# Patient Record
Sex: MALE | Race: WHITE | Employment: OTHER | URBAN - METROPOLITAN AREA
[De-identification: names, ages, dates, MRNs, and addresses within clinical notes are randomized per-mention and may not be internally consistent; named-entity substitution may affect disease eponyms.]

---

## 2017-03-06 ENCOUNTER — TELEPHONE (OUTPATIENT)
Dept: CARDIOLOGY CLINIC | Age: 67
End: 2017-03-06

## 2017-03-31 ENCOUNTER — OFFICE VISIT (OUTPATIENT)
Dept: CARDIOLOGY CLINIC | Age: 67
End: 2017-03-31

## 2017-03-31 VITALS
BODY MASS INDEX: 39.17 KG/M2 | HEIGHT: 75 IN | WEIGHT: 315 LBS | SYSTOLIC BLOOD PRESSURE: 122 MMHG | DIASTOLIC BLOOD PRESSURE: 88 MMHG | HEART RATE: 104 BPM

## 2017-03-31 DIAGNOSIS — R06.02 SOBOE (SHORTNESS OF BREATH ON EXERTION): ICD-10-CM

## 2017-03-31 DIAGNOSIS — I10 ESSENTIAL HYPERTENSION: Primary | ICD-10-CM

## 2017-03-31 PROCEDURE — 99214 OFFICE O/P EST MOD 30 MIN: CPT | Performed by: INTERNAL MEDICINE

## 2017-04-25 ENCOUNTER — PROCEDURE VISIT (OUTPATIENT)
Dept: CARDIOLOGY CLINIC | Age: 67
End: 2017-04-25

## 2017-04-25 DIAGNOSIS — R06.02 SOBOE (SHORTNESS OF BREATH ON EXERTION): ICD-10-CM

## 2017-04-25 DIAGNOSIS — I10 ESSENTIAL HYPERTENSION: ICD-10-CM

## 2017-04-25 LAB
LV EF: 41 %
LVEF MODALITY: NORMAL

## 2017-04-25 PROCEDURE — A9500 TC99M SESTAMIBI: HCPCS | Performed by: INTERNAL MEDICINE

## 2017-04-25 PROCEDURE — 93018 CV STRESS TEST I&R ONLY: CPT | Performed by: INTERNAL MEDICINE

## 2017-04-25 PROCEDURE — 93017 CV STRESS TEST TRACING ONLY: CPT | Performed by: INTERNAL MEDICINE

## 2017-04-25 PROCEDURE — 93016 CV STRESS TEST SUPVJ ONLY: CPT | Performed by: INTERNAL MEDICINE

## 2017-04-25 PROCEDURE — 78452 HT MUSCLE IMAGE SPECT MULT: CPT | Performed by: INTERNAL MEDICINE

## 2017-04-26 ENCOUNTER — TELEPHONE (OUTPATIENT)
Dept: CARDIOLOGY CLINIC | Age: 67
End: 2017-04-26

## 2017-04-29 ENCOUNTER — OFFICE VISIT (OUTPATIENT)
Dept: CARDIOLOGY CLINIC | Age: 67
End: 2017-04-29

## 2017-04-29 VITALS
HEIGHT: 75 IN | HEART RATE: 88 BPM | BODY MASS INDEX: 39.17 KG/M2 | WEIGHT: 315 LBS | SYSTOLIC BLOOD PRESSURE: 134 MMHG | DIASTOLIC BLOOD PRESSURE: 82 MMHG

## 2017-04-29 DIAGNOSIS — I10 ESSENTIAL HYPERTENSION: Primary | ICD-10-CM

## 2017-04-29 PROCEDURE — 99213 OFFICE O/P EST LOW 20 MIN: CPT | Performed by: INTERNAL MEDICINE

## 2017-05-03 ENCOUNTER — HOSPITAL ENCOUNTER (OUTPATIENT)
Dept: GENERAL RADIOLOGY | Age: 67
Discharge: HOME | End: 2017-05-03
Attending: INTERNAL MEDICINE | Admitting: INTERNAL MEDICINE

## 2017-05-03 DIAGNOSIS — Z01.811 PRE-OP CHEST EXAM: ICD-10-CM

## 2017-05-03 LAB
ANION GAP SERPL CALCULATED.3IONS-SCNC: 17 MMOL/L (ref 4–16)
APTT: 29.1 SECONDS (ref 21.2–33)
BUN BLDV-MCNC: 21 MG/DL (ref 6–23)
CALCIUM SERPL-MCNC: 9.3 MG/DL (ref 8.3–10.6)
CHLORIDE BLD-SCNC: 98 MMOL/L (ref 99–110)
CO2: 21 MMOL/L (ref 21–32)
CREAT SERPL-MCNC: 1.6 MG/DL (ref 0.9–1.3)
GFR AFRICAN AMERICAN: 53 ML/MIN/1.73M2
GFR NON-AFRICAN AMERICAN: 43 ML/MIN/1.73M2
GLUCOSE BLD-MCNC: 220 MG/DL (ref 70–140)
HCT VFR BLD CALC: 54 % (ref 42–52)
HEMOGLOBIN: 17.8 GM/DL (ref 13.5–18)
INR BLD: 1.01 INDEX
MCH RBC QN AUTO: 31.9 PG (ref 27–31)
MCHC RBC AUTO-ENTMCNC: 33 % (ref 32–36)
MCV RBC AUTO: 96.8 FL (ref 78–100)
PDW BLD-RTO: 13.7 % (ref 11.7–14.9)
PLATELET # BLD: 236 K/CU MM (ref 140–440)
PMV BLD AUTO: 9.7 FL (ref 7.5–11.1)
POTASSIUM SERPL-SCNC: 4.3 MMOL/L (ref 3.5–5.1)
PROTHROMBIN TIME: 11.5 SECONDS (ref 9.12–12.5)
RBC # BLD: 5.58 M/CU MM (ref 4.6–6.2)
SODIUM BLD-SCNC: 136 MMOL/L (ref 135–145)
WBC # BLD: 10 K/CU MM (ref 4–10.5)

## 2017-07-31 ENCOUNTER — OFFICE VISIT (OUTPATIENT)
Dept: CARDIOLOGY CLINIC | Age: 67
End: 2017-07-31

## 2017-07-31 VITALS
WEIGHT: 315 LBS | HEART RATE: 92 BPM | SYSTOLIC BLOOD PRESSURE: 128 MMHG | HEIGHT: 75 IN | BODY MASS INDEX: 39.17 KG/M2 | DIASTOLIC BLOOD PRESSURE: 84 MMHG

## 2017-07-31 DIAGNOSIS — R06.02 SOBOE (SHORTNESS OF BREATH ON EXERTION): Primary | ICD-10-CM

## 2017-07-31 DIAGNOSIS — I10 ESSENTIAL HYPERTENSION: ICD-10-CM

## 2017-07-31 PROCEDURE — 99214 OFFICE O/P EST MOD 30 MIN: CPT | Performed by: INTERNAL MEDICINE

## 2018-06-08 ENCOUNTER — OFFICE VISIT (OUTPATIENT)
Dept: CARDIOLOGY CLINIC | Age: 68
End: 2018-06-08

## 2018-06-08 VITALS
HEART RATE: 82 BPM | DIASTOLIC BLOOD PRESSURE: 76 MMHG | HEIGHT: 73 IN | BODY MASS INDEX: 41.75 KG/M2 | WEIGHT: 315 LBS | SYSTOLIC BLOOD PRESSURE: 120 MMHG

## 2018-06-08 DIAGNOSIS — E78.2 MIXED HYPERLIPIDEMIA: ICD-10-CM

## 2018-06-08 DIAGNOSIS — I12.9 HYPERTENSIVE KIDNEY DISEASE WITH CKD STAGE III (HCC): ICD-10-CM

## 2018-06-08 DIAGNOSIS — E66.09 OBESITY DUE TO EXCESS CALORIES, UNSPECIFIED CLASSIFICATION, UNSPECIFIED WHETHER SERIOUS COMORBIDITY PRESENT: Primary | ICD-10-CM

## 2018-06-08 DIAGNOSIS — N18.30 HYPERTENSIVE KIDNEY DISEASE WITH CKD STAGE III (HCC): ICD-10-CM

## 2018-06-08 DIAGNOSIS — E66.01 MORBID OBESITY WITH BMI OF 50.0-59.9, ADULT (HCC): ICD-10-CM

## 2018-06-08 PROCEDURE — 99214 OFFICE O/P EST MOD 30 MIN: CPT | Performed by: INTERNAL MEDICINE

## 2019-01-01 ENCOUNTER — HOSPITAL ENCOUNTER (OUTPATIENT)
Age: 69
Discharge: HOME | End: 2019-11-06

## 2019-01-01 ENCOUNTER — APPOINTMENT (OUTPATIENT)
Dept: MRI IMAGING | Age: 69
DRG: 853 | End: 2019-01-01
Payer: MEDICARE

## 2019-01-01 ENCOUNTER — APPOINTMENT (OUTPATIENT)
Dept: CT IMAGING | Age: 69
DRG: 853 | End: 2019-01-01
Payer: MEDICARE

## 2019-01-01 ENCOUNTER — HOSPITAL ENCOUNTER (OUTPATIENT)
Age: 69
Discharge: HOME | End: 2019-12-06

## 2019-01-01 ENCOUNTER — APPOINTMENT (OUTPATIENT)
Dept: CT IMAGING | Age: 69
DRG: 469 | End: 2019-01-01
Payer: MEDICARE

## 2019-01-01 ENCOUNTER — OUTSIDE SERVICES (OUTPATIENT)
Dept: WOUND CARE | Age: 69
End: 2019-01-01
Payer: MEDICARE

## 2019-01-01 ENCOUNTER — APPOINTMENT (OUTPATIENT)
Dept: GENERAL RADIOLOGY | Age: 69
DRG: 853 | End: 2019-01-01
Payer: MEDICARE

## 2019-01-01 ENCOUNTER — ANESTHESIA EVENT (OUTPATIENT)
Dept: OPERATING ROOM | Age: 69
DRG: 853 | End: 2019-01-01
Payer: MEDICARE

## 2019-01-01 ENCOUNTER — HOSPITAL ENCOUNTER (INPATIENT)
Age: 69
LOS: 23 days | Discharge: NURSING, REHAB OR LONG-TERM CARE | DRG: 853 | End: 2019-10-14
Attending: EMERGENCY MEDICINE | Admitting: INTERNAL MEDICINE
Payer: MEDICARE

## 2019-01-01 ENCOUNTER — HOSPITAL ENCOUNTER (OUTPATIENT)
Age: 69
Setting detail: SPECIMEN
Discharge: HOME | End: 2019-09-13
Payer: MEDICARE

## 2019-01-01 ENCOUNTER — HOSPITAL ENCOUNTER (EMERGENCY)
Age: 69
Discharge: HOME | End: 2019-10-28
Attending: EMERGENCY MEDICINE
Payer: MEDICARE

## 2019-01-01 ENCOUNTER — ANESTHESIA (OUTPATIENT)
Dept: OPERATING ROOM | Age: 69
DRG: 853 | End: 2019-01-01
Payer: MEDICARE

## 2019-01-01 ENCOUNTER — HOSPITAL ENCOUNTER (OUTPATIENT)
Age: 69
Setting detail: SPECIMEN
Discharge: HOME | End: 2019-12-13
Payer: MEDICARE

## 2019-01-01 ENCOUNTER — HOSPITAL ENCOUNTER (OUTPATIENT)
Dept: CT IMAGING | Age: 69
Discharge: HOME | End: 2019-08-13
Payer: MEDICARE

## 2019-01-01 ENCOUNTER — APPOINTMENT (OUTPATIENT)
Dept: GENERAL RADIOLOGY | Age: 69
DRG: 469 | End: 2019-01-01
Payer: MEDICARE

## 2019-01-01 ENCOUNTER — HOSPITAL ENCOUNTER (OUTPATIENT)
Age: 69
Setting detail: SPECIMEN
Discharge: HOME | End: 2019-12-10
Payer: MEDICARE

## 2019-01-01 ENCOUNTER — APPOINTMENT (OUTPATIENT)
Dept: GENERAL RADIOLOGY | Age: 69
End: 2019-01-01
Payer: MEDICARE

## 2019-01-01 ENCOUNTER — HOSPITAL ENCOUNTER (OUTPATIENT)
Age: 69
Setting detail: SPECIMEN
Discharge: HOME | End: 2019-11-05
Payer: MEDICARE

## 2019-01-01 ENCOUNTER — HOSPITAL ENCOUNTER (OUTPATIENT)
Age: 69
Discharge: HOME | End: 2019-09-12

## 2019-01-01 ENCOUNTER — HOSPITAL ENCOUNTER (OUTPATIENT)
Age: 69
Setting detail: SPECIMEN
Discharge: HOME | End: 2019-09-04
Payer: MEDICARE

## 2019-01-01 ENCOUNTER — HOSPITAL ENCOUNTER (OUTPATIENT)
Age: 69
Setting detail: SPECIMEN
Discharge: HOME | End: 2019-12-23
Payer: MEDICARE

## 2019-01-01 ENCOUNTER — HOSPITAL ENCOUNTER (OUTPATIENT)
Age: 69
Setting detail: SPECIMEN
Discharge: HOME | End: 2019-09-10

## 2019-01-01 ENCOUNTER — HOSPITAL ENCOUNTER (OUTPATIENT)
Age: 69
Setting detail: SPECIMEN
Discharge: HOME | End: 2019-12-08
Payer: MEDICARE

## 2019-01-01 ENCOUNTER — HOSPITAL ENCOUNTER (OUTPATIENT)
Age: 69
Setting detail: SPECIMEN
Discharge: HOME | End: 2019-10-21
Payer: MEDICARE

## 2019-01-01 ENCOUNTER — HOSPITAL ENCOUNTER (OUTPATIENT)
Age: 69
Setting detail: SPECIMEN
Discharge: HOME | DRG: 853 | End: 2019-09-20
Payer: MEDICARE

## 2019-01-01 ENCOUNTER — HOSPITAL ENCOUNTER (OUTPATIENT)
Age: 69
Discharge: HOME | End: 2019-10-31

## 2019-01-01 ENCOUNTER — HOSPITAL ENCOUNTER (OUTPATIENT)
Age: 69
Setting detail: SPECIMEN
Discharge: HOME | End: 2019-12-20
Payer: MEDICARE

## 2019-01-01 ENCOUNTER — HOSPITAL ENCOUNTER (OUTPATIENT)
Age: 69
Setting detail: SPECIMEN
Discharge: HOME | End: 2019-12-16
Payer: MEDICARE

## 2019-01-01 ENCOUNTER — ANESTHESIA EVENT (OUTPATIENT)
Dept: OPERATING ROOM | Age: 69
DRG: 469 | End: 2019-01-01
Payer: MEDICARE

## 2019-01-01 ENCOUNTER — HOSPITAL ENCOUNTER (OUTPATIENT)
Age: 69
Setting detail: SPECIMEN
Discharge: HOME | End: 2019-12-07
Payer: MEDICARE

## 2019-01-01 ENCOUNTER — HOSPITAL ENCOUNTER (OUTPATIENT)
Age: 69
Discharge: HOME | End: 2019-11-29

## 2019-01-01 ENCOUNTER — HOSPITAL ENCOUNTER (OUTPATIENT)
Age: 69
Setting detail: SPECIMEN
Discharge: HOME | End: 2019-12-09
Payer: MEDICARE

## 2019-01-01 ENCOUNTER — HOSPITAL ENCOUNTER (OUTPATIENT)
Age: 69
Setting detail: SPECIMEN
Discharge: HOME | End: 2019-12-12
Payer: MEDICARE

## 2019-01-01 ENCOUNTER — HOSPITAL ENCOUNTER (OUTPATIENT)
Age: 69
Setting detail: SPECIMEN
Discharge: HOME | End: 2019-10-30
Payer: MEDICARE

## 2019-01-01 ENCOUNTER — HOSPITAL ENCOUNTER (OUTPATIENT)
Age: 69
Setting detail: SPECIMEN
Discharge: HOME | End: 2019-12-21
Payer: MEDICARE

## 2019-01-01 ENCOUNTER — HOSPITAL ENCOUNTER (OUTPATIENT)
Age: 69
Discharge: HOME | End: 2019-12-05

## 2019-01-01 ENCOUNTER — HOSPITAL ENCOUNTER (OUTPATIENT)
Age: 69
Setting detail: SPECIMEN
Discharge: HOME | End: 2019-10-15
Payer: MEDICARE

## 2019-01-01 ENCOUNTER — HOSPITAL ENCOUNTER (INPATIENT)
Age: 69
LOS: 5 days | Discharge: NURSING, REHAB OR LONG-TERM CARE | DRG: 469 | End: 2019-09-02
Attending: EMERGENCY MEDICINE | Admitting: INTERNAL MEDICINE
Payer: MEDICARE

## 2019-01-01 ENCOUNTER — APPOINTMENT (OUTPATIENT)
Dept: CT IMAGING | Age: 69
End: 2019-01-01
Payer: MEDICARE

## 2019-01-01 ENCOUNTER — HOSPITAL ENCOUNTER (OUTPATIENT)
Age: 69
Discharge: HOME | End: 2019-09-16

## 2019-01-01 ENCOUNTER — HOSPITAL ENCOUNTER (OUTPATIENT)
Age: 69
Setting detail: SPECIMEN
Discharge: HOME | End: 2019-11-04
Payer: MEDICARE

## 2019-01-01 ENCOUNTER — TELEPHONE (OUTPATIENT)
Dept: SURGERY | Age: 69
End: 2019-01-01

## 2019-01-01 ENCOUNTER — HOSPITAL ENCOUNTER (OUTPATIENT)
Age: 69
Setting detail: SPECIMEN
Discharge: HOME | End: 2019-09-03
Payer: MEDICARE

## 2019-01-01 ENCOUNTER — HOSPITAL ENCOUNTER (OUTPATIENT)
Age: 69
Discharge: HOME | End: 2019-09-06

## 2019-01-01 ENCOUNTER — APPOINTMENT (OUTPATIENT)
Dept: ULTRASOUND IMAGING | Age: 69
DRG: 853 | End: 2019-01-01
Payer: MEDICARE

## 2019-01-01 ENCOUNTER — TELEPHONE (OUTPATIENT)
Dept: BARIATRICS/WEIGHT MGMT | Age: 69
End: 2019-01-01

## 2019-01-01 ENCOUNTER — HOSPITAL ENCOUNTER (OUTPATIENT)
Age: 69
Discharge: HOME | End: 2019-09-19

## 2019-01-01 ENCOUNTER — OFFICE VISIT (OUTPATIENT)
Dept: CARDIOLOGY CLINIC | Age: 69
End: 2019-01-01
Payer: MEDICARE

## 2019-01-01 ENCOUNTER — HOSPITAL ENCOUNTER (OUTPATIENT)
Age: 69
Discharge: HOME | End: 2019-09-09

## 2019-01-01 ENCOUNTER — HOSPITAL ENCOUNTER (OUTPATIENT)
Age: 69
Setting detail: SPECIMEN
Discharge: HOME | End: 2019-10-28
Payer: MEDICARE

## 2019-01-01 ENCOUNTER — HOSPITAL ENCOUNTER (OUTPATIENT)
Age: 69
Discharge: HOME | End: 2019-10-23

## 2019-01-01 ENCOUNTER — HOSPITAL ENCOUNTER (INPATIENT)
Age: 69
LOS: 18 days | Discharge: HOSPICE | DRG: 853 | End: 2020-01-10
Attending: EMERGENCY MEDICINE | Admitting: INTERNAL MEDICINE
Payer: MEDICARE

## 2019-01-01 ENCOUNTER — ANESTHESIA (OUTPATIENT)
Dept: OPERATING ROOM | Age: 69
DRG: 469 | End: 2019-01-01
Payer: MEDICARE

## 2019-01-01 ENCOUNTER — CARE COORDINATION (OUTPATIENT)
Dept: CASE MANAGEMENT | Age: 69
End: 2019-01-01

## 2019-01-01 ENCOUNTER — HOSPITAL ENCOUNTER (OUTPATIENT)
Age: 69
Setting detail: SPECIMEN
Discharge: HOME | End: 2019-12-22
Payer: MEDICARE

## 2019-01-01 ENCOUNTER — HOSPITAL ENCOUNTER (INPATIENT)
Age: 69
LOS: 17 days | Discharge: NURSING, REHAB OR LONG-TERM CARE | DRG: 853 | End: 2019-11-27
Attending: EMERGENCY MEDICINE | Admitting: INTERNAL MEDICINE
Payer: MEDICARE

## 2019-01-01 ENCOUNTER — HOSPITAL ENCOUNTER (OUTPATIENT)
Age: 69
Setting detail: SPECIMEN
Discharge: HOME | End: 2019-12-15
Payer: MEDICARE

## 2019-01-01 ENCOUNTER — HOSPITAL ENCOUNTER (OUTPATIENT)
Age: 69
Discharge: HOME | End: 2019-10-24

## 2019-01-01 ENCOUNTER — HOSPITAL ENCOUNTER (OUTPATIENT)
Age: 69
Setting detail: SPECIMEN
Discharge: HOME | End: 2019-12-11
Payer: MEDICARE

## 2019-01-01 VITALS
WEIGHT: 309.56 LBS | DIASTOLIC BLOOD PRESSURE: 59 MMHG | HEART RATE: 105 BPM | OXYGEN SATURATION: 91 % | SYSTOLIC BLOOD PRESSURE: 106 MMHG | HEIGHT: 75 IN | TEMPERATURE: 98.1 F | BODY MASS INDEX: 38.49 KG/M2 | RESPIRATION RATE: 16 BRPM

## 2019-01-01 VITALS
SYSTOLIC BLOOD PRESSURE: 142 MMHG | WEIGHT: 315 LBS | TEMPERATURE: 97.8 F | HEART RATE: 98 BPM | BODY MASS INDEX: 39.17 KG/M2 | HEIGHT: 75 IN | OXYGEN SATURATION: 98 % | RESPIRATION RATE: 23 BRPM | DIASTOLIC BLOOD PRESSURE: 92 MMHG

## 2019-01-01 VITALS
SYSTOLIC BLOOD PRESSURE: 99 MMHG | DIASTOLIC BLOOD PRESSURE: 67 MMHG | TEMPERATURE: 97.9 F | WEIGHT: 315 LBS | HEART RATE: 92 BPM | BODY MASS INDEX: 39.17 KG/M2 | HEIGHT: 75 IN | RESPIRATION RATE: 22 BRPM | OXYGEN SATURATION: 91 %

## 2019-01-01 VITALS
OXYGEN SATURATION: 98 % | BODY MASS INDEX: 39.17 KG/M2 | DIASTOLIC BLOOD PRESSURE: 75 MMHG | WEIGHT: 315 LBS | RESPIRATION RATE: 30 BRPM | SYSTOLIC BLOOD PRESSURE: 140 MMHG | TEMPERATURE: 98 F | HEIGHT: 75 IN | HEART RATE: 93 BPM

## 2019-01-01 VITALS
TEMPERATURE: 97.2 F | SYSTOLIC BLOOD PRESSURE: 116 MMHG | OXYGEN SATURATION: 98 % | DIASTOLIC BLOOD PRESSURE: 69 MMHG | RESPIRATION RATE: 20 BRPM

## 2019-01-01 VITALS
DIASTOLIC BLOOD PRESSURE: 76 MMHG | RESPIRATION RATE: 11 BRPM | TEMPERATURE: 98.6 F | SYSTOLIC BLOOD PRESSURE: 111 MMHG | OXYGEN SATURATION: 100 %

## 2019-01-01 VITALS
BODY MASS INDEX: 39.17 KG/M2 | WEIGHT: 315 LBS | HEART RATE: 104 BPM | SYSTOLIC BLOOD PRESSURE: 122 MMHG | DIASTOLIC BLOOD PRESSURE: 76 MMHG | HEIGHT: 75 IN

## 2019-01-01 VITALS
TEMPERATURE: 98.2 F | OXYGEN SATURATION: 100 % | RESPIRATION RATE: 19 BRPM | DIASTOLIC BLOOD PRESSURE: 87 MMHG | SYSTOLIC BLOOD PRESSURE: 134 MMHG

## 2019-01-01 VITALS
DIASTOLIC BLOOD PRESSURE: 67 MMHG | SYSTOLIC BLOOD PRESSURE: 112 MMHG | OXYGEN SATURATION: 99 % | RESPIRATION RATE: 10 BRPM

## 2019-01-01 VITALS
SYSTOLIC BLOOD PRESSURE: 157 MMHG | RESPIRATION RATE: 17 BRPM | TEMPERATURE: 97.8 F | OXYGEN SATURATION: 100 % | DIASTOLIC BLOOD PRESSURE: 96 MMHG

## 2019-01-01 DIAGNOSIS — S72.011A SUBCAPITAL FRACTURE OF HIP, RIGHT, CLOSED, INITIAL ENCOUNTER (HCC): Primary | ICD-10-CM

## 2019-01-01 DIAGNOSIS — E66.09 OBESITY DUE TO EXCESS CALORIES, UNSPECIFIED CLASSIFICATION, UNSPECIFIED WHETHER SERIOUS COMORBIDITY PRESENT: ICD-10-CM

## 2019-01-01 DIAGNOSIS — T81.89XA NON-HEALING SURGICAL WOUND, INITIAL ENCOUNTER: ICD-10-CM

## 2019-01-01 DIAGNOSIS — A41.9 SEPSIS, DUE TO UNSPECIFIED ORGANISM, UNSPECIFIED WHETHER ACUTE ORGAN DYSFUNCTION PRESENT (HCC): Primary | ICD-10-CM

## 2019-01-01 DIAGNOSIS — E78.2 MIXED HYPERLIPIDEMIA: ICD-10-CM

## 2019-01-01 DIAGNOSIS — L89.150 PRESSURE ULCER OF COCCYGEAL REGION, UNSTAGEABLE (HCC): Primary | ICD-10-CM

## 2019-01-01 DIAGNOSIS — Z92.89 H/O CARDIOVASCULAR STRESS TEST: ICD-10-CM

## 2019-01-01 DIAGNOSIS — L97.113: ICD-10-CM

## 2019-01-01 DIAGNOSIS — N18.30 CHRONIC KIDNEY DISEASE, STAGE III (MODERATE) (HCC): ICD-10-CM

## 2019-01-01 DIAGNOSIS — F03.90 DEMENTIA WITHOUT BEHAVIORAL DISTURBANCE, UNSPECIFIED DEMENTIA TYPE: ICD-10-CM
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
## 2019-01-01 DIAGNOSIS — L89.150 PRESSURE ULCER OF COCCYGEAL REGION, UNSTAGEABLE (HCC): ICD-10-CM

## 2019-01-01 DIAGNOSIS — I10 ESSENTIAL HYPERTENSION: Primary | ICD-10-CM

## 2019-01-01 DIAGNOSIS — E87.70 HYPERVOLEMIA, UNSPECIFIED HYPERVOLEMIA TYPE: Primary | ICD-10-CM

## 2019-01-01 DIAGNOSIS — L89.154 PRESSURE INJURY OF SACRAL REGION, STAGE 4 (HCC): ICD-10-CM

## 2019-01-01 DIAGNOSIS — S40.811A ABRASION OF RIGHT ARM, INITIAL ENCOUNTER: ICD-10-CM

## 2019-01-01 LAB
ABO/RH: NORMAL
ACQUISITION DURATION: NORMAL S
ALBUMIN SERPL-MCNC: 2 GM/DL (ref 3.4–5)
ALBUMIN SERPL-MCNC: 2 GM/DL (ref 3.4–5)
ALBUMIN SERPL-MCNC: 2.1 GM/DL (ref 3.4–5)
ALBUMIN SERPL-MCNC: 2.2 GM/DL (ref 3.4–5)
ALBUMIN SERPL-MCNC: 2.2 GM/DL (ref 3.4–5)
ALBUMIN SERPL-MCNC: 2.3 GM/DL (ref 3.4–5)
ALBUMIN SERPL-MCNC: 2.4 GM/DL (ref 3.4–5)
ALBUMIN SERPL-MCNC: 2.5 GM/DL (ref 3.4–5)
ALBUMIN SERPL-MCNC: 2.6 GM/DL (ref 3.4–5)
ALBUMIN SERPL-MCNC: 2.7 GM/DL (ref 3.4–5)
ALBUMIN SERPL-MCNC: 2.8 GM/DL (ref 3.4–5)
ALBUMIN SERPL-MCNC: 2.8 GM/DL (ref 3.4–5)
ALBUMIN SERPL-MCNC: 3.1 GM/DL (ref 3.4–5)
ALBUMIN SERPL-MCNC: 4.1 GM/DL (ref 3.4–5)
ALP BLD-CCNC: 100 IU/L (ref 40–128)
ALP BLD-CCNC: 101 IU/L (ref 40–128)
ALP BLD-CCNC: 104 IU/L (ref 40–128)
ALP BLD-CCNC: 104 IU/L (ref 40–128)
ALP BLD-CCNC: 104 IU/L (ref 40–129)
ALP BLD-CCNC: 106 IU/L (ref 40–128)
ALP BLD-CCNC: 106 IU/L (ref 40–128)
ALP BLD-CCNC: 111 IU/L (ref 40–128)
ALP BLD-CCNC: 114 IU/L (ref 40–128)
ALP BLD-CCNC: 115 IU/L (ref 40–128)
ALP BLD-CCNC: 116 IU/L (ref 40–128)
ALP BLD-CCNC: 119 IU/L (ref 40–128)
ALP BLD-CCNC: 122 IU/L (ref 40–128)
ALP BLD-CCNC: 122 IU/L (ref 40–128)
ALP BLD-CCNC: 123 IU/L (ref 40–128)
ALP BLD-CCNC: 125 IU/L (ref 40–129)
ALP BLD-CCNC: 130 IU/L (ref 40–128)
ALP BLD-CCNC: 139 IU/L (ref 40–129)
ALP BLD-CCNC: 140 IU/L (ref 40–129)
ALP BLD-CCNC: 143 IU/L (ref 40–129)
ALP BLD-CCNC: 69 IU/L (ref 40–128)
ALP BLD-CCNC: 73 IU/L (ref 40–129)
ALP BLD-CCNC: 99 IU/L (ref 40–128)
ALT SERPL-CCNC: 10 U/L (ref 10–40)
ALT SERPL-CCNC: 17 U/L (ref 10–40)
ALT SERPL-CCNC: 18 U/L (ref 10–40)
ALT SERPL-CCNC: 19 U/L (ref 10–40)
ALT SERPL-CCNC: 19 U/L (ref 10–40)
ALT SERPL-CCNC: 24 U/L (ref 10–40)
ALT SERPL-CCNC: 28 U/L (ref 10–40)
ALT SERPL-CCNC: 28 U/L (ref 10–40)
ALT SERPL-CCNC: 36 U/L (ref 10–40)
ALT SERPL-CCNC: 38 U/L (ref 10–40)
ALT SERPL-CCNC: 38 U/L (ref 10–40)
ALT SERPL-CCNC: 42 U/L (ref 10–40)
ALT SERPL-CCNC: 42 U/L (ref 10–40)
ALT SERPL-CCNC: 44 U/L (ref 10–40)
ALT SERPL-CCNC: 8 U/L (ref 10–40)
ALT SERPL-CCNC: 9 U/L (ref 10–40)
ALT SERPL-CCNC: 98 U/L (ref 10–40)
ANION GAP SERPL CALCULATED.3IONS-SCNC: 10 MMOL/L (ref 4–16)
ANION GAP SERPL CALCULATED.3IONS-SCNC: 11 MMOL/L (ref 4–16)
ANION GAP SERPL CALCULATED.3IONS-SCNC: 12 MMOL/L (ref 4–16)
ANION GAP SERPL CALCULATED.3IONS-SCNC: 13 MMOL/L (ref 4–16)
ANION GAP SERPL CALCULATED.3IONS-SCNC: 14 MMOL/L (ref 4–16)
ANION GAP SERPL CALCULATED.3IONS-SCNC: 15 MMOL/L (ref 4–16)
ANION GAP SERPL CALCULATED.3IONS-SCNC: 7 MMOL/L (ref 4–16)
ANION GAP SERPL CALCULATED.3IONS-SCNC: 8 MMOL/L (ref 4–16)
ANION GAP SERPL CALCULATED.3IONS-SCNC: 8 MMOL/L (ref 4–16)
ANION GAP SERPL CALCULATED.3IONS-SCNC: 9 MMOL/L (ref 4–16)
ANTIBODY SCREEN: NEGATIVE
AST SERPL-CCNC: 103 IU/L (ref 15–37)
AST SERPL-CCNC: 13 IU/L (ref 15–37)
AST SERPL-CCNC: 13 IU/L (ref 15–37)
AST SERPL-CCNC: 15 IU/L (ref 15–37)
AST SERPL-CCNC: 15 IU/L (ref 15–37)
AST SERPL-CCNC: 16 IU/L (ref 15–37)
AST SERPL-CCNC: 16 IU/L (ref 15–37)
AST SERPL-CCNC: 17 IU/L (ref 15–37)
AST SERPL-CCNC: 18 IU/L (ref 15–37)
AST SERPL-CCNC: 19 IU/L (ref 15–37)
AST SERPL-CCNC: 20 IU/L (ref 15–37)
AST SERPL-CCNC: 21 IU/L (ref 15–37)
AST SERPL-CCNC: 22 IU/L (ref 15–37)
AST SERPL-CCNC: 23 IU/L (ref 15–37)
AST SERPL-CCNC: 31 IU/L (ref 15–37)
AST SERPL-CCNC: 32 IU/L (ref 15–37)
AST SERPL-CCNC: 36 IU/L (ref 15–37)
AST SERPL-CCNC: 38 IU/L (ref 15–37)
AST SERPL-CCNC: 40 IU/L (ref 15–37)
AST SERPL-CCNC: 41 IU/L (ref 15–37)
AST SERPL-CCNC: 42 IU/L (ref 15–37)
AST SERPL-CCNC: 49 IU/L (ref 15–37)
AST SERPL-CCNC: 61 IU/L (ref 15–37)
AVERAGE HEART RATE: 92 BPM
BACTERIA: ABNORMAL /HPF
BACTERIA: NEGATIVE /HPF
BANDED NEUTROPHILS ABSOLUTE COUNT: 0.2 K/CU MM
BANDED NEUTROPHILS ABSOLUTE COUNT: 0.22 K/CU MM
BANDED NEUTROPHILS ABSOLUTE COUNT: 0.62 K/CU MM
BANDED NEUTROPHILS ABSOLUTE COUNT: 0.63 K/CU MM
BANDED NEUTROPHILS ABSOLUTE COUNT: 0.8 K/CU MM
BANDED NEUTROPHILS ABSOLUTE COUNT: 1.32 K/CU MM
BANDED NEUTROPHILS ABSOLUTE COUNT: 2.29 K/CU MM
BANDED NEUTROPHILS ABSOLUTE COUNT: 3.07 K/CU MM
BANDED NEUTROPHILS RELATIVE PERCENT: 1 % (ref 5–11)
BANDED NEUTROPHILS RELATIVE PERCENT: 1 % (ref 5–11)
BANDED NEUTROPHILS RELATIVE PERCENT: 10 % (ref 5–11)
BANDED NEUTROPHILS RELATIVE PERCENT: 11 % (ref 5–11)
BANDED NEUTROPHILS RELATIVE PERCENT: 3 % (ref 5–11)
BANDED NEUTROPHILS RELATIVE PERCENT: 3 % (ref 5–11)
BANDED NEUTROPHILS RELATIVE PERCENT: 4 % (ref 5–11)
BANDED NEUTROPHILS RELATIVE PERCENT: 9 % (ref 5–11)
BASE EXCESS: ABNORMAL (ref 0–3.3)
BASOPHILIC STIPPLING: PRESENT
BASOPHILS ABSOLUTE: 0 K/CU MM
BASOPHILS ABSOLUTE: 0.1 K/CU MM
BASOPHILS RELATIVE PERCENT: 0.2 % (ref 0–1)
BASOPHILS RELATIVE PERCENT: 0.3 % (ref 0–1)
BASOPHILS RELATIVE PERCENT: 0.4 % (ref 0–1)
BASOPHILS RELATIVE PERCENT: 0.5 % (ref 0–1)
BASOPHILS RELATIVE PERCENT: 0.6 % (ref 0–1)
BASOPHILS RELATIVE PERCENT: 0.6 % (ref 0–1)
BASOPHILS RELATIVE PERCENT: 0.7 % (ref 0–1)
BASOPHILS RELATIVE PERCENT: 0.7 % (ref 0–1)
BASOPHILS RELATIVE PERCENT: 0.8 % (ref 0–1)
BASOPHILS RELATIVE PERCENT: 0.9 % (ref 0–1)
BASOPHILS RELATIVE PERCENT: 0.9 % (ref 0–1)
BASOPHILS RELATIVE PERCENT: 1 % (ref 0–1)
BASOPHILS RELATIVE PERCENT: 1.1 % (ref 0–1)
BASOPHILS RELATIVE PERCENT: 1.2 % (ref 0–1)
BILIRUB SERPL-MCNC: 0.3 MG/DL (ref 0–1)
BILIRUB SERPL-MCNC: 0.4 MG/DL (ref 0–1)
BILIRUB SERPL-MCNC: 0.5 MG/DL (ref 0–1)
BILIRUB SERPL-MCNC: 0.7 MG/DL (ref 0–1)
BILIRUB SERPL-MCNC: 0.7 MG/DL (ref 0–1)
BILIRUB SERPL-MCNC: 1.1 MG/DL (ref 0–1)
BILIRUB SERPL-MCNC: 1.8 MG/DL (ref 0–1)
BILIRUBIN URINE: ABNORMAL MG/DL
BILIRUBIN URINE: NEGATIVE MG/DL
BLOOD, URINE: ABNORMAL
BUN BLDV-MCNC: 11 MG/DL (ref 6–23)
BUN BLDV-MCNC: 12 MG/DL (ref 6–23)
BUN BLDV-MCNC: 12 MG/DL (ref 6–23)
BUN BLDV-MCNC: 13 MG/DL (ref 6–23)
BUN BLDV-MCNC: 13 MG/DL (ref 6–23)
BUN BLDV-MCNC: 14 MG/DL (ref 6–23)
BUN BLDV-MCNC: 15 MG/DL (ref 6–23)
BUN BLDV-MCNC: 16 MG/DL (ref 6–23)
BUN BLDV-MCNC: 17 MG/DL (ref 6–23)
BUN BLDV-MCNC: 18 MG/DL (ref 6–23)
BUN BLDV-MCNC: 20 MG/DL (ref 6–23)
BUN BLDV-MCNC: 21 MG/DL (ref 6–23)
BUN BLDV-MCNC: 22 MG/DL (ref 6–23)
BUN BLDV-MCNC: 23 MG/DL (ref 6–23)
BUN BLDV-MCNC: 24 MG/DL (ref 6–23)
BUN BLDV-MCNC: 25 MG/DL (ref 6–23)
BUN BLDV-MCNC: 27 MG/DL (ref 6–23)
BUN BLDV-MCNC: 28 MG/DL (ref 6–23)
BUN BLDV-MCNC: 30 MG/DL (ref 6–23)
BUN BLDV-MCNC: 31 MG/DL (ref 6–23)
BUN BLDV-MCNC: 34 MG/DL (ref 6–23)
BUN BLDV-MCNC: 36 MG/DL (ref 6–23)
BUN BLDV-MCNC: 38 MG/DL (ref 6–23)
BUN BLDV-MCNC: 39 MG/DL (ref 6–23)
BUN BLDV-MCNC: 39 MG/DL (ref 6–23)
BUN BLDV-MCNC: 41 MG/DL (ref 6–23)
BUN BLDV-MCNC: 42 MG/DL (ref 6–23)
BUN BLDV-MCNC: 43 MG/DL (ref 6–23)
BUN BLDV-MCNC: 45 MG/DL (ref 6–23)
BUN BLDV-MCNC: 49 MG/DL (ref 6–23)
BUN BLDV-MCNC: 50 MG/DL (ref 6–23)
BUN BLDV-MCNC: 7 MG/DL (ref 6–23)
BUN BLDV-MCNC: 7 MG/DL (ref 6–23)
BUN BLDV-MCNC: 9 MG/DL (ref 6–23)
C-REACTIVE PROTEIN, HIGH SENSITIVITY: 108.7 MG/L
C-REACTIVE PROTEIN, HIGH SENSITIVITY: 55.9 MG/L
C-REACTIVE PROTEIN, HIGH SENSITIVITY: 83.3 MG/L
C-REACTIVE PROTEIN, HIGH SENSITIVITY: 99.3 MG/L
CALCIUM SERPL-MCNC: 10 MG/DL (ref 8.3–10.6)
CALCIUM SERPL-MCNC: 10.2 MG/DL (ref 8.3–10.6)
CALCIUM SERPL-MCNC: 7.4 MG/DL (ref 8.3–10.6)
CALCIUM SERPL-MCNC: 7.4 MG/DL (ref 8.3–10.6)
CALCIUM SERPL-MCNC: 7.5 MG/DL (ref 8.3–10.6)
CALCIUM SERPL-MCNC: 7.7 MG/DL (ref 8.3–10.6)
CALCIUM SERPL-MCNC: 7.8 MG/DL (ref 8.3–10.6)
CALCIUM SERPL-MCNC: 7.9 MG/DL (ref 8.3–10.6)
CALCIUM SERPL-MCNC: 8 MG/DL (ref 8.3–10.6)
CALCIUM SERPL-MCNC: 8.1 MG/DL (ref 8.3–10.6)
CALCIUM SERPL-MCNC: 8.2 MG/DL (ref 8.3–10.6)
CALCIUM SERPL-MCNC: 8.3 MG/DL (ref 8.3–10.6)
CALCIUM SERPL-MCNC: 8.3 MG/DL (ref 8.3–10.6)
CALCIUM SERPL-MCNC: 8.5 MG/DL (ref 8.3–10.6)
CALCIUM SERPL-MCNC: 8.6 MG/DL (ref 8.3–10.6)
CALCIUM SERPL-MCNC: 8.7 MG/DL (ref 8.3–10.6)
CALCIUM SERPL-MCNC: 8.7 MG/DL (ref 8.3–10.6)
CALCIUM SERPL-MCNC: 8.8 MG/DL (ref 8.3–10.6)
CALCIUM SERPL-MCNC: 8.9 MG/DL (ref 8.3–10.6)
CALCIUM SERPL-MCNC: 9 MG/DL (ref 8.3–10.6)
CALCIUM SERPL-MCNC: 9 MG/DL (ref 8.3–10.6)
CALCIUM SERPL-MCNC: 9.1 MG/DL (ref 8.3–10.6)
CALCIUM SERPL-MCNC: 9.1 MG/DL (ref 8.3–10.6)
CALCIUM SERPL-MCNC: 9.2 MG/DL (ref 8.3–10.6)
CALCIUM SERPL-MCNC: 9.3 MG/DL (ref 8.3–10.6)
CALCIUM SERPL-MCNC: 9.3 MG/DL (ref 8.3–10.6)
CALCIUM SERPL-MCNC: 9.5 MG/DL (ref 8.3–10.6)
CALCIUM SERPL-MCNC: 9.6 MG/DL (ref 8.3–10.6)
CALCIUM SERPL-MCNC: 9.6 MG/DL (ref 8.3–10.6)
CALCIUM SERPL-MCNC: 9.8 MG/DL (ref 8.3–10.6)
CALCIUM SERPL-MCNC: 9.9 MG/DL (ref 8.3–10.6)
CARBON MONOXIDE, BLOOD: 1.9 % (ref 0–5)
CARBON MONOXIDE, BLOOD: 2.2 % (ref 0–5)
CARBON MONOXIDE, BLOOD: 2.5 % (ref 0–5)
CELLULAR CASTS: 64 /LPF
CHLORIDE BLD-SCNC: 100 MMOL/L (ref 99–110)
CHLORIDE BLD-SCNC: 101 MMOL/L (ref 99–110)
CHLORIDE BLD-SCNC: 102 MMOL/L (ref 99–110)
CHLORIDE BLD-SCNC: 103 MMOL/L (ref 99–110)
CHLORIDE BLD-SCNC: 104 MMOL/L (ref 99–110)
CHLORIDE BLD-SCNC: 105 MMOL/L (ref 99–110)
CHLORIDE BLD-SCNC: 105 MMOL/L (ref 99–110)
CHLORIDE BLD-SCNC: 106 MMOL/L (ref 99–110)
CHLORIDE BLD-SCNC: 108 MMOL/L (ref 99–110)
CHLORIDE BLD-SCNC: 91 MMOL/L (ref 99–110)
CHLORIDE BLD-SCNC: 94 MMOL/L (ref 99–110)
CHLORIDE BLD-SCNC: 95 MMOL/L (ref 99–110)
CHLORIDE BLD-SCNC: 95 MMOL/L (ref 99–110)
CHLORIDE BLD-SCNC: 96 MMOL/L (ref 99–110)
CHLORIDE BLD-SCNC: 97 MMOL/L (ref 99–110)
CHLORIDE BLD-SCNC: 97 MMOL/L (ref 99–110)
CHLORIDE BLD-SCNC: 98 MMOL/L (ref 99–110)
CHLORIDE BLD-SCNC: 99 MMOL/L (ref 99–110)
CHLORIDE URINE RANDOM: 12 MMOL/L (ref 43–210)
CLARITY: ABNORMAL
CLOSTRIDIUM DIFFICILE, PCR: NORMAL
CO2 CONTENT: 23.2 MMOL/L (ref 19–24)
CO2 CONTENT: 23.7 MMOL/L (ref 19–24)
CO2 CONTENT: 25.6 MMOL/L (ref 19–24)
CO2: 19 MMOL/L (ref 21–32)
CO2: 21 MMOL/L (ref 21–32)
CO2: 21 MMOL/L (ref 21–32)
CO2: 22 MMOL/L (ref 21–32)
CO2: 23 MMOL/L (ref 21–32)
CO2: 24 MMOL/L (ref 21–32)
CO2: 25 MMOL/L (ref 21–32)
CO2: 26 MMOL/L (ref 21–32)
CO2: 27 MMOL/L (ref 21–32)
CO2: 28 MMOL/L (ref 21–32)
CO2: 28 MMOL/L (ref 21–32)
CO2: 29 MMOL/L (ref 21–32)
CO2: 30 MMOL/L (ref 21–32)
CO2: 30 MMOL/L (ref 21–32)
CO2: 31 MMOL/L (ref 21–32)
CO2: 33 MMOL/L (ref 21–32)
COLOR: ABNORMAL
COMMENT: ABNORMAL
CREAT SERPL-MCNC: 0.9 MG/DL (ref 0.9–1.3)
CREAT SERPL-MCNC: 1 MG/DL (ref 0.9–1.3)
CREAT SERPL-MCNC: 1.1 MG/DL (ref 0.9–1.3)
CREAT SERPL-MCNC: 1.2 MG/DL (ref 0.9–1.3)
CREAT SERPL-MCNC: 1.3 MG/DL (ref 0.9–1.3)
CREAT SERPL-MCNC: 1.4 MG/DL (ref 0.9–1.3)
CREAT SERPL-MCNC: 1.5 MG/DL (ref 0.9–1.3)
CREAT SERPL-MCNC: 1.7 MG/DL (ref 0.9–1.3)
CREAT SERPL-MCNC: 1.7 MG/DL (ref 0.9–1.3)
CREAT SERPL-MCNC: 1.8 MG/DL (ref 0.9–1.3)
CREAT SERPL-MCNC: 1.8 MG/DL (ref 0.9–1.3)
CREAT SERPL-MCNC: 2.1 MG/DL (ref 0.9–1.3)
CREAT SERPL-MCNC: 2.2 MG/DL (ref 0.9–1.3)
CREAT SERPL-MCNC: 2.3 MG/DL (ref 0.9–1.3)
CREATININE URINE: 184.1 MG/DL (ref 39–259)
CREATININE URINE: 35.7 MG/DL (ref 39–259)
CULTURE: ABNORMAL
CULTURE: NORMAL
D DIMER: 2110 NG/ML(DDU)
DIFFERENTIAL TYPE: ABNORMAL
DOSE AMOUNT: ABNORMAL
DOSE AMOUNT: NORMAL
DOSE TIME: ABNORMAL
DOSE TIME: NORMAL
EKG ATRIAL RATE: 137 BPM
EKG ATRIAL RATE: 88 BPM
EKG ATRIAL RATE: 92 BPM
EKG ATRIAL RATE: 97 BPM
EKG ATRIAL RATE: 99 BPM
EKG ATRIAL RATE: 99 BPM
EKG DIAGNOSIS: NORMAL
EKG P AXIS: 20 DEGREES
EKG P AXIS: 27 DEGREES
EKG P AXIS: 59 DEGREES
EKG P AXIS: 61 DEGREES
EKG P AXIS: 71 DEGREES
EKG P-R INTERVAL: 120 MS
EKG P-R INTERVAL: 174 MS
EKG P-R INTERVAL: 182 MS
EKG P-R INTERVAL: 184 MS
EKG P-R INTERVAL: 200 MS
EKG P-R INTERVAL: 210 MS
EKG Q-T INTERVAL: 332 MS
EKG Q-T INTERVAL: 404 MS
EKG Q-T INTERVAL: 424 MS
EKG Q-T INTERVAL: 434 MS
EKG Q-T INTERVAL: 442 MS
EKG Q-T INTERVAL: 490 MS
EKG QRS DURATION: 146 MS
EKG QRS DURATION: 150 MS
EKG QRS DURATION: 156 MS
EKG QRS DURATION: 162 MS
EKG QRS DURATION: 174 MS
EKG QRS DURATION: 184 MS
EKG QTC CALCULATION (BAZETT): 501 MS
EKG QTC CALCULATION (BAZETT): 518 MS
EKG QTC CALCULATION (BAZETT): 524 MS
EKG QTC CALCULATION (BAZETT): 534 MS
EKG QTC CALCULATION (BAZETT): 551 MS
EKG QTC CALCULATION (BAZETT): 628 MS
EKG R AXIS: -54 DEGREES
EKG R AXIS: -55 DEGREES
EKG R AXIS: -67 DEGREES
EKG R AXIS: -73 DEGREES
EKG R AXIS: -75 DEGREES
EKG R AXIS: 270 DEGREES
EKG T AXIS: 18 DEGREES
EKG T AXIS: 21 DEGREES
EKG T AXIS: 25 DEGREES
EKG T AXIS: 28 DEGREES
EKG T AXIS: 46 DEGREES
EKG T AXIS: 63 DEGREES
EKG VENTRICULAR RATE: 137 BPM
EKG VENTRICULAR RATE: 88 BPM
EKG VENTRICULAR RATE: 92 BPM
EKG VENTRICULAR RATE: 97 BPM
EKG VENTRICULAR RATE: 99 BPM
EKG VENTRICULAR RATE: 99 BPM
EOSINOPHILS ABSOLUTE: 0 K/CU MM
EOSINOPHILS ABSOLUTE: 0.2 K/CU MM
EOSINOPHILS ABSOLUTE: 0.3 K/CU MM
EOSINOPHILS ABSOLUTE: 0.4 K/CU MM
EOSINOPHILS ABSOLUTE: 0.5 K/CU MM
EOSINOPHILS ABSOLUTE: 0.6 K/CU MM
EOSINOPHILS ABSOLUTE: 0.8 K/CU MM
EOSINOPHILS ABSOLUTE: 0.9 K/CU MM
EOSINOPHILS ABSOLUTE: 1 K/CU MM
EOSINOPHILS ABSOLUTE: 1 K/CU MM
EOSINOPHILS ABSOLUTE: 1.2 K/CU MM
EOSINOPHILS RELATIVE PERCENT: 0 % (ref 0–3)
EOSINOPHILS RELATIVE PERCENT: 0.1 % (ref 0–3)
EOSINOPHILS RELATIVE PERCENT: 0.1 % (ref 0–3)
EOSINOPHILS RELATIVE PERCENT: 1 % (ref 0–3)
EOSINOPHILS RELATIVE PERCENT: 1.1 % (ref 0–3)
EOSINOPHILS RELATIVE PERCENT: 1.4 % (ref 0–3)
EOSINOPHILS RELATIVE PERCENT: 1.5 % (ref 0–3)
EOSINOPHILS RELATIVE PERCENT: 1.8 % (ref 0–3)
EOSINOPHILS RELATIVE PERCENT: 1.9 % (ref 0–3)
EOSINOPHILS RELATIVE PERCENT: 10.1 % (ref 0–3)
EOSINOPHILS RELATIVE PERCENT: 2.2 % (ref 0–3)
EOSINOPHILS RELATIVE PERCENT: 2.3 % (ref 0–3)
EOSINOPHILS RELATIVE PERCENT: 2.4 % (ref 0–3)
EOSINOPHILS RELATIVE PERCENT: 2.8 % (ref 0–3)
EOSINOPHILS RELATIVE PERCENT: 2.9 % (ref 0–3)
EOSINOPHILS RELATIVE PERCENT: 3 % (ref 0–3)
EOSINOPHILS RELATIVE PERCENT: 3.4 % (ref 0–3)
EOSINOPHILS RELATIVE PERCENT: 4 % (ref 0–3)
EOSINOPHILS RELATIVE PERCENT: 5.1 % (ref 0–3)
EOSINOPHILS RELATIVE PERCENT: 5.3 % (ref 0–3)
EOSINOPHILS RELATIVE PERCENT: 5.6 % (ref 0–3)
EOSINOPHILS RELATIVE PERCENT: 6.8 % (ref 0–3)
EOSINOPHILS RELATIVE PERCENT: 7.1 % (ref 0–3)
EOSINOPHILS RELATIVE PERCENT: 8.3 % (ref 0–3)
EOSINOPHILS RELATIVE PERCENT: 9.9 % (ref 0–3)
ERYTHROCYTE SEDIMENTATION RATE: 35 MM/HR (ref 0–20)
ERYTHROCYTE SEDIMENTATION RATE: 39 MM/HR (ref 0–20)
ERYTHROCYTE SEDIMENTATION RATE: 73 MM/HR (ref 0–20)
ERYTHROCYTE SEDIMENTATION RATE: 74 MM/HR (ref 0–20)
ERYTHROCYTE SEDIMENTATION RATE: 81 MM/HR (ref 0–20)
ERYTHROCYTE SEDIMENTATION RATE: 83 MM/HR (ref 0–20)
ERYTHROCYTE SEDIMENTATION RATE: 84 MM/HR (ref 0–20)
ERYTHROCYTE SEDIMENTATION RATE: 91 MM/HR (ref 0–20)
ERYTHROCYTE SEDIMENTATION RATE: 92 MM/HR (ref 0–20)
ESTIMATED AVERAGE GLUCOSE: 128 MG/DL
FASTEST VENTRICULAR RATE: 132 BPM
FOLATE: 5.9 NG/ML (ref 3.1–17.5)
GFR AFRICAN AMERICAN: 34 ML/MIN/1.73M2
GFR AFRICAN AMERICAN: 36 ML/MIN/1.73M2
GFR AFRICAN AMERICAN: 38 ML/MIN/1.73M2
GFR AFRICAN AMERICAN: 46 ML/MIN/1.73M2
GFR AFRICAN AMERICAN: 46 ML/MIN/1.73M2
GFR AFRICAN AMERICAN: 49 ML/MIN/1.73M2
GFR AFRICAN AMERICAN: 49 ML/MIN/1.73M2
GFR AFRICAN AMERICAN: 56 ML/MIN/1.73M2
GFR AFRICAN AMERICAN: >60 ML/MIN/1.73M2
GFR NON-AFRICAN AMERICAN: 28 ML/MIN/1.73M2
GFR NON-AFRICAN AMERICAN: 30 ML/MIN/1.73M2
GFR NON-AFRICAN AMERICAN: 32 ML/MIN/1.73M2
GFR NON-AFRICAN AMERICAN: 38 ML/MIN/1.73M2
GFR NON-AFRICAN AMERICAN: 38 ML/MIN/1.73M2
GFR NON-AFRICAN AMERICAN: 40 ML/MIN/1.73M2
GFR NON-AFRICAN AMERICAN: 40 ML/MIN/1.73M2
GFR NON-AFRICAN AMERICAN: 47 ML/MIN/1.73M2
GFR NON-AFRICAN AMERICAN: 50 ML/MIN/1.73M2
GFR NON-AFRICAN AMERICAN: 55 ML/MIN/1.73M2
GFR NON-AFRICAN AMERICAN: >60 ML/MIN/1.73M2
GLUCOSE BLD-MCNC: 100 MG/DL (ref 70–99)
GLUCOSE BLD-MCNC: 101 MG/DL (ref 70–99)
GLUCOSE BLD-MCNC: 102 MG/DL (ref 70–99)
GLUCOSE BLD-MCNC: 103 MG/DL (ref 70–99)
GLUCOSE BLD-MCNC: 104 MG/DL (ref 70–99)
GLUCOSE BLD-MCNC: 105 MG/DL (ref 70–99)
GLUCOSE BLD-MCNC: 106 MG/DL (ref 70–99)
GLUCOSE BLD-MCNC: 106 MG/DL (ref 70–99)
GLUCOSE BLD-MCNC: 107 MG/DL (ref 70–99)
GLUCOSE BLD-MCNC: 107 MG/DL (ref 70–99)
GLUCOSE BLD-MCNC: 108 MG/DL (ref 70–99)
GLUCOSE BLD-MCNC: 109 MG/DL (ref 70–99)
GLUCOSE BLD-MCNC: 110 MG/DL (ref 70–99)
GLUCOSE BLD-MCNC: 111 MG/DL (ref 70–99)
GLUCOSE BLD-MCNC: 112 MG/DL (ref 70–99)
GLUCOSE BLD-MCNC: 113 MG/DL (ref 70–99)
GLUCOSE BLD-MCNC: 114 MG/DL (ref 70–99)
GLUCOSE BLD-MCNC: 115 MG/DL (ref 70–99)
GLUCOSE BLD-MCNC: 116 MG/DL (ref 70–99)
GLUCOSE BLD-MCNC: 116 MG/DL (ref 70–99)
GLUCOSE BLD-MCNC: 117 MG/DL (ref 70–99)
GLUCOSE BLD-MCNC: 118 MG/DL (ref 70–99)
GLUCOSE BLD-MCNC: 118 MG/DL (ref 70–99)
GLUCOSE BLD-MCNC: 119 MG/DL (ref 70–99)
GLUCOSE BLD-MCNC: 120 MG/DL (ref 70–99)
GLUCOSE BLD-MCNC: 120 MG/DL (ref 70–99)
GLUCOSE BLD-MCNC: 121 MG/DL (ref 70–99)
GLUCOSE BLD-MCNC: 122 MG/DL (ref 70–99)
GLUCOSE BLD-MCNC: 123 MG/DL (ref 70–99)
GLUCOSE BLD-MCNC: 124 MG/DL (ref 70–99)
GLUCOSE BLD-MCNC: 124 MG/DL (ref 70–99)
GLUCOSE BLD-MCNC: 125 MG/DL (ref 70–99)
GLUCOSE BLD-MCNC: 126 MG/DL (ref 70–99)
GLUCOSE BLD-MCNC: 127 MG/DL (ref 70–99)
GLUCOSE BLD-MCNC: 128 MG/DL (ref 70–99)
GLUCOSE BLD-MCNC: 129 MG/DL (ref 70–99)
GLUCOSE BLD-MCNC: 129 MG/DL (ref 70–99)
GLUCOSE BLD-MCNC: 130 MG/DL (ref 70–99)
GLUCOSE BLD-MCNC: 131 MG/DL (ref 70–99)
GLUCOSE BLD-MCNC: 132 MG/DL (ref 70–99)
GLUCOSE BLD-MCNC: 133 MG/DL (ref 70–99)
GLUCOSE BLD-MCNC: 134 MG/DL (ref 70–99)
GLUCOSE BLD-MCNC: 135 MG/DL (ref 70–99)
GLUCOSE BLD-MCNC: 136 MG/DL (ref 70–99)
GLUCOSE BLD-MCNC: 137 MG/DL (ref 70–99)
GLUCOSE BLD-MCNC: 138 MG/DL (ref 70–99)
GLUCOSE BLD-MCNC: 139 MG/DL (ref 70–99)
GLUCOSE BLD-MCNC: 139 MG/DL (ref 70–99)
GLUCOSE BLD-MCNC: 140 MG/DL (ref 70–99)
GLUCOSE BLD-MCNC: 141 MG/DL (ref 70–99)
GLUCOSE BLD-MCNC: 141 MG/DL (ref 70–99)
GLUCOSE BLD-MCNC: 143 MG/DL (ref 70–99)
GLUCOSE BLD-MCNC: 144 MG/DL (ref 70–99)
GLUCOSE BLD-MCNC: 144 MG/DL (ref 70–99)
GLUCOSE BLD-MCNC: 145 MG/DL (ref 70–99)
GLUCOSE BLD-MCNC: 146 MG/DL (ref 70–99)
GLUCOSE BLD-MCNC: 147 MG/DL (ref 70–99)
GLUCOSE BLD-MCNC: 148 MG/DL (ref 70–99)
GLUCOSE BLD-MCNC: 149 MG/DL (ref 70–99)
GLUCOSE BLD-MCNC: 150 MG/DL (ref 70–99)
GLUCOSE BLD-MCNC: 151 MG/DL (ref 70–99)
GLUCOSE BLD-MCNC: 152 MG/DL (ref 70–99)
GLUCOSE BLD-MCNC: 153 MG/DL (ref 70–99)
GLUCOSE BLD-MCNC: 154 MG/DL (ref 70–99)
GLUCOSE BLD-MCNC: 154 MG/DL (ref 70–99)
GLUCOSE BLD-MCNC: 155 MG/DL (ref 70–99)
GLUCOSE BLD-MCNC: 156 MG/DL (ref 70–99)
GLUCOSE BLD-MCNC: 156 MG/DL (ref 70–99)
GLUCOSE BLD-MCNC: 157 MG/DL (ref 70–99)
GLUCOSE BLD-MCNC: 158 MG/DL (ref 70–99)
GLUCOSE BLD-MCNC: 159 MG/DL (ref 70–99)
GLUCOSE BLD-MCNC: 160 MG/DL (ref 70–99)
GLUCOSE BLD-MCNC: 160 MG/DL (ref 70–99)
GLUCOSE BLD-MCNC: 161 MG/DL (ref 70–99)
GLUCOSE BLD-MCNC: 161 MG/DL (ref 70–99)
GLUCOSE BLD-MCNC: 163 MG/DL (ref 70–99)
GLUCOSE BLD-MCNC: 164 MG/DL (ref 70–99)
GLUCOSE BLD-MCNC: 165 MG/DL (ref 70–99)
GLUCOSE BLD-MCNC: 167 MG/DL (ref 70–99)
GLUCOSE BLD-MCNC: 168 MG/DL (ref 70–99)
GLUCOSE BLD-MCNC: 169 MG/DL
GLUCOSE BLD-MCNC: 169 MG/DL (ref 70–99)
GLUCOSE BLD-MCNC: 170 MG/DL (ref 70–99)
GLUCOSE BLD-MCNC: 171 MG/DL (ref 70–99)
GLUCOSE BLD-MCNC: 172 MG/DL (ref 70–99)
GLUCOSE BLD-MCNC: 173 MG/DL (ref 70–99)
GLUCOSE BLD-MCNC: 173 MG/DL (ref 70–99)
GLUCOSE BLD-MCNC: 174 MG/DL (ref 70–99)
GLUCOSE BLD-MCNC: 174 MG/DL (ref 70–99)
GLUCOSE BLD-MCNC: 175 MG/DL (ref 70–99)
GLUCOSE BLD-MCNC: 176 MG/DL (ref 70–99)
GLUCOSE BLD-MCNC: 176 MG/DL (ref 70–99)
GLUCOSE BLD-MCNC: 177 MG/DL (ref 70–99)
GLUCOSE BLD-MCNC: 177 MG/DL (ref 70–99)
GLUCOSE BLD-MCNC: 178 MG/DL (ref 70–99)
GLUCOSE BLD-MCNC: 179 MG/DL (ref 70–99)
GLUCOSE BLD-MCNC: 179 MG/DL (ref 70–99)
GLUCOSE BLD-MCNC: 180 MG/DL (ref 70–99)
GLUCOSE BLD-MCNC: 180 MG/DL (ref 70–99)
GLUCOSE BLD-MCNC: 181 MG/DL (ref 70–99)
GLUCOSE BLD-MCNC: 183 MG/DL (ref 70–99)
GLUCOSE BLD-MCNC: 184 MG/DL (ref 70–99)
GLUCOSE BLD-MCNC: 185 MG/DL (ref 70–99)
GLUCOSE BLD-MCNC: 187 MG/DL (ref 70–99)
GLUCOSE BLD-MCNC: 188 MG/DL (ref 70–99)
GLUCOSE BLD-MCNC: 191 MG/DL (ref 70–99)
GLUCOSE BLD-MCNC: 193 MG/DL (ref 70–99)
GLUCOSE BLD-MCNC: 194 MG/DL (ref 70–99)
GLUCOSE BLD-MCNC: 195 MG/DL (ref 70–99)
GLUCOSE BLD-MCNC: 196 MG/DL (ref 70–99)
GLUCOSE BLD-MCNC: 196 MG/DL (ref 70–99)
GLUCOSE BLD-MCNC: 197 MG/DL (ref 70–99)
GLUCOSE BLD-MCNC: 198 MG/DL (ref 70–99)
GLUCOSE BLD-MCNC: 198 MG/DL (ref 70–99)
GLUCOSE BLD-MCNC: 202 MG/DL (ref 70–99)
GLUCOSE BLD-MCNC: 203 MG/DL (ref 70–99)
GLUCOSE BLD-MCNC: 204 MG/DL (ref 70–99)
GLUCOSE BLD-MCNC: 204 MG/DL (ref 70–99)
GLUCOSE BLD-MCNC: 206 MG/DL (ref 70–99)
GLUCOSE BLD-MCNC: 207 MG/DL (ref 70–99)
GLUCOSE BLD-MCNC: 209 MG/DL (ref 70–99)
GLUCOSE BLD-MCNC: 218 MG/DL (ref 70–99)
GLUCOSE BLD-MCNC: 221 MG/DL (ref 70–99)
GLUCOSE BLD-MCNC: 224 MG/DL (ref 70–99)
GLUCOSE BLD-MCNC: 231 MG/DL (ref 70–99)
GLUCOSE BLD-MCNC: 232 MG/DL (ref 70–99)
GLUCOSE BLD-MCNC: 233 MG/DL (ref 70–99)
GLUCOSE BLD-MCNC: 241 MG/DL (ref 70–99)
GLUCOSE BLD-MCNC: 253 MG/DL (ref 70–99)
GLUCOSE BLD-MCNC: 62 MG/DL (ref 70–99)
GLUCOSE BLD-MCNC: 65 MG/DL (ref 70–99)
GLUCOSE BLD-MCNC: 71 MG/DL (ref 70–99)
GLUCOSE BLD-MCNC: 74 MG/DL (ref 70–99)
GLUCOSE BLD-MCNC: 75 MG/DL (ref 70–99)
GLUCOSE BLD-MCNC: 77 MG/DL (ref 70–99)
GLUCOSE BLD-MCNC: 77 MG/DL (ref 70–99)
GLUCOSE BLD-MCNC: 80 MG/DL (ref 70–99)
GLUCOSE BLD-MCNC: 81 MG/DL (ref 70–99)
GLUCOSE BLD-MCNC: 83 MG/DL (ref 70–99)
GLUCOSE BLD-MCNC: 83 MG/DL (ref 70–99)
GLUCOSE BLD-MCNC: 84 MG/DL (ref 70–99)
GLUCOSE BLD-MCNC: 85 MG/DL (ref 70–99)
GLUCOSE BLD-MCNC: 86 MG/DL (ref 70–99)
GLUCOSE BLD-MCNC: 87 MG/DL (ref 70–99)
GLUCOSE BLD-MCNC: 89 MG/DL (ref 70–99)
GLUCOSE BLD-MCNC: 90 MG/DL (ref 70–99)
GLUCOSE BLD-MCNC: 91 MG/DL (ref 70–99)
GLUCOSE BLD-MCNC: 92 MG/DL (ref 70–99)
GLUCOSE BLD-MCNC: 93 MG/DL (ref 70–99)
GLUCOSE BLD-MCNC: 94 MG/DL (ref 70–99)
GLUCOSE BLD-MCNC: 94 MG/DL (ref 70–99)
GLUCOSE BLD-MCNC: 95 MG/DL (ref 70–99)
GLUCOSE BLD-MCNC: 95 MG/DL (ref 70–99)
GLUCOSE BLD-MCNC: 96 MG/DL (ref 70–99)
GLUCOSE BLD-MCNC: 97 MG/DL (ref 70–99)
GLUCOSE BLD-MCNC: 98 MG/DL (ref 70–99)
GLUCOSE BLD-MCNC: 98 MG/DL (ref 70–99)
GLUCOSE BLD-MCNC: 99 MG/DL (ref 70–99)
GLUCOSE, URINE: 150 MG/DL
GLUCOSE, URINE: 150 MG/DL
GLUCOSE, URINE: 50 MG/DL
GLUCOSE, URINE: 50 MG/DL
GLUCOSE, URINE: >500 MG/DL
GLUCOSE, URINE: >500 MG/DL
GRAM SMEAR: ABNORMAL
GRANULAR CASTS: 2 /LPF
HBA1C MFR BLD: 6.1 % (ref 4.2–6.3)
HCO3 ARTERIAL: 22.2 MMOL/L (ref 18–23)
HCO3 ARTERIAL: 22.5 MMOL/L (ref 18–23)
HCO3 ARTERIAL: 24.3 MMOL/L (ref 18–23)
HCT VFR BLD CALC: 33.2 % (ref 42–52)
HCT VFR BLD CALC: 33.4 % (ref 42–52)
HCT VFR BLD CALC: 33.5 % (ref 42–52)
HCT VFR BLD CALC: 33.7 % (ref 42–52)
HCT VFR BLD CALC: 34.2 % (ref 42–52)
HCT VFR BLD CALC: 34.5 % (ref 42–52)
HCT VFR BLD CALC: 34.5 % (ref 42–52)
HCT VFR BLD CALC: 34.6 % (ref 42–52)
HCT VFR BLD CALC: 34.9 % (ref 42–52)
HCT VFR BLD CALC: 35.1 % (ref 42–52)
HCT VFR BLD CALC: 35.2 % (ref 42–52)
HCT VFR BLD CALC: 35.4 % (ref 42–52)
HCT VFR BLD CALC: 36.1 % (ref 42–52)
HCT VFR BLD CALC: 36.3 % (ref 42–52)
HCT VFR BLD CALC: 36.3 % (ref 42–52)
HCT VFR BLD CALC: 36.4 % (ref 42–52)
HCT VFR BLD CALC: 36.4 % (ref 42–52)
HCT VFR BLD CALC: 36.5 % (ref 42–52)
HCT VFR BLD CALC: 36.6 % (ref 42–52)
HCT VFR BLD CALC: 36.6 % (ref 42–52)
HCT VFR BLD CALC: 36.7 % (ref 42–52)
HCT VFR BLD CALC: 37 % (ref 42–52)
HCT VFR BLD CALC: 37.4 % (ref 42–52)
HCT VFR BLD CALC: 37.6 % (ref 42–52)
HCT VFR BLD CALC: 37.9 % (ref 42–52)
HCT VFR BLD CALC: 38.2 % (ref 42–52)
HCT VFR BLD CALC: 38.2 % (ref 42–52)
HCT VFR BLD CALC: 38.3 % (ref 42–52)
HCT VFR BLD CALC: 38.4 % (ref 42–52)
HCT VFR BLD CALC: 38.6 % (ref 42–52)
HCT VFR BLD CALC: 38.7 % (ref 42–52)
HCT VFR BLD CALC: 38.7 % (ref 42–52)
HCT VFR BLD CALC: 39.3 % (ref 42–52)
HCT VFR BLD CALC: 39.3 % (ref 42–52)
HCT VFR BLD CALC: 39.7 % (ref 42–52)
HCT VFR BLD CALC: 41.2 % (ref 42–52)
HCT VFR BLD CALC: 41.3 % (ref 42–52)
HCT VFR BLD CALC: 41.4 % (ref 42–52)
HCT VFR BLD CALC: 43 % (ref 42–52)
HCT VFR BLD CALC: 43.1 % (ref 42–52)
HCT VFR BLD CALC: 43.4 % (ref 42–52)
HCT VFR BLD CALC: 43.9 % (ref 42–52)
HCT VFR BLD CALC: 45.6 % (ref 42–52)
HCT VFR BLD CALC: 51.1 % (ref 42–52)
HCT VFR BLD CALC: 54.6 % (ref 42–52)
HCT VFR BLD CALC: ABNORMAL % (ref 42–52)
HCT VFR BLD CALC: ABNORMAL % (ref 42–52)
HEMOGLOBIN: 10 GM/DL (ref 13.5–18)
HEMOGLOBIN: 10.3 GM/DL (ref 13.5–18)
HEMOGLOBIN: 10.3 GM/DL (ref 13.5–18)
HEMOGLOBIN: 10.4 GM/DL (ref 13.5–18)
HEMOGLOBIN: 10.5 GM/DL (ref 13.5–18)
HEMOGLOBIN: 10.6 GM/DL (ref 13.5–18)
HEMOGLOBIN: 10.6 GM/DL (ref 13.5–18)
HEMOGLOBIN: 10.7 GM/DL (ref 13.5–18)
HEMOGLOBIN: 10.7 GM/DL (ref 13.5–18)
HEMOGLOBIN: 10.8 GM/DL (ref 13.5–18)
HEMOGLOBIN: 10.9 GM/DL (ref 13.5–18)
HEMOGLOBIN: 11 GM/DL (ref 13.5–18)
HEMOGLOBIN: 11.1 GM/DL (ref 13.5–18)
HEMOGLOBIN: 11.4 GM/DL (ref 13.5–18)
HEMOGLOBIN: 11.4 GM/DL (ref 13.5–18)
HEMOGLOBIN: 11.5 GM/DL (ref 13.5–18)
HEMOGLOBIN: 11.5 GM/DL (ref 13.5–18)
HEMOGLOBIN: 11.6 GM/DL (ref 13.5–18)
HEMOGLOBIN: 11.6 GM/DL (ref 13.5–18)
HEMOGLOBIN: 11.7 GM/DL (ref 13.5–18)
HEMOGLOBIN: 11.7 GM/DL (ref 13.5–18)
HEMOGLOBIN: 11.8 GM/DL (ref 13.5–18)
HEMOGLOBIN: 11.9 GM/DL (ref 13.5–18)
HEMOGLOBIN: 11.9 GM/DL (ref 13.5–18)
HEMOGLOBIN: 12 GM/DL (ref 13.5–18)
HEMOGLOBIN: 12.2 GM/DL (ref 13.5–18)
HEMOGLOBIN: 12.4 GM/DL (ref 13.5–18)
HEMOGLOBIN: 12.4 GM/DL (ref 13.5–18)
HEMOGLOBIN: 12.6 GM/DL (ref 13.5–18)
HEMOGLOBIN: 12.6 GM/DL (ref 13.5–18)
HEMOGLOBIN: 12.8 GM/DL (ref 13.5–18)
HEMOGLOBIN: 13 GM/DL (ref 13.5–18)
HEMOGLOBIN: 13.1 GM/DL (ref 13.5–18)
HEMOGLOBIN: 13.1 GM/DL (ref 13.5–18)
HEMOGLOBIN: 13.4 GM/DL (ref 13.5–18)
HEMOGLOBIN: 13.7 GM/DL (ref 13.5–18)
HEMOGLOBIN: 14.9 GM/DL (ref 13.5–18)
HEMOGLOBIN: 17.1 GM/DL (ref 13.5–18)
HEMOGLOBIN: 18.1 GM/DL (ref 13.5–18)
HEMOGLOBIN: ABNORMAL GM/DL (ref 13.5–18)
HEMOGLOBIN: ABNORMAL GM/DL (ref 13.5–18)
HIGH SENSITIVE C-REACTIVE PROTEIN: 40.4 MG/L
HIGH SENSITIVE C-REACTIVE PROTEIN: 42.8 MG/L
HIGH SENSITIVE C-REACTIVE PROTEIN: 53 MG/L
HIGH SENSITIVE C-REACTIVE PROTEIN: 71.4 MG/L
HOOKUP DATE: NORMAL
HOOKUP TIME: NORMAL
HYALINE CASTS: 0 /LPF
HYALINE CASTS: 5 /LPF
ICTOTEST: NEGATIVE
IMMATURE NEUTROPHIL %: 0.3 % (ref 0–0.43)
IMMATURE NEUTROPHIL %: 0.3 % (ref 0–0.43)
IMMATURE NEUTROPHIL %: 0.4 % (ref 0–0.43)
IMMATURE NEUTROPHIL %: 0.5 % (ref 0–0.43)
IMMATURE NEUTROPHIL %: 0.6 % (ref 0–0.43)
IMMATURE NEUTROPHIL %: 0.7 % (ref 0–0.43)
IMMATURE NEUTROPHIL %: 0.7 % (ref 0–0.43)
IMMATURE NEUTROPHIL %: 0.9 % (ref 0–0.43)
IMMATURE NEUTROPHIL %: 1.1 % (ref 0–0.43)
IMMATURE NEUTROPHIL %: 1.3 % (ref 0–0.43)
IMMATURE NEUTROPHIL %: 1.4 % (ref 0–0.43)
IMMATURE NEUTROPHIL %: 1.6 % (ref 0–0.43)
IMMATURE NEUTROPHIL %: 1.6 % (ref 0–0.43)
IMMATURE NEUTROPHIL %: 1.9 % (ref 0–0.43)
KETONES, URINE: ABNORMAL MG/DL
KETONES, URINE: ABNORMAL MG/DL
KETONES, URINE: NEGATIVE MG/DL
LACTATE: 1.4 MMOL/L (ref 0.4–2)
LACTATE: 1.4 MMOL/L (ref 0.4–2)
LACTATE: 1.7 MMOL/L (ref 0.4–2)
LACTIC ACID, SEPSIS: 0.8 MMOL/L (ref 0.5–1.9)
LACTIC ACID, SEPSIS: 1 MMOL/L (ref 0.5–1.9)
LACTIC ACID, SEPSIS: 1.5 MMOL/L (ref 0.5–1.9)
LEUKOCYTE ESTERASE, URINE: ABNORMAL
LEUKOCYTE ESTERASE, URINE: NEGATIVE
LONGEST VE RUN RATE: 90 BPM
LV EF: 53 %
LVEF MODALITY: NORMAL
LYMPHOCYTES ABSOLUTE: 0.6 K/CU MM
LYMPHOCYTES ABSOLUTE: 0.7 K/CU MM
LYMPHOCYTES ABSOLUTE: 0.7 K/CU MM
LYMPHOCYTES ABSOLUTE: 0.8 K/CU MM
LYMPHOCYTES ABSOLUTE: 0.9 K/CU MM
LYMPHOCYTES ABSOLUTE: 1 K/CU MM
LYMPHOCYTES ABSOLUTE: 1.1 K/CU MM
LYMPHOCYTES ABSOLUTE: 1.2 K/CU MM
LYMPHOCYTES ABSOLUTE: 1.4 K/CU MM
LYMPHOCYTES ABSOLUTE: 1.5 K/CU MM
LYMPHOCYTES ABSOLUTE: 1.5 K/CU MM
LYMPHOCYTES ABSOLUTE: 1.7 K/CU MM
LYMPHOCYTES ABSOLUTE: 1.9 K/CU MM
LYMPHOCYTES ABSOLUTE: 2 K/CU MM
LYMPHOCYTES ABSOLUTE: 2.2 K/CU MM
LYMPHOCYTES ABSOLUTE: 2.2 K/CU MM
LYMPHOCYTES RELATIVE PERCENT: 10 % (ref 24–44)
LYMPHOCYTES RELATIVE PERCENT: 10 % (ref 24–44)
LYMPHOCYTES RELATIVE PERCENT: 11.4 % (ref 24–44)
LYMPHOCYTES RELATIVE PERCENT: 14 % (ref 24–44)
LYMPHOCYTES RELATIVE PERCENT: 2.1 % (ref 24–44)
LYMPHOCYTES RELATIVE PERCENT: 3 % (ref 24–44)
LYMPHOCYTES RELATIVE PERCENT: 3.7 % (ref 24–44)
LYMPHOCYTES RELATIVE PERCENT: 4.8 % (ref 24–44)
LYMPHOCYTES RELATIVE PERCENT: 4.8 % (ref 24–44)
LYMPHOCYTES RELATIVE PERCENT: 5 % (ref 24–44)
LYMPHOCYTES RELATIVE PERCENT: 5 % (ref 24–44)
LYMPHOCYTES RELATIVE PERCENT: 5.5 % (ref 24–44)
LYMPHOCYTES RELATIVE PERCENT: 5.7 % (ref 24–44)
LYMPHOCYTES RELATIVE PERCENT: 6 % (ref 24–44)
LYMPHOCYTES RELATIVE PERCENT: 6 % (ref 24–44)
LYMPHOCYTES RELATIVE PERCENT: 6.1 % (ref 24–44)
LYMPHOCYTES RELATIVE PERCENT: 6.7 % (ref 24–44)
LYMPHOCYTES RELATIVE PERCENT: 6.8 % (ref 24–44)
LYMPHOCYTES RELATIVE PERCENT: 6.9 % (ref 24–44)
LYMPHOCYTES RELATIVE PERCENT: 7 % (ref 24–44)
LYMPHOCYTES RELATIVE PERCENT: 7.1 % (ref 24–44)
LYMPHOCYTES RELATIVE PERCENT: 7.3 % (ref 24–44)
LYMPHOCYTES RELATIVE PERCENT: 7.5 % (ref 24–44)
LYMPHOCYTES RELATIVE PERCENT: 7.8 % (ref 24–44)
LYMPHOCYTES RELATIVE PERCENT: 8 % (ref 24–44)
LYMPHOCYTES RELATIVE PERCENT: 8.2 % (ref 24–44)
LYMPHOCYTES RELATIVE PERCENT: 8.8 % (ref 24–44)
LYMPHOCYTES RELATIVE PERCENT: 9 % (ref 24–44)
LYMPHOCYTES RELATIVE PERCENT: 9 % (ref 24–44)
LYMPHOCYTES RELATIVE PERCENT: 9.3 % (ref 24–44)
LYMPHOCYTES RELATIVE PERCENT: 9.3 % (ref 24–44)
LYMPHOCYTES RELATIVE PERCENT: 9.6 % (ref 24–44)
Lab: ABNORMAL
Lab: NORMAL
MACROCYTES: ABNORMAL
MACROCYTES: ABNORMAL
MAGNESIUM: 1.2 MG/DL (ref 1.8–2.4)
MAGNESIUM: 1.4 MG/DL (ref 1.8–2.4)
MAGNESIUM: 1.5 MG/DL (ref 1.8–2.4)
MAGNESIUM: 1.6 MG/DL (ref 1.8–2.4)
MAGNESIUM: 1.6 MG/DL (ref 1.8–2.4)
MAGNESIUM: 1.7 MG/DL (ref 1.8–2.4)
MAGNESIUM: 1.8 MG/DL (ref 1.8–2.4)
MAX HEART RATE TIME/DATE: NORMAL
MAX HEART RATE: 111 BPM
MCH RBC QN AUTO: 26.3 PG (ref 27–31)
MCH RBC QN AUTO: 26.7 PG (ref 27–31)
MCH RBC QN AUTO: 26.7 PG (ref 27–31)
MCH RBC QN AUTO: 26.9 PG (ref 27–31)
MCH RBC QN AUTO: 27 PG (ref 27–31)
MCH RBC QN AUTO: 27 PG (ref 27–31)
MCH RBC QN AUTO: 27.1 PG (ref 27–31)
MCH RBC QN AUTO: 27.4 PG (ref 27–31)
MCH RBC QN AUTO: 27.4 PG (ref 27–31)
MCH RBC QN AUTO: 27.5 PG (ref 27–31)
MCH RBC QN AUTO: 27.6 PG (ref 27–31)
MCH RBC QN AUTO: 27.8 PG (ref 27–31)
MCH RBC QN AUTO: 27.8 PG (ref 27–31)
MCH RBC QN AUTO: 28.1 PG (ref 27–31)
MCH RBC QN AUTO: 28.3 PG (ref 27–31)
MCH RBC QN AUTO: 28.7 PG (ref 27–31)
MCH RBC QN AUTO: 29.4 PG (ref 27–31)
MCH RBC QN AUTO: 29.5 PG (ref 27–31)
MCH RBC QN AUTO: 29.8 PG (ref 27–31)
MCH RBC QN AUTO: 30 PG (ref 27–31)
MCH RBC QN AUTO: 30 PG (ref 27–31)
MCH RBC QN AUTO: 30.1 PG (ref 27–31)
MCH RBC QN AUTO: 30.4 PG (ref 27–31)
MCH RBC QN AUTO: 30.5 PG (ref 27–31)
MCH RBC QN AUTO: 31.1 PG (ref 27–31)
MCH RBC QN AUTO: 31.2 PG (ref 27–31)
MCH RBC QN AUTO: 31.3 PG (ref 27–31)
MCH RBC QN AUTO: 31.6 PG (ref 27–31)
MCH RBC QN AUTO: 31.7 PG (ref 27–31)
MCH RBC QN AUTO: 31.8 PG (ref 27–31)
MCH RBC QN AUTO: ABNORMAL PG (ref 27–31)
MCH RBC QN AUTO: ABNORMAL PG (ref 27–31)
MCHC RBC AUTO-ENTMCNC: 28.5 % (ref 32–36)
MCHC RBC AUTO-ENTMCNC: 29.2 % (ref 32–36)
MCHC RBC AUTO-ENTMCNC: 29.2 % (ref 32–36)
MCHC RBC AUTO-ENTMCNC: 29.4 % (ref 32–36)
MCHC RBC AUTO-ENTMCNC: 29.5 % (ref 32–36)
MCHC RBC AUTO-ENTMCNC: 29.5 % (ref 32–36)
MCHC RBC AUTO-ENTMCNC: 29.7 % (ref 32–36)
MCHC RBC AUTO-ENTMCNC: 29.7 % (ref 32–36)
MCHC RBC AUTO-ENTMCNC: 29.8 % (ref 32–36)
MCHC RBC AUTO-ENTMCNC: 29.9 % (ref 32–36)
MCHC RBC AUTO-ENTMCNC: 30.1 % (ref 32–36)
MCHC RBC AUTO-ENTMCNC: 30.2 % (ref 32–36)
MCHC RBC AUTO-ENTMCNC: 30.2 % (ref 32–36)
MCHC RBC AUTO-ENTMCNC: 30.7 % (ref 32–36)
MCHC RBC AUTO-ENTMCNC: 30.8 % (ref 32–36)
MCHC RBC AUTO-ENTMCNC: 30.9 % (ref 32–36)
MCHC RBC AUTO-ENTMCNC: 31 % (ref 32–36)
MCHC RBC AUTO-ENTMCNC: 31.1 % (ref 32–36)
MCHC RBC AUTO-ENTMCNC: 31.1 % (ref 32–36)
MCHC RBC AUTO-ENTMCNC: 31.2 % (ref 32–36)
MCHC RBC AUTO-ENTMCNC: 31.3 % (ref 32–36)
MCHC RBC AUTO-ENTMCNC: 31.4 % (ref 32–36)
MCHC RBC AUTO-ENTMCNC: 31.4 % (ref 32–36)
MCHC RBC AUTO-ENTMCNC: 31.6 % (ref 32–36)
MCHC RBC AUTO-ENTMCNC: 31.8 % (ref 32–36)
MCHC RBC AUTO-ENTMCNC: 31.8 % (ref 32–36)
MCHC RBC AUTO-ENTMCNC: 32.1 % (ref 32–36)
MCHC RBC AUTO-ENTMCNC: 32.6 % (ref 32–36)
MCHC RBC AUTO-ENTMCNC: 32.7 % (ref 32–36)
MCHC RBC AUTO-ENTMCNC: 32.8 % (ref 32–36)
MCHC RBC AUTO-ENTMCNC: 33.2 % (ref 32–36)
MCHC RBC AUTO-ENTMCNC: 33.5 % (ref 32–36)
MCHC RBC AUTO-ENTMCNC: ABNORMAL % (ref 32–36)
MCHC RBC AUTO-ENTMCNC: ABNORMAL % (ref 32–36)
MCV RBC AUTO: 85 FL (ref 78–100)
MCV RBC AUTO: 85.1 FL (ref 78–100)
MCV RBC AUTO: 85.1 FL (ref 78–100)
MCV RBC AUTO: 85.4 FL (ref 78–100)
MCV RBC AUTO: 85.6 FL (ref 78–100)
MCV RBC AUTO: 86.1 FL (ref 78–100)
MCV RBC AUTO: 86.3 FL (ref 78–100)
MCV RBC AUTO: 86.4 FL (ref 78–100)
MCV RBC AUTO: 87.4 FL (ref 78–100)
MCV RBC AUTO: 89.9 FL (ref 78–100)
MCV RBC AUTO: 90 FL (ref 78–100)
MCV RBC AUTO: 90.8 FL (ref 78–100)
MCV RBC AUTO: 91 FL (ref 78–100)
MCV RBC AUTO: 91.2 FL (ref 78–100)
MCV RBC AUTO: 92.6 FL (ref 78–100)
MCV RBC AUTO: 93.6 FL (ref 78–100)
MCV RBC AUTO: 94 FL (ref 78–100)
MCV RBC AUTO: 94.4 FL (ref 78–100)
MCV RBC AUTO: 94.4 FL (ref 78–100)
MCV RBC AUTO: 94.8 FL (ref 78–100)
MCV RBC AUTO: 94.8 FL (ref 78–100)
MCV RBC AUTO: 95.1 FL (ref 78–100)
MCV RBC AUTO: 95.3 FL (ref 78–100)
MCV RBC AUTO: 95.3 FL (ref 78–100)
MCV RBC AUTO: 95.4 FL (ref 78–100)
MCV RBC AUTO: 95.7 FL (ref 78–100)
MCV RBC AUTO: 95.8 FL (ref 78–100)
MCV RBC AUTO: 96 FL (ref 78–100)
MCV RBC AUTO: 96.1 FL (ref 78–100)
MCV RBC AUTO: 96.1 FL (ref 78–100)
MCV RBC AUTO: 96.3 FL (ref 78–100)
MCV RBC AUTO: 96.5 FL (ref 78–100)
MCV RBC AUTO: 96.7 FL (ref 78–100)
MCV RBC AUTO: 96.9 FL (ref 78–100)
MCV RBC AUTO: 97 FL (ref 78–100)
MCV RBC AUTO: 97 FL (ref 78–100)
MCV RBC AUTO: 97.1 FL (ref 78–100)
MCV RBC AUTO: 97.7 FL (ref 78–100)
MCV RBC AUTO: 97.9 FL (ref 78–100)
MCV RBC AUTO: ABNORMAL FL (ref 78–100)
MCV RBC AUTO: ABNORMAL FL (ref 78–100)
METAMYELOCYTES ABSOLUTE COUNT: 0.12 K/CU MM
METAMYELOCYTES ABSOLUTE COUNT: 0.2 K/CU MM
METAMYELOCYTES PERCENT: 1 %
METAMYELOCYTES PERCENT: 1 %
METHEMOGLOBIN ARTERIAL: 1.2 %
METHEMOGLOBIN ARTERIAL: 1.3 %
METHEMOGLOBIN ARTERIAL: 1.4 %
MIN HEART RATE TIME/DATE: NORMAL
MIN HEART RATE: 74 BPM
MONOCYTES ABSOLUTE: 0.1 K/CU MM
MONOCYTES ABSOLUTE: 0.3 K/CU MM
MONOCYTES ABSOLUTE: 0.6 K/CU MM
MONOCYTES ABSOLUTE: 0.8 K/CU MM
MONOCYTES ABSOLUTE: 0.9 K/CU MM
MONOCYTES ABSOLUTE: 1 K/CU MM
MONOCYTES ABSOLUTE: 1.1 K/CU MM
MONOCYTES ABSOLUTE: 1.2 K/CU MM
MONOCYTES ABSOLUTE: 1.3 K/CU MM
MONOCYTES ABSOLUTE: 1.4 K/CU MM
MONOCYTES ABSOLUTE: 1.5 K/CU MM
MONOCYTES ABSOLUTE: 1.6 K/CU MM
MONOCYTES ABSOLUTE: 1.7 K/CU MM
MONOCYTES ABSOLUTE: 2.5 K/CU MM
MONOCYTES ABSOLUTE: 3.3 K/CU MM
MONOCYTES RELATIVE PERCENT: 1 % (ref 0–4)
MONOCYTES RELATIVE PERCENT: 1 % (ref 0–4)
MONOCYTES RELATIVE PERCENT: 12 % (ref 0–4)
MONOCYTES RELATIVE PERCENT: 12.8 % (ref 0–4)
MONOCYTES RELATIVE PERCENT: 14 % (ref 0–4)
MONOCYTES RELATIVE PERCENT: 3 % (ref 0–4)
MONOCYTES RELATIVE PERCENT: 3 % (ref 0–4)
MONOCYTES RELATIVE PERCENT: 4 % (ref 0–4)
MONOCYTES RELATIVE PERCENT: 4.7 % (ref 0–4)
MONOCYTES RELATIVE PERCENT: 5.8 % (ref 0–4)
MONOCYTES RELATIVE PERCENT: 6 % (ref 0–4)
MONOCYTES RELATIVE PERCENT: 6.9 % (ref 0–4)
MONOCYTES RELATIVE PERCENT: 7 % (ref 0–4)
MONOCYTES RELATIVE PERCENT: 7 % (ref 0–4)
MONOCYTES RELATIVE PERCENT: 7.1 % (ref 0–4)
MONOCYTES RELATIVE PERCENT: 7.1 % (ref 0–4)
MONOCYTES RELATIVE PERCENT: 7.4 % (ref 0–4)
MONOCYTES RELATIVE PERCENT: 7.4 % (ref 0–4)
MONOCYTES RELATIVE PERCENT: 7.7 % (ref 0–4)
MONOCYTES RELATIVE PERCENT: 7.8 % (ref 0–4)
MONOCYTES RELATIVE PERCENT: 8.2 % (ref 0–4)
MONOCYTES RELATIVE PERCENT: 8.3 % (ref 0–4)
MONOCYTES RELATIVE PERCENT: 8.6 % (ref 0–4)
MONOCYTES RELATIVE PERCENT: 8.6 % (ref 0–4)
MONOCYTES RELATIVE PERCENT: 8.7 % (ref 0–4)
MONOCYTES RELATIVE PERCENT: 8.8 % (ref 0–4)
MONOCYTES RELATIVE PERCENT: 8.8 % (ref 0–4)
MONOCYTES RELATIVE PERCENT: 8.9 % (ref 0–4)
MONOCYTES RELATIVE PERCENT: 9 % (ref 0–4)
MUCUS: ABNORMAL HPF
MYELOCYTE PERCENT: 1 %
MYELOCYTE PERCENT: 2 %
MYELOCYTE PERCENT: 2 %
MYELOCYTES ABSOLUTE COUNT: 0.21 K/CU MM
MYELOCYTES ABSOLUTE COUNT: 0.41 K/CU MM
MYELOCYTES ABSOLUTE COUNT: 0.5 K/CU MM
NITRITE URINE, QUANTITATIVE: NEGATIVE
NUCLEATED RBC %: 0 %
NUMBER OF FASTEST VENTRICULAR BEATS: 3
NUMBER OF LONGEST VENTRICULAR BEATS: 7
NUMBER OF QRS COMPLEXES: NORMAL
NUMBER OF SUPRAVENTRICULAR BEATS IN RUNS: 0
NUMBER OF SUPRAVENTRICULAR COUPLETS: 1
NUMBER OF SUPRAVENTRICULAR ECTOPICS: 88
NUMBER OF SUPRAVENTRICULAR ISOLATED BEATS: 86
NUMBER OF SUPRAVENTRICULAR RUNS: 0
NUMBER OF VENTRICULAR BEATS IN RUNS: 65
NUMBER OF VENTRICULAR BIGEMINAL CYCLES: 3
NUMBER OF VENTRICULAR COUPLETS: 94
NUMBER OF VENTRICULAR ECTOPICS: 2293
NUMBER OF VENTRICULAR ISOLATED BEATS: 2038
NUMBER OF VENTRICULAR RUNS: 20
O2 SATURATION: 89.2 % (ref 96–97)
O2 SATURATION: 94.5 % (ref 96–97)
O2 SATURATION: 95.5 % (ref 96–97)
PCO2 ARTERIAL: 32 MMHG (ref 32–45)
PCO2 ARTERIAL: 39 MMHG (ref 32–45)
PCO2 ARTERIAL: 41 MMHG (ref 32–45)
PDW BLD-RTO: 13.1 % (ref 11.7–14.9)
PDW BLD-RTO: 13.2 % (ref 11.7–14.9)
PDW BLD-RTO: 13.3 % (ref 11.7–14.9)
PDW BLD-RTO: 13.4 % (ref 11.7–14.9)
PDW BLD-RTO: 13.4 % (ref 11.7–14.9)
PDW BLD-RTO: 13.5 % (ref 11.7–14.9)
PDW BLD-RTO: 13.5 % (ref 11.7–14.9)
PDW BLD-RTO: 13.7 % (ref 11.7–14.9)
PDW BLD-RTO: 13.8 % (ref 11.7–14.9)
PDW BLD-RTO: 13.8 % (ref 11.7–14.9)
PDW BLD-RTO: 13.9 % (ref 11.7–14.9)
PDW BLD-RTO: 13.9 % (ref 11.7–14.9)
PDW BLD-RTO: 14 % (ref 11.7–14.9)
PDW BLD-RTO: 14.1 % (ref 11.7–14.9)
PDW BLD-RTO: 14.2 % (ref 11.7–14.9)
PDW BLD-RTO: 14.3 % (ref 11.7–14.9)
PDW BLD-RTO: 14.4 % (ref 11.7–14.9)
PDW BLD-RTO: 14.6 % (ref 11.7–14.9)
PDW BLD-RTO: 14.6 % (ref 11.7–14.9)
PDW BLD-RTO: 14.9 % (ref 11.7–14.9)
PDW BLD-RTO: 15.3 % (ref 11.7–14.9)
PDW BLD-RTO: 15.5 % (ref 11.7–14.9)
PDW BLD-RTO: 15.6 % (ref 11.7–14.9)
PDW BLD-RTO: 15.8 % (ref 11.7–14.9)
PDW BLD-RTO: 15.9 % (ref 11.7–14.9)
PDW BLD-RTO: 15.9 % (ref 11.7–14.9)
PDW BLD-RTO: 16 % (ref 11.7–14.9)
PDW BLD-RTO: 16.1 % (ref 11.7–14.9)
PDW BLD-RTO: 16.2 % (ref 11.7–14.9)
PDW BLD-RTO: 16.3 % (ref 11.7–14.9)
PDW BLD-RTO: 16.5 % (ref 11.7–14.9)
PDW BLD-RTO: 16.6 % (ref 11.7–14.9)
PDW BLD-RTO: 16.9 % (ref 11.7–14.9)
PDW BLD-RTO: ABNORMAL % (ref 11.7–14.9)
PDW BLD-RTO: ABNORMAL % (ref 11.7–14.9)
PH BLOOD: 7.37 (ref 7.34–7.45)
PH BLOOD: 7.38 (ref 7.34–7.45)
PH BLOOD: 7.45 (ref 7.34–7.45)
PH, URINE: 5 (ref 5–8)
PH, URINE: 6 (ref 5–8)
PH, URINE: 8 (ref 5–8)
PHOSPHORUS: 1.9 MG/DL (ref 2.5–4.9)
PHOSPHORUS: 2.1 MG/DL (ref 2.5–4.9)
PHOSPHORUS: 2.1 MG/DL (ref 2.5–4.9)
PHOSPHORUS: 2.2 MG/DL (ref 2.5–4.9)
PHOSPHORUS: 2.3 MG/DL (ref 2.5–4.9)
PHOSPHORUS: 2.4 MG/DL (ref 2.5–4.9)
PHOSPHORUS: 2.8 MG/DL (ref 2.5–4.9)
PHOSPHORUS: 2.9 MG/DL (ref 2.5–4.9)
PHOSPHORUS: 3.1 MG/DL (ref 2.5–4.9)
PHOSPHORUS: 3.3 MG/DL (ref 2.5–4.9)
PHOSPHORUS: 3.5 MG/DL (ref 2.5–4.9)
PLATELET # BLD: 179 K/CU MM (ref 140–440)
PLATELET # BLD: 180 K/CU MM (ref 140–440)
PLATELET # BLD: 189 K/CU MM (ref 140–440)
PLATELET # BLD: 193 K/CU MM (ref 140–440)
PLATELET # BLD: 210 K/CU MM (ref 140–440)
PLATELET # BLD: 220 K/CU MM (ref 140–440)
PLATELET # BLD: 220 K/CU MM (ref 140–440)
PLATELET # BLD: 233 K/CU MM (ref 140–440)
PLATELET # BLD: 237 K/CU MM (ref 140–440)
PLATELET # BLD: 281 K/CU MM (ref 140–440)
PLATELET # BLD: 285 K/CU MM (ref 140–440)
PLATELET # BLD: 299 K/CU MM (ref 140–440)
PLATELET # BLD: 303 K/CU MM (ref 140–440)
PLATELET # BLD: 306 K/CU MM (ref 140–440)
PLATELET # BLD: 308 K/CU MM (ref 140–440)
PLATELET # BLD: 314 K/CU MM (ref 140–440)
PLATELET # BLD: 317 K/CU MM (ref 140–440)
PLATELET # BLD: 317 K/CU MM (ref 140–440)
PLATELET # BLD: 334 K/CU MM (ref 140–440)
PLATELET # BLD: 336 K/CU MM (ref 140–440)
PLATELET # BLD: 337 K/CU MM (ref 140–440)
PLATELET # BLD: 337 K/CU MM (ref 140–440)
PLATELET # BLD: 340 K/CU MM (ref 140–440)
PLATELET # BLD: 352 K/CU MM (ref 140–440)
PLATELET # BLD: 354 K/CU MM (ref 140–440)
PLATELET # BLD: 363 K/CU MM (ref 140–440)
PLATELET # BLD: 365 K/CU MM (ref 140–440)
PLATELET # BLD: 367 K/CU MM (ref 140–440)
PLATELET # BLD: 367 K/CU MM (ref 140–440)
PLATELET # BLD: 369 K/CU MM (ref 140–440)
PLATELET # BLD: 371 K/CU MM (ref 140–440)
PLATELET # BLD: 372 K/CU MM (ref 140–440)
PLATELET # BLD: 374 K/CU MM (ref 140–440)
PLATELET # BLD: 375 K/CU MM (ref 140–440)
PLATELET # BLD: 380 K/CU MM (ref 140–440)
PLATELET # BLD: 384 K/CU MM (ref 140–440)
PLATELET # BLD: 398 K/CU MM (ref 140–440)
PLATELET # BLD: 404 K/CU MM (ref 140–440)
PLATELET # BLD: 409 K/CU MM (ref 140–440)
PLATELET # BLD: 411 K/CU MM (ref 140–440)
PLATELET # BLD: 413 K/CU MM (ref 140–440)
PLATELET # BLD: 418 K/CU MM (ref 140–440)
PLATELET # BLD: 460 K/CU MM (ref 140–440)
PLATELET # BLD: 462 K/CU MM (ref 140–440)
PLATELET # BLD: 482 K/CU MM (ref 140–440)
PLATELET # BLD: ABNORMAL K/CU MM (ref 140–440)
PLATELET # BLD: ABNORMAL K/CU MM (ref 140–440)
PLT MORPHOLOGY: ABNORMAL
PLT MORPHOLOGY: ABNORMAL
PMV BLD AUTO: 10 FL (ref 7.5–11.1)
PMV BLD AUTO: 10 FL (ref 7.5–11.1)
PMV BLD AUTO: 10.1 FL (ref 7.5–11.1)
PMV BLD AUTO: 10.1 FL (ref 7.5–11.1)
PMV BLD AUTO: 10.2 FL (ref 7.5–11.1)
PMV BLD AUTO: 10.3 FL (ref 7.5–11.1)
PMV BLD AUTO: 10.3 FL (ref 7.5–11.1)
PMV BLD AUTO: 10.4 FL (ref 7.5–11.1)
PMV BLD AUTO: 10.5 FL (ref 7.5–11.1)
PMV BLD AUTO: 9 FL (ref 7.5–11.1)
PMV BLD AUTO: 9 FL (ref 7.5–11.1)
PMV BLD AUTO: 9.1 FL (ref 7.5–11.1)
PMV BLD AUTO: 9.2 FL (ref 7.5–11.1)
PMV BLD AUTO: 9.3 FL (ref 7.5–11.1)
PMV BLD AUTO: 9.4 FL (ref 7.5–11.1)
PMV BLD AUTO: 9.5 FL (ref 7.5–11.1)
PMV BLD AUTO: 9.6 FL (ref 7.5–11.1)
PMV BLD AUTO: 9.7 FL (ref 7.5–11.1)
PMV BLD AUTO: 9.8 FL (ref 7.5–11.1)
PMV BLD AUTO: 9.9 FL (ref 7.5–11.1)
PMV BLD AUTO: 9.9 FL (ref 7.5–11.1)
PMV BLD AUTO: ABNORMAL FL (ref 7.5–11.1)
PMV BLD AUTO: ABNORMAL FL (ref 7.5–11.1)
PO2 ARTERIAL: 56 MMHG (ref 75–100)
PO2 ARTERIAL: 89 MMHG (ref 75–100)
PO2 ARTERIAL: 92 MMHG (ref 75–100)
POLYCHROMASIA: ABNORMAL
POTASSIUM SERPL-SCNC: 3.2 MMOL/L (ref 3.5–5.1)
POTASSIUM SERPL-SCNC: 3.3 MMOL/L (ref 3.5–5.1)
POTASSIUM SERPL-SCNC: 3.3 MMOL/L (ref 3.5–5.1)
POTASSIUM SERPL-SCNC: 3.4 MMOL/L (ref 3.5–5.1)
POTASSIUM SERPL-SCNC: 3.5 MMOL/L (ref 3.5–5.1)
POTASSIUM SERPL-SCNC: 3.6 MMOL/L (ref 3.5–5.1)
POTASSIUM SERPL-SCNC: 3.7 MMOL/L (ref 3.5–5.1)
POTASSIUM SERPL-SCNC: 3.8 MMOL/L (ref 3.5–5.1)
POTASSIUM SERPL-SCNC: 3.9 MMOL/L (ref 3.5–5.1)
POTASSIUM SERPL-SCNC: 3.9 MMOL/L (ref 3.5–5.1)
POTASSIUM SERPL-SCNC: 4 MMOL/L (ref 3.5–5.1)
POTASSIUM SERPL-SCNC: 4.1 MMOL/L (ref 3.5–5.1)
POTASSIUM SERPL-SCNC: 4.2 MMOL/L (ref 3.5–5.1)
POTASSIUM SERPL-SCNC: 4.3 MMOL/L (ref 3.5–5.1)
POTASSIUM SERPL-SCNC: 4.4 MMOL/L (ref 3.5–5.1)
POTASSIUM SERPL-SCNC: 4.5 MMOL/L (ref 3.5–5.1)
POTASSIUM SERPL-SCNC: 4.6 MMOL/L (ref 3.5–5.1)
POTASSIUM SERPL-SCNC: 4.7 MMOL/L (ref 3.5–5.1)
POTASSIUM SERPL-SCNC: 4.7 MMOL/L (ref 3.5–5.1)
POTASSIUM SERPL-SCNC: 4.8 MMOL/L (ref 3.5–5.1)
POTASSIUM SERPL-SCNC: 4.9 MMOL/L (ref 3.5–5.1)
POTASSIUM SERPL-SCNC: 5 MMOL/L (ref 3.5–5.1)
POTASSIUM SERPL-SCNC: 5 MMOL/L (ref 3.5–5.1)
POTASSIUM SERPL-SCNC: ABNORMAL MMOL/L (ref 3.5–5.1)
POTASSIUM SERPL-SCNC: ABNORMAL MMOL/L (ref 3.5–5.1)
POTASSIUM, UR: 27.8 MMOL/L (ref 22–119)
PRO-BNP: 9115 PG/ML
PRO-BNP: ABNORMAL PG/ML
PROCALCITONIN: 0.41
PROCALCITONIN: 0.46
PROMYELOCYTES ABSOLUTE COUNT: 0.48 K/CU MM
PROMYELOCYTES PERCENT: 2 %
PROT/CREAT RATIO, UR: ABNORMAL
PROT/CREAT RATIO, UR: ABNORMAL
PROTEIN UA: 100 MG/DL
PROTEIN UA: 100 MG/DL
PROTEIN UA: 30 MG/DL
PROTEIN UA: >500 MG/DL
RBC # BLD: 3.47 M/CU MM (ref 4.6–6.2)
RBC # BLD: 3.48 M/CU MM (ref 4.6–6.2)
RBC # BLD: 3.49 M/CU MM (ref 4.6–6.2)
RBC # BLD: 3.5 M/CU MM (ref 4.6–6.2)
RBC # BLD: 3.56 M/CU MM (ref 4.6–6.2)
RBC # BLD: 3.57 M/CU MM (ref 4.6–6.2)
RBC # BLD: 3.63 M/CU MM (ref 4.6–6.2)
RBC # BLD: 3.63 M/CU MM (ref 4.6–6.2)
RBC # BLD: 3.64 M/CU MM (ref 4.6–6.2)
RBC # BLD: 3.67 M/CU MM (ref 4.6–6.2)
RBC # BLD: 3.72 M/CU MM (ref 4.6–6.2)
RBC # BLD: 3.73 M/CU MM (ref 4.6–6.2)
RBC # BLD: 3.74 M/CU MM (ref 4.6–6.2)
RBC # BLD: 3.77 M/CU MM (ref 4.6–6.2)
RBC # BLD: 3.77 M/CU MM (ref 4.6–6.2)
RBC # BLD: 3.81 M/CU MM (ref 4.6–6.2)
RBC # BLD: 3.82 M/CU MM (ref 4.6–6.2)
RBC # BLD: 3.82 M/CU MM (ref 4.6–6.2)
RBC # BLD: 3.85 M/CU MM (ref 4.6–6.2)
RBC # BLD: 3.92 M/CU MM (ref 4.6–6.2)
RBC # BLD: 3.92 M/CU MM (ref 4.6–6.2)
RBC # BLD: 3.95 M/CU MM (ref 4.6–6.2)
RBC # BLD: 3.95 M/CU MM (ref 4.6–6.2)
RBC # BLD: 3.99 M/CU MM (ref 4.6–6.2)
RBC # BLD: 4 M/CU MM (ref 4.6–6.2)
RBC # BLD: 4.07 M/CU MM (ref 4.6–6.2)
RBC # BLD: 4.09 M/CU MM (ref 4.6–6.2)
RBC # BLD: 4.12 M/CU MM (ref 4.6–6.2)
RBC # BLD: 4.18 M/CU MM (ref 4.6–6.2)
RBC # BLD: 4.21 M/CU MM (ref 4.6–6.2)
RBC # BLD: 4.25 M/CU MM (ref 4.6–6.2)
RBC # BLD: 4.33 M/CU MM (ref 4.6–6.2)
RBC # BLD: 4.38 M/CU MM (ref 4.6–6.2)
RBC # BLD: 4.46 M/CU MM (ref 4.6–6.2)
RBC # BLD: 4.52 M/CU MM (ref 4.6–6.2)
RBC # BLD: 4.65 M/CU MM (ref 4.6–6.2)
RBC # BLD: 4.67 M/CU MM (ref 4.6–6.2)
RBC # BLD: 4.7 M/CU MM (ref 4.6–6.2)
RBC # BLD: 4.79 M/CU MM (ref 4.6–6.2)
RBC # BLD: 4.84 M/CU MM (ref 4.6–6.2)
RBC # BLD: 4.87 M/CU MM (ref 4.6–6.2)
RBC # BLD: 4.98 M/CU MM (ref 4.6–6.2)
RBC # BLD: 5.1 M/CU MM (ref 4.6–6.2)
RBC # BLD: 5.46 M/CU MM (ref 4.6–6.2)
RBC # BLD: 5.81 M/CU MM (ref 4.6–6.2)
RBC # BLD: ABNORMAL M/CU MM (ref 4.6–6.2)
RBC # BLD: ABNORMAL M/CU MM (ref 4.6–6.2)
RBC URINE: 29 /HPF (ref 0–3)
RBC URINE: 30 /HPF (ref 0–3)
RBC URINE: 34 /HPF (ref 0–3)
RBC URINE: 40 /HPF (ref 0–3)
RBC URINE: 480 /HPF (ref 0–3)
RBC URINE: 9 /HPF (ref 0–3)
REASON FOR REJECTION: NORMAL
REJECTED TEST: NORMAL
REJECTED TEST: NORMAL
SEGMENTED NEUTROPHILS ABSOLUTE COUNT: 10.7 K/CU MM
SEGMENTED NEUTROPHILS ABSOLUTE COUNT: 10.8 K/CU MM
SEGMENTED NEUTROPHILS ABSOLUTE COUNT: 11 K/CU MM
SEGMENTED NEUTROPHILS ABSOLUTE COUNT: 11.6 K/CU MM
SEGMENTED NEUTROPHILS ABSOLUTE COUNT: 11.9 K/CU MM
SEGMENTED NEUTROPHILS ABSOLUTE COUNT: 13.1 K/CU MM
SEGMENTED NEUTROPHILS ABSOLUTE COUNT: 13.1 K/CU MM
SEGMENTED NEUTROPHILS ABSOLUTE COUNT: 13.2 K/CU MM
SEGMENTED NEUTROPHILS ABSOLUTE COUNT: 13.4 K/CU MM
SEGMENTED NEUTROPHILS ABSOLUTE COUNT: 13.5 K/CU MM
SEGMENTED NEUTROPHILS ABSOLUTE COUNT: 14.3 K/CU MM
SEGMENTED NEUTROPHILS ABSOLUTE COUNT: 15 K/CU MM
SEGMENTED NEUTROPHILS ABSOLUTE COUNT: 15.3 K/CU MM
SEGMENTED NEUTROPHILS ABSOLUTE COUNT: 15.7 K/CU MM
SEGMENTED NEUTROPHILS ABSOLUTE COUNT: 15.8 K/CU MM
SEGMENTED NEUTROPHILS ABSOLUTE COUNT: 16.9 K/CU MM
SEGMENTED NEUTROPHILS ABSOLUTE COUNT: 17.5 K/CU MM
SEGMENTED NEUTROPHILS ABSOLUTE COUNT: 17.7 K/CU MM
SEGMENTED NEUTROPHILS ABSOLUTE COUNT: 17.9 K/CU MM
SEGMENTED NEUTROPHILS ABSOLUTE COUNT: 18.2 K/CU MM
SEGMENTED NEUTROPHILS ABSOLUTE COUNT: 18.5 K/CU MM
SEGMENTED NEUTROPHILS ABSOLUTE COUNT: 18.7 K/CU MM
SEGMENTED NEUTROPHILS ABSOLUTE COUNT: 20.1 K/CU MM
SEGMENTED NEUTROPHILS ABSOLUTE COUNT: 20.7 K/CU MM
SEGMENTED NEUTROPHILS ABSOLUTE COUNT: 20.7 K/CU MM
SEGMENTED NEUTROPHILS ABSOLUTE COUNT: 25.1 K/CU MM
SEGMENTED NEUTROPHILS ABSOLUTE COUNT: 27.5 K/CU MM
SEGMENTED NEUTROPHILS ABSOLUTE COUNT: 6 K/CU MM
SEGMENTED NEUTROPHILS ABSOLUTE COUNT: 6.3 K/CU MM
SEGMENTED NEUTROPHILS ABSOLUTE COUNT: 6.5 K/CU MM
SEGMENTED NEUTROPHILS ABSOLUTE COUNT: 7.2 K/CU MM
SEGMENTED NEUTROPHILS ABSOLUTE COUNT: 7.5 K/CU MM
SEGMENTED NEUTROPHILS ABSOLUTE COUNT: 7.6 K/CU MM
SEGMENTED NEUTROPHILS ABSOLUTE COUNT: 7.7 K/CU MM
SEGMENTED NEUTROPHILS ABSOLUTE COUNT: 8.8 K/CU MM
SEGMENTED NEUTROPHILS ABSOLUTE COUNT: 9 K/CU MM
SEGMENTED NEUTROPHILS ABSOLUTE COUNT: 9.3 K/CU MM
SEGMENTED NEUTROPHILS ABSOLUTE COUNT: 9.4 K/CU MM
SEGMENTED NEUTROPHILS ABSOLUTE COUNT: 9.4 K/CU MM
SEGMENTED NEUTROPHILS RELATIVE PERCENT: 72.5 % (ref 36–66)
SEGMENTED NEUTROPHILS RELATIVE PERCENT: 72.7 % (ref 36–66)
SEGMENTED NEUTROPHILS RELATIVE PERCENT: 73 % (ref 36–66)
SEGMENTED NEUTROPHILS RELATIVE PERCENT: 74.3 % (ref 36–66)
SEGMENTED NEUTROPHILS RELATIVE PERCENT: 75 % (ref 36–66)
SEGMENTED NEUTROPHILS RELATIVE PERCENT: 75.1 % (ref 36–66)
SEGMENTED NEUTROPHILS RELATIVE PERCENT: 75.7 % (ref 36–66)
SEGMENTED NEUTROPHILS RELATIVE PERCENT: 76 % (ref 36–66)
SEGMENTED NEUTROPHILS RELATIVE PERCENT: 76.4 % (ref 36–66)
SEGMENTED NEUTROPHILS RELATIVE PERCENT: 77.5 % (ref 36–66)
SEGMENTED NEUTROPHILS RELATIVE PERCENT: 77.8 % (ref 36–66)
SEGMENTED NEUTROPHILS RELATIVE PERCENT: 79 % (ref 36–66)
SEGMENTED NEUTROPHILS RELATIVE PERCENT: 79.2 % (ref 36–66)
SEGMENTED NEUTROPHILS RELATIVE PERCENT: 79.4 % (ref 36–66)
SEGMENTED NEUTROPHILS RELATIVE PERCENT: 80.7 % (ref 36–66)
SEGMENTED NEUTROPHILS RELATIVE PERCENT: 80.8 % (ref 36–66)
SEGMENTED NEUTROPHILS RELATIVE PERCENT: 80.9 % (ref 36–66)
SEGMENTED NEUTROPHILS RELATIVE PERCENT: 81.3 % (ref 36–66)
SEGMENTED NEUTROPHILS RELATIVE PERCENT: 81.5 % (ref 36–66)
SEGMENTED NEUTROPHILS RELATIVE PERCENT: 82 % (ref 36–66)
SEGMENTED NEUTROPHILS RELATIVE PERCENT: 82.3 % (ref 36–66)
SEGMENTED NEUTROPHILS RELATIVE PERCENT: 82.4 % (ref 36–66)
SEGMENTED NEUTROPHILS RELATIVE PERCENT: 82.9 % (ref 36–66)
SEGMENTED NEUTROPHILS RELATIVE PERCENT: 83 % (ref 36–66)
SEGMENTED NEUTROPHILS RELATIVE PERCENT: 83.5 % (ref 36–66)
SEGMENTED NEUTROPHILS RELATIVE PERCENT: 83.9 % (ref 36–66)
SEGMENTED NEUTROPHILS RELATIVE PERCENT: 84 % (ref 36–66)
SEGMENTED NEUTROPHILS RELATIVE PERCENT: 84.1 % (ref 36–66)
SEGMENTED NEUTROPHILS RELATIVE PERCENT: 86 % (ref 36–66)
SEGMENTED NEUTROPHILS RELATIVE PERCENT: 86.2 % (ref 36–66)
SEGMENTED NEUTROPHILS RELATIVE PERCENT: 86.4 % (ref 36–66)
SEGMENTED NEUTROPHILS RELATIVE PERCENT: 86.7 % (ref 36–66)
SEGMENTED NEUTROPHILS RELATIVE PERCENT: 87 % (ref 36–66)
SEGMENTED NEUTROPHILS RELATIVE PERCENT: 87 % (ref 36–66)
SEGMENTED NEUTROPHILS RELATIVE PERCENT: 89 % (ref 36–66)
SEGMENTED NEUTROPHILS RELATIVE PERCENT: 89 % (ref 36–66)
SEGMENTED NEUTROPHILS RELATIVE PERCENT: 90 % (ref 36–66)
SEGMENTED NEUTROPHILS RELATIVE PERCENT: 92 % (ref 36–66)
SEGMENTED NEUTROPHILS RELATIVE PERCENT: ABNORMAL % (ref 36–66)
SEGMENTED NEUTROPHILS RELATIVE PERCENT: ABNORMAL % (ref 36–66)
SODIUM BLD-SCNC: 129 MMOL/L (ref 135–145)
SODIUM BLD-SCNC: 130 MMOL/L (ref 135–145)
SODIUM BLD-SCNC: 131 MMOL/L (ref 135–145)
SODIUM BLD-SCNC: 133 MMOL/L (ref 135–145)
SODIUM BLD-SCNC: 134 MMOL/L (ref 135–145)
SODIUM BLD-SCNC: 135 MMOL/L (ref 135–145)
SODIUM BLD-SCNC: 136 MMOL/L (ref 135–145)
SODIUM BLD-SCNC: 137 MMOL/L (ref 135–145)
SODIUM BLD-SCNC: 138 MMOL/L (ref 135–145)
SODIUM BLD-SCNC: 139 MMOL/L (ref 135–145)
SODIUM BLD-SCNC: 140 MMOL/L (ref 135–145)
SODIUM BLD-SCNC: 141 MMOL/L (ref 135–145)
SODIUM BLD-SCNC: 141 MMOL/L (ref 135–145)
SODIUM BLD-SCNC: 143 MMOL/L (ref 135–145)
SODIUM BLD-SCNC: 145 MMOL/L (ref 135–145)
SODIUM BLD-SCNC: 146 MMOL/L (ref 135–145)
SODIUM URINE: 10 MMOL/L (ref 35–167)
SODIUM URINE: 13 MMOL/L (ref 35–167)
SPECIFIC GRAVITY UA: 1 (ref 1–1.03)
SPECIFIC GRAVITY UA: 1.01 (ref 1–1.03)
SPECIFIC GRAVITY UA: 1.02 (ref 1–1.03)
SPECIFIC GRAVITY UA: 1.03 (ref 1–1.03)
SPECIMEN: ABNORMAL
SPECIMEN: NORMAL
SQUAMOUS EPITHELIAL: 1 /HPF
SQUAMOUS EPITHELIAL: 1 /HPF
SQUAMOUS EPITHELIAL: <1 /HPF
SQUAMOUS EPITHELIAL: <1 /HPF
TOTAL COLONY COUNT: ABNORMAL
TOTAL IMMATURE NEUTOROPHIL: 0.03 K/CU MM
TOTAL IMMATURE NEUTOROPHIL: 0.03 K/CU MM
TOTAL IMMATURE NEUTOROPHIL: 0.04 K/CU MM
TOTAL IMMATURE NEUTOROPHIL: 0.05 K/CU MM
TOTAL IMMATURE NEUTOROPHIL: 0.06 K/CU MM
TOTAL IMMATURE NEUTOROPHIL: 0.07 K/CU MM
TOTAL IMMATURE NEUTOROPHIL: 0.08 K/CU MM
TOTAL IMMATURE NEUTOROPHIL: 0.08 K/CU MM
TOTAL IMMATURE NEUTOROPHIL: 0.1 K/CU MM
TOTAL IMMATURE NEUTOROPHIL: 0.12 K/CU MM
TOTAL IMMATURE NEUTOROPHIL: 0.14 K/CU MM
TOTAL IMMATURE NEUTOROPHIL: 0.23 K/CU MM
TOTAL IMMATURE NEUTOROPHIL: 0.24 K/CU MM
TOTAL IMMATURE NEUTOROPHIL: 0.26 K/CU MM
TOTAL IMMATURE NEUTOROPHIL: 0.28 K/CU MM
TOTAL IMMATURE NEUTOROPHIL: 0.31 K/CU MM
TOTAL IMMATURE NEUTOROPHIL: 0.39 K/CU MM
TOTAL NUCLEATED RBC: 0 K/CU MM
TOTAL PROTEIN: 3.9 GM/DL (ref 6.4–8.2)
TOTAL PROTEIN: 4.2 GM/DL (ref 6.4–8.2)
TOTAL PROTEIN: 4.6 GM/DL (ref 6.4–8.2)
TOTAL PROTEIN: 4.7 GM/DL (ref 6.4–8.2)
TOTAL PROTEIN: 4.8 GM/DL (ref 6.4–8.2)
TOTAL PROTEIN: 4.9 GM/DL (ref 6.4–8.2)
TOTAL PROTEIN: 5 GM/DL (ref 6.4–8.2)
TOTAL PROTEIN: 5 GM/DL (ref 6.4–8.2)
TOTAL PROTEIN: 5.1 GM/DL (ref 6.4–8.2)
TOTAL PROTEIN: 5.3 GM/DL (ref 6.4–8.2)
TOTAL PROTEIN: 5.3 GM/DL (ref 6.4–8.2)
TOTAL PROTEIN: 5.5 GM/DL (ref 6.4–8.2)
TOTAL PROTEIN: 5.6 GM/DL (ref 6.4–8.2)
TOTAL PROTEIN: 5.8 GM/DL (ref 6.4–8.2)
TOTAL PROTEIN: 6 GM/DL (ref 6.4–8.2)
TOTAL PROTEIN: 6 GM/DL (ref 6.4–8.2)
TOTAL PROTEIN: 6.1 GM/DL (ref 6.4–8.2)
TOTAL PROTEIN: 7.1 GM/DL (ref 6.4–8.2)
TOXIC GRANULATION: PRESENT
TOXIC GRANULATION: PRESENT
TRICHOMONAS: ABNORMAL /HPF
TROPONIN T: 0.06 NG/ML
TROPONIN T: 0.06 NG/ML
TROPONIN T: 0.07 NG/ML
TROPONIN T: 0.08 NG/ML
TROPONIN T: 0.09 NG/ML
TSH HIGH SENSITIVITY: 2.35 UIU/ML (ref 0.27–4.2)
TSH HIGH SENSITIVITY: 2.5 UIU/ML (ref 0.27–4.2)
URINE TOTAL PROTEIN: 193 MG/DL
URINE TOTAL PROTEIN: 27.7 MG/DL
UROBILINOGEN, URINE: 1 MG/DL (ref 0.2–1)
UROBILINOGEN, URINE: 1 MG/DL (ref 0.2–1)
UROBILINOGEN, URINE: 2 MG/DL (ref 0.2–1)
UROBILINOGEN, URINE: 8 MG/DL (ref 0.2–1)
UROBILINOGEN, URINE: <2 MG/DL (ref 0.2–1)
UROBILINOGEN, URINE: NORMAL MG/DL (ref 0.2–1)
VANCOMYCIN PEAK: 30.9 UG/ML (ref 30–40)
VANCOMYCIN RANDOM: 11.1 UG/ML
VANCOMYCIN RANDOM: 14.8 UG/ML
VANCOMYCIN RANDOM: 17.1 UG/ML
VANCOMYCIN RANDOM: 17.1 UG/ML
VANCOMYCIN RANDOM: 20.6 UG/ML
VANCOMYCIN RANDOM: 21.9 UG/ML
VANCOMYCIN RANDOM: 24.4 UG/ML
VANCOMYCIN RANDOM: NORMAL UG/ML
VANCOMYCIN TROUGH: 13.8 UG/ML (ref 10–20)
VANCOMYCIN TROUGH: 14 UG/ML (ref 10–20)
VANCOMYCIN TROUGH: 15.5 UG/ML (ref 10–20)
VANCOMYCIN TROUGH: 16.4 UG/ML (ref 10–20)
VANCOMYCIN TROUGH: 16.9 UG/ML (ref 10–20)
VANCOMYCIN TROUGH: 17.6 UG/ML (ref 10–20)
VANCOMYCIN TROUGH: 17.9 UG/ML (ref 10–20)
VANCOMYCIN TROUGH: 18.5 UG/ML (ref 10–20)
VANCOMYCIN TROUGH: 18.9 UG/ML (ref 10–20)
VANCOMYCIN TROUGH: 19.1 UG/ML (ref 10–20)
VANCOMYCIN TROUGH: 21 UG/ML (ref 10–20)
VANCOMYCIN TROUGH: 22.2 UG/ML (ref 10–20)
VANCOMYCIN TROUGH: 22.2 UG/ML (ref 10–20)
VANCOMYCIN TROUGH: 23.5 UG/ML (ref 10–20)
VANCOMYCIN TROUGH: 24.7 UG/ML (ref 10–20)
VANCOMYCIN TROUGH: 25.5 UG/ML (ref 10–20)
VANCOMYCIN TROUGH: 26 UG/ML (ref 10–20)
VANCOMYCIN TROUGH: 26.5 UG/ML (ref 10–20)
VANCOMYCIN TROUGH: 27.3 UG/ML (ref 10–20)
VANCOMYCIN TROUGH: 30.2 UG/ML (ref 10–20)
VANCOMYCIN TROUGH: 30.3 UG/ML (ref 10–20)
VANCOMYCIN TROUGH: 32 UG/ML (ref 10–20)
VITAMIN B-12: 555.2 PG/ML (ref 211–911)
WBC # BLD: 10.3 K/CU MM (ref 4–10.5)
WBC # BLD: 10.4 K/CU MM (ref 4–10.5)
WBC # BLD: 11 K/CU MM (ref 4–10.5)
WBC # BLD: 11.3 K/CU MM (ref 4–10.5)
WBC # BLD: 11.6 K/CU MM (ref 4–10.5)
WBC # BLD: 11.7 K/CU MM (ref 4–10.5)
WBC # BLD: 12 K/CU MM (ref 4–10.5)
WBC # BLD: 12.2 K/CU MM (ref 4–10.5)
WBC # BLD: 12.3 K/CU MM (ref 4–10.5)
WBC # BLD: 12.9 K/CU MM (ref 4–10.5)
WBC # BLD: 13 K/CU MM (ref 4–10.5)
WBC # BLD: 14.3 K/CU MM (ref 4–10.5)
WBC # BLD: 14.4 K/CU MM (ref 4–10.5)
WBC # BLD: 14.4 K/CU MM (ref 4–10.5)
WBC # BLD: 14.8 K/CU MM (ref 4–10.5)
WBC # BLD: 15.2 K/CU MM (ref 4–10.5)
WBC # BLD: 15.7 K/CU MM (ref 4–10.5)
WBC # BLD: 15.8 K/CU MM (ref 4–10.5)
WBC # BLD: 16.7 K/CU MM (ref 4–10.5)
WBC # BLD: 17 K/CU MM (ref 4–10.5)
WBC # BLD: 17 K/CU MM (ref 4–10.5)
WBC # BLD: 17.8 K/CU MM (ref 4–10.5)
WBC # BLD: 17.9 K/CU MM (ref 4–10.5)
WBC # BLD: 18.1 K/CU MM (ref 4–10.5)
WBC # BLD: 19.7 K/CU MM (ref 4–10.5)
WBC # BLD: 20.1 K/CU MM (ref 4–10.5)
WBC # BLD: 20.1 K/CU MM (ref 4–10.5)
WBC # BLD: 20.2 K/CU MM (ref 4–10.5)
WBC # BLD: 20.4 K/CU MM (ref 4–10.5)
WBC # BLD: 20.8 K/CU MM (ref 4–10.5)
WBC # BLD: 20.9 K/CU MM (ref 4–10.5)
WBC # BLD: 21.5 K/CU MM (ref 4–10.5)
WBC # BLD: 23.8 K/CU MM (ref 4–10.5)
WBC # BLD: 24 K/CU MM (ref 4–10.5)
WBC # BLD: 24.9 K/CU MM (ref 4–10.5)
WBC # BLD: 25.4 K/CU MM (ref 4–10.5)
WBC # BLD: 29.9 K/CU MM (ref 4–10.5)
WBC # BLD: 8.2 K/CU MM (ref 4–10.5)
WBC # BLD: 8.4 K/CU MM (ref 4–10.5)
WBC # BLD: 8.6 K/CU MM (ref 4–10.5)
WBC # BLD: 9.2 K/CU MM (ref 4–10.5)
WBC # BLD: 9.4 K/CU MM (ref 4–10.5)
WBC # BLD: 9.7 K/CU MM (ref 4–10.5)
WBC # BLD: 9.9 K/CU MM (ref 4–10.5)
WBC # BLD: ABNORMAL K/CU MM (ref 4–10.5)
WBC CLUMP: ABNORMAL /HPF
WBC UA: 1 /HPF (ref 0–2)
WBC UA: 103 /HPF (ref 0–2)
WBC UA: 125 /HPF (ref 0–2)
WBC UA: 213 /HPF (ref 0–2)
WBC UA: 3 /HPF (ref 0–2)
WBC UA: 514 /HPF (ref 0–2)
YEAST: ABNORMAL /HPF
YEAST: ABNORMAL /HPF

## 2019-01-01 PROCEDURE — 6370000000 HC RX 637 (ALT 250 FOR IP): Performed by: ORTHOPAEDIC SURGERY

## 2019-01-01 PROCEDURE — 99024 POSTOP FOLLOW-UP VISIT: CPT | Performed by: SURGERY

## 2019-01-01 PROCEDURE — 80202 ASSAY OF VANCOMYCIN: CPT

## 2019-01-01 PROCEDURE — 94761 N-INVAS EAR/PLS OXIMETRY MLT: CPT

## 2019-01-01 PROCEDURE — 97530 THERAPEUTIC ACTIVITIES: CPT

## 2019-01-01 PROCEDURE — 3600000014 HC SURGERY LEVEL 4 ADDTL 15MIN: Performed by: ORTHOPAEDIC SURGERY

## 2019-01-01 PROCEDURE — 2709999900 HC NON-CHARGEABLE SUPPLY

## 2019-01-01 PROCEDURE — 85027 COMPLETE CBC AUTOMATED: CPT

## 2019-01-01 PROCEDURE — 97605 NEG PRS WND THER DME<=50SQCM: CPT | Performed by: NURSE PRACTITIONER

## 2019-01-01 PROCEDURE — 83605 ASSAY OF LACTIC ACID: CPT

## 2019-01-01 PROCEDURE — 2060000000 HC ICU INTERMEDIATE R&B

## 2019-01-01 PROCEDURE — 97162 PT EVAL MOD COMPLEX 30 MIN: CPT

## 2019-01-01 PROCEDURE — 6360000002 HC RX W HCPCS: Performed by: EMERGENCY MEDICINE

## 2019-01-01 PROCEDURE — 84484 ASSAY OF TROPONIN QUANT: CPT

## 2019-01-01 PROCEDURE — 94640 AIRWAY INHALATION TREATMENT: CPT

## 2019-01-01 PROCEDURE — 85025 COMPLETE CBC W/AUTO DIFF WBC: CPT

## 2019-01-01 PROCEDURE — 84100 ASSAY OF PHOSPHORUS: CPT

## 2019-01-01 PROCEDURE — 82803 BLOOD GASES ANY COMBINATION: CPT

## 2019-01-01 PROCEDURE — 97535 SELF CARE MNGMENT TRAINING: CPT

## 2019-01-01 PROCEDURE — 2580000003 HC RX 258: Performed by: INTERNAL MEDICINE

## 2019-01-01 PROCEDURE — 6360000002 HC RX W HCPCS: Performed by: ORTHOPAEDIC SURGERY

## 2019-01-01 PROCEDURE — 2500000003 HC RX 250 WO HCPCS: Performed by: NURSE ANESTHETIST, CERTIFIED REGISTERED

## 2019-01-01 PROCEDURE — 36415 COLL VENOUS BLD VENIPUNCTURE: CPT

## 2019-01-01 PROCEDURE — 99211 OFF/OP EST MAY X REQ PHY/QHP: CPT

## 2019-01-01 PROCEDURE — 6360000002 HC RX W HCPCS: Performed by: INTERNAL MEDICINE

## 2019-01-01 PROCEDURE — 2580000003 HC RX 258: Performed by: ORTHOPAEDIC SURGERY

## 2019-01-01 PROCEDURE — 6360000002 HC RX W HCPCS: Performed by: SURGERY

## 2019-01-01 PROCEDURE — 2500000003 HC RX 250 WO HCPCS: Performed by: INTERNAL MEDICINE

## 2019-01-01 PROCEDURE — 83735 ASSAY OF MAGNESIUM: CPT

## 2019-01-01 PROCEDURE — 82962 GLUCOSE BLOOD TEST: CPT

## 2019-01-01 PROCEDURE — 99233 SBSQ HOSP IP/OBS HIGH 50: CPT | Performed by: INTERNAL MEDICINE

## 2019-01-01 PROCEDURE — 89220 SPUTUM SPECIMEN COLLECTION: CPT

## 2019-01-01 PROCEDURE — 85652 RBC SED RATE AUTOMATED: CPT

## 2019-01-01 PROCEDURE — APPSS15 APP SPLIT SHARED TIME 0-15 MINUTES: Performed by: NURSE PRACTITIONER

## 2019-01-01 PROCEDURE — 2500000003 HC RX 250 WO HCPCS: Performed by: EMERGENCY MEDICINE

## 2019-01-01 PROCEDURE — 99213 OFFICE O/P EST LOW 20 MIN: CPT

## 2019-01-01 PROCEDURE — 6370000000 HC RX 637 (ALT 250 FOR IP): Performed by: INTERNAL MEDICINE

## 2019-01-01 PROCEDURE — C9113 INJ PANTOPRAZOLE SODIUM, VIA: HCPCS | Performed by: INTERNAL MEDICINE

## 2019-01-01 PROCEDURE — 2709999900 HC NON-CHARGEABLE SUPPLY: Performed by: ORTHOPAEDIC SURGERY

## 2019-01-01 PROCEDURE — 1200000000 HC SEMI PRIVATE

## 2019-01-01 PROCEDURE — 7100000000 HC PACU RECOVERY - FIRST 15 MIN: Performed by: ORTHOPAEDIC SURGERY

## 2019-01-01 PROCEDURE — 6360000002 HC RX W HCPCS

## 2019-01-01 PROCEDURE — 99232 SBSQ HOSP IP/OBS MODERATE 35: CPT | Performed by: INTERNAL MEDICINE

## 2019-01-01 PROCEDURE — 99221 1ST HOSP IP/OBS SF/LOW 40: CPT | Performed by: SURGERY

## 2019-01-01 PROCEDURE — 80053 COMPREHEN METABOLIC PANEL: CPT

## 2019-01-01 PROCEDURE — 87205 SMEAR GRAM STAIN: CPT

## 2019-01-01 PROCEDURE — 93454 CORONARY ARTERY ANGIO S&I: CPT | Performed by: INTERNAL MEDICINE

## 2019-01-01 PROCEDURE — 72170 X-RAY EXAM OF PELVIS: CPT

## 2019-01-01 PROCEDURE — 36591 DRAW BLOOD OFF VENOUS DEVICE: CPT

## 2019-01-01 PROCEDURE — 87071 CULTURE AEROBIC QUANT OTHER: CPT

## 2019-01-01 PROCEDURE — 6370000000 HC RX 637 (ALT 250 FOR IP): Performed by: SURGERY

## 2019-01-01 PROCEDURE — 94660 CPAP INITIATION&MGMT: CPT

## 2019-01-01 PROCEDURE — 72193 CT PELVIS W/DYE: CPT

## 2019-01-01 PROCEDURE — G0008 ADMIN INFLUENZA VIRUS VAC: HCPCS | Performed by: SURGERY

## 2019-01-01 PROCEDURE — 87106 FUNGI IDENTIFICATION YEAST: CPT

## 2019-01-01 PROCEDURE — 76937 US GUIDE VASCULAR ACCESS: CPT

## 2019-01-01 PROCEDURE — 3600000004 HC SURGERY LEVEL 4 BASE: Performed by: SURGERY

## 2019-01-01 PROCEDURE — 87077 CULTURE AEROBIC IDENTIFY: CPT

## 2019-01-01 PROCEDURE — 99222 1ST HOSP IP/OBS MODERATE 55: CPT | Performed by: INTERNAL MEDICINE

## 2019-01-01 PROCEDURE — 85007 BL SMEAR W/DIFF WBC COUNT: CPT

## 2019-01-01 PROCEDURE — 71045 X-RAY EXAM CHEST 1 VIEW: CPT

## 2019-01-01 PROCEDURE — 80069 RENAL FUNCTION PANEL: CPT

## 2019-01-01 PROCEDURE — 87073 CULTURE BACTERIA ANAEROBIC: CPT

## 2019-01-01 PROCEDURE — 3700000001 HC ADD 15 MINUTES (ANESTHESIA): Performed by: SURGERY

## 2019-01-01 PROCEDURE — C1751 CATH, INF, PER/CENT/MIDLINE: HCPCS

## 2019-01-01 PROCEDURE — 80048 BASIC METABOLIC PNL TOTAL CA: CPT

## 2019-01-01 PROCEDURE — 2140000000 HC CCU INTERMEDIATE R&B

## 2019-01-01 PROCEDURE — C1887 CATHETER, GUIDING: HCPCS

## 2019-01-01 PROCEDURE — 2580000003 HC RX 258: Performed by: EMERGENCY MEDICINE

## 2019-01-01 PROCEDURE — 2700000000 HC OXYGEN THERAPY PER DAY

## 2019-01-01 PROCEDURE — 99232 SBSQ HOSP IP/OBS MODERATE 35: CPT | Performed by: SURGERY

## 2019-01-01 PROCEDURE — 84132 ASSAY OF SERUM POTASSIUM: CPT

## 2019-01-01 PROCEDURE — 99221 1ST HOSP IP/OBS SF/LOW 40: CPT | Performed by: INTERNAL MEDICINE

## 2019-01-01 PROCEDURE — 94664 DEMO&/EVAL PT USE INHALER: CPT

## 2019-01-01 PROCEDURE — 86141 C-REACTIVE PROTEIN HS: CPT

## 2019-01-01 PROCEDURE — 2720000010 HC SURG SUPPLY STERILE: Performed by: ORTHOPAEDIC SURGERY

## 2019-01-01 PROCEDURE — 6370000000 HC RX 637 (ALT 250 FOR IP): Performed by: EMERGENCY MEDICINE

## 2019-01-01 PROCEDURE — 87186 SC STD MICRODIL/AGAR DIL: CPT

## 2019-01-01 PROCEDURE — 2580000003 HC RX 258: Performed by: SURGERY

## 2019-01-01 PROCEDURE — 74176 CT ABD & PELVIS W/O CONTRAST: CPT

## 2019-01-01 PROCEDURE — 87150 DNA/RNA AMPLIFIED PROBE: CPT

## 2019-01-01 PROCEDURE — 87086 URINE CULTURE/COLONY COUNT: CPT

## 2019-01-01 PROCEDURE — 02HV33Z INSERTION OF INFUSION DEVICE INTO SUPERIOR VENA CAVA, PERCUTANEOUS APPROACH: ICD-10-PCS | Performed by: INTERNAL MEDICINE

## 2019-01-01 PROCEDURE — 6360000002 HC RX W HCPCS: Performed by: NURSE ANESTHETIST, CERTIFIED REGISTERED

## 2019-01-01 PROCEDURE — 84133 ASSAY OF URINE POTASSIUM: CPT

## 2019-01-01 PROCEDURE — 51702 INSERT TEMP BLADDER CATH: CPT

## 2019-01-01 PROCEDURE — 72125 CT NECK SPINE W/O DYE: CPT

## 2019-01-01 PROCEDURE — 99221 1ST HOSP IP/OBS SF/LOW 40: CPT | Performed by: NURSE PRACTITIONER

## 2019-01-01 PROCEDURE — 0JD70ZZ EXTRACTION OF BACK SUBCUTANEOUS TISSUE AND FASCIA, OPEN APPROACH: ICD-10-PCS | Performed by: ORTHOPAEDIC SURGERY

## 2019-01-01 PROCEDURE — 84156 ASSAY OF PROTEIN URINE: CPT

## 2019-01-01 PROCEDURE — 3700000001 HC ADD 15 MINUTES (ANESTHESIA): Performed by: ORTHOPAEDIC SURGERY

## 2019-01-01 PROCEDURE — 96365 THER/PROPH/DIAG IV INF INIT: CPT

## 2019-01-01 PROCEDURE — 99304 1ST NF CARE SF/LOW MDM 25: CPT | Performed by: NURSE PRACTITIONER

## 2019-01-01 PROCEDURE — 96361 HYDRATE IV INFUSION ADD-ON: CPT

## 2019-01-01 PROCEDURE — 36592 COLLECT BLOOD FROM PICC: CPT

## 2019-01-01 PROCEDURE — 97167 OT EVAL HIGH COMPLEX 60 MIN: CPT

## 2019-01-01 PROCEDURE — 86140 C-REACTIVE PROTEIN: CPT

## 2019-01-01 PROCEDURE — 84443 ASSAY THYROID STIM HORMONE: CPT

## 2019-01-01 PROCEDURE — 81001 URINALYSIS AUTO W/SCOPE: CPT

## 2019-01-01 PROCEDURE — 6370000000 HC RX 637 (ALT 250 FOR IP): Performed by: FAMILY MEDICINE

## 2019-01-01 PROCEDURE — 82565 ASSAY OF CREATININE: CPT

## 2019-01-01 PROCEDURE — 6360000002 HC RX W HCPCS: Performed by: HOSPITALIST

## 2019-01-01 PROCEDURE — 96374 THER/PROPH/DIAG INJ IV PUSH: CPT

## 2019-01-01 PROCEDURE — 3700000000 HC ANESTHESIA ATTENDED CARE: Performed by: SURGERY

## 2019-01-01 PROCEDURE — APPSS30 APP SPLIT SHARED TIME 16-30 MINUTES: Performed by: NURSE PRACTITIONER

## 2019-01-01 PROCEDURE — 97606 NEG PRS WND THER DME>50 SQCM: CPT

## 2019-01-01 PROCEDURE — 0QB10ZZ EXCISION OF SACRUM, OPEN APPROACH: ICD-10-PCS | Performed by: SURGERY

## 2019-01-01 PROCEDURE — 02HV33Z INSERTION OF INFUSION DEVICE INTO SUPERIOR VENA CAVA, PERCUTANEOUS APPROACH: ICD-10-PCS | Performed by: EMERGENCY MEDICINE

## 2019-01-01 PROCEDURE — 3600000013 HC SURGERY LEVEL 3 ADDTL 15MIN: Performed by: SURGERY

## 2019-01-01 PROCEDURE — 36569 INSJ PICC 5 YR+ W/O IMAGING: CPT

## 2019-01-01 PROCEDURE — 3600000012 HC SURGERY LEVEL 2 ADDTL 15MIN: Performed by: ORTHOPAEDIC SURGERY

## 2019-01-01 PROCEDURE — 0D1B4Z4 BYPASS ILEUM TO CUTANEOUS, PERCUTANEOUS ENDOSCOPIC APPROACH: ICD-10-PCS | Performed by: SURGERY

## 2019-01-01 PROCEDURE — 2580000003 HC RX 258: Performed by: NURSE PRACTITIONER

## 2019-01-01 PROCEDURE — 87076 CULTURE ANAEROBE IDENT EACH: CPT

## 2019-01-01 PROCEDURE — 7100000000 HC PACU RECOVERY - FIRST 15 MIN: Performed by: SURGERY

## 2019-01-01 PROCEDURE — 87040 BLOOD CULTURE FOR BACTERIA: CPT

## 2019-01-01 PROCEDURE — 97110 THERAPEUTIC EXERCISES: CPT

## 2019-01-01 PROCEDURE — 11047 DBRDMT BONE EACH ADDL: CPT | Performed by: SURGERY

## 2019-01-01 PROCEDURE — 99233 SBSQ HOSP IP/OBS HIGH 50: CPT | Performed by: SURGERY

## 2019-01-01 PROCEDURE — 37799 UNLISTED PX VASCULAR SURGERY: CPT

## 2019-01-01 PROCEDURE — 7100000001 HC PACU RECOVERY - ADDTL 15 MIN: Performed by: ORTHOPAEDIC SURGERY

## 2019-01-01 PROCEDURE — 96366 THER/PROPH/DIAG IV INF ADDON: CPT

## 2019-01-01 PROCEDURE — 11044 DBRDMT BONE 1ST 20 SQ CM/<: CPT | Performed by: SURGERY

## 2019-01-01 PROCEDURE — 93225 XTRNL ECG REC<48 HRS REC: CPT

## 2019-01-01 PROCEDURE — 99223 1ST HOSP IP/OBS HIGH 75: CPT | Performed by: INTERNAL MEDICINE

## 2019-01-01 PROCEDURE — 2580000003 HC RX 258

## 2019-01-01 PROCEDURE — 83880 ASSAY OF NATRIURETIC PEPTIDE: CPT

## 2019-01-01 PROCEDURE — 94002 VENT MGMT INPAT INIT DAY: CPT

## 2019-01-01 PROCEDURE — 44187 LAP ILEO/JEJUNO-STOMY: CPT | Performed by: SURGERY

## 2019-01-01 PROCEDURE — 96367 TX/PROPH/DG ADDL SEQ IV INF: CPT

## 2019-01-01 PROCEDURE — C1729 CATH, DRAINAGE: HCPCS | Performed by: ORTHOPAEDIC SURGERY

## 2019-01-01 PROCEDURE — 99285 EMERGENCY DEPT VISIT HI MDM: CPT

## 2019-01-01 PROCEDURE — P9045 ALBUMIN (HUMAN), 5%, 250 ML: HCPCS | Performed by: INTERNAL MEDICINE

## 2019-01-01 PROCEDURE — 2500000003 HC RX 250 WO HCPCS

## 2019-01-01 PROCEDURE — 93226 XTRNL ECG REC<48 HR SCAN A/R: CPT

## 2019-01-01 PROCEDURE — 90686 IIV4 VACC NO PRSV 0.5 ML IM: CPT | Performed by: SURGERY

## 2019-01-01 PROCEDURE — 93005 ELECTROCARDIOGRAM TRACING: CPT | Performed by: EMERGENCY MEDICINE

## 2019-01-01 PROCEDURE — 4A023N7 MEASUREMENT OF CARDIAC SAMPLING AND PRESSURE, LEFT HEART, PERCUTANEOUS APPROACH: ICD-10-PCS | Performed by: INTERNAL MEDICINE

## 2019-01-01 PROCEDURE — 93005 ELECTROCARDIOGRAM TRACING: CPT | Performed by: INTERNAL MEDICINE

## 2019-01-01 PROCEDURE — 82746 ASSAY OF FOLIC ACID SERUM: CPT

## 2019-01-01 PROCEDURE — 97608 NEG PRS WND THER NDME>50SQCM: CPT | Performed by: SURGERY

## 2019-01-01 PROCEDURE — P9045 ALBUMIN (HUMAN), 5%, 250 ML: HCPCS | Performed by: NURSE ANESTHETIST, CERTIFIED REGISTERED

## 2019-01-01 PROCEDURE — 84300 ASSAY OF URINE SODIUM: CPT

## 2019-01-01 PROCEDURE — 93010 ELECTROCARDIOGRAM REPORT: CPT | Performed by: INTERNAL MEDICINE

## 2019-01-01 PROCEDURE — 73502 X-RAY EXAM HIP UNI 2-3 VIEWS: CPT

## 2019-01-01 PROCEDURE — 93308 TTE F-UP OR LMTD: CPT

## 2019-01-01 PROCEDURE — 99213 OFFICE O/P EST LOW 20 MIN: CPT | Performed by: NURSE PRACTITIONER

## 2019-01-01 PROCEDURE — 2580000003 HC RX 258: Performed by: NURSE ANESTHETIST, CERTIFIED REGISTERED

## 2019-01-01 PROCEDURE — 93454 CORONARY ARTERY ANGIO S&I: CPT

## 2019-01-01 PROCEDURE — 88311 DECALCIFY TISSUE: CPT

## 2019-01-01 PROCEDURE — 6360000004 HC RX CONTRAST MEDICATION: Performed by: INTERNAL MEDICINE

## 2019-01-01 PROCEDURE — 99291 CRITICAL CARE FIRST HOUR: CPT

## 2019-01-01 PROCEDURE — 84145 PROCALCITONIN (PCT): CPT

## 2019-01-01 PROCEDURE — 70450 CT HEAD/BRAIN W/O DYE: CPT

## 2019-01-01 PROCEDURE — 87147 CULTURE TYPE IMMUNOLOGIC: CPT

## 2019-01-01 PROCEDURE — 3700000000 HC ANESTHESIA ATTENDED CARE: Performed by: ORTHOPAEDIC SURGERY

## 2019-01-01 PROCEDURE — 3600000003 HC SURGERY LEVEL 3 BASE: Performed by: SURGERY

## 2019-01-01 PROCEDURE — 4500000027

## 2019-01-01 PROCEDURE — 3600000013 HC SURGERY LEVEL 3 ADDTL 15MIN: Performed by: ORTHOPAEDIC SURGERY

## 2019-01-01 PROCEDURE — 93005 ELECTROCARDIOGRAM TRACING: CPT | Performed by: ANESTHESIOLOGY

## 2019-01-01 PROCEDURE — 96375 TX/PRO/DX INJ NEW DRUG ADDON: CPT

## 2019-01-01 PROCEDURE — 6370000000 HC RX 637 (ALT 250 FOR IP): Performed by: NURSE PRACTITIONER

## 2019-01-01 PROCEDURE — 96368 THER/DIAG CONCURRENT INF: CPT

## 2019-01-01 PROCEDURE — 2000000000 HC ICU R&B

## 2019-01-01 PROCEDURE — 83036 HEMOGLOBIN GLYCOSYLATED A1C: CPT

## 2019-01-01 PROCEDURE — 2580000003 HC RX 258: Performed by: ANESTHESIOLOGY

## 2019-01-01 PROCEDURE — 51701 INSERT BLADDER CATHETER: CPT

## 2019-01-01 PROCEDURE — 85379 FIBRIN DEGRADATION QUANT: CPT

## 2019-01-01 PROCEDURE — B2111ZZ FLUOROSCOPY OF MULTIPLE CORONARY ARTERIES USING LOW OSMOLAR CONTRAST: ICD-10-PCS | Performed by: INTERNAL MEDICINE

## 2019-01-01 PROCEDURE — 86850 RBC ANTIBODY SCREEN: CPT

## 2019-01-01 PROCEDURE — 86900 BLOOD TYPING SEROLOGIC ABO: CPT

## 2019-01-01 PROCEDURE — 2709999900 HC NON-CHARGEABLE SUPPLY: Performed by: SURGERY

## 2019-01-01 PROCEDURE — 73501 X-RAY EXAM HIP UNI 1 VIEW: CPT

## 2019-01-01 PROCEDURE — 97166 OT EVAL MOD COMPLEX 45 MIN: CPT

## 2019-01-01 PROCEDURE — C1894 INTRO/SHEATH, NON-LASER: HCPCS

## 2019-01-01 PROCEDURE — 6360000004 HC RX CONTRAST MEDICATION: Performed by: EMERGENCY MEDICINE

## 2019-01-01 PROCEDURE — 86901 BLOOD TYPING SEROLOGIC RH(D): CPT

## 2019-01-01 PROCEDURE — 87324 CLOSTRIDIUM AG IA: CPT

## 2019-01-01 PROCEDURE — 36600 WITHDRAWAL OF ARTERIAL BLOOD: CPT

## 2019-01-01 PROCEDURE — 82570 ASSAY OF URINE CREATININE: CPT

## 2019-01-01 PROCEDURE — 6360000002 HC RX W HCPCS: Performed by: ANESTHESIOLOGY

## 2019-01-01 PROCEDURE — 99291 CRITICAL CARE FIRST HOUR: CPT | Performed by: INTERNAL MEDICINE

## 2019-01-01 PROCEDURE — 0S990ZZ DRAINAGE OF RIGHT HIP JOINT, OPEN APPROACH: ICD-10-PCS | Performed by: ORTHOPAEDIC SURGERY

## 2019-01-01 PROCEDURE — 93005 ELECTROCARDIOGRAM TRACING: CPT | Performed by: NURSE PRACTITIONER

## 2019-01-01 PROCEDURE — 71275 CT ANGIOGRAPHY CHEST: CPT

## 2019-01-01 PROCEDURE — 93306 TTE W/DOPPLER COMPLETE: CPT

## 2019-01-01 PROCEDURE — C1776 JOINT DEVICE (IMPLANTABLE): HCPCS | Performed by: ORTHOPAEDIC SURGERY

## 2019-01-01 PROCEDURE — 82436 ASSAY OF URINE CHLORIDE: CPT

## 2019-01-01 PROCEDURE — 2500000003 HC RX 250 WO HCPCS: Performed by: SURGERY

## 2019-01-01 PROCEDURE — 88304 TISSUE EXAM BY PATHOLOGIST: CPT

## 2019-01-01 PROCEDURE — 6370000000 HC RX 637 (ALT 250 FOR IP): Performed by: ANESTHESIOLOGY

## 2019-01-01 PROCEDURE — 3600000004 HC SURGERY LEVEL 4 BASE: Performed by: ORTHOPAEDIC SURGERY

## 2019-01-01 PROCEDURE — 88307 TISSUE EXAM BY PATHOLOGIST: CPT

## 2019-01-01 PROCEDURE — 6360000004 HC RX CONTRAST MEDICATION

## 2019-01-01 PROCEDURE — 2500000003 HC RX 250 WO HCPCS: Performed by: NURSE PRACTITIONER

## 2019-01-01 PROCEDURE — 3600000002 HC SURGERY LEVEL 2 BASE: Performed by: ORTHOPAEDIC SURGERY

## 2019-01-01 PROCEDURE — 74177 CT ABD & PELVIS W/CONTRAST: CPT

## 2019-01-01 PROCEDURE — 71046 X-RAY EXAM CHEST 2 VIEWS: CPT

## 2019-01-01 PROCEDURE — 6360000002 HC RX W HCPCS: Performed by: FAMILY MEDICINE

## 2019-01-01 PROCEDURE — 2580000003 HC RX 258: Performed by: HOSPITALIST

## 2019-01-01 PROCEDURE — 70551 MRI BRAIN STEM W/O DYE: CPT

## 2019-01-01 PROCEDURE — 05HB33Z INSERTION OF INFUSION DEVICE INTO RIGHT BASILIC VEIN, PERCUTANEOUS APPROACH: ICD-10-PCS | Performed by: INTERNAL MEDICINE

## 2019-01-01 PROCEDURE — 2W1NX6Z COMPRESSION OF RIGHT UPPER LEG USING PRESSURE DRESSING: ICD-10-PCS | Performed by: ORTHOPAEDIC SURGERY

## 2019-01-01 PROCEDURE — 2720000010 HC SURG SUPPLY STERILE: Performed by: SURGERY

## 2019-01-01 PROCEDURE — 88305 TISSUE EXAM BY PATHOLOGIST: CPT

## 2019-01-01 PROCEDURE — 99223 1ST HOSP IP/OBS HIGH 75: CPT | Performed by: PSYCHIATRY & NEUROLOGY

## 2019-01-01 PROCEDURE — 82607 VITAMIN B-12: CPT

## 2019-01-01 PROCEDURE — 0D9670Z DRAINAGE OF STOMACH WITH DRAINAGE DEVICE, VIA NATURAL OR ARTIFICIAL OPENING: ICD-10-PCS | Performed by: INTERNAL MEDICINE

## 2019-01-01 PROCEDURE — P9047 ALBUMIN (HUMAN), 25%, 50ML: HCPCS

## 2019-01-01 PROCEDURE — 3600000003 HC SURGERY LEVEL 3 BASE: Performed by: ORTHOPAEDIC SURGERY

## 2019-01-01 PROCEDURE — 2W1NX6Z COMPRESSION OF RIGHT UPPER LEG USING PRESSURE DRESSING: ICD-10-PCS | Performed by: SURGERY

## 2019-01-01 PROCEDURE — 3600000014 HC SURGERY LEVEL 4 ADDTL 15MIN: Performed by: SURGERY

## 2019-01-01 PROCEDURE — P9045 ALBUMIN (HUMAN), 5%, 250 ML: HCPCS

## 2019-01-01 PROCEDURE — 7100000001 HC PACU RECOVERY - ADDTL 15 MIN: Performed by: SURGERY

## 2019-01-01 PROCEDURE — 88108 CYTOPATH CONCENTRATE TECH: CPT

## 2019-01-01 PROCEDURE — 6370000000 HC RX 637 (ALT 250 FOR IP)

## 2019-01-01 PROCEDURE — 99308 SBSQ NF CARE LOW MDM 20: CPT | Performed by: NURSE PRACTITIONER

## 2019-01-01 PROCEDURE — 99284 EMERGENCY DEPT VISIT MOD MDM: CPT

## 2019-01-01 PROCEDURE — 93970 EXTREMITY STUDY: CPT

## 2019-01-01 PROCEDURE — 0SRR0JA REPLACEMENT OF RIGHT HIP JOINT, FEMORAL SURFACE WITH SYNTHETIC SUBSTITUTE, UNCEMENTED, OPEN APPROACH: ICD-10-PCS | Performed by: ORTHOPAEDIC SURGERY

## 2019-01-01 PROCEDURE — 99231 SBSQ HOSP IP/OBS SF/LOW 25: CPT | Performed by: SURGERY

## 2019-01-01 PROCEDURE — 99231 SBSQ HOSP IP/OBS SF/LOW 25: CPT | Performed by: INTERNAL MEDICINE

## 2019-01-01 PROCEDURE — C1769 GUIDE WIRE: HCPCS

## 2019-01-01 DEVICE — AVENIR MÜLLER STEM 7 LATERAL
Type: IMPLANTABLE DEVICE | Status: FUNCTIONAL
Brand: AVENIR® MÜLLER

## 2019-01-01 DEVICE — IMPLANTABLE DEVICE: Type: IMPLANTABLE DEVICE | Status: FUNCTIONAL

## 2019-01-01 DEVICE — ADAPTER FEM L+0MM UPLR NEUT NK: Type: IMPLANTABLE DEVICE | Status: FUNCTIONAL

## 2019-01-01 RX ORDER — INSULIN GLARGINE 100 [IU]/ML
10 INJECTION, SOLUTION SUBCUTANEOUS NIGHTLY
Status: DISCONTINUED | OUTPATIENT
Start: 2019-01-01 | End: 2019-01-01 | Stop reason: HOSPADM

## 2019-01-01 RX ORDER — IPRATROPIUM BROMIDE AND ALBUTEROL SULFATE 2.5; .5 MG/3ML; MG/3ML
1 SOLUTION RESPIRATORY (INHALATION)
Status: DISCONTINUED | OUTPATIENT
Start: 2019-01-01 | End: 2019-01-01 | Stop reason: HOSPADM

## 2019-01-01 RX ORDER — POTASSIUM CHLORIDE 20 MEQ/1
20 TABLET, EXTENDED RELEASE ORAL 3 TIMES DAILY
Status: DISCONTINUED | OUTPATIENT
Start: 2019-01-01 | End: 2019-01-01

## 2019-01-01 RX ORDER — MORPHINE SULFATE 4 MG/ML
4 INJECTION, SOLUTION INTRAMUSCULAR; INTRAVENOUS ONCE
Status: COMPLETED | OUTPATIENT
Start: 2019-01-01 | End: 2019-01-01

## 2019-01-01 RX ORDER — PROMETHAZINE HYDROCHLORIDE 25 MG/ML
6.25 INJECTION, SOLUTION INTRAMUSCULAR; INTRAVENOUS
Status: DISCONTINUED | OUTPATIENT
Start: 2019-01-01 | End: 2019-01-01 | Stop reason: HOSPADM

## 2019-01-01 RX ORDER — SODIUM CHLORIDE 9 MG/ML
INJECTION, SOLUTION INTRAVENOUS ONCE
Status: DISCONTINUED | OUTPATIENT
Start: 2019-01-01 | End: 2019-01-01 | Stop reason: HOSPADM

## 2019-01-01 RX ORDER — SPIRONOLACTONE 25 MG/1
25 TABLET ORAL EVERY MORNING
Status: DISCONTINUED | OUTPATIENT
Start: 2019-01-01 | End: 2019-01-01

## 2019-01-01 RX ORDER — DEXTROSE MONOHYDRATE 25 G/50ML
12.5 INJECTION, SOLUTION INTRAVENOUS PRN
Status: DISCONTINUED | OUTPATIENT
Start: 2019-01-01 | End: 2019-01-01 | Stop reason: HOSPADM

## 2019-01-01 RX ORDER — BACLOFEN 10 MG/1
10 TABLET ORAL EVERY 8 HOURS
Status: DISCONTINUED | OUTPATIENT
Start: 2019-01-01 | End: 2019-01-01

## 2019-01-01 RX ORDER — HYDROMORPHONE HCL 110MG/55ML
0.5 PATIENT CONTROLLED ANALGESIA SYRINGE INTRAVENOUS EVERY 5 MIN PRN
Status: DISCONTINUED | OUTPATIENT
Start: 2019-01-01 | End: 2019-01-01 | Stop reason: HOSPADM

## 2019-01-01 RX ORDER — ACETAMINOPHEN 500 MG
500 TABLET ORAL EVERY 6 HOURS PRN
Qty: 120 TABLET | Refills: 3 | Status: SHIPPED | OUTPATIENT
Start: 2019-01-01

## 2019-01-01 RX ORDER — SERTRALINE HYDROCHLORIDE 100 MG/1
100 TABLET, FILM COATED ORAL 2 TIMES DAILY
Status: DISCONTINUED | OUTPATIENT
Start: 2019-01-01 | End: 2019-01-01

## 2019-01-01 RX ORDER — ROCURONIUM BROMIDE 10 MG/ML
INJECTION, SOLUTION INTRAVENOUS PRN
Status: DISCONTINUED | OUTPATIENT
Start: 2019-01-01 | End: 2019-01-01 | Stop reason: SDUPTHER

## 2019-01-01 RX ORDER — TRAMADOL HYDROCHLORIDE 50 MG/1
50 TABLET ORAL EVERY 6 HOURS PRN
Status: DISCONTINUED | OUTPATIENT
Start: 2019-01-01 | End: 2019-01-01 | Stop reason: HOSPADM

## 2019-01-01 RX ORDER — FENTANYL CITRATE 50 UG/ML
25 INJECTION, SOLUTION INTRAMUSCULAR; INTRAVENOUS EVERY 5 MIN PRN
Status: DISCONTINUED | OUTPATIENT
Start: 2019-01-01 | End: 2019-01-01 | Stop reason: HOSPADM

## 2019-01-01 RX ORDER — HYDROMORPHONE HCL 110MG/55ML
0.5 PATIENT CONTROLLED ANALGESIA SYRINGE INTRAVENOUS EVERY 6 HOURS PRN
Status: DISCONTINUED | OUTPATIENT
Start: 2019-01-01 | End: 2019-01-01 | Stop reason: HOSPADM

## 2019-01-01 RX ORDER — METHYLPREDNISOLONE SODIUM SUCCINATE 40 MG/ML
40 INJECTION, POWDER, LYOPHILIZED, FOR SOLUTION INTRAMUSCULAR; INTRAVENOUS EVERY 8 HOURS
Status: DISCONTINUED | OUTPATIENT
Start: 2019-01-01 | End: 2020-01-01

## 2019-01-01 RX ORDER — SODIUM CHLORIDE, SODIUM LACTATE, POTASSIUM CHLORIDE, CALCIUM CHLORIDE 600; 310; 30; 20 MG/100ML; MG/100ML; MG/100ML; MG/100ML
INJECTION, SOLUTION INTRAVENOUS CONTINUOUS PRN
Status: DISCONTINUED | OUTPATIENT
Start: 2019-01-01 | End: 2019-01-01 | Stop reason: SDUPTHER

## 2019-01-01 RX ORDER — 0.9 % SODIUM CHLORIDE 0.9 %
1000 INTRAVENOUS SOLUTION INTRAVENOUS ONCE
Status: COMPLETED | OUTPATIENT
Start: 2019-01-01 | End: 2019-01-01

## 2019-01-01 RX ORDER — ACETAMINOPHEN 500 MG
500 TABLET ORAL EVERY 6 HOURS PRN
Status: DISCONTINUED | OUTPATIENT
Start: 2019-01-01 | End: 2020-01-01 | Stop reason: HOSPADM

## 2019-01-01 RX ORDER — INSULIN GLARGINE 100 [IU]/ML
15 INJECTION, SOLUTION SUBCUTANEOUS NIGHTLY
Status: DISCONTINUED | OUTPATIENT
Start: 2019-01-01 | End: 2020-01-01

## 2019-01-01 RX ORDER — LISINOPRIL 20 MG/1
20 TABLET ORAL DAILY
Status: DISCONTINUED | OUTPATIENT
Start: 2019-01-01 | End: 2019-01-01

## 2019-01-01 RX ORDER — IPRATROPIUM BROMIDE AND ALBUTEROL SULFATE 2.5; .5 MG/3ML; MG/3ML
1 SOLUTION RESPIRATORY (INHALATION)
Status: DISCONTINUED | OUTPATIENT
Start: 2019-01-01 | End: 2019-01-01

## 2019-01-01 RX ORDER — LIDOCAINE HYDROCHLORIDE 20 MG/ML
INJECTION, SOLUTION INTRAVENOUS PRN
Status: DISCONTINUED | OUTPATIENT
Start: 2019-01-01 | End: 2019-01-01 | Stop reason: SDUPTHER

## 2019-01-01 RX ORDER — EZETIMIBE 10 MG/1
10 TABLET ORAL NIGHTLY
Status: DISCONTINUED | OUTPATIENT
Start: 2019-01-01 | End: 2019-01-01 | Stop reason: CLARIF

## 2019-01-01 RX ORDER — ASCORBIC ACID 500 MG
1000 TABLET ORAL DAILY
Status: DISCONTINUED | OUTPATIENT
Start: 2019-01-01 | End: 2019-01-01 | Stop reason: HOSPADM

## 2019-01-01 RX ORDER — NICOTINE POLACRILEX 4 MG
15 LOZENGE BUCCAL PRN
Status: DISCONTINUED | OUTPATIENT
Start: 2019-01-01 | End: 2020-01-01 | Stop reason: HOSPADM

## 2019-01-01 RX ORDER — NITROGLYCERIN 20 MG/100ML
5 INJECTION INTRAVENOUS CONTINUOUS
Status: DISCONTINUED | OUTPATIENT
Start: 2019-01-01 | End: 2019-01-01

## 2019-01-01 RX ORDER — DIPHENHYDRAMINE HCL 25 MG
25 TABLET ORAL
Status: CANCELLED | OUTPATIENT
Start: 2019-01-01 | End: 2019-01-01

## 2019-01-01 RX ORDER — ALBUMIN, HUMAN INJ 5% 5 %
SOLUTION INTRAVENOUS PRN
Status: DISCONTINUED | OUTPATIENT
Start: 2019-01-01 | End: 2019-01-01 | Stop reason: SDUPTHER

## 2019-01-01 RX ORDER — ASPIRIN 81 MG/1
81 TABLET ORAL DAILY
Status: DISCONTINUED | OUTPATIENT
Start: 2019-01-01 | End: 2020-01-01 | Stop reason: HOSPADM

## 2019-01-01 RX ORDER — NITROGLYCERIN 0.4 MG/1
0.4 TABLET SUBLINGUAL EVERY 5 MIN PRN
Status: DISCONTINUED | OUTPATIENT
Start: 2019-01-01 | End: 2020-01-01 | Stop reason: HOSPADM

## 2019-01-01 RX ORDER — FLUCONAZOLE 200 MG/1
600 TABLET ORAL DAILY
Status: ON HOLD | COMMUNITY
End: 2019-01-01 | Stop reason: HOSPADM

## 2019-01-01 RX ORDER — CALCIUM CARBONATE 200(500)MG
500 TABLET,CHEWABLE ORAL 2 TIMES DAILY
Qty: 60 TABLET | Refills: 0 | Status: ON HOLD | OUTPATIENT
Start: 2019-01-01 | End: 2019-01-01

## 2019-01-01 RX ORDER — MEMANTINE HYDROCHLORIDE 5 MG/1
5 TABLET ORAL NIGHTLY
Status: DISCONTINUED | OUTPATIENT
Start: 2019-01-01 | End: 2019-01-01 | Stop reason: HOSPADM

## 2019-01-01 RX ORDER — TROLAMINE SALICYLATE 10 G/100G
CREAM TOPICAL
Qty: 1 TUBE | Refills: 2 | Status: SHIPPED | OUTPATIENT
Start: 2019-01-01 | End: 2019-01-01

## 2019-01-01 RX ORDER — BACLOFEN 10 MG/1
10 TABLET ORAL 3 TIMES DAILY
Status: DISCONTINUED | OUTPATIENT
Start: 2019-01-01 | End: 2019-01-01

## 2019-01-01 RX ORDER — ONDANSETRON 2 MG/ML
4 INJECTION INTRAMUSCULAR; INTRAVENOUS
Status: DISCONTINUED | OUTPATIENT
Start: 2019-01-01 | End: 2019-01-01 | Stop reason: HOSPADM

## 2019-01-01 RX ORDER — LABETALOL 20 MG/4 ML (5 MG/ML) INTRAVENOUS SYRINGE
5 EVERY 10 MIN PRN
Status: DISCONTINUED | OUTPATIENT
Start: 2019-01-01 | End: 2019-01-01 | Stop reason: HOSPADM

## 2019-01-01 RX ORDER — INSULIN GLARGINE 100 [IU]/ML
15 INJECTION, SOLUTION SUBCUTANEOUS NIGHTLY
Qty: 1 VIAL | Refills: 3 | Status: SHIPPED | OUTPATIENT
Start: 2019-01-01

## 2019-01-01 RX ORDER — MAGNESIUM SULFATE IN WATER 40 MG/ML
2 INJECTION, SOLUTION INTRAVENOUS ONCE
Status: COMPLETED | OUTPATIENT
Start: 2019-01-01 | End: 2019-01-01

## 2019-01-01 RX ORDER — DOXYCYCLINE HYCLATE 100 MG
100 TABLET ORAL EVERY 12 HOURS SCHEDULED
Status: DISCONTINUED | OUTPATIENT
Start: 2019-01-01 | End: 2019-01-01

## 2019-01-01 RX ORDER — FENTANYL CITRATE 50 UG/ML
50 INJECTION, SOLUTION INTRAMUSCULAR; INTRAVENOUS EVERY 5 MIN PRN
Status: DISCONTINUED | OUTPATIENT
Start: 2019-01-01 | End: 2019-01-01 | Stop reason: HOSPADM

## 2019-01-01 RX ORDER — TRAMADOL HYDROCHLORIDE 50 MG/1
50 TABLET ORAL ONCE
Status: COMPLETED | OUTPATIENT
Start: 2019-01-01 | End: 2019-01-01

## 2019-01-01 RX ORDER — PANTOPRAZOLE SODIUM 40 MG/1
40 TABLET, DELAYED RELEASE ORAL
Status: DISCONTINUED | OUTPATIENT
Start: 2019-01-01 | End: 2019-01-01 | Stop reason: HOSPADM

## 2019-01-01 RX ORDER — ALBUMIN, HUMAN INJ 5% 5 %
12.5 SOLUTION INTRAVENOUS ONCE
Status: COMPLETED | OUTPATIENT
Start: 2019-01-01 | End: 2019-01-01

## 2019-01-01 RX ORDER — BUPROPION HYDROCHLORIDE 150 MG/1
300 TABLET ORAL DAILY
Status: DISCONTINUED | OUTPATIENT
Start: 2019-01-01 | End: 2019-01-01 | Stop reason: HOSPADM

## 2019-01-01 RX ORDER — SODIUM CHLORIDE 0.9 % (FLUSH) 0.9 %
10 SYRINGE (ML) INJECTION PRN
Status: DISCONTINUED | OUTPATIENT
Start: 2019-01-01 | End: 2020-01-01 | Stop reason: HOSPADM

## 2019-01-01 RX ORDER — FLUTICASONE PROPIONATE 50 MCG
1 SPRAY, SUSPENSION (ML) NASAL DAILY
Status: DISCONTINUED | OUTPATIENT
Start: 2019-01-01 | End: 2019-01-01 | Stop reason: HOSPADM

## 2019-01-01 RX ORDER — ONDANSETRON 2 MG/ML
4 INJECTION INTRAMUSCULAR; INTRAVENOUS EVERY 6 HOURS PRN
Status: DISCONTINUED | OUTPATIENT
Start: 2019-01-01 | End: 2019-01-01 | Stop reason: HOSPADM

## 2019-01-01 RX ORDER — DIPHENHYDRAMINE HYDROCHLORIDE 50 MG/ML
12.5 INJECTION INTRAMUSCULAR; INTRAVENOUS EVERY 6 HOURS PRN
Status: DISCONTINUED | OUTPATIENT
Start: 2019-01-01 | End: 2020-01-01 | Stop reason: HOSPADM

## 2019-01-01 RX ORDER — HEPARIN SODIUM 10000 [USP'U]/100ML
2100 INJECTION, SOLUTION INTRAVENOUS CONTINUOUS
Status: DISCONTINUED | OUTPATIENT
Start: 2019-01-01 | End: 2019-01-01

## 2019-01-01 RX ORDER — ALBUMIN (HUMAN) 12.5 G/50ML
SOLUTION INTRAVENOUS
Status: COMPLETED
Start: 2019-01-01 | End: 2019-01-01

## 2019-01-01 RX ORDER — SERTRALINE HYDROCHLORIDE 100 MG/1
100 TABLET, FILM COATED ORAL 2 TIMES DAILY
Status: DISCONTINUED | OUTPATIENT
Start: 2019-01-01 | End: 2020-01-01

## 2019-01-01 RX ORDER — LIDOCAINE HYDROCHLORIDE 10 MG/ML
5 INJECTION, SOLUTION EPIDURAL; INFILTRATION; INTRACAUDAL; PERINEURAL ONCE
Status: COMPLETED | OUTPATIENT
Start: 2019-01-01 | End: 2019-01-01

## 2019-01-01 RX ORDER — BUPROPION HYDROCHLORIDE 150 MG/1
300 TABLET ORAL DAILY
Status: DISCONTINUED | OUTPATIENT
Start: 2019-01-01 | End: 2020-01-01

## 2019-01-01 RX ORDER — FUROSEMIDE 10 MG/ML
20 INJECTION INTRAMUSCULAR; INTRAVENOUS 2 TIMES DAILY
Status: DISCONTINUED | OUTPATIENT
Start: 2019-01-01 | End: 2019-01-01

## 2019-01-01 RX ORDER — SUCCINYLCHOLINE/SOD CL,ISO/PF 100 MG/5ML
SYRINGE (ML) INTRAVENOUS PRN
Status: DISCONTINUED | OUTPATIENT
Start: 2019-01-01 | End: 2019-01-01 | Stop reason: SDUPTHER

## 2019-01-01 RX ORDER — IPRATROPIUM BROMIDE AND ALBUTEROL SULFATE 2.5; .5 MG/3ML; MG/3ML
1 SOLUTION RESPIRATORY (INHALATION) EVERY 6 HOURS
COMMUNITY

## 2019-01-01 RX ORDER — FLUTICASONE FUROATE AND VILANTEROL 100; 25 UG/1; UG/1
1 POWDER RESPIRATORY (INHALATION) DAILY
COMMUNITY

## 2019-01-01 RX ORDER — ALBUTEROL SULFATE 90 UG/1
2 AEROSOL, METERED RESPIRATORY (INHALATION) EVERY 6 HOURS PRN
Status: DISCONTINUED | OUTPATIENT
Start: 2019-01-01 | End: 2019-01-01 | Stop reason: HOSPADM

## 2019-01-01 RX ORDER — MAGNESIUM SULFATE 1 G/100ML
1 INJECTION INTRAVENOUS ONCE
Status: COMPLETED | OUTPATIENT
Start: 2019-01-01 | End: 2019-01-01

## 2019-01-01 RX ORDER — HEPARIN SODIUM 1000 [USP'U]/ML
10000 INJECTION, SOLUTION INTRAVENOUS; SUBCUTANEOUS PRN
Status: DISCONTINUED | OUTPATIENT
Start: 2020-01-01 | End: 2020-01-01

## 2019-01-01 RX ORDER — DEXAMETHASONE SODIUM PHOSPHATE 4 MG/ML
INJECTION, SOLUTION INTRA-ARTICULAR; INTRALESIONAL; INTRAMUSCULAR; INTRAVENOUS; SOFT TISSUE PRN
Status: DISCONTINUED | OUTPATIENT
Start: 2019-01-01 | End: 2019-01-01 | Stop reason: SDUPTHER

## 2019-01-01 RX ORDER — METHOCARBAMOL 500 MG/1
500 TABLET, FILM COATED ORAL ONCE
Status: COMPLETED | OUTPATIENT
Start: 2019-01-01 | End: 2019-01-01

## 2019-01-01 RX ORDER — INSULIN GLARGINE 100 [IU]/ML
15 INJECTION, SOLUTION SUBCUTANEOUS NIGHTLY
Status: DISCONTINUED | OUTPATIENT
Start: 2019-01-01 | End: 2019-01-01 | Stop reason: HOSPADM

## 2019-01-01 RX ORDER — ONDANSETRON 2 MG/ML
INJECTION INTRAMUSCULAR; INTRAVENOUS PRN
Status: DISCONTINUED | OUTPATIENT
Start: 2019-01-01 | End: 2019-01-01 | Stop reason: SDUPTHER

## 2019-01-01 RX ORDER — MONTELUKAST SODIUM 10 MG/1
10 TABLET ORAL NIGHTLY
Qty: 30 TABLET | Refills: 3 | Status: SHIPPED | OUTPATIENT
Start: 2019-01-01

## 2019-01-01 RX ORDER — FLUCONAZOLE 100 MG/1
600 TABLET ORAL DAILY
Status: DISCONTINUED | OUTPATIENT
Start: 2019-01-01 | End: 2019-01-01 | Stop reason: HOSPADM

## 2019-01-01 RX ORDER — TRAMADOL HYDROCHLORIDE 50 MG/1
50 TABLET ORAL EVERY 6 HOURS PRN
Status: DISCONTINUED | OUTPATIENT
Start: 2019-01-01 | End: 2019-01-01

## 2019-01-01 RX ORDER — INSULIN GLARGINE 100 [IU]/ML
30 INJECTION, SOLUTION SUBCUTANEOUS NIGHTLY
Status: DISCONTINUED | OUTPATIENT
Start: 2019-01-01 | End: 2019-01-01

## 2019-01-01 RX ORDER — SODIUM CHLORIDE 9 MG/ML
1000 INJECTION, SOLUTION INTRAVENOUS CONTINUOUS
Status: DISPENSED | OUTPATIENT
Start: 2019-01-01 | End: 2019-01-01

## 2019-01-01 RX ORDER — FUROSEMIDE 40 MG/1
40 TABLET ORAL EVERY MORNING
COMMUNITY

## 2019-01-01 RX ORDER — DULAGLUTIDE 1.5 MG/.5ML
1.5 INJECTION, SOLUTION SUBCUTANEOUS WEEKLY
Refills: 2 | COMMUNITY
Start: 2019-01-01

## 2019-01-01 RX ORDER — OXYCODONE HYDROCHLORIDE AND ACETAMINOPHEN 5; 325 MG/1; MG/1
2 TABLET ORAL EVERY 6 HOURS PRN
Status: DISCONTINUED | OUTPATIENT
Start: 2019-01-01 | End: 2019-01-01

## 2019-01-01 RX ORDER — SODIUM CHLORIDE 0.9 % (FLUSH) 0.9 %
10 SYRINGE (ML) INJECTION EVERY 12 HOURS SCHEDULED
Status: DISCONTINUED | OUTPATIENT
Start: 2019-01-01 | End: 2019-01-01 | Stop reason: HOSPADM

## 2019-01-01 RX ORDER — FUROSEMIDE 40 MG/1
40 TABLET ORAL DAILY
Status: DISCONTINUED | OUTPATIENT
Start: 2019-01-01 | End: 2019-01-01

## 2019-01-01 RX ORDER — SODIUM CHLORIDE 0.9 % (FLUSH) 0.9 %
10 SYRINGE (ML) INJECTION EVERY 12 HOURS SCHEDULED
Status: DISCONTINUED | OUTPATIENT
Start: 2019-01-01 | End: 2019-01-01

## 2019-01-01 RX ORDER — INSULIN GLARGINE 100 [IU]/ML
75 INJECTION, SOLUTION SUBCUTANEOUS NIGHTLY
Status: DISCONTINUED | OUTPATIENT
Start: 2019-01-01 | End: 2019-01-01

## 2019-01-01 RX ORDER — ACETAMINOPHEN 500 MG
500 TABLET ORAL EVERY 6 HOURS PRN
Status: DISCONTINUED | OUTPATIENT
Start: 2019-01-01 | End: 2019-01-01 | Stop reason: HOSPADM

## 2019-01-01 RX ORDER — GLIPIZIDE 10 MG/1
10 TABLET ORAL
COMMUNITY
End: 2019-01-01

## 2019-01-01 RX ORDER — PROPOFOL 10 MG/ML
INJECTION, EMULSION INTRAVENOUS PRN
Status: DISCONTINUED | OUTPATIENT
Start: 2019-01-01 | End: 2019-01-01 | Stop reason: SDUPTHER

## 2019-01-01 RX ORDER — IPRATROPIUM BROMIDE AND ALBUTEROL SULFATE 2.5; .5 MG/3ML; MG/3ML
1 SOLUTION RESPIRATORY (INHALATION) 3 TIMES DAILY PRN
Status: DISCONTINUED | OUTPATIENT
Start: 2019-01-01 | End: 2020-01-01 | Stop reason: HOSPADM

## 2019-01-01 RX ORDER — TRAMADOL HYDROCHLORIDE 50 MG/1
50 TABLET ORAL EVERY 6 HOURS PRN
COMMUNITY

## 2019-01-01 RX ORDER — SODIUM CHLORIDE, SODIUM LACTATE, POTASSIUM CHLORIDE, CALCIUM CHLORIDE 600; 310; 30; 20 MG/100ML; MG/100ML; MG/100ML; MG/100ML
INJECTION, SOLUTION INTRAVENOUS
Status: COMPLETED
Start: 2019-01-01 | End: 2019-01-01

## 2019-01-01 RX ORDER — HEPARIN SODIUM 10000 [USP'U]/100ML
14.2 INJECTION, SOLUTION INTRAVENOUS CONTINUOUS
Status: DISCONTINUED | OUTPATIENT
Start: 2019-01-01 | End: 2020-01-01

## 2019-01-01 RX ORDER — IPRATROPIUM BROMIDE AND ALBUTEROL SULFATE 2.5; .5 MG/3ML; MG/3ML
1 SOLUTION RESPIRATORY (INHALATION) EVERY 6 HOURS
Status: DISCONTINUED | OUTPATIENT
Start: 2019-01-01 | End: 2019-01-01

## 2019-01-01 RX ORDER — SODIUM CHLORIDE 9 MG/ML
INJECTION, SOLUTION INTRAVENOUS CONTINUOUS
Status: DISCONTINUED | OUTPATIENT
Start: 2019-01-01 | End: 2020-01-01

## 2019-01-01 RX ORDER — VITAMIN E 268 MG
400 CAPSULE ORAL DAILY
Status: DISCONTINUED | OUTPATIENT
Start: 2019-01-01 | End: 2019-01-01 | Stop reason: HOSPADM

## 2019-01-01 RX ORDER — PROMETHAZINE HYDROCHLORIDE 25 MG/ML
12.5 INJECTION, SOLUTION INTRAMUSCULAR; INTRAVENOUS EVERY 6 HOURS PRN
Status: DISCONTINUED | OUTPATIENT
Start: 2019-01-01 | End: 2020-01-01 | Stop reason: HOSPADM

## 2019-01-01 RX ORDER — INSULIN GLARGINE 100 [IU]/ML
65 INJECTION, SOLUTION SUBCUTANEOUS NIGHTLY
Status: DISCONTINUED | OUTPATIENT
Start: 2019-01-01 | End: 2019-01-01

## 2019-01-01 RX ORDER — SODIUM CHLORIDE 0.9 % (FLUSH) 0.9 %
10 SYRINGE (ML) INJECTION PRN
Status: DISCONTINUED | OUTPATIENT
Start: 2019-01-01 | End: 2019-01-01 | Stop reason: HOSPADM

## 2019-01-01 RX ORDER — ASPIRIN 81 MG/1
81 TABLET ORAL DAILY
Status: DISCONTINUED | OUTPATIENT
Start: 2019-01-01 | End: 2019-01-01 | Stop reason: HOSPADM

## 2019-01-01 RX ORDER — PROMETHAZINE HYDROCHLORIDE 25 MG/1
12.5 TABLET ORAL EVERY 6 HOURS PRN
Status: DISCONTINUED | OUTPATIENT
Start: 2019-01-01 | End: 2019-01-01 | Stop reason: HOSPADM

## 2019-01-01 RX ORDER — EZETIMIBE 10 MG/1
10 TABLET ORAL DAILY
Status: DISCONTINUED | OUTPATIENT
Start: 2019-01-01 | End: 2019-01-01 | Stop reason: HOSPADM

## 2019-01-01 RX ORDER — INSULIN GLARGINE 100 [IU]/ML
40 INJECTION, SOLUTION SUBCUTANEOUS NIGHTLY
Status: DISCONTINUED | OUTPATIENT
Start: 2019-01-01 | End: 2019-01-01

## 2019-01-01 RX ORDER — MORPHINE SULFATE 4 MG/ML
4 INJECTION, SOLUTION INTRAMUSCULAR; INTRAVENOUS EVERY 30 MIN PRN
Status: DISCONTINUED | OUTPATIENT
Start: 2019-01-01 | End: 2019-01-01

## 2019-01-01 RX ORDER — INSULIN GLARGINE 100 [IU]/ML
30 INJECTION, SOLUTION SUBCUTANEOUS ONCE
Status: COMPLETED | OUTPATIENT
Start: 2019-01-01 | End: 2019-01-01

## 2019-01-01 RX ORDER — SODIUM CHLORIDE 9 MG/ML
INJECTION, SOLUTION INTRAVENOUS CONTINUOUS
Status: DISCONTINUED | OUTPATIENT
Start: 2019-01-01 | End: 2019-01-01

## 2019-01-01 RX ORDER — OXYCODONE HYDROCHLORIDE AND ACETAMINOPHEN 5; 325 MG/1; MG/1
2 TABLET ORAL EVERY 4 HOURS PRN
Status: DISCONTINUED | OUTPATIENT
Start: 2019-01-01 | End: 2019-01-01 | Stop reason: HOSPADM

## 2019-01-01 RX ORDER — IPRATROPIUM BROMIDE AND ALBUTEROL SULFATE 2.5; .5 MG/3ML; MG/3ML
1 SOLUTION RESPIRATORY (INHALATION) EVERY 4 HOURS PRN
Status: DISCONTINUED | OUTPATIENT
Start: 2019-01-01 | End: 2019-01-01 | Stop reason: HOSPADM

## 2019-01-01 RX ORDER — FENTANYL CITRATE 50 UG/ML
INJECTION, SOLUTION INTRAMUSCULAR; INTRAVENOUS PRN
Status: DISCONTINUED | OUTPATIENT
Start: 2019-01-01 | End: 2019-01-01 | Stop reason: SDUPTHER

## 2019-01-01 RX ORDER — HYDROCODONE BITARTRATE AND ACETAMINOPHEN 5; 325 MG/1; MG/1
1 TABLET ORAL EVERY 6 HOURS PRN
Status: DISCONTINUED | OUTPATIENT
Start: 2019-01-01 | End: 2020-01-01

## 2019-01-01 RX ORDER — MEMANTINE HYDROCHLORIDE 5 MG/1
5 TABLET ORAL NIGHTLY
Qty: 60 TABLET | Refills: 3 | Status: SHIPPED | OUTPATIENT
Start: 2019-01-01

## 2019-01-01 RX ORDER — TRAMADOL HYDROCHLORIDE 50 MG/1
100 TABLET ORAL EVERY 6 HOURS PRN
Status: DISCONTINUED | OUTPATIENT
Start: 2019-01-01 | End: 2019-01-01 | Stop reason: HOSPADM

## 2019-01-01 RX ORDER — MORPHINE SULFATE 2 MG/ML
2 INJECTION, SOLUTION INTRAMUSCULAR; INTRAVENOUS EVERY 5 MIN PRN
Status: DISCONTINUED | OUTPATIENT
Start: 2019-01-01 | End: 2019-01-01 | Stop reason: HOSPADM

## 2019-01-01 RX ORDER — BUPIVACAINE HYDROCHLORIDE 5 MG/ML
INJECTION, SOLUTION EPIDURAL; INTRACAUDAL
Status: COMPLETED | OUTPATIENT
Start: 2019-01-01 | End: 2019-01-01

## 2019-01-01 RX ORDER — HYDROMORPHONE HCL 110MG/55ML
0.25 PATIENT CONTROLLED ANALGESIA SYRINGE INTRAVENOUS EVERY 5 MIN PRN
Status: DISCONTINUED | OUTPATIENT
Start: 2019-01-01 | End: 2019-01-01 | Stop reason: HOSPADM

## 2019-01-01 RX ORDER — CIPROFLOXACIN 500 MG/1
500 TABLET, FILM COATED ORAL EVERY 12 HOURS SCHEDULED
Status: DISCONTINUED | OUTPATIENT
Start: 2019-01-01 | End: 2019-01-01

## 2019-01-01 RX ORDER — ACETAMINOPHEN 325 MG/1
650 TABLET ORAL EVERY 4 HOURS PRN
Status: DISCONTINUED | OUTPATIENT
Start: 2019-01-01 | End: 2019-01-01 | Stop reason: HOSPADM

## 2019-01-01 RX ORDER — INSULIN GLARGINE 100 [IU]/ML
74 INJECTION, SOLUTION SUBCUTANEOUS NIGHTLY
Status: DISCONTINUED | OUTPATIENT
Start: 2019-01-01 | End: 2019-01-01

## 2019-01-01 RX ORDER — FUROSEMIDE 10 MG/ML
80 INJECTION INTRAMUSCULAR; INTRAVENOUS ONCE
Status: COMPLETED | OUTPATIENT
Start: 2019-01-01 | End: 2019-01-01

## 2019-01-01 RX ORDER — PRAVASTATIN SODIUM 40 MG
80 TABLET ORAL NIGHTLY
Status: DISCONTINUED | OUTPATIENT
Start: 2019-01-01 | End: 2019-01-01 | Stop reason: HOSPADM

## 2019-01-01 RX ORDER — SODIUM CHLORIDE 0.9 % (FLUSH) 0.9 %
10 SYRINGE (ML) INJECTION EVERY 12 HOURS SCHEDULED
Status: DISCONTINUED | OUTPATIENT
Start: 2019-01-01 | End: 2019-01-01 | Stop reason: SDUPTHER

## 2019-01-01 RX ORDER — ONDANSETRON 2 MG/ML
4 INJECTION INTRAMUSCULAR; INTRAVENOUS EVERY 30 MIN PRN
Status: DISCONTINUED | OUTPATIENT
Start: 2019-01-01 | End: 2019-01-01

## 2019-01-01 RX ORDER — SODIUM CHLORIDE 0.9 % (FLUSH) 0.9 %
10 SYRINGE (ML) INJECTION EVERY 12 HOURS SCHEDULED
Status: DISCONTINUED | OUTPATIENT
Start: 2019-01-01 | End: 2020-01-01 | Stop reason: HOSPADM

## 2019-01-01 RX ORDER — DEXTROSE MONOHYDRATE 50 MG/ML
100 INJECTION, SOLUTION INTRAVENOUS PRN
Status: DISCONTINUED | OUTPATIENT
Start: 2019-01-01 | End: 2020-01-01 | Stop reason: HOSPADM

## 2019-01-01 RX ORDER — 0.9 % SODIUM CHLORIDE 0.9 %
500 INTRAVENOUS SOLUTION INTRAVENOUS
Status: DISCONTINUED | OUTPATIENT
Start: 2019-01-01 | End: 2019-01-01 | Stop reason: HOSPADM

## 2019-01-01 RX ORDER — MORPHINE SULFATE 4 MG/ML
2 INJECTION, SOLUTION INTRAMUSCULAR; INTRAVENOUS
Status: DISCONTINUED | OUTPATIENT
Start: 2019-01-01 | End: 2019-01-01 | Stop reason: HOSPADM

## 2019-01-01 RX ORDER — CALCIUM CARBONATE 200(500)MG
500 TABLET,CHEWABLE ORAL 3 TIMES DAILY PRN
Status: DISCONTINUED | OUTPATIENT
Start: 2019-01-01 | End: 2020-01-01 | Stop reason: HOSPADM

## 2019-01-01 RX ORDER — FUROSEMIDE 10 MG/ML
INJECTION INTRAMUSCULAR; INTRAVENOUS
Status: COMPLETED
Start: 2019-01-01 | End: 2019-01-01

## 2019-01-01 RX ORDER — DIPHENHYDRAMINE HCL 25 MG
25 TABLET ORAL PRN
Status: DISCONTINUED | OUTPATIENT
Start: 2019-01-01 | End: 2019-01-01 | Stop reason: HOSPADM

## 2019-01-01 RX ORDER — POTASSIUM CHLORIDE 29.8 MG/ML
20 INJECTION INTRAVENOUS PRN
Status: DISCONTINUED | OUTPATIENT
Start: 2019-01-01 | End: 2020-01-01 | Stop reason: HOSPADM

## 2019-01-01 RX ORDER — SODIUM CHLORIDE 9 MG/ML
INJECTION, SOLUTION INTRAVENOUS CONTINUOUS
Status: DISCONTINUED | OUTPATIENT
Start: 2019-01-01 | End: 2019-01-01 | Stop reason: SDUPTHER

## 2019-01-01 RX ORDER — PANTOPRAZOLE SODIUM 40 MG/1
40 TABLET, DELAYED RELEASE ORAL
Status: DISCONTINUED | OUTPATIENT
Start: 2019-01-01 | End: 2019-01-01 | Stop reason: DRUGHIGH

## 2019-01-01 RX ORDER — CEFAZOLIN SODIUM 1 G/50ML
1 INJECTION, SOLUTION INTRAVENOUS EVERY 8 HOURS
Status: DISPENSED | OUTPATIENT
Start: 2019-01-01 | End: 2019-01-01

## 2019-01-01 RX ORDER — DEXTROSE MONOHYDRATE 50 MG/ML
100 INJECTION, SOLUTION INTRAVENOUS PRN
Status: DISCONTINUED | OUTPATIENT
Start: 2019-01-01 | End: 2019-01-01 | Stop reason: HOSPADM

## 2019-01-01 RX ORDER — RISPERIDONE 0.5 MG/1
1 TABLET, FILM COATED ORAL NIGHTLY
Status: DISCONTINUED | OUTPATIENT
Start: 2019-01-01 | End: 2019-01-01 | Stop reason: HOSPADM

## 2019-01-01 RX ORDER — DEXTROSE MONOHYDRATE 25 G/50ML
12.5 INJECTION, SOLUTION INTRAVENOUS PRN
Status: DISCONTINUED | OUTPATIENT
Start: 2019-01-01 | End: 2020-01-01 | Stop reason: HOSPADM

## 2019-01-01 RX ORDER — POTASSIUM CHLORIDE 29.8 MG/ML
20 INJECTION INTRAVENOUS
Status: DISPENSED | OUTPATIENT
Start: 2019-01-01 | End: 2019-01-01

## 2019-01-01 RX ORDER — SODIUM CHLORIDE 0.9 % (FLUSH) 0.9 %
10 SYRINGE (ML) INJECTION EVERY 12 HOURS SCHEDULED
Status: CANCELLED | OUTPATIENT
Start: 2019-01-01

## 2019-01-01 RX ORDER — ACETAMINOPHEN 325 MG/1
650 TABLET ORAL EVERY 4 HOURS PRN
Status: DISCONTINUED | OUTPATIENT
Start: 2019-01-01 | End: 2019-01-01 | Stop reason: SDUPTHER

## 2019-01-01 RX ORDER — VITAMIN E 268 MG
400 CAPSULE ORAL DAILY
Status: DISCONTINUED | OUTPATIENT
Start: 2019-01-01 | End: 2020-01-01 | Stop reason: HOSPADM

## 2019-01-01 RX ORDER — SODIUM CHLORIDE 0.9 % (FLUSH) 0.9 %
10 SYRINGE (ML) INJECTION PRN
Status: DISCONTINUED | OUTPATIENT
Start: 2019-01-01 | End: 2019-01-01

## 2019-01-01 RX ORDER — NITROGLYCERIN 0.4 MG/1
0.4 TABLET SUBLINGUAL EVERY 5 MIN PRN
Status: COMPLETED | OUTPATIENT
Start: 2019-01-01 | End: 2019-01-01

## 2019-01-01 RX ORDER — CYCLOBENZAPRINE HCL 10 MG
10 TABLET ORAL ONCE
Status: COMPLETED | OUTPATIENT
Start: 2019-01-01 | End: 2019-01-01

## 2019-01-01 RX ORDER — FUROSEMIDE 10 MG/ML
40 INJECTION INTRAMUSCULAR; INTRAVENOUS ONCE
Status: COMPLETED | OUTPATIENT
Start: 2019-01-01 | End: 2019-01-01

## 2019-01-01 RX ORDER — SODIUM CHLORIDE 9 MG/ML
INJECTION, SOLUTION INTRAVENOUS
Status: DISPENSED
Start: 2019-01-01 | End: 2019-01-01

## 2019-01-01 RX ORDER — NITROGLYCERIN 0.4 MG/1
0.4 TABLET SUBLINGUAL EVERY 5 MIN PRN
Status: DISCONTINUED | OUTPATIENT
Start: 2019-01-01 | End: 2019-01-01 | Stop reason: HOSPADM

## 2019-01-01 RX ORDER — LIDOCAINE HYDROCHLORIDE 10 MG/ML
5 INJECTION, SOLUTION EPIDURAL; INFILTRATION; INTRACAUDAL; PERINEURAL ONCE
Qty: 5 ML | Refills: 0 | Status: SHIPPED | OUTPATIENT
Start: 2019-01-01 | End: 2019-01-01

## 2019-01-01 RX ORDER — CALCIUM CARBONATE 200(500)MG
500 TABLET,CHEWABLE ORAL 2 TIMES DAILY
Qty: 60 TABLET | Refills: 0 | Status: SHIPPED | OUTPATIENT
Start: 2019-01-01 | End: 2019-01-01

## 2019-01-01 RX ORDER — CALCIUM CARBONATE 200(500)MG
500 TABLET,CHEWABLE ORAL 4 TIMES DAILY PRN
Status: DISCONTINUED | OUTPATIENT
Start: 2019-01-01 | End: 2019-01-01 | Stop reason: HOSPADM

## 2019-01-01 RX ORDER — POTASSIUM CHLORIDE 20 MEQ/1
40 TABLET, EXTENDED RELEASE ORAL ONCE
Status: COMPLETED | OUTPATIENT
Start: 2019-01-01 | End: 2019-01-01

## 2019-01-01 RX ORDER — HYDRALAZINE HYDROCHLORIDE 20 MG/ML
5 INJECTION INTRAMUSCULAR; INTRAVENOUS EVERY 10 MIN PRN
Status: DISCONTINUED | OUTPATIENT
Start: 2019-01-01 | End: 2019-01-01 | Stop reason: HOSPADM

## 2019-01-01 RX ORDER — MIDODRINE HYDROCHLORIDE 5 MG/1
5 TABLET ORAL
Status: DISCONTINUED | OUTPATIENT
Start: 2019-01-01 | End: 2019-01-01 | Stop reason: HOSPADM

## 2019-01-01 RX ORDER — CALCIUM CARBONATE 200(500)MG
500 TABLET,CHEWABLE ORAL 2 TIMES DAILY
Status: DISCONTINUED | OUTPATIENT
Start: 2019-01-01 | End: 2019-01-01

## 2019-01-01 RX ORDER — DEXTROSE AND SODIUM CHLORIDE 5; .9 G/100ML; G/100ML
INJECTION, SOLUTION INTRAVENOUS CONTINUOUS
Status: DISCONTINUED | OUTPATIENT
Start: 2019-01-01 | End: 2019-01-01

## 2019-01-01 RX ORDER — SIMVASTATIN 40 MG
40 TABLET ORAL NIGHTLY
Status: DISCONTINUED | OUTPATIENT
Start: 2019-01-01 | End: 2019-01-01 | Stop reason: HOSPADM

## 2019-01-01 RX ORDER — MONTELUKAST SODIUM 10 MG/1
10 TABLET ORAL NIGHTLY
Status: DISCONTINUED | OUTPATIENT
Start: 2019-01-01 | End: 2020-01-01 | Stop reason: HOSPADM

## 2019-01-01 RX ORDER — NITROGLYCERIN 0.4 MG/1
0.4 TABLET SUBLINGUAL EVERY 5 MIN PRN
Qty: 25 TABLET | Refills: 2 | Status: SHIPPED | OUTPATIENT
Start: 2019-01-01

## 2019-01-01 RX ORDER — CLONIDINE HYDROCHLORIDE 0.1 MG/1
0.1 TABLET ORAL 3 TIMES DAILY PRN
Status: DISCONTINUED | OUTPATIENT
Start: 2019-01-01 | End: 2019-01-01 | Stop reason: HOSPADM

## 2019-01-01 RX ORDER — SODIUM CHLORIDE, SODIUM LACTATE, POTASSIUM CHLORIDE, CALCIUM CHLORIDE 600; 310; 30; 20 MG/100ML; MG/100ML; MG/100ML; MG/100ML
INJECTION, SOLUTION INTRAVENOUS CONTINUOUS
Status: DISCONTINUED | OUTPATIENT
Start: 2019-01-01 | End: 2019-01-01

## 2019-01-01 RX ORDER — TROLAMINE SALICYLATE 10 G/100G
CREAM TOPICAL 2 TIMES DAILY PRN
Status: DISCONTINUED | OUTPATIENT
Start: 2019-01-01 | End: 2019-01-01 | Stop reason: HOSPADM

## 2019-01-01 RX ORDER — FLUTICASONE PROPIONATE 50 MCG
1 SPRAY, SUSPENSION (ML) NASAL DAILY PRN
Status: DISCONTINUED | OUTPATIENT
Start: 2019-01-01 | End: 2019-01-01 | Stop reason: HOSPADM

## 2019-01-01 RX ORDER — HEPARIN SODIUM 1000 [USP'U]/ML
10000 INJECTION, SOLUTION INTRAVENOUS; SUBCUTANEOUS ONCE
Status: COMPLETED | OUTPATIENT
Start: 2019-01-01 | End: 2019-01-01

## 2019-01-01 RX ORDER — HEPARIN SODIUM 1000 [USP'U]/ML
5000 INJECTION, SOLUTION INTRAVENOUS; SUBCUTANEOUS PRN
Status: DISCONTINUED | OUTPATIENT
Start: 2020-01-01 | End: 2020-01-01

## 2019-01-01 RX ORDER — DEXTROSE MONOHYDRATE 50 MG/ML
INJECTION, SOLUTION INTRAVENOUS CONTINUOUS
Status: DISCONTINUED | OUTPATIENT
Start: 2019-01-01 | End: 2020-01-01 | Stop reason: SDUPTHER

## 2019-01-01 RX ORDER — MEPERIDINE HYDROCHLORIDE 25 MG/ML
12.5 INJECTION INTRAMUSCULAR; INTRAVENOUS; SUBCUTANEOUS EVERY 5 MIN PRN
Status: DISCONTINUED | OUTPATIENT
Start: 2019-01-01 | End: 2019-01-01 | Stop reason: HOSPADM

## 2019-01-01 RX ORDER — MAGNESIUM SULFATE 1 G/100ML
1 INJECTION INTRAVENOUS PRN
Status: DISCONTINUED | OUTPATIENT
Start: 2019-01-01 | End: 2019-01-01 | Stop reason: HOSPADM

## 2019-01-01 RX ORDER — M-VIT,TX,IRON,MINS/CALC/FOLIC 27MG-0.4MG
1 TABLET ORAL DAILY
Status: DISCONTINUED | OUTPATIENT
Start: 2019-01-01 | End: 2019-01-01 | Stop reason: HOSPADM

## 2019-01-01 RX ORDER — MEMANTINE HYDROCHLORIDE 10 MG/1
5 TABLET ORAL NIGHTLY
Status: DISCONTINUED | OUTPATIENT
Start: 2019-01-01 | End: 2020-01-01

## 2019-01-01 RX ORDER — ACETAMINOPHEN 10 MG/ML
INJECTION, SOLUTION INTRAVENOUS PRN
Status: DISCONTINUED | OUTPATIENT
Start: 2019-01-01 | End: 2019-01-01 | Stop reason: SDUPTHER

## 2019-01-01 RX ORDER — POTASSIUM CHLORIDE 7.45 MG/ML
10 INJECTION INTRAVENOUS PRN
Status: DISCONTINUED | OUTPATIENT
Start: 2019-01-01 | End: 2019-01-01 | Stop reason: HOSPADM

## 2019-01-01 RX ORDER — FUROSEMIDE 20 MG/1
20 TABLET ORAL DAILY
COMMUNITY

## 2019-01-01 RX ORDER — CEFAZOLIN SODIUM 2 G/50ML
SOLUTION INTRAVENOUS PRN
Status: DISCONTINUED | OUTPATIENT
Start: 2019-01-01 | End: 2019-01-01 | Stop reason: SDUPTHER

## 2019-01-01 RX ORDER — INSULIN GLARGINE 100 [IU]/ML
50 INJECTION, SOLUTION SUBCUTANEOUS NIGHTLY
Status: DISCONTINUED | OUTPATIENT
Start: 2019-01-01 | End: 2019-01-01

## 2019-01-01 RX ORDER — SERTRALINE HYDROCHLORIDE 100 MG/1
100 TABLET, FILM COATED ORAL 2 TIMES DAILY
Status: DISCONTINUED | OUTPATIENT
Start: 2019-01-01 | End: 2019-01-01 | Stop reason: HOSPADM

## 2019-01-01 RX ORDER — VITS A,C,E/LUTEIN/MINERALS 300MCG-200
1 TABLET ORAL DAILY
Status: DISCONTINUED | OUTPATIENT
Start: 2019-01-01 | End: 2019-01-01 | Stop reason: HOSPADM

## 2019-01-01 RX ORDER — SIMVASTATIN 40 MG
40 TABLET ORAL DAILY
Status: DISCONTINUED | OUTPATIENT
Start: 2019-01-01 | End: 2019-01-01

## 2019-01-01 RX ORDER — ALBUMIN, HUMAN INJ 5% 5 %
25 SOLUTION INTRAVENOUS ONCE
Status: COMPLETED | OUTPATIENT
Start: 2019-01-01 | End: 2019-01-01

## 2019-01-01 RX ORDER — HALOPERIDOL 5 MG/ML
1 INJECTION INTRAMUSCULAR EVERY 6 HOURS PRN
Status: DISCONTINUED | OUTPATIENT
Start: 2019-01-01 | End: 2019-01-01 | Stop reason: HOSPADM

## 2019-01-01 RX ORDER — ROPINIROLE 1 MG/1
1 TABLET, FILM COATED ORAL NIGHTLY
Status: DISCONTINUED | OUTPATIENT
Start: 2019-01-01 | End: 2019-01-01 | Stop reason: HOSPADM

## 2019-01-01 RX ORDER — ASCORBIC ACID 500 MG
1000 TABLET ORAL DAILY
Status: DISCONTINUED | OUTPATIENT
Start: 2019-01-01 | End: 2020-01-01 | Stop reason: HOSPADM

## 2019-01-01 RX ORDER — IPRATROPIUM BROMIDE AND ALBUTEROL SULFATE 2.5; .5 MG/3ML; MG/3ML
1 SOLUTION RESPIRATORY (INHALATION) EVERY 4 HOURS
Status: DISCONTINUED | OUTPATIENT
Start: 2019-01-01 | End: 2020-01-01

## 2019-01-01 RX ORDER — ST. JOHN'S WORT 300 MG
2 CAPSULE ORAL DAILY
Status: DISCONTINUED | OUTPATIENT
Start: 2019-01-01 | End: 2019-01-01

## 2019-01-01 RX ORDER — SODIUM CHLORIDE 0.9 % (FLUSH) 0.9 %
10 SYRINGE (ML) INJECTION PRN
Status: DISCONTINUED | OUTPATIENT
Start: 2019-01-01 | End: 2019-01-01 | Stop reason: SDUPTHER

## 2019-01-01 RX ORDER — ROPINIROLE 1 MG/1
1 TABLET, FILM COATED ORAL NIGHTLY
Status: DISCONTINUED | OUTPATIENT
Start: 2019-01-01 | End: 2020-01-01

## 2019-01-01 RX ORDER — ALBUTEROL SULFATE 90 UG/1
2 AEROSOL, METERED RESPIRATORY (INHALATION) EVERY 4 HOURS PRN
Status: DISCONTINUED | OUTPATIENT
Start: 2019-01-01 | End: 2019-01-01 | Stop reason: HOSPADM

## 2019-01-01 RX ORDER — KETAMINE HYDROCHLORIDE 10 MG/ML
INJECTION, SOLUTION INTRAMUSCULAR; INTRAVENOUS PRN
Status: DISCONTINUED | OUTPATIENT
Start: 2019-01-01 | End: 2019-01-01 | Stop reason: SDUPTHER

## 2019-01-01 RX ORDER — NICOTINE POLACRILEX 4 MG
15 LOZENGE BUCCAL PRN
Status: DISCONTINUED | OUTPATIENT
Start: 2019-01-01 | End: 2019-01-01 | Stop reason: HOSPADM

## 2019-01-01 RX ORDER — MAGNESIUM SULFATE 1 G/100ML
1 INJECTION INTRAVENOUS PRN
Status: DISCONTINUED | OUTPATIENT
Start: 2019-01-01 | End: 2020-01-01 | Stop reason: HOSPADM

## 2019-01-01 RX ORDER — FUROSEMIDE 10 MG/ML
40 INJECTION INTRAMUSCULAR; INTRAVENOUS ONCE
Status: DISCONTINUED | OUTPATIENT
Start: 2019-01-01 | End: 2020-01-01

## 2019-01-01 RX ORDER — SODIUM CHLORIDE, SODIUM LACTATE, POTASSIUM CHLORIDE, AND CALCIUM CHLORIDE .6; .31; .03; .02 G/100ML; G/100ML; G/100ML; G/100ML
30 INJECTION, SOLUTION INTRAVENOUS ONCE
Status: COMPLETED | OUTPATIENT
Start: 2019-01-01 | End: 2019-01-01

## 2019-01-01 RX ORDER — SODIUM CHLORIDE 9 MG/ML
INJECTION, SOLUTION INTRAVENOUS
Status: COMPLETED
Start: 2019-01-01 | End: 2019-01-01

## 2019-01-01 RX ORDER — SODIUM CHLORIDE 0.9 % (FLUSH) 0.9 %
10 SYRINGE (ML) INJECTION PRN
Status: CANCELLED | OUTPATIENT
Start: 2019-01-01

## 2019-01-01 RX ORDER — LEVOFLOXACIN 5 MG/ML
750 INJECTION, SOLUTION INTRAVENOUS ONCE
Status: COMPLETED | OUTPATIENT
Start: 2019-01-01 | End: 2019-01-01

## 2019-01-01 RX ORDER — HYDROMORPHONE HCL 110MG/55ML
4 PATIENT CONTROLLED ANALGESIA SYRINGE INTRAVENOUS
Status: DISCONTINUED | OUTPATIENT
Start: 2019-01-01 | End: 2019-01-01

## 2019-01-01 RX ORDER — PANTOPRAZOLE SODIUM 40 MG/10ML
40 INJECTION, POWDER, LYOPHILIZED, FOR SOLUTION INTRAVENOUS 2 TIMES DAILY
Status: DISCONTINUED | OUTPATIENT
Start: 2019-01-01 | End: 2020-01-01 | Stop reason: HOSPADM

## 2019-01-01 RX ORDER — SODIUM CHLORIDE 0.9 % (FLUSH) 0.9 %
10 SYRINGE (ML) INJECTION
Status: COMPLETED | OUTPATIENT
Start: 2019-01-01 | End: 2019-01-01

## 2019-01-01 RX ORDER — ASPIRIN 81 MG/1
324 TABLET, CHEWABLE ORAL ONCE
Status: COMPLETED | OUTPATIENT
Start: 2019-01-01 | End: 2019-01-01

## 2019-01-01 RX ORDER — DIPHENHYDRAMINE HYDROCHLORIDE 50 MG/ML
12.5 INJECTION INTRAMUSCULAR; INTRAVENOUS
Status: DISCONTINUED | OUTPATIENT
Start: 2019-01-01 | End: 2019-01-01 | Stop reason: HOSPADM

## 2019-01-01 RX ORDER — POTASSIUM CHLORIDE 7.45 MG/ML
10 INJECTION INTRAVENOUS
Status: COMPLETED | OUTPATIENT
Start: 2019-01-01 | End: 2019-01-01

## 2019-01-01 RX ORDER — SPIRONOLACTONE 25 MG/1
25 TABLET ORAL DAILY
Status: DISCONTINUED | OUTPATIENT
Start: 2019-01-01 | End: 2019-01-01

## 2019-01-01 RX ORDER — CYCLOBENZAPRINE HCL 10 MG
10 TABLET ORAL 3 TIMES DAILY PRN
Status: DISCONTINUED | OUTPATIENT
Start: 2019-01-01 | End: 2019-01-01 | Stop reason: HOSPADM

## 2019-01-01 RX ORDER — OXYCODONE HYDROCHLORIDE AND ACETAMINOPHEN 5; 325 MG/1; MG/1
2 TABLET ORAL EVERY 8 HOURS PRN
Qty: 9 TABLET | Refills: 0 | Status: SHIPPED | OUTPATIENT
Start: 2019-01-01 | End: 2019-01-01

## 2019-01-01 RX ORDER — SPIRONOLACTONE 25 MG/1
25 TABLET ORAL EVERY MORNING
COMMUNITY

## 2019-01-01 RX ORDER — POTASSIUM CHLORIDE 20 MEQ/1
40 TABLET, EXTENDED RELEASE ORAL 2 TIMES DAILY WITH MEALS
Status: COMPLETED | OUTPATIENT
Start: 2019-01-01 | End: 2019-01-01

## 2019-01-01 RX ORDER — BACLOFEN 10 MG/1
10 TABLET ORAL EVERY 8 HOURS
COMMUNITY

## 2019-01-01 RX ORDER — LIDOCAINE HYDROCHLORIDE 10 MG/ML
5 INJECTION, SOLUTION EPIDURAL; INFILTRATION; INTRACAUDAL; PERINEURAL ONCE
Status: DISCONTINUED | OUTPATIENT
Start: 2019-01-01 | End: 2019-01-01 | Stop reason: HOSPADM

## 2019-01-01 RX ORDER — MAGNESIUM SULFATE 4 G/50ML
4 INJECTION INTRAVENOUS ONCE
Status: COMPLETED | OUTPATIENT
Start: 2019-01-01 | End: 2019-01-01

## 2019-01-01 RX ORDER — DOPAMINE HYDROCHLORIDE 160 MG/100ML
2.5 INJECTION, SOLUTION INTRAVENOUS CONTINUOUS
Status: DISCONTINUED | OUTPATIENT
Start: 2019-01-01 | End: 2019-01-01

## 2019-01-01 RX ORDER — DIAZEPAM 5 MG/1
5 TABLET ORAL
Status: CANCELLED | OUTPATIENT
Start: 2019-01-01 | End: 2019-01-01

## 2019-01-01 RX ORDER — ASPIRIN 81 MG/1
324 TABLET, CHEWABLE ORAL DAILY
Status: DISCONTINUED | OUTPATIENT
Start: 2019-01-01 | End: 2019-01-01

## 2019-01-01 RX ORDER — MONTELUKAST SODIUM 10 MG/1
10 TABLET ORAL NIGHTLY
Status: DISCONTINUED | OUTPATIENT
Start: 2019-01-01 | End: 2019-01-01 | Stop reason: HOSPADM

## 2019-01-01 RX ORDER — INSULIN GLARGINE 100 [IU]/ML
15 INJECTION, SOLUTION SUBCUTANEOUS NIGHTLY
Status: DISCONTINUED | OUTPATIENT
Start: 2019-01-01 | End: 2019-01-01

## 2019-01-01 RX ORDER — VASOPRESSIN 20 U/ML
INJECTION PARENTERAL PRN
Status: DISCONTINUED | OUTPATIENT
Start: 2019-01-01 | End: 2019-01-01 | Stop reason: SDUPTHER

## 2019-01-01 RX ORDER — TRAMADOL HYDROCHLORIDE 50 MG/1
50 TABLET ORAL EVERY 6 HOURS PRN
Status: DISCONTINUED | OUTPATIENT
Start: 2019-01-01 | End: 2020-01-01 | Stop reason: HOSPADM

## 2019-01-01 RX ORDER — MORPHINE SULFATE 2 MG/ML
2 INJECTION, SOLUTION INTRAMUSCULAR; INTRAVENOUS EVERY 4 HOURS PRN
Status: DISCONTINUED | OUTPATIENT
Start: 2019-01-01 | End: 2020-01-01 | Stop reason: HOSPADM

## 2019-01-01 RX ORDER — ACETAMINOPHEN 500 MG
1000 TABLET ORAL ONCE
Status: COMPLETED | OUTPATIENT
Start: 2019-01-01 | End: 2019-01-01

## 2019-01-01 RX ORDER — CALCIUM CARBONATE 200(500)MG
500 TABLET,CHEWABLE ORAL 2 TIMES DAILY
Status: DISCONTINUED | OUTPATIENT
Start: 2019-01-01 | End: 2019-01-01 | Stop reason: HOSPADM

## 2019-01-01 RX ORDER — ALBUMIN (HUMAN) 12.5 G/50ML
25 SOLUTION INTRAVENOUS ONCE
Status: DISCONTINUED | OUTPATIENT
Start: 2019-01-01 | End: 2020-01-01 | Stop reason: HOSPADM

## 2019-01-01 RX ORDER — SODIUM CHLORIDE 9 MG/ML
INJECTION, SOLUTION INTRAVENOUS CONTINUOUS
Status: CANCELLED | OUTPATIENT
Start: 2019-01-01

## 2019-01-01 RX ORDER — PHENYLEPHRINE HYDROCHLORIDE 10 MG/ML
INJECTION INTRAVENOUS PRN
Status: DISCONTINUED | OUTPATIENT
Start: 2019-01-01 | End: 2019-01-01 | Stop reason: SDUPTHER

## 2019-01-01 RX ORDER — SODIUM CHLORIDE 9 MG/ML
1000 INJECTION, SOLUTION INTRAVENOUS CONTINUOUS
Status: DISCONTINUED | OUTPATIENT
Start: 2019-01-01 | End: 2019-01-01

## 2019-01-01 RX ORDER — ACETAMINOPHEN 325 MG/1
650 TABLET ORAL EVERY 4 HOURS PRN
Status: DISCONTINUED | OUTPATIENT
Start: 2019-01-01 | End: 2019-01-01

## 2019-01-01 RX ORDER — IPRATROPIUM BROMIDE AND ALBUTEROL SULFATE 2.5; .5 MG/3ML; MG/3ML
1 SOLUTION RESPIRATORY (INHALATION) ONCE
Status: COMPLETED | OUTPATIENT
Start: 2019-01-01 | End: 2019-01-01

## 2019-01-01 RX ORDER — CLONAZEPAM 1 MG/1
1 TABLET ORAL ONCE
Status: COMPLETED | OUTPATIENT
Start: 2019-01-01 | End: 2019-01-01

## 2019-01-01 RX ORDER — INSULIN GLARGINE 100 [IU]/ML
15 INJECTION, SOLUTION SUBCUTANEOUS ONCE
Status: COMPLETED | OUTPATIENT
Start: 2019-01-01 | End: 2019-01-01

## 2019-01-01 RX ORDER — POTASSIUM CHLORIDE 20 MEQ/1
20 TABLET, EXTENDED RELEASE ORAL 3 TIMES DAILY
COMMUNITY

## 2019-01-01 RX ORDER — ALBUMIN, HUMAN INJ 5% 5 %
SOLUTION INTRAVENOUS
Status: COMPLETED
Start: 2019-01-01 | End: 2019-01-01

## 2019-01-01 RX ORDER — ONDANSETRON 2 MG/ML
4 INJECTION INTRAMUSCULAR; INTRAVENOUS EVERY 6 HOURS PRN
Status: DISCONTINUED | OUTPATIENT
Start: 2019-01-01 | End: 2020-01-01 | Stop reason: HOSPADM

## 2019-01-01 RX ORDER — IPRATROPIUM BROMIDE AND ALBUTEROL SULFATE 2.5; .5 MG/3ML; MG/3ML
3 SOLUTION RESPIRATORY (INHALATION)
Qty: 360 ML | Refills: 0 | Status: SHIPPED | OUTPATIENT
Start: 2019-01-01 | End: 2019-01-01 | Stop reason: DRUGHIGH

## 2019-01-01 RX ORDER — NALOXONE HYDROCHLORIDE 0.4 MG/ML
INJECTION, SOLUTION INTRAMUSCULAR; INTRAVENOUS; SUBCUTANEOUS PRN
Status: DISCONTINUED | OUTPATIENT
Start: 2019-01-01 | End: 2019-01-01 | Stop reason: SDUPTHER

## 2019-01-01 RX ORDER — ROPINIROLE 1 MG/1
1 TABLET, FILM COATED ORAL NIGHTLY
Qty: 30 TABLET | Refills: 0 | Status: SHIPPED | OUTPATIENT
Start: 2019-01-01

## 2019-01-01 RX ORDER — TRAMADOL HYDROCHLORIDE 50 MG/1
50 TABLET ORAL ONCE
Status: DISCONTINUED | OUTPATIENT
Start: 2019-01-01 | End: 2019-01-01

## 2019-01-01 RX ORDER — VITAMIN B COMPLEX
1 CAPSULE ORAL DAILY
Status: DISCONTINUED | OUTPATIENT
Start: 2019-01-01 | End: 2019-01-01 | Stop reason: HOSPADM

## 2019-01-01 RX ORDER — TROLAMINE SALICYLATE 10 G/100G
CREAM TOPICAL EVERY 4 HOURS PRN
Status: DISCONTINUED | OUTPATIENT
Start: 2019-01-01 | End: 2020-01-01 | Stop reason: HOSPADM

## 2019-01-01 RX ORDER — HYDROMORPHONE HCL 110MG/55ML
0.5 PATIENT CONTROLLED ANALGESIA SYRINGE INTRAVENOUS ONCE
Status: COMPLETED | OUTPATIENT
Start: 2019-01-01 | End: 2019-01-01

## 2019-01-01 RX ORDER — FLUTICASONE PROPIONATE 50 MCG
1 SPRAY, SUSPENSION (ML) NASAL DAILY PRN
Status: DISCONTINUED | OUTPATIENT
Start: 2019-01-01 | End: 2020-01-01 | Stop reason: HOSPADM

## 2019-01-01 RX ADMIN — FLUTICASONE PROPIONATE 1 SPRAY: 50 SPRAY, METERED NASAL at 09:52

## 2019-01-01 RX ADMIN — MEROPENEM 2 G: 1 INJECTION, POWDER, FOR SOLUTION INTRAVENOUS at 02:07

## 2019-01-01 RX ADMIN — OXYCODONE HYDROCHLORIDE AND ACETAMINOPHEN 1000 MG: 500 TABLET ORAL at 10:10

## 2019-01-01 RX ADMIN — MONTELUKAST 10 MG: 10 TABLET, FILM COATED ORAL at 21:42

## 2019-01-01 RX ADMIN — PANTOPRAZOLE SODIUM 40 MG: 40 TABLET, DELAYED RELEASE ORAL at 06:09

## 2019-01-01 RX ADMIN — PANTOPRAZOLE SODIUM 40 MG: 40 TABLET, DELAYED RELEASE ORAL at 05:34

## 2019-01-01 RX ADMIN — LEVOFLOXACIN 750 MG: 5 INJECTION, SOLUTION INTRAVENOUS at 01:23

## 2019-01-01 RX ADMIN — FLUCONAZOLE 600 MG: 100 TABLET ORAL at 09:15

## 2019-01-01 RX ADMIN — Medication 2 PUFF: at 21:30

## 2019-01-01 RX ADMIN — ASPIRIN 81 MG: 81 TABLET, COATED ORAL at 10:25

## 2019-01-01 RX ADMIN — MEMANTINE HYDROCHLORIDE 5 MG: 5 TABLET ORAL at 21:41

## 2019-01-01 RX ADMIN — PANTOPRAZOLE SODIUM 40 MG: 40 TABLET, DELAYED RELEASE ORAL at 05:30

## 2019-01-01 RX ADMIN — CALCIUM CARBONATE 500 MG: 500 TABLET, CHEWABLE ORAL at 12:59

## 2019-01-01 RX ADMIN — MEROPENEM 2 G: 1 INJECTION, POWDER, FOR SOLUTION INTRAVENOUS at 13:20

## 2019-01-01 RX ADMIN — IPRATROPIUM BROMIDE AND ALBUTEROL SULFATE 1 AMPULE: .5; 3 SOLUTION RESPIRATORY (INHALATION) at 20:51

## 2019-01-01 RX ADMIN — BUPROPION HYDROCHLORIDE 300 MG: 150 TABLET, EXTENDED RELEASE ORAL at 09:31

## 2019-01-01 RX ADMIN — PIPERACILLIN AND TAZOBACTAM 3.38 G: 3; .375 INJECTION, POWDER, LYOPHILIZED, FOR SOLUTION INTRAVENOUS at 01:53

## 2019-01-01 RX ADMIN — AMPICILLIN SODIUM AND SULBACTAM SODIUM 3 G: 2; 1 INJECTION, POWDER, FOR SOLUTION INTRAMUSCULAR; INTRAVENOUS at 20:22

## 2019-01-01 RX ADMIN — CALCIUM CARBONATE 500 MG: 500 TABLET, CHEWABLE ORAL at 20:59

## 2019-01-01 RX ADMIN — SODIUM CHLORIDE, PRESERVATIVE FREE 10 ML: 5 INJECTION INTRAVENOUS at 21:40

## 2019-01-01 RX ADMIN — MEROPENEM 2 G: 1 INJECTION, POWDER, FOR SOLUTION INTRAVENOUS at 06:04

## 2019-01-01 RX ADMIN — MEROPENEM 2 G: 1 INJECTION, POWDER, FOR SOLUTION INTRAVENOUS at 13:34

## 2019-01-01 RX ADMIN — CALCIUM CARBONATE 500 MG: 500 TABLET, CHEWABLE ORAL at 10:19

## 2019-01-01 RX ADMIN — SODIUM CHLORIDE: 9 INJECTION, SOLUTION INTRAVENOUS at 07:34

## 2019-01-01 RX ADMIN — CALCIUM CARBONATE 500 MG: 500 TABLET, CHEWABLE ORAL at 12:34

## 2019-01-01 RX ADMIN — ASPIRIN 81 MG: 81 TABLET, COATED ORAL at 10:35

## 2019-01-01 RX ADMIN — PANTOPRAZOLE SODIUM 40 MG: 40 INJECTION, POWDER, FOR SOLUTION INTRAVENOUS at 10:34

## 2019-01-01 RX ADMIN — COLLAGENASE SANTYL: 250 OINTMENT TOPICAL at 08:43

## 2019-01-01 RX ADMIN — SODIUM CHLORIDE: 9 INJECTION, SOLUTION INTRAVENOUS at 06:34

## 2019-01-01 RX ADMIN — Medication 2 PUFF: at 08:15

## 2019-01-01 RX ADMIN — AMPICILLIN SODIUM AND SULBACTAM SODIUM 3 G: 2; 1 INJECTION, POWDER, FOR SOLUTION INTRAMUSCULAR; INTRAVENOUS at 23:17

## 2019-01-01 RX ADMIN — TRAMADOL HYDROCHLORIDE 100 MG: 50 TABLET, COATED ORAL at 11:37

## 2019-01-01 RX ADMIN — HYDROCODONE BITARTRATE AND ACETAMINOPHEN 1 TABLET: 5; 325 TABLET ORAL at 03:03

## 2019-01-01 RX ADMIN — PANTOPRAZOLE SODIUM 40 MG: 40 TABLET, DELAYED RELEASE ORAL at 06:39

## 2019-01-01 RX ADMIN — CYCLOBENZAPRINE HYDROCHLORIDE 10 MG: 10 TABLET, FILM COATED ORAL at 22:50

## 2019-01-01 RX ADMIN — TRAMADOL HYDROCHLORIDE 100 MG: 50 TABLET, COATED ORAL at 17:45

## 2019-01-01 RX ADMIN — PHENYLEPHRINE HYDROCHLORIDE 200 MCG: 10 INJECTION INTRAVENOUS at 15:51

## 2019-01-01 RX ADMIN — RISPERIDONE 1 MG: 0.5 TABLET, FILM COATED ORAL at 20:59

## 2019-01-01 RX ADMIN — MAGNESIUM SULFATE HEPTAHYDRATE 4 G: 80 INJECTION, SOLUTION INTRAVENOUS at 22:39

## 2019-01-01 RX ADMIN — PHENYLEPHRINE HYDROCHLORIDE 100 MCG: 10 INJECTION INTRAVENOUS at 12:36

## 2019-01-01 RX ADMIN — SIMVASTATIN 40 MG: 40 TABLET, FILM COATED ORAL at 20:44

## 2019-01-01 RX ADMIN — Medication 2 PUFF: at 07:31

## 2019-01-01 RX ADMIN — SODIUM CHLORIDE 1000 ML: 9 INJECTION, SOLUTION INTRAVENOUS at 18:43

## 2019-01-01 RX ADMIN — Medication 1 TABLET: at 09:26

## 2019-01-01 RX ADMIN — Medication 2 PUFF: at 22:13

## 2019-01-01 RX ADMIN — DOXYCYCLINE HYCLATE 100 MG: 100 TABLET, COATED ORAL at 22:02

## 2019-01-01 RX ADMIN — OXYCODONE HYDROCHLORIDE AND ACETAMINOPHEN 1000 MG: 500 TABLET ORAL at 08:53

## 2019-01-01 RX ADMIN — PANTOPRAZOLE SODIUM 40 MG: 40 INJECTION, POWDER, FOR SOLUTION INTRAVENOUS at 17:27

## 2019-01-01 RX ADMIN — ROPINIROLE HYDROCHLORIDE 1 MG: 1 TABLET, FILM COATED ORAL at 20:36

## 2019-01-01 RX ADMIN — OXYCODONE HYDROCHLORIDE AND ACETAMINOPHEN 1000 MG: 500 TABLET ORAL at 10:29

## 2019-01-01 RX ADMIN — BUPROPION HYDROCHLORIDE 300 MG: 150 TABLET, EXTENDED RELEASE ORAL at 09:26

## 2019-01-01 RX ADMIN — INSULIN GLARGINE 15 UNITS: 100 INJECTION, SOLUTION SUBCUTANEOUS at 22:09

## 2019-01-01 RX ADMIN — INSULIN GLARGINE 15 UNITS: 100 INJECTION, SOLUTION SUBCUTANEOUS at 21:34

## 2019-01-01 RX ADMIN — ENOXAPARIN SODIUM 40 MG: 40 INJECTION SUBCUTANEOUS at 09:02

## 2019-01-01 RX ADMIN — Medication 1 CAPSULE: at 10:51

## 2019-01-01 RX ADMIN — INSULIN LISPRO 4 UNITS: 100 INJECTION, SOLUTION INTRAVENOUS; SUBCUTANEOUS at 12:48

## 2019-01-01 RX ADMIN — MEROPENEM 2 G: 1 INJECTION, POWDER, FOR SOLUTION INTRAVENOUS at 21:15

## 2019-01-01 RX ADMIN — SERTRALINE HYDROCHLORIDE 100 MG: 100 TABLET ORAL at 20:43

## 2019-01-01 RX ADMIN — SERTRALINE HYDROCHLORIDE 100 MG: 100 TABLET ORAL at 21:41

## 2019-01-01 RX ADMIN — SERTRALINE HYDROCHLORIDE 100 MG: 100 TABLET ORAL at 09:02

## 2019-01-01 RX ADMIN — PANTOPRAZOLE SODIUM 40 MG: 40 TABLET, DELAYED RELEASE ORAL at 05:54

## 2019-01-01 RX ADMIN — Medication 1 TABLET: at 09:15

## 2019-01-01 RX ADMIN — Medication 2 PUFF: at 11:15

## 2019-01-01 RX ADMIN — Medication 2 PUFF: at 08:44

## 2019-01-01 RX ADMIN — OXYCODONE HYDROCHLORIDE AND ACETAMINOPHEN 1000 MG: 500 TABLET ORAL at 11:15

## 2019-01-01 RX ADMIN — SERTRALINE HYDROCHLORIDE 100 MG: 100 TABLET ORAL at 21:40

## 2019-01-01 RX ADMIN — VANCOMYCIN HYDROCHLORIDE 2000 MG: 1 INJECTION, POWDER, LYOPHILIZED, FOR SOLUTION INTRAVENOUS at 16:55

## 2019-01-01 RX ADMIN — SODIUM CHLORIDE: 9 INJECTION, SOLUTION INTRAVENOUS at 07:45

## 2019-01-01 RX ADMIN — TRAMADOL HYDROCHLORIDE 100 MG: 50 TABLET, COATED ORAL at 03:00

## 2019-01-01 RX ADMIN — OXYCODONE HYDROCHLORIDE AND ACETAMINOPHEN 1000 MG: 500 TABLET ORAL at 08:24

## 2019-01-01 RX ADMIN — IPRATROPIUM BROMIDE AND ALBUTEROL SULFATE 1 AMPULE: .5; 3 SOLUTION RESPIRATORY (INHALATION) at 11:22

## 2019-01-01 RX ADMIN — AMPICILLIN SODIUM AND SULBACTAM SODIUM 3 G: 2; 1 INJECTION, POWDER, FOR SOLUTION INTRAMUSCULAR; INTRAVENOUS at 14:05

## 2019-01-01 RX ADMIN — Medication 1 TABLET: at 09:58

## 2019-01-01 RX ADMIN — Medication 2 PUFF: at 20:46

## 2019-01-01 RX ADMIN — SODIUM CHLORIDE, PRESERVATIVE FREE 10 ML: 5 INJECTION INTRAVENOUS at 20:58

## 2019-01-01 RX ADMIN — SODIUM CHLORIDE, PRESERVATIVE FREE 10 ML: 5 INJECTION INTRAVENOUS at 08:37

## 2019-01-01 RX ADMIN — BUPROPION HYDROCHLORIDE 300 MG: 150 TABLET, EXTENDED RELEASE ORAL at 08:30

## 2019-01-01 RX ADMIN — PANTOPRAZOLE SODIUM 40 MG: 40 TABLET, DELAYED RELEASE ORAL at 06:00

## 2019-01-01 RX ADMIN — MEROPENEM 2 G: 1 INJECTION, POWDER, FOR SOLUTION INTRAVENOUS at 22:45

## 2019-01-01 RX ADMIN — TRAMADOL HYDROCHLORIDE 100 MG: 50 TABLET, COATED ORAL at 19:20

## 2019-01-01 RX ADMIN — BUPROPION HYDROCHLORIDE 300 MG: 150 TABLET, EXTENDED RELEASE ORAL at 09:14

## 2019-01-01 RX ADMIN — Medication 2 PUFF: at 07:26

## 2019-01-01 RX ADMIN — ACETAMINOPHEN 500 MG: 500 TABLET ORAL at 06:23

## 2019-01-01 RX ADMIN — ENOXAPARIN SODIUM 40 MG: 40 INJECTION SUBCUTANEOUS at 10:46

## 2019-01-01 RX ADMIN — NITROGLYCERIN 5 MCG/MIN: 20 INJECTION INTRAVENOUS at 06:27

## 2019-01-01 RX ADMIN — PANTOPRAZOLE SODIUM 40 MG: 40 TABLET, DELAYED RELEASE ORAL at 06:35

## 2019-01-01 RX ADMIN — SERTRALINE HYDROCHLORIDE 100 MG: 100 TABLET ORAL at 21:38

## 2019-01-01 RX ADMIN — CEFEPIME 2 G: 2 INJECTION, POWDER, FOR SOLUTION INTRAVENOUS at 04:49

## 2019-01-01 RX ADMIN — PROMETHAZINE HYDROCHLORIDE 12.5 MG: 25 INJECTION INTRAMUSCULAR; INTRAVENOUS at 17:27

## 2019-01-01 RX ADMIN — INSULIN LISPRO 2 UNITS: 100 INJECTION, SOLUTION INTRAVENOUS; SUBCUTANEOUS at 08:09

## 2019-01-01 RX ADMIN — POTASSIUM CHLORIDE 40 MEQ: 20 TABLET, EXTENDED RELEASE ORAL at 08:00

## 2019-01-01 RX ADMIN — TROLAMINE SALICYLATE: 10 CREAM TOPICAL at 03:00

## 2019-01-01 RX ADMIN — CEFEPIME HYDROCHLORIDE 2 G: 2 INJECTION, POWDER, FOR SOLUTION INTRAVENOUS at 10:34

## 2019-01-01 RX ADMIN — CALCIUM CARBONATE 500 MG: 500 TABLET, CHEWABLE ORAL at 22:16

## 2019-01-01 RX ADMIN — SIMVASTATIN 40 MG: 40 TABLET, FILM COATED ORAL at 21:40

## 2019-01-01 RX ADMIN — TRAMADOL HYDROCHLORIDE 50 MG: 50 TABLET, FILM COATED ORAL at 23:37

## 2019-01-01 RX ADMIN — LIDOCAINE HYDROCHLORIDE 5 ML: 10 INJECTION, SOLUTION EPIDURAL; INFILTRATION; INTRACAUDAL; PERINEURAL at 10:45

## 2019-01-01 RX ADMIN — SODIUM CHLORIDE 1000 ML: 9 INJECTION, SOLUTION INTRAVENOUS at 22:30

## 2019-01-01 RX ADMIN — CEFEPIME 2 G: 2 INJECTION, POWDER, FOR SOLUTION INTRAVENOUS at 04:57

## 2019-01-01 RX ADMIN — POTASSIUM CHLORIDE 40 MEQ: 20 TABLET, EXTENDED RELEASE ORAL at 09:26

## 2019-01-01 RX ADMIN — IPRATROPIUM BROMIDE AND ALBUTEROL SULFATE 1 AMPULE: .5; 3 SOLUTION RESPIRATORY (INHALATION) at 11:21

## 2019-01-01 RX ADMIN — MEROPENEM 2 G: 1 INJECTION, POWDER, FOR SOLUTION INTRAVENOUS at 21:26

## 2019-01-01 RX ADMIN — ROPINIROLE HYDROCHLORIDE 1 MG: 1 TABLET, FILM COATED ORAL at 21:42

## 2019-01-01 RX ADMIN — TRAMADOL HYDROCHLORIDE 100 MG: 50 TABLET, COATED ORAL at 12:03

## 2019-01-01 RX ADMIN — Medication 10 ML: at 21:26

## 2019-01-01 RX ADMIN — ENOXAPARIN SODIUM 40 MG: 40 INJECTION SUBCUTANEOUS at 09:55

## 2019-01-01 RX ADMIN — PROPOFOL 150 MG: 10 INJECTION, EMULSION INTRAVENOUS at 13:00

## 2019-01-01 RX ADMIN — OXYCODONE HYDROCHLORIDE AND ACETAMINOPHEN 1000 MG: 500 TABLET ORAL at 10:19

## 2019-01-01 RX ADMIN — MULTIPLE VITAMINS W/ MINERALS TAB 1 TABLET: TAB at 08:54

## 2019-01-01 RX ADMIN — MIDODRINE HYDROCHLORIDE 5 MG: 5 TABLET ORAL at 18:01

## 2019-01-01 RX ADMIN — ASPIRIN 81 MG: 81 TABLET, COATED ORAL at 16:17

## 2019-01-01 RX ADMIN — MEROPENEM 2 G: 1 INJECTION, POWDER, FOR SOLUTION INTRAVENOUS at 11:50

## 2019-01-01 RX ADMIN — MAGNESIUM OXIDE TAB 400 MG (241.3 MG ELEMENTAL MG) 400 MG: 400 (241.3 MG) TAB at 01:28

## 2019-01-01 RX ADMIN — MEROPENEM 2 G: 1 INJECTION, POWDER, FOR SOLUTION INTRAVENOUS at 22:44

## 2019-01-01 RX ADMIN — ENOXAPARIN SODIUM 40 MG: 40 INJECTION SUBCUTANEOUS at 09:27

## 2019-01-01 RX ADMIN — CEFAZOLIN SODIUM 1 G: 1 INJECTION, SOLUTION INTRAVENOUS at 20:29

## 2019-01-01 RX ADMIN — SERTRALINE HYDROCHLORIDE 100 MG: 100 TABLET ORAL at 09:33

## 2019-01-01 RX ADMIN — IPRATROPIUM BROMIDE AND ALBUTEROL SULFATE 1 AMPULE: .5; 3 SOLUTION RESPIRATORY (INHALATION) at 07:37

## 2019-01-01 RX ADMIN — INSULIN LISPRO 2 UNITS: 100 INJECTION, SOLUTION INTRAVENOUS; SUBCUTANEOUS at 08:55

## 2019-01-01 RX ADMIN — CALCIUM CARBONATE 500 MG: 500 TABLET, CHEWABLE ORAL at 08:36

## 2019-01-01 RX ADMIN — BUPROPION HYDROCHLORIDE 300 MG: 150 TABLET, EXTENDED RELEASE ORAL at 09:36

## 2019-01-01 RX ADMIN — Medication 1 TABLET: at 10:35

## 2019-01-01 RX ADMIN — PANTOPRAZOLE SODIUM 40 MG: 40 TABLET, DELAYED RELEASE ORAL at 06:23

## 2019-01-01 RX ADMIN — PANTOPRAZOLE SODIUM 40 MG: 40 TABLET, DELAYED RELEASE ORAL at 06:17

## 2019-01-01 RX ADMIN — ALTEPLASE 1 MG: 2.2 INJECTION, POWDER, LYOPHILIZED, FOR SOLUTION INTRAVENOUS at 11:13

## 2019-01-01 RX ADMIN — OXYCODONE HYDROCHLORIDE AND ACETAMINOPHEN 1000 MG: 500 TABLET ORAL at 12:58

## 2019-01-01 RX ADMIN — MEROPENEM 2 G: 1 INJECTION, POWDER, FOR SOLUTION INTRAVENOUS at 04:21

## 2019-01-01 RX ADMIN — Medication 1 CAPSULE: at 09:02

## 2019-01-01 RX ADMIN — MEMANTINE HYDROCHLORIDE 5 MG: 5 TABLET ORAL at 21:38

## 2019-01-01 RX ADMIN — COLLAGENASE SANTYL: 250 OINTMENT TOPICAL at 17:00

## 2019-01-01 RX ADMIN — TRAMADOL HYDROCHLORIDE 100 MG: 50 TABLET, COATED ORAL at 20:51

## 2019-01-01 RX ADMIN — IPRATROPIUM BROMIDE AND ALBUTEROL SULFATE 1 AMPULE: .5; 3 SOLUTION RESPIRATORY (INHALATION) at 08:18

## 2019-01-01 RX ADMIN — SODIUM CHLORIDE, PRESERVATIVE FREE 10 ML: 5 INJECTION INTRAVENOUS at 16:56

## 2019-01-01 RX ADMIN — Medication 10 ML: at 09:46

## 2019-01-01 RX ADMIN — ENOXAPARIN SODIUM 40 MG: 40 INJECTION SUBCUTANEOUS at 08:11

## 2019-01-01 RX ADMIN — INSULIN LISPRO 1 UNITS: 100 INJECTION, SOLUTION INTRAVENOUS; SUBCUTANEOUS at 21:03

## 2019-01-01 RX ADMIN — Medication 2 PUFF: at 08:12

## 2019-01-01 RX ADMIN — ONDANSETRON 4 MG: 2 INJECTION INTRAMUSCULAR; INTRAVENOUS at 20:45

## 2019-01-01 RX ADMIN — BUPROPION HYDROCHLORIDE 300 MG: 150 TABLET, EXTENDED RELEASE ORAL at 12:03

## 2019-01-01 RX ADMIN — IPRATROPIUM BROMIDE AND ALBUTEROL SULFATE 1 AMPULE: .5; 3 SOLUTION RESPIRATORY (INHALATION) at 08:15

## 2019-01-01 RX ADMIN — Medication 2 MCG/MIN: at 20:26

## 2019-01-01 RX ADMIN — CEFAZOLIN SODIUM 1 G: 1 INJECTION, SOLUTION INTRAVENOUS at 04:44

## 2019-01-01 RX ADMIN — MORPHINE SULFATE 2 MG: 2 INJECTION, SOLUTION INTRAMUSCULAR; INTRAVENOUS at 02:40

## 2019-01-01 RX ADMIN — SERTRALINE HYDROCHLORIDE 100 MG: 100 TABLET ORAL at 10:32

## 2019-01-01 RX ADMIN — MEROPENEM 2 G: 1 INJECTION, POWDER, FOR SOLUTION INTRAVENOUS at 07:09

## 2019-01-01 RX ADMIN — PANTOPRAZOLE SODIUM 40 MG: 40 TABLET, DELAYED RELEASE ORAL at 06:11

## 2019-01-01 RX ADMIN — SODIUM CHLORIDE, PRESERVATIVE FREE 10 ML: 5 INJECTION INTRAVENOUS at 10:09

## 2019-01-01 RX ADMIN — AMPICILLIN SODIUM AND SULBACTAM SODIUM 3 G: 2; 1 INJECTION, POWDER, FOR SOLUTION INTRAMUSCULAR; INTRAVENOUS at 03:16

## 2019-01-01 RX ADMIN — Medication 10 ML: at 22:03

## 2019-01-01 RX ADMIN — VASOPRESSIN 2 UNITS: 20 INJECTION INTRAVENOUS at 13:17

## 2019-01-01 RX ADMIN — MEROPENEM 2 G: 1 INJECTION, POWDER, FOR SOLUTION INTRAVENOUS at 02:14

## 2019-01-01 RX ADMIN — INFLUENZA A VIRUS A/BRISBANE/02/2018 IVR-190 (H1N1) ANTIGEN (PROPIOLACTONE INACTIVATED), INFLUENZA A VIRUS A/KANSAS/14/2017 X-327 (H3N2) ANTIGEN (PROPIOLACTONE INACTIVATED), INFLUENZA B VIRUS B/MARYLAND/15/2016 ANTIGEN (PROPIOLACTONE INACTIVATED), INFLUENZA B VIRUS B/PHUKET/3073/2013 BVR-1B ANTIGEN (PROPIOLACTONE INACTIVATED) 0.5 ML: 15; 15; 15; 15 INJECTION, SUSPENSION INTRAMUSCULAR at 09:07

## 2019-01-01 RX ADMIN — TRAMADOL HYDROCHLORIDE 100 MG: 50 TABLET, COATED ORAL at 20:41

## 2019-01-01 RX ADMIN — ASPIRIN 81 MG: 81 TABLET, COATED ORAL at 10:02

## 2019-01-01 RX ADMIN — BUPROPION HYDROCHLORIDE 300 MG: 150 TABLET, EXTENDED RELEASE ORAL at 11:13

## 2019-01-01 RX ADMIN — INSULIN LISPRO 1 UNITS: 100 INJECTION, SOLUTION INTRAVENOUS; SUBCUTANEOUS at 21:19

## 2019-01-01 RX ADMIN — ASPIRIN 81 MG: 81 TABLET, COATED ORAL at 08:40

## 2019-01-01 RX ADMIN — INSULIN GLARGINE 15 UNITS: 100 INJECTION, SOLUTION SUBCUTANEOUS at 21:24

## 2019-01-01 RX ADMIN — TROLAMINE SALICYLATE: 10 CREAM TOPICAL at 08:40

## 2019-01-01 RX ADMIN — HALOPERIDOL LACTATE 1 MG: 5 INJECTION, SOLUTION INTRAMUSCULAR at 21:58

## 2019-01-01 RX ADMIN — RISPERIDONE 1 MG: 0.5 TABLET, FILM COATED ORAL at 20:05

## 2019-01-01 RX ADMIN — AMPICILLIN SODIUM AND SULBACTAM SODIUM 3 G: 2; 1 INJECTION, POWDER, FOR SOLUTION INTRAMUSCULAR; INTRAVENOUS at 04:16

## 2019-01-01 RX ADMIN — PANTOPRAZOLE SODIUM 40 MG: 40 TABLET, DELAYED RELEASE ORAL at 05:52

## 2019-01-01 RX ADMIN — MONTELUKAST 10 MG: 10 TABLET, FILM COATED ORAL at 20:57

## 2019-01-01 RX ADMIN — ENOXAPARIN SODIUM 40 MG: 40 INJECTION SUBCUTANEOUS at 09:11

## 2019-01-01 RX ADMIN — SERTRALINE HYDROCHLORIDE 100 MG: 100 TABLET ORAL at 16:14

## 2019-01-01 RX ADMIN — CALCIUM CARBONATE 500 MG: 500 TABLET, CHEWABLE ORAL at 21:19

## 2019-01-01 RX ADMIN — BACLOFEN 10 MG: 10 TABLET ORAL at 10:02

## 2019-01-01 RX ADMIN — SODIUM CHLORIDE: 9 INJECTION, SOLUTION INTRAVENOUS at 13:03

## 2019-01-01 RX ADMIN — TRAMADOL HYDROCHLORIDE 50 MG: 50 TABLET, COATED ORAL at 10:26

## 2019-01-01 RX ADMIN — CEFEPIME HYDROCHLORIDE 2 G: 2 INJECTION, POWDER, FOR SOLUTION INTRAVENOUS at 01:00

## 2019-01-01 RX ADMIN — Medication 10 ML: at 09:38

## 2019-01-01 RX ADMIN — SODIUM CHLORIDE, PRESERVATIVE FREE 10 ML: 5 INJECTION INTRAVENOUS at 20:59

## 2019-01-01 RX ADMIN — CEFEPIME HYDROCHLORIDE 2 G: 2 INJECTION, POWDER, FOR SOLUTION INTRAVENOUS at 17:10

## 2019-01-01 RX ADMIN — POTASSIUM CHLORIDE 20 MEQ: 29.8 INJECTION, SOLUTION INTRAVENOUS at 10:39

## 2019-01-01 RX ADMIN — ASPIRIN 81 MG: 81 TABLET, COATED ORAL at 09:02

## 2019-01-01 RX ADMIN — CEFEPIME 2 G: 2 INJECTION, POWDER, FOR SOLUTION INTRAVENOUS at 13:59

## 2019-01-01 RX ADMIN — INSULIN LISPRO 2 UNITS: 100 INJECTION, SOLUTION INTRAVENOUS; SUBCUTANEOUS at 10:19

## 2019-01-01 RX ADMIN — IPRATROPIUM BROMIDE AND ALBUTEROL SULFATE 1 AMPULE: .5; 3 SOLUTION RESPIRATORY (INHALATION) at 15:38

## 2019-01-01 RX ADMIN — AMPICILLIN SODIUM AND SULBACTAM SODIUM 3 G: 2; 1 INJECTION, POWDER, FOR SOLUTION INTRAMUSCULAR; INTRAVENOUS at 10:25

## 2019-01-01 RX ADMIN — CALCIUM CARBONATE 500 MG: 500 TABLET, CHEWABLE ORAL at 10:01

## 2019-01-01 RX ADMIN — MAGNESIUM OXIDE TAB 400 MG (241.3 MG ELEMENTAL MG) 400 MG: 400 (241.3 MG) TAB at 09:03

## 2019-01-01 RX ADMIN — ENOXAPARIN SODIUM 40 MG: 40 INJECTION SUBCUTANEOUS at 08:42

## 2019-01-01 RX ADMIN — SPIRONOLACTONE 25 MG: 25 TABLET ORAL at 08:27

## 2019-01-01 RX ADMIN — COLLAGENASE SANTYL: 250 OINTMENT TOPICAL at 09:21

## 2019-01-01 RX ADMIN — CALCIUM CARBONATE 500 MG: 500 TABLET, CHEWABLE ORAL at 12:58

## 2019-01-01 RX ADMIN — INSULIN GLARGINE 74 UNITS: 100 INJECTION, SOLUTION SUBCUTANEOUS at 21:19

## 2019-01-01 RX ADMIN — Medication 400 UNITS: at 10:29

## 2019-01-01 RX ADMIN — MAGNESIUM SULFATE HEPTAHYDRATE 2 G: 40 INJECTION, SOLUTION INTRAVENOUS at 11:14

## 2019-01-01 RX ADMIN — VASOPRESSIN 1 UNITS: 20 INJECTION INTRAVENOUS at 13:26

## 2019-01-01 RX ADMIN — IPRATROPIUM BROMIDE AND ALBUTEROL SULFATE 1 AMPULE: .5; 3 SOLUTION RESPIRATORY (INHALATION) at 11:19

## 2019-01-01 RX ADMIN — Medication 10 ML: at 08:37

## 2019-01-01 RX ADMIN — IPRATROPIUM BROMIDE AND ALBUTEROL SULFATE 1 AMPULE: .5; 3 SOLUTION RESPIRATORY (INHALATION) at 20:02

## 2019-01-01 RX ADMIN — SERTRALINE HYDROCHLORIDE 100 MG: 100 TABLET ORAL at 21:17

## 2019-01-01 RX ADMIN — SODIUM CHLORIDE, POTASSIUM CHLORIDE, SODIUM LACTATE AND CALCIUM CHLORIDE: 600; 310; 30; 20 INJECTION, SOLUTION INTRAVENOUS at 14:25

## 2019-01-01 RX ADMIN — MEROPENEM 2 G: 1 INJECTION, POWDER, FOR SOLUTION INTRAVENOUS at 21:12

## 2019-01-01 RX ADMIN — INSULIN GLARGINE 15 UNITS: 100 INJECTION, SOLUTION SUBCUTANEOUS at 22:11

## 2019-01-01 RX ADMIN — DOPAMINE HYDROCHLORIDE 2.5 MCG/KG/MIN: 160 INJECTION, SOLUTION INTRAVENOUS at 14:46

## 2019-01-01 RX ADMIN — Medication 400 UNITS: at 10:06

## 2019-01-01 RX ADMIN — SERTRALINE HYDROCHLORIDE 100 MG: 100 TABLET ORAL at 21:21

## 2019-01-01 RX ADMIN — PANTOPRAZOLE SODIUM 40 MG: 40 TABLET, DELAYED RELEASE ORAL at 07:54

## 2019-01-01 RX ADMIN — Medication 10 ML: at 10:01

## 2019-01-01 RX ADMIN — PANTOPRAZOLE SODIUM 40 MG: 40 TABLET, DELAYED RELEASE ORAL at 06:22

## 2019-01-01 RX ADMIN — ACETAMINOPHEN 500 MG: 500 TABLET ORAL at 10:01

## 2019-01-01 RX ADMIN — MEMANTINE 5 MG: 10 TABLET ORAL at 21:00

## 2019-01-01 RX ADMIN — CEFEPIME HYDROCHLORIDE 2 G: 2 INJECTION, POWDER, FOR SOLUTION INTRAVENOUS at 09:38

## 2019-01-01 RX ADMIN — INSULIN LISPRO 4 UNITS: 100 INJECTION, SOLUTION INTRAVENOUS; SUBCUTANEOUS at 13:52

## 2019-01-01 RX ADMIN — OXYCODONE HYDROCHLORIDE AND ACETAMINOPHEN 1000 MG: 500 TABLET ORAL at 09:06

## 2019-01-01 RX ADMIN — CEFEPIME HYDROCHLORIDE 2 G: 2 INJECTION, POWDER, FOR SOLUTION INTRAVENOUS at 08:44

## 2019-01-01 RX ADMIN — ROPINIROLE HYDROCHLORIDE 1 MG: 1 TABLET, FILM COATED ORAL at 20:59

## 2019-01-01 RX ADMIN — BUPROPION HYDROCHLORIDE 300 MG: 150 TABLET, EXTENDED RELEASE ORAL at 10:20

## 2019-01-01 RX ADMIN — ROPINIROLE HYDROCHLORIDE 1 MG: 1 TABLET, FILM COATED ORAL at 22:16

## 2019-01-01 RX ADMIN — CALCIUM CARBONATE 500 MG: 500 TABLET, CHEWABLE ORAL at 12:50

## 2019-01-01 RX ADMIN — DOXYCYCLINE HYCLATE 100 MG: 100 TABLET, COATED ORAL at 10:25

## 2019-01-01 RX ADMIN — Medication 1 TABLET: at 08:43

## 2019-01-01 RX ADMIN — SERTRALINE HYDROCHLORIDE 100 MG: 100 TABLET ORAL at 21:26

## 2019-01-01 RX ADMIN — CEFEPIME HYDROCHLORIDE 2 G: 2 INJECTION, POWDER, FOR SOLUTION INTRAVENOUS at 09:03

## 2019-01-01 RX ADMIN — MORPHINE SULFATE 2 MG: 4 INJECTION, SOLUTION INTRAMUSCULAR; INTRAVENOUS at 07:04

## 2019-01-01 RX ADMIN — SIMVASTATIN 40 MG: 40 TABLET, FILM COATED ORAL at 22:43

## 2019-01-01 RX ADMIN — Medication 1 TABLET: at 09:17

## 2019-01-01 RX ADMIN — MEROPENEM 2 G: 1 INJECTION, POWDER, FOR SOLUTION INTRAVENOUS at 05:55

## 2019-01-01 RX ADMIN — DOXYCYCLINE HYCLATE 100 MG: 100 TABLET, COATED ORAL at 22:08

## 2019-01-01 RX ADMIN — BACLOFEN 10 MG: 10 TABLET ORAL at 21:19

## 2019-01-01 RX ADMIN — Medication 10 ML: at 22:09

## 2019-01-01 RX ADMIN — ACETAMINOPHEN 500 MG: 500 TABLET ORAL at 20:59

## 2019-01-01 RX ADMIN — Medication 2 PUFF: at 20:17

## 2019-01-01 RX ADMIN — MEROPENEM 2 G: 1 INJECTION, POWDER, FOR SOLUTION INTRAVENOUS at 22:41

## 2019-01-01 RX ADMIN — PHENYLEPHRINE HYDROCHLORIDE 100 MCG: 10 INJECTION INTRAVENOUS at 11:02

## 2019-01-01 RX ADMIN — Medication 2 PUFF: at 07:50

## 2019-01-01 RX ADMIN — Medication 400 UNITS: at 10:21

## 2019-01-01 RX ADMIN — MONTELUKAST 10 MG: 10 TABLET, FILM COATED ORAL at 20:36

## 2019-01-01 RX ADMIN — ONDANSETRON 4 MG: 2 INJECTION INTRAMUSCULAR; INTRAVENOUS at 16:17

## 2019-01-01 RX ADMIN — MEROPENEM 2 G: 1 INJECTION, POWDER, FOR SOLUTION INTRAVENOUS at 02:40

## 2019-01-01 RX ADMIN — MONTELUKAST 10 MG: 10 TABLET, FILM COATED ORAL at 21:30

## 2019-01-01 RX ADMIN — ROCURONIUM BROMIDE 40 MG: 10 INJECTION INTRAVENOUS at 08:05

## 2019-01-01 RX ADMIN — COLLAGENASE SANTYL: 250 OINTMENT TOPICAL at 16:17

## 2019-01-01 RX ADMIN — ACETAMINOPHEN 500 MG: 500 TABLET ORAL at 13:05

## 2019-01-01 RX ADMIN — INSULIN GLARGINE 75 UNITS: 100 INJECTION, SOLUTION SUBCUTANEOUS at 20:48

## 2019-01-01 RX ADMIN — VANCOMYCIN HYDROCHLORIDE 2000 MG: 1 INJECTION, POWDER, LYOPHILIZED, FOR SOLUTION INTRAVENOUS at 22:40

## 2019-01-01 RX ADMIN — BACLOFEN 10 MG: 10 TABLET ORAL at 21:47

## 2019-01-01 RX ADMIN — Medication 1 TABLET: at 10:01

## 2019-01-01 RX ADMIN — SODIUM CHLORIDE: 9 INJECTION, SOLUTION INTRAVENOUS at 21:47

## 2019-01-01 RX ADMIN — ENOXAPARIN SODIUM 40 MG: 40 INJECTION SUBCUTANEOUS at 09:19

## 2019-01-01 RX ADMIN — SIMVASTATIN 40 MG: 40 TABLET, FILM COATED ORAL at 21:17

## 2019-01-01 RX ADMIN — VASOPRESSIN 1 UNITS: 20 INJECTION INTRAVENOUS at 13:10

## 2019-01-01 RX ADMIN — COLLAGENASE SANTYL: 250 OINTMENT TOPICAL at 10:09

## 2019-01-01 RX ADMIN — PROMETHAZINE HYDROCHLORIDE 12.5 MG: 25 INJECTION INTRAMUSCULAR; INTRAVENOUS at 02:50

## 2019-01-01 RX ADMIN — Medication 2 PUFF: at 08:41

## 2019-01-01 RX ADMIN — SERTRALINE HYDROCHLORIDE 100 MG: 100 TABLET ORAL at 20:46

## 2019-01-01 RX ADMIN — Medication 1 TABLET: at 16:20

## 2019-01-01 RX ADMIN — Medication 1 TABLET: at 11:09

## 2019-01-01 RX ADMIN — DOXYCYCLINE HYCLATE 100 MG: 100 TABLET, COATED ORAL at 09:27

## 2019-01-01 RX ADMIN — PANTOPRAZOLE SODIUM 40 MG: 40 TABLET, DELAYED RELEASE ORAL at 08:41

## 2019-01-01 RX ADMIN — OXYCODONE HYDROCHLORIDE AND ACETAMINOPHEN 1000 MG: 500 TABLET ORAL at 08:36

## 2019-01-01 RX ADMIN — ROPINIROLE HYDROCHLORIDE 1 MG: 1 TABLET, FILM COATED ORAL at 22:23

## 2019-01-01 RX ADMIN — DOPAMINE HYDROCHLORIDE 2.5 MCG/KG/MIN: 160 INJECTION, SOLUTION INTRAVENOUS at 10:18

## 2019-01-01 RX ADMIN — INSULIN LISPRO 1 UNITS: 100 INJECTION, SOLUTION INTRAVENOUS; SUBCUTANEOUS at 21:12

## 2019-01-01 RX ADMIN — MEMANTINE HYDROCHLORIDE 5 MG: 5 TABLET ORAL at 21:30

## 2019-01-01 RX ADMIN — MEROPENEM 2 G: 1 INJECTION, POWDER, FOR SOLUTION INTRAVENOUS at 10:51

## 2019-01-01 RX ADMIN — CEFEPIME HYDROCHLORIDE 2 G: 2 INJECTION, POWDER, FOR SOLUTION INTRAVENOUS at 00:30

## 2019-01-01 RX ADMIN — SODIUM CHLORIDE: 9 INJECTION, SOLUTION INTRAVENOUS at 22:03

## 2019-01-01 RX ADMIN — SERTRALINE HYDROCHLORIDE 100 MG: 100 TABLET ORAL at 20:37

## 2019-01-01 RX ADMIN — ONDANSETRON 4 MG: 2 INJECTION INTRAMUSCULAR; INTRAVENOUS at 13:35

## 2019-01-01 RX ADMIN — MEMANTINE HYDROCHLORIDE 5 MG: 5 TABLET ORAL at 20:36

## 2019-01-01 RX ADMIN — INSULIN LISPRO 2 UNITS: 100 INJECTION, SOLUTION INTRAVENOUS; SUBCUTANEOUS at 17:48

## 2019-01-01 RX ADMIN — SIMVASTATIN 40 MG: 40 TABLET, FILM COATED ORAL at 21:31

## 2019-01-01 RX ADMIN — TROLAMINE SALICYLATE: 10 CREAM TOPICAL at 19:22

## 2019-01-01 RX ADMIN — CEFEPIME HYDROCHLORIDE 2 G: 2 INJECTION, POWDER, FOR SOLUTION INTRAVENOUS at 08:31

## 2019-01-01 RX ADMIN — MEROPENEM 2 G: 1 INJECTION, POWDER, FOR SOLUTION INTRAVENOUS at 03:43

## 2019-01-01 RX ADMIN — PANTOPRAZOLE SODIUM 40 MG: 40 TABLET, DELAYED RELEASE ORAL at 06:20

## 2019-01-01 RX ADMIN — ENOXAPARIN SODIUM 40 MG: 40 INJECTION SUBCUTANEOUS at 08:54

## 2019-01-01 RX ADMIN — AMPICILLIN SODIUM AND SULBACTAM SODIUM 3 G: 2; 1 INJECTION, POWDER, FOR SOLUTION INTRAMUSCULAR; INTRAVENOUS at 10:57

## 2019-01-01 RX ADMIN — MEMANTINE 5 MG: 10 TABLET ORAL at 20:18

## 2019-01-01 RX ADMIN — CEFEPIME HYDROCHLORIDE 2 G: 2 INJECTION, POWDER, FOR SOLUTION INTRAVENOUS at 17:41

## 2019-01-01 RX ADMIN — IPRATROPIUM BROMIDE AND ALBUTEROL SULFATE 1 AMPULE: .5; 3 SOLUTION RESPIRATORY (INHALATION) at 15:41

## 2019-01-01 RX ADMIN — SERTRALINE HYDROCHLORIDE 100 MG: 100 TABLET ORAL at 22:08

## 2019-01-01 RX ADMIN — IPRATROPIUM BROMIDE AND ALBUTEROL SULFATE 1 AMPULE: .5; 3 SOLUTION RESPIRATORY (INHALATION) at 07:39

## 2019-01-01 RX ADMIN — CALCIUM CARBONATE 500 MG: 500 TABLET, CHEWABLE ORAL at 10:05

## 2019-01-01 RX ADMIN — PANTOPRAZOLE SODIUM 40 MG: 40 TABLET, DELAYED RELEASE ORAL at 07:06

## 2019-01-01 RX ADMIN — SODIUM CHLORIDE: 9 INJECTION, SOLUTION INTRAVENOUS at 10:18

## 2019-01-01 RX ADMIN — Medication 29 MCG/MIN: at 21:25

## 2019-01-01 RX ADMIN — SIMVASTATIN 40 MG: 40 TABLET, FILM COATED ORAL at 20:57

## 2019-01-01 RX ADMIN — MONTELUKAST 10 MG: 10 TABLET, FILM COATED ORAL at 20:43

## 2019-01-01 RX ADMIN — Medication 400 UNITS: at 09:59

## 2019-01-01 RX ADMIN — CIPROFLOXACIN HYDROCHLORIDE 500 MG: 500 TABLET, FILM COATED ORAL at 20:41

## 2019-01-01 RX ADMIN — TRAMADOL HYDROCHLORIDE 100 MG: 50 TABLET, COATED ORAL at 22:28

## 2019-01-01 RX ADMIN — SODIUM CHLORIDE 1000 ML: 9 INJECTION, SOLUTION INTRAVENOUS at 12:28

## 2019-01-01 RX ADMIN — MEROPENEM 2 G: 1 INJECTION, POWDER, FOR SOLUTION INTRAVENOUS at 17:46

## 2019-01-01 RX ADMIN — ROPINIROLE HYDROCHLORIDE 1 MG: 1 TABLET, FILM COATED ORAL at 22:09

## 2019-01-01 RX ADMIN — TRAMADOL HYDROCHLORIDE 100 MG: 50 TABLET, COATED ORAL at 19:02

## 2019-01-01 RX ADMIN — INSULIN LISPRO 2 UNITS: 100 INJECTION, SOLUTION INTRAVENOUS; SUBCUTANEOUS at 16:10

## 2019-01-01 RX ADMIN — MORPHINE SULFATE 2 MG: 2 INJECTION, SOLUTION INTRAMUSCULAR; INTRAVENOUS at 05:47

## 2019-01-01 RX ADMIN — AMPICILLIN SODIUM AND SULBACTAM SODIUM 3 G: 2; 1 INJECTION, POWDER, FOR SOLUTION INTRAMUSCULAR; INTRAVENOUS at 23:07

## 2019-01-01 RX ADMIN — TRAMADOL HYDROCHLORIDE 50 MG: 50 TABLET, FILM COATED ORAL at 03:47

## 2019-01-01 RX ADMIN — OXYCODONE HYDROCHLORIDE AND ACETAMINOPHEN 1000 MG: 500 TABLET ORAL at 09:41

## 2019-01-01 RX ADMIN — Medication 400 UNITS: at 09:02

## 2019-01-01 RX ADMIN — CEFEPIME HYDROCHLORIDE 2 G: 2 INJECTION, POWDER, FOR SOLUTION INTRAVENOUS at 00:43

## 2019-01-01 RX ADMIN — BUPROPION HYDROCHLORIDE 300 MG: 150 TABLET, EXTENDED RELEASE ORAL at 09:52

## 2019-01-01 RX ADMIN — SIMVASTATIN 40 MG: 40 TABLET, FILM COATED ORAL at 21:19

## 2019-01-01 RX ADMIN — OXYCODONE HYDROCHLORIDE AND ACETAMINOPHEN 1000 MG: 500 TABLET ORAL at 08:16

## 2019-01-01 RX ADMIN — CALCIUM CARBONATE 500 MG: 500 TABLET, CHEWABLE ORAL at 18:11

## 2019-01-01 RX ADMIN — PHENYLEPHRINE HYDROCHLORIDE 100 MCG: 10 INJECTION INTRAVENOUS at 12:50

## 2019-01-01 RX ADMIN — PHENYLEPHRINE HYDROCHLORIDE 200 MCG: 10 INJECTION INTRAVENOUS at 15:06

## 2019-01-01 RX ADMIN — POTASSIUM CHLORIDE 40 MEQ: 1500 TABLET, EXTENDED RELEASE ORAL at 10:30

## 2019-01-01 RX ADMIN — BUPROPION HYDROCHLORIDE 300 MG: 150 TABLET, EXTENDED RELEASE ORAL at 10:31

## 2019-01-01 RX ADMIN — IPRATROPIUM BROMIDE AND ALBUTEROL SULFATE 1 AMPULE: .5; 3 SOLUTION RESPIRATORY (INHALATION) at 20:22

## 2019-01-01 RX ADMIN — SODIUM CHLORIDE, PRESERVATIVE FREE 10 ML: 5 INJECTION INTRAVENOUS at 20:38

## 2019-01-01 RX ADMIN — SODIUM CHLORIDE: 9 INJECTION, SOLUTION INTRAVENOUS at 00:53

## 2019-01-01 RX ADMIN — SODIUM CHLORIDE: 9 INJECTION, SOLUTION INTRAVENOUS at 02:47

## 2019-01-01 RX ADMIN — COLLAGENASE SANTYL: 250 OINTMENT TOPICAL at 09:52

## 2019-01-01 RX ADMIN — TRAMADOL HYDROCHLORIDE 100 MG: 50 TABLET, COATED ORAL at 21:31

## 2019-01-01 RX ADMIN — MEROPENEM 2 G: 1 INJECTION, POWDER, FOR SOLUTION INTRAVENOUS at 14:21

## 2019-01-01 RX ADMIN — BUPROPION HYDROCHLORIDE 300 MG: 150 TABLET, EXTENDED RELEASE ORAL at 08:15

## 2019-01-01 RX ADMIN — Medication 10 ML: at 09:22

## 2019-01-01 RX ADMIN — MEROPENEM 2 G: 1 INJECTION, POWDER, FOR SOLUTION INTRAVENOUS at 13:59

## 2019-01-01 RX ADMIN — Medication 1 CAPSULE: at 08:54

## 2019-01-01 RX ADMIN — FLUCONAZOLE 600 MG: 100 TABLET ORAL at 08:24

## 2019-01-01 RX ADMIN — NITROGLYCERIN 0.4 MG: 0.4 TABLET, ORALLY DISINTEGRATING SUBLINGUAL at 04:15

## 2019-01-01 RX ADMIN — SODIUM CHLORIDE, POTASSIUM CHLORIDE, SODIUM LACTATE AND CALCIUM CHLORIDE: 600; 310; 30; 20 INJECTION, SOLUTION INTRAVENOUS at 14:20

## 2019-01-01 RX ADMIN — OXYCODONE HYDROCHLORIDE AND ACETAMINOPHEN 1000 MG: 500 TABLET ORAL at 10:52

## 2019-01-01 RX ADMIN — AMPICILLIN SODIUM AND SULBACTAM SODIUM 3 G: 2; 1 INJECTION, POWDER, FOR SOLUTION INTRAMUSCULAR; INTRAVENOUS at 08:15

## 2019-01-01 RX ADMIN — HALOPERIDOL LACTATE 1 MG: 5 INJECTION, SOLUTION INTRAMUSCULAR at 22:56

## 2019-01-01 RX ADMIN — FUROSEMIDE 80 MG: 10 INJECTION, SOLUTION INTRAMUSCULAR; INTRAVENOUS at 22:06

## 2019-01-01 RX ADMIN — CEFEPIME HYDROCHLORIDE 2 G: 2 INJECTION, POWDER, FOR SOLUTION INTRAVENOUS at 17:39

## 2019-01-01 RX ADMIN — BUPROPION HYDROCHLORIDE 300 MG: 150 TABLET, EXTENDED RELEASE ORAL at 10:47

## 2019-01-01 RX ADMIN — MEROPENEM 2 G: 1 INJECTION, POWDER, FOR SOLUTION INTRAVENOUS at 03:45

## 2019-01-01 RX ADMIN — SODIUM CHLORIDE, PRESERVATIVE FREE 10 ML: 5 INJECTION INTRAVENOUS at 12:59

## 2019-01-01 RX ADMIN — ALBUMIN (HUMAN) 12.5 G: 12.5 INJECTION, SOLUTION INTRAVENOUS at 17:26

## 2019-01-01 RX ADMIN — INSULIN GLARGINE 15 UNITS: 100 INJECTION, SOLUTION SUBCUTANEOUS at 21:20

## 2019-01-01 RX ADMIN — PANTOPRAZOLE SODIUM 40 MG: 40 TABLET, DELAYED RELEASE ORAL at 03:01

## 2019-01-01 RX ADMIN — VANCOMYCIN HYDROCHLORIDE 1500 MG: 5 INJECTION, POWDER, LYOPHILIZED, FOR SOLUTION INTRAVENOUS at 23:13

## 2019-01-01 RX ADMIN — INSULIN GLARGINE 15 UNITS: 100 INJECTION, SOLUTION SUBCUTANEOUS at 22:31

## 2019-01-01 RX ADMIN — Medication 10 ML: at 20:45

## 2019-01-01 RX ADMIN — BUPROPION HYDROCHLORIDE 300 MG: 150 TABLET, EXTENDED RELEASE ORAL at 08:54

## 2019-01-01 RX ADMIN — ENOXAPARIN SODIUM 40 MG: 40 INJECTION SUBCUTANEOUS at 11:14

## 2019-01-01 RX ADMIN — SODIUM CHLORIDE: 9 INJECTION, SOLUTION INTRAVENOUS at 02:15

## 2019-01-01 RX ADMIN — SERTRALINE HYDROCHLORIDE 100 MG: 100 TABLET ORAL at 09:06

## 2019-01-01 RX ADMIN — BUPROPION HYDROCHLORIDE 300 MG: 150 TABLET, EXTENDED RELEASE ORAL at 10:01

## 2019-01-01 RX ADMIN — ENOXAPARIN SODIUM 40 MG: 40 INJECTION SUBCUTANEOUS at 10:31

## 2019-01-01 RX ADMIN — IPRATROPIUM BROMIDE AND ALBUTEROL SULFATE 1 AMPULE: .5; 3 SOLUTION RESPIRATORY (INHALATION) at 07:49

## 2019-01-01 RX ADMIN — SUGAMMADEX 100 MG: 100 INJECTION, SOLUTION INTRAVENOUS at 14:04

## 2019-01-01 RX ADMIN — SERTRALINE HYDROCHLORIDE 100 MG: 100 TABLET ORAL at 21:31

## 2019-01-01 RX ADMIN — SERTRALINE HYDROCHLORIDE 100 MG: 100 TABLET ORAL at 09:37

## 2019-01-01 RX ADMIN — SODIUM CHLORIDE: 9 INJECTION, SOLUTION INTRAVENOUS at 18:26

## 2019-01-01 RX ADMIN — DEXTROSE MONOHYDRATE 100 MG: 50 INJECTION, SOLUTION INTRAVENOUS at 11:18

## 2019-01-01 RX ADMIN — Medication 1 TABLET: at 09:39

## 2019-01-01 RX ADMIN — TRAMADOL HYDROCHLORIDE 100 MG: 50 TABLET, COATED ORAL at 18:30

## 2019-01-01 RX ADMIN — MAGNESIUM SULFATE HEPTAHYDRATE 2 G: 40 INJECTION, SOLUTION INTRAVENOUS at 04:21

## 2019-01-01 RX ADMIN — CEFAZOLIN SODIUM 4 G: 2 SOLUTION INTRAVENOUS at 12:30

## 2019-01-01 RX ADMIN — COLLAGENASE SANTYL: 250 OINTMENT TOPICAL at 08:40

## 2019-01-01 RX ADMIN — MORPHINE SULFATE 2 MG: 4 INJECTION, SOLUTION INTRAMUSCULAR; INTRAVENOUS at 04:56

## 2019-01-01 RX ADMIN — OXYCODONE HYDROCHLORIDE AND ACETAMINOPHEN 2 TABLET: 5; 325 TABLET ORAL at 22:43

## 2019-01-01 RX ADMIN — SIMVASTATIN 40 MG: 40 TABLET, FILM COATED ORAL at 22:46

## 2019-01-01 RX ADMIN — MEMANTINE HYDROCHLORIDE 5 MG: 5 TABLET ORAL at 20:57

## 2019-01-01 RX ADMIN — BUPROPION HYDROCHLORIDE 300 MG: 150 TABLET, EXTENDED RELEASE ORAL at 09:24

## 2019-01-01 RX ADMIN — SUGAMMADEX 200 MG: 100 INJECTION, SOLUTION INTRAVENOUS at 16:21

## 2019-01-01 RX ADMIN — CEFEPIME HYDROCHLORIDE 2 G: 2 INJECTION, POWDER, FOR SOLUTION INTRAVENOUS at 18:44

## 2019-01-01 RX ADMIN — HYDROMORPHONE HYDROCHLORIDE 0.5 MG: 2 INJECTION, SOLUTION INTRAMUSCULAR; INTRAVENOUS; SUBCUTANEOUS at 06:04

## 2019-01-01 RX ADMIN — ONDANSETRON 4 MG: 2 INJECTION INTRAMUSCULAR; INTRAVENOUS at 21:10

## 2019-01-01 RX ADMIN — AMPICILLIN SODIUM AND SULBACTAM SODIUM 3 G: 2; 1 INJECTION, POWDER, FOR SOLUTION INTRAMUSCULAR; INTRAVENOUS at 20:34

## 2019-01-01 RX ADMIN — BUPROPION HYDROCHLORIDE 300 MG: 150 TABLET, FILM COATED, EXTENDED RELEASE ORAL at 10:09

## 2019-01-01 RX ADMIN — PHENYLEPHRINE HYDROCHLORIDE 200 MCG: 10 INJECTION INTRAVENOUS at 15:22

## 2019-01-01 RX ADMIN — SERTRALINE HYDROCHLORIDE 100 MG: 100 TABLET ORAL at 22:47

## 2019-01-01 RX ADMIN — Medication 100 MG: at 09:57

## 2019-01-01 RX ADMIN — Medication 10 ML: at 09:40

## 2019-01-01 RX ADMIN — PHENYLEPHRINE HYDROCHLORIDE 100 MCG: 10 INJECTION INTRAVENOUS at 12:43

## 2019-01-01 RX ADMIN — SODIUM CHLORIDE, PRESERVATIVE FREE 10 ML: 5 INJECTION INTRAVENOUS at 22:24

## 2019-01-01 RX ADMIN — SODIUM CHLORIDE, PRESERVATIVE FREE 10 ML: 5 INJECTION INTRAVENOUS at 20:36

## 2019-01-01 RX ADMIN — CEFEPIME HYDROCHLORIDE 2 G: 2 INJECTION, POWDER, FOR SOLUTION INTRAVENOUS at 00:23

## 2019-01-01 RX ADMIN — Medication 10 ML: at 22:48

## 2019-01-01 RX ADMIN — TRAMADOL HYDROCHLORIDE 100 MG: 50 TABLET, COATED ORAL at 16:15

## 2019-01-01 RX ADMIN — Medication 1 TABLET: at 10:03

## 2019-01-01 RX ADMIN — TRAMADOL HYDROCHLORIDE 100 MG: 50 TABLET, COATED ORAL at 09:07

## 2019-01-01 RX ADMIN — TRAMADOL HYDROCHLORIDE 100 MG: 50 TABLET, COATED ORAL at 14:17

## 2019-01-01 RX ADMIN — COLLAGENASE SANTYL: 250 OINTMENT TOPICAL at 23:02

## 2019-01-01 RX ADMIN — PANTOPRAZOLE SODIUM 40 MG: 40 TABLET, DELAYED RELEASE ORAL at 06:55

## 2019-01-01 RX ADMIN — ACETAMINOPHEN 500 MG: 500 TABLET ORAL at 23:32

## 2019-01-01 RX ADMIN — AMPICILLIN SODIUM AND SULBACTAM SODIUM 3 G: 2; 1 INJECTION, POWDER, FOR SOLUTION INTRAMUSCULAR; INTRAVENOUS at 22:08

## 2019-01-01 RX ADMIN — INSULIN LISPRO 6 UNITS: 100 INJECTION, SOLUTION INTRAVENOUS; SUBCUTANEOUS at 13:59

## 2019-01-01 RX ADMIN — METHYLPREDNISOLONE SODIUM SUCCINATE 40 MG: 40 INJECTION, POWDER, LYOPHILIZED, FOR SOLUTION INTRAMUSCULAR; INTRAVENOUS at 17:34

## 2019-01-01 RX ADMIN — MONTELUKAST 10 MG: 10 TABLET, FILM COATED ORAL at 21:15

## 2019-01-01 RX ADMIN — INSULIN LISPRO 1 UNITS: 100 INJECTION, SOLUTION INTRAVENOUS; SUBCUTANEOUS at 22:31

## 2019-01-01 RX ADMIN — ROPINIROLE HYDROCHLORIDE 1 MG: 1 TABLET, FILM COATED ORAL at 20:51

## 2019-01-01 RX ADMIN — SODIUM CHLORIDE, PRESERVATIVE FREE 10 ML: 5 INJECTION INTRAVENOUS at 08:16

## 2019-01-01 RX ADMIN — AMPICILLIN SODIUM AND SULBACTAM SODIUM 1.5 G: 1; .5 INJECTION, POWDER, FOR SOLUTION INTRAMUSCULAR; INTRAVENOUS at 12:47

## 2019-01-01 RX ADMIN — DEXAMETHASONE SODIUM PHOSPHATE 8 MG: 4 INJECTION, SOLUTION INTRAMUSCULAR; INTRAVENOUS at 08:05

## 2019-01-01 RX ADMIN — SODIUM CHLORIDE 1000 ML: 9 INJECTION, SOLUTION INTRAVENOUS at 17:44

## 2019-01-01 RX ADMIN — OXYCODONE HYDROCHLORIDE AND ACETAMINOPHEN 1000 MG: 500 TABLET ORAL at 08:11

## 2019-01-01 RX ADMIN — IPRATROPIUM BROMIDE AND ALBUTEROL SULFATE 1 AMPULE: .5; 3 SOLUTION RESPIRATORY (INHALATION) at 23:55

## 2019-01-01 RX ADMIN — ACETAMINOPHEN 500 MG: 500 TABLET ORAL at 10:37

## 2019-01-01 RX ADMIN — METRONIDAZOLE 500 MG: 500 INJECTION, SOLUTION INTRAVENOUS at 23:59

## 2019-01-01 RX ADMIN — SODIUM CHLORIDE: 9 INJECTION, SOLUTION INTRAVENOUS at 23:01

## 2019-01-01 RX ADMIN — SODIUM CHLORIDE, PRESERVATIVE FREE 10 ML: 5 INJECTION INTRAVENOUS at 11:25

## 2019-01-01 RX ADMIN — ENOXAPARIN SODIUM 40 MG: 40 INJECTION SUBCUTANEOUS at 09:48

## 2019-01-01 RX ADMIN — Medication 400 UNITS: at 08:40

## 2019-01-01 RX ADMIN — Medication 2 PUFF: at 21:49

## 2019-01-01 RX ADMIN — IPRATROPIUM BROMIDE AND ALBUTEROL SULFATE 1 AMPULE: .5; 3 SOLUTION RESPIRATORY (INHALATION) at 11:32

## 2019-01-01 RX ADMIN — BUPROPION HYDROCHLORIDE 300 MG: 150 TABLET, EXTENDED RELEASE ORAL at 09:38

## 2019-01-01 RX ADMIN — Medication 2 PUFF: at 07:41

## 2019-01-01 RX ADMIN — SPIRONOLACTONE 25 MG: 25 TABLET ORAL at 08:42

## 2019-01-01 RX ADMIN — ONDANSETRON 4 MG: 2 INJECTION INTRAMUSCULAR; INTRAVENOUS at 13:26

## 2019-01-01 RX ADMIN — ACETAMINOPHEN 500 MG: 500 TABLET ORAL at 10:34

## 2019-01-01 RX ADMIN — VANCOMYCIN HYDROCHLORIDE 1500 MG: 5 INJECTION, POWDER, LYOPHILIZED, FOR SOLUTION INTRAVENOUS at 06:00

## 2019-01-01 RX ADMIN — IPRATROPIUM BROMIDE AND ALBUTEROL SULFATE 1 AMPULE: .5; 3 SOLUTION RESPIRATORY (INHALATION) at 21:00

## 2019-01-01 RX ADMIN — SODIUM CHLORIDE, PRESERVATIVE FREE 10 ML: 5 INJECTION INTRAVENOUS at 08:28

## 2019-01-01 RX ADMIN — CEFEPIME HYDROCHLORIDE 2 G: 2 INJECTION, POWDER, FOR SOLUTION INTRAVENOUS at 08:51

## 2019-01-01 RX ADMIN — PROPOFOL 20 MG: 10 INJECTION, EMULSION INTRAVENOUS at 15:02

## 2019-01-01 RX ADMIN — Medication 200 MG: at 12:24

## 2019-01-01 RX ADMIN — SODIUM CHLORIDE, PRESERVATIVE FREE 10 ML: 5 INJECTION INTRAVENOUS at 21:48

## 2019-01-01 RX ADMIN — FLUTICASONE PROPIONATE 1 SPRAY: 50 SPRAY, METERED NASAL at 10:35

## 2019-01-01 RX ADMIN — PHENYLEPHRINE HYDROCHLORIDE 200 MCG: 10 INJECTION INTRAVENOUS at 12:38

## 2019-01-01 RX ADMIN — CALCIUM CARBONATE 500 MG: 500 TABLET, CHEWABLE ORAL at 10:46

## 2019-01-01 RX ADMIN — SODIUM CHLORIDE: 9 INJECTION, SOLUTION INTRAVENOUS at 16:30

## 2019-01-01 RX ADMIN — Medication 10 ML: at 10:05

## 2019-01-01 RX ADMIN — SODIUM CHLORIDE, PRESERVATIVE FREE 10 ML: 5 INJECTION INTRAVENOUS at 12:27

## 2019-01-01 RX ADMIN — PANTOPRAZOLE SODIUM 40 MG: 40 TABLET, DELAYED RELEASE ORAL at 05:47

## 2019-01-01 RX ADMIN — CEFEPIME HYDROCHLORIDE 2 G: 2 INJECTION, POWDER, FOR SOLUTION INTRAVENOUS at 16:26

## 2019-01-01 RX ADMIN — TRAMADOL HYDROCHLORIDE 100 MG: 50 TABLET, COATED ORAL at 23:42

## 2019-01-01 RX ADMIN — Medication 2 PUFF: at 20:52

## 2019-01-01 RX ADMIN — Medication 10 ML: at 21:35

## 2019-01-01 RX ADMIN — TRAMADOL HYDROCHLORIDE 50 MG: 50 TABLET, FILM COATED ORAL at 14:00

## 2019-01-01 RX ADMIN — AMPICILLIN SODIUM AND SULBACTAM SODIUM 3 G: 2; 1 INJECTION, POWDER, FOR SOLUTION INTRAMUSCULAR; INTRAVENOUS at 17:18

## 2019-01-01 RX ADMIN — SIMVASTATIN 40 MG: 40 TABLET, FILM COATED ORAL at 21:13

## 2019-01-01 RX ADMIN — SUGAMMADEX 400 MG: 100 INJECTION, SOLUTION INTRAVENOUS at 13:36

## 2019-01-01 RX ADMIN — DEXAMETHASONE SODIUM PHOSPHATE 8 MG: 4 INJECTION, SOLUTION INTRAMUSCULAR; INTRAVENOUS at 09:57

## 2019-01-01 RX ADMIN — MEROPENEM 2 G: 1 INJECTION, POWDER, FOR SOLUTION INTRAVENOUS at 06:12

## 2019-01-01 RX ADMIN — HYDROMORPHONE HYDROCHLORIDE 0.5 MG: 2 INJECTION, SOLUTION INTRAMUSCULAR; INTRAVENOUS; SUBCUTANEOUS at 13:46

## 2019-01-01 RX ADMIN — HYDROMORPHONE HYDROCHLORIDE 0.5 MG: 2 INJECTION, SOLUTION INTRAMUSCULAR; INTRAVENOUS; SUBCUTANEOUS at 09:44

## 2019-01-01 RX ADMIN — ENOXAPARIN SODIUM 40 MG: 40 INJECTION SUBCUTANEOUS at 10:03

## 2019-01-01 RX ADMIN — PANTOPRAZOLE SODIUM 40 MG: 40 TABLET, DELAYED RELEASE ORAL at 05:55

## 2019-01-01 RX ADMIN — SODIUM CHLORIDE, PRESERVATIVE FREE 10 ML: 5 INJECTION INTRAVENOUS at 09:02

## 2019-01-01 RX ADMIN — FENTANYL CITRATE 100 MCG: 50 INJECTION INTRAMUSCULAR; INTRAVENOUS at 09:55

## 2019-01-01 RX ADMIN — TRAMADOL HYDROCHLORIDE 100 MG: 50 TABLET, COATED ORAL at 05:09

## 2019-01-01 RX ADMIN — FLUCONAZOLE 600 MG: 100 TABLET ORAL at 09:49

## 2019-01-01 RX ADMIN — Medication 2 PUFF: at 19:45

## 2019-01-01 RX ADMIN — CALCIUM CARBONATE 500 MG: 500 TABLET, CHEWABLE ORAL at 21:51

## 2019-01-01 RX ADMIN — Medication 10 ML: at 21:52

## 2019-01-01 RX ADMIN — ENOXAPARIN SODIUM 40 MG: 40 INJECTION SUBCUTANEOUS at 16:18

## 2019-01-01 RX ADMIN — CEFEPIME HYDROCHLORIDE 2 G: 2 INJECTION, POWDER, FOR SOLUTION INTRAVENOUS at 01:09

## 2019-01-01 RX ADMIN — Medication 10 ML: at 11:11

## 2019-01-01 RX ADMIN — Medication 400 UNITS: at 08:11

## 2019-01-01 RX ADMIN — MAGNESIUM SULFATE HEPTAHYDRATE 2 G: 40 INJECTION, SOLUTION INTRAVENOUS at 19:02

## 2019-01-01 RX ADMIN — Medication 400 UNITS: at 09:38

## 2019-01-01 RX ADMIN — VANCOMYCIN HYDROCHLORIDE 1500 MG: 5 INJECTION, POWDER, LYOPHILIZED, FOR SOLUTION INTRAVENOUS at 01:36

## 2019-01-01 RX ADMIN — IPRATROPIUM BROMIDE AND ALBUTEROL SULFATE 1 AMPULE: .5; 3 SOLUTION RESPIRATORY (INHALATION) at 21:46

## 2019-01-01 RX ADMIN — MEROPENEM 2 G: 1 INJECTION, POWDER, FOR SOLUTION INTRAVENOUS at 04:23

## 2019-01-01 RX ADMIN — INSULIN GLARGINE 15 UNITS: 100 INJECTION, SOLUTION SUBCUTANEOUS at 23:20

## 2019-01-01 RX ADMIN — CEFEPIME HYDROCHLORIDE 2 G: 2 INJECTION, POWDER, FOR SOLUTION INTRAVENOUS at 10:03

## 2019-01-01 RX ADMIN — IPRATROPIUM BROMIDE AND ALBUTEROL SULFATE 1 AMPULE: .5; 3 SOLUTION RESPIRATORY (INHALATION) at 15:47

## 2019-01-01 RX ADMIN — MEMANTINE HYDROCHLORIDE 5 MG: 5 TABLET ORAL at 22:23

## 2019-01-01 RX ADMIN — OXYCODONE HYDROCHLORIDE AND ACETAMINOPHEN 1000 MG: 500 TABLET ORAL at 09:27

## 2019-01-01 RX ADMIN — Medication 400 UNITS: at 10:26

## 2019-01-01 RX ADMIN — ROPINIROLE HYDROCHLORIDE 1 MG: 1 TABLET, FILM COATED ORAL at 21:34

## 2019-01-01 RX ADMIN — SIMVASTATIN 40 MG: 40 TABLET, FILM COATED ORAL at 21:20

## 2019-01-01 RX ADMIN — ACETAMINOPHEN 500 MG: 500 TABLET ORAL at 16:40

## 2019-01-01 RX ADMIN — METRONIDAZOLE 500 MG: 500 INJECTION, SOLUTION INTRAVENOUS at 08:30

## 2019-01-01 RX ADMIN — Medication 2 PUFF: at 23:56

## 2019-01-01 RX ADMIN — ENOXAPARIN SODIUM 40 MG: 40 INJECTION SUBCUTANEOUS at 09:50

## 2019-01-01 RX ADMIN — SODIUM CHLORIDE: 9 INJECTION, SOLUTION INTRAVENOUS at 15:17

## 2019-01-01 RX ADMIN — MEMANTINE HYDROCHLORIDE 5 MG: 5 TABLET ORAL at 22:16

## 2019-01-01 RX ADMIN — Medication 10 ML: at 08:58

## 2019-01-01 RX ADMIN — POTASSIUM CHLORIDE 40 MEQ: 20 TABLET, EXTENDED RELEASE ORAL at 09:58

## 2019-01-01 RX ADMIN — Medication 400 UNITS: at 12:58

## 2019-01-01 RX ADMIN — PHENYLEPHRINE HYDROCHLORIDE 100 MCG: 10 INJECTION INTRAVENOUS at 12:58

## 2019-01-01 RX ADMIN — VANCOMYCIN HYDROCHLORIDE 1500 MG: 5 INJECTION, POWDER, LYOPHILIZED, FOR SOLUTION INTRAVENOUS at 11:18

## 2019-01-01 RX ADMIN — COLLAGENASE SANTYL: 250 OINTMENT TOPICAL at 21:18

## 2019-01-01 RX ADMIN — Medication 2 PUFF: at 10:28

## 2019-01-01 RX ADMIN — PRAVASTATIN SODIUM 80 MG: 40 TABLET ORAL at 22:17

## 2019-01-01 RX ADMIN — Medication 1 TABLET: at 08:15

## 2019-01-01 RX ADMIN — INSULIN LISPRO 2 UNITS: 100 INJECTION, SOLUTION INTRAVENOUS; SUBCUTANEOUS at 12:33

## 2019-01-01 RX ADMIN — METHYLPREDNISOLONE SODIUM SUCCINATE 40 MG: 40 INJECTION, POWDER, LYOPHILIZED, FOR SOLUTION INTRAMUSCULAR; INTRAVENOUS at 12:45

## 2019-01-01 RX ADMIN — SODIUM CHLORIDE: 9 INJECTION, SOLUTION INTRAVENOUS at 17:10

## 2019-01-01 RX ADMIN — MULTIPLE VITAMINS W/ MINERALS TAB 1 TABLET: TAB at 10:51

## 2019-01-01 RX ADMIN — COLLAGENASE SANTYL: 250 OINTMENT TOPICAL at 21:56

## 2019-01-01 RX ADMIN — CALCIUM CARBONATE 500 MG: 500 TABLET, CHEWABLE ORAL at 20:06

## 2019-01-01 RX ADMIN — Medication 400 UNITS: at 09:41

## 2019-01-01 RX ADMIN — IPRATROPIUM BROMIDE AND ALBUTEROL SULFATE 3 ML: .5; 3 SOLUTION RESPIRATORY (INHALATION) at 20:44

## 2019-01-01 RX ADMIN — FLUCONAZOLE 600 MG: 100 TABLET ORAL at 10:32

## 2019-01-01 RX ADMIN — CALCIUM CARBONATE 500 MG: 500 TABLET, CHEWABLE ORAL at 09:03

## 2019-01-01 RX ADMIN — SODIUM CHLORIDE: 9 INJECTION, SOLUTION INTRAVENOUS at 04:18

## 2019-01-01 RX ADMIN — RISPERIDONE 1 MG: 0.5 TABLET, FILM COATED ORAL at 22:16

## 2019-01-01 RX ADMIN — Medication 2 PUFF: at 20:02

## 2019-01-01 RX ADMIN — ROPINIROLE HYDROCHLORIDE 1 MG: 1 TABLET, FILM COATED ORAL at 20:46

## 2019-01-01 RX ADMIN — COLLAGENASE SANTYL: 250 OINTMENT TOPICAL at 16:13

## 2019-01-01 RX ADMIN — ACETAMINOPHEN 500 MG: 500 TABLET ORAL at 02:35

## 2019-01-01 RX ADMIN — CEFEPIME 2 G: 2 INJECTION, POWDER, FOR SOLUTION INTRAVENOUS at 20:35

## 2019-01-01 RX ADMIN — SPIRONOLACTONE 25 MG: 25 TABLET ORAL at 10:10

## 2019-01-01 RX ADMIN — MONTELUKAST 10 MG: 10 TABLET, FILM COATED ORAL at 21:00

## 2019-01-01 RX ADMIN — SIMVASTATIN 40 MG: 40 TABLET, FILM COATED ORAL at 22:02

## 2019-01-01 RX ADMIN — TRAMADOL HYDROCHLORIDE 100 MG: 50 TABLET, COATED ORAL at 12:41

## 2019-01-01 RX ADMIN — PANTOPRAZOLE SODIUM 40 MG: 40 TABLET, DELAYED RELEASE ORAL at 09:26

## 2019-01-01 RX ADMIN — IPRATROPIUM BROMIDE AND ALBUTEROL SULFATE 1 AMPULE: .5; 3 SOLUTION RESPIRATORY (INHALATION) at 21:22

## 2019-01-01 RX ADMIN — MEMANTINE 5 MG: 10 TABLET ORAL at 21:42

## 2019-01-01 RX ADMIN — ONDANSETRON 4 MG: 2 INJECTION INTRAMUSCULAR; INTRAVENOUS at 16:34

## 2019-01-01 RX ADMIN — POTASSIUM CHLORIDE 40 MEQ: 20 TABLET, EXTENDED RELEASE ORAL at 19:01

## 2019-01-01 RX ADMIN — Medication 10 ML: at 23:38

## 2019-01-01 RX ADMIN — ASPIRIN 81 MG: 81 TABLET, COATED ORAL at 08:53

## 2019-01-01 RX ADMIN — Medication 400 UNITS: at 10:32

## 2019-01-01 RX ADMIN — MEROPENEM 2 G: 1 INJECTION, POWDER, FOR SOLUTION INTRAVENOUS at 21:03

## 2019-01-01 RX ADMIN — Medication 10 ML: at 10:10

## 2019-01-01 RX ADMIN — VANCOMYCIN HYDROCHLORIDE 1500 MG: 5 INJECTION, POWDER, LYOPHILIZED, FOR SOLUTION INTRAVENOUS at 17:22

## 2019-01-01 RX ADMIN — Medication 2 PUFF: at 08:37

## 2019-01-01 RX ADMIN — LISINOPRIL 20 MG: 20 TABLET ORAL at 09:02

## 2019-01-01 RX ADMIN — ASPIRIN 81 MG: 81 TABLET, COATED ORAL at 10:09

## 2019-01-01 RX ADMIN — ENOXAPARIN SODIUM 40 MG: 40 INJECTION SUBCUTANEOUS at 12:58

## 2019-01-01 RX ADMIN — ROPINIROLE HYDROCHLORIDE 1 MG: 1 TABLET, FILM COATED ORAL at 22:28

## 2019-01-01 RX ADMIN — POTASSIUM CHLORIDE 40 MEQ: 20 TABLET, EXTENDED RELEASE ORAL at 09:32

## 2019-01-01 RX ADMIN — ROPINIROLE HYDROCHLORIDE 1 MG: 1 TABLET, FILM COATED ORAL at 21:19

## 2019-01-01 RX ADMIN — SODIUM CHLORIDE, PRESERVATIVE FREE 10 ML: 5 INJECTION INTRAVENOUS at 10:21

## 2019-01-01 RX ADMIN — AMPICILLIN SODIUM AND SULBACTAM SODIUM 3 G: 2; 1 INJECTION, POWDER, FOR SOLUTION INTRAMUSCULAR; INTRAVENOUS at 05:06

## 2019-01-01 RX ADMIN — COLLAGENASE SANTYL: 250 OINTMENT TOPICAL at 09:20

## 2019-01-01 RX ADMIN — SODIUM CHLORIDE 1000 ML: 9 INJECTION, SOLUTION INTRAVENOUS at 01:23

## 2019-01-01 RX ADMIN — SODIUM CHLORIDE, PRESERVATIVE FREE 10 ML: 5 INJECTION INTRAVENOUS at 20:06

## 2019-01-01 RX ADMIN — BUPROPION HYDROCHLORIDE 300 MG: 150 TABLET, EXTENDED RELEASE ORAL at 12:59

## 2019-01-01 RX ADMIN — Medication 400 UNITS: at 09:21

## 2019-01-01 RX ADMIN — BUPROPION HYDROCHLORIDE 300 MG: 150 TABLET, EXTENDED RELEASE ORAL at 10:24

## 2019-01-01 RX ADMIN — IPRATROPIUM BROMIDE AND ALBUTEROL SULFATE 1 AMPULE: .5; 3 SOLUTION RESPIRATORY (INHALATION) at 20:11

## 2019-01-01 RX ADMIN — TRAMADOL HYDROCHLORIDE 100 MG: 50 TABLET, COATED ORAL at 15:36

## 2019-01-01 RX ADMIN — AMPICILLIN SODIUM AND SULBACTAM SODIUM 3 G: 2; 1 INJECTION, POWDER, FOR SOLUTION INTRAMUSCULAR; INTRAVENOUS at 22:19

## 2019-01-01 RX ADMIN — SODIUM CHLORIDE, PRESERVATIVE FREE 10 ML: 5 INJECTION INTRAVENOUS at 10:53

## 2019-01-01 RX ADMIN — ENOXAPARIN SODIUM 40 MG: 40 INJECTION SUBCUTANEOUS at 10:35

## 2019-01-01 RX ADMIN — ENOXAPARIN SODIUM 40 MG: 40 INJECTION SUBCUTANEOUS at 09:47

## 2019-01-01 RX ADMIN — SODIUM CHLORIDE: 9 INJECTION, SOLUTION INTRAVENOUS at 19:00

## 2019-01-01 RX ADMIN — Medication 400 UNITS: at 09:00

## 2019-01-01 RX ADMIN — Medication 1 TABLET: at 10:27

## 2019-01-01 RX ADMIN — SODIUM CHLORIDE, PRESERVATIVE FREE 10 ML: 5 INJECTION INTRAVENOUS at 21:19

## 2019-01-01 RX ADMIN — PROPOFOL 130 MG: 10 INJECTION, EMULSION INTRAVENOUS at 07:47

## 2019-01-01 RX ADMIN — INSULIN GLARGINE 15 UNITS: 100 INJECTION, SOLUTION SUBCUTANEOUS at 22:28

## 2019-01-01 RX ADMIN — ACETAMINOPHEN 500 MG: 500 TABLET ORAL at 09:29

## 2019-01-01 RX ADMIN — ENOXAPARIN SODIUM 40 MG: 40 INJECTION SUBCUTANEOUS at 08:36

## 2019-01-01 RX ADMIN — CALCIUM CARBONATE 500 MG: 500 TABLET, CHEWABLE ORAL at 20:18

## 2019-01-01 RX ADMIN — AMPICILLIN SODIUM AND SULBACTAM SODIUM 3 G: 2; 1 INJECTION, POWDER, FOR SOLUTION INTRAMUSCULAR; INTRAVENOUS at 02:12

## 2019-01-01 RX ADMIN — Medication 2 PUFF: at 21:23

## 2019-01-01 RX ADMIN — SERTRALINE HYDROCHLORIDE 100 MG: 100 TABLET ORAL at 21:23

## 2019-01-01 RX ADMIN — MEROPENEM 2 G: 1 INJECTION, POWDER, FOR SOLUTION INTRAVENOUS at 12:32

## 2019-01-01 RX ADMIN — ENOXAPARIN SODIUM 40 MG: 40 INJECTION SUBCUTANEOUS at 08:31

## 2019-01-01 RX ADMIN — CALCIUM CARBONATE 500 MG: 500 TABLET, CHEWABLE ORAL at 23:17

## 2019-01-01 RX ADMIN — AMPICILLIN SODIUM AND SULBACTAM SODIUM 3 G: 2; 1 INJECTION, POWDER, FOR SOLUTION INTRAMUSCULAR; INTRAVENOUS at 09:54

## 2019-01-01 RX ADMIN — ENOXAPARIN SODIUM 40 MG: 100 INJECTION SUBCUTANEOUS at 08:54

## 2019-01-01 RX ADMIN — AMPICILLIN SODIUM AND SULBACTAM SODIUM 3 G: 2; 1 INJECTION, POWDER, FOR SOLUTION INTRAMUSCULAR; INTRAVENOUS at 11:16

## 2019-01-01 RX ADMIN — VANCOMYCIN HYDROCHLORIDE 1500 MG: 5 INJECTION, POWDER, LYOPHILIZED, FOR SOLUTION INTRAVENOUS at 23:21

## 2019-01-01 RX ADMIN — Medication 2 PUFF: at 08:45

## 2019-01-01 RX ADMIN — IPRATROPIUM BROMIDE AND ALBUTEROL SULFATE 1 AMPULE: .5; 3 SOLUTION RESPIRATORY (INHALATION) at 20:13

## 2019-01-01 RX ADMIN — BUPROPION HYDROCHLORIDE 300 MG: 150 TABLET, EXTENDED RELEASE ORAL at 16:16

## 2019-01-01 RX ADMIN — AMPICILLIN SODIUM AND SULBACTAM SODIUM 3 G: 2; 1 INJECTION, POWDER, FOR SOLUTION INTRAMUSCULAR; INTRAVENOUS at 14:08

## 2019-01-01 RX ADMIN — PHENYLEPHRINE HYDROCHLORIDE 200 MCG: 10 INJECTION INTRAVENOUS at 13:34

## 2019-01-01 RX ADMIN — INSULIN GLARGINE 15 UNITS: 100 INJECTION, SOLUTION SUBCUTANEOUS at 21:59

## 2019-01-01 RX ADMIN — NALOXONE HYDROCHLORIDE 0.08 MG: 0.4 INJECTION, SOLUTION INTRAMUSCULAR; INTRAVENOUS; SUBCUTANEOUS at 14:04

## 2019-01-01 RX ADMIN — TRAMADOL HYDROCHLORIDE 50 MG: 50 TABLET, COATED ORAL at 05:23

## 2019-01-01 RX ADMIN — ONDANSETRON 4 MG: 2 INJECTION INTRAMUSCULAR; INTRAVENOUS at 22:02

## 2019-01-01 RX ADMIN — Medication 2 PUFF: at 22:04

## 2019-01-01 RX ADMIN — MEMANTINE 5 MG: 10 TABLET ORAL at 20:51

## 2019-01-01 RX ADMIN — Medication 2 PUFF: at 08:33

## 2019-01-01 RX ADMIN — Medication 10 ML: at 08:25

## 2019-01-01 RX ADMIN — SERTRALINE HYDROCHLORIDE 100 MG: 100 TABLET ORAL at 20:41

## 2019-01-01 RX ADMIN — Medication 1 TABLET: at 08:31

## 2019-01-01 RX ADMIN — ASPIRIN 81 MG: 81 TABLET, COATED ORAL at 12:58

## 2019-01-01 RX ADMIN — SODIUM CHLORIDE: 9 INJECTION, SOLUTION INTRAVENOUS at 06:46

## 2019-01-01 RX ADMIN — ASPIRIN 81 MG: 81 TABLET, COATED ORAL at 10:29

## 2019-01-01 RX ADMIN — COLLAGENASE SANTYL: 250 OINTMENT TOPICAL at 15:25

## 2019-01-01 RX ADMIN — TRAMADOL HYDROCHLORIDE 100 MG: 50 TABLET, COATED ORAL at 17:52

## 2019-01-01 RX ADMIN — Medication 2 PUFF: at 07:28

## 2019-01-01 RX ADMIN — OXYCODONE HYDROCHLORIDE AND ACETAMINOPHEN 1000 MG: 500 TABLET ORAL at 08:58

## 2019-01-01 RX ADMIN — ENOXAPARIN SODIUM 40 MG: 100 INJECTION SUBCUTANEOUS at 10:20

## 2019-01-01 RX ADMIN — SERTRALINE HYDROCHLORIDE 100 MG: 100 TABLET ORAL at 22:46

## 2019-01-01 RX ADMIN — INSULIN LISPRO 2 UNITS: 100 INJECTION, SOLUTION INTRAVENOUS; SUBCUTANEOUS at 08:47

## 2019-01-01 RX ADMIN — INSULIN GLARGINE 75 UNITS: 100 INJECTION, SOLUTION SUBCUTANEOUS at 21:43

## 2019-01-01 RX ADMIN — MEMANTINE 5 MG: 10 TABLET ORAL at 20:46

## 2019-01-01 RX ADMIN — SODIUM CHLORIDE: 9 INJECTION, SOLUTION INTRAVENOUS at 10:10

## 2019-01-01 RX ADMIN — ONDANSETRON 4 MG: 2 INJECTION, SOLUTION INTRAMUSCULAR; INTRAVENOUS at 12:48

## 2019-01-01 RX ADMIN — OXYCODONE HYDROCHLORIDE AND ACETAMINOPHEN 1000 MG: 500 TABLET ORAL at 09:02

## 2019-01-01 RX ADMIN — AMPICILLIN SODIUM AND SULBACTAM SODIUM 3 G: 2; 1 INJECTION, POWDER, FOR SOLUTION INTRAMUSCULAR; INTRAVENOUS at 11:12

## 2019-01-01 RX ADMIN — LIDOCAINE HYDROCHLORIDE 100 MG: 20 INJECTION, SOLUTION INTRAVENOUS at 12:24

## 2019-01-01 RX ADMIN — MIDODRINE HYDROCHLORIDE 5 MG: 5 TABLET ORAL at 10:51

## 2019-01-01 RX ADMIN — BUPROPION HYDROCHLORIDE 300 MG: 150 TABLET, EXTENDED RELEASE ORAL at 10:09

## 2019-01-01 RX ADMIN — SODIUM CHLORIDE, PRESERVATIVE FREE 10 ML: 5 INJECTION INTRAVENOUS at 20:21

## 2019-01-01 RX ADMIN — Medication 10 ML: at 22:38

## 2019-01-01 RX ADMIN — SERTRALINE HYDROCHLORIDE 100 MG: 100 TABLET ORAL at 08:58

## 2019-01-01 RX ADMIN — SERTRALINE HYDROCHLORIDE 100 MG: 100 TABLET ORAL at 08:37

## 2019-01-01 RX ADMIN — AMPICILLIN SODIUM AND SULBACTAM SODIUM 3 G: 2; 1 INJECTION, POWDER, FOR SOLUTION INTRAMUSCULAR; INTRAVENOUS at 23:28

## 2019-01-01 RX ADMIN — CIPROFLOXACIN HYDROCHLORIDE 500 MG: 500 TABLET, FILM COATED ORAL at 10:30

## 2019-01-01 RX ADMIN — SERTRALINE HYDROCHLORIDE 100 MG: 100 TABLET ORAL at 20:58

## 2019-01-01 RX ADMIN — SODIUM CHLORIDE, POTASSIUM CHLORIDE, SODIUM LACTATE AND CALCIUM CHLORIDE: 600; 310; 30; 20 INJECTION, SOLUTION INTRAVENOUS at 09:43

## 2019-01-01 RX ADMIN — BUPROPION HYDROCHLORIDE 300 MG: 150 TABLET, FILM COATED, EXTENDED RELEASE ORAL at 10:34

## 2019-01-01 RX ADMIN — PIPERACILLIN AND TAZOBACTAM 3.38 G: 3; .375 INJECTION, POWDER, LYOPHILIZED, FOR SOLUTION INTRAVENOUS at 16:55

## 2019-01-01 RX ADMIN — IPRATROPIUM BROMIDE AND ALBUTEROL SULFATE 3 ML: .5; 3 SOLUTION RESPIRATORY (INHALATION) at 11:48

## 2019-01-01 RX ADMIN — KETAMINE HYDROCHLORIDE 50 MG: 10 INJECTION INTRAMUSCULAR; INTRAVENOUS at 15:01

## 2019-01-01 RX ADMIN — SIMVASTATIN 40 MG: 40 TABLET, FILM COATED ORAL at 21:50

## 2019-01-01 RX ADMIN — SODIUM CHLORIDE, PRESERVATIVE FREE 10 ML: 5 INJECTION INTRAVENOUS at 22:17

## 2019-01-01 RX ADMIN — MEMANTINE HYDROCHLORIDE 5 MG: 5 TABLET ORAL at 20:59

## 2019-01-01 RX ADMIN — INSULIN LISPRO 4 UNITS: 100 INJECTION, SOLUTION INTRAVENOUS; SUBCUTANEOUS at 10:31

## 2019-01-01 RX ADMIN — Medication 10 ML: at 21:32

## 2019-01-01 RX ADMIN — ENOXAPARIN SODIUM 40 MG: 40 INJECTION SUBCUTANEOUS at 09:03

## 2019-01-01 RX ADMIN — TRAMADOL HYDROCHLORIDE 100 MG: 50 TABLET, COATED ORAL at 02:40

## 2019-01-01 RX ADMIN — ONDANSETRON 4 MG: 2 INJECTION INTRAMUSCULAR; INTRAVENOUS at 21:14

## 2019-01-01 RX ADMIN — COLLAGENASE SANTYL: 250 OINTMENT TOPICAL at 01:08

## 2019-01-01 RX ADMIN — Medication 1 TABLET: at 10:06

## 2019-01-01 RX ADMIN — CEFEPIME HYDROCHLORIDE 2 G: 2 INJECTION, POWDER, FOR SOLUTION INTRAVENOUS at 16:11

## 2019-01-01 RX ADMIN — Medication 1 TABLET: at 16:16

## 2019-01-01 RX ADMIN — MAGNESIUM OXIDE TAB 400 MG (241.3 MG ELEMENTAL MG) 400 MG: 400 (241.3 MG) TAB at 10:51

## 2019-01-01 RX ADMIN — ENOXAPARIN SODIUM 40 MG: 40 INJECTION SUBCUTANEOUS at 10:27

## 2019-01-01 RX ADMIN — Medication 400 UNITS: at 08:43

## 2019-01-01 RX ADMIN — MEMANTINE HYDROCHLORIDE 5 MG: 5 TABLET ORAL at 21:12

## 2019-01-01 RX ADMIN — CEFEPIME HYDROCHLORIDE 2 G: 2 INJECTION, POWDER, FOR SOLUTION INTRAVENOUS at 09:51

## 2019-01-01 RX ADMIN — BACLOFEN 10 MG: 10 TABLET ORAL at 06:22

## 2019-01-01 RX ADMIN — MEROPENEM 2 G: 1 INJECTION, POWDER, FOR SOLUTION INTRAVENOUS at 13:57

## 2019-01-01 RX ADMIN — Medication 400 UNITS: at 08:36

## 2019-01-01 RX ADMIN — SERTRALINE HYDROCHLORIDE 100 MG: 100 TABLET ORAL at 22:37

## 2019-01-01 RX ADMIN — BACLOFEN 10 MG: 10 TABLET ORAL at 21:42

## 2019-01-01 RX ADMIN — INSULIN GLARGINE 15 UNITS: 100 INJECTION, SOLUTION SUBCUTANEOUS at 23:05

## 2019-01-01 RX ADMIN — SPIRONOLACTONE 25 MG: 25 TABLET ORAL at 09:02

## 2019-01-01 RX ADMIN — Medication 10 ML: at 09:33

## 2019-01-01 RX ADMIN — ASPIRIN 81 MG: 81 TABLET, COATED ORAL at 09:17

## 2019-01-01 RX ADMIN — Medication 1 TABLET: at 09:27

## 2019-01-01 RX ADMIN — INSULIN LISPRO 2 UNITS: 100 INJECTION, SOLUTION INTRAVENOUS; SUBCUTANEOUS at 07:19

## 2019-01-01 RX ADMIN — SIMVASTATIN 40 MG: 40 TABLET, FILM COATED ORAL at 20:50

## 2019-01-01 RX ADMIN — IPRATROPIUM BROMIDE AND ALBUTEROL SULFATE 1 AMPULE: .5; 3 SOLUTION RESPIRATORY (INHALATION) at 15:24

## 2019-01-01 RX ADMIN — PROPOFOL 200 MG: 10 INJECTION, EMULSION INTRAVENOUS at 12:24

## 2019-01-01 RX ADMIN — SIMVASTATIN 40 MG: 40 TABLET, FILM COATED ORAL at 21:39

## 2019-01-01 RX ADMIN — SODIUM CHLORIDE, PRESERVATIVE FREE 10 ML: 5 INJECTION INTRAVENOUS at 21:00

## 2019-01-01 RX ADMIN — Medication 400 UNITS: at 12:25

## 2019-01-01 RX ADMIN — PANTOPRAZOLE SODIUM 40 MG: 40 TABLET, DELAYED RELEASE ORAL at 05:51

## 2019-01-01 RX ADMIN — CIPROFLOXACIN HYDROCHLORIDE 500 MG: 500 TABLET, FILM COATED ORAL at 06:17

## 2019-01-01 RX ADMIN — SODIUM CHLORIDE: 9 INJECTION, SOLUTION INTRAVENOUS at 16:22

## 2019-01-01 RX ADMIN — Medication 400 UNITS: at 08:58

## 2019-01-01 RX ADMIN — Medication 2 PUFF: at 08:17

## 2019-01-01 RX ADMIN — METRONIDAZOLE 500 MG: 500 INJECTION, SOLUTION INTRAVENOUS at 17:42

## 2019-01-01 RX ADMIN — IPRATROPIUM BROMIDE AND ALBUTEROL SULFATE 1 AMPULE: .5; 3 SOLUTION RESPIRATORY (INHALATION) at 12:05

## 2019-01-01 RX ADMIN — SODIUM CHLORIDE: 9 INJECTION, SOLUTION INTRAVENOUS at 09:07

## 2019-01-01 RX ADMIN — INSULIN LISPRO 2 UNITS: 100 INJECTION, SOLUTION INTRAVENOUS; SUBCUTANEOUS at 13:18

## 2019-01-01 RX ADMIN — INSULIN GLARGINE 10 UNITS: 100 INJECTION, SOLUTION SUBCUTANEOUS at 20:37

## 2019-01-01 RX ADMIN — ASPIRIN 81 MG: 81 TABLET, COATED ORAL at 09:26

## 2019-01-01 RX ADMIN — RISPERIDONE 1 MG: 0.5 TABLET, FILM COATED ORAL at 21:13

## 2019-01-01 RX ADMIN — ONDANSETRON 4 MG: 2 INJECTION INTRAMUSCULAR; INTRAVENOUS at 10:35

## 2019-01-01 RX ADMIN — SODIUM CHLORIDE, PRESERVATIVE FREE 10 ML: 5 INJECTION INTRAVENOUS at 22:11

## 2019-01-01 RX ADMIN — AMPICILLIN SODIUM AND SULBACTAM SODIUM 3 G: 2; 1 INJECTION, POWDER, FOR SOLUTION INTRAMUSCULAR; INTRAVENOUS at 22:02

## 2019-01-01 RX ADMIN — VANCOMYCIN HYDROCHLORIDE 2000 MG: 1 INJECTION, POWDER, LYOPHILIZED, FOR SOLUTION INTRAVENOUS at 00:32

## 2019-01-01 RX ADMIN — SODIUM CHLORIDE: 9 INJECTION, SOLUTION INTRAVENOUS at 20:11

## 2019-01-01 RX ADMIN — Medication 10 ML: at 21:13

## 2019-01-01 RX ADMIN — MONTELUKAST 10 MG: 10 TABLET, FILM COATED ORAL at 20:46

## 2019-01-01 RX ADMIN — TRAMADOL HYDROCHLORIDE 50 MG: 50 TABLET, FILM COATED ORAL at 14:23

## 2019-01-01 RX ADMIN — OXYCODONE HYDROCHLORIDE AND ACETAMINOPHEN 1000 MG: 500 TABLET ORAL at 10:31

## 2019-01-01 RX ADMIN — ONDANSETRON 4 MG: 2 INJECTION INTRAMUSCULAR; INTRAVENOUS at 09:57

## 2019-01-01 RX ADMIN — ENOXAPARIN SODIUM 40 MG: 40 INJECTION SUBCUTANEOUS at 10:09

## 2019-01-01 RX ADMIN — MEROPENEM 2 G: 1 INJECTION, POWDER, FOR SOLUTION INTRAVENOUS at 06:01

## 2019-01-01 RX ADMIN — Medication 2 PUFF: at 22:34

## 2019-01-01 RX ADMIN — Medication 2 PUFF: at 07:33

## 2019-01-01 RX ADMIN — Medication 10 ML: at 10:03

## 2019-01-01 RX ADMIN — CALCIUM CARBONATE 500 MG: 500 TABLET, CHEWABLE ORAL at 21:47

## 2019-01-01 RX ADMIN — MEMANTINE 5 MG: 10 TABLET ORAL at 21:24

## 2019-01-01 RX ADMIN — MEROPENEM 2 G: 1 INJECTION, POWDER, FOR SOLUTION INTRAVENOUS at 21:38

## 2019-01-01 RX ADMIN — INSULIN LISPRO 2 UNITS: 100 INJECTION, SOLUTION INTRAVENOUS; SUBCUTANEOUS at 13:21

## 2019-01-01 RX ADMIN — ACETAMINOPHEN 500 MG: 500 TABLET ORAL at 21:25

## 2019-01-01 RX ADMIN — SIMVASTATIN 40 MG: 40 TABLET, FILM COATED ORAL at 21:47

## 2019-01-01 RX ADMIN — SODIUM CHLORIDE: 9 INJECTION, SOLUTION INTRAVENOUS at 10:27

## 2019-01-01 RX ADMIN — COLLAGENASE SANTYL: 250 OINTMENT TOPICAL at 09:07

## 2019-01-01 RX ADMIN — Medication 2 PUFF: at 07:38

## 2019-01-01 RX ADMIN — IPRATROPIUM BROMIDE AND ALBUTEROL SULFATE 1 AMPULE: .5; 3 SOLUTION RESPIRATORY (INHALATION) at 08:12

## 2019-01-01 RX ADMIN — PANTOPRAZOLE SODIUM 40 MG: 40 TABLET, DELAYED RELEASE ORAL at 05:23

## 2019-01-01 RX ADMIN — SODIUM CHLORIDE, PRESERVATIVE FREE 10 ML: 5 INJECTION INTRAVENOUS at 21:25

## 2019-01-01 RX ADMIN — MEROPENEM 2 G: 1 INJECTION, POWDER, FOR SOLUTION INTRAVENOUS at 22:11

## 2019-01-01 RX ADMIN — PIPERACILLIN AND TAZOBACTAM 3.38 G: 3; .375 INJECTION, POWDER, LYOPHILIZED, FOR SOLUTION INTRAVENOUS at 09:50

## 2019-01-01 RX ADMIN — CEFEPIME HYDROCHLORIDE 2 G: 2 INJECTION, POWDER, FOR SOLUTION INTRAVENOUS at 17:45

## 2019-01-01 RX ADMIN — ENOXAPARIN SODIUM 40 MG: 40 INJECTION SUBCUTANEOUS at 08:29

## 2019-01-01 RX ADMIN — CLONAZEPAM 1 MG: 0.5 TABLET ORAL at 21:19

## 2019-01-01 RX ADMIN — Medication 400 UNITS: at 08:24

## 2019-01-01 RX ADMIN — INSULIN LISPRO 1 UNITS: 100 INJECTION, SOLUTION INTRAVENOUS; SUBCUTANEOUS at 21:34

## 2019-01-01 RX ADMIN — ALBUMIN (HUMAN) 12.5 G: 0.25 INJECTION, SOLUTION INTRAVENOUS at 10:45

## 2019-01-01 RX ADMIN — Medication 400 UNITS: at 08:53

## 2019-01-01 RX ADMIN — Medication 2 MCG/MIN: at 11:04

## 2019-01-01 RX ADMIN — SERTRALINE HYDROCHLORIDE 100 MG: 100 TABLET ORAL at 21:50

## 2019-01-01 RX ADMIN — ASPIRIN 81 MG: 81 TABLET, COATED ORAL at 12:59

## 2019-01-01 RX ADMIN — RISPERIDONE 1 MG: 0.5 TABLET, FILM COATED ORAL at 21:34

## 2019-01-01 RX ADMIN — OXYCODONE HYDROCHLORIDE AND ACETAMINOPHEN 1000 MG: 500 TABLET ORAL at 10:35

## 2019-01-01 RX ADMIN — Medication 10 ML: at 21:41

## 2019-01-01 RX ADMIN — TRAMADOL HYDROCHLORIDE 100 MG: 50 TABLET, COATED ORAL at 08:34

## 2019-01-01 RX ADMIN — CEFEPIME 2 G: 2 INJECTION, POWDER, FOR SOLUTION INTRAVENOUS at 23:26

## 2019-01-01 RX ADMIN — IPRATROPIUM BROMIDE AND ALBUTEROL SULFATE 1 AMPULE: .5; 3 SOLUTION RESPIRATORY (INHALATION) at 08:35

## 2019-01-01 RX ADMIN — CALCIUM CARBONATE 500 MG: 500 TABLET, CHEWABLE ORAL at 10:26

## 2019-01-01 RX ADMIN — INSULIN GLARGINE 15 UNITS: 100 INJECTION, SOLUTION SUBCUTANEOUS at 23:03

## 2019-01-01 RX ADMIN — SODIUM CHLORIDE, PRESERVATIVE FREE 10 ML: 5 INJECTION INTRAVENOUS at 08:11

## 2019-01-01 RX ADMIN — INSULIN LISPRO 2 UNITS: 100 INJECTION, SOLUTION INTRAVENOUS; SUBCUTANEOUS at 13:10

## 2019-01-01 RX ADMIN — MEROPENEM 2 G: 1 INJECTION, POWDER, FOR SOLUTION INTRAVENOUS at 12:53

## 2019-01-01 RX ADMIN — BUPROPION HYDROCHLORIDE 300 MG: 150 TABLET, FILM COATED, EXTENDED RELEASE ORAL at 09:05

## 2019-01-01 RX ADMIN — MONTELUKAST 10 MG: 10 TABLET, FILM COATED ORAL at 20:37

## 2019-01-01 RX ADMIN — ASPIRIN 81 MG: 81 TABLET, COATED ORAL at 09:50

## 2019-01-01 RX ADMIN — SODIUM CHLORIDE, PRESERVATIVE FREE 10 ML: 5 INJECTION INTRAVENOUS at 20:22

## 2019-01-01 RX ADMIN — Medication 10 ML: at 09:00

## 2019-01-01 RX ADMIN — AMPICILLIN SODIUM AND SULBACTAM SODIUM 3 G: 2; 1 INJECTION, POWDER, FOR SOLUTION INTRAMUSCULAR; INTRAVENOUS at 18:44

## 2019-01-01 RX ADMIN — AMPICILLIN SODIUM AND SULBACTAM SODIUM 3 G: 2; 1 INJECTION, POWDER, FOR SOLUTION INTRAMUSCULAR; INTRAVENOUS at 23:03

## 2019-01-01 RX ADMIN — POTASSIUM CHLORIDE 40 MEQ: 20 TABLET, EXTENDED RELEASE ORAL at 08:41

## 2019-01-01 RX ADMIN — CALCIUM CARBONATE 500 MG: 500 TABLET, CHEWABLE ORAL at 22:22

## 2019-01-01 RX ADMIN — CALCIUM CARBONATE 500 MG: 500 TABLET, CHEWABLE ORAL at 20:57

## 2019-01-01 RX ADMIN — TRAMADOL HYDROCHLORIDE 100 MG: 50 TABLET, COATED ORAL at 13:19

## 2019-01-01 RX ADMIN — SIMVASTATIN 40 MG: 40 TABLET, FILM COATED ORAL at 21:34

## 2019-01-01 RX ADMIN — TRAMADOL HYDROCHLORIDE 100 MG: 50 TABLET, COATED ORAL at 04:27

## 2019-01-01 RX ADMIN — SODIUM CHLORIDE: 9 INJECTION, SOLUTION INTRAVENOUS at 23:45

## 2019-01-01 RX ADMIN — FENTANYL CITRATE 100 MCG: 50 INJECTION INTRAMUSCULAR; INTRAVENOUS at 12:57

## 2019-01-01 RX ADMIN — MEROPENEM 2 G: 1 INJECTION, POWDER, FOR SOLUTION INTRAVENOUS at 15:39

## 2019-01-01 RX ADMIN — AMPICILLIN SODIUM AND SULBACTAM SODIUM 3 G: 2; 1 INJECTION, POWDER, FOR SOLUTION INTRAMUSCULAR; INTRAVENOUS at 23:05

## 2019-01-01 RX ADMIN — PHENYLEPHRINE HYDROCHLORIDE 100 MCG: 10 INJECTION INTRAVENOUS at 11:08

## 2019-01-01 RX ADMIN — VANCOMYCIN HYDROCHLORIDE 1500 MG: 5 INJECTION, POWDER, LYOPHILIZED, FOR SOLUTION INTRAVENOUS at 18:23

## 2019-01-01 RX ADMIN — CALCIUM CARBONATE 500 MG: 500 TABLET, CHEWABLE ORAL at 08:11

## 2019-01-01 RX ADMIN — SERTRALINE HYDROCHLORIDE 100 MG: 100 TABLET ORAL at 10:35

## 2019-01-01 RX ADMIN — IPRATROPIUM BROMIDE AND ALBUTEROL SULFATE 1 AMPULE: .5; 3 SOLUTION RESPIRATORY (INHALATION) at 20:42

## 2019-01-01 RX ADMIN — CEFEPIME HYDROCHLORIDE 2 G: 2 INJECTION, POWDER, FOR SOLUTION INTRAVENOUS at 17:29

## 2019-01-01 RX ADMIN — OXYCODONE HYDROCHLORIDE AND ACETAMINOPHEN 1000 MG: 500 TABLET ORAL at 09:34

## 2019-01-01 RX ADMIN — ENOXAPARIN SODIUM 40 MG: 40 INJECTION SUBCUTANEOUS at 10:24

## 2019-01-01 RX ADMIN — SIMVASTATIN 40 MG: 40 TABLET, FILM COATED ORAL at 22:24

## 2019-01-01 RX ADMIN — TRAMADOL HYDROCHLORIDE 100 MG: 50 TABLET, COATED ORAL at 09:43

## 2019-01-01 RX ADMIN — OXYCODONE HYDROCHLORIDE AND ACETAMINOPHEN 2 TABLET: 5; 325 TABLET ORAL at 10:51

## 2019-01-01 RX ADMIN — HYDROMORPHONE HYDROCHLORIDE 0.5 MG: 2 INJECTION, SOLUTION INTRAMUSCULAR; INTRAVENOUS; SUBCUTANEOUS at 19:21

## 2019-01-01 RX ADMIN — Medication 2 PUFF: at 22:12

## 2019-01-01 RX ADMIN — RISPERIDONE 1 MG: 0.5 TABLET, FILM COATED ORAL at 20:36

## 2019-01-01 RX ADMIN — FUROSEMIDE 40 MG: 10 INJECTION, SOLUTION INTRAVENOUS at 12:02

## 2019-01-01 RX ADMIN — SODIUM CHLORIDE 1000 ML: 9 INJECTION, SOLUTION INTRAVENOUS at 18:35

## 2019-01-01 RX ADMIN — Medication 2 PUFF: at 20:25

## 2019-01-01 RX ADMIN — SODIUM CHLORIDE: 9 INJECTION, SOLUTION INTRAVENOUS at 12:09

## 2019-01-01 RX ADMIN — ASPIRIN 81 MG: 81 TABLET, COATED ORAL at 09:36

## 2019-01-01 RX ADMIN — BUPROPION HYDROCHLORIDE 300 MG: 150 TABLET, EXTENDED RELEASE ORAL at 09:11

## 2019-01-01 RX ADMIN — Medication 2 PUFF: at 21:00

## 2019-01-01 RX ADMIN — TRAMADOL HYDROCHLORIDE 100 MG: 50 TABLET, COATED ORAL at 10:18

## 2019-01-01 RX ADMIN — Medication 10 ML: at 20:37

## 2019-01-01 RX ADMIN — Medication 400 UNITS: at 10:48

## 2019-01-01 RX ADMIN — SODIUM CHLORIDE: 9 INJECTION, SOLUTION INTRAVENOUS at 20:42

## 2019-01-01 RX ADMIN — IPRATROPIUM BROMIDE AND ALBUTEROL SULFATE 1 AMPULE: .5; 3 SOLUTION RESPIRATORY (INHALATION) at 21:28

## 2019-01-01 RX ADMIN — Medication 1 TABLET: at 09:37

## 2019-01-01 RX ADMIN — OXYCODONE HYDROCHLORIDE AND ACETAMINOPHEN 1000 MG: 500 TABLET ORAL at 10:09

## 2019-01-01 RX ADMIN — SODIUM CHLORIDE, PRESERVATIVE FREE 10 ML: 5 INJECTION INTRAVENOUS at 08:44

## 2019-01-01 RX ADMIN — VASOPRESSIN 1 UNITS: 20 INJECTION INTRAVENOUS at 13:14

## 2019-01-01 RX ADMIN — VANCOMYCIN HYDROCHLORIDE 1500 MG: 5 INJECTION, POWDER, LYOPHILIZED, FOR SOLUTION INTRAVENOUS at 00:23

## 2019-01-01 RX ADMIN — PROPOFOL 60 MG: 10 INJECTION, EMULSION INTRAVENOUS at 15:01

## 2019-01-01 RX ADMIN — VANCOMYCIN HYDROCHLORIDE 1500 MG: 5 INJECTION, POWDER, LYOPHILIZED, FOR SOLUTION INTRAVENOUS at 11:39

## 2019-01-01 RX ADMIN — IPRATROPIUM BROMIDE AND ALBUTEROL SULFATE 1 AMPULE: .5; 3 SOLUTION RESPIRATORY (INHALATION) at 11:25

## 2019-01-01 RX ADMIN — SERTRALINE HYDROCHLORIDE 100 MG: 100 TABLET ORAL at 20:34

## 2019-01-01 RX ADMIN — SODIUM CHLORIDE: 9 INJECTION, SOLUTION INTRAVENOUS at 20:12

## 2019-01-01 RX ADMIN — Medication 2 PUFF: at 00:05

## 2019-01-01 RX ADMIN — ACETAMINOPHEN 1000 MG: 500 TABLET ORAL at 17:25

## 2019-01-01 RX ADMIN — CIPROFLOXACIN HYDROCHLORIDE 500 MG: 500 TABLET, FILM COATED ORAL at 20:37

## 2019-01-01 RX ADMIN — Medication 2 PUFF: at 08:19

## 2019-01-01 RX ADMIN — CALCIUM CARBONATE 500 MG: 500 TABLET, CHEWABLE ORAL at 09:21

## 2019-01-01 RX ADMIN — PANTOPRAZOLE SODIUM 40 MG: 40 TABLET, DELAYED RELEASE ORAL at 06:52

## 2019-01-01 RX ADMIN — Medication 2 PUFF: at 07:40

## 2019-01-01 RX ADMIN — Medication 1 TABLET: at 12:26

## 2019-01-01 RX ADMIN — ASPIRIN 81 MG: 81 TABLET, COATED ORAL at 09:27

## 2019-01-01 RX ADMIN — BACLOFEN 10 MG: 10 TABLET ORAL at 16:59

## 2019-01-01 RX ADMIN — Medication 2 PUFF: at 20:22

## 2019-01-01 RX ADMIN — ASPIRIN 81 MG: 81 TABLET, COATED ORAL at 10:26

## 2019-01-01 RX ADMIN — TRAMADOL HYDROCHLORIDE 100 MG: 50 TABLET, COATED ORAL at 17:04

## 2019-01-01 RX ADMIN — POTASSIUM CHLORIDE 20 MEQ: 29.8 INJECTION, SOLUTION INTRAVENOUS at 11:27

## 2019-01-01 RX ADMIN — NALOXONE HYDROCHLORIDE 0.08 MG: 0.4 INJECTION, SOLUTION INTRAMUSCULAR; INTRAVENOUS; SUBCUTANEOUS at 13:57

## 2019-01-01 RX ADMIN — MONTELUKAST 10 MG: 10 TABLET, FILM COATED ORAL at 21:40

## 2019-01-01 RX ADMIN — INSULIN GLARGINE 30 UNITS: 100 INJECTION, SOLUTION SUBCUTANEOUS at 21:46

## 2019-01-01 RX ADMIN — ENOXAPARIN SODIUM 40 MG: 40 INJECTION SUBCUTANEOUS at 09:34

## 2019-01-01 RX ADMIN — PHENYLEPHRINE HYDROCHLORIDE 100 MCG: 10 INJECTION INTRAVENOUS at 10:04

## 2019-01-01 RX ADMIN — PANTOPRAZOLE SODIUM 40 MG: 40 TABLET, DELAYED RELEASE ORAL at 06:40

## 2019-01-01 RX ADMIN — MEMANTINE 5 MG: 10 TABLET ORAL at 21:41

## 2019-01-01 RX ADMIN — MAGNESIUM SULFATE HEPTAHYDRATE 2 G: 40 INJECTION, SOLUTION INTRAVENOUS at 10:21

## 2019-01-01 RX ADMIN — Medication 10 ML: at 20:58

## 2019-01-01 RX ADMIN — ENOXAPARIN SODIUM 40 MG: 100 INJECTION SUBCUTANEOUS at 10:02

## 2019-01-01 RX ADMIN — ASPIRIN 81 MG: 81 TABLET, COATED ORAL at 09:44

## 2019-01-01 RX ADMIN — Medication 10 ML: at 22:18

## 2019-01-01 RX ADMIN — TRAMADOL HYDROCHLORIDE 100 MG: 50 TABLET, COATED ORAL at 21:19

## 2019-01-01 RX ADMIN — TRAMADOL HYDROCHLORIDE 50 MG: 50 TABLET, FILM COATED ORAL at 09:48

## 2019-01-01 RX ADMIN — SERTRALINE HYDROCHLORIDE 100 MG: 100 TABLET ORAL at 21:48

## 2019-01-01 RX ADMIN — ASPIRIN 81 MG: 81 TABLET, COATED ORAL at 08:58

## 2019-01-01 RX ADMIN — BUPROPION HYDROCHLORIDE 300 MG: 150 TABLET, EXTENDED RELEASE ORAL at 09:41

## 2019-01-01 RX ADMIN — Medication 10 ML: at 21:25

## 2019-01-01 RX ADMIN — TRAMADOL HYDROCHLORIDE 100 MG: 50 TABLET, COATED ORAL at 16:11

## 2019-01-01 RX ADMIN — OXYCODONE HYDROCHLORIDE AND ACETAMINOPHEN 1000 MG: 500 TABLET ORAL at 09:21

## 2019-01-01 RX ADMIN — RISPERIDONE 1 MG: 0.5 TABLET, FILM COATED ORAL at 20:19

## 2019-01-01 RX ADMIN — MORPHINE SULFATE 2 MG: 4 INJECTION, SOLUTION INTRAMUSCULAR; INTRAVENOUS at 02:50

## 2019-01-01 RX ADMIN — IPRATROPIUM BROMIDE AND ALBUTEROL SULFATE 1 AMPULE: .5; 3 SOLUTION RESPIRATORY (INHALATION) at 15:29

## 2019-01-01 RX ADMIN — SPIRONOLACTONE 25 MG: 25 TABLET ORAL at 09:06

## 2019-01-01 RX ADMIN — PRAVASTATIN SODIUM 80 MG: 40 TABLET ORAL at 20:34

## 2019-01-01 RX ADMIN — LIDOCAINE HYDROCHLORIDE 5 ML: 10 INJECTION, SOLUTION EPIDURAL; INFILTRATION; INTRACAUDAL; PERINEURAL at 15:38

## 2019-01-01 RX ADMIN — INSULIN GLARGINE 15 UNITS: 100 INJECTION, SOLUTION SUBCUTANEOUS at 23:51

## 2019-01-01 RX ADMIN — INSULIN LISPRO 2 UNITS: 100 INJECTION, SOLUTION INTRAVENOUS; SUBCUTANEOUS at 10:09

## 2019-01-01 RX ADMIN — OXYCODONE HYDROCHLORIDE AND ACETAMINOPHEN 1000 MG: 500 TABLET ORAL at 09:19

## 2019-01-01 RX ADMIN — SODIUM CHLORIDE, PRESERVATIVE FREE 10 ML: 5 INJECTION INTRAVENOUS at 09:06

## 2019-01-01 RX ADMIN — PRAVASTATIN SODIUM 80 MG: 40 TABLET ORAL at 21:21

## 2019-01-01 RX ADMIN — IPRATROPIUM BROMIDE AND ALBUTEROL SULFATE 1 AMPULE: .5; 3 SOLUTION RESPIRATORY (INHALATION) at 12:07

## 2019-01-01 RX ADMIN — Medication 2 PUFF: at 20:44

## 2019-01-01 RX ADMIN — ROPINIROLE HYDROCHLORIDE 1 MG: 1 TABLET, FILM COATED ORAL at 20:05

## 2019-01-01 RX ADMIN — PHENYLEPHRINE HYDROCHLORIDE 100 MCG: 10 INJECTION INTRAVENOUS at 12:31

## 2019-01-01 RX ADMIN — SERTRALINE HYDROCHLORIDE 100 MG: 100 TABLET ORAL at 10:10

## 2019-01-01 RX ADMIN — Medication 2 PUFF: at 20:14

## 2019-01-01 RX ADMIN — COLLAGENASE SANTYL: 250 OINTMENT TOPICAL at 09:00

## 2019-01-01 RX ADMIN — COLLAGENASE SANTYL: 250 OINTMENT TOPICAL at 10:53

## 2019-01-01 RX ADMIN — ASPIRIN 81 MG: 81 TABLET, COATED ORAL at 10:05

## 2019-01-01 RX ADMIN — BUPROPION HYDROCHLORIDE 300 MG: 150 TABLET, EXTENDED RELEASE ORAL at 09:01

## 2019-01-01 RX ADMIN — COLLAGENASE SANTYL: 250 OINTMENT TOPICAL at 07:45

## 2019-01-01 RX ADMIN — SODIUM CHLORIDE 1000 ML: 9 INJECTION, SOLUTION INTRAVENOUS at 16:19

## 2019-01-01 RX ADMIN — VANCOMYCIN HYDROCHLORIDE 1500 MG: 5 INJECTION, POWDER, LYOPHILIZED, FOR SOLUTION INTRAVENOUS at 15:10

## 2019-01-01 RX ADMIN — ENOXAPARIN SODIUM 40 MG: 40 INJECTION SUBCUTANEOUS at 10:30

## 2019-01-01 RX ADMIN — COLLAGENASE SANTYL: 250 OINTMENT TOPICAL at 10:31

## 2019-01-01 RX ADMIN — TRAMADOL HYDROCHLORIDE 100 MG: 50 TABLET, COATED ORAL at 17:27

## 2019-01-01 RX ADMIN — SODIUM CHLORIDE: 9 INJECTION, SOLUTION INTRAVENOUS at 11:23

## 2019-01-01 RX ADMIN — MEROPENEM 2 G: 1 INJECTION, POWDER, FOR SOLUTION INTRAVENOUS at 21:10

## 2019-01-01 RX ADMIN — LISINOPRIL 20 MG: 20 TABLET ORAL at 08:54

## 2019-01-01 RX ADMIN — CALCIUM CARBONATE 500 MG: 500 TABLET, CHEWABLE ORAL at 22:09

## 2019-01-01 RX ADMIN — ASPIRIN 81 MG: 81 TABLET, COATED ORAL at 09:57

## 2019-01-01 RX ADMIN — ENOXAPARIN SODIUM 40 MG: 40 INJECTION SUBCUTANEOUS at 09:05

## 2019-01-01 RX ADMIN — ASPIRIN 81 MG: 81 TABLET, COATED ORAL at 10:20

## 2019-01-01 RX ADMIN — FLUTICASONE PROPIONATE 1 SPRAY: 50 SPRAY, METERED NASAL at 10:00

## 2019-01-01 RX ADMIN — BUPROPION HYDROCHLORIDE 300 MG: 150 TABLET, FILM COATED, EXTENDED RELEASE ORAL at 09:02

## 2019-01-01 RX ADMIN — MEROPENEM 2 G: 1 INJECTION, POWDER, FOR SOLUTION INTRAVENOUS at 20:07

## 2019-01-01 RX ADMIN — TRAMADOL HYDROCHLORIDE 100 MG: 50 TABLET, COATED ORAL at 05:35

## 2019-01-01 RX ADMIN — PHENYLEPHRINE HYDROCHLORIDE 200 MCG: 10 INJECTION INTRAVENOUS at 13:20

## 2019-01-01 RX ADMIN — MONTELUKAST 10 MG: 10 TABLET, FILM COATED ORAL at 21:13

## 2019-01-01 RX ADMIN — PANTOPRAZOLE SODIUM 40 MG: 40 TABLET, DELAYED RELEASE ORAL at 06:18

## 2019-01-01 RX ADMIN — TRAMADOL HYDROCHLORIDE 100 MG: 50 TABLET, COATED ORAL at 10:09

## 2019-01-01 RX ADMIN — BUPROPION HYDROCHLORIDE 300 MG: 150 TABLET, EXTENDED RELEASE ORAL at 10:29

## 2019-01-01 RX ADMIN — CALCIUM CARBONATE 500 MG: 500 TABLET, CHEWABLE ORAL at 21:13

## 2019-01-01 RX ADMIN — Medication 2 PUFF: at 21:12

## 2019-01-01 RX ADMIN — ONDANSETRON 4 MG: 2 INJECTION INTRAMUSCULAR; INTRAVENOUS at 08:05

## 2019-01-01 RX ADMIN — ROCURONIUM BROMIDE 20 MG: 10 INJECTION INTRAVENOUS at 15:29

## 2019-01-01 RX ADMIN — FLUCONAZOLE 600 MG: 100 TABLET ORAL at 08:36

## 2019-01-01 RX ADMIN — COLLAGENASE SANTYL: 250 OINTMENT TOPICAL at 09:24

## 2019-01-01 RX ADMIN — SIMVASTATIN 40 MG: 40 TABLET, FILM COATED ORAL at 20:05

## 2019-01-01 RX ADMIN — Medication 10 ML: at 21:39

## 2019-01-01 RX ADMIN — IPRATROPIUM BROMIDE AND ALBUTEROL SULFATE 1 AMPULE: .5; 3 SOLUTION RESPIRATORY (INHALATION) at 12:19

## 2019-01-01 RX ADMIN — AMPICILLIN SODIUM AND SULBACTAM SODIUM 3 G: 2; 1 INJECTION, POWDER, FOR SOLUTION INTRAMUSCULAR; INTRAVENOUS at 14:38

## 2019-01-01 RX ADMIN — PRAVASTATIN SODIUM 80 MG: 40 TABLET ORAL at 21:40

## 2019-01-01 RX ADMIN — VANCOMYCIN HYDROCHLORIDE 1500 MG: 5 INJECTION, POWDER, LYOPHILIZED, FOR SOLUTION INTRAVENOUS at 14:01

## 2019-01-01 RX ADMIN — SODIUM CHLORIDE: 9 INJECTION, SOLUTION INTRAVENOUS at 20:56

## 2019-01-01 RX ADMIN — PHENYLEPHRINE HYDROCHLORIDE 200 MCG: 10 INJECTION INTRAVENOUS at 15:35

## 2019-01-01 RX ADMIN — PHENYLEPHRINE HYDROCHLORIDE 100 MCG: 10 INJECTION INTRAVENOUS at 12:33

## 2019-01-01 RX ADMIN — ASPIRIN 81 MG: 81 TABLET, COATED ORAL at 09:30

## 2019-01-01 RX ADMIN — ACETAMINOPHEN 500 MG: 500 TABLET ORAL at 02:31

## 2019-01-01 RX ADMIN — IPRATROPIUM BROMIDE AND ALBUTEROL SULFATE 1 AMPULE: .5; 3 SOLUTION RESPIRATORY (INHALATION) at 07:38

## 2019-01-01 RX ADMIN — OXYCODONE HYDROCHLORIDE AND ACETAMINOPHEN 1000 MG: 500 TABLET ORAL at 09:37

## 2019-01-01 RX ADMIN — SERTRALINE HYDROCHLORIDE 100 MG: 100 TABLET ORAL at 08:30

## 2019-01-01 RX ADMIN — SERTRALINE HYDROCHLORIDE 100 MG: 100 TABLET ORAL at 20:51

## 2019-01-01 RX ADMIN — DOPAMINE HYDROCHLORIDE 2.5 MCG/KG/MIN: 160 INJECTION, SOLUTION INTRAVENOUS at 09:53

## 2019-01-01 RX ADMIN — DEXTROSE MONOHYDRATE 100 MG: 50 INJECTION, SOLUTION INTRAVENOUS at 11:31

## 2019-01-01 RX ADMIN — ENOXAPARIN SODIUM 40 MG: 40 INJECTION SUBCUTANEOUS at 09:44

## 2019-01-01 RX ADMIN — SPIRONOLACTONE 25 MG: 25 TABLET ORAL at 10:35

## 2019-01-01 RX ADMIN — TRAMADOL HYDROCHLORIDE 100 MG: 50 TABLET, COATED ORAL at 02:55

## 2019-01-01 RX ADMIN — COLLAGENASE SANTYL: 250 OINTMENT TOPICAL at 11:33

## 2019-01-01 RX ADMIN — MEROPENEM 2 G: 1 INJECTION, POWDER, FOR SOLUTION INTRAVENOUS at 19:51

## 2019-01-01 RX ADMIN — CALCIUM CARBONATE 500 MG: 500 TABLET, CHEWABLE ORAL at 05:04

## 2019-01-01 RX ADMIN — TRAMADOL HYDROCHLORIDE 100 MG: 50 TABLET, COATED ORAL at 16:06

## 2019-01-01 RX ADMIN — Medication 10 ML: at 17:08

## 2019-01-01 RX ADMIN — LIDOCAINE HYDROCHLORIDE 60 MG: 20 INJECTION, SOLUTION INTRAVENOUS at 09:55

## 2019-01-01 RX ADMIN — Medication 400 UNITS: at 08:41

## 2019-01-01 RX ADMIN — CEFEPIME HYDROCHLORIDE 2 G: 2 INJECTION, POWDER, FOR SOLUTION INTRAVENOUS at 09:50

## 2019-01-01 RX ADMIN — Medication 2 PUFF: at 22:33

## 2019-01-01 RX ADMIN — Medication 10 ML: at 11:22

## 2019-01-01 RX ADMIN — TRAMADOL HYDROCHLORIDE 100 MG: 50 TABLET, COATED ORAL at 22:13

## 2019-01-01 RX ADMIN — Medication 2 PUFF: at 20:11

## 2019-01-01 RX ADMIN — INSULIN LISPRO 1 UNITS: 100 INJECTION, SOLUTION INTRAVENOUS; SUBCUTANEOUS at 22:25

## 2019-01-01 RX ADMIN — ASPIRIN 81 MG: 81 TABLET, COATED ORAL at 09:11

## 2019-01-01 RX ADMIN — PHENYLEPHRINE HYDROCHLORIDE 200 MCG: 10 INJECTION INTRAVENOUS at 12:53

## 2019-01-01 RX ADMIN — IPRATROPIUM BROMIDE AND ALBUTEROL SULFATE 1 AMPULE: .5; 3 SOLUTION RESPIRATORY (INHALATION) at 11:56

## 2019-01-01 RX ADMIN — MONTELUKAST 10 MG: 10 TABLET, FILM COATED ORAL at 20:59

## 2019-01-01 RX ADMIN — VASOPRESSIN 1 UNITS: 20 INJECTION INTRAVENOUS at 12:59

## 2019-01-01 RX ADMIN — ALBUTEROL SULFATE 2 PUFF: 90 AEROSOL, METERED RESPIRATORY (INHALATION) at 10:27

## 2019-01-01 RX ADMIN — MONTELUKAST 10 MG: 10 TABLET, FILM COATED ORAL at 20:18

## 2019-01-01 RX ADMIN — ROPINIROLE HYDROCHLORIDE 1 MG: 1 TABLET, FILM COATED ORAL at 21:15

## 2019-01-01 RX ADMIN — MULTIPLE VITAMINS W/ MINERALS TAB 1 TABLET: TAB at 09:01

## 2019-01-01 RX ADMIN — INSULIN LISPRO 1 UNITS: 100 INJECTION, SOLUTION INTRAVENOUS; SUBCUTANEOUS at 21:44

## 2019-01-01 RX ADMIN — ROPINIROLE HYDROCHLORIDE 1 MG: 1 TABLET, FILM COATED ORAL at 20:37

## 2019-01-01 RX ADMIN — ACETAMINOPHEN 500 MG: 500 TABLET ORAL at 03:45

## 2019-01-01 RX ADMIN — SUGAMMADEX 100 MG: 100 INJECTION, SOLUTION INTRAVENOUS at 14:01

## 2019-01-01 RX ADMIN — BUPROPION HYDROCHLORIDE 300 MG: 150 TABLET, EXTENDED RELEASE ORAL at 08:11

## 2019-01-01 RX ADMIN — ROPINIROLE HYDROCHLORIDE 1 MG: 1 TABLET, FILM COATED ORAL at 21:30

## 2019-01-01 RX ADMIN — Medication 400 UNITS: at 08:26

## 2019-01-01 RX ADMIN — VANCOMYCIN HYDROCHLORIDE 1500 MG: 5 INJECTION, POWDER, LYOPHILIZED, FOR SOLUTION INTRAVENOUS at 01:33

## 2019-01-01 RX ADMIN — POTASSIUM CHLORIDE 10 MEQ: 7.46 INJECTION, SOLUTION INTRAVENOUS at 10:21

## 2019-01-01 RX ADMIN — FLUTICASONE PROPIONATE 1 SPRAY: 50 SPRAY, METERED NASAL at 16:25

## 2019-01-01 RX ADMIN — ENOXAPARIN SODIUM 40 MG: 40 INJECTION SUBCUTANEOUS at 09:39

## 2019-01-01 RX ADMIN — Medication 400 UNITS: at 09:33

## 2019-01-01 RX ADMIN — INSULIN LISPRO 2 UNITS: 100 INJECTION, SOLUTION INTRAVENOUS; SUBCUTANEOUS at 08:00

## 2019-01-01 RX ADMIN — Medication 1 TABLET: at 09:47

## 2019-01-01 RX ADMIN — CALCIUM CARBONATE 500 MG: 500 TABLET, CHEWABLE ORAL at 21:00

## 2019-01-01 RX ADMIN — AMPICILLIN SODIUM AND SULBACTAM SODIUM 3 G: 2; 1 INJECTION, POWDER, FOR SOLUTION INTRAMUSCULAR; INTRAVENOUS at 15:53

## 2019-01-01 RX ADMIN — CALCIUM CARBONATE 500 MG: 500 TABLET, CHEWABLE ORAL at 21:20

## 2019-01-01 RX ADMIN — IPRATROPIUM BROMIDE AND ALBUTEROL SULFATE 1 AMPULE: .5; 3 SOLUTION RESPIRATORY (INHALATION) at 11:12

## 2019-01-01 RX ADMIN — Medication 400 UNITS: at 09:11

## 2019-01-01 RX ADMIN — SIMVASTATIN 40 MG: 40 TABLET, FILM COATED ORAL at 22:16

## 2019-01-01 RX ADMIN — VASOPRESSIN 0.02 UNITS/MIN: 20 INJECTION INTRAVENOUS at 16:15

## 2019-01-01 RX ADMIN — SERTRALINE HYDROCHLORIDE 100 MG: 100 TABLET ORAL at 21:00

## 2019-01-01 RX ADMIN — COLLAGENASE SANTYL: 250 OINTMENT TOPICAL at 10:39

## 2019-01-01 RX ADMIN — FLUTICASONE PROPIONATE 1 SPRAY: 50 SPRAY, METERED NASAL at 10:05

## 2019-01-01 RX ADMIN — TRAMADOL HYDROCHLORIDE 100 MG: 50 TABLET, COATED ORAL at 21:13

## 2019-01-01 RX ADMIN — OXYCODONE HYDROCHLORIDE AND ACETAMINOPHEN 1000 MG: 500 TABLET ORAL at 09:38

## 2019-01-01 RX ADMIN — OXYCODONE HYDROCHLORIDE AND ACETAMINOPHEN 1000 MG: 500 TABLET ORAL at 09:58

## 2019-01-01 RX ADMIN — ROPINIROLE HYDROCHLORIDE 1 MG: 1 TABLET, FILM COATED ORAL at 21:40

## 2019-01-01 RX ADMIN — PHENYLEPHRINE HYDROCHLORIDE 200 MCG: 10 INJECTION INTRAVENOUS at 15:34

## 2019-01-01 RX ADMIN — VANCOMYCIN HYDROCHLORIDE 1500 MG: 5 INJECTION, POWDER, LYOPHILIZED, FOR SOLUTION INTRAVENOUS at 11:02

## 2019-01-01 RX ADMIN — FLUTICASONE PROPIONATE 1 SPRAY: 50 SPRAY, METERED NASAL at 10:03

## 2019-01-01 RX ADMIN — HEPARIN SODIUM AND DEXTROSE 14.2 UNITS/KG/HR: 10000; 5 INJECTION INTRAVENOUS at 18:27

## 2019-01-01 RX ADMIN — SODIUM CHLORIDE, POTASSIUM CHLORIDE, SODIUM LACTATE AND CALCIUM CHLORIDE: 600; 310; 30; 20 INJECTION, SOLUTION INTRAVENOUS at 12:22

## 2019-01-01 RX ADMIN — IPRATROPIUM BROMIDE AND ALBUTEROL SULFATE 1 AMPULE: .5; 3 SOLUTION RESPIRATORY (INHALATION) at 11:52

## 2019-01-01 RX ADMIN — PHENYLEPHRINE HYDROCHLORIDE 300 MCG: 10 INJECTION INTRAVENOUS at 15:32

## 2019-01-01 RX ADMIN — ACETAMINOPHEN 500 MG: 500 TABLET ORAL at 02:14

## 2019-01-01 RX ADMIN — ASPIRIN 81 MG: 81 TABLET, COATED ORAL at 09:03

## 2019-01-01 RX ADMIN — INSULIN LISPRO 4 UNITS: 100 INJECTION, SOLUTION INTRAVENOUS; SUBCUTANEOUS at 17:27

## 2019-01-01 RX ADMIN — Medication 10 ML: at 14:15

## 2019-01-01 RX ADMIN — TROLAMINE SALICYLATE: 10 CREAM TOPICAL at 16:15

## 2019-01-01 RX ADMIN — MEMANTINE HYDROCHLORIDE 5 MG: 5 TABLET ORAL at 22:10

## 2019-01-01 RX ADMIN — SODIUM CHLORIDE: 9 INJECTION, SOLUTION INTRAVENOUS at 14:40

## 2019-01-01 RX ADMIN — Medication 1 TABLET: at 10:55

## 2019-01-01 RX ADMIN — MEROPENEM 2 G: 1 INJECTION, POWDER, FOR SOLUTION INTRAVENOUS at 05:04

## 2019-01-01 RX ADMIN — PRAVASTATIN SODIUM 80 MG: 40 TABLET ORAL at 20:22

## 2019-01-01 RX ADMIN — TRAMADOL HYDROCHLORIDE 50 MG: 50 TABLET, COATED ORAL at 18:39

## 2019-01-01 RX ADMIN — MEROPENEM 2 G: 1 INJECTION, POWDER, FOR SOLUTION INTRAVENOUS at 13:10

## 2019-01-01 RX ADMIN — Medication 1 TABLET: at 08:36

## 2019-01-01 RX ADMIN — MORPHINE SULFATE 2 MG: 4 INJECTION, SOLUTION INTRAMUSCULAR; INTRAVENOUS at 02:42

## 2019-01-01 RX ADMIN — SODIUM CHLORIDE: 9 INJECTION, SOLUTION INTRAVENOUS at 21:49

## 2019-01-01 RX ADMIN — SODIUM CHLORIDE, PRESERVATIVE FREE 10 ML: 5 INJECTION INTRAVENOUS at 21:53

## 2019-01-01 RX ADMIN — ACETAMINOPHEN 500 MG: 500 TABLET ORAL at 22:14

## 2019-01-01 RX ADMIN — OXYCODONE HYDROCHLORIDE AND ACETAMINOPHEN 1000 MG: 500 TABLET ORAL at 12:25

## 2019-01-01 RX ADMIN — AMPICILLIN SODIUM AND SULBACTAM SODIUM 3 G: 2; 1 INJECTION, POWDER, FOR SOLUTION INTRAMUSCULAR; INTRAVENOUS at 09:33

## 2019-01-01 RX ADMIN — SODIUM CHLORIDE, PRESERVATIVE FREE 10 ML: 5 INJECTION INTRAVENOUS at 20:34

## 2019-01-01 RX ADMIN — IPRATROPIUM BROMIDE AND ALBUTEROL SULFATE 1 AMPULE: .5; 3 SOLUTION RESPIRATORY (INHALATION) at 16:27

## 2019-01-01 RX ADMIN — MIDODRINE HYDROCHLORIDE 5 MG: 5 TABLET ORAL at 19:01

## 2019-01-01 RX ADMIN — INSULIN LISPRO 1 UNITS: 100 INJECTION, SOLUTION INTRAVENOUS; SUBCUTANEOUS at 22:12

## 2019-01-01 RX ADMIN — ROCURONIUM BROMIDE 50 MG: 10 INJECTION INTRAVENOUS at 15:01

## 2019-01-01 RX ADMIN — OXYCODONE HYDROCHLORIDE AND ACETAMINOPHEN 1000 MG: 500 TABLET ORAL at 10:01

## 2019-01-01 RX ADMIN — MEROPENEM 2 G: 1 INJECTION, POWDER, FOR SOLUTION INTRAVENOUS at 14:59

## 2019-01-01 RX ADMIN — COLLAGENASE SANTYL: 250 OINTMENT TOPICAL at 08:27

## 2019-01-01 RX ADMIN — SERTRALINE HYDROCHLORIDE 100 MG: 100 TABLET ORAL at 09:38

## 2019-01-01 RX ADMIN — OXYCODONE HYDROCHLORIDE AND ACETAMINOPHEN 1000 MG: 500 TABLET ORAL at 10:25

## 2019-01-01 RX ADMIN — Medication 1 TABLET: at 08:41

## 2019-01-01 RX ADMIN — INSULIN GLARGINE 15 UNITS: 100 INJECTION, SOLUTION SUBCUTANEOUS at 21:44

## 2019-01-01 RX ADMIN — Medication 400 UNITS: at 08:15

## 2019-01-01 RX ADMIN — MORPHINE SULFATE 2 MG: 4 INJECTION, SOLUTION INTRAMUSCULAR; INTRAVENOUS at 00:27

## 2019-01-01 RX ADMIN — INSULIN GLARGINE 30 UNITS: 100 INJECTION, SOLUTION SUBCUTANEOUS at 21:42

## 2019-01-01 RX ADMIN — Medication 1 TABLET: at 22:09

## 2019-01-01 RX ADMIN — MORPHINE SULFATE 2 MG: 4 INJECTION, SOLUTION INTRAMUSCULAR; INTRAVENOUS at 22:32

## 2019-01-01 RX ADMIN — ASPIRIN 81 MG: 81 TABLET, COATED ORAL at 09:14

## 2019-01-01 RX ADMIN — Medication 400 UNITS: at 09:19

## 2019-01-01 RX ADMIN — FLUTICASONE PROPIONATE 1 SPRAY: 50 SPRAY, METERED NASAL at 08:36

## 2019-01-01 RX ADMIN — Medication 400 UNITS: at 10:51

## 2019-01-01 RX ADMIN — OXYCODONE HYDROCHLORIDE AND ACETAMINOPHEN 1000 MG: 500 TABLET ORAL at 08:30

## 2019-01-01 RX ADMIN — OXYCODONE HYDROCHLORIDE AND ACETAMINOPHEN 1000 MG: 500 TABLET ORAL at 08:27

## 2019-01-01 RX ADMIN — SIMVASTATIN 40 MG: 40 TABLET, FILM COATED ORAL at 20:29

## 2019-01-01 RX ADMIN — ACETAMINOPHEN 500 MG: 500 TABLET ORAL at 23:20

## 2019-01-01 RX ADMIN — VANCOMYCIN HYDROCHLORIDE 1500 MG: 5 INJECTION, POWDER, LYOPHILIZED, FOR SOLUTION INTRAVENOUS at 01:07

## 2019-01-01 RX ADMIN — Medication 1 TABLET: at 09:11

## 2019-01-01 RX ADMIN — SERTRALINE HYDROCHLORIDE 100 MG: 100 TABLET ORAL at 08:24

## 2019-01-01 RX ADMIN — DEXTROSE MONOHYDRATE 100 MG: 50 INJECTION, SOLUTION INTRAVENOUS at 11:41

## 2019-01-01 RX ADMIN — Medication 2 PUFF: at 21:29

## 2019-01-01 RX ADMIN — Medication 1 TABLET: at 10:19

## 2019-01-01 RX ADMIN — FENTANYL CITRATE 100 MCG: 50 INJECTION INTRAMUSCULAR; INTRAVENOUS at 07:47

## 2019-01-01 RX ADMIN — Medication 1 TABLET: at 09:54

## 2019-01-01 RX ADMIN — Medication 1 TABLET: at 10:31

## 2019-01-01 RX ADMIN — DEXTROSE AND SODIUM CHLORIDE: 5; 900 INJECTION, SOLUTION INTRAVENOUS at 22:32

## 2019-01-01 RX ADMIN — HYDROMORPHONE HYDROCHLORIDE 0.5 MG: 2 INJECTION, SOLUTION INTRAMUSCULAR; INTRAVENOUS; SUBCUTANEOUS at 09:18

## 2019-01-01 RX ADMIN — FLUTICASONE PROPIONATE 1 SPRAY: 50 SPRAY, METERED NASAL at 10:02

## 2019-01-01 RX ADMIN — OXYCODONE HYDROCHLORIDE AND ACETAMINOPHEN 2 TABLET: 5; 325 TABLET ORAL at 09:02

## 2019-01-01 RX ADMIN — OXYCODONE HYDROCHLORIDE AND ACETAMINOPHEN 1000 MG: 500 TABLET ORAL at 09:48

## 2019-01-01 RX ADMIN — OXYCODONE HYDROCHLORIDE AND ACETAMINOPHEN 1000 MG: 500 TABLET ORAL at 09:11

## 2019-01-01 RX ADMIN — CIPROFLOXACIN HYDROCHLORIDE 500 MG: 500 TABLET, FILM COATED ORAL at 09:32

## 2019-01-01 RX ADMIN — INSULIN GLARGINE 74 UNITS: 100 INJECTION, SOLUTION SUBCUTANEOUS at 20:51

## 2019-01-01 RX ADMIN — SERTRALINE HYDROCHLORIDE 100 MG: 100 TABLET ORAL at 09:41

## 2019-01-01 RX ADMIN — OXYCODONE HYDROCHLORIDE AND ACETAMINOPHEN 1000 MG: 500 TABLET ORAL at 09:03

## 2019-01-01 RX ADMIN — AMPICILLIN SODIUM AND SULBACTAM SODIUM 3 G: 2; 1 INJECTION, POWDER, FOR SOLUTION INTRAMUSCULAR; INTRAVENOUS at 08:24

## 2019-01-01 RX ADMIN — ASPIRIN 81 MG: 81 TABLET, COATED ORAL at 08:16

## 2019-01-01 RX ADMIN — TRAMADOL HYDROCHLORIDE 100 MG: 50 TABLET, COATED ORAL at 05:10

## 2019-01-01 RX ADMIN — SODIUM CHLORIDE, PRESERVATIVE FREE 10 ML: 5 INJECTION INTRAVENOUS at 23:11

## 2019-01-01 RX ADMIN — INSULIN GLARGINE 74 UNITS: 100 INJECTION, SOLUTION SUBCUTANEOUS at 20:59

## 2019-01-01 RX ADMIN — Medication 1 TABLET: at 08:40

## 2019-01-01 RX ADMIN — ROPINIROLE HYDROCHLORIDE 1 MG: 1 TABLET, FILM COATED ORAL at 20:19

## 2019-01-01 RX ADMIN — PANTOPRAZOLE SODIUM 40 MG: 40 TABLET, DELAYED RELEASE ORAL at 05:08

## 2019-01-01 RX ADMIN — ROPINIROLE HYDROCHLORIDE 1 MG: 1 TABLET, FILM COATED ORAL at 21:00

## 2019-01-01 RX ADMIN — ENOXAPARIN SODIUM 40 MG: 40 INJECTION SUBCUTANEOUS at 09:59

## 2019-01-01 RX ADMIN — Medication 2 PUFF: at 07:49

## 2019-01-01 RX ADMIN — SERTRALINE HYDROCHLORIDE 100 MG: 100 TABLET ORAL at 20:22

## 2019-01-01 RX ADMIN — IPRATROPIUM BROMIDE AND ALBUTEROL SULFATE 1 AMPULE: .5; 3 SOLUTION RESPIRATORY (INHALATION) at 07:20

## 2019-01-01 RX ADMIN — VANCOMYCIN HYDROCHLORIDE 1500 MG: 5 INJECTION, POWDER, LYOPHILIZED, FOR SOLUTION INTRAVENOUS at 13:14

## 2019-01-01 RX ADMIN — AMPICILLIN SODIUM AND SULBACTAM SODIUM 3 G: 2; 1 INJECTION, POWDER, FOR SOLUTION INTRAMUSCULAR; INTRAVENOUS at 14:12

## 2019-01-01 RX ADMIN — AMPICILLIN SODIUM AND SULBACTAM SODIUM 3 G: 2; 1 INJECTION, POWDER, FOR SOLUTION INTRAMUSCULAR; INTRAVENOUS at 17:35

## 2019-01-01 RX ADMIN — OXYCODONE HYDROCHLORIDE AND ACETAMINOPHEN 1000 MG: 500 TABLET ORAL at 10:47

## 2019-01-01 RX ADMIN — AMPICILLIN SODIUM AND SULBACTAM SODIUM 3 G: 2; 1 INJECTION, POWDER, FOR SOLUTION INTRAMUSCULAR; INTRAVENOUS at 14:15

## 2019-01-01 RX ADMIN — SIMVASTATIN 40 MG: 40 TABLET, FILM COATED ORAL at 22:09

## 2019-01-01 RX ADMIN — INSULIN GLARGINE 15 UNITS: 100 INJECTION, SOLUTION SUBCUTANEOUS at 22:33

## 2019-01-01 RX ADMIN — OXYCODONE HYDROCHLORIDE AND ACETAMINOPHEN 2 TABLET: 5; 325 TABLET ORAL at 13:57

## 2019-01-01 RX ADMIN — PHENYLEPHRINE HYDROCHLORIDE 200 MCG: 10 INJECTION INTRAVENOUS at 13:25

## 2019-01-01 RX ADMIN — TRAMADOL HYDROCHLORIDE 50 MG: 50 TABLET, FILM COATED ORAL at 21:42

## 2019-01-01 RX ADMIN — AMPICILLIN SODIUM AND SULBACTAM SODIUM 3 G: 2; 1 INJECTION, POWDER, FOR SOLUTION INTRAMUSCULAR; INTRAVENOUS at 12:33

## 2019-01-01 RX ADMIN — CYCLOBENZAPRINE HYDROCHLORIDE 10 MG: 10 TABLET, FILM COATED ORAL at 21:20

## 2019-01-01 RX ADMIN — SERTRALINE HYDROCHLORIDE 100 MG: 100 TABLET ORAL at 09:58

## 2019-01-01 RX ADMIN — LIDOCAINE HYDROCHLORIDE 100 MG: 20 INJECTION, SOLUTION INTRAVENOUS at 15:01

## 2019-01-01 RX ADMIN — INSULIN LISPRO 2 UNITS: 100 INJECTION, SOLUTION INTRAVENOUS; SUBCUTANEOUS at 18:05

## 2019-01-01 RX ADMIN — VANCOMYCIN HYDROCHLORIDE 1500 MG: 5 INJECTION, POWDER, LYOPHILIZED, FOR SOLUTION INTRAVENOUS at 15:25

## 2019-01-01 RX ADMIN — ACETAMINOPHEN 500 MG: 500 TABLET ORAL at 03:05

## 2019-01-01 RX ADMIN — NALOXONE HYDROCHLORIDE 0.08 MG: 0.4 INJECTION, SOLUTION INTRAMUSCULAR; INTRAVENOUS; SUBCUTANEOUS at 14:00

## 2019-01-01 RX ADMIN — Medication 400 UNITS: at 10:25

## 2019-01-01 RX ADMIN — INSULIN LISPRO 2 UNITS: 100 INJECTION, SOLUTION INTRAVENOUS; SUBCUTANEOUS at 13:33

## 2019-01-01 RX ADMIN — Medication 1 TABLET: at 08:23

## 2019-01-01 RX ADMIN — MEROPENEM 2 G: 1 INJECTION, POWDER, FOR SOLUTION INTRAVENOUS at 12:29

## 2019-01-01 RX ADMIN — Medication 10 ML: at 10:30

## 2019-01-01 RX ADMIN — MEMANTINE 5 MG: 10 TABLET ORAL at 21:16

## 2019-01-01 RX ADMIN — TRAMADOL HYDROCHLORIDE 50 MG: 50 TABLET, FILM COATED ORAL at 22:49

## 2019-01-01 RX ADMIN — VANCOMYCIN HYDROCHLORIDE 1500 MG: 5 INJECTION, POWDER, LYOPHILIZED, FOR SOLUTION INTRAVENOUS at 06:25

## 2019-01-01 RX ADMIN — FLUTICASONE PROPIONATE 1 SPRAY: 50 SPRAY, METERED NASAL at 08:59

## 2019-01-01 RX ADMIN — SERTRALINE HYDROCHLORIDE 100 MG: 100 TABLET ORAL at 09:26

## 2019-01-01 RX ADMIN — ROPINIROLE HYDROCHLORIDE 1 MG: 1 TABLET, FILM COATED ORAL at 22:10

## 2019-01-01 RX ADMIN — SIMVASTATIN 40 MG: 40 TABLET, FILM COATED ORAL at 20:37

## 2019-01-01 RX ADMIN — HALOPERIDOL LACTATE 1 MG: 5 INJECTION, SOLUTION INTRAMUSCULAR at 02:30

## 2019-01-01 RX ADMIN — MEROPENEM 2 G: 1 INJECTION, POWDER, FOR SOLUTION INTRAVENOUS at 13:03

## 2019-01-01 RX ADMIN — PANTOPRAZOLE SODIUM 40 MG: 40 INJECTION, POWDER, FOR SOLUTION INTRAVENOUS at 20:37

## 2019-01-01 RX ADMIN — IPRATROPIUM BROMIDE AND ALBUTEROL SULFATE 1 AMPULE: .5; 3 SOLUTION RESPIRATORY (INHALATION) at 11:26

## 2019-01-01 RX ADMIN — CEFEPIME HYDROCHLORIDE 2 G: 2 INJECTION, POWDER, FOR SOLUTION INTRAVENOUS at 23:47

## 2019-01-01 RX ADMIN — IPRATROPIUM BROMIDE AND ALBUTEROL SULFATE 1 AMPULE: .5; 3 SOLUTION RESPIRATORY (INHALATION) at 20:46

## 2019-01-01 RX ADMIN — ONDANSETRON 4 MG: 2 INJECTION INTRAMUSCULAR; INTRAVENOUS at 07:22

## 2019-01-01 RX ADMIN — TRAMADOL HYDROCHLORIDE 100 MG: 50 TABLET, COATED ORAL at 09:21

## 2019-01-01 RX ADMIN — RISPERIDONE 1 MG: 0.5 TABLET, FILM COATED ORAL at 21:00

## 2019-01-01 RX ADMIN — BUPROPION HYDROCHLORIDE 300 MG: 150 TABLET, EXTENDED RELEASE ORAL at 08:58

## 2019-01-01 RX ADMIN — FUROSEMIDE 20 MG: 10 INJECTION, SOLUTION INTRAVENOUS at 15:47

## 2019-01-01 RX ADMIN — CEFEPIME HYDROCHLORIDE 2 G: 2 INJECTION, POWDER, FOR SOLUTION INTRAVENOUS at 17:59

## 2019-01-01 RX ADMIN — VANCOMYCIN HYDROCHLORIDE 1500 MG: 5 INJECTION, POWDER, LYOPHILIZED, FOR SOLUTION INTRAVENOUS at 17:46

## 2019-01-01 RX ADMIN — CEFEPIME 2 G: 2 INJECTION, POWDER, FOR SOLUTION INTRAVENOUS at 12:34

## 2019-01-01 RX ADMIN — MULTIPLE VITAMINS W/ MINERALS TAB 1 TABLET: TAB at 10:29

## 2019-01-01 RX ADMIN — ENOXAPARIN SODIUM 40 MG: 40 INJECTION SUBCUTANEOUS at 09:20

## 2019-01-01 RX ADMIN — INSULIN LISPRO 2 UNITS: 100 INJECTION, SOLUTION INTRAVENOUS; SUBCUTANEOUS at 18:04

## 2019-01-01 RX ADMIN — TRAMADOL HYDROCHLORIDE 100 MG: 50 TABLET, COATED ORAL at 06:56

## 2019-01-01 RX ADMIN — SODIUM CHLORIDE, PRESERVATIVE FREE 10 ML: 5 INJECTION INTRAVENOUS at 10:16

## 2019-01-01 RX ADMIN — ASPIRIN 81 MG: 81 TABLET, COATED ORAL at 10:31

## 2019-01-01 RX ADMIN — Medication 400 UNITS: at 11:15

## 2019-01-01 RX ADMIN — ASPIRIN 81 MG: 81 TABLET, COATED ORAL at 09:21

## 2019-01-01 RX ADMIN — TRAMADOL HYDROCHLORIDE 100 MG: 50 TABLET, COATED ORAL at 14:36

## 2019-01-01 RX ADMIN — IPRATROPIUM BROMIDE AND ALBUTEROL SULFATE 1 AMPULE: .5; 3 SOLUTION RESPIRATORY (INHALATION) at 15:40

## 2019-01-01 RX ADMIN — SIMVASTATIN 40 MG: 40 TABLET, FILM COATED ORAL at 22:08

## 2019-01-01 RX ADMIN — MEROPENEM 2 G: 1 INJECTION, POWDER, FOR SOLUTION INTRAVENOUS at 05:40

## 2019-01-01 RX ADMIN — ASPIRIN 81 MG: 81 TABLET, COATED ORAL at 10:46

## 2019-01-01 RX ADMIN — TRAMADOL HYDROCHLORIDE 50 MG: 50 TABLET, FILM COATED ORAL at 02:50

## 2019-01-01 RX ADMIN — SERTRALINE HYDROCHLORIDE 100 MG: 100 TABLET ORAL at 08:41

## 2019-01-01 RX ADMIN — INSULIN LISPRO 2 UNITS: 100 INJECTION, SOLUTION INTRAVENOUS; SUBCUTANEOUS at 11:31

## 2019-01-01 RX ADMIN — CEFEPIME HYDROCHLORIDE 2 G: 2 INJECTION, POWDER, FOR SOLUTION INTRAVENOUS at 12:05

## 2019-01-01 RX ADMIN — CEFEPIME 2 G: 2 INJECTION, POWDER, FOR SOLUTION INTRAVENOUS at 04:48

## 2019-01-01 RX ADMIN — MONTELUKAST 10 MG: 10 TABLET, FILM COATED ORAL at 21:41

## 2019-01-01 RX ADMIN — PHENYLEPHRINE HYDROCHLORIDE 100 MCG: 10 INJECTION INTRAVENOUS at 12:55

## 2019-01-01 RX ADMIN — BUPROPION HYDROCHLORIDE 300 MG: 150 TABLET, EXTENDED RELEASE ORAL at 08:40

## 2019-01-01 RX ADMIN — COLLAGENASE SANTYL: 250 OINTMENT TOPICAL at 22:45

## 2019-01-01 RX ADMIN — TRAMADOL HYDROCHLORIDE 50 MG: 50 TABLET, FILM COATED ORAL at 21:47

## 2019-01-01 RX ADMIN — INSULIN LISPRO 2 UNITS: 100 INJECTION, SOLUTION INTRAVENOUS; SUBCUTANEOUS at 20:16

## 2019-01-01 RX ADMIN — BUPROPION HYDROCHLORIDE 300 MG: 150 TABLET, EXTENDED RELEASE ORAL at 09:17

## 2019-01-01 RX ADMIN — CIPROFLOXACIN HYDROCHLORIDE 500 MG: 500 TABLET, FILM COATED ORAL at 22:46

## 2019-01-01 RX ADMIN — Medication 400 UNITS: at 09:03

## 2019-01-01 RX ADMIN — RISPERIDONE 1 MG: 0.5 TABLET, FILM COATED ORAL at 22:23

## 2019-01-01 RX ADMIN — OXYCODONE HYDROCHLORIDE AND ACETAMINOPHEN 1000 MG: 500 TABLET ORAL at 08:40

## 2019-01-01 RX ADMIN — Medication 400 UNITS: at 09:27

## 2019-01-01 RX ADMIN — SODIUM CHLORIDE: 9 INJECTION, SOLUTION INTRAVENOUS at 21:15

## 2019-01-01 RX ADMIN — Medication 2 PUFF: at 22:17

## 2019-01-01 RX ADMIN — PRAVASTATIN SODIUM 80 MG: 40 TABLET ORAL at 22:37

## 2019-01-01 RX ADMIN — SODIUM CHLORIDE: 9 INJECTION, SOLUTION INTRAVENOUS at 12:37

## 2019-01-01 RX ADMIN — ONDANSETRON 4 MG: 2 INJECTION INTRAMUSCULAR; INTRAVENOUS at 02:40

## 2019-01-01 RX ADMIN — HEPARIN SODIUM 10000 UNITS: 1000 INJECTION INTRAVENOUS; SUBCUTANEOUS at 18:25

## 2019-01-01 RX ADMIN — INSULIN GLARGINE 30 UNITS: 100 INJECTION, SOLUTION SUBCUTANEOUS at 22:18

## 2019-01-01 RX ADMIN — SERTRALINE HYDROCHLORIDE 100 MG: 100 TABLET ORAL at 08:53

## 2019-01-01 RX ADMIN — Medication 2 PUFF: at 11:48

## 2019-01-01 RX ADMIN — INSULIN LISPRO 4 UNITS: 100 INJECTION, SOLUTION INTRAVENOUS; SUBCUTANEOUS at 16:45

## 2019-01-01 RX ADMIN — IPRATROPIUM BROMIDE AND ALBUTEROL SULFATE 1 AMPULE: .5; 3 SOLUTION RESPIRATORY (INHALATION) at 20:09

## 2019-01-01 RX ADMIN — CALCIUM CARBONATE 500 MG: 500 TABLET, CHEWABLE ORAL at 21:40

## 2019-01-01 RX ADMIN — TRAMADOL HYDROCHLORIDE 100 MG: 50 TABLET, COATED ORAL at 23:43

## 2019-01-01 RX ADMIN — MEROPENEM 2 G: 1 INJECTION, POWDER, FOR SOLUTION INTRAVENOUS at 04:13

## 2019-01-01 RX ADMIN — TRAMADOL HYDROCHLORIDE 100 MG: 50 TABLET, COATED ORAL at 08:45

## 2019-01-01 RX ADMIN — BUPROPION HYDROCHLORIDE 300 MG: 150 TABLET, EXTENDED RELEASE ORAL at 08:37

## 2019-01-01 RX ADMIN — SODIUM CHLORIDE: 9 INJECTION, SOLUTION INTRAVENOUS at 18:39

## 2019-01-01 RX ADMIN — CEFEPIME HYDROCHLORIDE 2 G: 2 INJECTION, POWDER, FOR SOLUTION INTRAVENOUS at 22:47

## 2019-01-01 RX ADMIN — DOPAMINE HYDROCHLORIDE 2.5 MCG/KG/MIN: 160 INJECTION, SOLUTION INTRAVENOUS at 15:36

## 2019-01-01 RX ADMIN — ASPIRIN 81 MG: 81 TABLET, COATED ORAL at 09:38

## 2019-01-01 RX ADMIN — Medication 2 PUFF: at 21:43

## 2019-01-01 RX ADMIN — Medication 2 PUFF: at 11:36

## 2019-01-01 RX ADMIN — FLUTICASONE PROPIONATE 1 SPRAY: 50 SPRAY, METERED NASAL at 13:48

## 2019-01-01 RX ADMIN — SERTRALINE HYDROCHLORIDE 100 MG: 100 TABLET ORAL at 10:25

## 2019-01-01 RX ADMIN — MONTELUKAST 10 MG: 10 TABLET, FILM COATED ORAL at 22:16

## 2019-01-01 RX ADMIN — AMPICILLIN SODIUM AND SULBACTAM SODIUM 3 G: 2; 1 INJECTION, POWDER, FOR SOLUTION INTRAMUSCULAR; INTRAVENOUS at 05:15

## 2019-01-01 RX ADMIN — COLLAGENASE SANTYL: 250 OINTMENT TOPICAL at 20:41

## 2019-01-01 RX ADMIN — Medication 1 TABLET: at 08:30

## 2019-01-01 RX ADMIN — ROPINIROLE HYDROCHLORIDE 1 MG: 1 TABLET, FILM COATED ORAL at 21:13

## 2019-01-01 RX ADMIN — MIDODRINE HYDROCHLORIDE 5 MG: 5 TABLET ORAL at 09:03

## 2019-01-01 RX ADMIN — Medication 2 PUFF: at 20:00

## 2019-01-01 RX ADMIN — AMPICILLIN SODIUM AND SULBACTAM SODIUM 3 G: 2; 1 INJECTION, POWDER, FOR SOLUTION INTRAMUSCULAR; INTRAVENOUS at 21:39

## 2019-01-01 RX ADMIN — LIDOCAINE HYDROCHLORIDE 60 MG: 20 INJECTION, SOLUTION INTRAVENOUS at 07:47

## 2019-01-01 RX ADMIN — INSULIN LISPRO 4 UNITS: 100 INJECTION, SOLUTION INTRAVENOUS; SUBCUTANEOUS at 18:03

## 2019-01-01 RX ADMIN — Medication 1 TABLET: at 09:49

## 2019-01-01 RX ADMIN — Medication 2 PUFF: at 08:00

## 2019-01-01 RX ADMIN — Medication 2 PUFF: at 21:02

## 2019-01-01 RX ADMIN — OXYCODONE HYDROCHLORIDE AND ACETAMINOPHEN 2 TABLET: 5; 325 TABLET ORAL at 22:30

## 2019-01-01 RX ADMIN — ROPINIROLE HYDROCHLORIDE 1 MG: 1 TABLET, FILM COATED ORAL at 20:44

## 2019-01-01 RX ADMIN — ACETAMINOPHEN 500 MG: 500 TABLET ORAL at 14:42

## 2019-01-01 RX ADMIN — FLUTICASONE PROPIONATE 1 SPRAY: 50 SPRAY, METERED NASAL at 10:52

## 2019-01-01 RX ADMIN — SODIUM CHLORIDE: 9 INJECTION, SOLUTION INTRAVENOUS at 09:39

## 2019-01-01 RX ADMIN — MAGNESIUM SULFATE HEPTAHYDRATE 2 G: 40 INJECTION, SOLUTION INTRAVENOUS at 12:23

## 2019-01-01 RX ADMIN — SIMVASTATIN 40 MG: 40 TABLET, FILM COATED ORAL at 20:19

## 2019-01-01 RX ADMIN — ASPIRIN 81 MG: 81 TABLET, COATED ORAL at 08:37

## 2019-01-01 RX ADMIN — VASOPRESSIN 1 UNITS: 20 INJECTION INTRAVENOUS at 13:33

## 2019-01-01 RX ADMIN — PRAVASTATIN SODIUM 80 MG: 40 TABLET ORAL at 21:39

## 2019-01-01 RX ADMIN — SODIUM CHLORIDE, PRESERVATIVE FREE 10 ML: 5 INJECTION INTRAVENOUS at 09:23

## 2019-01-01 RX ADMIN — SERTRALINE HYDROCHLORIDE 100 MG: 100 TABLET ORAL at 09:44

## 2019-01-01 RX ADMIN — MORPHINE SULFATE 2 MG: 2 INJECTION, SOLUTION INTRAMUSCULAR; INTRAVENOUS at 00:55

## 2019-01-01 RX ADMIN — VANCOMYCIN HYDROCHLORIDE 1500 MG: 5 INJECTION, POWDER, LYOPHILIZED, FOR SOLUTION INTRAVENOUS at 21:12

## 2019-01-01 RX ADMIN — Medication 2 PUFF: at 12:08

## 2019-01-01 RX ADMIN — SODIUM CHLORIDE 1000 ML: 9 INJECTION, SOLUTION INTRAVENOUS at 15:11

## 2019-01-01 RX ADMIN — LISINOPRIL 20 MG: 20 TABLET ORAL at 10:05

## 2019-01-01 RX ADMIN — AMPICILLIN SODIUM AND SULBACTAM SODIUM 3 G: 2; 1 INJECTION, POWDER, FOR SOLUTION INTRAMUSCULAR; INTRAVENOUS at 17:01

## 2019-01-01 RX ADMIN — OXYCODONE HYDROCHLORIDE AND ACETAMINOPHEN 1000 MG: 500 TABLET ORAL at 08:54

## 2019-01-01 RX ADMIN — VANCOMYCIN HYDROCHLORIDE 1500 MG: 5 INJECTION, POWDER, LYOPHILIZED, FOR SOLUTION INTRAVENOUS at 11:56

## 2019-01-01 RX ADMIN — SODIUM CHLORIDE: 9 INJECTION, SOLUTION INTRAVENOUS at 17:23

## 2019-01-01 RX ADMIN — ALBUMIN (HUMAN) 250 ML: 12.5 INJECTION, SOLUTION INTRAVENOUS at 13:10

## 2019-01-01 RX ADMIN — ACETAMINOPHEN 1000 MG: 500 TABLET, COATED ORAL at 22:30

## 2019-01-01 RX ADMIN — INSULIN LISPRO 1 UNITS: 100 INJECTION, SOLUTION INTRAVENOUS; SUBCUTANEOUS at 21:23

## 2019-01-01 RX ADMIN — IPRATROPIUM BROMIDE AND ALBUTEROL SULFATE 1 AMPULE: .5; 3 SOLUTION RESPIRATORY (INHALATION) at 08:00

## 2019-01-01 RX ADMIN — TROLAMINE SALICYLATE: 10 CREAM TOPICAL at 01:36

## 2019-01-01 RX ADMIN — CALCIUM CARBONATE 500 MG: 500 TABLET, CHEWABLE ORAL at 21:41

## 2019-01-01 RX ADMIN — MONTELUKAST 10 MG: 10 TABLET, FILM COATED ORAL at 21:20

## 2019-01-01 RX ADMIN — SODIUM CHLORIDE, PRESERVATIVE FREE 10 ML: 5 INJECTION INTRAVENOUS at 20:11

## 2019-01-01 RX ADMIN — INSULIN LISPRO 4 UNITS: 100 INJECTION, SOLUTION INTRAVENOUS; SUBCUTANEOUS at 12:55

## 2019-01-01 RX ADMIN — AMPICILLIN SODIUM AND SULBACTAM SODIUM 3 G: 2; 1 INJECTION, POWDER, FOR SOLUTION INTRAMUSCULAR; INTRAVENOUS at 05:02

## 2019-01-01 RX ADMIN — SODIUM CHLORIDE: 9 INJECTION, SOLUTION INTRAVENOUS at 08:34

## 2019-01-01 RX ADMIN — HYDROMORPHONE HYDROCHLORIDE 0.5 MG: 2 INJECTION, SOLUTION INTRAMUSCULAR; INTRAVENOUS; SUBCUTANEOUS at 12:01

## 2019-01-01 RX ADMIN — Medication 1 TABLET: at 10:09

## 2019-01-01 RX ADMIN — MEMANTINE HYDROCHLORIDE 5 MG: 5 TABLET ORAL at 20:43

## 2019-01-01 RX ADMIN — IPRATROPIUM BROMIDE AND ALBUTEROL SULFATE 1 AMPULE: .5; 3 SOLUTION RESPIRATORY (INHALATION) at 22:11

## 2019-01-01 RX ADMIN — ENOXAPARIN SODIUM 40 MG: 40 INJECTION SUBCUTANEOUS at 09:21

## 2019-01-01 RX ADMIN — SIMVASTATIN 40 MG: 40 TABLET, FILM COATED ORAL at 20:41

## 2019-01-01 RX ADMIN — BACLOFEN 10 MG: 10 TABLET ORAL at 10:20

## 2019-01-01 RX ADMIN — MAGNESIUM OXIDE TAB 400 MG (241.3 MG ELEMENTAL MG) 400 MG: 400 (241.3 MG) TAB at 22:43

## 2019-01-01 RX ADMIN — SERTRALINE HYDROCHLORIDE 100 MG: 100 TABLET ORAL at 09:27

## 2019-01-01 RX ADMIN — Medication 2 PUFF: at 21:08

## 2019-01-01 RX ADMIN — SERTRALINE HYDROCHLORIDE 100 MG: 100 TABLET ORAL at 21:43

## 2019-01-01 RX ADMIN — MONTELUKAST 10 MG: 10 TABLET, FILM COATED ORAL at 22:10

## 2019-01-01 RX ADMIN — BUPROPION HYDROCHLORIDE 300 MG: 150 TABLET, EXTENDED RELEASE ORAL at 12:57

## 2019-01-01 RX ADMIN — Medication 1 TABLET: at 09:44

## 2019-01-01 RX ADMIN — SIMVASTATIN 40 MG: 40 TABLET, FILM COATED ORAL at 22:10

## 2019-01-01 RX ADMIN — ACETAMINOPHEN 650 MG: 325 TABLET ORAL at 21:57

## 2019-01-01 RX ADMIN — MEROPENEM 2 G: 1 INJECTION, POWDER, FOR SOLUTION INTRAVENOUS at 05:51

## 2019-01-01 RX ADMIN — PHENYLEPHRINE HYDROCHLORIDE 100 MCG: 10 INJECTION INTRAVENOUS at 12:46

## 2019-01-01 RX ADMIN — IPRATROPIUM BROMIDE AND ALBUTEROL SULFATE 1 AMPULE: .5; 3 SOLUTION RESPIRATORY (INHALATION) at 11:33

## 2019-01-01 RX ADMIN — VANCOMYCIN HYDROCHLORIDE 1500 MG: 5 INJECTION, POWDER, LYOPHILIZED, FOR SOLUTION INTRAVENOUS at 10:58

## 2019-01-01 RX ADMIN — TRAMADOL HYDROCHLORIDE 50 MG: 50 TABLET, COATED ORAL at 13:51

## 2019-01-01 RX ADMIN — OXYCODONE HYDROCHLORIDE AND ACETAMINOPHEN 1000 MG: 500 TABLET ORAL at 16:15

## 2019-01-01 RX ADMIN — BUPROPION HYDROCHLORIDE 300 MG: 150 TABLET, EXTENDED RELEASE ORAL at 08:24

## 2019-01-01 RX ADMIN — Medication 400 UNITS: at 09:36

## 2019-01-01 RX ADMIN — ASPIRIN 81 MG: 81 TABLET, COATED ORAL at 08:54

## 2019-01-01 RX ADMIN — PANTOPRAZOLE SODIUM 40 MG: 40 TABLET, DELAYED RELEASE ORAL at 05:40

## 2019-01-01 RX ADMIN — ENOXAPARIN SODIUM 40 MG: 40 INJECTION SUBCUTANEOUS at 08:40

## 2019-01-01 RX ADMIN — Medication 1 TABLET: at 09:48

## 2019-01-01 RX ADMIN — NITROGLYCERIN 0.4 MG: 0.4 TABLET, ORALLY DISINTEGRATING SUBLINGUAL at 19:33

## 2019-01-01 RX ADMIN — SODIUM CHLORIDE: 9 INJECTION, SOLUTION INTRAVENOUS at 01:54

## 2019-01-01 RX ADMIN — SIMVASTATIN 40 MG: 40 TABLET, FILM COATED ORAL at 21:00

## 2019-01-01 RX ADMIN — COLLAGENASE SANTYL: 250 OINTMENT TOPICAL at 08:24

## 2019-01-01 RX ADMIN — MAGNESIUM SULFATE HEPTAHYDRATE 4 G: 80 INJECTION, SOLUTION INTRAVENOUS at 15:05

## 2019-01-01 RX ADMIN — INSULIN GLARGINE 10 UNITS: 100 INJECTION, SOLUTION SUBCUTANEOUS at 21:48

## 2019-01-01 RX ADMIN — CALCIUM CARBONATE 500 MG: 500 TABLET, CHEWABLE ORAL at 08:40

## 2019-01-01 RX ADMIN — CIPROFLOXACIN HYDROCHLORIDE 500 MG: 500 TABLET, FILM COATED ORAL at 08:58

## 2019-01-01 RX ADMIN — Medication 1 TABLET: at 08:53

## 2019-01-01 RX ADMIN — ENOXAPARIN SODIUM 40 MG: 40 INJECTION SUBCUTANEOUS at 08:16

## 2019-01-01 RX ADMIN — CEFEPIME 2 G: 2 INJECTION, POWDER, FOR SOLUTION INTRAVENOUS at 22:16

## 2019-01-01 RX ADMIN — Medication 1 TABLET: at 10:32

## 2019-01-01 RX ADMIN — INSULIN LISPRO 1 UNITS: 100 INJECTION, SOLUTION INTRAVENOUS; SUBCUTANEOUS at 22:51

## 2019-01-01 RX ADMIN — SIMVASTATIN 40 MG: 40 TABLET, FILM COATED ORAL at 20:36

## 2019-01-01 RX ADMIN — ACETAMINOPHEN 1000 MG: 10 INJECTION, SOLUTION INTRAVENOUS at 15:48

## 2019-01-01 RX ADMIN — TRAMADOL HYDROCHLORIDE 50 MG: 50 TABLET, COATED ORAL at 17:43

## 2019-01-01 RX ADMIN — ASPIRIN 81 MG: 81 TABLET, COATED ORAL at 09:41

## 2019-01-01 RX ADMIN — COLLAGENASE SANTYL: 250 OINTMENT TOPICAL at 18:20

## 2019-01-01 RX ADMIN — Medication 1 TABLET: at 10:25

## 2019-01-01 RX ADMIN — ROPINIROLE HYDROCHLORIDE 1 MG: 1 TABLET, FILM COATED ORAL at 21:23

## 2019-01-01 RX ADMIN — MEROPENEM 2 G: 1 INJECTION, POWDER, FOR SOLUTION INTRAVENOUS at 20:06

## 2019-01-01 RX ADMIN — MEMANTINE HYDROCHLORIDE 5 MG: 5 TABLET ORAL at 20:05

## 2019-01-01 RX ADMIN — Medication 400 UNITS: at 10:01

## 2019-01-01 RX ADMIN — ASPIRIN 81 MG 324 MG: 81 TABLET ORAL at 22:37

## 2019-01-01 RX ADMIN — TRAMADOL HYDROCHLORIDE 100 MG: 50 TABLET, COATED ORAL at 09:11

## 2019-01-01 RX ADMIN — PANTOPRAZOLE SODIUM 40 MG: 40 TABLET, DELAYED RELEASE ORAL at 05:07

## 2019-01-01 RX ADMIN — OXYCODONE HYDROCHLORIDE AND ACETAMINOPHEN 1000 MG: 500 TABLET ORAL at 09:43

## 2019-01-01 RX ADMIN — ALBUMIN (HUMAN) 250 ML: 12.5 INJECTION, SOLUTION INTRAVENOUS at 16:43

## 2019-01-01 RX ADMIN — INSULIN GLARGINE 15 UNITS: 100 INJECTION, SOLUTION SUBCUTANEOUS at 21:13

## 2019-01-01 RX ADMIN — SODIUM CHLORIDE: 9 INJECTION, SOLUTION INTRAVENOUS at 01:13

## 2019-01-01 RX ADMIN — OXYCODONE HYDROCHLORIDE AND ACETAMINOPHEN 2 TABLET: 5; 325 TABLET ORAL at 18:00

## 2019-01-01 RX ADMIN — SODIUM CHLORIDE, PRESERVATIVE FREE 10 ML: 5 INJECTION INTRAVENOUS at 10:35

## 2019-01-01 RX ADMIN — ROCURONIUM BROMIDE 50 MG: 10 INJECTION INTRAVENOUS at 12:29

## 2019-01-01 RX ADMIN — ALBUMIN (HUMAN) 25 G: 12.5 INJECTION, SOLUTION INTRAVENOUS at 16:51

## 2019-01-01 RX ADMIN — PANTOPRAZOLE SODIUM 40 MG: 40 TABLET, DELAYED RELEASE ORAL at 09:36

## 2019-01-01 RX ADMIN — DEXTROSE MONOHYDRATE 100 MG: 50 INJECTION, SOLUTION INTRAVENOUS at 10:59

## 2019-01-01 RX ADMIN — COLLAGENASE SANTYL: 250 OINTMENT TOPICAL at 09:51

## 2019-01-01 RX ADMIN — AMPICILLIN SODIUM AND SULBACTAM SODIUM 3 G: 2; 1 INJECTION, POWDER, FOR SOLUTION INTRAMUSCULAR; INTRAVENOUS at 17:07

## 2019-01-01 RX ADMIN — Medication 1 TABLET: at 09:29

## 2019-01-01 RX ADMIN — LIDOCAINE HYDROCHLORIDE 100 MG: 20 INJECTION, SOLUTION INTRAVENOUS at 12:59

## 2019-01-01 RX ADMIN — TRAMADOL HYDROCHLORIDE 100 MG: 50 TABLET, COATED ORAL at 11:59

## 2019-01-01 RX ADMIN — MAGNESIUM SULFATE HEPTAHYDRATE 1 G: 1 INJECTION, SOLUTION INTRAVENOUS at 13:07

## 2019-01-01 RX ADMIN — CALCIUM CARBONATE 500 MG: 500 TABLET, CHEWABLE ORAL at 21:31

## 2019-01-01 RX ADMIN — Medication 400 UNITS: at 10:09

## 2019-01-01 RX ADMIN — AMPICILLIN SODIUM AND SULBACTAM SODIUM 3 G: 2; 1 INJECTION, POWDER, FOR SOLUTION INTRAMUSCULAR; INTRAVENOUS at 04:49

## 2019-01-01 RX ADMIN — CIPROFLOXACIN HYDROCHLORIDE 500 MG: 500 TABLET, FILM COATED ORAL at 09:41

## 2019-01-01 RX ADMIN — Medication 2 PUFF: at 20:01

## 2019-01-01 RX ADMIN — TRAMADOL HYDROCHLORIDE 100 MG: 50 TABLET, COATED ORAL at 20:44

## 2019-01-01 RX ADMIN — ENOXAPARIN SODIUM 40 MG: 40 INJECTION SUBCUTANEOUS at 12:26

## 2019-01-01 RX ADMIN — METHOCARBAMOL TABLETS 500 MG: 500 TABLET, COATED ORAL at 21:10

## 2019-01-01 RX ADMIN — ACETAMINOPHEN 500 MG: 500 TABLET ORAL at 13:06

## 2019-01-01 RX ADMIN — PANTOPRAZOLE SODIUM 40 MG: 40 TABLET, DELAYED RELEASE ORAL at 08:39

## 2019-01-01 RX ADMIN — COLLAGENASE SANTYL: 250 OINTMENT TOPICAL at 07:51

## 2019-01-01 RX ADMIN — Medication 1 TABLET: at 08:11

## 2019-01-01 RX ADMIN — ONDANSETRON 4 MG: 2 INJECTION INTRAMUSCULAR; INTRAVENOUS at 10:09

## 2019-01-01 RX ADMIN — CALCIUM CARBONATE 500 MG: 500 TABLET, CHEWABLE ORAL at 10:30

## 2019-01-01 RX ADMIN — MEROPENEM 2 G: 1 INJECTION, POWDER, FOR SOLUTION INTRAVENOUS at 17:35

## 2019-01-01 RX ADMIN — AMPICILLIN SODIUM AND SULBACTAM SODIUM 3 G: 2; 1 INJECTION, POWDER, FOR SOLUTION INTRAMUSCULAR; INTRAVENOUS at 04:07

## 2019-01-01 RX ADMIN — MEROPENEM 2 G: 1 INJECTION, POWDER, FOR SOLUTION INTRAVENOUS at 22:39

## 2019-01-01 RX ADMIN — SODIUM CHLORIDE, PRESERVATIVE FREE 10 ML: 5 INJECTION INTRAVENOUS at 08:25

## 2019-01-01 RX ADMIN — MAGNESIUM SULFATE HEPTAHYDRATE 2 G: 40 INJECTION, SOLUTION INTRAVENOUS at 10:39

## 2019-01-01 RX ADMIN — MEROPENEM 2 G: 1 INJECTION, POWDER, FOR SOLUTION INTRAVENOUS at 14:23

## 2019-01-01 RX ADMIN — FLUTICASONE PROPIONATE 1 SPRAY: 50 SPRAY, METERED NASAL at 09:28

## 2019-01-01 RX ADMIN — Medication 1 TABLET: at 10:34

## 2019-01-01 RX ADMIN — IPRATROPIUM BROMIDE AND ALBUTEROL SULFATE 1 AMPULE: .5; 3 SOLUTION RESPIRATORY (INHALATION) at 11:28

## 2019-01-01 RX ADMIN — TRAMADOL HYDROCHLORIDE 100 MG: 50 TABLET, COATED ORAL at 19:33

## 2019-01-01 RX ADMIN — DEXAMETHASONE SODIUM PHOSPHATE 4 MG: 4 INJECTION, SOLUTION INTRAMUSCULAR; INTRAVENOUS at 13:07

## 2019-01-01 RX ADMIN — ROPINIROLE HYDROCHLORIDE 1 MG: 1 TABLET, FILM COATED ORAL at 20:57

## 2019-01-01 RX ADMIN — Medication 1 TABLET: at 08:58

## 2019-01-01 RX ADMIN — INSULIN GLARGINE 15 UNITS: 100 INJECTION, SOLUTION SUBCUTANEOUS at 22:26

## 2019-01-01 RX ADMIN — Medication 1 TABLET: at 09:03

## 2019-01-01 RX ADMIN — IPRATROPIUM BROMIDE AND ALBUTEROL SULFATE 1 AMPULE: .5; 3 SOLUTION RESPIRATORY (INHALATION) at 15:43

## 2019-01-01 RX ADMIN — ASPIRIN 81 MG: 81 TABLET, COATED ORAL at 08:30

## 2019-01-01 RX ADMIN — Medication 1 TABLET: at 09:21

## 2019-01-01 RX ADMIN — PANTOPRAZOLE SODIUM 40 MG: 40 TABLET, DELAYED RELEASE ORAL at 12:25

## 2019-01-01 RX ADMIN — SERTRALINE HYDROCHLORIDE 100 MG: 100 TABLET ORAL at 08:15

## 2019-01-01 RX ADMIN — INSULIN LISPRO 2 UNITS: 100 INJECTION, SOLUTION INTRAVENOUS; SUBCUTANEOUS at 17:22

## 2019-01-01 RX ADMIN — Medication 2 PUFF: at 07:39

## 2019-01-01 RX ADMIN — Medication 2 PUFF: at 08:36

## 2019-01-01 RX ADMIN — ROPINIROLE HYDROCHLORIDE 1 MG: 1 TABLET, FILM COATED ORAL at 21:20

## 2019-01-01 RX ADMIN — SERTRALINE HYDROCHLORIDE 100 MG: 100 TABLET ORAL at 20:38

## 2019-01-01 RX ADMIN — ASPIRIN 81 MG: 81 TABLET, COATED ORAL at 09:06

## 2019-01-01 RX ADMIN — MONTELUKAST 10 MG: 10 TABLET, FILM COATED ORAL at 21:23

## 2019-01-01 RX ADMIN — ACETAMINOPHEN 500 MG: 500 TABLET ORAL at 06:48

## 2019-01-01 RX ADMIN — INSULIN GLARGINE 15 UNITS: 100 INJECTION, SOLUTION SUBCUTANEOUS at 21:37

## 2019-01-01 RX ADMIN — ENOXAPARIN SODIUM 40 MG: 40 INJECTION SUBCUTANEOUS at 08:27

## 2019-01-01 RX ADMIN — ASPIRIN 81 MG: 81 TABLET, COATED ORAL at 12:26

## 2019-01-01 RX ADMIN — MEROPENEM 2 G: 1 INJECTION, POWDER, FOR SOLUTION INTRAVENOUS at 21:51

## 2019-01-01 RX ADMIN — Medication 1 TABLET: at 12:58

## 2019-01-01 RX ADMIN — SERTRALINE HYDROCHLORIDE 100 MG: 100 TABLET ORAL at 21:15

## 2019-01-01 RX ADMIN — AMPICILLIN SODIUM AND SULBACTAM SODIUM 3 G: 2; 1 INJECTION, POWDER, FOR SOLUTION INTRAMUSCULAR; INTRAVENOUS at 11:13

## 2019-01-01 RX ADMIN — OXYCODONE HYDROCHLORIDE AND ACETAMINOPHEN 1000 MG: 500 TABLET ORAL at 09:16

## 2019-01-01 RX ADMIN — SODIUM CHLORIDE: 9 INJECTION, SOLUTION INTRAVENOUS at 04:50

## 2019-01-01 RX ADMIN — VANCOMYCIN HYDROCHLORIDE 1500 MG: 5 INJECTION, POWDER, LYOPHILIZED, FOR SOLUTION INTRAVENOUS at 06:50

## 2019-01-01 RX ADMIN — TRAMADOL HYDROCHLORIDE 50 MG: 50 TABLET, COATED ORAL at 22:08

## 2019-01-01 RX ADMIN — SODIUM CHLORIDE, PRESERVATIVE FREE 10 ML: 5 INJECTION INTRAVENOUS at 10:51

## 2019-01-01 RX ADMIN — CALCIUM CARBONATE 500 MG: 500 TABLET, CHEWABLE ORAL at 21:34

## 2019-01-01 RX ADMIN — Medication 1 TABLET: at 13:05

## 2019-01-01 RX ADMIN — PANTOPRAZOLE SODIUM 40 MG: 40 TABLET, DELAYED RELEASE ORAL at 06:12

## 2019-01-01 RX ADMIN — FLUCONAZOLE 600 MG: 100 TABLET ORAL at 08:15

## 2019-01-01 RX ADMIN — AMPICILLIN SODIUM AND SULBACTAM SODIUM 3 G: 2; 1 INJECTION, POWDER, FOR SOLUTION INTRAMUSCULAR; INTRAVENOUS at 01:52

## 2019-01-01 RX ADMIN — TRAMADOL HYDROCHLORIDE 50 MG: 50 TABLET, FILM COATED ORAL at 10:58

## 2019-01-01 RX ADMIN — SODIUM CHLORIDE, PRESERVATIVE FREE 10 ML: 5 INJECTION INTRAVENOUS at 22:59

## 2019-01-01 RX ADMIN — Medication 400 UNITS: at 09:53

## 2019-01-01 RX ADMIN — SIMVASTATIN 40 MG: 40 TABLET, FILM COATED ORAL at 20:43

## 2019-01-01 RX ADMIN — TRAMADOL HYDROCHLORIDE 50 MG: 50 TABLET, COATED ORAL at 23:05

## 2019-01-01 RX ADMIN — SIMVASTATIN 40 MG: 40 TABLET, FILM COATED ORAL at 20:59

## 2019-01-01 RX ADMIN — NITROGLYCERIN 0.4 MG: 0.4 TABLET, ORALLY DISINTEGRATING SUBLINGUAL at 20:05

## 2019-01-01 RX ADMIN — ACETAMINOPHEN 500 MG: 500 TABLET ORAL at 02:50

## 2019-01-01 RX ADMIN — AMPICILLIN SODIUM AND SULBACTAM SODIUM 3 G: 2; 1 INJECTION, POWDER, FOR SOLUTION INTRAMUSCULAR; INTRAVENOUS at 05:07

## 2019-01-01 RX ADMIN — POTASSIUM CHLORIDE 20 MEQ: 20 TABLET, EXTENDED RELEASE ORAL at 21:41

## 2019-01-01 RX ADMIN — Medication 10 ML: at 21:18

## 2019-01-01 RX ADMIN — PANTOPRAZOLE SODIUM 40 MG: 40 TABLET, DELAYED RELEASE ORAL at 06:46

## 2019-01-01 RX ADMIN — Medication 1 TABLET: at 09:30

## 2019-01-01 RX ADMIN — IPRATROPIUM BROMIDE AND ALBUTEROL SULFATE 1 AMPULE: .5; 3 SOLUTION RESPIRATORY (INHALATION) at 08:43

## 2019-01-01 RX ADMIN — ASPIRIN 81 MG: 81 TABLET, COATED ORAL at 10:21

## 2019-01-01 RX ADMIN — PROPOFOL 150 MG: 10 INJECTION, EMULSION INTRAVENOUS at 09:55

## 2019-01-01 RX ADMIN — FUROSEMIDE 40 MG: 10 INJECTION, SOLUTION INTRAMUSCULAR; INTRAVENOUS at 10:33

## 2019-01-01 RX ADMIN — IPRATROPIUM BROMIDE AND ALBUTEROL SULFATE 1 AMPULE: .5; 3 SOLUTION RESPIRATORY (INHALATION) at 20:17

## 2019-01-01 RX ADMIN — Medication 1 TABLET: at 10:24

## 2019-01-01 RX ADMIN — TRAMADOL HYDROCHLORIDE 100 MG: 50 TABLET, COATED ORAL at 16:38

## 2019-01-01 RX ADMIN — POTASSIUM CHLORIDE 10 MEQ: 7.46 INJECTION, SOLUTION INTRAVENOUS at 10:53

## 2019-01-01 RX ADMIN — MEROPENEM 2 G: 1 INJECTION, POWDER, FOR SOLUTION INTRAVENOUS at 21:23

## 2019-01-01 RX ADMIN — ROPINIROLE HYDROCHLORIDE 1 MG: 1 TABLET, FILM COATED ORAL at 21:47

## 2019-01-01 RX ADMIN — Medication 100 MG: at 07:48

## 2019-01-01 RX ADMIN — SODIUM CHLORIDE: 9 INJECTION, SOLUTION INTRAVENOUS at 05:54

## 2019-01-01 RX ADMIN — MONTELUKAST 10 MG: 10 TABLET, FILM COATED ORAL at 20:05

## 2019-01-01 RX ADMIN — ROCURONIUM BROMIDE 50 MG: 10 INJECTION INTRAVENOUS at 13:01

## 2019-01-01 RX ADMIN — Medication 1 TABLET: at 11:14

## 2019-01-01 RX ADMIN — ROPINIROLE HYDROCHLORIDE 1 MG: 1 TABLET, FILM COATED ORAL at 20:18

## 2019-01-01 RX ADMIN — BUPROPION HYDROCHLORIDE 300 MG: 150 TABLET, EXTENDED RELEASE ORAL at 11:15

## 2019-01-01 RX ADMIN — Medication 2 PUFF: at 21:50

## 2019-01-01 RX ADMIN — SODIUM CHLORIDE: 9 INJECTION, SOLUTION INTRAVENOUS at 13:45

## 2019-01-01 RX ADMIN — Medication 2 PUFF: at 20:13

## 2019-01-01 RX ADMIN — SODIUM CHLORIDE, PRESERVATIVE FREE 10 ML: 5 INJECTION INTRAVENOUS at 21:14

## 2019-01-01 RX ADMIN — CALCIUM CARBONATE 500 MG: 500 TABLET, CHEWABLE ORAL at 10:09

## 2019-01-01 RX ADMIN — Medication 2 PUFF: at 08:31

## 2019-01-01 RX ADMIN — SIMVASTATIN 40 MG: 40 TABLET, FILM COATED ORAL at 21:26

## 2019-01-01 RX ADMIN — CALCIUM CARBONATE 500 MG: 500 TABLET, CHEWABLE ORAL at 20:44

## 2019-01-01 RX ADMIN — TRAMADOL HYDROCHLORIDE 50 MG: 50 TABLET, COATED ORAL at 16:13

## 2019-01-01 RX ADMIN — RISPERIDONE 1 MG: 0.5 TABLET, FILM COATED ORAL at 20:57

## 2019-01-01 RX ADMIN — COLLAGENASE SANTYL: 250 OINTMENT TOPICAL at 15:30

## 2019-01-01 RX ADMIN — IPRATROPIUM BROMIDE AND ALBUTEROL SULFATE 3 ML: .5; 3 SOLUTION RESPIRATORY (INHALATION) at 01:42

## 2019-01-01 RX ADMIN — BUPROPION HYDROCHLORIDE 300 MG: 150 TABLET, FILM COATED, EXTENDED RELEASE ORAL at 08:27

## 2019-01-01 RX ADMIN — FENTANYL CITRATE 200 MCG: 50 INJECTION INTRAMUSCULAR; INTRAVENOUS at 15:01

## 2019-01-01 RX ADMIN — CEFEPIME 2 G: 2 INJECTION, POWDER, FOR SOLUTION INTRAVENOUS at 13:11

## 2019-01-01 RX ADMIN — BUPROPION HYDROCHLORIDE 300 MG: 150 TABLET, EXTENDED RELEASE ORAL at 10:51

## 2019-01-01 RX ADMIN — AMPICILLIN SODIUM AND SULBACTAM SODIUM 3 G: 2; 1 INJECTION, POWDER, FOR SOLUTION INTRAMUSCULAR; INTRAVENOUS at 17:02

## 2019-01-01 RX ADMIN — VANCOMYCIN HYDROCHLORIDE 1500 MG: 5 INJECTION, POWDER, LYOPHILIZED, FOR SOLUTION INTRAVENOUS at 06:17

## 2019-01-01 RX ADMIN — Medication 400 UNITS: at 16:15

## 2019-01-01 RX ADMIN — INSULIN GLARGINE 15 UNITS: 100 INJECTION, SOLUTION SUBCUTANEOUS at 22:50

## 2019-01-01 RX ADMIN — IPRATROPIUM BROMIDE AND ALBUTEROL SULFATE 1 AMPULE: .5; 3 SOLUTION RESPIRATORY (INHALATION) at 15:06

## 2019-01-01 RX ADMIN — SERTRALINE HYDROCHLORIDE 100 MG: 100 TABLET ORAL at 08:27

## 2019-01-01 RX ADMIN — SODIUM CHLORIDE: 9 INJECTION, SOLUTION INTRAVENOUS at 12:00

## 2019-01-01 RX ADMIN — SODIUM CHLORIDE, PRESERVATIVE FREE 10 ML: 5 INJECTION INTRAVENOUS at 21:58

## 2019-01-01 RX ADMIN — ROPINIROLE HYDROCHLORIDE 1 MG: 1 TABLET, FILM COATED ORAL at 21:41

## 2019-01-01 RX ADMIN — TRAMADOL HYDROCHLORIDE 100 MG: 50 TABLET, COATED ORAL at 02:47

## 2019-01-01 RX ADMIN — CEFEPIME HYDROCHLORIDE 2 G: 2 INJECTION, POWDER, FOR SOLUTION INTRAVENOUS at 01:50

## 2019-01-01 RX ADMIN — CALCIUM CARBONATE 500 MG: 500 TABLET, CHEWABLE ORAL at 09:11

## 2019-01-01 RX ADMIN — TRAMADOL HYDROCHLORIDE 100 MG: 50 TABLET, COATED ORAL at 19:00

## 2019-01-01 RX ADMIN — ALBUMIN, HUMAN INJ 5% 12.5 G: 5 SOLUTION at 17:26

## 2019-01-01 RX ADMIN — SODIUM CHLORIDE, PRESERVATIVE FREE 10 ML: 5 INJECTION INTRAVENOUS at 10:47

## 2019-01-01 RX ADMIN — SODIUM CHLORIDE, PRESERVATIVE FREE 10 ML: 5 INJECTION INTRAVENOUS at 09:20

## 2019-01-01 RX ADMIN — Medication 2 PUFF: at 08:08

## 2019-01-01 RX ADMIN — LIDOCAINE HYDROCHLORIDE 5 ML: 10 INJECTION, SOLUTION EPIDURAL; INFILTRATION; INTRACAUDAL; PERINEURAL at 11:45

## 2019-01-01 RX ADMIN — INSULIN GLARGINE 75 UNITS: 100 INJECTION, SOLUTION SUBCUTANEOUS at 22:53

## 2019-01-01 RX ADMIN — INSULIN GLARGINE 40 UNITS: 100 INJECTION, SOLUTION SUBCUTANEOUS at 21:54

## 2019-01-01 RX ADMIN — ASPIRIN 81 MG: 81 TABLET, COATED ORAL at 10:51

## 2019-01-01 RX ADMIN — CEFEPIME HYDROCHLORIDE 2 G: 2 INJECTION, POWDER, FOR SOLUTION INTRAVENOUS at 01:36

## 2019-01-01 RX ADMIN — Medication 10 ML: at 20:22

## 2019-01-01 RX ADMIN — AMPICILLIN SODIUM AND SULBACTAM SODIUM 3 G: 2; 1 INJECTION, POWDER, FOR SOLUTION INTRAMUSCULAR; INTRAVENOUS at 06:47

## 2019-01-01 RX ADMIN — Medication 1 TABLET: at 11:23

## 2019-01-01 RX ADMIN — ASPIRIN 81 MG: 81 TABLET, COATED ORAL at 10:30

## 2019-01-01 RX ADMIN — CALCIUM CARBONATE 500 MG: 500 TABLET, CHEWABLE ORAL at 20:36

## 2019-01-01 RX ADMIN — IPRATROPIUM BROMIDE AND ALBUTEROL SULFATE 1 AMPULE: .5; 3 SOLUTION RESPIRATORY (INHALATION) at 15:11

## 2019-01-01 RX ADMIN — INSULIN LISPRO 2 UNITS: 100 INJECTION, SOLUTION INTRAVENOUS; SUBCUTANEOUS at 13:04

## 2019-01-01 RX ADMIN — SODIUM CHLORIDE, PRESERVATIVE FREE 10 ML: 5 INJECTION INTRAVENOUS at 08:41

## 2019-01-01 RX ADMIN — SERTRALINE HYDROCHLORIDE 100 MG: 100 TABLET ORAL at 22:17

## 2019-01-01 RX ADMIN — IOPAMIDOL 80 ML: 755 INJECTION, SOLUTION INTRAVENOUS at 19:08

## 2019-01-01 RX ADMIN — SODIUM CHLORIDE, PRESERVATIVE FREE 10 ML: 5 INJECTION INTRAVENOUS at 20:28

## 2019-01-01 RX ADMIN — IPRATROPIUM BROMIDE AND ALBUTEROL SULFATE 3 ML: .5; 3 SOLUTION RESPIRATORY (INHALATION) at 08:22

## 2019-01-01 RX ADMIN — ENOXAPARIN SODIUM 40 MG: 40 INJECTION SUBCUTANEOUS at 10:29

## 2019-01-01 RX ADMIN — OXYCODONE HYDROCHLORIDE AND ACETAMINOPHEN 1000 MG: 500 TABLET ORAL at 10:26

## 2019-01-01 RX ADMIN — MEROPENEM 2 G: 1 INJECTION, POWDER, FOR SOLUTION INTRAVENOUS at 22:58

## 2019-01-01 RX ADMIN — IPRATROPIUM BROMIDE AND ALBUTEROL SULFATE 1 AMPULE: .5; 3 SOLUTION RESPIRATORY (INHALATION) at 08:17

## 2019-01-01 RX ADMIN — OXYCODONE HYDROCHLORIDE AND ACETAMINOPHEN 1000 MG: 500 TABLET ORAL at 10:05

## 2019-01-01 RX ADMIN — TRAMADOL HYDROCHLORIDE 50 MG: 50 TABLET, FILM COATED ORAL at 08:40

## 2019-01-01 RX ADMIN — VANCOMYCIN HYDROCHLORIDE 1500 MG: 5 INJECTION, POWDER, LYOPHILIZED, FOR SOLUTION INTRAVENOUS at 09:23

## 2019-01-01 RX ADMIN — CALCIUM CARBONATE 500 MG: 500 TABLET, CHEWABLE ORAL at 11:15

## 2019-01-01 RX ADMIN — SODIUM CHLORIDE, POTASSIUM CHLORIDE, SODIUM LACTATE AND CALCIUM CHLORIDE: 600; 310; 30; 20 INJECTION, SOLUTION INTRAVENOUS at 16:30

## 2019-01-01 RX ADMIN — RISPERIDONE 1 MG: 0.5 TABLET, FILM COATED ORAL at 20:44

## 2019-01-01 RX ADMIN — DOXYCYCLINE HYCLATE 100 MG: 100 TABLET, COATED ORAL at 12:18

## 2019-01-01 RX ADMIN — Medication 400 UNITS: at 08:30

## 2019-01-01 RX ADMIN — AMPICILLIN SODIUM AND SULBACTAM SODIUM 3 G: 2; 1 INJECTION, POWDER, FOR SOLUTION INTRAMUSCULAR; INTRAVENOUS at 08:36

## 2019-01-01 RX ADMIN — OXYCODONE HYDROCHLORIDE AND ACETAMINOPHEN 2 TABLET: 5; 325 TABLET ORAL at 11:23

## 2019-01-01 RX ADMIN — POTASSIUM CHLORIDE 20 MEQ: 20 TABLET, EXTENDED RELEASE ORAL at 09:06

## 2019-01-01 RX ADMIN — MORPHINE SULFATE 4 MG: 4 INJECTION, SOLUTION INTRAMUSCULAR; INTRAVENOUS at 21:10

## 2019-01-01 RX ADMIN — MONTELUKAST 10 MG: 10 TABLET, FILM COATED ORAL at 20:50

## 2019-01-01 RX ADMIN — TRAMADOL HYDROCHLORIDE 100 MG: 50 TABLET, COATED ORAL at 13:57

## 2019-01-01 RX ADMIN — POTASSIUM CHLORIDE 40 MEQ: 20 TABLET, EXTENDED RELEASE ORAL at 17:38

## 2019-01-01 RX ADMIN — ASPIRIN 81 MG: 81 TABLET, COATED ORAL at 08:11

## 2019-01-01 RX ADMIN — TRAMADOL HYDROCHLORIDE 100 MG: 50 TABLET, COATED ORAL at 14:33

## 2019-01-01 RX ADMIN — MIDODRINE HYDROCHLORIDE 5 MG: 5 TABLET ORAL at 14:05

## 2019-01-01 RX ADMIN — TRAMADOL HYDROCHLORIDE 50 MG: 50 TABLET, COATED ORAL at 14:05

## 2019-01-01 RX ADMIN — COLLAGENASE SANTYL: 250 OINTMENT TOPICAL at 10:20

## 2019-01-01 RX ADMIN — MEMANTINE HYDROCHLORIDE 5 MG: 5 TABLET ORAL at 20:18

## 2019-01-01 RX ADMIN — PHENYLEPHRINE HYDROCHLORIDE 200 MCG: 10 INJECTION INTRAVENOUS at 15:20

## 2019-01-01 RX ADMIN — DEXTROSE MONOHYDRATE 100 MG: 50 INJECTION, SOLUTION INTRAVENOUS at 11:16

## 2019-01-01 RX ADMIN — BUPROPION HYDROCHLORIDE 300 MG: 150 TABLET, EXTENDED RELEASE ORAL at 09:03

## 2019-01-01 RX ADMIN — ACETAMINOPHEN 500 MG: 500 TABLET ORAL at 18:44

## 2019-01-01 RX ADMIN — Medication 10 ML: at 12:17

## 2019-01-01 RX ADMIN — Medication 2 PUFF: at 08:58

## 2019-01-01 RX ADMIN — TRAMADOL HYDROCHLORIDE 100 MG: 50 TABLET, COATED ORAL at 09:59

## 2019-01-01 RX ADMIN — VANCOMYCIN HYDROCHLORIDE 1500 MG: 5 INJECTION, POWDER, LYOPHILIZED, FOR SOLUTION INTRAVENOUS at 06:27

## 2019-01-01 RX ADMIN — MAGNESIUM SULFATE HEPTAHYDRATE 1 G: 1 INJECTION, SOLUTION INTRAVENOUS at 11:39

## 2019-01-01 RX ADMIN — Medication 400 UNITS: at 09:44

## 2019-01-01 RX ADMIN — HYDROMORPHONE HYDROCHLORIDE 4 MG: 2 INJECTION, SOLUTION INTRAMUSCULAR; INTRAVENOUS; SUBCUTANEOUS at 11:13

## 2019-01-01 RX ADMIN — BACLOFEN 10 MG: 10 TABLET ORAL at 22:09

## 2019-01-01 RX ADMIN — Medication 400 UNITS: at 10:35

## 2019-01-01 RX ADMIN — SERTRALINE HYDROCHLORIDE 100 MG: 100 TABLET ORAL at 09:19

## 2019-01-01 RX ADMIN — SIMVASTATIN 40 MG: 40 TABLET, FILM COATED ORAL at 22:30

## 2019-01-01 RX ADMIN — CEFEPIME HYDROCHLORIDE 2 G: 2 INJECTION, POWDER, FOR SOLUTION INTRAVENOUS at 01:02

## 2019-01-01 RX ADMIN — COLLAGENASE SANTYL: 250 OINTMENT TOPICAL at 16:15

## 2019-01-01 RX ADMIN — Medication 400 UNITS: at 10:10

## 2019-01-01 RX ADMIN — CIPROFLOXACIN HYDROCHLORIDE 500 MG: 500 TABLET, FILM COATED ORAL at 16:14

## 2019-01-01 RX ADMIN — HYDROCODONE BITARTRATE AND ACETAMINOPHEN 1 TABLET: 5; 325 TABLET ORAL at 15:30

## 2019-01-01 RX ADMIN — INSULIN LISPRO 2 UNITS: 100 INJECTION, SOLUTION INTRAVENOUS; SUBCUTANEOUS at 16:18

## 2019-01-01 RX ADMIN — MEMANTINE 5 MG: 10 TABLET ORAL at 20:37

## 2019-01-01 RX ADMIN — VANCOMYCIN HYDROCHLORIDE 1500 MG: 5 INJECTION, POWDER, LYOPHILIZED, FOR SOLUTION INTRAVENOUS at 13:55

## 2019-01-01 RX ADMIN — CALCIUM CARBONATE 500 MG: 500 TABLET, CHEWABLE ORAL at 20:25

## 2019-01-01 RX ADMIN — CEFEPIME HYDROCHLORIDE 2 G: 2 INJECTION, POWDER, FOR SOLUTION INTRAVENOUS at 18:20

## 2019-01-01 RX ADMIN — INSULIN GLARGINE 15 UNITS: 100 INJECTION, SOLUTION SUBCUTANEOUS at 20:46

## 2019-01-01 RX ADMIN — METRONIDAZOLE 500 MG: 500 INJECTION, SOLUTION INTRAVENOUS at 00:31

## 2019-01-01 RX ADMIN — Medication 1 TABLET: at 09:53

## 2019-01-01 RX ADMIN — SIMVASTATIN 40 MG: 40 TABLET, FILM COATED ORAL at 21:43

## 2019-01-01 RX ADMIN — Medication 2 PUFF: at 07:44

## 2019-01-01 RX ADMIN — ENOXAPARIN SODIUM 40 MG: 40 INJECTION SUBCUTANEOUS at 08:58

## 2019-01-01 RX ADMIN — ASPIRIN 81 MG: 81 TABLET, COATED ORAL at 11:14

## 2019-01-01 RX ADMIN — DOPAMINE HYDROCHLORIDE 2.5 MCG/KG/MIN: 160 INJECTION, SOLUTION INTRAVENOUS at 01:15

## 2019-01-01 RX ADMIN — INSULIN LISPRO 2 UNITS: 100 INJECTION, SOLUTION INTRAVENOUS; SUBCUTANEOUS at 22:28

## 2019-01-01 RX ADMIN — PANTOPRAZOLE SODIUM 40 MG: 40 TABLET, DELAYED RELEASE ORAL at 07:02

## 2019-01-01 RX ADMIN — PROPOFOL 20 MG: 10 INJECTION, EMULSION INTRAVENOUS at 15:03

## 2019-01-01 RX ADMIN — SERTRALINE HYDROCHLORIDE 100 MG: 100 TABLET ORAL at 10:30

## 2019-01-01 RX ADMIN — SERTRALINE HYDROCHLORIDE 100 MG: 100 TABLET ORAL at 21:35

## 2019-01-01 RX ADMIN — MEROPENEM 2 G: 1 INJECTION, POWDER, FOR SOLUTION INTRAVENOUS at 06:40

## 2019-01-01 RX ADMIN — CIPROFLOXACIN HYDROCHLORIDE 500 MG: 500 TABLET, FILM COATED ORAL at 21:31

## 2019-01-01 RX ADMIN — SERTRALINE HYDROCHLORIDE 100 MG: 100 TABLET ORAL at 20:18

## 2019-01-01 RX ADMIN — FLUCONAZOLE 600 MG: 100 TABLET ORAL at 09:26

## 2019-01-01 RX ADMIN — COLLAGENASE SANTYL: 250 OINTMENT TOPICAL at 18:45

## 2019-01-01 RX ADMIN — MONTELUKAST 10 MG: 10 TABLET, FILM COATED ORAL at 21:34

## 2019-01-01 RX ADMIN — CALCIUM CARBONATE 500 MG: 500 TABLET, CHEWABLE ORAL at 09:06

## 2019-01-01 RX ADMIN — FUROSEMIDE 20 MG: 10 INJECTION, SOLUTION INTRAVENOUS at 10:34

## 2019-01-01 RX ADMIN — MONTELUKAST 10 MG: 10 TABLET, FILM COATED ORAL at 22:23

## 2019-01-01 RX ADMIN — SODIUM CHLORIDE, POTASSIUM CHLORIDE, SODIUM LACTATE AND CALCIUM CHLORIDE 2535 ML: 600; 310; 30; 20 INJECTION, SOLUTION INTRAVENOUS at 16:55

## 2019-01-01 RX ADMIN — BACLOFEN 10 MG: 10 TABLET ORAL at 12:59

## 2019-01-01 RX ADMIN — HYDROMORPHONE HYDROCHLORIDE 0.5 MG: 2 INJECTION, SOLUTION INTRAMUSCULAR; INTRAVENOUS; SUBCUTANEOUS at 06:49

## 2019-01-01 RX ADMIN — AMPICILLIN SODIUM AND SULBACTAM SODIUM 3 G: 2; 1 INJECTION, POWDER, FOR SOLUTION INTRAMUSCULAR; INTRAVENOUS at 23:51

## 2019-01-01 RX ADMIN — SODIUM CHLORIDE, PRESERVATIVE FREE 10 ML: 5 INJECTION INTRAVENOUS at 07:51

## 2019-01-01 RX ADMIN — CALCIUM CARBONATE 500 MG: 500 TABLET, CHEWABLE ORAL at 04:27

## 2019-01-01 RX ADMIN — VANCOMYCIN HYDROCHLORIDE 1500 MG: 5 INJECTION, POWDER, LYOPHILIZED, FOR SOLUTION INTRAVENOUS at 19:00

## 2019-01-01 RX ADMIN — HYDROMORPHONE HYDROCHLORIDE 4 MG: 2 INJECTION, SOLUTION INTRAMUSCULAR; INTRAVENOUS; SUBCUTANEOUS at 10:07

## 2019-01-01 RX ADMIN — DEXTROSE MONOHYDRATE: 50 INJECTION, SOLUTION INTRAVENOUS at 12:22

## 2019-01-01 RX ADMIN — Medication 2 PUFF: at 21:46

## 2019-01-01 RX ADMIN — RISPERIDONE 1 MG: 0.5 TABLET, FILM COATED ORAL at 22:10

## 2019-01-01 RX ADMIN — IPRATROPIUM BROMIDE AND ALBUTEROL SULFATE 1 AMPULE: .5; 3 SOLUTION RESPIRATORY (INHALATION) at 15:21

## 2019-01-01 RX ADMIN — ENOXAPARIN SODIUM 40 MG: 40 INJECTION SUBCUTANEOUS at 10:11

## 2019-01-01 RX ADMIN — OXYCODONE HYDROCHLORIDE AND ACETAMINOPHEN 1000 MG: 500 TABLET ORAL at 10:22

## 2019-01-01 RX ADMIN — SERTRALINE HYDROCHLORIDE 100 MG: 100 TABLET ORAL at 22:02

## 2019-01-01 RX ADMIN — DOPAMINE HYDROCHLORIDE 2.5 MCG/KG/MIN: 160 INJECTION, SOLUTION INTRAVENOUS at 06:58

## 2019-01-01 RX ADMIN — TRAMADOL HYDROCHLORIDE 50 MG: 50 TABLET, COATED ORAL at 02:07

## 2019-01-01 RX ADMIN — SERTRALINE HYDROCHLORIDE 100 MG: 100 TABLET ORAL at 09:53

## 2019-01-01 RX ADMIN — AMPICILLIN SODIUM AND SULBACTAM SODIUM 3 G: 2; 1 INJECTION, POWDER, FOR SOLUTION INTRAMUSCULAR; INTRAVENOUS at 14:19

## 2019-01-01 RX ADMIN — RISPERIDONE 1 MG: 0.5 TABLET, FILM COATED ORAL at 21:20

## 2019-01-01 RX ADMIN — HALOPERIDOL LACTATE 1 MG: 5 INJECTION, SOLUTION INTRAMUSCULAR at 21:01

## 2019-01-01 RX ADMIN — PANTOPRAZOLE SODIUM 40 MG: 40 INJECTION, POWDER, FOR SOLUTION INTRAVENOUS at 09:20

## 2019-01-01 RX ADMIN — BUPROPION HYDROCHLORIDE 300 MG: 150 TABLET, EXTENDED RELEASE ORAL at 12:25

## 2019-01-01 RX ADMIN — BUPROPION HYDROCHLORIDE 300 MG: 150 TABLET, FILM COATED, EXTENDED RELEASE ORAL at 08:41

## 2019-01-01 RX ADMIN — PHENYLEPHRINE HYDROCHLORIDE 100 MCG: 10 INJECTION INTRAVENOUS at 11:13

## 2019-01-01 RX ADMIN — PANTOPRAZOLE SODIUM 40 MG: 40 TABLET, DELAYED RELEASE ORAL at 04:21

## 2019-01-01 RX ADMIN — ENOXAPARIN SODIUM 40 MG: 40 INJECTION SUBCUTANEOUS at 10:19

## 2019-01-01 RX ADMIN — ENOXAPARIN SODIUM 40 MG: 100 INJECTION SUBCUTANEOUS at 10:52

## 2019-01-01 RX ADMIN — AMPICILLIN SODIUM AND SULBACTAM SODIUM 3 G: 2; 1 INJECTION, POWDER, FOR SOLUTION INTRAMUSCULAR; INTRAVENOUS at 17:05

## 2019-01-01 RX ADMIN — RISPERIDONE 1 MG: 0.5 TABLET, FILM COATED ORAL at 21:31

## 2019-01-01 RX ADMIN — TRAMADOL HYDROCHLORIDE 50 MG: 50 TABLET, COATED ORAL at 11:39

## 2019-01-01 RX ADMIN — COLLAGENASE SANTYL: 250 OINTMENT TOPICAL at 16:23

## 2019-01-01 RX ADMIN — Medication 2 PUFF: at 20:09

## 2019-01-01 RX ADMIN — SODIUM CHLORIDE: 9 INJECTION, SOLUTION INTRAVENOUS at 09:50

## 2019-01-01 RX ADMIN — SODIUM CHLORIDE, PRESERVATIVE FREE 10 ML: 5 INJECTION INTRAVENOUS at 21:38

## 2019-01-01 RX ADMIN — IOPAMIDOL 100 ML: 755 INJECTION, SOLUTION INTRAVENOUS at 23:38

## 2019-01-01 RX ADMIN — BUPROPION HYDROCHLORIDE 300 MG: 150 TABLET, EXTENDED RELEASE ORAL at 09:57

## 2019-01-01 RX ADMIN — ROCURONIUM BROMIDE 10 MG: 10 INJECTION INTRAVENOUS at 15:36

## 2019-01-01 RX ADMIN — SERTRALINE HYDROCHLORIDE 100 MG: 100 TABLET ORAL at 09:16

## 2019-01-01 RX ADMIN — PANTOPRAZOLE SODIUM 40 MG: 40 TABLET, DELAYED RELEASE ORAL at 05:02

## 2019-01-01 RX ADMIN — CEFTAZIDIME, AVIBACTAM 2.5 G: 2; .5 POWDER, FOR SOLUTION INTRAVENOUS at 16:17

## 2019-01-01 RX ADMIN — ASPIRIN 81 MG: 81 TABLET, COATED ORAL at 08:24

## 2019-01-01 RX ADMIN — Medication 400 UNITS: at 09:06

## 2019-01-01 RX ADMIN — VANCOMYCIN HYDROCHLORIDE 1500 MG: 5 INJECTION, POWDER, LYOPHILIZED, FOR SOLUTION INTRAVENOUS at 03:55

## 2019-01-01 RX ADMIN — Medication 1 TABLET: at 10:04

## 2019-01-01 RX ADMIN — BUPROPION HYDROCHLORIDE 300 MG: 150 TABLET, EXTENDED RELEASE ORAL at 10:05

## 2019-01-01 RX ADMIN — ACETAMINOPHEN 1000 MG: 10 INJECTION, SOLUTION INTRAVENOUS at 13:31

## 2019-01-01 RX ADMIN — AMPICILLIN SODIUM AND SULBACTAM SODIUM 3 G: 2; 1 INJECTION, POWDER, FOR SOLUTION INTRAMUSCULAR; INTRAVENOUS at 02:22

## 2019-01-01 RX ADMIN — Medication 1 TABLET: at 10:47

## 2019-01-01 RX ADMIN — SERTRALINE HYDROCHLORIDE 100 MG: 100 TABLET ORAL at 10:06

## 2019-01-01 RX ADMIN — PHENYLEPHRINE HYDROCHLORIDE 100 MCG: 10 INJECTION INTRAVENOUS at 10:09

## 2019-01-01 RX ADMIN — HYDROMORPHONE HYDROCHLORIDE 4 MG: 2 INJECTION, SOLUTION INTRAMUSCULAR; INTRAVENOUS; SUBCUTANEOUS at 08:32

## 2019-01-01 RX ADMIN — ACETAMINOPHEN 500 MG: 500 TABLET ORAL at 20:45

## 2019-01-01 RX ADMIN — TRAMADOL HYDROCHLORIDE 100 MG: 50 TABLET, COATED ORAL at 01:46

## 2019-01-01 RX ADMIN — TRAMADOL HYDROCHLORIDE 100 MG: 50 TABLET, COATED ORAL at 15:04

## 2019-01-01 RX ADMIN — TRAMADOL HYDROCHLORIDE 50 MG: 50 TABLET, COATED ORAL at 05:43

## 2019-01-01 RX ADMIN — Medication 400 UNITS: at 10:20

## 2019-01-01 RX ADMIN — BUPROPION HYDROCHLORIDE 300 MG: 150 TABLET, EXTENDED RELEASE ORAL at 10:19

## 2019-01-01 RX ADMIN — MEROPENEM 2 G: 1 INJECTION, POWDER, FOR SOLUTION INTRAVENOUS at 17:50

## 2019-01-01 RX ADMIN — RISPERIDONE 1 MG: 0.5 TABLET, FILM COATED ORAL at 21:40

## 2019-01-01 RX ADMIN — INSULIN GLARGINE 15 UNITS: 100 INJECTION, SOLUTION SUBCUTANEOUS at 21:03

## 2019-01-01 RX ADMIN — TRAMADOL HYDROCHLORIDE 100 MG: 50 TABLET, COATED ORAL at 10:59

## 2019-01-01 RX ADMIN — PANTOPRAZOLE SODIUM 40 MG: 40 TABLET, DELAYED RELEASE ORAL at 06:02

## 2019-01-01 RX ADMIN — OXYCODONE HYDROCHLORIDE AND ACETAMINOPHEN 1000 MG: 500 TABLET ORAL at 10:30

## 2019-01-01 RX ADMIN — DEXTROSE MONOHYDRATE 200 MG: 50 INJECTION, SOLUTION INTRAVENOUS at 13:35

## 2019-01-01 RX ADMIN — PANTOPRAZOLE SODIUM 40 MG: 40 INJECTION, POWDER, FOR SOLUTION INTRAVENOUS at 21:14

## 2019-01-01 RX ADMIN — INSULIN GLARGINE 74 UNITS: 100 INJECTION, SOLUTION SUBCUTANEOUS at 20:46

## 2019-01-01 RX ADMIN — MORPHINE SULFATE 2 MG: 4 INJECTION, SOLUTION INTRAMUSCULAR; INTRAVENOUS at 12:47

## 2019-01-01 RX ADMIN — Medication 1 CAPSULE: at 10:21

## 2019-01-01 RX ADMIN — Medication 10 ML: at 08:17

## 2019-01-01 RX ADMIN — ASPIRIN 81 MG: 81 TABLET, COATED ORAL at 08:27

## 2019-01-01 RX ADMIN — BUPROPION HYDROCHLORIDE 300 MG: 150 TABLET, EXTENDED RELEASE ORAL at 09:44

## 2019-01-01 RX ADMIN — SODIUM CHLORIDE: 9 INJECTION, SOLUTION INTRAVENOUS at 01:05

## 2019-01-01 RX ADMIN — ACETAMINOPHEN 500 MG: 500 TABLET ORAL at 22:31

## 2019-01-01 ASSESSMENT — ENCOUNTER SYMPTOMS
RESPIRATORY NEGATIVE: 1
RESPIRATORY NEGATIVE: 1
EYES NEGATIVE: 1
COUGH: 0
RESPIRATORY NEGATIVE: 1
ALLERGIC/IMMUNOLOGIC NEGATIVE: 1
ALLERGIC/IMMUNOLOGIC NEGATIVE: 1
COUGH: 0
RECTAL PAIN: 0
EYES NEGATIVE: 1
EYES NEGATIVE: 1
CHOKING: 0
RECTAL PAIN: 0
SHORTNESS OF BREATH: 1
EYE REDNESS: 0
SHORTNESS OF BREATH: 0
EYES NEGATIVE: 1
PHOTOPHOBIA: 0
ABDOMINAL DISTENTION: 0
ANAL BLEEDING: 0
ALLERGIC/IMMUNOLOGIC NEGATIVE: 1
EYE PAIN: 0
SINUS PRESSURE: 0
BACK PAIN: 0
RESPIRATORY NEGATIVE: 1
EYE REDNESS: 0
ALLERGIC/IMMUNOLOGIC NEGATIVE: 1
COLOR CHANGE: 0
ALLERGIC/IMMUNOLOGIC NEGATIVE: 1
RESPIRATORY NEGATIVE: 1
ALLERGIC/IMMUNOLOGIC NEGATIVE: 1
CONSTIPATION: 0
RESPIRATORY NEGATIVE: 1
EYES NEGATIVE: 1
BACK PAIN: 0
COLOR CHANGE: 0
ALLERGIC/IMMUNOLOGIC NEGATIVE: 1
ALLERGIC/IMMUNOLOGIC NEGATIVE: 1
CONSTIPATION: 0
BACK PAIN: 1
GASTROINTESTINAL NEGATIVE: 1
GASTROINTESTINAL NEGATIVE: 1
SORE THROAT: 0
GASTROINTESTINAL NEGATIVE: 1
SHORTNESS OF BREATH: 1
GASTROINTESTINAL NEGATIVE: 1
RESPIRATORY NEGATIVE: 1
ALLERGIC/IMMUNOLOGIC NEGATIVE: 1
SORE THROAT: 0
NAUSEA: 0
ALLERGIC/IMMUNOLOGIC NEGATIVE: 1
ALLERGIC/IMMUNOLOGIC NEGATIVE: 1
EYES NEGATIVE: 1
SINUS PAIN: 0
CONSTIPATION: 0
ALLERGIC/IMMUNOLOGIC NEGATIVE: 1
GASTROINTESTINAL NEGATIVE: 1
EYES NEGATIVE: 1
ALLERGIC/IMMUNOLOGIC NEGATIVE: 1
GASTROINTESTINAL NEGATIVE: 1
GASTROINTESTINAL NEGATIVE: 1
ALLERGIC/IMMUNOLOGIC NEGATIVE: 1
GASTROINTESTINAL NEGATIVE: 1
SORE THROAT: 0
ABDOMINAL PAIN: 0
EYES NEGATIVE: 1
RESPIRATORY NEGATIVE: 1
ALLERGIC/IMMUNOLOGIC NEGATIVE: 1
VOMITING: 0
GASTROINTESTINAL NEGATIVE: 1
RESPIRATORY NEGATIVE: 1
ANAL BLEEDING: 0
ALLERGIC/IMMUNOLOGIC NEGATIVE: 1
RESPIRATORY NEGATIVE: 1
EYES NEGATIVE: 1
GASTROINTESTINAL NEGATIVE: 1
SHORTNESS OF BREATH: 1
EYES NEGATIVE: 1
ABDOMINAL PAIN: 1
DIARRHEA: 0
PHOTOPHOBIA: 0
ABDOMINAL PAIN: 1
EYES NEGATIVE: 1
EYES NEGATIVE: 1
EYE ITCHING: 0
APNEA: 0
BACK PAIN: 0
GASTROINTESTINAL NEGATIVE: 1
STRIDOR: 0
CHOKING: 0
EYES NEGATIVE: 1
ALLERGIC/IMMUNOLOGIC NEGATIVE: 1
EYE ITCHING: 0
BACK PAIN: 0
EYE ITCHING: 0
SHORTNESS OF BREATH: 1
RECTAL PAIN: 0
APNEA: 0
EYES NEGATIVE: 1
PHOTOPHOBIA: 0
GASTROINTESTINAL NEGATIVE: 1
ALLERGIC/IMMUNOLOGIC NEGATIVE: 1
SORE THROAT: 0
EYES NEGATIVE: 1
GASTROINTESTINAL NEGATIVE: 1
CHOKING: 0
GASTROINTESTINAL NEGATIVE: 1
RHINORRHEA: 0
ALLERGIC/IMMUNOLOGIC NEGATIVE: 1
RESPIRATORY NEGATIVE: 1
EYES NEGATIVE: 1
EYE REDNESS: 0
CHEST TIGHTNESS: 0
EYE REDNESS: 0
ALLERGIC/IMMUNOLOGIC NEGATIVE: 1
CONSTIPATION: 0
RESPIRATORY NEGATIVE: 1
GASTROINTESTINAL NEGATIVE: 1
COLOR CHANGE: 0
RESPIRATORY NEGATIVE: 1
APNEA: 0
GASTROINTESTINAL NEGATIVE: 1
EYES NEGATIVE: 1
EYES NEGATIVE: 1
VOMITING: 0
ABDOMINAL PAIN: 0
SHORTNESS OF BREATH: 1
GASTROINTESTINAL NEGATIVE: 1
BACK PAIN: 0
GASTROINTESTINAL NEGATIVE: 1
GASTROINTESTINAL NEGATIVE: 1
SORE THROAT: 0
EYE ITCHING: 0
STRIDOR: 0
RESPIRATORY NEGATIVE: 1
RESPIRATORY NEGATIVE: 1
ALLERGIC/IMMUNOLOGIC NEGATIVE: 1
ANAL BLEEDING: 0
STRIDOR: 0
NAUSEA: 0
EYES NEGATIVE: 1
RESPIRATORY NEGATIVE: 1
EYES NEGATIVE: 1
COLOR CHANGE: 0
RESPIRATORY NEGATIVE: 1
GASTROINTESTINAL NEGATIVE: 1
EYE DISCHARGE: 0
SHORTNESS OF BREATH: 1
RESPIRATORY NEGATIVE: 1
ABDOMINAL PAIN: 1
RESPIRATORY NEGATIVE: 1
EYES NEGATIVE: 1
GASTROINTESTINAL NEGATIVE: 1

## 2019-01-01 ASSESSMENT — PULMONARY FUNCTION TESTS
PIF_VALUE: 20
PIF_VALUE: 11
PIF_VALUE: 13
PIF_VALUE: 17
PIF_VALUE: 0
PIF_VALUE: 23
PIF_VALUE: 18
PIF_VALUE: 13
PIF_VALUE: 22
PIF_VALUE: 23
PIF_VALUE: 0
PIF_VALUE: 11
PIF_VALUE: 22
PIF_VALUE: 22
PIF_VALUE: 12
PIF_VALUE: 0
PIF_VALUE: 13
PIF_VALUE: 23
PIF_VALUE: 29
PIF_VALUE: 8
PIF_VALUE: 0
PIF_VALUE: 20
PIF_VALUE: 16
PIF_VALUE: 12
PIF_VALUE: 3
PIF_VALUE: 19
PIF_VALUE: 3
PIF_VALUE: 0
PIF_VALUE: 25
PIF_VALUE: 7
PIF_VALUE: 12
PIF_VALUE: 21
PIF_VALUE: 20
PIF_VALUE: 6
PIF_VALUE: 20
PIF_VALUE: 2
PIF_VALUE: 24
PIF_VALUE: 23
PIF_VALUE: 23
PIF_VALUE: 19
PIF_VALUE: 12
PIF_VALUE: 1
PIF_VALUE: 20
PIF_VALUE: 24
PIF_VALUE: 1
PIF_VALUE: 0
PIF_VALUE: 22
PIF_VALUE: 22
PIF_VALUE: 23
PIF_VALUE: 22
PIF_VALUE: 17
PIF_VALUE: 15
PIF_VALUE: 5
PIF_VALUE: 19
PIF_VALUE: 23
PIF_VALUE: 4
PIF_VALUE: 22
PIF_VALUE: 0
PIF_VALUE: 2
PIF_VALUE: 23
PIF_VALUE: 23
PIF_VALUE: 20
PIF_VALUE: 19
PIF_VALUE: 26
PIF_VALUE: 12
PIF_VALUE: 12
PIF_VALUE: 14
PIF_VALUE: 26
PIF_VALUE: 19
PIF_VALUE: 13
PIF_VALUE: 24
PIF_VALUE: 25
PIF_VALUE: 22
PIF_VALUE: 23
PIF_VALUE: 20
PIF_VALUE: 12
PIF_VALUE: 3
PIF_VALUE: 6
PIF_VALUE: 12
PIF_VALUE: 5
PIF_VALUE: 22
PIF_VALUE: 28
PIF_VALUE: 12
PIF_VALUE: 26
PIF_VALUE: 12
PIF_VALUE: 0
PIF_VALUE: 22
PIF_VALUE: 22
PIF_VALUE: 20
PIF_VALUE: 22
PIF_VALUE: 19
PIF_VALUE: 17
PIF_VALUE: 5
PIF_VALUE: 20
PIF_VALUE: 23
PIF_VALUE: 5
PIF_VALUE: 27
PIF_VALUE: 24
PIF_VALUE: 12
PIF_VALUE: 22
PIF_VALUE: 29
PIF_VALUE: 12
PIF_VALUE: 24
PIF_VALUE: 13
PIF_VALUE: 24
PIF_VALUE: 12
PIF_VALUE: 2
PIF_VALUE: 3
PIF_VALUE: 21
PIF_VALUE: 19
PIF_VALUE: 20
PIF_VALUE: 19
PIF_VALUE: 26
PIF_VALUE: 23
PIF_VALUE: 16
PIF_VALUE: 12
PIF_VALUE: 13
PIF_VALUE: 25
PIF_VALUE: 4
PIF_VALUE: 4
PIF_VALUE: 2
PIF_VALUE: 26
PIF_VALUE: 23
PIF_VALUE: 7
PIF_VALUE: 20
PIF_VALUE: 1
PIF_VALUE: 13
PIF_VALUE: 10
PIF_VALUE: 14
PIF_VALUE: 25
PIF_VALUE: 0
PIF_VALUE: 23
PIF_VALUE: 22
PIF_VALUE: 25
PIF_VALUE: 3
PIF_VALUE: 23
PIF_VALUE: 25
PIF_VALUE: 12
PIF_VALUE: 24
PIF_VALUE: 6
PIF_VALUE: 14
PIF_VALUE: 24
PIF_VALUE: 37
PIF_VALUE: 21
PIF_VALUE: 1
PIF_VALUE: 22
PIF_VALUE: 19
PIF_VALUE: 1
PIF_VALUE: 5
PIF_VALUE: 19
PIF_VALUE: 26
PIF_VALUE: 23
PIF_VALUE: 19
PIF_VALUE: 24
PIF_VALUE: 13
PIF_VALUE: 20
PIF_VALUE: 20
PIF_VALUE: 16
PIF_VALUE: 22
PIF_VALUE: 26
PIF_VALUE: 1
PIF_VALUE: 12
PIF_VALUE: 19
PIF_VALUE: 27
PIF_VALUE: 29
PIF_VALUE: 16
PIF_VALUE: 13
PIF_VALUE: 2
PIF_VALUE: 13
PIF_VALUE: 0
PIF_VALUE: 23
PIF_VALUE: 12
PIF_VALUE: 6
PIF_VALUE: 23
PIF_VALUE: 22
PIF_VALUE: 12
PIF_VALUE: 21
PIF_VALUE: 19
PIF_VALUE: 0
PIF_VALUE: 13
PIF_VALUE: 3
PIF_VALUE: 16
PIF_VALUE: 15
PIF_VALUE: 22
PIF_VALUE: 22
PIF_VALUE: 10
PIF_VALUE: 20
PIF_VALUE: 3
PIF_VALUE: 20
PIF_VALUE: 21
PIF_VALUE: 22
PIF_VALUE: 0
PIF_VALUE: 21
PIF_VALUE: 24
PIF_VALUE: 9
PIF_VALUE: 12
PIF_VALUE: 24
PIF_VALUE: 24
PIF_VALUE: 22
PIF_VALUE: 2
PIF_VALUE: 23
PIF_VALUE: 0
PIF_VALUE: 3
PIF_VALUE: 7
PIF_VALUE: 12
PIF_VALUE: 20
PIF_VALUE: 1
PIF_VALUE: 29
PIF_VALUE: 23
PIF_VALUE: 23
PIF_VALUE: 20
PIF_VALUE: 22
PIF_VALUE: 21
PIF_VALUE: 1
PIF_VALUE: 7
PIF_VALUE: 18
PIF_VALUE: 23
PIF_VALUE: 22
PIF_VALUE: 23
PIF_VALUE: 23
PIF_VALUE: 25
PIF_VALUE: 18
PIF_VALUE: 12
PIF_VALUE: 12
PIF_VALUE: 24
PIF_VALUE: 12
PIF_VALUE: 23
PIF_VALUE: 22
PIF_VALUE: 14
PIF_VALUE: 22
PIF_VALUE: 0
PIF_VALUE: 23
PIF_VALUE: 0
PIF_VALUE: 4
PIF_VALUE: 16
PIF_VALUE: 22
PIF_VALUE: 2
PIF_VALUE: 20
PIF_VALUE: 25
PIF_VALUE: 12
PIF_VALUE: 20
PIF_VALUE: 6
PIF_VALUE: 12
PIF_VALUE: 22
PIF_VALUE: 14
PIF_VALUE: 24
PIF_VALUE: 23
PIF_VALUE: 22
PIF_VALUE: 25
PIF_VALUE: 1
PIF_VALUE: 1
PIF_VALUE: 0
PIF_VALUE: 23
PIF_VALUE: 20
PIF_VALUE: 16
PIF_VALUE: 20
PIF_VALUE: 16
PIF_VALUE: 26
PIF_VALUE: 12
PIF_VALUE: 23
PIF_VALUE: 28
PIF_VALUE: 4
PIF_VALUE: 23
PIF_VALUE: 24
PIF_VALUE: 21
PIF_VALUE: 20
PIF_VALUE: 6
PIF_VALUE: 12
PIF_VALUE: 23
PIF_VALUE: 0
PIF_VALUE: 20
PIF_VALUE: 22
PIF_VALUE: 20
PIF_VALUE: 12
PIF_VALUE: 23
PIF_VALUE: 19
PIF_VALUE: 20
PIF_VALUE: 26
PIF_VALUE: 6
PIF_VALUE: 21
PIF_VALUE: 5
PIF_VALUE: 3
PIF_VALUE: 24
PIF_VALUE: 12
PIF_VALUE: 21
PIF_VALUE: 16
PIF_VALUE: 12
PIF_VALUE: 14
PIF_VALUE: 6
PIF_VALUE: 22
PIF_VALUE: 11
PIF_VALUE: 22
PIF_VALUE: 12
PIF_VALUE: 0
PIF_VALUE: 19
PIF_VALUE: 5
PIF_VALUE: 5
PIF_VALUE: 22
PIF_VALUE: 20
PIF_VALUE: 26
PIF_VALUE: 26
PIF_VALUE: 19
PIF_VALUE: 26
PIF_VALUE: 20
PIF_VALUE: 23
PIF_VALUE: 24
PIF_VALUE: 23
PIF_VALUE: 23
PIF_VALUE: 24
PIF_VALUE: 23
PIF_VALUE: 0
PIF_VALUE: 4
PIF_VALUE: 19
PIF_VALUE: 12
PIF_VALUE: 20
PIF_VALUE: 25
PIF_VALUE: 26
PIF_VALUE: 26
PIF_VALUE: 23
PIF_VALUE: 22
PIF_VALUE: 1
PIF_VALUE: 23
PIF_VALUE: 25
PIF_VALUE: 23
PIF_VALUE: 23
PIF_VALUE: 3
PIF_VALUE: 12
PIF_VALUE: 20
PIF_VALUE: 23
PIF_VALUE: 4
PIF_VALUE: 20
PIF_VALUE: 26
PIF_VALUE: 12
PIF_VALUE: 23
PIF_VALUE: 19
PIF_VALUE: 25
PIF_VALUE: 12
PIF_VALUE: 0
PIF_VALUE: 23
PIF_VALUE: 5
PIF_VALUE: 12
PIF_VALUE: 22
PIF_VALUE: 12
PIF_VALUE: 12
PIF_VALUE: 20
PIF_VALUE: 25
PIF_VALUE: 26
PIF_VALUE: 19
PIF_VALUE: 20
PIF_VALUE: 12
PIF_VALUE: 25
PIF_VALUE: 15
PIF_VALUE: 23
PIF_VALUE: 20
PIF_VALUE: 15
PIF_VALUE: 22
PIF_VALUE: 20
PIF_VALUE: 26
PIF_VALUE: 22
PIF_VALUE: 20
PIF_VALUE: 22
PIF_VALUE: 18
PIF_VALUE: 26
PIF_VALUE: 11
PIF_VALUE: 1
PIF_VALUE: 23
PIF_VALUE: 17
PIF_VALUE: 19
PIF_VALUE: 16
PIF_VALUE: 0
PIF_VALUE: 19
PIF_VALUE: 24
PIF_VALUE: 5
PIF_VALUE: 19
PIF_VALUE: 19
PIF_VALUE: 12
PIF_VALUE: 23
PIF_VALUE: 0
PIF_VALUE: 20
PIF_VALUE: 23
PIF_VALUE: 12
PIF_VALUE: 12
PIF_VALUE: 23
PIF_VALUE: 25
PIF_VALUE: 20
PIF_VALUE: 24
PIF_VALUE: 20
PIF_VALUE: 20
PIF_VALUE: 2
PIF_VALUE: 23
PIF_VALUE: 7
PIF_VALUE: 29
PIF_VALUE: 4
PIF_VALUE: 24
PIF_VALUE: 22
PIF_VALUE: 0
PIF_VALUE: 20
PIF_VALUE: 6
PIF_VALUE: 0
PIF_VALUE: 22
PIF_VALUE: 24
PIF_VALUE: 12
PIF_VALUE: 20
PIF_VALUE: 20
PIF_VALUE: 25
PIF_VALUE: 13
PIF_VALUE: 12
PIF_VALUE: 20
PIF_VALUE: 23
PIF_VALUE: 6
PIF_VALUE: 25
PIF_VALUE: 3
PIF_VALUE: 29
PIF_VALUE: 1
PIF_VALUE: 18
PIF_VALUE: 4
PIF_VALUE: 0
PIF_VALUE: 21
PIF_VALUE: 20
PIF_VALUE: 24
PIF_VALUE: 20
PIF_VALUE: 12
PIF_VALUE: 23
PIF_VALUE: 19
PIF_VALUE: 22
PIF_VALUE: 5
PIF_VALUE: 24
PIF_VALUE: 0
PIF_VALUE: 25
PIF_VALUE: 20
PIF_VALUE: 20
PIF_VALUE: 12
PIF_VALUE: 3
PIF_VALUE: 22
PIF_VALUE: 23
PIF_VALUE: 23
PIF_VALUE: 13
PIF_VALUE: 20
PIF_VALUE: 19
PIF_VALUE: 12
PIF_VALUE: 22
PIF_VALUE: 24
PIF_VALUE: 27
PIF_VALUE: 20
PIF_VALUE: 23
PIF_VALUE: 21
PIF_VALUE: 24
PIF_VALUE: 5
PIF_VALUE: 23
PIF_VALUE: 17
PIF_VALUE: 0
PIF_VALUE: 6
PIF_VALUE: 5
PIF_VALUE: 26
PIF_VALUE: 26
PIF_VALUE: 1
PIF_VALUE: 24
PIF_VALUE: 11
PIF_VALUE: 6
PIF_VALUE: 26
PIF_VALUE: 21
PIF_VALUE: 4
PIF_VALUE: 19
PIF_VALUE: 24
PIF_VALUE: 12
PIF_VALUE: 24
PIF_VALUE: 27
PIF_VALUE: 1
PIF_VALUE: 0
PIF_VALUE: 21
PIF_VALUE: 19
PIF_VALUE: 20
PIF_VALUE: 22
PIF_VALUE: 7
PIF_VALUE: 24
PIF_VALUE: 19
PIF_VALUE: 23
PIF_VALUE: 23
PIF_VALUE: 4
PIF_VALUE: 26
PIF_VALUE: 9
PIF_VALUE: 2
PIF_VALUE: 22
PIF_VALUE: 6
PIF_VALUE: 23
PIF_VALUE: 0
PIF_VALUE: 19
PIF_VALUE: 24
PIF_VALUE: 22
PIF_VALUE: 19
PIF_VALUE: 14
PIF_VALUE: 25
PIF_VALUE: 18
PIF_VALUE: 23
PIF_VALUE: 26
PIF_VALUE: 15
PIF_VALUE: 20
PIF_VALUE: 12
PIF_VALUE: 22
PIF_VALUE: 23
PIF_VALUE: 25
PIF_VALUE: 23
PIF_VALUE: 25
PIF_VALUE: 20
PIF_VALUE: 1
PIF_VALUE: 20

## 2019-01-01 ASSESSMENT — PAIN SCALES - GENERAL
PAINLEVEL_OUTOF10: 1
PAINLEVEL_OUTOF10: 0
PAINLEVEL_OUTOF10: 9
PAINLEVEL_OUTOF10: 0
PAINLEVEL_OUTOF10: 7
PAINLEVEL_OUTOF10: 8
PAINLEVEL_OUTOF10: 9
PAINLEVEL_OUTOF10: 5
PAINLEVEL_OUTOF10: 0
PAINLEVEL_OUTOF10: 5
PAINLEVEL_OUTOF10: 5
PAINLEVEL_OUTOF10: 4
PAINLEVEL_OUTOF10: 10
PAINLEVEL_OUTOF10: 7
PAINLEVEL_OUTOF10: 0
PAINLEVEL_OUTOF10: 0
PAINLEVEL_OUTOF10: 6
PAINLEVEL_OUTOF10: 0
PAINLEVEL_OUTOF10: 5
PAINLEVEL_OUTOF10: 5
PAINLEVEL_OUTOF10: 0
PAINLEVEL_OUTOF10: 4
PAINLEVEL_OUTOF10: 5
PAINLEVEL_OUTOF10: 3
PAINLEVEL_OUTOF10: 5
PAINLEVEL_OUTOF10: 0
PAINLEVEL_OUTOF10: 8
PAINLEVEL_OUTOF10: 4
PAINLEVEL_OUTOF10: 5
PAINLEVEL_OUTOF10: 6
PAINLEVEL_OUTOF10: 8
PAINLEVEL_OUTOF10: 0
PAINLEVEL_OUTOF10: 7
PAINLEVEL_OUTOF10: 1
PAINLEVEL_OUTOF10: 0
PAINLEVEL_OUTOF10: 8
PAINLEVEL_OUTOF10: 5
PAINLEVEL_OUTOF10: 10
PAINLEVEL_OUTOF10: 4
PAINLEVEL_OUTOF10: 0
PAINLEVEL_OUTOF10: 6
PAINLEVEL_OUTOF10: 0
PAINLEVEL_OUTOF10: 7
PAINLEVEL_OUTOF10: 9
PAINLEVEL_OUTOF10: 3
PAINLEVEL_OUTOF10: 5
PAINLEVEL_OUTOF10: 0
PAINLEVEL_OUTOF10: 7
PAINLEVEL_OUTOF10: 5
PAINLEVEL_OUTOF10: 0
PAINLEVEL_OUTOF10: 4
PAINLEVEL_OUTOF10: 8
PAINLEVEL_OUTOF10: 8
PAINLEVEL_OUTOF10: 6
PAINLEVEL_OUTOF10: 7
PAINLEVEL_OUTOF10: 7
PAINLEVEL_OUTOF10: 4
PAINLEVEL_OUTOF10: 9
PAINLEVEL_OUTOF10: 0
PAINLEVEL_OUTOF10: 9
PAINLEVEL_OUTOF10: 0
PAINLEVEL_OUTOF10: 0
PAINLEVEL_OUTOF10: 7
PAINLEVEL_OUTOF10: 8
PAINLEVEL_OUTOF10: 0
PAINLEVEL_OUTOF10: 7
PAINLEVEL_OUTOF10: 8
PAINLEVEL_OUTOF10: 5
PAINLEVEL_OUTOF10: 0
PAINLEVEL_OUTOF10: 3
PAINLEVEL_OUTOF10: 0
PAINLEVEL_OUTOF10: 10
PAINLEVEL_OUTOF10: 8
PAINLEVEL_OUTOF10: 6
PAINLEVEL_OUTOF10: 6
PAINLEVEL_OUTOF10: 7
PAINLEVEL_OUTOF10: 5
PAINLEVEL_OUTOF10: 7
PAINLEVEL_OUTOF10: 0
PAINLEVEL_OUTOF10: 0
PAINLEVEL_OUTOF10: 9
PAINLEVEL_OUTOF10: 0
PAINLEVEL_OUTOF10: 0
PAINLEVEL_OUTOF10: 6
PAINLEVEL_OUTOF10: 0
PAINLEVEL_OUTOF10: 0
PAINLEVEL_OUTOF10: 8
PAINLEVEL_OUTOF10: 0
PAINLEVEL_OUTOF10: 0
PAINLEVEL_OUTOF10: 10
PAINLEVEL_OUTOF10: 5
PAINLEVEL_OUTOF10: 10
PAINLEVEL_OUTOF10: 5
PAINLEVEL_OUTOF10: 8
PAINLEVEL_OUTOF10: 0
PAINLEVEL_OUTOF10: 10
PAINLEVEL_OUTOF10: 6
PAINLEVEL_OUTOF10: 6
PAINLEVEL_OUTOF10: 8
PAINLEVEL_OUTOF10: 0
PAINLEVEL_OUTOF10: 9
PAINLEVEL_OUTOF10: 7
PAINLEVEL_OUTOF10: 9
PAINLEVEL_OUTOF10: 10
PAINLEVEL_OUTOF10: 3
PAINLEVEL_OUTOF10: 6
PAINLEVEL_OUTOF10: 8
PAINLEVEL_OUTOF10: 7
PAINLEVEL_OUTOF10: 0
PAINLEVEL_OUTOF10: 7
PAINLEVEL_OUTOF10: 8
PAINLEVEL_OUTOF10: 6
PAINLEVEL_OUTOF10: 5
PAINLEVEL_OUTOF10: 0
PAINLEVEL_OUTOF10: 6
PAINLEVEL_OUTOF10: 0
PAINLEVEL_OUTOF10: 7
PAINLEVEL_OUTOF10: 0
PAINLEVEL_OUTOF10: 8
PAINLEVEL_OUTOF10: 0
PAINLEVEL_OUTOF10: 4
PAINLEVEL_OUTOF10: 6
PAINLEVEL_OUTOF10: 5
PAINLEVEL_OUTOF10: 6
PAINLEVEL_OUTOF10: 7
PAINLEVEL_OUTOF10: 8
PAINLEVEL_OUTOF10: 6
PAINLEVEL_OUTOF10: 0
PAINLEVEL_OUTOF10: 0
PAINLEVEL_OUTOF10: 2
PAINLEVEL_OUTOF10: 7
PAINLEVEL_OUTOF10: 4
PAINLEVEL_OUTOF10: 0
PAINLEVEL_OUTOF10: 10
PAINLEVEL_OUTOF10: 6
PAINLEVEL_OUTOF10: 7
PAINLEVEL_OUTOF10: 8
PAINLEVEL_OUTOF10: 7
PAINLEVEL_OUTOF10: 0
PAINLEVEL_OUTOF10: 7
PAINLEVEL_OUTOF10: 0
PAINLEVEL_OUTOF10: 6
PAINLEVEL_OUTOF10: 3
PAINLEVEL_OUTOF10: 0
PAINLEVEL_OUTOF10: 10
PAINLEVEL_OUTOF10: 0
PAINLEVEL_OUTOF10: 7
PAINLEVEL_OUTOF10: 8
PAINLEVEL_OUTOF10: 1
PAINLEVEL_OUTOF10: 0
PAINLEVEL_OUTOF10: 0
PAINLEVEL_OUTOF10: 2
PAINLEVEL_OUTOF10: 3
PAINLEVEL_OUTOF10: 4
PAINLEVEL_OUTOF10: 7
PAINLEVEL_OUTOF10: 4
PAINLEVEL_OUTOF10: 5
PAINLEVEL_OUTOF10: 0
PAINLEVEL_OUTOF10: 4
PAINLEVEL_OUTOF10: 4
PAINLEVEL_OUTOF10: 3
PAINLEVEL_OUTOF10: 5
PAINLEVEL_OUTOF10: 7
PAINLEVEL_OUTOF10: 8
PAINLEVEL_OUTOF10: 0
PAINLEVEL_OUTOF10: 7
PAINLEVEL_OUTOF10: 9
PAINLEVEL_OUTOF10: 0
PAINLEVEL_OUTOF10: 4
PAINLEVEL_OUTOF10: 10
PAINLEVEL_OUTOF10: 5
PAINLEVEL_OUTOF10: 0
PAINLEVEL_OUTOF10: 7
PAINLEVEL_OUTOF10: 0
PAINLEVEL_OUTOF10: 8
PAINLEVEL_OUTOF10: 0
PAINLEVEL_OUTOF10: 7
PAINLEVEL_OUTOF10: 10
PAINLEVEL_OUTOF10: 7
PAINLEVEL_OUTOF10: 7
PAINLEVEL_OUTOF10: 8
PAINLEVEL_OUTOF10: 3
PAINLEVEL_OUTOF10: 7
PAINLEVEL_OUTOF10: 7
PAINLEVEL_OUTOF10: 4
PAINLEVEL_OUTOF10: 0
PAINLEVEL_OUTOF10: 7
PAINLEVEL_OUTOF10: 0
PAINLEVEL_OUTOF10: 0
PAINLEVEL_OUTOF10: 6
PAINLEVEL_OUTOF10: 10
PAINLEVEL_OUTOF10: 3
PAINLEVEL_OUTOF10: 7
PAINLEVEL_OUTOF10: 8
PAINLEVEL_OUTOF10: 7
PAINLEVEL_OUTOF10: 0
PAINLEVEL_OUTOF10: 4
PAINLEVEL_OUTOF10: 5
PAINLEVEL_OUTOF10: 5
PAINLEVEL_OUTOF10: 10
PAINLEVEL_OUTOF10: 9
PAINLEVEL_OUTOF10: 8
PAINLEVEL_OUTOF10: 4
PAINLEVEL_OUTOF10: 9
PAINLEVEL_OUTOF10: 8
PAINLEVEL_OUTOF10: 0
PAINLEVEL_OUTOF10: 0
PAINLEVEL_OUTOF10: 6
PAINLEVEL_OUTOF10: 0
PAINLEVEL_OUTOF10: 3
PAINLEVEL_OUTOF10: 0
PAINLEVEL_OUTOF10: 5
PAINLEVEL_OUTOF10: 8
PAINLEVEL_OUTOF10: 0
PAINLEVEL_OUTOF10: 5
PAINLEVEL_OUTOF10: 6
PAINLEVEL_OUTOF10: 0
PAINLEVEL_OUTOF10: 9
PAINLEVEL_OUTOF10: 0
PAINLEVEL_OUTOF10: 4
PAINLEVEL_OUTOF10: 9
PAINLEVEL_OUTOF10: 9
PAINLEVEL_OUTOF10: 0
PAINLEVEL_OUTOF10: 0
PAINLEVEL_OUTOF10: 3
PAINLEVEL_OUTOF10: 3
PAINLEVEL_OUTOF10: 0
PAINLEVEL_OUTOF10: 9
PAINLEVEL_OUTOF10: 5
PAINLEVEL_OUTOF10: 10
PAINLEVEL_OUTOF10: 0
PAINLEVEL_OUTOF10: 0
PAINLEVEL_OUTOF10: 8
PAINLEVEL_OUTOF10: 7
PAINLEVEL_OUTOF10: 5
PAINLEVEL_OUTOF10: 7
PAINLEVEL_OUTOF10: 0
PAINLEVEL_OUTOF10: 8
PAINLEVEL_OUTOF10: 6
PAINLEVEL_OUTOF10: 0
PAINLEVEL_OUTOF10: 9
PAINLEVEL_OUTOF10: 0
PAINLEVEL_OUTOF10: 2
PAINLEVEL_OUTOF10: 6
PAINLEVEL_OUTOF10: 10
PAINLEVEL_OUTOF10: 8
PAINLEVEL_OUTOF10: 0
PAINLEVEL_OUTOF10: 0
PAINLEVEL_OUTOF10: 4
PAINLEVEL_OUTOF10: 6
PAINLEVEL_OUTOF10: 0
PAINLEVEL_OUTOF10: 7
PAINLEVEL_OUTOF10: 0
PAINLEVEL_OUTOF10: 6
PAINLEVEL_OUTOF10: 8
PAINLEVEL_OUTOF10: 5
PAINLEVEL_OUTOF10: 8
PAINLEVEL_OUTOF10: 8
PAINLEVEL_OUTOF10: 7
PAINLEVEL_OUTOF10: 0
PAINLEVEL_OUTOF10: 7
PAINLEVEL_OUTOF10: 0
PAINLEVEL_OUTOF10: 7
PAINLEVEL_OUTOF10: 5
PAINLEVEL_OUTOF10: 3
PAINLEVEL_OUTOF10: 10
PAINLEVEL_OUTOF10: 0
PAINLEVEL_OUTOF10: 3
PAINLEVEL_OUTOF10: 3
PAINLEVEL_OUTOF10: 6
PAINLEVEL_OUTOF10: 0
PAINLEVEL_OUTOF10: 10
PAINLEVEL_OUTOF10: 8
PAINLEVEL_OUTOF10: 5
PAINLEVEL_OUTOF10: 0
PAINLEVEL_OUTOF10: 0
PAINLEVEL_OUTOF10: 7
PAINLEVEL_OUTOF10: 4
PAINLEVEL_OUTOF10: 3
PAINLEVEL_OUTOF10: 7
PAINLEVEL_OUTOF10: 7
PAINLEVEL_OUTOF10: 8
PAINLEVEL_OUTOF10: 10
PAINLEVEL_OUTOF10: 0
PAINLEVEL_OUTOF10: 8
PAINLEVEL_OUTOF10: 3
PAINLEVEL_OUTOF10: 5
PAINLEVEL_OUTOF10: 4
PAINLEVEL_OUTOF10: 8

## 2019-01-01 ASSESSMENT — PAIN DESCRIPTION - PAIN TYPE
TYPE: ACUTE PAIN
TYPE: ACUTE PAIN;SURGICAL PAIN
TYPE: SURGICAL PAIN;ACUTE PAIN
TYPE: ACUTE PAIN
TYPE: SURGICAL PAIN;ACUTE PAIN
TYPE_2: REFERRED PAIN
TYPE: ACUTE PAIN
TYPE: SURGICAL PAIN
TYPE: ACUTE PAIN
TYPE: ACUTE PAIN
TYPE: SURGICAL PAIN
TYPE: CHRONIC PAIN
TYPE: ACUTE PAIN
TYPE: CHRONIC PAIN
TYPE: SURGICAL PAIN
TYPE: ACUTE PAIN
TYPE: SURGICAL PAIN
TYPE: SURGICAL PAIN
TYPE: ACUTE PAIN
TYPE: ACUTE PAIN
TYPE: CHRONIC PAIN
TYPE: ACUTE PAIN
TYPE: ACUTE PAIN;CHRONIC PAIN
TYPE: SURGICAL PAIN
TYPE: ACUTE PAIN
TYPE: SURGICAL PAIN;ACUTE PAIN
TYPE: SURGICAL PAIN
TYPE: ACUTE PAIN
TYPE: CHRONIC PAIN
TYPE: ACUTE PAIN
TYPE: ACUTE PAIN
TYPE: SURGICAL PAIN
TYPE: SURGICAL PAIN;CHRONIC PAIN
TYPE: ACUTE PAIN
TYPE: SURGICAL PAIN
TYPE: SURGICAL PAIN
TYPE: ACUTE PAIN;SURGICAL PAIN
TYPE: ACUTE PAIN
TYPE: CHRONIC PAIN
TYPE: SURGICAL PAIN
TYPE: SURGICAL PAIN
TYPE: ACUTE PAIN
TYPE: ACUTE PAIN
TYPE: SURGICAL PAIN
TYPE: ACUTE PAIN
TYPE: SURGICAL PAIN;ACUTE PAIN
TYPE: CHRONIC PAIN
TYPE: ACUTE PAIN
TYPE: CHRONIC PAIN;ACUTE PAIN
TYPE: CHRONIC PAIN
TYPE: SURGICAL PAIN
TYPE: ACUTE PAIN
TYPE: CHRONIC PAIN
TYPE: ACUTE PAIN
TYPE: ACUTE PAIN
TYPE: CHRONIC PAIN
TYPE: ACUTE PAIN
TYPE: SURGICAL PAIN;ACUTE PAIN
TYPE: ACUTE PAIN
TYPE: CHRONIC PAIN
TYPE: ACUTE PAIN
TYPE: ACUTE PAIN
TYPE: OTHER (COMMENT)

## 2019-01-01 ASSESSMENT — PAIN DESCRIPTION - ORIENTATION
ORIENTATION: RIGHT
ORIENTATION: RIGHT;LEFT
ORIENTATION: MID
ORIENTATION: RIGHT;POSTERIOR
ORIENTATION: RIGHT
ORIENTATION: RIGHT
ORIENTATION: RIGHT;POSTERIOR
ORIENTATION: MID
ORIENTATION: RIGHT
ORIENTATION: ANTERIOR
ORIENTATION: RIGHT
ORIENTATION: RIGHT
ORIENTATION: MID
ORIENTATION: OTHER (COMMENT)
ORIENTATION: RIGHT
ORIENTATION: RIGHT;LEFT
ORIENTATION_2: RIGHT;LEFT
ORIENTATION: RIGHT
ORIENTATION: RIGHT
ORIENTATION: RIGHT;LEFT
ORIENTATION: LOWER;RIGHT;LEFT
ORIENTATION: LEFT;RIGHT
ORIENTATION: RIGHT
ORIENTATION: RIGHT
ORIENTATION: RIGHT;LEFT
ORIENTATION: RIGHT;LEFT
ORIENTATION: POSTERIOR;RIGHT
ORIENTATION: RIGHT
ORIENTATION: MID
ORIENTATION: RIGHT
ORIENTATION: RIGHT;LEFT
ORIENTATION: RIGHT
ORIENTATION: RIGHT
ORIENTATION: RIGHT;LEFT;MID
ORIENTATION: MID
ORIENTATION: RIGHT;LEFT
ORIENTATION: RIGHT
ORIENTATION: MID
ORIENTATION: RIGHT;LEFT
ORIENTATION: RIGHT
ORIENTATION: RIGHT
ORIENTATION: MID
ORIENTATION: OTHER (COMMENT)
ORIENTATION: RIGHT
ORIENTATION: POSTERIOR
ORIENTATION: RIGHT
ORIENTATION: RIGHT;LEFT
ORIENTATION: MID;RIGHT
ORIENTATION: RIGHT

## 2019-01-01 ASSESSMENT — PAIN DESCRIPTION - PROGRESSION
CLINICAL_PROGRESSION: NOT CHANGED
CLINICAL_PROGRESSION: GRADUALLY WORSENING
CLINICAL_PROGRESSION: NOT CHANGED
CLINICAL_PROGRESSION: GRADUALLY WORSENING
CLINICAL_PROGRESSION: NOT CHANGED
CLINICAL_PROGRESSION: GRADUALLY IMPROVING
CLINICAL_PROGRESSION: NOT CHANGED
CLINICAL_PROGRESSION: NOT CHANGED
CLINICAL_PROGRESSION: GRADUALLY IMPROVING
CLINICAL_PROGRESSION: NOT CHANGED
CLINICAL_PROGRESSION: GRADUALLY WORSENING
CLINICAL_PROGRESSION: NOT CHANGED
CLINICAL_PROGRESSION: GRADUALLY WORSENING
CLINICAL_PROGRESSION: NOT CHANGED
CLINICAL_PROGRESSION: NOT CHANGED
CLINICAL_PROGRESSION: GRADUALLY IMPROVING
CLINICAL_PROGRESSION: NOT CHANGED
CLINICAL_PROGRESSION: GRADUALLY WORSENING
CLINICAL_PROGRESSION: GRADUALLY IMPROVING
CLINICAL_PROGRESSION: NOT CHANGED
CLINICAL_PROGRESSION: GRADUALLY WORSENING
CLINICAL_PROGRESSION: NOT CHANGED
CLINICAL_PROGRESSION: GRADUALLY WORSENING
CLINICAL_PROGRESSION: NOT CHANGED
CLINICAL_PROGRESSION: GRADUALLY WORSENING
CLINICAL_PROGRESSION: NOT CHANGED
CLINICAL_PROGRESSION: GRADUALLY IMPROVING
CLINICAL_PROGRESSION: GRADUALLY WORSENING
CLINICAL_PROGRESSION: NOT CHANGED
CLINICAL_PROGRESSION: GRADUALLY WORSENING
CLINICAL_PROGRESSION: NOT CHANGED
CLINICAL_PROGRESSION: GRADUALLY WORSENING
CLINICAL_PROGRESSION: NOT CHANGED
CLINICAL_PROGRESSION: GRADUALLY IMPROVING
CLINICAL_PROGRESSION: NOT CHANGED
CLINICAL_PROGRESSION: GRADUALLY WORSENING
CLINICAL_PROGRESSION: NOT CHANGED
CLINICAL_PROGRESSION: NOT CHANGED
CLINICAL_PROGRESSION: GRADUALLY WORSENING
CLINICAL_PROGRESSION: NOT CHANGED
CLINICAL_PROGRESSION: GRADUALLY WORSENING
CLINICAL_PROGRESSION: GRADUALLY WORSENING
CLINICAL_PROGRESSION: NOT CHANGED

## 2019-01-01 ASSESSMENT — PAIN DESCRIPTION - LOCATION
LOCATION: COCCYX
LOCATION: BUTTOCKS;COCCYX
LOCATION: HIP
LOCATION: LEG
LOCATION: KNEE;HIP;BUTTOCKS
LOCATION: HIP
LOCATION: BUTTOCKS;COCCYX
LOCATION: OTHER (COMMENT)
LOCATION: BUTTOCKS;HIP
LOCATION: RECTUM
LOCATION: HIP
LOCATION: ABDOMEN;CHEST
LOCATION: BUTTOCKS;COCCYX
LOCATION: GENERALIZED
LOCATION: GENERALIZED
LOCATION: LEG
LOCATION: GENERALIZED
LOCATION: ABDOMEN
LOCATION: BUTTOCKS
LOCATION: BUTTOCKS
LOCATION: COCCYX
LOCATION: COCCYX
LOCATION: HIP
LOCATION: RECTUM
LOCATION: CHEST
LOCATION: RECTUM
LOCATION: COCCYX;HIP
LOCATION: HIP
LOCATION: ABDOMEN
LOCATION: BUTTOCKS;HIP
LOCATION: LEG
LOCATION: BUTTOCKS;HIP;LEG
LOCATION: BUTTOCKS
LOCATION: HIP;BUTTOCKS
LOCATION: BUTTOCKS
LOCATION: HIP
LOCATION: HIP
LOCATION: LEG;HIP;BUTTOCKS
LOCATION: HIP
LOCATION: BUTTOCKS;COCCYX
LOCATION: LEG
LOCATION: HIP
LOCATION: COCCYX
LOCATION: HIP;OTHER (COMMENT)
LOCATION: RECTUM
LOCATION: BACK;BUTTOCKS;HIP
LOCATION: LEG
LOCATION: HIP
LOCATION: ABDOMEN
LOCATION: BUTTOCKS;HIP
LOCATION: HIP
LOCATION: COCCYX;RECTUM
LOCATION: LEG
LOCATION: BUTTOCKS
LOCATION: CHEST
LOCATION: HIP
LOCATION: INCISION;HIP
LOCATION: HIP
LOCATION: COCCYX;BUTTOCKS
LOCATION: COCCYX;HIP
LOCATION: BUTTOCKS
LOCATION: BUTTOCKS
LOCATION: HIP
LOCATION: HIP;LEG
LOCATION: HIP
LOCATION: HIP
LOCATION: LEG;BACK
LOCATION: HIP
LOCATION: BUTTOCKS
LOCATION: BUTTOCKS
LOCATION: RECTUM
LOCATION: HIP
LOCATION_2: JAW
LOCATION: HIP
LOCATION: BUTTOCKS;COCCYX
LOCATION: HIP
LOCATION: GENERALIZED
LOCATION: LEG
LOCATION: ABDOMEN
LOCATION: HIP
LOCATION: GENERALIZED

## 2019-01-01 ASSESSMENT — PAIN DESCRIPTION - DESCRIPTORS
DESCRIPTORS: ACHING
DESCRIPTORS: CRAMPING;CONSTANT
DESCRIPTORS: ACHING
DESCRIPTORS: ACHING;CRAMPING
DESCRIPTORS: DULL
DESCRIPTORS: ACHING;DISCOMFORT;CRAMPING
DESCRIPTORS: ACHING
DESCRIPTORS: CONSTANT
DESCRIPTORS: ACHING
DESCRIPTORS: BURNING;THROBBING
DESCRIPTORS: ACHING;DISCOMFORT
DESCRIPTORS_2: ACHING
DESCRIPTORS: ACHING
DESCRIPTORS: DISCOMFORT
DESCRIPTORS: DISCOMFORT
DESCRIPTORS: DULL;ACHING
DESCRIPTORS: CRAMPING
DESCRIPTORS: BURNING;NUMBNESS
DESCRIPTORS: DULL;ACHING
DESCRIPTORS: CRAMPING;DISCOMFORT;ACHING
DESCRIPTORS: CRAMPING;ACHING;DISCOMFORT
DESCRIPTORS: ACHING
DESCRIPTORS: ACHING
DESCRIPTORS: DISCOMFORT
DESCRIPTORS: SHARP
DESCRIPTORS: ACHING
DESCRIPTORS: ACHING;CRAMPING
DESCRIPTORS: ACHING
DESCRIPTORS: ACHING;DULL
DESCRIPTORS: SPASM;DISCOMFORT;ACHING
DESCRIPTORS: ACHING
DESCRIPTORS: DISCOMFORT
DESCRIPTORS: ACHING;DISCOMFORT;CRAMPING
DESCRIPTORS: ACHING
DESCRIPTORS: DISCOMFORT
DESCRIPTORS: ACHING;DISCOMFORT
DESCRIPTORS: DISCOMFORT
DESCRIPTORS: ACHING
DESCRIPTORS: OTHER (COMMENT)
DESCRIPTORS: DISCOMFORT;DULL
DESCRIPTORS: DISCOMFORT
DESCRIPTORS: DISCOMFORT
DESCRIPTORS: DISCOMFORT;PRESSURE
DESCRIPTORS: DISCOMFORT
DESCRIPTORS: ACHING
DESCRIPTORS: NUMBNESS
DESCRIPTORS: DULL;PRESSURE
DESCRIPTORS: ACHING
DESCRIPTORS: CONSTANT;ACHING;DISCOMFORT
DESCRIPTORS: CRAMPING;DISCOMFORT
DESCRIPTORS: ACHING
DESCRIPTORS: ACHING
DESCRIPTORS: CONSTANT;SHARP
DESCRIPTORS: DULL;ACHING
DESCRIPTORS: CRAMPING;ACHING
DESCRIPTORS: PRESSURE
DESCRIPTORS: DISCOMFORT
DESCRIPTORS: ACHING
DESCRIPTORS: ACHING
DESCRIPTORS: ACHING;DULL
DESCRIPTORS: ACHING;BURNING
DESCRIPTORS: DISCOMFORT

## 2019-01-01 ASSESSMENT — PAIN - FUNCTIONAL ASSESSMENT
PAIN_FUNCTIONAL_ASSESSMENT: PREVENTS OR INTERFERES SOME ACTIVE ACTIVITIES AND ADLS
PAIN_FUNCTIONAL_ASSESSMENT: PREVENTS OR INTERFERES WITH ALL ACTIVE AND SOME PASSIVE ACTIVITIES
PAIN_FUNCTIONAL_ASSESSMENT: PREVENTS OR INTERFERES SOME ACTIVE ACTIVITIES AND ADLS
PAIN_FUNCTIONAL_ASSESSMENT: PREVENTS OR INTERFERES SOME ACTIVE ACTIVITIES AND ADLS
PAIN_FUNCTIONAL_ASSESSMENT: ACTIVITIES ARE NOT PREVENTED
PAIN_FUNCTIONAL_ASSESSMENT: PREVENTS OR INTERFERES SOME ACTIVE ACTIVITIES AND ADLS
PAIN_FUNCTIONAL_ASSESSMENT: ACTIVITIES ARE NOT PREVENTED
PAIN_FUNCTIONAL_ASSESSMENT: PREVENTS OR INTERFERES SOME ACTIVE ACTIVITIES AND ADLS
PAIN_FUNCTIONAL_ASSESSMENT: ACTIVITIES ARE NOT PREVENTED
PAIN_FUNCTIONAL_ASSESSMENT: PREVENTS OR INTERFERES WITH ALL ACTIVE AND SOME PASSIVE ACTIVITIES
PAIN_FUNCTIONAL_ASSESSMENT: PREVENTS OR INTERFERES SOME ACTIVE ACTIVITIES AND ADLS
PAIN_FUNCTIONAL_ASSESSMENT: ACTIVITIES ARE NOT PREVENTED
PAIN_FUNCTIONAL_ASSESSMENT: PREVENTS OR INTERFERES SOME ACTIVE ACTIVITIES AND ADLS
PAIN_FUNCTIONAL_ASSESSMENT: 0-10
PAIN_FUNCTIONAL_ASSESSMENT: PREVENTS OR INTERFERES SOME ACTIVE ACTIVITIES AND ADLS
PAIN_FUNCTIONAL_ASSESSMENT: ACTIVITIES ARE NOT PREVENTED
PAIN_FUNCTIONAL_ASSESSMENT: PREVENTS OR INTERFERES SOME ACTIVE ACTIVITIES AND ADLS
PAIN_FUNCTIONAL_ASSESSMENT: ACTIVITIES ARE NOT PREVENTED

## 2019-01-01 ASSESSMENT — PAIN DESCRIPTION - FREQUENCY
FREQUENCY: INTERMITTENT
FREQUENCY: CONTINUOUS
FREQUENCY: INTERMITTENT
FREQUENCY: CONTINUOUS
FREQUENCY: INTERMITTENT
FREQUENCY: CONTINUOUS
FREQUENCY: INTERMITTENT
FREQUENCY: INTERMITTENT
FREQUENCY: CONTINUOUS
FREQUENCY: INTERMITTENT
FREQUENCY: CONTINUOUS
FREQUENCY: INTERMITTENT
FREQUENCY: INTERMITTENT
FREQUENCY: CONTINUOUS
FREQUENCY: INTERMITTENT
FREQUENCY: CONTINUOUS
FREQUENCY: INTERMITTENT
FREQUENCY: CONTINUOUS
FREQUENCY: INTERMITTENT
FREQUENCY: CONTINUOUS
FREQUENCY: INTERMITTENT
FREQUENCY: CONTINUOUS
FREQUENCY: INTERMITTENT
FREQUENCY: CONTINUOUS
FREQUENCY: INTERMITTENT
FREQUENCY: CONTINUOUS
FREQUENCY: INTERMITTENT
FREQUENCY: CONTINUOUS
FREQUENCY: INTERMITTENT
FREQUENCY: CONTINUOUS
FREQUENCY: INTERMITTENT
FREQUENCY: INTERMITTENT

## 2019-01-01 ASSESSMENT — PAIN DESCRIPTION - ONSET
ONSET: ON-GOING
ONSET: GRADUAL
ONSET: ON-GOING
ONSET: ON-GOING
ONSET: GRADUAL
ONSET: ON-GOING
ONSET: GRADUAL
ONSET: ON-GOING
ONSET: GRADUAL
ONSET: ON-GOING
ONSET: GRADUAL
ONSET: GRADUAL
ONSET_2: GRADUAL
ONSET: ON-GOING
ONSET: GRADUAL
ONSET: ON-GOING
ONSET: ON-GOING
ONSET: OTHER (COMMENT)
ONSET: ON-GOING
ONSET: GRADUAL
ONSET: ON-GOING
ONSET: ON-GOING
ONSET: GRADUAL
ONSET: ON-GOING
ONSET: GRADUAL

## 2019-01-01 ASSESSMENT — PAIN DESCRIPTION - DIRECTION
RADIATING_TOWARDS: OUTWARDS
RADIATING_TOWARDS: JAW
RADIATING_TOWARDS: DOWN
RADIATING_TOWARDS: LEFT HIP
RADIATING_TOWARDS: ACROSS
RADIATING_TOWARDS: MID

## 2019-01-01 ASSESSMENT — PAIN DESCRIPTION - INTENSITY: RATING_2: 2

## 2019-01-01 ASSESSMENT — PAIN DESCRIPTION - DURATION: DURATION_2: CONTINUOUS

## 2019-06-10 NOTE — PROGRESS NOTES
CARDIOLOGY  NOTE      6/10/2019    RE: Jerel Aguilera  (1950)                               TO:  Dr. Jem Pope MD  The primary cardiologist is Dr Cassandra Vazquez    CC:  Denies the following  Chest Pain  Edema  Dizziness  Syncope     Here for 1 year follow up. Complains of intermittent shortness of breath with exertion with palpiations    HPI: Thank you for involving me in taking care of your patient Jerel Aguilera, who is a  77y.o. year old male with a history of HTN, HLD, Obesity, DM, CKD. He is seen today for a follow up on HTN, HLD. He during this visit denies chest pain, edema, dizziness or syncope. He reports that he has intermittent shortness of breath and palpitations. He states that it is infrequent and notices it with exertion. He states he feels it is due to when his heart rate is elevated. He states his HR is fast today because he is at the Doctor and he gets anxious.  .       Vitals:    06/10/19 1137   BP: 122/76   Pulse: 104       Current Outpatient Medications   Medication Sig Dispense Refill    glipiZIDE (GLUCOTROL) 10 MG tablet Take 10 mg by mouth 2 times daily (before meals)      ezetimibe (ZETIA) 10 MG tablet Take 10 mg by mouth daily      acetaminophen (TYLENOL) 500 MG tablet Take 500 mg by mouth every 6 hours as needed for Pain      Cinnamon 500 MG TABS Take 1,000 mg by mouth daily      vitamin E 400 UNIT capsule Take 400 Units by mouth daily      vitamin C (ASCORBIC ACID) 500 MG tablet Take 500 mg by mouth daily      insulin glargine (LANTUS) 100 UNIT/ML injection vial Inject 70 Units into the skin nightly       benazepril (LOTENSIN) 20 MG tablet Take 1 tablet by mouth daily 90 tablet 3    fluticasone (FLONASE) 50 MCG/ACT nasal spray as needed       diphenhydrAMINE (BENADRYL) 25 MG tablet Take 25 mg by mouth as needed for Itching      Multiple Vitamins-Minerals (VISION VITAMINS PO) Take by mouth daily      omeprazole (PRILOSEC) 20 MG capsule Take 20 mg by mouth daily.  [place] Wort 300 MG CAPS Take 2 capsules by mouth.  Canagliflozin (INVOKANA) 300 MG TABS Take  by mouth daily.  budesonide-formoterol (SYMBICORT) 80-4.5 MCG/ACT AERO Inhale 2 puffs into the lungs as needed       Albuterol Sulfate (PROAIR HFA IN) Inhale into the lungs as needed       buPROPion (WELLBUTRIN XL) 300 MG XL tablet Take 300 mg by mouth daily.  sertraline (ZOLOFT) 100 MG tablet Take 100 mg by mouth 2 times daily.  simvastatin (ZOCOR) 40 MG tablet Take 55 mg by mouth nightly       nitroGLYCERIN (NITROSTAT) 0.4 MG SL tablet Place 0.4 mg under the tongue every 5 minutes as needed.  aspirin 81 MG EC tablet Take 81 mg by mouth daily.  Multiple Vitamin (MULTI-VITAMIN PO) Take  by mouth.  SUPER B COMPLEX/C PO Take 1 capsule by mouth daily      insulin lispro (HUMALOG) 100 UNIT/ML injection vial Inject into the skin 3 times daily (before meals)       No current facility-administered medications for this visit.       Allergies: Bee venom  Past Medical History:   Diagnosis Date    Arthritis     Asthma     Chronic kidney disease     Diabetes mellitus ([facility] [address]     H/O cardiac catheterization 05/04/2017    H/O cardiovascular stress test 6/07, 12/08    CARDIOLITE: EF->51%    Hyperlipidemia     Hypertension     Obesity     Thyroid disease      Past Surgical History:   Procedure Laterality Date    CARPAL TUNNEL RELEASE  2005    DIAGNOSTIC CARDIAC CATH LAB PROCEDURE  12/05    NO SIGNIFICANT CAD    EYE SURGERY      RETINAL DETACHMENT SURGERY       Family History   Problem Relation Age of Onset    High Blood Pressure Mother     Cancer Mother     Cancer Father     Stroke Maternal Grandfather     Diabetes Paternal Grandmother     Heart Disease Paternal Grandfather      Social History     Tobacco Use    Smoking status: Never Smoker    Smokeless tobacco: Never Used   Substance Use Topics    Alcohol use: No        Review of Systems   Constitutional: Negative for activity change, appetite change and fatigue. HENT: Negative for congestion, sinus pressure, sinus pain and sore throat. Eyes: Negative for redness and itching. Respiratory: Positive for shortness of breath. Negative for cough and chest tightness. Cardiovascular: Positive for palpitations (intermittent, 2-3 times in 3 months. checked with pulse ox, 02 stable, HR . lasts about 15 mins and resolves on own). Gastrointestinal: Negative for abdominal distention, abdominal pain, constipation, diarrhea, nausea and vomiting. Genitourinary: Negative for difficulty urinating and dysuria. Musculoskeletal: Positive for arthralgias and gait problem. Negative for back pain. Skin: Negative for color change, pallor, rash and wound. Neurological: Positive for dizziness (standing or bending over). Negative for syncope and light-headedness. Psychiatric/Behavioral: Negative for confusion. The patient is nervous/anxious. Objective:      Physical Exam:  /76   Pulse 104   Ht 6' 3\" (1.905 m)   Wt (!) 375 lb 3.2 oz (170.2 kg)   BMI 46.90 kg/m²   Wt Readings from Last 3 Encounters:   06/10/19 (!) 375 lb 3.2 oz (170.2 kg)   05/15/19 (!) 384 lb (174.2 kg)   10/26/18 (!) 392 lb (177.8 kg)     Body mass index is 46.9 kg/m². Physical Exam   Constitutional: He is oriented to person, place, and time. He appears well-developed and well-nourished. Obese  Walks with cane   HENT:   Head: Normocephalic and atraumatic. Eyes: Pupils are equal, round, and reactive to light. Conjunctivae are normal. Right eye exhibits no discharge. Left eye exhibits no discharge. Neck: Neck supple. No JVD present. No thyromegaly present. Cardiovascular: Regular rhythm, normal heart sounds and intact distal pulses. Tachycardia present. Exam reveals no gallop and no friction rub. No murmur heard. Pulmonary/Chest: Effort normal and breath sounds normal. No stridor.  No

## 2019-08-28 PROBLEM — S72.011A SUBCAPITAL FRACTURE OF FEMUR, RIGHT, CLOSED, INITIAL ENCOUNTER (HCC): Status: ACTIVE | Noted: 2019-01-01

## 2019-08-28 PROBLEM — S72.022A: Status: ACTIVE | Noted: 2019-01-01

## 2019-08-28 NOTE — ED PROVIDER NOTES
ED COURSE & MEDICAL DECISION MAKING       Vital signs and nursing notes reviewed during ED course. Patient care and presentation staffed and seen in conjunction with supervising physician, Dr. Rubi Basurto. Patient presents as above which prompted work up today. She presented after mechanical fall resulting in abrasion of right forearm that does not require repair and tetanus status is up-to-date as well as right subcapital hip fracture. While in the ED today- labs and imaging were obtained. Labs reveal chronic creatinine elevation of 1.5 consistent patient's CKD, elevated hemoglobin that appears chronic, and mild leukocytosis with left shift. CT head reveals no acute intracranial abnormality. CT cervical spine reveals no acute abnormality of the cervical spine. There are opacified air cells of the left mastoid tip but patient has no ear pain, acute otitis media, erythema or tenderness palpation over the mastoid to suggest acute infection. Right hip x-ray reveals acute mildly impacted right subcapital hip fracture without dislocation of the femoral head. Right lower extremity is distally neurovascular intact. Patient has no neurologic symptoms. Patient initially treated with Tylenol as he initially declined narcotic pain medication. However patient continued to have increased pain. Patient then treated with IV Dilaudid for pain with improvement. Patient reports he is established with Dr. Mitzy Burdick, local orthopedist.  I consulted with patient's orthopedist, Dr. Aydin Smiley, we discussed the patient's case. He agrees with admission at this time and he will follow along with the patient. I then consulted with patient's PCP, Dr. Caryl Cerda, and we discussed the patient's case. She agrees to admit the patient at this time. She would like me to place bridge orders. Bridge orders placed. Patient admitted to medicine. All pertinent Lab data and radiographic results reviewed with patient at bedside.

## 2019-08-28 NOTE — ED NOTES
Report given to [facility]. No further questions at this time.      Anne Laguna RN  08/28/19 2894

## 2019-08-29 PROBLEM — S72.023A: Status: ACTIVE | Noted: 2019-01-01

## 2019-08-29 NOTE — CONSULTS
Consultation  Tao Austin (1950)    8/29/2019        CHIEF COMPLAINT:  Right hip pain    History Obtained From:  patient    HISTORY OF PRESENT ILLNESS:      The patient is a 77 y.o. male  who presents with       Past Medical History         Diagnosis Date    Arthritis     Asthma     Chronic kidney disease     Diabetes mellitus ([facility] [address]     H/O cardiac catheterization 05/04/2017    H/O cardiovascular stress test 6/07, 12/08    CARDIOLITE: EF->51%    Hyperlipidemia     Hypertension     Obesity     Thyroid disease        Past Surgical History         Procedure Laterality Date    CARPAL TUNNEL RELEASE  2005    DIAGNOSTIC CARDIAC CATH LAB PROCEDURE  12/05    NO SIGNIFICANT CAD    EYE SURGERY      RETINAL DETACHMENT SURGERY         Medications Prior to Admission:     Prior to Admission medications    Medication Sig Start Date End Date Taking?  Authorizing Provider   TRULICITY 1.5 BJ/9.3HQ SOPN Inject into the skin once a week 8/21/19  Yes Historical Provider, MD   ezetimibe (ZETIA) 10 MG tablet Take 10 mg by mouth daily   Yes Historical Provider, MD   SUPER B COMPLEX/C PO Take 1 capsule by mouth daily   Yes Historical Provider, MD   Cinnamon 500 MG TABS Take 1,000 mg by mouth daily   Yes Historical Provider, MD   vitamin E 400 UNIT capsule Take 400 Units by mouth daily   Yes Historical Provider, MD   vitamin C (ASCORBIC ACID) 500 MG tablet Take 1,000 mg by mouth daily    Yes Historical Provider, MD   insulin lispro (HUMALOG) 100 UNIT/ML injection vial Inject into the skin 3 times daily (before meals)   Yes Historical Provider, MD   insulin glargine (LANTUS) 100 UNIT/ML injection vial Inject 74 Units into the skin nightly    Yes Historical Provider, MD   benazepril (LOTENSIN) 20 MG tablet Take 1 tablet by mouth daily 9/15/16  Yes Alexandra Thompson MD   fluticasone (FLONASE) 50 MCG/ACT nasal spray as needed  3/22/16  Yes Historical Provider, MD   diphenhydrAMINE (BENADRYL) 25 MG tablet Take 25 mg by mouth as needed for Itching   Yes Historical Provider, MD   Multiple Vitamins-Minerals (VISION VITAMINS PO) Take by mouth daily   Yes Historical Provider, MD   omeprazole (PRILOSEC) 20 MG capsule Take 20 mg by mouth daily. Yes Historical Provider, MD   [facility] Wort 300 MG CAPS Take 2 capsules by mouth. Yes Historical Provider, MD   Canagliflozin (INVOKANA) 300 MG TABS Take  by mouth daily. Yes Historical Provider, MD   budesonide-formoterol (SYMBICORT) 80-4.5 MCG/ACT AERO Inhale 2 puffs into the lungs as needed    Yes Historical Provider, MD   Albuterol Sulfate (PROAIR HFA IN) Inhale into the lungs as needed    Yes Historical Provider, MD   buPROPion (WELLBUTRIN XL) 300 MG XL tablet Take 300 mg by mouth daily. 2/19/13  Yes Historical Provider, MD   sertraline (ZOLOFT) 100 MG tablet Take 100 mg by mouth 2 times daily. 2/10/13  Yes Historical Provider, MD   simvastatin (ZOCOR) 40 MG tablet Take 40 mg by mouth daily    Yes Historical Provider, MD   aspirin 81 MG EC tablet Take 81 mg by mouth daily. Yes Historical Provider, MD   Multiple Vitamin (MULTI-VITAMIN PO) Take  by mouth. Yes Historical Provider, MD   nitroGLYCERIN (NITROSTAT) 0.4 MG SL tablet Place 1 tablet under the tongue every 5 minutes as needed for Chest pain 6/10/19   [employer], APRN - CNP   acetaminophen (TYLENOL) 500 MG tablet Take 500 mg by mouth every 6 hours as needed for Pain    Historical Provider, MD       Allergies     Bee venom    Social History   TOBACCO:   reports that he has never smoked. He has never used smokeless tobacco.  ETOH:   reports that he does not drink alcohol.   Patient currently lives alone    Family History         Problem Relation Age of Onset    High Blood Pressure Mother     Cancer Mother     Cancer Father     Stroke Maternal Grandfather     Diabetes Paternal Grandmother     Heart Disease Paternal Grandfather        Review of Systems   Constitutional:  No weight loss, no night sweats  Eyes:  No visual changes  ENT:  No nasal drainage or ear pain  CV:  No chest pain  PULM:  No cough, no shortness of breath  GI:  No nausea, vomiting, diarrhea  :  No dysuria  Musc:  No weakness of arms or legs  Neuro: No numbness, tingling or parethesias  Skin:  No rashes  Psych: No depression symptoms    Physical Exam:    Vitals: /72   Pulse 89   Temp 98.3 °F (36.8 °C) (Oral)   Resp 16   Ht 6' 3\" (1.905 m)   Wt (!) 403 lb 8 oz (183 kg)   SpO2 95%   BMI 50.43 kg/m² ,  Body mass index is 50.43 kg/m². General appearance: alert, appears stated age and cooperative  Skin: Skin color, texture, turgor normal. No rashes or lesions  HEENT: Head: Normal, normocephalic, atraumatic. Neck: supple, symmetrical, trachea midline  Extremities:    -Affected hip tender to palpation.   -Pain with PROM to hip, full assessment not done due to known fracture   -No knee or ankle deformity or significant tenderness   -Good distal pulses with good capillary refill   -Able to dorsiflex and plantar flex ankle and toes-bilaterally   -No tenderness over bilateral wrist, elbow or shoulders. Neurologic: Mental status: Alert, oriented, thought content appropriate    Labs     CBC:   Recent Labs     08/28/19  1548 08/29/19  0359   WBC 11.0* 15.2*   HGB 18.1* 17.1    189     BMP:    Recent Labs     08/28/19  1548      K 4.1      CO2 24   BUN 16   CREATININE 1.5*   GLUCOSE 178*     INR: No results for input(s): INR in the last 72 hours. X-ray view   Imaging Review      Varus angulated, comminuted, right femoral neck fracture.        Assessment and Plan     Patient Active Problem List   Diagnosis Code    Hypertension I10    Hyperlipidemia E78.5    Asthma J45.909    Diabetes mellitus ([facility] [address] E11.9    H/O cardiovascular stress test Z92.89    Obesity E66.9    Chronic kidney disease, stage III (moderate) (HCC) N18.3    Hypertensive kidney disease with CKD stage III (HCC) I12.9, N18.3    Depression F32.9    SOBOE (shortness of breath on exertion) R06.02    Abnormal cardiovascular stress test R94.39    Subcapital fracture of femur, right, closed, initial encounter ([facility] [address] S72.011A    Fracture of epiphysis (separation) (upper) of neck of femur, closed, left, initial encounter ([facility] [address] S72.022A       1. Right hip femoral neck fracture    Based on the fracture pattern I recommend hemiarthroplasty. The goal of surgical intervention is pain control and mobility. Postoperatively the patient will remain in the hospital for pain control, physical therapy, as well as DVT prophylaxis. Surgical risks were explained to the patient as well as the family; these included but not limited to infection,dislocation, periprosthetic fracture, continued pain after surgical intervention. Risks also include post-operative Deep venous thrombosis, heart attack, stroke and death. There is also at risk for loss of ambulatory status or a decreased level of ambulatory status. Postoperatively the patient will be weightbearing as tolerated and physical therapy will work with the patient on a daily basis. We will order the use incentive spirometry to prevent pneumonia postoperatively. The patient will receive 24 hours of antibiotics postoperatively and the Lujan will be planned to be discontinued on postoperative day #2. The patient and family understands the risks and benefits of surgery and wishes to proceed with operative intervention.      Donis Riley MD

## 2019-08-29 NOTE — ANESTHESIA PRE PROCEDURE
Department of Anesthesiology  Preprocedure Note       Name:  Vishal Gonzales   Age:  77 y.o.  :  1950                                          MRN:  [id number]         Date:  2019      Surgeon: Severiano Limes):  Venecia Elliott MD    Procedure: HIP HEMIARTHROPLASTY (Right )    Medications prior to admission:   Prior to Admission medications    Medication Sig Start Date End Date Taking? Authorizing Provider   TRULICITY 1.5 DQ/0.9TS SOPN Inject into the skin once a week 19  Yes Historical Provider, MD   ezetimibe (ZETIA) 10 MG tablet Take 10 mg by mouth daily   Yes Historical Provider, MD   SUPER B COMPLEX/C PO Take 1 capsule by mouth daily   Yes Historical Provider, MD   Cinnamon 500 MG TABS Take 1,000 mg by mouth daily   Yes Historical Provider, MD   vitamin E 400 UNIT capsule Take 400 Units by mouth daily   Yes Historical Provider, MD   vitamin C (ASCORBIC ACID) 500 MG tablet Take 1,000 mg by mouth daily    Yes Historical Provider, MD   insulin lispro (HUMALOG) 100 UNIT/ML injection vial Inject into the skin 3 times daily (before meals)   Yes Historical Provider, MD   insulin glargine (LANTUS) 100 UNIT/ML injection vial Inject 74 Units into the skin nightly    Yes Historical Provider, MD   benazepril (LOTENSIN) 20 MG tablet Take 1 tablet by mouth daily 9/15/16  Yes Madi Smallwood MD   fluticasone (FLONASE) 50 MCG/ACT nasal spray as needed  3/22/16  Yes Historical Provider, MD   diphenhydrAMINE (BENADRYL) 25 MG tablet Take 25 mg by mouth as needed for Itching   Yes Historical Provider, MD   Multiple Vitamins-Minerals (VISION VITAMINS PO) Take by mouth daily   Yes Historical Provider, MD   omeprazole (PRILOSEC) 20 MG capsule Take 20 mg by mouth daily. Yes Historical Provider, MD   [facility] Wort 300 MG CAPS Take 2 capsules by mouth. Yes Historical Provider, MD   Canagliflozin (INVOKANA) 300 MG TABS Take  by mouth daily.    Yes Historical Provider, MD   budesonide-formoterol (SYMBICORT) 80-4.5 diphenhydrAMINE (BENADRYL) tablet 25 mg  25 mg Oral PRN Laurent Councilman, MD        ezetimibe (ZETIA) tablet 10 mg  10 mg Oral Daily Laurent Councilman, MD        fluticasone (FLONASE) 50 MCG/ACT nasal spray 1 spray  1 spray Each Nostril Daily Laurent Councilman, MD        therapeutic multivitamin-minerals 1 tablet  1 tablet Oral Daily Laurent Councilman, MD        antioxidant multivitamin (OCUVITE) tablet  1 tablet Oral Daily Laurent Councilman, MD        nitroGLYCERIN (NITROSTAT) SL tablet 0.4 mg  0.4 mg Sublingual Q5 Min PRN Laurent Councilman, MD        pantoprazole (PROTONIX) tablet 40 mg  40 mg Oral QAM AC Laurent Councilman, MD        simvastatin (ZOCOR) tablet 40 mg  40 mg Oral Nightly Laurent Councilman, MD        b complex vitamins capsule 1 capsule  1 capsule Oral Daily Laurent Councilman, MD        Dulaglutide SOPN 1.5 mg  1.5 mg Subcutaneous Weekly Laurent Councilman, MD        vitamin C (ASCORBIC ACID) tablet 1,000 mg  1,000 mg Oral Daily Laurent Councilman, MD        vitamin E capsule 400 Units  400 Units Oral Daily Laurent Councilman, MD        insulin lispro (HUMALOG) injection vial 0-12 Units  0-12 Units Subcutaneous TID WC Laurent Councilman, MD        insulin lispro (HUMALOG) injection vial 0-6 Units  0-6 Units Subcutaneous Nightly Laurent Councilman, MD        sodium chloride flush 0.9 % injection 10 mL  10 mL Intravenous 2 times per day Laurent Councilman, MD        sodium chloride flush 0.9 % injection 10 mL  10 mL Intravenous PRN Laurent Councilman, MD        magnesium hydroxide (MILK OF MAGNESIA) 400 MG/5ML suspension 30 mL  30 mL Oral Daily PRN Laurent Councilman, MD        ondansetron (ZOFRAN) injection 4 mg  4 mg Intravenous Q6H PRN Laurent Councilman, MD        enoxaparin (LOVENOX) injection 40 mg  40 mg Subcutaneous Daily Laurent Councilman, MD        potassium chloride 10 mEq/100 mL IVPB (Peripheral Line)  10 mEq Intravenous PRN Laurent Councilman, MD        magnesium sulfate 1 g in dextrose 5% 100 mL IVPB  1 g Intravenous PRN Laurent Councilman, MD        glucose (GLUTOSE) 40 % oral Alcohol use: No                                Counseling given: Not Answered      Vital Signs (Current):   Vitals:    08/28/19 1907 08/28/19 1912 08/28/19 2008 08/29/19 0540   BP: 116/67  112/64 119/72   Pulse: 70 68 94 89   Resp: 16 20 16 16   Temp:   36.8 °C (98.2 °F) 36.8 °C (98.3 °F)   TempSrc:   Oral Oral   SpO2: 93% 93% 90% 95%   Weight:   (!) 403 lb 8 oz (183 kg)    Height:   6' 3\" (1.905 m)                                               BP Readings from Last 3 Encounters:   08/29/19 119/72   06/10/19 122/76   05/15/19 138/82       NPO Status: Time of last liquid consumption: 2300                        Time of last solid consumption: 2130                        Date of last liquid consumption: 08/28/19                        Date of last solid food consumption: 08/28/19    BMI:   Wt Readings from Last 3 Encounters:   08/28/19 (!) 403 lb 8 oz (183 kg)   06/10/19 (!) 375 lb 3.2 oz (170.2 kg)   05/15/19 (!) 384 lb (174.2 kg)     Body mass index is 50.43 kg/m².     CBC:   Lab Results   Component Value Date    WBC 15.2 08/29/2019    RBC 5.46 08/29/2019    HGB 17.1 08/29/2019    HCT 51.1 08/29/2019    MCV 93.6 08/29/2019    RDW 13.2 08/29/2019     08/29/2019       CMP:   Lab Results   Component Value Date     08/28/2019    K 4.1 08/28/2019     08/28/2019    CO2 24 08/28/2019    BUN 16 08/28/2019    CREATININE 1.5 08/28/2019    GFRAA 56 08/28/2019    LABGLOM 47 08/28/2019    GLUCOSE 178 08/28/2019    PROT 7.1 08/28/2019    CALCIUM 9.8 08/28/2019    BILITOT 0.7 08/28/2019    ALKPHOS 73 08/28/2019    AST 22 08/28/2019    ALT 19 08/28/2019       POC Tests:   Recent Labs     08/29/19  0728   POCGLU 174*       Coags:   Lab Results   Component Value Date    PROTIME 11.5 05/03/2017    PROTIME 10.6 01/26/2012    INR 1.01 05/03/2017    APTT 29.1 05/03/2017       HCG (If Applicable): No results found for: PREGTESTUR, PREGSERUM, HCG, HCGQUANT     ABGs: No results found for: PHART, PO2ART, WKB5KHR, RIB8RCX,

## 2019-08-29 NOTE — PLAN OF CARE
Problem: Falls - Risk of:  Goal: Will remain free from falls  Description  Will remain free from falls  8/29/2019 1721 by Elvia Clayton LPN  Outcome: Ongoing  8/29/2019 0801 by Elvia Clayton LPN  Outcome: Ongoing  Goal: Absence of physical injury  Description  Absence of physical injury  8/29/2019 1721 by Elvia Clayton LPN  Outcome: Ongoing  8/29/2019 0801 by Elvia Clayton LPN  Outcome: Ongoing     Problem: Risk for Impaired Skin Integrity  Goal: Tissue integrity - skin and mucous membranes  Description  Structural intactness and normal physiological function of skin and  mucous membranes.   Outcome: Ongoing     Problem: Pain:  Goal: Pain level will decrease  Description  Pain level will decrease  Outcome: Ongoing  Goal: Control of acute pain  Description  Control of acute pain  Outcome: Ongoing  Goal: Control of chronic pain  Description  Control of chronic pain  Outcome: Ongoing

## 2019-08-29 NOTE — BRIEF OP NOTE
Brief Postoperative Note  ______________________________________________________________    Patient: Vishal Gonzales  YOB: 1950  MRN: [id number]  Date of Procedure: 8/29/2019    Pre-Op Diagnosis: hip right femoral neck fracture    Post-Op Diagnosis: Same       Procedure(s):  HIP HEMIARTHROPLASTY    Anesthesia: General, Spinal    Surgeon(s):  Venecia Elliott MD    Assistant: Pavel Love PA-C    Estimated Blood Loss (mL): 021     Complications: None    Specimens:   * No specimens in log *    Implants:  Implant Name Type Inv.  Item Serial No.  Lot No. LRB No. Used   IMPL HIP AVENIR DAVIS LATERAL STEM 7 Hip IMPL HIP AVENIR DAVIS LATERAL STEM 7  [employer] [id number] Right 1   ADAPTER HEAD FEM UNIPOLAR +0MM Knee ADAPTER HEAD FEM UNIPOLAR +0MM  [employer] [id number] Right 1   IMPL FEMORAL HEAD UNIPOLAR 58MM Hip IMPL FEMORAL HEAD UNIPOLAR 58MM  [employer] [id number] Right 1         Drains:   Urethral Catheter Non-latex 16 fr (Active)   Catheter Indications Perioperative use in selected surgeries including but not limited to urologic, pelvic or need for intraoperative monitoring of urinary output due to prolonged surgery, large volume infusion or need for diuretic therapy in surgery 8/29/2019  9:45 AM   Urine Color Sheri 8/29/2019  9:45 AM   Urine Appearance Clear 8/29/2019  9:45 AM   Output (mL) 550 mL 8/29/2019  5:38 AM       Findings: see dictation    Venecia Elliott MD  Date: 8/29/2019  Time: 1:54 PM

## 2019-09-01 NOTE — CONSULTS
Nephrology Service Consultation    Patient:  Britt Vick  MRN: [id number]  Consulting physician:  Meghan Damico MD  Reason for Consult: arf on ckd with low Na    History Obtained From:  patient, electronic medical record  PCP: Giselle Morin MD    HISTORY OF PRESENT ILLNESS:   The patient is a 77 y.o. male who presents with weakness and sp fall with right hip fracture sp surgery 8/29 and tolerated well in setting arf on ckd was on lisinopril but stopped. Na was low as well but now better. Plan was for aru and wanted make sure medically stable first and renal asked to follow up.  Prior cad, depression, obesity, ckd3, Dm2 and htn known to me creat 1.5-1.8 baseline      Past Medical History:        Diagnosis Date    Arthritis     Asthma     Chronic kidney disease     [facility] mellitus [facility] [address]     H/O cardiac catheterization 05/04/2017    H/O cardiovascular stress test 6/07, 12/08    CARDIOLITE: EF->51%    Hyperlipidemia     Hypertension     Obesity     Thyroid disease        Past Surgical History:        Procedure Laterality Date    CARPAL TUNNEL RELEASE  2005    DIAGNOSTIC CARDIAC CATH LAB PROCEDURE  12/05    NO SIGNIFICANT CAD    EYE SURGERY      RETINAL DETACHMENT SURGERY         Medications:   Scheduled Meds:   midodrine  5 mg Oral TID WC    magnesium oxide  400 mg Oral BID    aspirin  81 mg Oral Daily    lisinopril  20 mg Oral Daily    mometasone-formoterol  2 puff Inhalation BID    buPROPion  300 mg Oral Daily    canagliflozin  300 mg Oral Daily    ezetimibe  10 mg Oral Daily    fluticasone  1 spray Each Nostril Daily    therapeutic multivitamin-minerals  1 tablet Oral Daily    ocuvite-lutein  1 tablet Oral Daily    pantoprazole  40 mg Oral QAM AC    simvastatin  40 mg Oral Nightly    b complex vitamins  1 capsule Oral Daily    Dulaglutide  1.5 mg Subcutaneous Weekly    vitamin C  1,000 mg Oral Daily    vitamin E  400 Units Oral Daily    insulin lispro  0-12 Units Subcutaneous TID WC    insulin lispro  0-6 Units Subcutaneous Nightly    sodium chloride flush  10 mL Intravenous 2 times per day    enoxaparin  40 mg Subcutaneous Daily     Continuous Infusions:   sodium chloride 100 mL/hr at 09/01/19 0646    dextrose       PRN Meds:.oxyCODONE-acetaminophen, ipratropium-albuterol, promethazine, morphine, acetaminophen, diphenhydrAMINE, nitroGLYCERIN, sodium chloride flush, magnesium hydroxide, ondansetron, potassium chloride, magnesium sulfate, glucose, dextrose, glucagon (rDNA), dextrose, cyclobenzaprine    Allergies:  Bee venom    Social History:   TOBACCO:   reports that he has never smoked. He has never used smokeless tobacco.  ETOH:   reports that he does not drink alcohol. OCCUPATION:      Family History:       Problem Relation Age of Onset    High Blood Pressure Mother     Cancer Mother     Cancer Father     Stroke Maternal Grandfather     Diabetes Paternal Grandmother     Heart Disease Paternal Grandfather        REVIEW OF SYSTEMS:  Negative except for weak anxious obese sp fall with fracture. Physical Exam:    Vitals: BP (!) 110/57   Pulse 92   Temp 98.2 °F (36.8 °C) (Oral)   Resp 18   Ht 6' 3\" (1.905 m)   Wt (!) 403 lb 8 oz (183 kg)   SpO2 97%   BMI 50.43 kg/m²   General appearance: awake weak  HEENT: Head: Normal, normocephalic, atraumatic.   Neck: supple, symmetrical, trachea midline  Lungs: diminished breath sounds bilaterally  Heart: S1, S2 normal  Abdomen: abnormal findings:  hypoactive bowel sounds obese  Extremities: edema trace sp fall with surgery  Neurologic: Mental status: alertness: alert    CBC:   Recent Labs     08/30/19  0641 09/01/19  0630   WBC 20.2* 12.3*   HGB 14.9 12.6*    179     BMP:    Recent Labs     08/31/19  1036 09/01/19  0630   * 133*   K 4.0 3.9   CL 96* 101   CO2 19* 22   BUN 43* 45*   CREATININE 2.2* 1.8*   GLUCOSE 241* 196*     Hepatic: No results for input(s): AST, ALT, ALB, BILITOT, ALKPHOS in the last 72 hours.  Troponin: No results for input(s): TROPONINI in the last 72 hours. Mg, Phos:   Recent Labs     08/31/19  1036   MG 1.7*       ABGs: No results found for: PHART, PO2ART, JVK0OOE  INR: No results for input(s): INR in the last 72 hours.   -----------------------------------------------------------------      Assessment and Recommendations     Patient Active Problem List   Diagnosis Code    Hypertension I10    Hyperlipidemia E78.5    Asthma J45.909    Diabetes mellitus ([facility] [address] E11.9    H/O cardiovascular stress test Z92.89    Obesity E66.9    Chronic kidney disease, stage III (moderate) ([facility]) N18.3    Hypertensive kidney disease with CKD stage III (HCC) I12.9, N18.3    Depression F32.9    SOBOE (shortness of breath on exertion) R06.02    Abnormal cardiovascular stress test R94.39    Fracture of epiphysis (separation) (upper) of neck of femur, closed ([facility] [address] S72.023A     Imp/plan  1 arf from atn on ckd 3 creat 1.5-1.7  2 sp fall with right hip fracture  3 htn  4 dm2 uncontrolled  5 hyponatremia  6 obesity with sob    Plan  1 renal improved and hold ace arb and no nsaid or diuretic for now  2 sp surgery and working for aru rehab  3 bp low stable on proamatine  4 monitor glucose and trying control  5 na better and restrict water  6 monitor o2 and work weight loss  Will follow and medically stable for aru when approved    Electronically signed by Herb Blue MD on 9/1/2019 at 10:34 AM

## 2019-09-02 NOTE — PROGRESS NOTES
1413- pt rec'd from the OR and placed on pacu monitor with alarms on. Report rec'd from Eva KAUFMAN and OR nurse. Pt remains asleep from anesthesia. resps even and unlabored. No facial grimacing noted. 1435- pt arousing and following basic commands. Pt wiggles all right toes and flexes with right foot upon command. Pt denies pain. Returns to sleep when not stimulated. 12- Dr. Trisha Frye called and pain medications verified. 1520- xrays being done per orders  1545- pt turned and repositioned with assistance from pt. Pt more aware of surroundings. Cough and deep breathing exercises done. 1600- pt tolerating po ice chips without c/o nausea. 1619- pt denies any pain. Pt following commands. Wiggles right toes upon command. Pt transferred to room 4009 via bed, tele, and O2. Family at the bedside and updated.
Dr. Syed Torres at bedside. Updated on labs and events over night.  See new orders    Gato Reid RN
Increased Dilaudid to 4mg q1h per Dr. Jaja Maciel. Surgery scheduled for around 12. Gómez Guzman, pts nephew notified of time.
Medication History  [facility]    Patient Name: Parag Galicia 1950     Medication history has been completed by: Lizzie Sy CPhT    Source(s) of information: patient and insurance claims     Primary Care Physician: Shirley Henson MD     Pharmacy: [facility]    Allergies as of 08/28/2019 - Review Complete 08/28/2019   Allergen Reaction Noted    Bee venom Shortness Of Breath 10/25/2017        Prior to Admission medications    Medication Sig Start Date End Date Taking? Authorizing Provider   TRULICITY 1.5 MQ/6.2UW SOPN Inject into the skin once a week 8/21/19  Yes Historical Provider, MD   ezetimibe (ZETIA) 10 MG tablet Take 10 mg by mouth daily   Yes Historical Provider, MD   SUPER B COMPLEX/C PO Take 1 capsule by mouth daily   Yes Historical Provider, MD   Cinnamon 500 MG TABS Take 1,000 mg by mouth daily   Yes Historical Provider, MD   vitamin E 400 UNIT capsule Take 400 Units by mouth daily   Yes Historical Provider, MD   vitamin C (ASCORBIC ACID) 500 MG tablet Take 1,000 mg by mouth daily    Yes Historical Provider, MD   insulin lispro (HUMALOG) 100 UNIT/ML injection vial Inject into the skin 3 times daily (before meals)   Yes Historical Provider, MD   insulin glargine (LANTUS) 100 UNIT/ML injection vial Inject 74 Units into the skin nightly    Yes Historical Provider, MD   benazepril (LOTENSIN) 20 MG tablet Take 1 tablet by mouth daily 9/15/16  Yes David Arellano MD   fluticasone (FLONASE) 50 MCG/ACT nasal spray as needed  3/22/16  Yes Historical Provider, MD   diphenhydrAMINE (BENADRYL) 25 MG tablet Take 25 mg by mouth as needed for Itching   Yes Historical Provider, MD   Multiple Vitamins-Minerals (VISION VITAMINS PO) Take by mouth daily   Yes Historical Provider, MD   omeprazole (PRILOSEC) 20 MG capsule Take 20 mg by mouth daily. Yes Historical Provider, MD   [facility] Wort 300 MG CAPS Take 2 capsules by mouth.    Yes Historical Provider, MD   Canagliflozin (INVOKANA) 300 MG TABS
Pt being discharged to [facility] today. Pt's nephew Emily Hilliard was notified via phone. Report given to Arvin Niec at [facility]. Peripheral IV's removed. Pt to go with [facility] per Dr. Sy Rosario.
Received call from Charmaine Dunaway at [facility] stating that pt was approved for admission to facility. Patrick Alcantara in [address] called and notified. Dr. Carla christianson served to inform him.
DIET GENERAL;  Code Status: Full Code    Treatment progress and plan was d/w pt/family .         Riki Pennington MD

## 2019-09-02 NOTE — DISCHARGE SUMMARY
SURGERY  IP CONSULT TO PRIMARY CARE PROVIDER  IP CONSULT TO NEPHROLOGY        Discharge Medications:   Current Discharge Medication List      START taking these medications    Details   oxyCODONE-acetaminophen (PERCOCET) 5-325 MG per tablet Take 2 tablets by mouth every 8 hours as needed for Pain for up to 3 days. Qty: 9 tablet, Refills: 0    Comments: Reduce doses taken as pain becomes manageable  Associated Diagnoses: H/O cardiovascular stress test      enoxaparin (LOVENOX) 40 MG/0.4ML injection Inject 0.4 mLs into the skin daily for 21 days  Qty: 21 Syringe, Refills: 0           Current Discharge Medication List        Current Discharge Medication List      CONTINUE these medications which have NOT CHANGED    Details   TRULICITY 1.5 YP/1.1NL SOPN Inject into the skin once a week  Refills: 2      ezetimibe (ZETIA) 10 MG tablet Take 10 mg by mouth daily      SUPER B COMPLEX/C PO Take 1 capsule by mouth daily      Cinnamon 500 MG TABS Take 1,000 mg by mouth daily      vitamin E 400 UNIT capsule Take 400 Units by mouth daily      vitamin C (ASCORBIC ACID) 500 MG tablet Take 1,000 mg by mouth daily       insulin lispro (HUMALOG) 100 UNIT/ML injection vial Inject into the skin 3 times daily (before meals)      insulin glargine (LANTUS) 100 UNIT/ML injection vial Inject 74 Units into the skin nightly       benazepril (LOTENSIN) 20 MG tablet Take 1 tablet by mouth daily  Qty: 90 tablet, Refills: 3      fluticasone (FLONASE) 50 MCG/ACT nasal spray as needed       Multiple Vitamins-Minerals (VISION VITAMINS PO) Take by mouth daily      omeprazole (PRILOSEC) 20 MG capsule Take 20 mg by mouth daily. [facility] Wort 300 MG CAPS Take 2 capsules by mouth. Canagliflozin (INVOKANA) 300 MG TABS Take  by mouth daily.       budesonide-formoterol (SYMBICORT) 80-4.5 MCG/ACT AERO Inhale 2 puffs into the lungs as needed       Albuterol Sulfate (PROAIR HFA IN) Inhale into the lungs as needed       buPROPion (WELLBUTRIN XL) 300 MG 01/29/2015 HISTORY: ORDERING SYSTEM PROVIDED HISTORY: Dementia without behavioral disturbance, unspecified dementia type TECHNOLOGIST PROVIDED HISTORY: Reason for Exam: Pt states he recently had some kind of brain test done, he was not sure what it was called, but his results were sub-par. H/o multiple concussions in the past. FINDINGS: BRAIN/VENTRICLES: There is no acute intracranial hemorrhage, mass effect or midline shift. No abnormal extra-axial fluid collection. The gray-white differentiation is maintained without evidence of an acute infarct. There is no evidence of hydrocephalus. There is minimal diffuse cerebral atrophy, age-appropriate. Atherosclerotic carotid arterial calcifications are noted. ORBITS: The visualized portion of the orbits demonstrate no acute abnormality. SINUSES: The visualized paranasal sinuses and mastoid air cells demonstrate no acute abnormality. SOFT TISSUES/SKULL:  No acute abnormality of the visualized skull or soft tissues. Stable appearance of the brain with no acute intracranial abnormality. Ct Cervical Spine Wo Contrast    Result Date: 8/28/2019  EXAMINATION: CT OF THE CERVICAL SPINE WITHOUT CONTRAST 8/28/2019 4:30 pm TECHNIQUE: CT of the cervical spine was performed without the administration of intravenous contrast. Multiplanar reformatted images are provided for review. Dose modulation, iterative reconstruction, and/or weight based adjustment of the mA/kV was utilized to reduce the radiation dose to as low as reasonably achievable. COMPARISON: None. HISTORY: ORDERING SYSTEM PROVIDED HISTORY: fall TECHNOLOGIST PROVIDED HISTORY: Reason for Exam: fall Acuity: Acute Type of Exam: Initial Mechanism of Injury: patient states he went to sit down in a chair and missed the chair and fell Relevant Medical/Surgical History: no LOC, no c-collar FINDINGS: BONES/ALIGNMENT: There is no evidence of an acute cervical spine fracture.  There is normal alignment of the cervical

## 2019-09-10 PROBLEM — L89.150 PRESSURE ULCER OF COCCYGEAL REGION, UNSTAGEABLE (HCC): Status: ACTIVE | Noted: 2019-01-01

## 2019-09-10 NOTE — PROGRESS NOTES
Wound Care Initial Visit      Mikael Adams  AGE: 77 y.o. GENDER: male  : 1950  EPISODE DATE:  9/10/2019     Subjective:     CHIEF COMPLAINT wound on coccyx     HISTORY of PRESENT ILLNESS      Mikael Adams is a 77 y.o. male who Is evaluated today at [address] [place] [address] for an initial visit for evaluation and treatment of Chronic pressure  wound(s) of  Coccyx. The condition is of marked severity. The wound has been present since prior to admission to SNF. The underlying cause is thought to be pressure. The patients care to date has included dry dressing. Wound is very exudative with odorous purulent drainage. Wound culture obtained. The patient has significant underlying medical conditions as below.      Wound Pain Timing/Severity: none  Quality of pain: N/A  Severity of pain:  0 / 10   Modifying Factors: diabetes, chronic pressure, decreased mobility, shear force and obesity  Associated Signs/Symptoms: drainage and odor        PAST MEDICAL HISTORY        Diagnosis Date    Arthritis     Asthma     Chronic kidney disease     Diabetes mellitus ([facility] [address]     H/O cardiac catheterization 2017    H/O cardiovascular stress test ,     CARDIOLITE: EF->51%    Hyperlipidemia     Hypertension     Obesity     Thyroid disease        PAST SURGICAL HISTORY    Past Surgical History:   Procedure Laterality Date    CARPAL TUNNEL RELEASE      DIAGNOSTIC CARDIAC CATH LAB PROCEDURE      NO SIGNIFICANT CAD    EYE SURGERY      HEMIARTHROPLASTY HIP Right 2019    HIP HEMIARTHROPLASTY performed by Edgar Alba MD at [address] [place] [address] HISTORY    Family History   Problem Relation Age of Onset    High Blood Pressure Mother     Cancer Mother     Cancer Father     Stroke Maternal Grandfather     Diabetes Paternal Grandmother     Heart Disease Paternal Grandfather        SOCIAL HISTORY    Social History     Tobacco Use    Smoking daily.      simvastatin (ZOCOR) 40 MG tablet Take 40 mg by mouth daily       aspirin 81 MG EC tablet Take 81 mg by mouth daily. No current facility-administered medications on file prior to visit. PROBLEM LIST    Patient Active Problem List   Diagnosis    Hypertension    Hyperlipidemia    Asthma    Diabetes mellitus ([facility] [address]    H/O cardiovascular stress test    Obesity    Chronic kidney disease, stage III (moderate) (HCC)    Hypertensive kidney disease with CKD stage III (HCC)    Depression    SOBOE (shortness of breath on exertion)    Abnormal cardiovascular stress test    Fracture of epiphysis (separation) (upper) of neck of femur, closed (HCC)    Pressure ulcer of coccygeal region, unstageable ([facility] [address]       REVIEW OF SYSTEMS    Constitutional: negative for chills, fatigue, fevers and malaise  Respiratory: negative for cough and shortness of breath  Cardiovascular: negative for chest pain and chest pressure/discomfort  Integument/breast: positive for skin lesion(s)  Neurological: negative for headaches      Objective: There were no vitals taken for this visit. PHYSICAL EXAM  General Appearance: alert and oriented to person, place and time, well-developed and well-nourished, in no acute distress  Pulmonary/Chest: clear to auscultation bilaterally- no wheezes, rales or rhonchi, normal air movement, no respiratory distress  Cardiovascular: normal rate and normal S1 and S2  Dermatologic exam: Visual inspection of the periwound reveals the skin to be normal in turgor and texture  Wound exam: see wound description below in procedure note    Coccyx: Length: 12 cm, width 13 cm, depth ANSON, 100% brown-yellow slough    Assessment:       Rosanne Corado  appears to have a non-healing wound of the coccyx. The etiology of the wound is felt to be pressure. There are multiple complicating factors including diabetes, chronic pressure, decreased mobility, shear force and obesity.   A comprehensive wound management program would be helpful to heal this wound. Assessments completed include fall risk and nutritional, functional,and psychological status. At this time appropriate care would include: periodic debridement and wound care as below. Problem List Items Addressed This Visit     Pressure ulcer of coccygeal region, unstageable ([address]            Plan:        Do not scrub or use excessive force. Wash hands with soap and water before and after dressing changes. Prior to dressing application, cleanse wound with normal saline, wound cleanser, or mild soap and water. To coccyx wound, apply nickel-thick layer of Santyl to wound bed, then place dermacol, cover with abd pad, secure with paper or hyperfix tape. Change daily and as needed. Ensure that patient turns and changes positions frequently, at least every two hours. Use cushion if sitting up in chair. Encourage adequate nutritional intake and supplements. Keep skin clean and dry. Use of air mattress.        Electronically signed by LYNDA Phillips CNP on 9/10/2019 at 11:39 AM

## 2019-09-17 NOTE — CARE COORDINATION
[facility] Update Call    2019    Patient: Chelsy Steel Patient : 1950   MRN: <[id number]>  Reason for Admission: Fracture of epiphysis (separation) (upper) of neck of femur, close  Discharge Date: 19 RARS: Readmission Risk Score: 21    Outreach to [facility] Charlie Anderson for care transitions post-acute update, per RN request.       for  requesting return call regarding Ivy Torrez. Contact information provided.    Care Transitions Post Acute Facility Update    Care Transitions Interventions  Post Acute Facility:  [facility] [address] Update

## 2019-09-20 NOTE — ED TRIAGE NOTES
Pt from [facility], here for wound infection. Butocks ulcerations and hip surgery incision. Dressing change at [address] [place] [address] had large amount of puss. Recent Doxycycline use, not showing improvement.

## 2019-09-20 NOTE — ED PROVIDER NOTES
eMERGENCY dEPARTMENT eNCOUnter      CHIEF COMPLAINT:   Possible wound infection      CRITICAL CARE NOTE:  There was a high probability of clinically significant life-threatening deterioration of the patient's condition requiring my urgent intervention. Total critical care time is 55 minutes  This includes vital sign monitoring, pulse oximetry monitoring, telemetry monitoring, clinical response to the IV medications, reviewing the nursing notes, consultation time, dictation/documetation time. (This time excludes time spent performing procedures). HPI: Angela Castanon is a 77 y.o. male who presents to the Emergency Department, via EMS, complaining of a possible infection to his buttocks where he has a known decubitus ulcer. The patient sustained a right femoral neck fracture and underwent a right hemiarthroplasty with Dr. Bebe Dubon on 8/29/19. He was discharged to [facility]. He has had an infected decubitus ulcer and has been treated with doxycycline without improvement. He states that when they changed his dressing today, there is a large amount of pus from the decubitus ulcer. He states that the area is sore and throbbing. The pain is constant. There are no exacerbating or relieving factors. The patient denies fevers, chills, chest pain, shortness of breath, abdominal pain, numbness, tingling, weakness, or any other complaints. REVIEW OF SYSTEMS:  CONSTITUTIONAL:  Denies fever, chills, weight loss or weakness  EYES:  Denies photophobia or discharge  ENT:  Denies sore throat or ear pain  CARDIOVASCULAR:  Denies chest pain, palpitations or swelling  RESPIRATORY:  Denies cough or shortness of breath  GI: Denies abdominal pain, nausea, vomiting, or diarrhea  MUSCULOSKELETAL:  Denies back pain  SKIN:  No rash  NEUROLOGIC:  Denies headache, focal weakness or sensory changes  All systems negative except as marked. \"Remaining review of systems reviewed and negative.  I have reviewed the nursing triage documentation and agree unless otherwise noted below. \"    PAST MEDICAL HISTORY:   Past Medical History:   Diagnosis Date    Arthritis     Asthma     Chronic kidney disease     Diabetes mellitus ([facility] [address]     H/O cardiac catheterization 05/04/2017    H/O cardiovascular stress test 6/07, 12/08    CARDIOLITE: EF->51%    Hyperlipidemia     Hypertension     Obesity     Thyroid disease        CURRENT MEDICATIONS:   Home medications reviewed.     SURGICAL HISTORY:   Past Surgical History:   Procedure Laterality Date    CARPAL TUNNEL RELEASE  2005    DIAGNOSTIC CARDIAC CATH LAB PROCEDURE  12/05    NO SIGNIFICANT CAD    EYE SURGERY      HEMIARTHROPLASTY HIP Right 8/29/2019    HIP HEMIARTHROPLASTY performed by Trae Ramirez MD at [id number] [place] [address] HISTORY:   Family History   Problem Relation Age of Onset    High Blood Pressure Mother     Cancer Mother     Cancer Father     Stroke Maternal Grandfather     Diabetes Paternal Grandmother     Heart Disease Paternal Grandfather        SOCIAL HISTORY:   Social History     Socioeconomic History    Marital status: Single     Spouse name: Not on file    Number of children: Not on file    Years of education: Not on file    Highest education level: Not on file   Occupational History    Not on file   Social Needs    Financial resource strain: Not on file    Food insecurity:     Worry: Not on file     Inability: Not on file    Transportation needs:     Medical: Not on file     Non-medical: Not on file   Tobacco Use    Smoking status: Never Smoker    Smokeless tobacco: Never Used   Substance and Sexual Activity    Alcohol use: No    Drug use: No    Sexual activity: Not Currently   Lifestyle    Physical activity:     Days per week: Not on file     Minutes per session: Not on file    Stress: Not on file   Relationships    Social connections:     Talks on phone: Not on file     Gets together: Not on file     Attends Zoroastrian service: Not on file     Active member of club or organization: Not on file     Attends meetings of clubs or organizations: Not on file     Relationship status: Not on file    Intimate partner violence:     Fear of current or ex partner: Not on file     Emotionally abused: Not on file     Physically abused: Not on file     Forced sexual activity: Not on file   Other Topics Concern    Not on file   Social History Narrative    Not on file       ALLERGIES: Bee venom    PHYSICAL EXAM:  VITAL SIGNS:   ED Triage Vitals   Enc Vitals Group      BP       Pulse       Resp       Temp       Temp src       SpO2       Weight       Height       Head Circumference       Peak Flow       Pain Score       Pain Loc       Pain Edu? Excl. in [address]? Constitutional:  Non-toxic appearance  HENT: Normocephalic, Atraumatic  Eyes: PERRL, conjunctiva normal   Neck: Normal range of motion, No tenderness, Supple, No stridor, No lymphadenopathy  Cardiovascular:  Tachycardic, Regular rhythm  Pulmonary/Chest:  Normal breath sounds, No respiratory distress, No wheezing  Abdomen: Bowel sounds normal, Soft, No tenderness, No masses, No pulsatile masses  Back:  No tenderness, No CVA tenderness  Buttocks: Large infected decubitus ulcer over the coccyx with surrounding cellulitis  Extremities:  There is an incision noted to the right hip with staples in place, the wound is clean, dry, and intact with no signs of infection, The peripheral pulses are strong, Capillary refill is brisk, there is normal motor and sensory function, the feet are pink, warm, and well perfused, there is no cyanosis  Skin:  Warm, Dry, No erythema, No rash      EKG Interpretation  Interpreted by me  Compared to 8/29/19  Rhythm: sinus tachycardia  Rate: tachycardic 137  Axis: normal  Ectopy: none  Conduction: bifascicular block  ST Segments: no acute change  T Waves: no acute change  Clinical Impression: sinus tachycardia, bifascicular block    Cardiac Monitor Strip Interpretation  Interpreted by me  Monitor strip interpreted for greater than 10 seconds  Rhythm: sinus tachycardia  Rate: tachycardic  Ectopy: none  ST Segments: normal      Radiology / Procedures:      CT ABDOMEN PELVIS W IV CONTRAST Additional Contrast? None (Final result)   Result time 09/21/19 00:27:37   Final result by Evangelina Taylor MD (09/21/19 00:27:37)                Impression:    1. Soft tissue swelling and gas in the soft tissues posterior to the coccyx  and extending inferiorly.  There is no focal fluid collection to suggest  abscess.  No definite erosions are seen to suggest osteomyelitis. 2. Cholelithiasis without evidence to suggest acute cholecystitis. 3. Splenomegaly. 4. Tiny left pleural effusion.  Adjacent airspace disease likely represents  atelectasis. 5. Recent right hip arthroplasty. Narrative:    EXAMINATION:  CT OF THE ABDOMEN AND PELVIS WITH CONTRAST 9/20/2019 10:58 pm    TECHNIQUE:  CT of the abdomen and pelvis was performed with the administration of  intravenous contrast. Multiplanar reformatted images are provided for review. Dose modulation, iterative reconstruction, and/or weight based adjustment of  the mA/kV was utilized to reduce the radiation dose to as low as reasonably  achievable. COMPARISON:  None. HISTORY:  ORDERING SYSTEM PROVIDED HISTORY: Infected decubitus ulers, buttocks pain,  evaluate for necrotizing infection  TECHNOLOGIST PROVIDED HISTORY:  Additional Contrast?->None  Reason for Exam: Infected decubitus ulers, buttocks pain, evaluate for  necrotizing infection  Acuity: Unknown  Type of Exam: Initial  Additional signs and symptoms: 100cc isovue 370   wt# [id number],,,Pressure ulcer of  coccygeal region, unstageable  Relevant Medical/Surgical History: hx DM, htn    FINDINGS:  Lower Chest: There is a tiny left pleural effusion with adjacent airspace  disease.     Organs: Evaluation is limited by streak artifact from the patient's body  habitus and the arms.  The spleen, pancreas and adrenal glands are  unremarkable.  Cholelithiasis is noted in a distended gallbladder.  There is  no pericholecystic inflammation.  The spleen is enlarged.  There are  bilateral renal cysts measuring up to 2.9 x 4.1 cm on the left. GI/Bowel: Small bowel caliber is normal.  The appendix is normal.  The colon  is unremarkable. Pelvis: Bladder catheter is in place.  There is streak artifact from right  hip arthroplasty device. Peritoneum/Retroperitoneum: No adenopathy, mesenteric stranding or free  fluid.  Aortic caliber is normal.    Bones/Soft Tissues: There is soft tissue swelling and soft tissue gas  posterior to the coccyx and extending inferiorly toward the anus.  There is  no focal fluid collection. Sheri Cool is also soft tissue swelling and gas  adjacent to a recent right hip arthroplasty.  Gas extends along the right  iliopsoas.  No definite erosions are seen.                    CTA PULMONARY W CONTRAST (Preliminary result)   Result time 09/21/19 00:24:38   Preliminary result by Clifton Briseno MD (09/21/19 00:24:38)                Impression:    1. No evidence for a pulmonary embolism. 2. Small left pleural effusion with increased opacities noted at the left  base and elevation of the left hemidiaphragm, likely related to atelectasis.                     XR CHEST PORTABLE (Preliminary result)   Result time 09/20/19 23:14:09   Preliminary result by Clifton Briseno MD (09/20/19 23:14:09)                Impression:    Volume loss with increased opacities are noted at the left base, concerning  for atelectasis/infiltrates.  Limited exam.            Narrative:    EXAMINATION:  ONE X-RAY VIEW OF THE CHEST, 9/20/2019 10:29 pm    COMPARISON:  Chest radiograph dated August 29, 2019    HISTORY:  ORDERING SYSTEM PROVIDED HISTORY: Shortness of breath  TECHNOLOGIST PROVIDED HISTORY:  Reason for exam:->Shortness of breath  Reason for Exam: Shortness of breath  Acuity: Unknown  Type of Exam: Unknown  Relevant Medical/Surgical History: Best possible image due to pt size. Patient did not have any assistance.     FINDINGS:  A small portion of the right lateral lung is not visualized.  The  cardiomediastinal silhouette is stable.  Increased opacities are seen at the  left base.  There is volume loss at the left base.                Preliminary result by Marlen Zarate MD (09/20/19 23:07:56)                Impression:    Volume loss with increased opacities are noted at the left base, concerning  for atelectasis/infiltrates.  Limited exam.              Labs Reviewed   CBC WITH AUTO DIFFERENTIAL - Abnormal; Notable for the following components:       Result Value    WBC 24.0 (*)     RBC 4.18 (*)     Hemoglobin 12.6 (*)     Hematocrit 39.3 (*)     Platelets 631 (*)     Segs Relative 86.4 (*)     Lymphocytes % 4.8 (*)     Monocytes % 7.0 (*)     Immature Neutrophil % 1.6 (*)     All other components within normal limits   COMPREHENSIVE METABOLIC PANEL - Abnormal; Notable for the following components:    Sodium 130 (*)     Chloride 95 (*)     BUN 25 (*)     Glucose 203 (*)     Alb 2.4 (*)     Total Protein 6.1 (*)     Total Bilirubin 1.1 (*)     ALT 98 (*)      (*)     Alkaline Phosphatase 143 (*)     All other components within normal limits   URINALYSIS WITH MICROSCOPIC - Abnormal; Notable for the following components:    Color, UA MICHAEL (*)     Clarity, UA SLIGHTLY CLOUDY (*)     Glucose, Urine 150 (*)     Bilirubin Urine MODERATE (*)     Blood, Urine TRACE (*)     Protein, UA >500 (*)     Urobilinogen, Urine 8.0 (*)     Leukocyte Esterase, Urine LARGE (*)     RBC, UA 40 (*)     WBC,  (*)     Bacteria, UA MANY (*)     All other components within normal limits   BLOOD GAS, ARTERIAL - Abnormal; Notable for the following components:    O2 Sat 94.5 (*)     All other components within normal limits   POCT GLUCOSE - Abnormal; Notable for the following attempted to place a central line without success. Blood pressures remained on the low side, however mean arterial pressure remained above 65 and pressors were not initiated. I suspect that the patient has an infected decubitus ulcer, urinary tract infection and possible pneumonia. He appears to be septic. I have a low suspicion for CVA, meningitis, impending respiratory failure , acute surgical abdomen or shock. I recommended admission to the hospital and the patient was agreeable. I discussed the case with the patient's PCP, Dr. Thuan Wick, who will admit the patient for further treatment and care. The patient is currently in stable condition awaiting admission. Clinical Impression:  1. Septicemia ([address]    2. Infected decubitus ulcer, unspecified ulcer stage    3. Fever, unspecified fever cause    4. Leukocytosis, unspecified type    5. Elevated liver enzymes    6. Hypoxia        Comment: Please note this report has been produced using speech recognition software and may contain errors related to that system including errors in grammar, punctuation, and spelling, as well as words and phrases that may be inappropriate. If there are any questions or concerns please feel free to contact the dictating provider for clarification.         Milena Bosch MD  09/21/19 0119

## 2019-09-21 PROBLEM — A41.9 SEPSIS (HCC): Status: ACTIVE | Noted: 2019-01-01

## 2019-09-21 NOTE — CONSULTS
Nephrology Service Consultation    Patient:  Nguyen Ross  MRN: [id number]  Consulting physician:  Ana Cristina Blas MD  Reason for Consult: arf on ckd    History Obtained From:  patient, electronic medical record  PCP: James Dowell MD    HISTORY OF PRESENT ILLNESS:   The patient is a 77 y.o. male who presents with weakness and confusion with severe pain buttock sp recent right hemiarthroplasty with dr Sondra Gorman 8/29/19 at [facility] and concern infected hip and sent er and in work up had ct with contrast and admitted icu with hypotension possible sepsis with risk atn on ckd 3 with contrast as well.      Past Medical History:        Diagnosis Date    Arthritis     Asthma     Chronic kidney disease     Diabetes mellitus ([facility] [address]     H/O cardiac catheterization 05/04/2017    H/O cardiovascular stress test 6/07, 12/08    CARDIOLITE: EF->51%    Hyperlipidemia     Hypertension     Obesity     Thyroid disease        Past Surgical History:        Procedure Laterality Date    CARPAL TUNNEL RELEASE  2005    DIAGNOSTIC CARDIAC CATH LAB PROCEDURE  12/05    NO SIGNIFICANT CAD    EYE SURGERY      HEMIARTHROPLASTY HIP Right 8/29/2019    HIP HEMIARTHROPLASTY performed by Chelsea Linn MD at [address]         Medications:   Scheduled Meds:   aspirin  81 mg Oral Daily    lisinopril  20 mg Oral Daily    mometasone-formoterol  2 puff Inhalation BID    buPROPion  300 mg Oral Daily    canagliflozin  300 mg Oral Daily    ezetimibe  10 mg Oral Daily    fluticasone  1 spray Each Nostril Daily    insulin glargine  74 Units Subcutaneous Nightly    ocuvite-lutein  1 tablet Oral Daily    pantoprazole  40 mg Oral QAM AC    sertraline  100 mg Oral BID    simvastatin  40 mg Oral Daily    b complex-C-E-zinc  1 tablet Oral Daily    Dulaglutide  1.5 mg Subcutaneous Weekly    vitamin C  1,000 mg Oral Daily    vitamin E  400 Units Oral Daily    sodium chloride flush  10 mL Intravenous 2 times per day    enoxaparin  40 mg Subcutaneous Daily    cefepime  2 g Intravenous Q8H    insulin lispro  0-12 Units Subcutaneous TID WC    insulin lispro  0-6 Units Subcutaneous Nightly    [START ON 9/22/2019] influenza virus vaccine  0.5 mL Intramuscular Once    vancomycin  1,500 mg Intravenous Q12H     Continuous Infusions:   sodium chloride 150 mL/hr at 09/21/19 0834     PRN Meds:.acetaminophen, albuterol sulfate HFA, nitroGLYCERIN, sodium chloride flush, ondansetron    Allergies:  Bee venom    Social History:   TOBACCO:   reports that he has never smoked. He has never used smokeless tobacco.  ETOH:   reports that he does not drink alcohol. OCCUPATION:      Family History:       Problem Relation Age of Onset    High Blood Pressure Mother     Cancer Mother     Cancer Father     Stroke Maternal Grandfather     Diabetes Paternal Grandmother     Heart Disease Paternal Grandfather        REVIEW OF SYSTEMS:  Negative except for weak anxious pain confused. Physical Exam:    Vitals: BP (!) 94/58   Pulse 82   Temp 98.2 °F (36.8 °C) (Oral)   Resp 20   Ht 6' 3\" (1.905 m)   Wt (!) 392 lb 3.2 oz (177.9 kg)   SpO2 98%   BMI 49.02 kg/m²   General appearance: awake weak   HEENT: Head: Normal, normocephalic, atraumatic.   Neck: supple, symmetrical, trachea midline  Lungs: diminished breath sounds bilaterally  Heart: S1, S2 normal  Abdomen: abnormal findings:  hypoactive bowel sounds obese  Extremities: edema trace with tender hip  Neurologic: Mental status: alertness:awake    CBC:   Recent Labs     09/20/19  0835 09/20/19 1953 09/21/19  0321   WBC 17.0* 24.0* 30.7  ALERT CALLED TO [facility] RN AT [phone] LINDSEY MLT   RESULTS READ BACK  *   HGB 12.4* 12.6* 11.6*    460* 365     BMP:    Recent Labs     09/19/19  0658 09/20/19 1953 09/20/19  2244 09/21/19  0321    130*  --  136   K 3.8 4.0  --  4.4   CL 97* 95*  --  99   CO2 26 24  --  25   BUN 23 25*  --  30*   CREATININE 1.1 1.1  -- 1.5*   GLUCOSE 100* 203* 169 197*     Hepatic:   Recent Labs     09/20/19 1953   *   ALT 98*   BILITOT 1.1*   ALKPHOS 143*     Troponin: No results for input(s): TROPONINI in the last 72 hours. Mg, Phos: No results for input(s): MG, PHOS in the last 72 hours. ABGs:   Lab Results   Component Value Date    PO2ART 89 09/20/2019    GXG3ZRV 32.0 09/20/2019     INR: No results for input(s): INR in the last 72 hours.   -----------------------------------------------------------------      Assessment and Recommendations     Patient Active Problem List   Diagnosis Code    Hypertension I10    Hyperlipidemia E78.5    Asthma J45.909    Diabetes mellitus ([facility] [address] E11.9    H/O cardiovascular stress test Z92.89    Obesity E66.9    Chronic kidney disease, stage III (moderate) ([facility]) N18.3    Hypertensive kidney disease with CKD stage III ([facility]) I12.9, N18.3    Depression F32.9    SOBOE (shortness of breath on exertion) R06.02    Abnormal cardiovascular stress test R94.39    Fracture of epiphysis (separation) (upper) of neck of femur, closed ([facility]) S72.023A    Pressure ulcer of coccygeal region, unstageable ([facility]) L89.150    Sepsis ([facility]) A41.9     Imp/plan  1 septic shock  2 s/p hip surgery with wound infection  3 arf from atn/contrast on ckd 3  4 anxiety/confusion  5 resp distress with hypoxia    Plan  1 pressors as need and start antibiotic therapy fu cutlure  2 surgery eval and fu wound care  3 post contrast no ace arb diuretic and start ivf monitor no acute dialysis  4 monitor affect  5 o2 as need not co2 retention monitor  Will follow        Electronically signed by Anel Cannon MD on 9/21/2019 at 12:17 PM

## 2019-09-21 NOTE — PLAN OF CARE
Nutrition Problem: Increased nutrient needs  Intervention: Food and/or Nutrient Delivery: Continue current diet, Start ONS  Nutritional Goals: Pt will consume >75% of all meals and supplements provided

## 2019-09-21 NOTE — CARE COORDINATION
Pt was identified as a potential readmission, therefore chart review completed by this CM. No significant hospital history in 2018-19, only one ED to hospital admission encounter 8/28/19 - 9/02/19 for closed fracture of epiphysis of neck of femur. Pt underwent surgery and was d/c'd to [facility]. Pt presents to the ED today from [place] due to wound infection at buttocks decubitus ulcer site with a large amount of pus despite recent antibiotic completion. Pt's ED evaluation revealed leukocytosis (WBC 24), which has increased seven points from this AM. IV antibiotics were initiated in the ED. Further tests ordered and pending. No CM needs identified at this time.

## 2019-09-21 NOTE — CONSULTS
[address] [place] [address]    Rai Deleon is a 77 y.o. male started on vancomycin for wound infection. Pharmacy consulted by ED provider Dr Michael Lopez to order a dose of vancomycin in the emergency department. Other antimicrobials: Cefepime    Ht Readings from Last 1 Encounters:   09/20/19 6' 3\" (1.905 m)     Wt Readings from Last 3 Encounters:   09/20/19 (!) 382 lb (173.3 kg)   08/28/19 (!) 403 lb 8 oz (183 kg)   06/10/19 (!) 375 lb 3.2 oz (170.2 kg)        Pertinent Laboratory Values:   Temp Readings from Last 3 Encounters:   09/20/19 104 °F (40 °C) (Rectal)   09/02/19 97.9 °F (36.6 °C) (Oral)   08/29/19 97.2 °F (36.2 °C)     Recent Labs     09/20/19  0835 09/20/19  1953   WBC 17.0* 24.0*   LACTATE  --  1.7     Recent Labs     09/19/19  0658 09/20/19 1953   BUN 23 25*   CREATININE 1.1 1.1     Estimated Creatinine Clearance: 109 mL/min (based on SCr of 1.1 mg/dL). No intake or output data in the 24 hours ending 09/20/19 9103    Assessment/Plan:  Pharmacy will order vancomycin 2000 mg (11.5 mg/kg). Please note, pharmacy will order a one-time dose of vancomycin for the Emergency Department. The consult will need to be re-ordered if vancomycin is to continue upon admission. Thank you for the consult.   Stefanie Rodriguez RPh  9/20/2019 10:15 PM

## 2019-09-21 NOTE — CONSULTS
Nutrition Assessment    Type and Reason for Visit: Consult, Initial(Wounds, poor intake/appetite)    Nutrition Recommendations:   · Continue current diet (Carb Control 5 choices)  · Provide oral wound healing nutrition supplement BID    Nutrition Assessment: Pt at high nutrition risk with wounds. Noted pt with unstageable pressure ulcer to coccyx, cluster of ulcers and blisters to hip. Noted wound consult pending. No significant wt loss noted x 1 yr. Will order wound healing oral nutrition supplement and monitor intake. Malnutrition Assessment:  · Malnutrition Status:  At risk for malnutrition  · Context: Acute illness or injury    Nutrition Risk Level: High    Nutrient Needs:  · Estimated Daily Total Kcal: 0059-9050 (8-15 kcal/kg current BW)  · Estimated Daily Protein (g): >178 (>2 g/kg IBW)  · Estimated Daily Total Fluid (ml/day): 6836-6132 (1 mL/kcal)    Nutrition Diagnosis:   · Problem: Increased nutrient needs  · Etiology: related to Acute injury/trauma     Signs and symptoms:  as evidenced by Presence of wounds    Objective Information:  · Wound Type: Pressure Ulcer, Unstageable, Wound Consult Pending  · Current Nutrition Therapies:  · Oral Diet Orders: Carb Control 5 Carbs/Meal   · Oral Diet intake: Unable to assess  · Oral Nutrition Supplement (ONS) Orders: None  · Anthropometric Measures:  · Ht: 6' 3\" (190.5 cm)   · Current Body Wt: 392 lb (177.8 kg)  · Admission Body Wt: 392 lb (177.8 kg)  · Usual Body Wt: (ANSON)  · % Weight Change:None noted x 1 yr  · Ideal Body Wt: 196 lb (88.9 kg), % Ideal Body 200%  · BMI Classification: BMI > or equal to 40.0 Obese Class III    Nutrition Interventions:   Continue current diet, Start ONS  Continued Inpatient Monitoring, Education Not Indicated, Coordination of Care    Nutrition Evaluation:   · Evaluation: Goals set   · Goals: Pt will consume >75% of all meals and supplements provided    · Monitoring: Meal Intake, Supplement Intake, Wound Healing, Weight, Pertinent

## 2019-09-21 NOTE — ED NOTES
Central line placement unsuccessful. Pt has two peripheral IV's and pressure is maintaining at this time. Pt given fan and drink of water.         Mira Amato RN  09/21/19 6265

## 2019-09-21 NOTE — ED PROVIDER NOTES
I examined patient for laceration repair Malena Fung. In brief their history revealed ill patient requiring venous access    My management of patient is limited exclusively to Procedure . For history, physical, management, ed course, medical decision making and disposition please see note of attending physician. Their focused exam revealed elderly patient in bed A&Ox4     Procedure Note - Central Line:  The benefits, risks, and alternatives of central venous access were discussed with the  patient and Written consent was obtained for the procedure. Malena Fung was prepped and draped in standard bedside fashion in the [address] [place] [address] [address] area. Physical landmarks with ultrasound guidance was used to locate the central vein. Local anesthesia with 2ml of 1% lidocaine was injected. Seldinger technique was used for unsuccessful placement of the CVC. Procedure was aborted The patient tolerated the procedure well and no acute complications occurred. All diagnostic, treatment, and disposition decisions were made by Attending physician    I saw this patient in conjunction with attending physician Katerin Snider.  [address] [place], [place]  10/02/19 3624

## 2019-09-21 NOTE — ED NOTES
Upon entry to pts room, pt was shaking and less responsive. Pt was being asked the same question multiple times with no response. Pts oxygen level was checked and pt was in the 70s. Pt placed on non-rebreather and sepsis orders placed. MD at bedside and aware. Pts coccyx was assessed and he has an unstageable ulcer. Foul smelling. Old dressings were removed and replaced with new mepilex.       Joe Rahman RN  09/20/19 [address] Marie Benitez RN  09/20/19 9231

## 2019-09-21 NOTE — PROGRESS NOTES
PHARMACY TO DOSE VANCOMYCIN PER DR Giles    INFECTION BEING TREATED = sepsis  CULTURES = blood, urine  OTHER ABT'S = cefepime    AGE = 77 y.o.     SEX = male  HEIGHT = 6' 3\" (1.905 m)  Wt Readings from Last 3 Encounters:   09/21/19 (!) 388 lb 6.4 oz (176.2 kg)   08/28/19 (!) 403 lb 8 oz (183 kg)   06/10/19 (!) 375 lb 3.2 oz (170.2 kg)     Estimated Creatinine Clearance: 110 mL/min (based on SCr of 1.1 mg/dL).     Recent Labs     09/19/19  0658 09/20/19  0835 09/20/19  1953   WBC  --  17.0* 24.0*   BUN 23  --  25*   CREATININE 1.1  --  1.1   LACTATE  --   --  1.7     DOSING PLAN COMMENTS:  Vancomycin 2000 mg x 1 dose given in ER, followed by vancomycin 1500 mg every 12 hrs    VANCO TROUGH SCHEDULED FOR 9/22/19 @0930    Stephanie Miles [facility]  9/21/2019   3:34 AM  _______________________________________

## 2019-09-21 NOTE — CONSULTS
Department of General Surgery   Surgical Service Dr. Vaughn Unger   Consult Note    Date of Consult: 9/21/19    Reason for Consult:  Decubitus ulcer    Requesting Physician:  Dr. Oneyda Shelley:  Decubitus ulcer    History Obtained From:  patient, electronic medical record    HISTORY OF PRESENT ILLNESS:      The patient is a 77 y.o. male who presented to ED from his rehab facility with complaints of worsening decubitus ulcer described as:     Location: coccyx   Quality: sharp to dull  Severity: Denies when asked  Duration: States it happened since he was hospitalized s/p fall and right hemiarthroplasty over last few months  Timing: Constant  Context: At rehab, pt states he was only get turned a few times a day. Modifying factors: Denies. Apparently he was on Doxycycline at rehab facility without reported improvement. Associated signs and symptoms: Denies F/C. +drainage and + odor. Pt was evaluated in our ED and admitted to ICU by Dr. Armani Taveras.     A c/s was placed to General Surgery. Pt is currently on a diet.        Past Medical History:    Past Medical History:   Diagnosis Date    Arthritis     Asthma     Chronic kidney disease     Diabetes mellitus ([facility]     H/O cardiac catheterization 05/04/2017    H/O cardiovascular stress test 6/07, 12/08    CARDIOLITE: EF->51%    Hyperlipidemia     Hypertension     Obesity     Thyroid disease        Past Surgical History:    Past Surgical History:   Procedure Laterality Date    CARPAL TUNNEL RELEASE  2005    DIAGNOSTIC CARDIAC CATH LAB PROCEDURE  12/05    NO SIGNIFICANT CAD    EYE SURGERY      HEMIARTHROPLASTY HIP Right 8/29/2019    HIP HEMIARTHROPLASTY performed by Arabella Boo MD at [address]         Current Medications:   Current Facility-Administered Medications   Medication Dose Route Frequency Provider Last Rate Last Dose    acetaminophen (TYLENOL) tablet 500 mg  500 mg Oral Q6H PRN Yaneth Lagunas MD   500 mg at Osmin Khalil MD   10 mL at 09/21/19 1005    sodium chloride flush 0.9 % injection 10 mL  10 mL Intravenous PRN Osmin Khalil MD        enoxaparin (LOVENOX) injection 40 mg  40 mg Subcutaneous Daily Osmin Khalil MD   40 mg at 09/21/19 1003    0.9 % sodium chloride infusion   Intravenous Continuous Osmin Khalil  mL/hr at 09/21/19 0834      cefepime (MAXIPIME) 2 g in dextrose 5 % 50 mL IVPB  2 g Intravenous Q8H Karin Giles  mL/hr at 09/21/19 1003 2 g at 09/21/19 1003    insulin lispro (HUMALOG) injection vial 0-12 Units  0-12 Units Subcutaneous TID WC Osmin Khalil MD   2 Units at 09/21/19 1009    insulin lispro (HUMALOG) injection vial 0-6 Units  0-6 Units Subcutaneous Nightly Osmin Khalil MD        [START ON 9/22/2019] influenza quadrivalent split vaccine (FLUZONE;FLUARIX;FLULAVAL;AFLURIA) injection 0.5 mL  0.5 mL Intramuscular Once Osmin Khalil MD        vancomycin (VANCOCIN) 1,500 mg in dextrose 5 % 500 mL IVPB  1,500 mg Intravenous Q12H Karin Giles  mL/hr at 09/21/19 1102 1,500 mg at 09/21/19 1102    ondansetron (ZOFRAN) injection 4 mg  4 mg Intravenous Q6H PRN Osmin Khalil MD   4 mg at 09/20/19 2110       Allergies:  Bee venom    Social History:   Social History     Socioeconomic History    Marital status: Single     Spouse name: None    Number of children: None    Years of education: None    Highest education level: None   Occupational History    None   Social Needs    Financial resource strain: None    Food insecurity:     Worry: None     Inability: None    Transportation needs:     Medical: None     Non-medical: None   Tobacco Use    Smoking status: Never Smoker    Smokeless tobacco: Never Used   Substance and Sexual Activity    Alcohol use: No    Drug use: No    Sexual activity: Not Currently   Lifestyle    Physical activity:     Days per week: None     Minutes per session: None    Stress: None   Relationships    Social connections:     Talks on phone: None Gets together: None     Attends Confucianism service: None     Active member of club or organization: None     Attends meetings of clubs or organizations: None     Relationship status: None    Intimate partner violence:     Fear of current or ex partner: None     Emotionally abused: None     Physically abused: None     Forced sexual activity: None   Other Topics Concern    None   Social History Narrative    None       Family History:   Family History   Problem Relation Age of Onset    High Blood Pressure Mother     Cancer Mother     Cancer Father     Stroke Maternal Grandfather     Diabetes Paternal Grandmother     Heart Disease Paternal Grandfather        REVIEW OFSYSTEMS:    Review of Systems   Constitutional: Positive for fatigue. Negative for fever. HENT: Negative. Eyes: Negative. Respiratory: Negative. Cardiovascular: Negative. Gastrointestinal: Negative. Endocrine: Negative. Genitourinary: Negative. Musculoskeletal: Negative. Skin: Positive for wound. Allergic/Immunologic: Negative. Neurological: Negative. Hematological: Negative. Psychiatric/Behavioral: Positive for dysphoric mood. PHYSICAL EXAM:  Vitals:    09/21/19 0600 09/21/19 0700 09/21/19 0800 09/21/19 0805   BP: (!) 107/59 (!) 97/59 (!) 94/58    Pulse: 87 83 79 82   Resp: 26 25 20    Temp:   98.2 °F (36.8 °C)    TempSrc:   Oral    SpO2: 97% 96% 98%    Weight: (!) 392 lb 3.2 oz (177.9 kg)      Height:           Physical Exam   Constitutional: He is oriented to person, place, and time. He appears well-developed and well-nourished. No distress. HENT:   Head: Normocephalic and atraumatic. Eyes: Pupils are equal, round, and reactive to light. EOM are normal. Right eye exhibits no discharge. Left eye exhibits no discharge. Neck: Neck supple. No thyromegaly present. Cardiovascular: Regular rhythm. Pulmonary/Chest: Effort normal.   Abdominal: Soft. He exhibits no distension and no mass.  There is no tenderness. There is no rebound and no guarding. No hernia. Obese     Genitourinary:   Genitourinary Comments: +wilkerson with yellow urine in tubing and bag   Musculoskeletal: He exhibits no edema. Previous right hip incision C/D/I with staples present   Neurological: He is alert and oriented to person, place, and time. Skin: Skin is warm. He is not diaphoretic.   +coccyx decub ulcer approx 4\"x4\". +necrotic tissue present.   +foul odor. Psychiatric: He has a normal mood and affect. Vitals reviewed.         DATA:    CBC with Differential:    Lab Results   Component Value Date    WBC  09/21/2019     30.7  ALERT CALLED TO [facility] RN AT 0400 [phone] LINDSEY MLT   RESULTS READ BACK      RBC 3.85 09/21/2019    HGB 11.6 09/21/2019    HCT 37.6 09/21/2019     09/21/2019    MCV 97.7 09/21/2019    MCH 30.1 09/21/2019    MCHC 30.9 09/21/2019    RDW 13.3 09/21/2019    SEGSPCT 82.0 09/21/2019    BANDSPCT 10 09/21/2019    LYMPHOPCT 7.0 09/21/2019    MONOPCT 1.0 09/21/2019    BASOPCT 0.2 09/20/2019    MONOSABS 0.3 09/21/2019    LYMPHSABS 2.2 09/21/2019    EOSABS 0.0 09/20/2019    BASOSABS 0.1 09/20/2019    DIFFTYPE MANUAL DIFFERENTIAL 09/21/2019     BMP:    Lab Results   Component Value Date     09/21/2019    K 4.4 09/21/2019    CL 99 09/21/2019    CO2 25 09/21/2019    BUN 30 09/21/2019    LABALBU 2.4 09/20/2019    CREATININE 1.5 09/21/2019    CALCIUM 8.1 09/21/2019    GFRAA 56 09/21/2019    LABGLOM 47 09/21/2019    GLUCOSE 197 09/21/2019     U/A:    Lab Results   Component Value Date    COLORU MICHAEL 09/21/2019    PROTEINU >500 09/21/2019    PHUR 5.5 10/24/2018    LABCAST Present 12/12/2016    LABCAST Hyaline casts 12/12/2016    WBCUA 103 09/21/2019    RBCUA 40 09/21/2019    MUCUS RARE 09/06/2019    TRICHOMONAS NONE SEEN 09/06/2019    YEAST Present 12/12/2016    BACTERIA MANY 09/21/2019    CLARITYU SLIGHTLY CLOUDY 09/21/2019    SPECGRAV 1.005 09/21/2019    LEUKOCYTESUR LARGE 09/21/2019 UROBILINOGEN 8.0 09/21/2019    BILIRUBINUR MODERATE 09/21/2019    BLOODU TRACE 09/21/2019    GLUCOSEU 3+ 10/24/2018         CTA Chest:  1. No evidence for a pulmonary embolism. 2. Small left pleural effusion with increased opacities noted at the left   base and elevation of the left hemidiaphragm, likely related to atelectasis. CT A/P:  1. Soft tissue swelling and gas in the soft tissues posterior to the coccyx   and extending inferiorly.  There is no focal fluid collection to suggest   abscess.  No definite erosions are seen to suggest osteomyelitis. 2. Cholelithiasis without evidence to suggest acute cholecystitis. 3. Splenomegaly. 4. Tiny left pleural effusion.  Adjacent airspace disease likely represents   atelectasis. 5. Recent right hip arthroplasty. IMPRESSION:        Patient Active Problem List:     Hypertension     Hyperlipidemia     Asthma     Diabetes mellitus ([address]     H/O cardiovascular stress test     Obesity     Chronic kidney disease, stage III (moderate) ([facility])     Hypertensive kidney disease with CKD stage III ([facility])     Depression     SOBOE (shortness of breath on exertion)     Abnormal cardiovascular stress test     Fracture of epiphysis (separation) (upper) of neck of femur, closed ([facility])     Pressure ulcer of coccygeal region, unstageable ([address]     Sepsis ([address]    78 y/o M with infected decubitus ulcer over coccyx, appears to be at least stage 3-4    PLAN:    -Agree with wound consult.  -Continue dressing changes. D/w pt's nurse.  -Continue ABx.  -F/u cultures. -Pressure redistribution at least every two hours.  -Pressure reducing products. -PT/OT recommended. -Given pt has necrotic tissue present in wound, added to OR schedule for tomorrow (9/22/19) for surgical debridement. NPO at midnight. D/w pt's nurse.   -Pain control.   -Nutritional optimization.  -Ultimately pt may require NPWT (wound vac) and coverage.  -Call with questions or concerns.          [address] [place] [address] MD MARICRUZ

## 2019-09-21 NOTE — ED NOTES
Rashad from [facility] called to check on patient. States that pt has been at facility for 2 weeks. Pt is there for rehab. Pt came to facility with wound on coccyx and it has not gotten better. Pts family was notified that he was coming to ED and pt sister, niece, and nephew have been visiting him there frequently.       Tres Sands RN  09/20/19 4612

## 2019-09-21 NOTE — ED NOTES
Dr Frosty Canavan returned call @ [phone] -- transferred call to Dr Mel Tyler  Waiting orders     Jaky Armstrong  09/21/19 1929

## 2019-09-21 NOTE — ED NOTES
Report called to [address]104 Karlo RN. Room is awaiting bariatric bed for pt.       Tg Crabtree, MARLENE  09/21/19 9735

## 2019-09-21 NOTE — H&P
History and Physical        CHIEF COMPLAINT:  Possible infection in buttock      HISTORY OF PRESENT ILLNESS:      The patient is a 77 y.o. male with significant past medical history of DM-II, CRF, HTN, Hypothyroidism who presented to Emergency Department, via EMS, complaining of a possible infection to his buttocks where he has a known decubitus ulcer. The patient sustained a right femoral neck fracture and underwent a right hemiarthroplasty with Dr. Bebe Dubon on 8/29/19. He was discharged to [facility]. He has had an infected decubitus ulcer and has been treated with doxycycline without improvement. He says when they changed his dressing yesteday, there was a large amount of pus from the decubitus ulcer. He complains that the area is sore and throbbing. The patient denies fevers, chills, chest pain, shortness of breath, abdominal pain, numbness, tingling, weakness, or any other complaints.     Past Medical History:        Diagnosis Date    Arthritis     Asthma     Chronic kidney disease     [facility] mellitus ([facility]     H/O cardiac catheterization 05/04/2017    H/O cardiovascular stress test 6/07, 12/08    CARDIOLITE: EF->51%    Hyperlipidemia     Hypertension     Obesity     Thyroid disease      Past Surgical History:        Procedure Laterality Date    CARPAL TUNNEL RELEASE  2005    DIAGNOSTIC CARDIAC CATH LAB PROCEDURE  12/05    NO SIGNIFICANT CAD    EYE SURGERY      HEMIARTHROPLASTY HIP Right 8/29/2019    HIP HEMIARTHROPLASTY performed by Miriam Rivera MD at [address] [place] [address]         Medications Prior to Admission:   Medications Prior to Admission: enoxaparin (LOVENOX) 40 MG/0.4ML injection, Inject 0.4 mLs into the skin daily  TRULICITY 1.5 BY/4.3JD SOPN, Inject into the skin once a week  nitroGLYCERIN (NITROSTAT) 0.4 MG SL tablet, Place 1 tablet under the tongue every 5 minutes as needed for Chest pain  ezetimibe (ZETIA) 10 MG tablet, Take 10 mg by mouth negative  GASTROINTESTINAL:  negative  HEMATOLOGIC/LYMPHATIC:  negative  ALLERGIC/IMMUNOLOGIC:  negative  ENDOCRINE:  negative  MUSCULOSKELETAL:  See History of present illness  NEUROLOGICAL:  negative  BEHAVIOR/PSYCH:  negative  PHYSICAL EXAM:    Vitals:  /70   Pulse 90   Temp 98.2 °F (36.8 °C) (Oral)   Resp 28   Ht 6' 3\" (1.905 m)   Wt (!) 392 lb 3.2 oz (177.9 kg)   SpO2 94%   BMI 49.02 kg/m²     CONSTITUTIONAL:  awake, alert, cooperative, no apparent distress, and appears stated age  EYES:  Lids and lashes normal, pupils equal, round and reactive to light, extra ocular muscles intact, sclera clear, conjunctiva normal  ENT:  Normocephalic, without obvious abnormality, atraumatic, sinuses nontender on palpation, external ears without lesions, oral pharynx with moist mucus membranes, tonsils without erythema or exudates, gums normal and good dentition. NECK:  Supple, symmetrical, trachea midline, no adenopathy, thyroid symmetric, not enlarged and no tenderness, skin normal  HEMATOLOGIC/LYMPHATICS:  no cervical lymphadenopathy  BACK:  Symmetric, no curvature, spinous processes are non-tender on palpation, paraspinous muscles are non-tender on palpation, no costal vertebral tenderness  LUNGS:  No increased work of breathing, good air exchange, clear to auscultation bilaterally, no crackles or wheezing  CARDIOVASCULAR:  Normal apical impulse, regular rate and rhythm, normal S1 and S2, no S3 or S4, and no murmur noted  ABDOMEN:  No scars, normal bowel sounds, soft, non-distended, non-tender, no masses palpated, no hepatosplenomegally  MUSCULOSKELETAL:  He has right hip pain  NEUROLOGIC:  Awake, alert, oriented to name, place and time. Cranial nerves II-XII are grossly intact. Motor is 5 out of 5 bilaterally. Cerebellar finger to nose, heel to shin intact. Sensory is intact.   Babinski down going, Romberg negative, and gait is normal.  SKIN:  no bruising or bleeding    DATA:  CBC with Differential: Lab Results   Component Value Date    WBC  09/21/2019     30.7  ALERT CALLED TO [facility] RN AT 0400 [phone] LINDSEY MLT   RESULTS READ BACK      RBC 3.85 09/21/2019    HGB 11.6 09/21/2019    HCT 37.6 09/21/2019     09/21/2019    MCV 97.7 09/21/2019    MCH 30.1 09/21/2019    MCHC 30.9 09/21/2019    RDW 13.3 09/21/2019    SEGSPCT 82.0 09/21/2019    BANDSPCT 10 09/21/2019    LYMPHOPCT 7.0 09/21/2019    MONOPCT 1.0 09/21/2019    BASOPCT 0.2 09/20/2019    MONOSABS 0.3 09/21/2019    LYMPHSABS 2.2 09/21/2019    EOSABS 0.0 09/20/2019    BASOSABS 0.1 09/20/2019    DIFFTYPE MANUAL DIFFERENTIAL 09/21/2019     CMP:    Lab Results   Component Value Date     09/21/2019    K 4.4 09/21/2019    CL 99 09/21/2019    CO2 25 09/21/2019    BUN 30 09/21/2019    CREATININE 1.5 09/21/2019    GFRAA 56 09/21/2019    LABGLOM 47 09/21/2019    GLUCOSE 197 09/21/2019    PROT 6.1 09/20/2019    LABALBU 2.4 09/20/2019    CALCIUM 8.1 09/21/2019    BILITOT 1.1 09/20/2019    ALKPHOS 143 09/20/2019     09/20/2019    ALT 98 09/20/2019     PT/INR:    Lab Results   Component Value Date    PROTIME 11.5 05/03/2017    PROTIME 10.6 01/26/2012    INR 1.01 05/03/2017     Last 3 Troponin:  No results found for: TROPONINI  U/A:    Lab Results   Component Value Date    COLORU MICHAEL 09/21/2019    PROTEINU 30 09/21/2019    PHUR 5.5 10/24/2018    LABCAST Present 12/12/2016    LABCAST Hyaline casts 12/12/2016    WBCUA 125 09/21/2019    RBCUA 29 09/21/2019    MUCUS RARE 09/21/2019    TRICHOMONAS NONE SEEN 09/06/2019    YEAST Present 12/12/2016    BACTERIA NEGATIVE 09/21/2019    CLARITYU SLIGHTLY CLOUDY 09/21/2019    SPECGRAV 1.010 09/21/2019    LEUKOCYTESUR LARGE 09/21/2019    UROBILINOGEN <2.0 09/21/2019    BILIRUBINUR NEGATIVE 09/21/2019    BLOODU MODERATE 09/21/2019    GLUCOSEU 3+ 10/24/2018     ABG:    Lab Results   Component Value Date    PH 7.45 09/20/2019    BE 1  MINUS   09/20/2019    O2SAT 94.5 09/20/2019       Radiology:  CT chest:    1. No evidence for a pulmonary embolism. 2. Small left pleural effusion with increased opacities noted at the left   base and elevation of the left hemidiaphragm, likely related to atelectasis. CT abdomen:    1. Soft tissue swelling and gas in the soft tissues posterior to the coccyx   and extending inferiorly.  There is no focal fluid collection to suggest   abscess.  No definite erosions are seen to suggest osteomyelitis. 2. Cholelithiasis without evidence to suggest acute cholecystitis. 3. Splenomegaly. 4. Tiny left pleural effusion.  Adjacent airspace disease likely represents   atelectasis. 5. Recent right hip arthroplasty. EKG:  Sinus tachycardia   Right bundle branch block   Left anterior fascicular block    Bifascicular block   Possible Lateral infarct , age undetermined   ASSESSMENT AND PLAN:      Principal Problem:    Sepsis ([address]  Active Problems:    Hypertension    Hyperlipidemia    Asthma    Diabetes mellitus ([address]    Obesity    Chronic kidney disease, stage III (moderate) ([facility])    Depression    Pressure ulcer of coccygeal region, unstageable ([address]  Resolved Problems:    * No resolved hospital problems. *  S/P right hip surgery    IV antibiotics  IV fluids per sepsis protocol  SSI  Consult Dr. Tyrell Leonard wound care and general surgery  Consult nephrology  Gram negative sepsis  Sensitivity pending.   Spent approximately an hour and half of critical care time on patient care  Consult Dr. Eliel Stacy for bifasicular block    [address]  9/21/2019  11:04 PM

## 2019-09-22 NOTE — PROGRESS NOTES
[address] [facility]  consulted by Dr. Jing Childers for monitoring and adjustment. Indication for treatment: S/p R hemiarthroplasty 8/29/19, gluteal abscess  Goal trough: 10-15 mcg/mL     Pertinent Laboratory Values:   Temp Readings from Last 3 Encounters:   09/22/19 97.9 °F (36.6 °C) (Oral)   09/02/19 97.9 °F (36.6 °C) (Oral)   08/29/19 97.2 °F (36.2 °C)     Recent Labs     09/20/19 1953 09/21/19 0321 09/22/19  0548   WBC 24.0* 30.7  ALERT CALLED TO [facility] RN AT 9848 [phone] LINDSEY MLT   RESULTS READ BACK  * 25.4*   LACTATE 1.7  --   --      Recent Labs     09/20/19 1953 09/21/19 0321 09/22/19  0548   BUN 25* 30* 42*   CREATININE 1.1 1.5* 1.5*     Estimated Creatinine Clearance: 81 mL/min (A) (based on SCr of 1.5 mg/dL (H)). Intake/Output Summary (Last 24 hours) at 9/22/2019 1104  Last data filed at 9/21/2019 2343  Gross per 24 hour   Intake 1929 ml   Output 400 ml   Net 1529 ml       Pertinent Cultures:  Date    Source    Results  9/21   Blood    Proteus   9/21   Urine    Coliform- ID and sens pending            Vancomycin level:   TROUGH:  No results for input(s): VANCOTROUGH in the last 72 hours. RANDOM:    Recent Labs     09/22/19  0548   VANCORANDOM 11.1  (NOTE)  Therapeutic Range Interpretation: Please refer to current dosing   guidelines. Assessment:  · WBC and temperature: Elevated  · SCr, BUN, and urine output: VIRGILIO on CKD 2/2 infection and contrast administration, stable from yesterday   · Day(s) of therapy: 3   · Vancomycin level: 11.1    Plan:  · Mr. Antunez received vancomycin 2000 mg IVPB x 1 in the ER followed by vancomycin 1500 mg x 1   · Random concentration ~18h post-dose: 11.1   · Continue with vancomycin 1500 mg q18h IVPB   · Repeat level in 48h   · Pharmacy will continue to monitor patient and adjust therapy as indicated    Thank you for the consult.   Rena York RPh  9/22/2019 11:04 AM

## 2019-09-22 NOTE — PROGRESS NOTES
Nephrology Progress Note  9/22/2019 11:41 AM  Subjective:   Admit Date: 9/20/2019  PCP: Damon Bernal MD  Interval History: pt blood culture + proteus and plan for surgery    Diet: Diet NPO Time Specified Exceptions are: Sips with Meds  Pain is: Moderate      Data:   Scheduled Meds:   vancomycin  1,500 mg Intravenous Q18H    aspirin  81 mg Oral Daily    mometasone-formoterol  2 puff Inhalation BID    buPROPion  300 mg Oral Daily    canagliflozin  300 mg Oral Daily    ezetimibe  10 mg Oral Daily    fluticasone  1 spray Each Nostril Daily    insulin glargine  74 Units Subcutaneous Nightly    ocuvite-lutein  1 tablet Oral Daily    pantoprazole  40 mg Oral QAM AC    sertraline  100 mg Oral BID    simvastatin  40 mg Oral Daily    b complex-C-E-zinc  1 tablet Oral Daily    Dulaglutide  1.5 mg Subcutaneous Weekly    vitamin C  1,000 mg Oral Daily    vitamin E  400 Units Oral Daily    sodium chloride flush  10 mL Intravenous 2 times per day    enoxaparin  40 mg Subcutaneous Daily    cefepime  2 g Intravenous Q8H    insulin lispro  0-12 Units Subcutaneous TID WC    insulin lispro  0-6 Units Subcutaneous Nightly    influenza virus vaccine  0.5 mL Intramuscular Once     Continuous Infusions:   sodium chloride 125 mL/hr at 09/22/19 0418     PRN Meds:acetaminophen, albuterol sulfate HFA, nitroGLYCERIN, sodium chloride flush, traMADol **OR** traMADol, ondansetron  I/O last 3 completed shifts: In: 1929 [P.O.:220; I.V.:1709]  Out: 550 [Urine:550]  No intake/output data recorded.     Intake/Output Summary (Last 24 hours) at 9/22/2019 1141  Last data filed at 9/21/2019 2343  Gross per 24 hour   Intake 1929 ml   Output 400 ml   Net 1529 ml     CBC:   Recent Labs     09/20/19 1953 09/21/19  0321 09/22/19  0548   WBC 24.0* 30.7  ALERT CALLED TO [facility] RN AT 0400 [id number] LINDSEY MLT   RESULTS READ BACK  * 25.4*   HGB 12.6* 11.6* 12.0*   * 365 380     BMP:    Recent Labs     09/20/19 1953 09/20/19  2244 09/21/19  0321 09/22/19  0548   *  --  136 133*   K 4.0  --  4.4 4.0   CL 95*  --  99 98*   CO2 24  --  25 23   BUN 25*  --  30* 42*   CREATININE 1.1  --  1.5* 1.5*   GLUCOSE 203* 169 197* 141*     Hepatic:   Recent Labs     09/20/19 1953   *   ALT 98*   BILITOT 1.1*   ALKPHOS 143*     Troponin: No results for input(s): TROPONINI in the last 72 hours. BNP: No results for input(s): BNP in the last 72 hours. Lipids: No results for input(s): CHOL, HDL in the last 72 hours. Invalid input(s): LDLCALCU  ABGs:   Lab Results   Component Value Date    PO2ART 89 09/20/2019    JCS8NPI 32.0 09/20/2019     INR: No results for input(s): INR in the last 72 hours.   Renal Labs  Albumin:    Lab Results   Component Value Date    LABALBU 2.5 09/22/2019     Calcium:    Lab Results   Component Value Date    CALCIUM 7.8 09/22/2019     Phosphorus:    Lab Results   Component Value Date    PHOS 3.5 09/22/2019     U/A:    Lab Results   Component Value Date    NITRU NEGATIVE 09/21/2019    NITRU Negative 10/24/2018    COLORU MICHAEL 09/21/2019    PHUR 5.5 10/24/2018    LABCAST Present 12/12/2016    LABCAST Hyaline casts 12/12/2016    WBCUA 125 09/21/2019    RBCUA 29 09/21/2019    MUCUS RARE 09/21/2019    TRICHOMONAS NONE SEEN 09/06/2019    YEAST Present 12/12/2016    BACTERIA NEGATIVE 09/21/2019    CLARITYU SLIGHTLY CLOUDY 09/21/2019    SPECGRAV 1.010 09/21/2019    UROBILINOGEN <2.0 09/21/2019    BILIRUBINUR NEGATIVE 09/21/2019    BLOODU MODERATE 09/21/2019    GLUCOSEU 3+ 10/24/2018    KETUA NEGATIVE 09/21/2019     ABG:    Lab Results   Component Value Date    JDI3VNK 32.0 09/20/2019    PO2ART 89 09/20/2019    FRO2SPM 22.2 09/20/2019     HgBA1c:    Lab Results   Component Value Date    LABA1C 6.1 09/04/2019     Microalbumen/Creatinine ratio:  No components found for: RUCREAT          Objective:   Vitals: /68   Pulse 92   Temp 97.9 °F (36.6 °C) (Oral)   Resp 27   Ht 6' 3\" (1.905 m)   Wt (!) 390 lb 1.6 oz (176.9 kg)   SpO2 99%   BMI 48.76 kg/m²   General appearance: awake weak  HEENT: Head: Normal, normocephalic, atraumatic.   Neck: supple, symmetrical, trachea midline  Lungs: diminished breath sounds bilaterally  Heart: S1, S2 normal  Abdomen: abnormal findings:  soft nt  Extremities: edema + with tender hip  Neurologic: Mental status: alertness: alert      Patient Active Problem List:     Hypertension     Hyperlipidemia     Asthma     Diabetes mellitus ([address]     H/O cardiovascular stress test     Obesity     Chronic kidney disease, stage III (moderate) (HCC)     Hypertensive kidney disease with CKD stage III (HCC)     Depression     SOBOE (shortness of breath on exertion)     Abnormal cardiovascular stress test     Fracture of epiphysis (separation) (upper) of neck of femur, closed (HCC)     Pressure ulcer of coccygeal region, unstageable ([facility])     Sepsis ([facility])    Assessment and Plan:      IMP:  1 septic shock  2 s/p hip surgery with wound infection  3 arf from atn/contrast on ckd 3  4 anxiety/confusion  5 resp distress with hypoxia    Plan     1 on abx and bp stable now monitor  2 fu gen surgery for debridment on abx  3 arf not worse post contrast maintain ivf and monitor uop  4 monitor affect and sleepy or in pain  5 o2 monitor and stable for now  Will follow no acute dialysis           Joseph Torres MD

## 2019-09-22 NOTE — ANESTHESIA PRE PROCEDURE
Department of Anesthesiology  Preprocedure Note       Name:  Tessa Moore   Age:  77 y.o.  :  1950                                          MRN:  [id number]         Date:  2019      Surgeon: Madelyn Griggs):  Hamlet Amato MD    Procedure: GLUTEAL INCISION AND DRAINAGE (N/A )    Medications prior to admission:   Prior to Admission medications    Medication Sig Start Date End Date Taking? Authorizing Provider   enoxaparin (LOVENOX) 40 MG/0.4ML injection Inject 0.4 mLs into the skin daily 9/3/19 10/3/19  Dorys Smith MD   TRULICITY 1.5 XX/5.4NT SOPN Inject into the skin once a week 19   Historical Provider, MD   nitroGLYCERIN (NITROSTAT) 0.4 MG SL tablet Place 1 tablet under the tongue every 5 minutes as needed for Chest pain 6/10/19   Jasmyn Prasad, APRN - CNP   ezetimibe (ZETIA) 10 MG tablet Take 10 mg by mouth daily    Historical Provider, MD   acetaminophen (TYLENOL) 500 MG tablet Take 500 mg by mouth every 6 hours as needed for Pain    Historical Provider, MD   SUPER B COMPLEX/C PO Take 1 capsule by mouth daily    Historical Provider, MD   Cinnamon 500 MG TABS Take 1,000 mg by mouth daily    Historical Provider, MD   vitamin E 400 UNIT capsule Take 400 Units by mouth daily    Historical Provider, MD   vitamin C (ASCORBIC ACID) 500 MG tablet Take 1,000 mg by mouth daily     Historical Provider, MD   insulin lispro (HUMALOG) 100 UNIT/ML injection vial Inject into the skin 3 times daily (before meals)    Historical Provider, MD   insulin glargine (LANTUS) 100 UNIT/ML injection vial Inject 74 Units into the skin nightly     Historical Provider, MD   benazepril (LOTENSIN) 20 MG tablet Take 1 tablet by mouth daily 9/15/16   Jazlyn Rojas MD   fluticasone (FLONASE) 50 MCG/ACT nasal spray as needed  3/22/16   Historical Provider, MD   Multiple Vitamins-Minerals (VISION VITAMINS PO) Take by mouth daily    Historical Provider, MD   omeprazole (PRILOSEC) 20 MG capsule Take 20 mg by mouth daily.

## 2019-09-22 NOTE — PROGRESS NOTES
Musculoskeletal: He exhibits no tenderness. Neurological: He is alert and oriented to person, place, and time. Skin: Skin is warm. He is not diaphoretic. Psychiatric: He has a normal mood and affect. Vitals reviewed. +coccyx decub ulcer approx 4\"x4\". +necrotic tissue present.   +foul odor.         Scheduled Meds:   vancomycin  1,500 mg Intravenous Q18H    aspirin  81 mg Oral Daily    mometasone-formoterol  2 puff Inhalation BID    buPROPion  300 mg Oral Daily    canagliflozin  300 mg Oral Daily    ezetimibe  10 mg Oral Daily    fluticasone  1 spray Each Nostril Daily    insulin glargine  74 Units Subcutaneous Nightly    ocuvite-lutein  1 tablet Oral Daily    pantoprazole  40 mg Oral QAM AC    sertraline  100 mg Oral BID    simvastatin  40 mg Oral Daily    b complex-C-E-zinc  1 tablet Oral Daily    Dulaglutide  1.5 mg Subcutaneous Weekly    vitamin C  1,000 mg Oral Daily    vitamin E  400 Units Oral Daily    sodium chloride flush  10 mL Intravenous 2 times per day    enoxaparin  40 mg Subcutaneous Daily    cefepime  2 g Intravenous Q8H    insulin lispro  0-12 Units Subcutaneous TID WC    insulin lispro  0-6 Units Subcutaneous Nightly    influenza virus vaccine  0.5 mL Intramuscular Once     ContinuousInfusions:   sodium chloride 125 mL/hr at 09/22/19 0418     PRN Meds:acetaminophen, albuterol sulfate HFA, nitroGLYCERIN, sodium chloride flush, traMADol **OR** traMADol, ondansetron      Labs/Imaging Results:   Lab Results   Component Value Date    WBC 25.4 (H) 09/22/2019    HGB 12.0 (L) 09/22/2019    HCT 38.2 (L) 09/22/2019    MCV 96.7 09/22/2019     09/22/2019     Lab Results   Component Value Date     (L) 09/22/2019    K 4.0 09/22/2019    CL 98 (L) 09/22/2019    CO2 23 09/22/2019    BUN 42 (H) 09/22/2019    CREATININE 1.5 (H) 09/22/2019    GLUCOSE 141 (H) 09/22/2019    CALCIUM 7.8 (L) 09/22/2019    PROT 6.1 (L) 09/20/2019    LABALBU 2.5 (L) 09/22/2019    BILITOT 1.1 (H)

## 2019-09-22 NOTE — CONSULTS
(ZOLOFT) tablet 100 mg BID   simvastatin (ZOCOR) tablet 40 mg Daily   b complex-C-E-zinc (STRESS FORMULA W/ ZINC) 1 tablet Daily   Dulaglutide SOPN 1.5 mg Weekly   vitamin C (ASCORBIC ACID) tablet 1,000 mg Daily   vitamin E capsule 400 Units Daily   sodium chloride flush 0.9 % injection 10 mL 2 times per day   sodium chloride flush 0.9 % injection 10 mL PRN   enoxaparin (LOVENOX) injection 40 mg Daily   0.9 % sodium chloride infusion Continuous   cefepime (MAXIPIME) 2 g in dextrose 5 % 50 mL IVPB Q8H   insulin lispro (HUMALOG) injection vial 0-12 Units TID WC   insulin lispro (HUMALOG) injection vial 0-6 Units Nightly   influenza quadrivalent split vaccine (FLUZONE;FLUARIX;FLULAVAL;AFLURIA) injection 0.5 mL Once   traMADol (ULTRAM) tablet 50 mg Q6H PRN   Or    traMADol (ULTRAM) tablet 100 mg Q6H PRN   ondansetron (ZOFRAN) injection 4 mg Q6H PRN     Current Facility-Administered Medications   Medication Dose Route Frequency Provider Last Rate Last Dose    vancomycin (VANCOCIN) 1,500 mg in dextrose 5 % 500 mL IVPB  1,500 mg Intravenous Q18H Alejo Vargas MD   Stopped at 09/22/19 1414    acetaminophen (TYLENOL) tablet 500 mg  500 mg Oral Q6H PRN Alejo Vargas MD   500 mg at 09/21/19 0345    albuterol sulfate  (90 Base) MCG/ACT inhaler 2 puff  2 puff Inhalation Q4H PRN Alejo Vargas MD        aspirin EC tablet 81 mg  81 mg Oral Daily Karin Giles MD   81 mg at 09/22/19 0938    mometasone-formoterol (DULERA) 200-5 MCG/ACT inhaler 2 puff  2 puff Inhalation BID Alejo Vargas MD   2 puff at 09/22/19 0858    buPROPion (WELLBUTRIN XL) extended release tablet 300 mg  300 mg Oral Daily Alejo Vargas MD   300 mg at 09/22/19 7277    canagliflozin (INVOKANA) tablet 300 mg  300 mg Oral Daily Alejo Vargas MD        ezetimibe (ZETIA) tablet 10 mg  10 mg Oral Daily Karin Giles MD        fluticasone (FLONASE) 50 MCG/ACT nasal spray 1 spray  1 spray Each Nostril Daily Alejo Vargas MD   1 spray at 09/21/19 1004    insulin glargine (LANTUS) injection vial 74 Units  74 Units Subcutaneous Nightly Talon Garcia MD   74 Units at 09/21/19 2059    antioxidant multivitamin (OCUVITE) tablet  1 tablet Oral Daily Talon Garcia MD   1 tablet at 09/22/19 0939    nitroGLYCERIN (NITROSTAT) SL tablet 0.4 mg  0.4 mg Sublingual Q5 Min PRN Talon Garcia MD        pantoprazole (PROTONIX) tablet 40 mg  40 mg Oral QAM AC Karin Giles MD   40 mg at 09/21/19 0706    sertraline (ZOLOFT) tablet 100 mg  100 mg Oral BID Talon Garcia MD   100 mg at 09/22/19 2566    simvastatin (ZOCOR) tablet 40 mg  40 mg Oral Daily Talon Garcia MD   40 mg at 09/21/19 2050    b complex-C-E-zinc (STRESS FORMULA W/ ZINC) 1 tablet  1 tablet Oral Daily Talon Garcia MD   1 tablet at 09/22/19 0939    Dulaglutide SOPN 1.5 mg  1.5 mg Subcutaneous Weekly Talon Garcia MD        vitamin C (ASCORBIC ACID) tablet 1,000 mg  1,000 mg Oral Daily Talon Garcia MD   1,000 mg at 09/22/19 7331    vitamin E capsule 400 Units  400 Units Oral Daily Talon Garcia MD   400 Units at 09/22/19 0938    sodium chloride flush 0.9 % injection 10 mL  10 mL Intravenous 2 times per day Talon Garcia MD   10 mL at 09/22/19 0940    sodium chloride flush 0.9 % injection 10 mL  10 mL Intravenous PRN Talon Garcia MD        enoxaparin (LOVENOX) injection 40 mg  40 mg Subcutaneous Daily Karin Giles MD   40 mg at 09/22/19 0939    0.9 % sodium chloride infusion   Intravenous Continuous [place]  mL/hr at 09/22/19 1710      cefepime (MAXIPIME) 2 g in dextrose 5 % 50 mL IVPB  2 g Intravenous Q8H Karin Giles  mL/hr at 09/22/19 1710 2 g at 09/22/19 1710    insulin lispro (HUMALOG) injection vial 0-12 Units  0-12 Units Subcutaneous TID WC Talon Garcia MD   2 Units at 09/22/19 1321    insulin lispro (HUMALOG) injection vial 0-6 Units  0-6 Units Subcutaneous Nightly Talon Garcia MD   1 Units at 09/21/19 2058    influenza quadrivalent split vaccine (FLUZONE;FLUARIX;FLULAVAL;AFLURIA) injection 0.5 mL  0.5 mL Intramuscular Once Amador Melgar MD        traMADol (ULTRAM) tablet 50 mg  50 mg Oral Q6H PRN Amador Melgar MD   50 mg at 09/22/19 1139    Or    traMADol (ULTRAM) tablet 100 mg  100 mg Oral Q6H PRN Amador Melgar MD   100 mg at 09/21/19 2051    ondansetron (ZOFRAN) injection 4 mg  4 mg Intravenous Q6H PRN Amador Melgar MD   4 mg at 09/20/19 2110     Review of Systems:   · Constitutional: No Fever or Weight Loss   · Eyes: No Decreased Vision  · ENT: No Headaches, Hearing Loss or Vertigo  · Cardiovascular: No chest pain, dyspnea on exertion, palpitations or loss of consciousness  · Respiratory: No cough or wheezing    · Gastrointestinal: No abdominal pain, appetite loss, blood in stools, constipation, diarrhea or heartburn  · Genitourinary: No dysuria, trouble voiding, or hematuria  · Musculoskeletal:  No gait disturbance, weakness or joint complaints  · Integumentary: No rash or pruritis  · Neurological: No TIA or stroke symptoms  · Psychiatric: No anxiety or depression  · Endocrine: No malaise, fatigue or temperature intolerance  · Hematologic/Lymphatic: No bleeding problems, blood clots or swollen lymph nodes  · Allergic/Immunologic: No nasal congestion or hives  All systems negative except as marked. ·   ·      Physical Examination:    Vitals:    09/22/19 1603   BP: (!) 108/95   Pulse: 96   Resp: 25   Temp:    SpO2: 90%      Wt Readings from Last 3 Encounters:   09/22/19 (!) 390 lb 1.6 oz (176.9 kg)   08/28/19 (!) 403 lb 8 oz (183 kg)   06/10/19 (!) 375 lb 3.2 oz (170.2 kg)     Body mass index is 48.76 kg/m². General Appearance:  No distress, conversant    Constitutional:  Well developed, Well nourished, No acute distress, Non-toxic appearance.    HENT:  Normocephalic, Atraumatic, Bilateral external ears normal, Oropharynx moist, No oral exudates, Nose normal. Neck- Normal range of motion, No tenderness, Supple, No stridor,no apical-carotid delay, no carotid bruit  Eyes:  PERRL, EOMI, Conjunctiva normal, No discharge. Respiratory:  Normal breath sounds, No respiratory distress, No wheezing, No chest tenderness. ,no use of accessory muscles, diaphragm movement is normal  Cardiovascular: (PMI) apex non displaced,no lifts no thrills, no s3,no s4, Normal heart rate, Normal rhythm, No murmurs, No rubs, No gallops. Carotid arteries pulse and amplitude are normal no bruit, no abdominal bruit noted ( normal abdominal aorta ausculation), femoral arteries pulse and amplitude are normal no bruit, pedal pulses are normal  GI:  Bowel sounds normal, Soft, No tenderness, No masses, No pulsatile masses, no hepatosplenomegally, no bruits  : External genitalia appear normal, No masses or lesions. No discharge. No CVA tenderness. Musculoskeletal:  Intact distal pulses, No edema, No tenderness, No cyanosis, No clubbing. Good range of motion in all major joints. No tenderness to palpation or major deformities noted. Back- No tenderness. Integument:  Warm, Dry, No erythema, No rash. Skin: no rash, no ulcers  Lymphatic:  No lymphadenopathy noted. Neurologic:  Alert & oriented x 3, Normal motor function, Normal sensory function, No focal deficits noted. Psychiatric:  Affect normal, Judgment normal, Mood normal.   Lab Review   Recent Labs     09/22/19  0548   WBC 25.4*   HGB 12.0*   HCT 38.2*         Recent Labs     09/22/19  0548   *   K 4.0   CL 98*   CO2 23   PHOS 3.5   BUN 42*   CREATININE 1.5*     Recent Labs     09/20/19 1953   *   ALT 98*   BILITOT 1.1*   ALKPHOS 143*     No results for input(s): TROPONINI in the last 72 hours. No results found for: BNP  Lab Results   Component Value Date    INR 1.01 05/03/2017    PROTIME 11.5 05/03/2017         EKG:NSR, RBBB LAFB    Chest Xray: nad    ECHO:pending  Labs, echo, meds reviewed  Assessment: 77 y. o.year old with PMH of  has a past medical history of Arthritis, Asthma, Chronic kidney disease, Diabetes mellitus ([facility] [address] H/O cardiac catheterization, H/O cardiovascular stress test, Hyperlipidemia, Hypertension, Obesity, and Thyroid disease. Recommendations:    1. Bifascular block: RBBB+LAFB, not and ACS, recommend to continue present treatment, will get echo  2. DM: Stable  3. Dyslipidemia: Continue zetia  4. Ckd: ARF, recommend to treat with IVF  All labs, medications and tests reviewed, continue all other medications of all above medical condition listed as is.          Sara Anders MD, 9/22/2019 5:35 PM

## 2019-09-22 NOTE — PROGRESS NOTES
Component Value Date    K 4.0 09/22/2019     Calcium:    Lab Results   Component Value Date    CALCIUM 7.8 09/22/2019     Warfarin PT/INR:  No components found for: Isra Perales  PTT:    Lab Results   Component Value Date    APTT 29.1 05/03/2017   [APTT  Last 3 Troponin:  No results found for: TROPONINI  ABG:    Lab Results   Component Value Date    SPM9YDF 32.0 09/20/2019    PO2ART 89 09/20/2019    FNB7CED 22.2 09/20/2019         Assessment:     Principal Problem:    Sepsis ([facility] [address]  Active Problems:    Hypertension    Hyperlipidemia    Asthma    Diabetes mellitus ([facility] [address]    Obesity    Chronic kidney disease, stage III (moderate) (HCC)    Depression    Pressure ulcer of coccygeal region, unstageable ([facility] [address]  Resolved Problems:    * No resolved hospital problems. *      Plan:   He is Afebrile, WBC coming down, BP stable.  Responding to current antibiotics  Spoke to surgeon, he is planning debridement tomorrow  Renal function improved-noted Nephro notes      Raquel TORRES M.D.  9/22/2019

## 2019-09-23 NOTE — BRIEF OP NOTE
11:00 AM   Site Assessment Pink 9/2/2019 11:00 AM   Urine Color Sheri 9/2/2019 11:00 AM   Urine Appearance Cloudy 9/2/2019 11:00 AM   Output (mL) 525 mL 9/2/2019  7:09 AM       Findings: C/w post op dx and procedure.      Racheal Lockhart II, MD  Date: 9/23/2019  Time: 8:55 AM

## 2019-09-23 NOTE — OP NOTE
[address] [place] [address], [address] [place] [address]                                OPERATIVE REPORT    PATIENT NAME: Nubia Ignacio                      :        1950  MED REC NO:   [id number]                          ROOM:       2104  ACCOUNT NO:   [de-identified]                           ADMIT DATE: 2019  PROVIDER:     Adonis Corbin MD    DATE OF PROCEDURE:  2019    PREOPERATIVE DIAGNOSIS:  Sacral decubitus ulcer. POSTOPERATIVE DIAGNOSIS:  Stage IV sacral decubitus ulcer. SURGEON:  Rafael Stevens MD    ASSISTANT:  Ashlee Pimentel. PROCEDURE:  Excisional debridement down to the bone of a sacral  decubitus ulcer, 8 x 9 x 2 cm. ANESTHESIA:  General endotracheal.    IV FLUIDS:  700 mL of IV crystalloid. ESTIMATED BLOOD LOSS:  50 mL. SPECIMENS:  Excisional debrided tissue sent for permanent pathology. COMPLICATIONS:  None apparent. TUBES/LINES/DRAINS:  None. INDICATION FOR PROCEDURE:  The patient is a 25-year-old male who  presented to the emergency room with worsening sacral pressure ulcer  that was unstageable. The patient had an elevated white count after  being started on antibiotics. A consult to General Surgery was placed. Upon examining the patient, the patient required excisional debridement  in the operating room. Risks, benefits, and alternatives of the  procedure were discussed in detail with the patient and he agreed to  proceed. OPERATIVE STEPS:  On 2019, the patient was transported from the  intensive care unit to the operating room. The patient was intubated with general endotracheal anesthesia without  complications or changes in his vital signs. The patient was then moved from his hospital bed to the operating room  table and placed in a prone position. All pressure points were well padded.     The patient was secured to the operating room table with multiple  stirrups. The patient's sacral region was prepped and draped in a standard sterile  fashion. A [facility]-approved timeout was held with all members of the operating team  present and in agreement. The patient was on appropriate scheduled antibiotics. Using a combination of electrocautery with sharp and blunt dissection,  the area of interest was debrided with careful attention to the  hemostasis. The necrotic tissue involved the skin, subcutaneous tissue,  and muscle all the way down to the bone. The specimen was removed and  the defect measured approximately 8 x 9 x 2 cm. The defect was then irrigated with the pulsed lavage suction   with a total of 2 liters of fluid. The wound bed was further inspected for hemostasis and was appropriate. The wound edges appeared viable. Hemostatic agent was placed in the wound bed and it was packed with a  wet-to-dry saline Kerlix. At the end of the case, a surgical dressing was placed on the surgical  site. At the end of the case, the patient was transported back to the ICU in  stable condition. At the end of the case, the surgical count, sponge count, needle count,  and instrument count were correct x2. Dr. Britany Pierre was present, supervised, and scrubbed for the entire duration  of the case.         Dariel Diamond MD    D: 09/23/2019 8:55:36       T: 09/23/2019 14:43:57     [id number]  Job#: [id number]     Doc#: [id number]    CC:

## 2019-09-23 NOTE — PROGRESS NOTES
Progress note    Jessica Zen    9/23/2019    Subjective:     Patient had surgical debridement of Coccygeal decubitus today. Medication side effects: none. Scheduled Meds:   vancomycin  1,500 mg Intravenous Q18H    aspirin  81 mg Oral Daily    mometasone-formoterol  2 puff Inhalation BID    buPROPion  300 mg Oral Daily    canagliflozin  300 mg Oral Daily    ezetimibe  10 mg Oral Daily    fluticasone  1 spray Each Nostril Daily    insulin glargine  74 Units Subcutaneous Nightly    ocuvite-lutein  1 tablet Oral Daily    pantoprazole  40 mg Oral QAM AC    sertraline  100 mg Oral BID    simvastatin  40 mg Oral Daily    b complex-C-E-zinc  1 tablet Oral Daily    Dulaglutide  1.5 mg Subcutaneous Weekly    vitamin C  1,000 mg Oral Daily    vitamin E  400 Units Oral Daily    sodium chloride flush  10 mL Intravenous 2 times per day    enoxaparin  40 mg Subcutaneous Daily    cefepime  2 g Intravenous Q8H    insulin lispro  0-12 Units Subcutaneous TID WC    insulin lispro  0-6 Units Subcutaneous Nightly    influenza virus vaccine  0.5 mL Intramuscular Once     Continuous Infusions:   sodium chloride 125 mL/hr at 09/23/19 0247     PRN Meds:acetaminophen, albuterol sulfate HFA, nitroGLYCERIN, sodium chloride flush, traMADol **OR** traMADol, ondansetron    Review of Systems  Pertinent items are noted in HPI.     Objective:     Patient Vitals for the past 8 hrs:   BP Temp Temp src Pulse Resp SpO2 Weight   09/23/19 1029 -- -- -- -- 30 96 % --   09/23/19 1025 -- -- -- -- 29 91 % --   09/23/19 0915 -- -- -- -- -- 97 % --   09/23/19 0912 133/76 98.6 °F (37 °C) Oral 90 20 -- --   09/23/19 0700 (!) 142/69 -- -- 90 28 92 % --   09/23/19 0600 129/70 -- -- 90 24 93 % (!) 390 lb 10.5 oz (177.2 kg)   09/23/19 0500 114/66 -- -- 90 19 93 % --   09/23/19 0412 122/62 97.8 °F (36.6 °C) Oral 94 18 99 % --   09/23/19 0407 -- -- -- 92 -- -- --   09/23/19 0305 (!) 123/59 -- -- 95 26 92 % --     I/O last 3 completed 09/23/2019     Sodium:    Lab Results   Component Value Date     09/23/2019     Potassium:    Lab Results   Component Value Date    K 4.0 09/23/2019     Calcium:    Lab Results   Component Value Date    CALCIUM 8.5 09/23/2019     Warfarin PT/INR:  No components found for: Zen Charlton  PTT:    Lab Results   Component Value Date    APTT 29.1 05/03/2017   [APTT  Last 3 Troponin:  No results found for: TROPONINI  ABG:    Lab Results   Component Value Date    HSJ8EER 32.0 09/20/2019    PO2ART 89 09/20/2019    TPU3HJO 22.2 09/20/2019         Assessment:     Principal Problem:    Sepsis ([facility] [address]  Active Problems:    Hypertension    Hyperlipidemia    Asthma    Diabetes mellitus ([facility] [address]    Obesity    Chronic kidney disease, stage III (moderate) (HCC)    Depression    Pressure ulcer of coccygeal region, unstageable ([facility] [address]  Resolved Problems:    * No resolved hospital problems. *      Plan:   He is Afebrile, WBC coming down, BP stable. Responding to current antibiotics  Debrided decubitus to bone per surgery  Renal function improved-noted Nephro notes  Continue SSI  If stable can transfer to step down tomorrow.       Vahid TORRES M.D.  9/23/2019

## 2019-09-23 NOTE — CONSULTS
[address] [address] Continence Nurse  Consult Note       Taylor Cantrell  AGE: 77 y.o. GENDER: male  : 1950  TODAY'S DATE:  2019    Subjective:     Reason for  Evaluation and Assessment: wound assessment      Taylor Cantrell is a 77 y.o. male referred by:   [x] Physician  [] Nursing  [] Other:     Wound Identification:  Wound Type: pressure  Contributing Factors: diabetes, chronic pressure and decreased mobility        PAST MEDICAL HISTORY        Diagnosis Date    Arthritis     Asthma     Chronic kidney disease     Diabetes mellitus ([facility] [address] [address]     H/O cardiac catheterization 2017    H/O cardiovascular stress test ,     CARDIOLITE: EF->51%    Hyperlipidemia     Hypertension     Obesity     Thyroid disease        PAST SURGICAL HISTORY    Past Surgical History:   Procedure Laterality Date    CARPAL TUNNEL RELEASE      DIAGNOSTIC CARDIAC CATH LAB PROCEDURE      NO SIGNIFICANT CAD    EYE SURGERY      HEMIARTHROPLASTY HIP Right 2019    HIP HEMIARTHROPLASTY performed by Yara Gotti MD at [address] [facility] HISTORY    Family History   Problem Relation Age of Onset    High Blood Pressure Mother     Cancer Mother     Cancer Father     Stroke Maternal Grandfather     Diabetes Paternal Grandmother     Heart Disease Paternal Grandfather        SOCIAL HISTORY    Social History     Tobacco Use    Smoking status: Never Smoker    Smokeless tobacco: Never Used   Substance Use Topics    Alcohol use: No    Drug use: No       ALLERGIES    Allergies   Allergen Reactions    Bee Venom Shortness Of Breath       MEDICATIONS    No current facility-administered medications on file prior to encounter.       Current Outpatient Medications on File Prior to Encounter   Medication Sig Dispense Refill    enoxaparin (LOVENOX) 40 MG/0.4ML injection Inject 0.4 mLs into the skin daily 30 Syringe 0    TRULICITY 1.5 VK/0.3QN SOPN Inject into the skin HGB 11.7 09/23/2019    HCT 37.9 09/23/2019    MCV 96.7 09/23/2019    MCH 29.8 09/23/2019    MCHC 30.9 09/23/2019    RDW 13.7 09/23/2019     09/23/2019    MPV 10.0 09/23/2019     CMP:    Lab Results   Component Value Date     09/23/2019    K 4.0 09/23/2019     09/23/2019    CO2 23 09/23/2019    BUN 39 09/23/2019    CREATININE 1.2 09/23/2019    GFRAA >60 09/23/2019    LABGLOM >60 09/23/2019    GLUCOSE 137 09/23/2019    PROT 6.1 09/20/2019    LABALBU 2.3 09/23/2019    CALCIUM 8.5 09/23/2019    BILITOT 1.1 09/20/2019    ALKPHOS 143 09/20/2019     09/20/2019    ALT 98 09/20/2019     Albumin:    Lab Results   Component Value Date    LABALBU 2.3 09/23/2019     PT/INR:    Lab Results   Component Value Date    PROTIME 11.5 05/03/2017    PROTIME 10.6 01/26/2012    INR 1.01 05/03/2017     HgBA1c:    Lab Results   Component Value Date    LABA1C 6.1 09/04/2019         Assessment:     Patient Active Problem List   Diagnosis    Hypertension    Hyperlipidemia    Asthma    Diabetes mellitus ([address]    H/O cardiovascular stress test    Obesity    Chronic kidney disease, stage III (moderate) (HCC)    Hypertensive kidney disease with CKD stage III (HCC)    Depression    SOBOE (shortness of breath on exertion)    Abnormal cardiovascular stress test    Fracture of epiphysis (separation) (upper) of neck of femur, closed (HCC)    Pressure ulcer of coccygeal region, unstageable ([address]    Sepsis ([facility] [address]       Measurements:  Wound 09/21/19 Coccyx (Active)   Wound Pressure Unstageable 9/22/2019  4:00 PM   Dressing Status Clean;Dry; Intact 9/23/2019 12:05 PM   Dressing Changed Changed/New 9/23/2019  8:32 AM   Dressing/Treatment Moist to dry;ABD 9/23/2019 12:05 PM   Wound Cleansed Rinsed/Irrigated with saline 9/23/2019  8:32 AM   Wound Length (cm) 11.5 cm 9/21/2019  3:10 AM   Wound Width (cm) 8 cm 9/21/2019  3:10 AM   Wound Depth (cm) 1.5 cm 9/21/2019  3:10 AM   Wound Surface Area (cm^2) 92 cm^2 9/21/2019 9/23/2019  2:30 PM   Wound Assessment Pink;Yellow 9/23/2019  2:30 PM   Drainage Amount Small 9/23/2019  2:30 PM   Drainage Description Serosanguinous 9/23/2019  2:30 PM   Odor None 9/23/2019  2:30 PM   Margins Defined edges 9/23/2019  2:30 PM   Annika-wound Assessment Pink 9/23/2019  2:30 PM   Non-staged Wound Description Full thickness 9/23/2019  2:30 PM   [place]%Wound Bed 50 9/23/2019  2:30 PM   Yellow%Wound Bed 50 9/23/2019  2:30 PM   Number of days: 0       Response to treatment:  Well tolerated by patient. Pain Assessment:  Severity:  None   Quality of pain: none  Wound Pain Timing/Severity: none  Premedicated: no    Plan:     Plan of Care:   Wound 09/23/19 Ankle Anterior;Right;Posterior-Dressing/Treatment: (fibracol, mepilex)  Pt in bed. Per nephew just returned from surgery. Pt sleepy. Assessment of coccyx wound deferred until 9/24/19 due to debridement today. Pt is at high risk for skin breakdown AEB Alfonso. Follow Alfonso orders.        Specialty Bed Required : yes  [] Low Air Loss   [x] Pressure Redistribution  [] Fluid Immersion  [] Bariatric  [] Total Pressure Relief  [] Other:     Discharge Plan:  Placement for patient upon discharge: tbd  Hospice Care: no  Patient appropriate for Outpatient [address]: [facility]    Patient/Caregiver Teaching:  Level of patient/caregiver understanding able to:   Pt unable to voice any learning at present       Electronically signed by Fay Morrison RN,  on 9/23/2019 at 4:43 PM

## 2019-09-23 NOTE — PROGRESS NOTES
Nephrology Progress Note  9/23/2019 3:17 PM  Subjective:   Admit Date: 9/20/2019  PCP: Billee Hashimoto, MD  Interval History: pt sp surgery and painful and confused    Diet: Diet NPO Time Specified Exceptions are: Sips with Meds  Pain is: Moderate      Data:   Scheduled Meds:   vancomycin  1,500 mg Intravenous Q18H    aspirin  81 mg Oral Daily    mometasone-formoterol  2 puff Inhalation BID    buPROPion  300 mg Oral Daily    canagliflozin  300 mg Oral Daily    ezetimibe  10 mg Oral Daily    fluticasone  1 spray Each Nostril Daily    insulin glargine  74 Units Subcutaneous Nightly    ocuvite-lutein  1 tablet Oral Daily    pantoprazole  40 mg Oral QAM AC    sertraline  100 mg Oral BID    simvastatin  40 mg Oral Daily    b complex-C-E-zinc  1 tablet Oral Daily    Dulaglutide  1.5 mg Subcutaneous Weekly    vitamin C  1,000 mg Oral Daily    vitamin E  400 Units Oral Daily    sodium chloride flush  10 mL Intravenous 2 times per day    enoxaparin  40 mg Subcutaneous Daily    cefepime  2 g Intravenous Q8H    insulin lispro  0-12 Units Subcutaneous TID WC    insulin lispro  0-6 Units Subcutaneous Nightly    influenza virus vaccine  0.5 mL Intramuscular Once     Continuous Infusions:   sodium chloride 125 mL/hr at 09/23/19 1209     PRN Meds:acetaminophen, albuterol sulfate HFA, nitroGLYCERIN, sodium chloride flush, traMADol **OR** traMADol, ondansetron  I/O last 3 completed shifts: In: 3799 [I.V.:4988; IV Piggyback:300]  Out: 1525 [LFNNF:6540; Blood:50]  No intake/output data recorded.     Intake/Output Summary (Last 24 hours) at 9/23/2019 1517  Last data filed at 9/23/2019 0920  Gross per 24 hour   Intake 5288 ml   Output 1525 ml   Net 3763 ml     CBC:   Recent Labs     09/21/19  0321 09/22/19  0548 09/23/19  0550   WBC 30.7  ALERT CALLED TO [facility] RN AT [phone] LINDSEY MLT   RESULTS READ BACK  * 25.4* 21.5*   HGB 11.6* 12.0* 11.7*    380 340     BMP:    Recent Labs 09/21/19  0321 09/22/19  0548 09/23/19  0550    133* 137   K 4.4 4.0 4.0   CL 99 98* 103   CO2 25 23 23   BUN 30* 42* 39*   CREATININE 1.5* 1.5* 1.2   GLUCOSE 197* 141* 137*     Hepatic:   Recent Labs     09/20/19 1953   *   ALT 98*   BILITOT 1.1*   ALKPHOS 143*     Troponin: No results for input(s): TROPONINI in the last 72 hours. BNP: No results for input(s): BNP in the last 72 hours. Lipids: No results for input(s): CHOL, HDL in the last 72 hours. Invalid input(s): LDLCALCU  ABGs:   Lab Results   Component Value Date    PO2ART 89 09/20/2019    VQN0SJF 32.0 09/20/2019     INR: No results for input(s): INR in the last 72 hours.   Renal Labs  Albumin:    Lab Results   Component Value Date    LABALBU 2.3 09/23/2019     Calcium:    Lab Results   Component Value Date    CALCIUM 8.5 09/23/2019     Phosphorus:    Lab Results   Component Value Date    PHOS 2.8 09/23/2019     U/A:    Lab Results   Component Value Date    NITRU NEGATIVE 09/21/2019    NITRU Negative 10/24/2018    COLORU MICHAEL 09/21/2019    PHUR 5.5 10/24/2018    LABCAST Present 12/12/2016    LABCAST Hyaline casts 12/12/2016    WBCUA 125 09/21/2019    RBCUA 29 09/21/2019    MUCUS RARE 09/21/2019    TRICHOMONAS NONE SEEN 09/06/2019    YEAST Present 12/12/2016    BACTERIA NEGATIVE 09/21/2019    CLARITYU SLIGHTLY CLOUDY 09/21/2019    SPECGRAV 1.010 09/21/2019    UROBILINOGEN <2.0 09/21/2019    BILIRUBINUR NEGATIVE 09/21/2019    BLOODU MODERATE 09/21/2019    GLUCOSEU 3+ 10/24/2018    KETUA NEGATIVE 09/21/2019     ABG:    Lab Results   Component Value Date    GQI5WRM 32.0 09/20/2019    PO2ART 89 09/20/2019    SGJ8ICF 22.2 09/20/2019     HgBA1c:    Lab Results   Component Value Date    LABA1C 6.1 09/04/2019     Microalbumen/Creatinine ratio:  No components found for: RUCREAT          Objective:   Vitals: /73   Pulse 91   Temp 98.4 °F (36.9 °C) (Oral)   Resp 24   Ht 6' 3\" (1.905 m)   Wt (!) 390 lb 10.5 oz (177.2 kg)   SpO2 93%   BMI

## 2019-09-23 NOTE — PROGRESS NOTES
Today's plan: echo today, continue wound care      Admit Date:  9/20/2019    Subjective:ok      Chief complaints on admission  Chief Complaint   Patient presents with    Wound Infection     Hip surgery in past month. Infection. History of present illness:Zaid is a 77 y. o.year old who  presents with had concerns including Wound Infection (Hip surgery in past month. Infection. ). Past medical history:    has a past medical history of Arthritis, Asthma, Chronic kidney disease, Diabetes mellitus ([address] H/O cardiac catheterization, H/O cardiovascular stress test, Hyperlipidemia, Hypertension, Obesity, and Thyroid disease. Past surgical history:   has a past surgical history that includes Retinal detachment surgery; eye surgery; Carpal tunnel release (2005); Diagnostic Cardiac Cath Lab Procedure (12/05); and HEMIARTHROPLASTY HIP (Right, 8/29/2019). Social History:   reports that he has never smoked. He has never used smokeless tobacco. He reports that he does not drink alcohol or use drugs. Family history:  family history includes Cancer in his father and mother; Diabetes in his paternal grandmother; Heart Disease in his paternal grandfather; High Blood Pressure in his mother; Stroke in his maternal grandfather. Allergies   Allergen Reactions    Bee Venom Shortness Of Breath         Objective:   BP (!) 144/54   Pulse 90   Temp 97.8 °F (36.6 °C) (Oral)   Resp 21   Ht 6' 3\" (1.905 m)   Wt (!) 390 lb 1.6 oz (176.9 kg)   SpO2 92%   BMI 48.76 kg/m²       Intake/Output Summary (Last 24 hours) at 9/23/2019 0556  Last data filed at 9/22/2019 1659  Gross per 24 hour   Intake 2882 ml   Output 675 ml   Net 2207 ml       TELEMETRY: Sinus     Physical Exam:  Constitutional:  Well developed, Well nourished, No acute distress, Non-toxic appearance.    HENT:  Normocephalic, Atraumatic, Bilateral external ears normal, Oropharynx moist, No oral exudates, Nose normal. Neck- Normal range of motion, No

## 2019-09-23 NOTE — PROGRESS NOTES
received a consult to add pt to communion list. Makenzie Bynum was unable to determine if pt was [place] or from [address] [place] [address]. Have spouse call to verify pt manuel tradition. Placed pt on communion list until contact with spouse.

## 2019-09-23 NOTE — PROGRESS NOTES
[facility]  consulted by Dr. Zainab Quiroz for monitoring and adjustment. Indication for treatment: S/p R hemiarthroplasty 8/29/19, gluteal abscess  Goal trough: 10-15 mcg/mL     Pertinent Laboratory Values:   Temp Readings from Last 3 Encounters:   09/23/19 98.6 °F (37 °C) (Oral)   09/02/19 97.9 °F (36.6 °C) (Oral)   08/29/19 97.2 °F (36.2 °C)     Recent Labs     09/20/19 1953 09/21/19  0321 09/22/19  0548 09/23/19  0550   WBC 24.0* 30.7  ALERT CALLED TO [facility] RN AT [phone] LINDSEY MLT   RESULTS READ BACK  * 25.4* 21.5*   LACTATE 1.7  --   --   --      Recent Labs     09/21/19 0321 09/22/19  0548 09/23/19  0550   BUN 30* 42* 39*   CREATININE 1.5* 1.5* 1.2     Estimated Creatinine Clearance: 101 mL/min (based on SCr of 1.2 mg/dL). Intake/Output Summary (Last 24 hours) at 9/23/2019 1227  Last data filed at 9/23/2019 0920  Gross per 24 hour   Intake 5288 ml   Output 1525 ml   Net 3763 ml       Pertinent Cultures:  Date    Source    Results  9/21   Blood    Proteus   9/21   Urine    Proteus            Vancomycin level:   TROUGH:  No results for input(s): VANCOTROUGH in the last 72 hours. RANDOM:    Recent Labs     09/22/19  0548   VANCORANDOM 11.1  (NOTE)  Therapeutic Range Interpretation: Please refer to current dosing   guidelines. Assessment:  · WBC and temperature: Elevated  · SCr, BUN, and urine output: VIRGILIO on CKD 2/2 infection and contrast administration, stable from yesterday   · Day(s) of therapy: 4   · Vancomycin level: 11.1    Plan:  · Mr. Jenaro Boyd continues on vancomycin 1500 mg q18h IVPB   · Repeat level tonight    · Pharmacy will continue to monitor patient and adjust therapy as indicated    Thank you for the consult.   Alisia Crawley, [address]  9/23/2019 12:27 PM

## 2019-09-23 NOTE — PROGRESS NOTES
I called Aishwarya Gomez, this patient's POA, per his request, to let him know that they took this patient to surgery.

## 2019-09-24 NOTE — CARE COORDINATION
Late Entry---Pt's cathie presented to [facility] desk with request to discuss concerns re; discharge planning. Cathie provided [facility] and [facility] paperwork which was placed in bedside chart. Cathie states that pt had a hip fracture at the end of August which was repaired and pt was placed at [facility] for rehab. Per dirkew, pt's participation in rehab was not optimal with probably more than not times of refusing. Pt now admitted with sepsis and an infected decubitus ulcer. Dirkabelardo states that pt did not have the decub when he left the hospital. Felton explained that unfortunately pt's cannot be forced to get up, work with therapy or even be turned in their bed s and if pt was truly not participating he would obviously be at risk for wounds. Cathie is of the mindset that he is not giving his uncle options of not doing therapy. Cathie states that Pt had lived home with is mother and she has since . Per cathie, pt is a hoarder. Cathie states that he has worked on cleaning up the house but does not expect that pt would be able to return home in the future. Dirkabelardo has initiated application for medicaid. Cm to follow up with pt.

## 2019-09-24 NOTE — CONSULTS
[facility] Continence Nurse  Consult Note       Taylor Cantrell  AGE: 77 y.o.    GENDER: male  : 1950  TODAY'S DATE:  2019    Subjective:     Reason for [facility] Evaluation and Assessment: wound assessment      Taylor Cantrell is a 77 y.o. male referred by:   [x] Physician  [] Nursing  [] Other:     Wound Identification:  Wound Type: pressure  Contributing Factors: chronic pressure and decreased mobility        PAST MEDICAL HISTORY        Diagnosis Date    Arthritis     Asthma     Chronic kidney disease     stage 3, seen by Dr. Abelino Taylor Diabetes mellitus ([facility]     H/O cardiac catheterization 2017    H/O cardiovascular stress test ,     CARDIOLITE: EF->51%    Hyperlipidemia     Hypertension     Obesity     Pressure ulcer of coccygeal region, unstageable ([facility] [address] 09/10/2019    Thyroid disease        PAST SURGICAL HISTORY    Past Surgical History:   Procedure Laterality Date    CARPAL TUNNEL RELEASE      DIAGNOSTIC CARDIAC CATH LAB PROCEDURE      NO SIGNIFICANT CAD    EYE SURGERY      HEMIARTHROPLASTY HIP Right 2019    HIP HEMIARTHROPLASTY performed by Yara Gotti MD at [address] [place] [address]  2019    of stage 4 wound on coccyx, by Dr. Blossom Vu N/A 2019    EXCISIONAL DEBRIDEMENT OF SACRAL PRESSURE ULCER performed by Aditi Patel MD at [facility]    Family History   Problem Relation Age of Onset    High Blood Pressure Mother     Cancer Mother     Cancer Father     Stroke Maternal Grandfather     Diabetes Paternal Grandmother     Heart Disease Paternal Grandfather        SOCIAL HISTORY    Social History     Tobacco Use    Smoking status: Never Smoker    Smokeless tobacco: Never Used   Substance Use Topics    Alcohol use: No    Drug use: No       ALLERGIES    Allergies   Allergen Reactions    Bee Venom Shortness Of Breath MEDICATIONS    No current facility-administered medications on file prior to encounter. Current Outpatient Medications on File Prior to Encounter   Medication Sig Dispense Refill    enoxaparin (LOVENOX) 40 MG/0.4ML injection Inject 0.4 mLs into the skin daily 30 Syringe 0    TRULICITY 1.5 XT/7.7OG SOPN Inject into the skin once a week  2    nitroGLYCERIN (NITROSTAT) 0.4 MG SL tablet Place 1 tablet under the tongue every 5 minutes as needed for Chest pain 25 tablet 2    ezetimibe (ZETIA) 10 MG tablet Take 10 mg by mouth daily      acetaminophen (TYLENOL) 500 MG tablet Take 500 mg by mouth every 6 hours as needed for Pain      SUPER B COMPLEX/C PO Take 1 capsule by mouth daily      Cinnamon 500 MG TABS Take 1,000 mg by mouth daily      vitamin E 400 UNIT capsule Take 400 Units by mouth daily      vitamin C (ASCORBIC ACID) 500 MG tablet Take 1,000 mg by mouth daily       insulin lispro (HUMALOG) 100 UNIT/ML injection vial Inject into the skin 3 times daily (before meals)      insulin glargine (LANTUS) 100 UNIT/ML injection vial Inject 74 Units into the skin nightly       benazepril (LOTENSIN) 20 MG tablet Take 1 tablet by mouth daily 90 tablet 3    fluticasone (FLONASE) 50 MCG/ACT nasal spray as needed       Multiple Vitamins-Minerals (VISION VITAMINS PO) Take by mouth daily      omeprazole (PRILOSEC) 20 MG capsule Take 20 mg by mouth daily.  St Johns Wort 300 MG CAPS Take 2 capsules by mouth.  Canagliflozin (INVOKANA) 300 MG TABS Take  by mouth daily.  budesonide-formoterol (SYMBICORT) 80-4.5 MCG/ACT AERO Inhale 2 puffs into the lungs as needed       Albuterol Sulfate (PROAIR HFA IN) Inhale into the lungs as needed       buPROPion (WELLBUTRIN XL) 300 MG XL tablet Take 300 mg by mouth daily.  sertraline (ZOLOFT) 100 MG tablet Take 100 mg by mouth 2 times daily.       simvastatin (ZOCOR) 40 MG tablet Take 40 mg by mouth daily       aspirin 81 MG EC tablet Take 81 mg by mouth daily. Objective:      BP (!) 119/58   Pulse 84   Temp 98.3 °F (36.8 °C) (Oral)   Resp 29   Ht 6' 3\" (1.905 m)   Wt (!) 390 lb 10.5 oz (177.2 kg)   SpO2 94%   BMI 48.83 kg/m²   Alfonso Risk Score: Alfonso Scale Score: 9    LABS    CBC:   Lab Results   Component Value Date    WBC 20.9 09/24/2019    RBC 3.77 09/24/2019    HGB 11.5 09/24/2019    HCT 36.6 09/24/2019    MCV 97.1 09/24/2019    MCH 30.5 09/24/2019    MCHC 31.4 09/24/2019    RDW 13.8 09/24/2019     09/24/2019    MPV 10.0 09/24/2019     CMP:    Lab Results   Component Value Date     09/24/2019    K 4.3 09/24/2019     09/24/2019    CO2 22 09/24/2019    BUN 34 09/24/2019    CREATININE 1.1 09/24/2019    GFRAA >60 09/24/2019    LABGLOM >60 09/24/2019    GLUCOSE 143 09/24/2019    PROT 6.1 09/20/2019    LABALBU 2.3 09/24/2019    CALCIUM 8.1 09/24/2019    BILITOT 1.1 09/20/2019    ALKPHOS 143 09/20/2019     09/20/2019    ALT 98 09/20/2019     Albumin:    Lab Results   Component Value Date    LABALBU 2.3 09/24/2019     PT/INR:    Lab Results   Component Value Date    PROTIME 11.5 05/03/2017    PROTIME 10.6 01/26/2012    INR 1.01 05/03/2017     HgBA1c:    Lab Results   Component Value Date    LABA1C 6.1 09/04/2019         Assessment:     Patient Active Problem List   Diagnosis    Hypertension    Hyperlipidemia    Asthma    Diabetes mellitus ([address]    H/O cardiovascular stress test    Obesity    Chronic kidney disease, stage III (moderate) (HCC)    Hypertensive kidney disease with CKD stage III (HCC)    Depression    SOBOE (shortness of breath on exertion)    Abnormal cardiovascular stress test    Fracture of epiphysis (separation) (upper) of neck of femur, closed (HCC)    Pressure ulcer of coccygeal region, unstageable ([address]    Sepsis ([address]       Measurements:  Wound 09/21/19 Coccyx (Active)   Wound Pressure Unstageable 9/24/2019 11:00 AM   Dressing Status Clean;Dry; Intact 9/24/2019 12:03 PM   Dressing Changed Changed/New 9/24/2019 12:03 PM   Dressing/Treatment Moist to dry;ABD 9/24/2019 12:03 PM   Wound Cleansed Rinsed/Irrigated with saline 9/24/2019 11:00 AM   Wound Length (cm) 9 cm 9/24/2019 11:00 AM   Wound Width (cm) 9 cm 9/24/2019 11:00 AM   Wound Depth (cm) 4.5 cm 9/24/2019 11:00 AM   Wound Surface Area (cm^2) 81 cm^2 9/24/2019 11:00 AM   Change in Wound Size % (l*w) 11.96 9/24/2019 11:00 AM   Wound Volume (cm^3) 364.5 cm^3 9/24/2019 11:00 AM   Wound Healing % -164 9/24/2019 11:00 AM   Undermining Starts ___ O'Clock 10 9/24/2019 11:00 AM   Undermining Ends___ O'Clock 6 9/24/2019 11:00 AM   Undermining Maxium Distance (cm) 4.5 9/24/2019 11:00 AM   Wound Assessment Brown;Painful 9/24/2019 12:03 PM   Drainage Amount Moderate 9/24/2019 11:00 AM   Drainage Description Serosanguinous 9/24/2019 11:00 AM   Odor Strong 9/24/2019 12:03 PM   Margins Undefined edges 9/24/2019 12:03 PM   Exposed structure Muscle 9/24/2019 11:00 AM   Annika-wound Assessment Red;Pale 9/24/2019 12:03 PM   Non-staged Wound Description Full thickness 9/24/2019 12:03 PM   Red%Wound Bed 20 9/24/2019 11:00 AM   Yellow%Wound Bed 20 9/24/2019 11:00 AM   Black%Wound Bed 60 9/24/2019 11:00 AM   Purple%Wound Bed 80 9/21/2019  3:10 AM   Number of days: 3       Wound 09/21/19 Hip Left cluster/DTI (Active)   Wound Deep tissue/Injury 9/24/2019 11:00 AM   Dressing Status Clean;Dry; Intact; Changed 9/24/2019 12:03 PM   Dressing Changed Dressing reinforced 9/24/2019 11:00 AM   Dressing/Treatment Other (comment) 9/24/2019 12:03 PM   Wound Cleansed Not Cleansed 9/24/2019 11:00 AM   Wound Length (cm) 8 cm 9/24/2019 11:00 AM   Wound Width (cm) 6.5 cm 9/24/2019 11:00 AM   Wound Surface Area (cm^2) 52 cm^2 9/24/2019 11:00 AM   Distance Tunneling (cm) 0 cm 9/24/2019 11:00 AM   Tunneling Position ___ O'Clock 0 9/24/2019 11:00 AM   Undermining Starts ___ O'Clock 0 9/24/2019 11:00 AM   Undermining Ends___ O'Clock 0 9/24/2019 11:00 AM   Undermining Maxium Distance (cm) 0 9/24/2019 11:00 AM   Wound Assessment Purple;Pink;Red 9/24/2019 12:03 PM   Drainage Amount None 9/24/2019 12:03 PM   Odor None 9/24/2019 12:03 PM   Annika-wound Assessment Pink 9/24/2019 11:00 AM   Purple%Wound Bed 100 9/21/2019  3:10 AM   Number of days: 3       Wound 09/23/19 Ankle Anterior;Right;Posterior (Active)   Wound Pressure Stage  2 9/24/2019  8:30 AM   Dressing Status Clean;Dry; Intact 9/24/2019 12:03 PM   Dressing Changed Changed/New 9/23/2019  2:30 PM   Wound Cleansed Rinsed/Irrigated with saline 9/23/2019  2:30 PM   Wound Length (cm) 0.8 cm 9/23/2019  2:30 PM   Wound Width (cm) 0.5 cm 9/23/2019  2:30 PM   Wound Depth (cm) 0.1 cm 9/23/2019  2:30 PM   Wound Surface Area (cm^2) 0.4 cm^2 9/23/2019  2:30 PM   Wound Volume (cm^3) 0.04 cm^3 9/23/2019  2:30 PM   Distance Tunneling (cm) 0 cm 9/23/2019  2:30 PM   Tunneling Position ___ O'Clock 0 9/23/2019  2:30 PM   Undermining Starts ___ O'Clock 0 9/23/2019  2:30 PM   Undermining Ends___ O'Clock 0 9/23/2019  2:30 PM   Undermining Maxium Distance (cm) 0 9/23/2019  2:30 PM   Wound Assessment Pink;Yellow 9/23/2019  2:30 PM   Drainage Amount Small 9/23/2019  2:30 PM   Drainage Description Serosanguinous 9/23/2019  2:30 PM   Odor None 9/23/2019  2:30 PM   Margins Defined edges 9/23/2019  2:30 PM   Annika-wound Assessment Pink 9/23/2019  2:30 PM   Non-staged Wound Description Full thickness 9/23/2019  2:30 PM   [place]%Wound Bed 50 9/23/2019  2:30 PM   Yellow%Wound Bed 50 9/23/2019  2:30 PM   Number of days: 0       Response to treatment:  With complaints of pain. Pain Assessment:  Severity:  7/10  Quality of pain: sharp  Wound Pain Timing/Severity: intermittent with coccyx wound  Premedicated: yes    Plan:     Plan of Care:     Wound 09/21/19 Coccyx-Dressing/Treatment: Moist to dry, ABD  Wound 09/21/19 Hip Left cluster/DTI-Dressing/Treatment: Other (comment)(mepilex)    Pt in bed. Turned to left side to evaluate coccyx wound.  Black/brown necrotic tissue noted to base of

## 2019-09-24 NOTE — CARE COORDINATION
Cm contacted Paige in Admissions at [facility] who advises that because pt is medicaid pending he is on a bed hold at [facility]. They are agreeable to attempt to precert pt at discharge but could also accept pt back under his medicaid. Cm to follow.

## 2019-09-24 NOTE — PROGRESS NOTES
[id number] [facility]  consulted by Dr. Laura Masterson for monitoring and adjustment. Indication for treatment: S/p R hemiarthroplasty 8/29/19, gluteal abscess  Goal trough: 10-15 mcg/mL     Pertinent Laboratory Values:   Temp Readings from Last 3 Encounters:   09/24/19 98.2 °F (36.8 °C) (Oral)   09/02/19 97.9 °F (36.6 °C) (Oral)   08/29/19 97.2 °F (36.2 °C)     Recent Labs     09/22/19  0548 09/23/19  0550 09/24/19  0545   WBC 25.4* 21.5* 20.9*     Recent Labs     09/22/19  0548 09/23/19  0550 09/24/19  0545   BUN 42* 39* 34*   CREATININE 1.5* 1.2 1.1     Estimated Creatinine Clearance: 111 mL/min (based on SCr of 1.1 mg/dL). Intake/Output Summary (Last 24 hours) at 9/24/2019 0947  Last data filed at 9/24/2019 0634  Gross per 24 hour   Intake 3733.67 ml   Output 1350 ml   Net 2383.67 ml       Pertinent Cultures:  Date    Source    Results  9/21   Blood    Proteus   9/21   Urine    Proteus            Vancomycin level:   TROUGH:    Recent Labs     09/23/19  2310   VANCOTROUGH 14.0     RANDOM:    Recent Labs     09/22/19  0548   VANCORANDOM 11.1  (NOTE)  Therapeutic Range Interpretation: Please refer to current dosing   guidelines. Assessment:  · WBC and temperature: Elevated  · SCr, BUN, and urine output: returned to baseline    · Day(s) of therapy: 5   · Vancomycin level: therapeutic     Plan:  · Mr. Racheal Zuniga continues on vancomycin 1500 mg q18h IVPB   · Repeat trough therapeutic  · Pharmacy will continue to monitor patient and adjust therapy as indicated    Thank you for the consult.   Cookie Mckeon  9/24/2019 9:47 AM

## 2019-09-24 NOTE — PROGRESS NOTES
Pt blood sugar 65 and was given 4 oz apple juice per protocol. Rechecked in 15 minutes and was 91. Wll continue to check blood sugar per protocol.

## 2019-09-24 NOTE — PROGRESS NOTES
General Surgery-Dr. Cj Lombardi Day: 5    Chief Complaint on Admission: infected decub ulcer      Subjective: Went to OR yesterday. States pain was mostly controlled until early this AM.  Was seen by wound team.  Denies F/C. Tolerating PO. States he was getting position changed. ROS:  Review of Systems  Reviewed. Negative except as above. Allergies  Bee venom          Diagnosis Date    Arthritis     Asthma     Chronic kidney disease     stage 3, seen by Dr. Megan Bird Diabetes mellitus ([facility]     H/O cardiac catheterization 2017    H/O cardiovascular stress test ,     CARDIOLITE: EF->51%    Hyperlipidemia     Hypertension     Obesity     Pressure ulcer of coccygeal region, unstageable ([facility] 09/10/2019    Thyroid disease        Objective:     Vitals:    19 0700   BP: 116/84   Pulse: 89   Resp: 22   Temp:    SpO2:        TEMPERATURE:  Current -Temp: 98.2 °F (36.8 °C); Max - Temp  Av.4 °F (36.9 °C)  Min: 98.2 °F (36.8 °C)  Max: 98.6 °F (37 °C)    No intake/output data recorded. I/O last 3 completed shifts: In: 4433.7 [P.O.:1130; I.V.:2653.7; IV Piggyback:650]  Out: 1400 [Urine:1350; Blood:50]      Physical Exam:  Physical Exam   Laying in bed supine. NAD at rest.  AT. NC. Breathing unlabored. RRR. Obese, soft, NT, ND, no PS.  +Lujan. Dressing in place. Appropriately ttp. FRANZ. Warm, dry.        Scheduled Meds:   vancomycin  1,500 mg Intravenous Q18H    aspirin  81 mg Oral Daily    mometasone-formoterol  2 puff Inhalation BID    buPROPion  300 mg Oral Daily    canagliflozin  300 mg Oral Daily    ezetimibe  10 mg Oral Daily    fluticasone  1 spray Each Nostril Daily    insulin glargine  74 Units Subcutaneous Nightly    ocuvite-lutein  1 tablet Oral Daily    pantoprazole  40 mg Oral QAM AC    sertraline  100 mg Oral BID    simvastatin  40 mg Oral Daily    b complex-C-E-zinc  1 tablet Oral Daily    Dulaglutide  1.5 mg Subcutaneous Weekly    vitamin C  1,000 mg Oral Daily    vitamin E  400 Units Oral Daily    sodium chloride flush  10 mL Intravenous 2 times per day    enoxaparin  40 mg Subcutaneous Daily    cefepime  2 g Intravenous Q8H    insulin lispro  0-12 Units Subcutaneous TID WC    insulin lispro  0-6 Units Subcutaneous Nightly    influenza virus vaccine  0.5 mL Intramuscular Once     ContinuousInfusions:   dextrose      sodium chloride 125 mL/hr at 09/23/19 2203     PRN Meds:glucose, dextrose, glucagon (rDNA), dextrose, acetaminophen, albuterol sulfate HFA, nitroGLYCERIN, sodium chloride flush, traMADol **OR** traMADol, ondansetron      Labs/Imaging Results:   Lab Results   Component Value Date    WBC 20.9 (H) 09/24/2019    HGB 11.5 (L) 09/24/2019    HCT 36.6 (L) 09/24/2019    MCV 97.1 09/24/2019     09/24/2019     Lab Results   Component Value Date     09/24/2019    K 4.3 09/24/2019     09/24/2019    CO2 22 09/24/2019    BUN 34 (H) 09/24/2019    CREATININE 1.1 09/24/2019    GLUCOSE 143 (H) 09/24/2019    CALCIUM 8.1 (L) 09/24/2019    PROT 6.1 (L) 09/20/2019    LABALBU 2.3 (L) 09/24/2019    BILITOT 1.1 (H) 09/20/2019    ALKPHOS 143 (H) 09/20/2019     (H) 09/20/2019    ALT 98 (H) 09/20/2019    LABGLOM >60 09/24/2019    GFRAA >60 09/24/2019       Assessment:     76 y/o M POD 1 s/p excisional debridement down to bone of infected pressure ulcer    Plan:       -Local wound care by Wound Nurse Team appreciated. Pt may ultimately require wound vac vs coverage. If needs coverage will require plastic surgery c/s.     -Pain control.    -Leukocytosis improving.    -F/u pathology.     -Continue ABx.        Electronically signed by Rian Del Rio II, MD on 9/24/2019 at 7:32 AM

## 2019-09-24 NOTE — PROGRESS NOTES
Today's plan: echo was normal, continue wound care,will sign off      Admit Date:  9/20/2019    Subjective:ok      Chief complaints on admission  Chief Complaint   Patient presents with    Wound Infection     Hip surgery in past month. Infection. History of present illness:Zaid is a 77 y. o.year old who  presents with had concerns including Wound Infection (Hip surgery in past month. Infection. ). Past medical history:    has a past medical history of Arthritis, Asthma, Chronic kidney disease, Diabetes mellitus ([address] H/O cardiac catheterization, H/O cardiovascular stress test, Hyperlipidemia, Hypertension, Obesity, Pressure ulcer of coccygeal region, unstageable ([address] and Thyroid disease. Past surgical history:   has a past surgical history that includes Retinal detachment surgery; eye surgery; Carpal tunnel release (2005); Diagnostic Cardiac Cath Lab Procedure (12/05); HEMIARTHROPLASTY HIP (Right, 8/29/2019); Pressure ulcer debridement (09/23/2019); and Pressure ulcer debridement (N/A, 9/23/2019). Social History:   reports that he has never smoked. He has never used smokeless tobacco. He reports that he does not drink alcohol or use drugs. Family history:  family history includes Cancer in his father and mother; Diabetes in his paternal grandmother; Heart Disease in his paternal grandfather; High Blood Pressure in his mother; Stroke in his maternal grandfather.     Allergies   Allergen Reactions    Bee Venom Shortness Of Breath         Objective:   BP (!) 119/58   Pulse 83   Temp 98.3 °F (36.8 °C) (Oral)   Resp 22   Ht 6' 3\" (1.905 m)   Wt (!) 390 lb 10.5 oz (177.2 kg)   SpO2 95%   BMI 48.83 kg/m²       Intake/Output Summary (Last 24 hours) at 9/24/2019 1257  Last data filed at 9/24/2019 7680  Gross per 24 hour   Intake 3733.67 ml   Output 1350 ml   Net 2383.67 ml       TELEMETRY: Sinus     Physical Exam:  Constitutional:  Well developed, Well nourished, No acute distress, Non-toxic appearance. HENT:  Normocephalic, Atraumatic, Bilateral external ears normal, Oropharynx moist, No oral exudates, Nose normal. Neck- Normal range of motion, No tenderness, Supple, No stridor. Eyes:  PERRL, EOMI, Conjunctiva normal, No discharge. Respiratory:  Normal breath sounds, No respiratory distress, No wheezing, No chest tenderness. ,no use of accessory muscles, diaphragm movement is normal  Cardiovascular: (PMI) apex non displaced,no lifts no thrills, no s3,no s4, Normal heart rate, Normal rhythm, No murmurs, No rubs, No gallops. Carotid arteries pulse and amplitude are normal no bruit, no abdominal bruit noted ( normal abdominal aorta ausculation), femoral arteries pulse and amplitude are normal no bruit, pedal pulses are normal  GI:  Bowel sounds normal, Soft, No tenderness, No masses, No pulsatile masses. : External genitalia appear normal, No masses or lesions. No discharge. No CVA tenderness. Musculoskeletal:  Intact distal pulses, No edema, No tenderness, No cyanosis, No clubbing. Good range of motion in all major joints. No tenderness to palpation or major deformities noted. Back- No tenderness. Integument:  Warm, Dry, No erythema, No rash. Lymphatic:  No lymphadenopathy noted. Neurologic:  Alert & oriented x 3, Normal motor function, Normal sensory function, No focal deficits noted.    Psychiatric:  Affect normal, Judgment normal, Mood normal.     Medications:    collagenase   Topical BID    vancomycin  1,500 mg Intravenous Q18H    aspirin  81 mg Oral Daily    mometasone-formoterol  2 puff Inhalation BID    buPROPion  300 mg Oral Daily    canagliflozin  300 mg Oral Daily    ezetimibe  10 mg Oral Daily    fluticasone  1 spray Each Nostril Daily    insulin glargine  74 Units Subcutaneous Nightly    ocuvite-lutein  1 tablet Oral Daily    pantoprazole  40 mg Oral QAM AC    sertraline  100 mg Oral BID    simvastatin  40 mg Oral Daily    b complex-C-E-zinc  1 tablet Oral Daily  Dulaglutide  1.5 mg Subcutaneous Weekly    vitamin C  1,000 mg Oral Daily    vitamin E  400 Units Oral Daily    sodium chloride flush  10 mL Intravenous 2 times per day    enoxaparin  40 mg Subcutaneous Daily    cefepime  2 g Intravenous Q8H    insulin lispro  0-12 Units Subcutaneous TID WC    insulin lispro  0-6 Units Subcutaneous Nightly    influenza virus vaccine  0.5 mL Intramuscular Once      dextrose      sodium chloride 125 mL/hr at 09/24/19 0939     glucose, dextrose, glucagon (rDNA), dextrose, acetaminophen, albuterol sulfate HFA, nitroGLYCERIN, sodium chloride flush, traMADol **OR** traMADol, ondansetron    Lab Data:  CBC:   Recent Labs     09/22/19  0548 09/23/19  0550 09/24/19  0545   WBC 25.4* 21.5* 20.9*   HGB 12.0* 11.7* 11.5*   HCT 38.2* 37.9* 36.6*   MCV 96.7 96.7 97.1    340 367     BMP:   Recent Labs     09/22/19  0548 09/23/19  0550 09/24/19  0545   * 137 138   K 4.0 4.0 4.3   CL 98* 103 104   CO2 23 23 22   PHOS 3.5 2.8 2.8   BUN 42* 39* 34*   CREATININE 1.5* 1.2 1.1     LIVER PROFILE:   No results for input(s): AST, ALT, LIPASE, BILIDIR, BILITOT, ALKPHOS in the last 72 hours. Invalid input(s): AMYLASE,  ALB  PT/INR: No results for input(s): PROTIME, INR in the last 72 hours. APTT: No results for input(s): APTT in the last 72 hours. BNP:  No results for input(s): BNP in the last 72 hours. TROPONIN: @TROPONINI:3@      Assessment:  77 y. o.year old who is admitted for          Plan:  1. Bifascular block: RBBB+LAFB, not and ACS, recommend to continue present treatment, will get echo  2. DM: Stable  3. Dyslipidemia: Continue zetia  4. Ckd: ARF, recommend to treat with IVF  All labs, medications and tests reviewed, continue all other medications of all above medical condition listed as is.       Doris Sosa MD 9/24/2019 12:57 PM

## 2019-09-24 NOTE — PROGRESS NOTES
Nephrology Progress Note  9/24/2019 11:29 AM  Subjective:   Admit Date: 9/20/2019  PCP: Billee Hashimoto, MD  Interval History: pt with pain sp surgery and getting wound cleaned and tender    Diet: DIET CARB CONTROL;  Pain is: Moderate      Data:   Scheduled Meds:   vancomycin  1,500 mg Intravenous Q18H    aspirin  81 mg Oral Daily    mometasone-formoterol  2 puff Inhalation BID    buPROPion  300 mg Oral Daily    canagliflozin  300 mg Oral Daily    ezetimibe  10 mg Oral Daily    fluticasone  1 spray Each Nostril Daily    insulin glargine  74 Units Subcutaneous Nightly    ocuvite-lutein  1 tablet Oral Daily    pantoprazole  40 mg Oral QAM AC    sertraline  100 mg Oral BID    simvastatin  40 mg Oral Daily    b complex-C-E-zinc  1 tablet Oral Daily    Dulaglutide  1.5 mg Subcutaneous Weekly    vitamin C  1,000 mg Oral Daily    vitamin E  400 Units Oral Daily    sodium chloride flush  10 mL Intravenous 2 times per day    enoxaparin  40 mg Subcutaneous Daily    cefepime  2 g Intravenous Q8H    insulin lispro  0-12 Units Subcutaneous TID WC    insulin lispro  0-6 Units Subcutaneous Nightly    influenza virus vaccine  0.5 mL Intramuscular Once     Continuous Infusions:   dextrose      sodium chloride 125 mL/hr at 09/24/19 0939     PRN Meds:glucose, dextrose, glucagon (rDNA), dextrose, acetaminophen, albuterol sulfate HFA, nitroGLYCERIN, sodium chloride flush, traMADol **OR** traMADol, ondansetron  I/O last 3 completed shifts: In: 4433.7 [P.O.:1130; I.V.:2653.7; IV Piggyback:650]  Out: 1400 [Urine:1350; Blood:50]  No intake/output data recorded.     Intake/Output Summary (Last 24 hours) at 9/24/2019 1129  Last data filed at 9/24/2019 0634  Gross per 24 hour   Intake 3733.67 ml   Output 1350 ml   Net 2383.67 ml     CBC:   Recent Labs     09/22/19  0548 09/23/19  0550 09/24/19  0545   WBC 25.4* 21.5* 20.9*   HGB 12.0* 11.7* 11.5*    340 367     BMP:    Recent Labs     09/22/19  0548 09/23/19  0550 09/24/19  0545   * 137 138   K 4.0 4.0 4.3   CL 98* 103 104   CO2 23 23 22   BUN 42* 39* 34*   CREATININE 1.5* 1.2 1.1   GLUCOSE 141* 137* 143*     Hepatic:   No results for input(s): AST, ALT, ALB, BILITOT, ALKPHOS in the last 72 hours. Troponin: No results for input(s): TROPONINI in the last 72 hours. BNP: No results for input(s): BNP in the last 72 hours. Lipids: No results for input(s): CHOL, HDL in the last 72 hours. Invalid input(s): LDLCALCU  ABGs:   Lab Results   Component Value Date    PO2ART 89 09/20/2019    DET6VWL 32.0 09/20/2019     INR: No results for input(s): INR in the last 72 hours.   Renal Labs  Albumin:    Lab Results   Component Value Date    LABALBU 2.3 09/24/2019     Calcium:    Lab Results   Component Value Date    CALCIUM 8.1 09/24/2019     Phosphorus:    Lab Results   Component Value Date    PHOS 2.8 09/24/2019     U/A:    Lab Results   Component Value Date    NITRU NEGATIVE 09/21/2019    NITRU Negative 10/24/2018    COLORU MICHAEL 09/21/2019    PHUR 5.5 10/24/2018    LABCAST Present 12/12/2016    LABCAST Hyaline casts 12/12/2016    WBCUA 125 09/21/2019    RBCUA 29 09/21/2019    MUCUS RARE 09/21/2019    TRICHOMONAS NONE SEEN 09/06/2019    YEAST Present 12/12/2016    BACTERIA NEGATIVE 09/21/2019    CLARITYU SLIGHTLY CLOUDY 09/21/2019    SPECGRAV 1.010 09/21/2019    UROBILINOGEN <2.0 09/21/2019    BILIRUBINUR NEGATIVE 09/21/2019    BLOODU MODERATE 09/21/2019    GLUCOSEU 3+ 10/24/2018    KETUA NEGATIVE 09/21/2019     ABG:    Lab Results   Component Value Date    QAE0TVZ 32.0 09/20/2019    PO2ART 89 09/20/2019    LUD8HEM 22.2 09/20/2019     HgBA1c:    Lab Results   Component Value Date    LABA1C 6.1 09/04/2019     Microalbumen/Creatinine ratio:  No components found for: RUCREAT          Objective:   Vitals: BP (!) 114/56   Pulse 85   Temp 98.3 °F (36.8 °C) (Oral)   Resp 20   Ht 6' 3\" (1.905 m)   Wt (!) 390 lb 10.5 oz (177.2 kg)   SpO2 100%   BMI 48.83 kg/m²   General appearance: awake weak  HEENT: Head: Normal, normocephalic, atraumatic. Neck: supple, symmetrical, trachea midline  Lungs: diminished breath sounds bilaterally  Heart: S1, S2 normal  Abdomen: abnormal findings:  soft nt obese  Extremities: edema + tender surgery site  Neurologic: Mental status: alertness: anxious       Patient Active Problem List:     Hypertension     Hyperlipidemia     Asthma     Diabetes mellitus ([facility] [address]     H/O cardiovascular stress test     Obesity     Chronic kidney disease, stage III (moderate) (HCC)     Hypertensive kidney disease with CKD stage III (HCC)     Depression     SOBOE (shortness of breath on exertion)     Abnormal cardiovascular stress test     Fracture of epiphysis (separation) (upper) of neck of femur, closed (HCC)     Pressure ulcer of coccygeal region, unstageable ([facility])     Sepsis ([facility])    Assessment and Plan:      IMP:  1 septic shock  2 s/p hip surgery with wound infection  3 arf from atn/contrast on ckd 3  4 anxiety/confusion  5 resp distress with hypoxia    Plan     1 bp low stable monitor  2 sp surgery opposite hip surgery and wound clean and ?  Remove staples hip  3 renal stable monitotr  4 affect stable related to pain  5 o2 stable               Beka Curran MD

## 2019-09-24 NOTE — PROGRESS NOTES
Progress note    Taylor Cantrell    9/24/2019    Subjective:     Patient had surgical debridement of Coccygeal decubitus yesterday and has been doing good. Medication side effects: none. Scheduled Meds:   collagenase   Topical BID    vancomycin  1,500 mg Intravenous Q18H    aspirin  81 mg Oral Daily    mometasone-formoterol  2 puff Inhalation BID    buPROPion  300 mg Oral Daily    canagliflozin  300 mg Oral Daily    ezetimibe  10 mg Oral Daily    fluticasone  1 spray Each Nostril Daily    insulin glargine  74 Units Subcutaneous Nightly    ocuvite-lutein  1 tablet Oral Daily    pantoprazole  40 mg Oral QAM AC    sertraline  100 mg Oral BID    simvastatin  40 mg Oral Daily    b complex-C-E-zinc  1 tablet Oral Daily    Dulaglutide  1.5 mg Subcutaneous Weekly    vitamin C  1,000 mg Oral Daily    vitamin E  400 Units Oral Daily    sodium chloride flush  10 mL Intravenous 2 times per day    enoxaparin  40 mg Subcutaneous Daily    cefepime  2 g Intravenous Q8H    insulin lispro  0-12 Units Subcutaneous TID WC    insulin lispro  0-6 Units Subcutaneous Nightly    influenza virus vaccine  0.5 mL Intramuscular Once     Continuous Infusions:   dextrose      sodium chloride 125 mL/hr at 09/24/19 0939     PRN Meds:glucose, dextrose, glucagon (rDNA), dextrose, acetaminophen, albuterol sulfate HFA, nitroGLYCERIN, sodium chloride flush, traMADol **OR** traMADol, ondansetron    Review of Systems  Pertinent items are noted in HPI.     Objective:     Patient Vitals for the past 8 hrs:   BP Temp Temp src Pulse Resp SpO2   09/24/19 1303 127/64 -- -- 85 21 95 %   09/24/19 1230 -- -- -- 83 22 95 %   09/24/19 1203 (!) 119/58 98.3 °F (36.8 °C) Oral 84 29 94 %   09/24/19 1103 123/65 -- -- 85 23 97 %   09/24/19 1003 (!) 114/56 -- -- 85 20 100 %   09/24/19 0903 (!) 104/46 -- -- 88 20 99 %   09/24/19 0845 -- -- -- -- 20 99 %   09/24/19 0803 127/67 98.3 °F (36.8 °C) Oral 86 29 95 %   09/24/19 0703 116/84 -- -- 88 23 100 %   09/24/19 0700 116/84 -- -- 89 22 --   09/24/19 0600 107/83 -- -- 85 20 99 %     I/O last 3 completed shifts: In: 4433.7 [P.O.:1130; I.V.:2653.7; IV Piggyback:650]  Out: 1400 [Urine:1350; Blood:50]  No intake/output data recorded. /64   Pulse 85   Temp 98.3 °F (36.8 °C) (Oral)   Resp 21   Ht 6' 3\" (1.905 m)   Wt (!) 390 lb 10.5 oz (177.2 kg)   SpO2 95%   BMI 48.83 kg/m²   General appearance: alert and cooperative. Lungs: clear to auscultation bilaterally  Heart: regular rate and rhythm, S1, S2 normal, no murmur, click, rub or gallop  Abdomen: soft, non-tender; bowel sounds normal; no masses,  no organomegaly  Extremities: extremities normal, atraumatic, no cyanosis or edema  Skin: Skin color, texture, turgor normal. No rashes or lesions        Labs and imaging reviewed.    CBC with Differential:    Lab Results   Component Value Date    WBC 20.9 09/24/2019    RBC 3.77 09/24/2019    HGB 11.5 09/24/2019    HCT 36.6 09/24/2019     09/24/2019    MCV 97.1 09/24/2019    MCH 30.5 09/24/2019    MCHC 31.4 09/24/2019    RDW 13.8 09/24/2019    SEGSPCT 89.0 09/24/2019    BANDSPCT 3 09/24/2019    LYMPHOPCT 5.0 09/24/2019    MONOPCT 3.0 09/24/2019    BASOPCT 0.2 09/20/2019    MONOSABS 0.6 09/24/2019    LYMPHSABS 1.0 09/24/2019    EOSABS 0.0 09/20/2019    BASOSABS 0.1 09/20/2019    DIFFTYPE MANUAL DIFFERENTIAL 09/24/2019     CMP:    Lab Results   Component Value Date     09/24/2019    K 4.3 09/24/2019     09/24/2019    CO2 22 09/24/2019    BUN 34 09/24/2019    CREATININE 1.1 09/24/2019    GFRAA >60 09/24/2019    LABGLOM >60 09/24/2019    GLUCOSE 143 09/24/2019    PROT 6.1 09/20/2019    LABALBU 2.3 09/24/2019    CALCIUM 8.1 09/24/2019    BILITOT 1.1 09/20/2019    ALKPHOS 143 09/20/2019     09/20/2019    ALT 98 09/20/2019     BMP:    Lab Results   Component Value Date     09/24/2019    K 4.3 09/24/2019     09/24/2019    CO2 22 09/24/2019    BUN 34 09/24/2019    LABALBU 2.3 09/24/2019    CREATININE 1.1 09/24/2019    CALCIUM 8.1 09/24/2019    GFRAA >60 09/24/2019    LABGLOM >60 09/24/2019    GLUCOSE 143 09/24/2019     Sodium:    Lab Results   Component Value Date     09/24/2019     Potassium:    Lab Results   Component Value Date    K 4.3 09/24/2019     Calcium:    Lab Results   Component Value Date    CALCIUM 8.1 09/24/2019     Warfarin PT/INR:  No components found for: PTPATWAR, PTINRWAR  PTT:    Lab Results   Component Value Date    APTT 29.1 05/03/2017   [APTT  Last 3 Troponin:  No results found for: TROPONINI  ABG:    Lab Results   Component Value Date    XDX3IQL 32.0 09/20/2019    PO2ART 89 09/20/2019    GOW3FQK 22.2 09/20/2019         Assessment:     Principal Problem:    Sepsis ([facility] [address]  Active Problems:    Hypertension    Hyperlipidemia    Asthma    Diabetes mellitus ([facility] [address]    Obesity    Chronic kidney disease, stage III (moderate) (HCC)    Depression    Pressure ulcer of coccygeal region, unstageable ([facility] [address]  Resolved Problems:    * No resolved hospital problems. *      Plan:   He is Afebrile, WBC coming down, BP stable. Responding to current antibiotics  S/p Debrided decubitus   Renal function improved-noted Nephro notes  Continue SSI  Can transfer to step down today.     Martha TORRES M.D.  9/24/2019

## 2019-09-25 PROBLEM — L89.159 PRESSURE INJURY OF SKIN OF COCCYGEAL REGION: Status: ACTIVE | Noted: 2019-01-01

## 2019-09-25 NOTE — DISCHARGE INSTR - COC
Continuity of Care Form    Patient Name: Ana Olsen   :  1950  MRN:  [id number]    Admit date:  2019  Discharge date:  10-14-19    Code Status Order: Full Code   Advance Directives:   Advance Care Flowsheet Documentation     Date/Time Healthcare Directive Type of Healthcare Directive Copy in [address] Agent's Name Healthcare Agent's Phone Number    19 0809  No, patient does not have an advance directive for healthcare treatment -- -- -- -- --    19 1057  No, patient does not have an advance directive for healthcare treatment -- -- -- -- --    19 0307  No, patient does not have an advance directive for healthcare treatment -- -- -- -- --          Admitting Physician:  Anatoly Ross MD  PCP: Billee Hashimoto, MD    Discharging Nurse: [facility] Unit/Room#: 6475/6807-O  Discharging Unit Phone Number: [phone]    Emergency Contact:   Extended Emergency Contact Information  Primary Emergency Contact: Anne-Marie Begum [address] Phone: [phone]  Relation: Niece/Nephew  Secondary Emergency Contact: [address] [facility] Phone: [phone]  Relation: Niece/Nephew    Past Surgical History:  Past Surgical History:   Procedure Laterality Date    CARPAL TUNNEL RELEASE      DIAGNOSTIC CARDIAC CATH LAB PROCEDURE      NO SIGNIFICANT CAD    EYE SURGERY      HEMIARTHROPLASTY HIP Right 2019    HIP HEMIARTHROPLASTY performed by Aranza Richards MD at [address] [place] [address]  2019    of stage 4 wound on coccyx, by Dr. Nika Todd N/A 2019    EXCISIONAL DEBRIDEMENT OF SACRAL PRESSURE ULCER performed by Isabel Macario MD at [address] [place] [address]         Immunization History:   Immunization History   Administered Date(s) Administered    Influenza Vaccine, unspecified formulation 2017, 10/05/2018       Active Problems:  Patient Active Problem List   Diagnosis Code    Hypertension I10    Hyperlipidemia E78.5    Asthma J45.909    Diabetes mellitus [facility] E11.9    H/O cardiovascular stress test Z92.89    Obesity E66.9    Chronic kidney disease, stage III (moderate) ([facility]) N18.3    Hypertensive kidney disease with CKD stage III ([facility]) I12.9, N18.3    Depression F32.9    SOBOE (shortness of breath on exertion) R06.02    Abnormal cardiovascular stress test R94.39    Fracture of epiphysis (separation) (upper) of neck of femur, closed ([facility]) S72.023A    Pressure ulcer of coccygeal region, unstageable ([facility]) L89.150    Sepsis ([facility]) A41.9       Isolation/Infection:   Isolation          No Isolation            Nurse Assessment:  Last Vital Signs: /64   Pulse 84   Temp 97.9 °F (36.6 °C) (Oral)   Resp 23   Ht 6' 3\" (1.905 m)   Wt (!) 375 lb (170.1 kg)   SpO2 98%   BMI 46.87 kg/m²     Last documented pain score (0-10 scale): Pain Level: 0  Last Weight:   Wt Readings from Last 1 Encounters:   09/25/19 (!) 375 lb (170.1 kg)     Mental Status:  oriented, alert, coherent, logical, thought processes intact and able to concentrate and follow conversation    IV Access:  - PICC - site  R Basilic, insertion date: 10-6-19    Nursing Mobility/ADLs:  Walking   Dependent  Transfer  Dependent  Bathing  Dependent  Dressing  Dependent  Toileting  Dependent  Feeding  Assisted  Med Admin  Assisted  Med Delivery   whole    Wound Care Documentation and Therapy:  Wound 09/21/19 Coccyx (Active)   Wound Pressure Unstageable 9/24/2019 11:00 AM   Dressing Status Clean;Dry; Intact; Changed;New drainage 9/25/2019  4:00 AM   Dressing Changed Changed/New 9/25/2019  4:00 AM   Dressing/Treatment Moist to dry;ABD 9/25/2019  4:00 AM   Wound Cleansed Rinsed/Irrigated with saline 9/24/2019  8:00 PM   Wound Length (cm) 9 cm 9/24/2019 11:00 AM   Wound Width (cm) 9 cm 9/24/2019 11:00 AM   Wound Depth (cm) 4.5 cm 9/24/2019 11:00 AM   Wound Surface Area (cm^2) 81 cm^2 9/24/2019 11:00 AM   Change in Wound Size % (l*w) 11.96 9/24/2019 11:00 AM   Wound Volume (cm^3) 364.5 cm^3 9/24/2019 11:00 AM   Wound Healing % -164 9/24/2019 11:00 AM   Undermining Starts ___ O'Clock 10 9/24/2019 11:00 AM   Undermining Ends___ O'Clock 6 9/24/2019 11:00 AM   Undermining Maxium Distance (cm) 4.5 9/24/2019 11:00 AM   Wound Assessment Brown;Painful 9/25/2019  4:00 AM   Drainage Amount Moderate 9/24/2019 11:00 AM   Drainage Description Serosanguinous 9/24/2019 11:00 AM   Odor Strong 9/25/2019  4:00 AM   Margins Undefined edges 9/25/2019  4:00 AM   Exposed structure Muscle 9/24/2019 11:00 AM   Annika-wound Assessment Red;Pale 9/25/2019  4:00 AM   Non-staged Wound Description Full thickness 9/25/2019  4:00 AM   Red%Wound Bed 20 9/24/2019 11:00 AM   Yellow%Wound Bed 20 9/24/2019 11:00 AM   Black%Wound Bed 60 9/24/2019 11:00 AM   Purple%Wound Bed 80 9/21/2019  3:10 AM   Number of days: 4       Wound 09/21/19 Hip Left cluster/DTI (Active)   Wound Deep tissue/Injury 9/24/2019 11:00 AM   Dressing Status Clean;Dry; Intact; Changed 9/25/2019  4:00 AM   Dressing Changed Dressing reinforced 9/24/2019 11:00 AM   Dressing/Treatment Other (comment) 9/25/2019  4:00 AM   Wound Cleansed Not Cleansed 9/24/2019 11:00 AM   Wound Length (cm) 8 cm 9/24/2019 11:00 AM   Wound Width (cm) 6.5 cm 9/24/2019 11:00 AM   Wound Surface Area (cm^2) 52 cm^2 9/24/2019 11:00 AM   Distance Tunneling (cm) 0 cm 9/24/2019 11:00 AM   Tunneling Position ___ O'Clock 0 9/24/2019 11:00 AM   Undermining Starts ___ O'Clock 0 9/24/2019 11:00 AM   Undermining Ends___ O'Clock 0 9/24/2019 11:00 AM   Undermining Maxium Distance (cm) 0 9/24/2019 11:00 AM   Wound Assessment Purple;Pink;Red 9/25/2019  4:00 AM   Drainage Amount None 9/25/2019  4:00 AM   Odor None 9/25/2019  4:00 AM   Annika-wound Assessment Pink 9/24/2019 11:00 AM   Purple%Wound Bed 100 9/21/2019  3:10 AM   Number of days: 4       Wound 09/23/19 Ankle Anterior;Right;Posterior

## 2019-09-25 NOTE — PLAN OF CARE
Nutrition Problem: Increased nutrient needs  Intervention: Food and/or Nutrient Delivery: Continue current diet, Continue current ONS  Nutritional Goals: Pt will consume >75% of all meals and supplements provided

## 2019-09-25 NOTE — PROGRESS NOTES
Family Medicine Progress Note  9/25/2019 7:02 AM  Subjective:   Admit Date: 9/20/2019  PCP: Kimberlyn Haas MD  Diet: DIET CARB CONTROL;  Pain is:None  Nausea:None  Bowel Movement/Flatus yes    Interval History: admitted for decubitus ulcer , gluteal area, large, s/p debridement, currently on antibiotics and wound care, doing well. Also has UTI with proteus, complete course of atb. Restful night. Rest of ROS -ve    Data:   Scheduled Meds:   collagenase   Topical BID    vancomycin  1,500 mg Intravenous Q18H    aspirin  81 mg Oral Daily    mometasone-formoterol  2 puff Inhalation BID    buPROPion  300 mg Oral Daily    canagliflozin  300 mg Oral Daily    ezetimibe  10 mg Oral Daily    fluticasone  1 spray Each Nostril Daily    insulin glargine  74 Units Subcutaneous Nightly    ocuvite-lutein  1 tablet Oral Daily    pantoprazole  40 mg Oral QAM AC    sertraline  100 mg Oral BID    simvastatin  40 mg Oral Daily    b complex-C-E-zinc  1 tablet Oral Daily    Dulaglutide  1.5 mg Subcutaneous Weekly    vitamin C  1,000 mg Oral Daily    vitamin E  400 Units Oral Daily    sodium chloride flush  10 mL Intravenous 2 times per day    enoxaparin  40 mg Subcutaneous Daily    cefepime  2 g Intravenous Q8H    insulin lispro  0-12 Units Subcutaneous TID WC    insulin lispro  0-6 Units Subcutaneous Nightly    influenza virus vaccine  0.5 mL Intramuscular Once     Continuous Infusions:   dextrose      sodium chloride 125 mL/hr at 09/24/19 1900     PRN Meds:glucose, dextrose, glucagon (rDNA), dextrose, acetaminophen, albuterol sulfate HFA, nitroGLYCERIN, sodium chloride flush, traMADol **OR** traMADol, ondansetron  I/O last 3 completed shifts: In: 3764 [P.O.:120; I.V.:1396; IV Piggyback:100]  Out: 2950 [Urine:2950]  No intake/output data recorded.     Intake/Output Summary (Last 24 hours) at 9/25/2019 0702  Last data filed at 9/25/2019 0558  Gross per 24 hour   Intake 1616 ml   Output 2950 ml   Net -1334 ml CBC:   Recent Labs     09/24/19  0545 09/25/19  0247 09/25/19  0344   WBC 20.9* ? WRONG PATIENT    CORRECTED ON 09/25 AT 0344: PREVIOUSLY REPORTED AS: 12.0 20.1*   HGB 11.5* ? WRONG PATIENT    CORRECTED ON 09/25 AT 0344: PREVIOUSLY REPORTED AS: 11.1 10.7*    ? WRONG PATIENT    CORRECTED ON 09/25 AT 0344: PREVIOUSLY REPORTED AS: 83 363     BMP:    Recent Labs     09/23/19  0550 09/24/19  0545 09/25/19  0344    138 139   K 4.0 4.3 4.1    104 106   CO2 23 22 24   BUN 39* 34* 27*   CREATININE 1.2 1.1 1.1   GLUCOSE 137* 143* 87         Objective:   Vitals: BP (!) 120/59   Pulse 89   Temp 97.9 °F (36.6 °C) (Oral)   Resp 30   Ht 6' 3\" (1.905 m)   Wt (!) 375 lb (170.1 kg)   SpO2 96%   BMI 46.87 kg/m²   General appearance: alert and cooperative with exam, morbidly OBESE  HEENT: Head: Normal, normocephalic, atraumatic. Eye: Normal external eye, conjunctiva, lids cornea, SAÚL. Nose: Normal external nose, mucus membranes and septum. Neck: no adenopathy,  supple, symmetrical, trachea midline and thyroid not enlarged, symmetric, no tenderness/mass/nodules  Lungs: clear to auscultation bilaterally  Heart:  regular rate and rhythm  Abdomen: soft, non-tender; bowel sounds normal; no masses,  no organomegaly  Extremities: S/p right Hip Surgery, STAPLES in place  Neurologic: Mental status: Alert, oriented, thought content appropriate  DERM: Large Decubitus ulcer in gluteal area, s/p debridement    Assessment and Plan:   Principal Problem:   Sepsis ([address] - Secondary to Proteus Mirabilis, source Urinary Tract, urine and blood c/s +ve for Proteus Mirabilis  ARF on CKD, stable  UTI - proteus, on antibioitcs  Decubitus Ulcer- s/p debridement  Discharge Planning - to SNF, working on bed availability  D/w Nursing staff,  antibiotics to be changed to PO by surgeon prior to discharge,   Staff to reach out to orthopedic to remove stable from hip surgery.     Electronically signed by Lana Alcala MD on 9/25/2019 at 7:02 AM

## 2019-09-25 NOTE — PROGRESS NOTES
Nephrology Progress Note  9/25/2019 4:48 PM  Subjective:   Admit Date: 9/20/2019  PCP: Guero Espino MD  Interval History: pt more awake and still some pain    Diet: DIET CARB CONTROL; Dietary Nutrition Supplements: Wound Healing Oral Supplement  Pain is: Moderate      Data:   Scheduled Meds:   ciprofloxacin  500 mg Oral 2 times per day    collagenase   Topical BID    vancomycin  1,500 mg Intravenous Q18H    aspirin  81 mg Oral Daily    mometasone-formoterol  2 puff Inhalation BID    buPROPion  300 mg Oral Daily    canagliflozin  300 mg Oral Daily    ezetimibe  10 mg Oral Daily    fluticasone  1 spray Each Nostril Daily    insulin glargine  74 Units Subcutaneous Nightly    ocuvite-lutein  1 tablet Oral Daily    pantoprazole  40 mg Oral QAM AC    sertraline  100 mg Oral BID    simvastatin  40 mg Oral Daily    b complex-C-E-zinc  1 tablet Oral Daily    Dulaglutide  1.5 mg Subcutaneous Weekly    vitamin C  1,000 mg Oral Daily    vitamin E  400 Units Oral Daily    sodium chloride flush  10 mL Intravenous 2 times per day    enoxaparin  40 mg Subcutaneous Daily    cefepime  2 g Intravenous Q8H    insulin lispro  0-12 Units Subcutaneous TID WC    insulin lispro  0-6 Units Subcutaneous Nightly    influenza virus vaccine  0.5 mL Intramuscular Once     Continuous Infusions:   dextrose      sodium chloride 125 mL/hr at 09/25/19 1630     PRN Meds:glucose, dextrose, glucagon (rDNA), dextrose, acetaminophen, albuterol sulfate HFA, nitroGLYCERIN, sodium chloride flush, traMADol **OR** traMADol, ondansetron  I/O last 3 completed shifts: In: 3742 [P.O.:120;  I.V.:1396; IV Piggyback:100]  Out: 2950 [Urine:2950]  I/O this shift:  In: 3789.6 [I.V.:2689.6; IV Piggyback:1100]  Out: 1100 [Urine:1100]    Intake/Output Summary (Last 24 hours) at 9/25/2019 1648  Last data filed at 9/25/2019 1631  Gross per 24 hour   Intake 5405.58 ml   Output 3400 ml   Net 2005.58 ml     CBC:   Recent Labs     09/24/19  0545 09/25/19  0247 09/25/19  0344   WBC 20.9* ? WRONG PATIENT    CORRECTED ON 09/25 AT 0344: PREVIOUSLY REPORTED AS: 12.0 20.1*   HGB 11.5* ? WRONG PATIENT    CORRECTED ON 09/25 AT 0344: PREVIOUSLY REPORTED AS: 11.1 10.7*    ? WRONG PATIENT    CORRECTED ON 09/25 AT 0344: PREVIOUSLY REPORTED AS: 83 363     BMP:    Recent Labs     09/23/19  0550 09/24/19  0545 09/25/19  0344    138 139   K 4.0 4.3 4.1    104 106   CO2 23 22 24   BUN 39* 34* 27*   CREATININE 1.2 1.1 1.1   GLUCOSE 137* 143* 87     Hepatic:   No results for input(s): AST, ALT, ALB, BILITOT, ALKPHOS in the last 72 hours. Troponin: No results for input(s): TROPONINI in the last 72 hours. BNP: No results for input(s): BNP in the last 72 hours. Lipids: No results for input(s): CHOL, HDL in the last 72 hours. Invalid input(s): LDLCALCU  ABGs:   Lab Results   Component Value Date    PO2ART 89 09/20/2019    EPB8OUC 32.0 09/20/2019     INR: No results for input(s): INR in the last 72 hours.   Renal Labs  Albumin:    Lab Results   Component Value Date    LABALBU 2.2 09/25/2019     Calcium:    Lab Results   Component Value Date    CALCIUM 7.9 09/25/2019     Phosphorus:    Lab Results   Component Value Date    PHOS 2.1 09/25/2019     U/A:    Lab Results   Component Value Date    NITRU NEGATIVE 09/21/2019    NITRU Negative 10/24/2018    COLORU MICHAEL 09/21/2019    PHUR 5.5 10/24/2018    LABCAST Present 12/12/2016    LABCAST Hyaline casts 12/12/2016    WBCUA 125 09/21/2019    RBCUA 29 09/21/2019    MUCUS RARE 09/21/2019    TRICHOMONAS NONE SEEN 09/06/2019    YEAST Present 12/12/2016    BACTERIA NEGATIVE 09/21/2019    CLARITYU SLIGHTLY CLOUDY 09/21/2019    SPECGRAV 1.010 09/21/2019    UROBILINOGEN <2.0 09/21/2019    BILIRUBINUR NEGATIVE 09/21/2019    BLOODU MODERATE 09/21/2019    GLUCOSEU 3+ 10/24/2018    KETUA NEGATIVE 09/21/2019     ABG:    Lab Results   Component Value Date    QNN9TIY 32.0 09/20/2019    PO2ART 89 09/20/2019    CDN1LSW 22.2 09/20/2019     HgBA1c:    Lab Results   Component Value Date    LABA1C 6.1 09/04/2019     Microalbumen/Creatinine ratio:  No components found for: RUCREAT          Objective:   Vitals: BP (!) 115/59   Pulse 92   Temp 97.8 °F (36.6 °C) (Oral)   Resp 20   Ht 6' 3\" (1.905 m)   Wt (!) 375 lb (170.1 kg)   SpO2 96%   BMI 46.87 kg/m²   General appearance: awake weak  HEENT: Head: Normal, normocephalic, atraumatic.   Neck: supple, symmetrical, trachea midline  Lungs: diminished breath sounds bilaterally  Heart: S1, S2 normal  Abdomen: abnormal findings:  soft nt obese  Extremities: edema + tender surgery site  Neurologic: Mental status: alertness: anxious       Patient Active Problem List:     Hypertension     Hyperlipidemia     Asthma     Diabetes mellitus ([facility] [address]     H/O cardiovascular stress test     Obesity     Chronic kidney disease, stage III (moderate) (HCC)     Hypertensive kidney disease with CKD stage III (HCC)     Depression     SOBOE (shortness of breath on exertion)     Abnormal cardiovascular stress test     Fracture of epiphysis (separation) (upper) of neck of femur, closed (HCC)     Pressure ulcer of coccygeal region, unstageable (HCC)     Sepsis (HCC)    Assessment and Plan:      IMP:  1 septic shock  2 s/p hip surgery with wound infection  3 arf from atn/contrast on ckd 3  4 anxiety/confusion  5 resp distress with hypoxia    Plan     1 bp stable   2 wound care and pain control  3 renal stable   4 monitor affect and more awake  5 o2 stable for now  Need rehab               Charlotte Holland MD

## 2019-09-25 NOTE — PROGRESS NOTES
General Surgery-Dr. Augustina Peabody Day: 6    Chief Complaint on Admission: infected decub ulcer      Subjective:     Pain control still issue  Was seen by wound team.  Denies F/C. Tolerating PO. States he was getting position changed. ROS:  Review of Systems  Reviewed. Negative except as above. Allergies  Bee venom          Diagnosis Date    Arthritis     Asthma     Chronic kidney disease     stage 3, seen by Dr. Vi Lee Diabetes mellitus ([facility] [address]     H/O cardiac catheterization 2017    H/O cardiovascular stress test ,     CARDIOLITE: EF->51%    Hyperlipidemia     Hypertension     Obesity     Pressure ulcer of coccygeal region, unstageable ([facility] [address] 09/10/2019    Thyroid disease        Objective:     Vitals:    19 0823   BP: 121/64   Pulse: 84   Resp: 23   Temp: 97.9 °F (36.6 °C)   SpO2: 98%       TEMPERATURE:  Current -Temp: 97.9 °F (36.6 °C); Max - Temp  Av.1 °F (36.7 °C)  Min: 97.9 °F (36.6 °C)  Max: 98.3 °F (36.8 °C)    No intake/output data recorded. I/O last 3 completed shifts: In: 1596 [P.O.:120; I.V.:1396; IV Piggyback:100]  Out: 2950 [Urine:2950]      Physical Exam:  Physical Exam   Laying in bed supine. NAD at rest.  AT. NC. Breathing unlabored. RRR. Obese, soft, NT, ND, no PS.  +Lujan. Dressing in place. Appropriately ttp. Still with odor but improved. No active drainage. FRANZ. Warm, dry.        Scheduled Meds:   ciprofloxacin  500 mg Oral 2 times per day    collagenase   Topical BID    vancomycin  1,500 mg Intravenous Q18H    aspirin  81 mg Oral Daily    mometasone-formoterol  2 puff Inhalation BID    buPROPion  300 mg Oral Daily    canagliflozin  300 mg Oral Daily    ezetimibe  10 mg Oral Daily    fluticasone  1 spray Each Nostril Daily    insulin glargine  74 Units Subcutaneous Nightly    ocuvite-lutein  1 tablet Oral Daily    pantoprazole  40 mg Oral QAM AC    sertraline  100 mg Oral BID    simvastatin  40 mg Oral Daily    b complex-C-E-zinc  1 tablet Oral Daily    Dulaglutide  1.5 mg Subcutaneous Weekly    vitamin C  1,000 mg Oral Daily    vitamin E  400 Units Oral Daily    sodium chloride flush  10 mL Intravenous 2 times per day    enoxaparin  40 mg Subcutaneous Daily    cefepime  2 g Intravenous Q8H    insulin lispro  0-12 Units Subcutaneous TID WC    insulin lispro  0-6 Units Subcutaneous Nightly    influenza virus vaccine  0.5 mL Intramuscular Once     ContinuousInfusions:   dextrose      sodium chloride 125 mL/hr at 09/24/19 1900     PRN Meds:glucose, dextrose, glucagon (rDNA), dextrose, acetaminophen, albuterol sulfate HFA, nitroGLYCERIN, sodium chloride flush, traMADol **OR** traMADol, ondansetron      Labs/Imaging Results:   Lab Results   Component Value Date    WBC 20.1 (H) 09/25/2019    HGB 10.7 (L) 09/25/2019    HCT 34.5 (L) 09/25/2019    MCV 96.9 09/25/2019     09/25/2019     Lab Results   Component Value Date     09/25/2019    K 4.1 09/25/2019     09/25/2019    CO2 24 09/25/2019    BUN 27 (H) 09/25/2019    CREATININE 1.1 09/25/2019    GLUCOSE 87 09/25/2019    CALCIUM 7.9 (L) 09/25/2019    PROT 6.1 (L) 09/20/2019    LABALBU 2.2 (L) 09/25/2019    BILITOT 1.1 (H) 09/20/2019    ALKPHOS 143 (H) 09/20/2019     (H) 09/20/2019    ALT 98 (H) 09/20/2019    LABGLOM >60 09/25/2019    GFRAA >60 09/25/2019       Assessment:     76 y/o M POD 2 s/p excisional debridement down to bone of infected pressure ulcer    Plan:       -Local wound care by Wound Nurse Team appreciated. Pt may ultimately require wound vac vs coverage. If needs coverage will require plastic surgery c/s.     -Pain control.    -Leukocytosis improving.    -F/u pathology.     -Continue ABx per primary team.     -After d/c, will need to f/u with me in office 7-10 days post op.        Electronically signed by Abel Mead II, MD on 9/25/2019 at 9:25 AM

## 2019-09-25 NOTE — PROGRESS NOTES
Nutrition Assessment    Type and Reason for Visit: Reassess    Nutrition Recommendations:   · Continue current diet  · Start wound healing supplments    Nutrition Assessment: Pt is now consuming 51-75% of his meals and is at moderate risk at this time. Malnutrition Assessment:  · Malnutrition Status: At risk for malnutrition  · Context: Acute illness or injury  · Findings of the 6 clinical characteristics of malnutrition (Minimum of 2 out of 6 clinical characteristics is required to make the diagnosis of moderate or severe Protein Calorie Malnutrition based on AND/ASPEN Guidelines):  1. Energy Intake-Less than or equal to 75% of estimated energy requirement, Greater than or equal to 5 days    2. Weight Loss-No significant weight loss, in 1 year  3. Fat Loss-No significant subcutaneous fat loss, Orbital  4. Muscle Loss-No significant muscle mass loss, Clavicles (pectoralis and deltoids)  5. Fluid Accumulation-No significant fluid accumulation,    6.   Strength-Not measured    Nutrition Risk Level: High    Nutrient Needs:  · Estimated Daily Total Kcal: 2568 based on MSJ  · Estimated Daily Protein (g): >178 (>2 g/kg IBW)  · Estimated Daily Total Fluid (ml/day): 2568 based on 1 mL/kcal    Nutrition Diagnosis:   · Problem: Increased nutrient needs  · Etiology: related to Acute injury/trauma     Signs and symptoms:  as evidenced by Presence of wounds    Objective Information:  · Wound Type: Pressure Ulcer, Unstageable  · Current Nutrition Therapies:  · Oral Diet Orders: Carb Control 4 Carbs/Meal   · Oral Diet intake: 51-75%  · Oral Nutrition Supplement (ONS) Orders: None  · Anthropometric Measures:  · Ht: 6' 3\" (190.5 cm)   · Current Body Wt: 375 lb (170.1 kg)  · Admission Body Wt: 392 lb (177.8 kg)  · Usual Body Wt: (ANSON)  · % Weight Change: weight flucuations with fluid intake  · Ideal Body Wt: 196 lb (88.9 kg), % Ideal Body 191%  · BMI Classification: BMI > or equal to 40.0 Obese Class III    Nutrition

## 2019-09-25 NOTE — PROGRESS NOTES
[facility]  consulted by Dr. Reanna Galvan for monitoring and adjustment. Indication for treatment: S/p R hemiarthroplasty 8/29/19, gluteal abscess  Goal trough: 10-15 mcg/mL     Pertinent Laboratory Values:   Temp Readings from Last 3 Encounters:   09/25/19 97.9 °F (36.6 °C) (Oral)   09/02/19 97.9 °F (36.6 °C) (Oral)   08/29/19 97.2 °F (36.2 °C)     Recent Labs     09/24/19  0545 09/25/19  0247 09/25/19  0344   WBC 20.9* ? WRONG PATIENT    CORRECTED ON 09/25 AT 0344: PREVIOUSLY REPORTED AS: 12.0 20.1*     Recent Labs     09/23/19  0550 09/24/19  0545 09/25/19  0344   BUN 39* 34* 27*   CREATININE 1.2 1.1 1.1     Estimated Creatinine Clearance: 108 mL/min (based on SCr of 1.1 mg/dL). Intake/Output Summary (Last 24 hours) at 9/25/2019 1222  Last data filed at 9/25/2019 0558  Gross per 24 hour   Intake 1616 ml   Output 2950 ml   Net -1334 ml       Pertinent Cultures:  Date    Source    Results  9/21   Blood    Proteus   9/21   Urine    Proteus            Vancomycin level:   TROUGH:    Recent Labs     09/23/19  2310   VANCOTROUGH 14.0     RANDOM:    No results for input(s): VANCORANDOM in the last 72 hours. Assessment:  · WBC and temperature: Elevated  · SCr, BUN, and urine output: returned to baseline    · Day(s) of therapy: 6  · Vancomycin level: therapeutic     Plan:  · Mr. Cristi Espino continues on vancomycin 1500 mg q18h IVPB   · Repeat trough therapeutic  · Pharmacy will continue to monitor patient and adjust therapy as indicated    Thank you for the consult.   Mauri Weber RPh  9/25/2019 12:22 PM

## 2019-09-26 NOTE — PROGRESS NOTES
09/26/19  0511   WBC ? WRONG PATIENT    CORRECTED ON 09/25 AT 0344: PREVIOUSLY REPORTED AS: 12.0 20.1* 23.8*   HGB ?  WRONG PATIENT    CORRECTED ON 09/25 AT 0344: PREVIOUSLY REPORTED AS: 11.1 10.7* 11.4*   PLT ? WRONG PATIENT    CORRECTED ON 09/25 AT 0344: PREVIOUSLY REPORTED AS: 83 363 398     BMP:    Recent Labs     09/24/19  0545 09/25/19  0344 09/26/19  0511    139 138   K 4.3 4.1 4.8    106 103   CO2 22 24 25   BUN 34* 27* 21   CREATININE 1.1 1.1 1.1   GLUCOSE 143* 87 62*     Hepatic:   No results for input(s): AST, ALT, ALB, BILITOT, ALKPHOS in the last 72 hours. Troponin: No results for input(s): TROPONINI in the last 72 hours. BNP: No results for input(s): BNP in the last 72 hours. Lipids: No results for input(s): CHOL, HDL in the last 72 hours. Invalid input(s): LDLCALCU  ABGs:   Lab Results   Component Value Date    PO2ART 89 09/20/2019    ABV9WFJ 32.0 09/20/2019     INR: No results for input(s): INR in the last 72 hours.   Renal Labs  Albumin:    Lab Results   Component Value Date    LABALBU 2.3 09/26/2019     Calcium:    Lab Results   Component Value Date    CALCIUM 8.0 09/26/2019     Phosphorus:    Lab Results   Component Value Date    PHOS 2.3 09/26/2019     U/A:    Lab Results   Component Value Date    NITRU NEGATIVE 09/21/2019    NITRU Negative 10/24/2018    COLORU MICHAEL 09/21/2019    PHUR 5.5 10/24/2018    LABCAST Present 12/12/2016    LABCAST Hyaline casts 12/12/2016    WBCUA 125 09/21/2019    RBCUA 29 09/21/2019    MUCUS RARE 09/21/2019    TRICHOMONAS NONE SEEN 09/06/2019    YEAST Present 12/12/2016    BACTERIA NEGATIVE 09/21/2019    CLARITYU SLIGHTLY CLOUDY 09/21/2019    SPECGRAV 1.010 09/21/2019    UROBILINOGEN <2.0 09/21/2019    BILIRUBINUR NEGATIVE 09/21/2019    BLOODU MODERATE 09/21/2019    GLUCOSEU 3+ 10/24/2018    KETUA NEGATIVE 09/21/2019     ABG:    Lab Results   Component Value Date    DSE0IYT 32.0 09/20/2019    PO2ART 89 09/20/2019    VQC6LXW 22.2 09/20/2019 HgBA1c:    Lab Results   Component Value Date    LABA1C 6.1 09/04/2019     Microalbumen/Creatinine ratio:  No components found for: RUCREAT          Objective:   Vitals: /73   Pulse 97   Temp 98.4 °F (36.9 °C) (Oral)   Resp 29   Ht 6' 3\" (1.905 m)   Wt (!) 375 lb (170.1 kg)   SpO2 98%   BMI 46.87 kg/m²   General appearance: awake weak  HEENT: Head: Normal, normocephalic, atraumatic.   Neck: supple, symmetrical, trachea midline  Lungs: diminished breath sounds bilaterally  Heart: S1, S2 normal  Abdomen: abnormal findings:  soft nt obese  Extremities: edema + tender surgery site better  Neurologic: Mental status: alertness: awake      Patient Active Problem List:     Hypertension     Hyperlipidemia     Asthma     Diabetes mellitus ([facility] [address]     H/O cardiovascular stress test     Obesity     Chronic kidney disease, stage III (moderate) (HCC)     Hypertensive kidney disease with CKD stage III (HCC)     Depression     SOBOE (shortness of breath on exertion)     Abnormal cardiovascular stress test     Fracture of epiphysis (separation) (upper) of neck of femur, closed (HCC)     Pressure ulcer of coccygeal region, unstageable (HCC)     Sepsis (HCC)    Assessment and Plan:      IMP:  1 septic shock  2 s/p hip surgery with wound infection  3 arf from atn/contrast on ckd 3  4 anxiety/confusion  5 resp distress with hypoxia    Plan     1 bp stable monitor  2 sp surgery wound care now  3 resolved arf monitor  4 monitor stress and affect  5 o2 holding stable  Now work rehab plan                 Rosalba Rowe MD

## 2019-09-26 NOTE — CARE COORDINATION
Cm was advised per RN that pt did set up on side of the bed today. Cm in to see pt to congratulate and to encourage him to keep it up. 26 Cm attempted to contact [facility] admissions, Paige, with voice mail message left advising that PT/OT evals are in the record and rec SNF. CM requested precert be initiated today if possible. If insurance denies Paige had previously advised this CM that pt could return without a precert as he is on a medicaid bed hold. CM to follow.

## 2019-09-26 NOTE — PROGRESS NOTES
therex/therax c emphasis on strength, activity tolerance,  safety, DANIA tasks. Plan:  Plan  Times per week: 2x      Recommendation for activity with nursing staff:  Margarita Lu    Treatment today:    Self Care Training:   Self care training was performed today. Cues were given for safety, sequence, UE/LE placement, visual cues, and balance. Activities performed today included  grooming. Therapeutic Activity Training:   Therapeutic activity training was instructed today. Cues were given for safety, sequence, UE/LE placement, visual cues, and balance. Activities performed today included bed mobility training, sup-sit, sit-stand trial  Education: Role of OT, OT POC, d/c needs, home safety    Safety: Left in bed with all needs in reach. Gait belt used for transfer and mobility. Time in:  1010  Time out:  1100  Timed treatment minutes:  38  Total treatment time:  50    Electronically signed by:    410Johan Genao, OTR/L, [place]   [id number]   2:08 PM, 9/26/2019

## 2019-09-26 NOTE — PROGRESS NOTES
Did not sent stool for c dif, nursing supervisor Emile Victor says it is not suitable, not loose enough would be rejected.

## 2019-09-26 NOTE — PROGRESS NOTES
Planning - to SNF, working on bed availability  D/w Nursing staff,  antibiotics to be changed to PO by surgeon prior to discharge,since she has declined to give input, will consult ID. Patient will be on cipro for urosepsis on discharge. If ID input is not obtained, will consider discharge on Bactrim , since the old wound c/s showing staph aureus and urine and Blood c/s showing proteus are both sensitive to bactrim. Staff to reach out to orthopedic to remove stable from hip surgery.     Electronically signed by Anh Aguilera MD on 9/26/2019 at 6:22 AM

## 2019-09-26 NOTE — CONSULTS
Independent  Transfer Assistance: Independent  Active : Yes  Mode of Transportation: Car  Additional Comments: Pt reports 2 falls in last year. 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 Musculoskeletal  · ROM R/L:  WFL. · Strength R/L:  3-/5, weakness in function and endurance. · Neuro:  [facility]      Mobility:  · Supine to sit: Max A x2  · Transfers: Max A x2  · Sitting balance:  SBA. · Standing balance: Max A x2. · Gait: NT    [facility] 6 Clicks Inpatient Mobility:  AM-PAC Inpatient Mobility Raw Score : 8    Treatment:  Patient performed supine to sit with max A, assist to perform incremental LE management, patient able to perform with LLE, assist with RLE, max A at trunk with pull to sit and assist of second therapist.  Patient sat EOB x20 min, decreased UE support to challenge balance, patient performed SBA, cues for pursed lip breahting throughout session d/t hyperventilation (O2 sats at 100%). Patient encouraged to attempt STS with RW, educated on benefits of OOB mobility and risks of immobility. Patient attempted STS x2, unsucessful first attempt, 2nd attempt patient's nephew assist (patient with increased engagement). Able to perform partial stand, poor posture. Patient returned to supine with max A x2, able to assist with scooting up in bed. Safety: patient left in bed, call light within reach, RN notified, gait belt used. Assessment:  Patient is a 78 yo male who presents with pain and infection of decubitus ucler, patient was d/c to SNF after admission in August when he underwent R hemiarthroplasty after R hip fracture.   Patient demonstrates significant

## 2019-09-26 NOTE — CARE COORDINATION
Late Entry---Cm had conversation with pt today re; discharge planning and nephew indication that pt will return to [place] [id number] at discharge. Cm discussed with pt that it has been reported that he did not participate with therapies at the SNF. Pt confirms that he had been independent prior to his hip fracture. Cm explained to pt that it is highly likely that if he does not work with therapies and give 100% effort that he would live the remainder of his days in a NH. Pt offered excuses as to why he did not work with therapy and CM explained that they were just that excuses ie; he couldn't eat because the food was greasy, he fell at the NH, He believes laying in bed will make him stronger. Cm explained that the bed will not make him stronger. Pt needs to decide if he wants to try to get back to independence or is essence give up. Cm encouraged pt to give therapy another try. Cm encouraged pt not to give up and to work hard to get back to independence. Cm to follow.

## 2019-09-26 NOTE — PROGRESS NOTES
[address] [facility]  consulted by Dr. Zainab Quiroz for monitoring and adjustment. Indication for treatment: S/p R hemiarthroplasty 8/29/19, gluteal abscess  Goal trough: 10-15 mcg/mL     Pertinent Laboratory Values:   Temp Readings from Last 3 Encounters:   09/26/19 98.2 °F (36.8 °C) (Oral)   09/02/19 97.9 °F (36.6 °C) (Oral)   08/29/19 97.2 °F (36.2 °C)     Recent Labs     09/25/19  0247 09/25/19  0344 09/26/19  0511   WBC ? WRONG PATIENT    CORRECTED ON 09/25 AT 0344: PREVIOUSLY REPORTED AS: 12.0 20.1* 23.8*     Recent Labs     09/24/19  0545 09/25/19  0344 09/26/19  0511   BUN 34* 27* 21   CREATININE 1.1 1.1 1.1     Estimated Creatinine Clearance: 108 mL/min (based on SCr of 1.1 mg/dL). Intake/Output Summary (Last 24 hours) at 9/26/2019 1019  Last data filed at 9/26/2019 0920  Gross per 24 hour   Intake 4615.58 ml   Output 2700 ml   Net 1915.58 ml       Pertinent Cultures:  Date    Source    Results  9/21   Blood    Proteus   9/21   Urine    Proteus            Vancomycin level:   TROUGH:    Recent Labs     09/23/19  2310 09/26/19  0511   VANCOTROUGH 14.0 17.6     RANDOM:    No results for input(s): VANCORANDOM in the last 72 hours. Assessment:  · WBC and temperature: Elevated  · SCr, BUN, and urine output: returned to baseline    · Day(s) of therapy: 7  · Vancomycin level: therapeutic     Plan:  · Mr. Jenaro Boyd continues on vancomycin 1500 mg q18h IVPB   · Repeat trough therapeutic  · Repeat trough in 4 days   · Pharmacy will continue to monitor patient and adjust therapy as indicated    Thank you for the consult.   Alisia Crawley RPh  9/26/2019 10:19 AM

## 2019-09-27 NOTE — PROGRESS NOTES
General Surgery-Dr. Aline Bearden Day: 8    Chief Complaint on Admission: infected decub ulcer      Subjective:     Pain mostly controlled. Is getting wound changed. Is having dressings changed. Denies F/C. Tolerating PO.       ROS:  Review of Systems  Reviewed. Negative except as above. Allergies  Bee venom          Diagnosis Date    Arthritis     Asthma     Chronic kidney disease     stage 3, seen by Dr. Severa Rakers Diabetes mellitus ([address]     H/O cardiac catheterization 2017    H/O cardiovascular stress test ,     CARDIOLITE: EF->51%    Hyperlipidemia     Hypertension     Obesity     Pressure ulcer of coccygeal region, unstageable ([address] 09/10/2019    Thyroid disease        Objective:     Vitals:    19 1315   BP: 133/66   Pulse: 89   Resp: 26   Temp: 97.4 °F (36.3 °C)   SpO2: 93%       TEMPERATURE:  Current -Temp: 97.4 °F (36.3 °C); Max - Temp  Av.1 °F (36.7 °C)  Min: 97.4 °F (36.3 °C)  Max: 98.6 °F (37 °C)    No intake/output data recorded. I/O last 3 completed shifts: In: 2280 [P.O.:720; I.V.:1560]  Out: 7763 [Urine:3400; Stool:875]      Physical Exam:  Physical Exam   Laying in bed supine. NAD at rest.  AT. NC. Breathing unlabored. RRR. Obese, soft, NT, ND, no PS.  +Lujan. Dressing in place. Appropriately ttp. No active drainage. +rectal tube. FRANZ. Warm, dry.        Scheduled Meds:   ciprofloxacin  500 mg Oral 2 times per day    collagenase   Topical BID    vancomycin  1,500 mg Intravenous Q18H    aspirin  81 mg Oral Daily    mometasone-formoterol  2 puff Inhalation BID    buPROPion  300 mg Oral Daily    canagliflozin  300 mg Oral Daily    ezetimibe  10 mg Oral Daily    fluticasone  1 spray Each Nostril Daily    insulin glargine  74 Units Subcutaneous Nightly    ocuvite-lutein  1 tablet Oral Daily    pantoprazole  40 mg Oral QAM AC    sertraline  100 mg Oral BID    simvastatin  40 mg Oral Daily    b complex-C-E-zinc  1 tablet Oral Daily  Dulaglutide  1.5 mg Subcutaneous Weekly    vitamin C  1,000 mg Oral Daily    vitamin E  400 Units Oral Daily    sodium chloride flush  10 mL Intravenous 2 times per day    enoxaparin  40 mg Subcutaneous Daily    cefepime  2 g Intravenous Q8H    insulin lispro  0-12 Units Subcutaneous TID WC    insulin lispro  0-6 Units Subcutaneous Nightly     ContinuousInfusions:   dextrose      sodium chloride Stopped (09/27/19 0715)     PRN Meds:glucose, dextrose, glucagon (rDNA), dextrose, acetaminophen, albuterol sulfate HFA, nitroGLYCERIN, sodium chloride flush, traMADol **OR** traMADol, ondansetron      Labs/Imaging Results:   Lab Results   Component Value Date    WBC 24.9 (H) 09/27/2019    HGB 10.8 (L) 09/27/2019    HCT 34.6 (L) 09/27/2019    MCV 95.3 09/27/2019     09/27/2019     Lab Results   Component Value Date     (L) 09/27/2019    K 3.5 09/27/2019     09/27/2019    CO2 21 09/27/2019    BUN 17 09/27/2019    CREATININE 1.0 09/27/2019    GLUCOSE 123 (H) 09/27/2019    CALCIUM 7.4 (L) 09/27/2019    PROT 6.1 (L) 09/20/2019    LABALBU 2.0 (L) 09/27/2019    BILITOT 1.1 (H) 09/20/2019    ALKPHOS 143 (H) 09/20/2019     (H) 09/20/2019    ALT 98 (H) 09/20/2019    LABGLOM >60 09/27/2019    GFRAA >60 09/27/2019       Assessment:     78 y/o M POD 4 s/p excisional debridement down to bone of infected pressure ulcer    Plan:       -Leukocytosis Concerned there is involvement of underlying bone (coccyx /sacrum). Apparently no ID available until next week at earliest. Ortho was c/s'd but do not see note from them. -D/w pt's nurse recommendation to facility where ID is available for concern for osteo.            Electronically signed by Sue Scott II, MD on 9/27/2019 at 3:22 PM

## 2019-09-27 NOTE — PROGRESS NOTES
Physical Therapy    Physical Therapy Treatment Note  Name: Gene Montelongo MRN: [id number] :   1950   Date:  2019   Admission Date: 2019 [facility]   Restrictions/Precautions:          Communication with other providers:  Per nurse ok to tx and just outside room during tx sesssion  Subjective:  Patient states:  Agreeable to tx  Pain:   Location, Type, Intensity (0/10 to 10/10):  Pt did c/o pain during tx but did not rate and declined need for pain meds during tx session. Objective:    Observation:  Alert and oriented. Pt has rectal tube and folely  Treatment, including education/measures:  Bed placed in chair position x 10 minutes and pt was given control and was able with cues to move bed in/out of this position and educated on importance of changing bed position for pressure relief and might be good to eat meals with bed in this position. Trunk stretches with shoulder flex and cues for deep breathing 4 reps x 2.  10 reps aps  Bed returned to sup with HOB up and pt was able to transfer sup to sit with max assist and cues to move legs to EOB and was able to use UE and rails to lift trunk. Pt was able to sit EOB x 15 minutes. Pt attempted to stand at walker with bed elevated but unable. Sit to sup with pt using bed rails and trapez and needing max/dependent assist to lift legs back into bed. Once back in bed pt was given leg  and written copy of ex . Pt needing increased time and effort but was able to move legs to center of bed and max/dependent to scoot bottom to center of bed. Ex with blanket roll wedged between feet and foot board and pt pushing feet in to roll working on leg strength. Pt was able to do 5 reps x 2 and then 10 reps x 1 with mod encouragement. With leg  pt was able to do heel slides 2-3 reps x 4 with encouragement. Safety  Patient left safely in the bed, with call light/phone in reach with alarm applied.  Gait belt was used for transfers and gait.  Assessment / Impression:       Patient's tolerance of treatment:  good  Adverse Reaction: pt fatigues quickly  Significant change in status and impact:  na  Barriers to improvement:  Pt large size and very weak  Plan for Next Session:    Cont. POC  Time in:  1100  Time out:  1200  Timed treatment minutes:  60  Total treatment time:  61    Previously filed items:  Social/Functional History  Additional Comments: Has been in SNF but had not progressed to gait or transfers. Has been mostly bed level since last hopistalizatoin. Short term goals  Time Frame for Short term goals: 1 week  Short term goal 1: Patient will perform supine to sit with mod A x2. Short term goal 2: Patient will perform STS with max A x2 and RW.   Short term goal 3: Patient will sit EOB x15 min mod I.       Electronically signed by:    Rolanda Dubon PTA  9/27/2019, 1:12 PM

## 2019-09-27 NOTE — FLOWSHEET NOTE
Rectal wilkerson leaking around tube- re-inflated with [address] air and repositioned-will monitor

## 2019-09-27 NOTE — PROGRESS NOTES
Physical Therapy  Per nurse ok to tx but pt declined at this time stating \" why does therapy always come right after I have eaten? I'm to full to do therapy. \" will try back as schedule allows.

## 2019-09-27 NOTE — PROGRESS NOTES
Nephrology Progress Note  9/27/2019 5:21 PM        Subjective:   Admit Date: 9/20/2019  PCP: Gina Philip MD     This is a late entry. PT seen in  Early  am     Interval History: doing better    Diet: some    ROS:  No sob    Data:     Current meds:    ciprofloxacin  500 mg Oral 2 times per day    collagenase   Topical BID    vancomycin  1,500 mg Intravenous Q18H    aspirin  81 mg Oral Daily    mometasone-formoterol  2 puff Inhalation BID    buPROPion  300 mg Oral Daily    canagliflozin  300 mg Oral Daily    ezetimibe  10 mg Oral Daily    fluticasone  1 spray Each Nostril Daily    insulin glargine  74 Units Subcutaneous Nightly    ocuvite-lutein  1 tablet Oral Daily    pantoprazole  40 mg Oral QAM AC    sertraline  100 mg Oral BID    simvastatin  40 mg Oral Daily    b complex-C-E-zinc  1 tablet Oral Daily    Dulaglutide  1.5 mg Subcutaneous Weekly    vitamin C  1,000 mg Oral Daily    vitamin E  400 Units Oral Daily    sodium chloride flush  10 mL Intravenous 2 times per day    enoxaparin  40 mg Subcutaneous Daily    cefepime  2 g Intravenous Q8H    insulin lispro  0-12 Units Subcutaneous TID WC    insulin lispro  0-6 Units Subcutaneous Nightly      dextrose      sodium chloride Stopped (09/27/19 0715)         I/O last 3 completed shifts: In: 2280 [P.O.:720;  I.V.:1560]  Out: 4275 [Urine:3400; Stool:875]    CBC:   Recent Labs     09/25/19  0344 09/26/19  0511 09/27/19  0323   WBC 20.1* 23.8* 24.9*   HGB 10.7* 11.4* 10.8*    398 372          Recent Labs     09/25/19  0344 09/26/19  0511 09/27/19  0323    138 133*   K 4.1 4.8 3.5    103 101   CO2 24 25 21   BUN 27* 21 17   CREATININE 1.1 1.1 1.0   GLUCOSE 87 62* 123*       Lab Results   Component Value Date    CALCIUM 7.4 (L) 09/27/2019    PHOS 1.9 (L) 09/27/2019       Objective:     Vitals: /66   Pulse 89   Temp 97.4 °F (36.3 °C) (Oral)   Resp 26   Ht 6' 3\" (1.905 m)   Wt (!) 375 lb (170.1 kg)   SpO2 93%   BMI 46.87 kg/m²     General appearance:  No ac distress  HEENT:  No striking pallor  Neck:  supple  Lungs:  No gross crackles  Heart:  Seems irregular  Abdomen: soft  Extremities:  ++ edema  mainly below knee b/l       Problem List :         Impression :     1. VIRGILIO/ CKD stage 3- better   2. LE edema - d/c IVF  3. Underlying multiple d z- Dm etc   4. recent hip sx and infection -     Recommendation/Plan  :     1. D.C IVF  2. Po food/ fluid' adjust abx as more data becomes available   3.  Good BS control  4. 'follow clinically       Jhoan Ponce MD

## 2019-09-27 NOTE — PROGRESS NOTES
Family Medicine Progress Note  9/27/2019 6:33 AM  Subjective:   Admit Date: 9/20/2019  PCP: Patti Sneed MD  Diet: Dietary Nutrition Supplements: Wound Healing Oral Supplement  DIET CARB CONTROL;  Pain is:None  Nausea:None  Bowel Movement/Flatus yes    Interval History: admitted for decubitus ulcer , gluteal area, large, s/p debridement, currently on antibiotics and wound care, doing well. Also has UTI with proteus - started cipro, complete course of atb. Had diarrhea, but not proper stool sample for c diff. If ok with surgeon, will need to get off other antibiotics prior to transfer to [facility]  Restful night. Rest of ROS -ve    Data:   Scheduled Meds:   ciprofloxacin  500 mg Oral 2 times per day    collagenase   Topical BID    vancomycin  1,500 mg Intravenous Q18H    aspirin  81 mg Oral Daily    mometasone-formoterol  2 puff Inhalation BID    buPROPion  300 mg Oral Daily    canagliflozin  300 mg Oral Daily    ezetimibe  10 mg Oral Daily    fluticasone  1 spray Each Nostril Daily    insulin glargine  74 Units Subcutaneous Nightly    ocuvite-lutein  1 tablet Oral Daily    pantoprazole  40 mg Oral QAM AC    sertraline  100 mg Oral BID    simvastatin  40 mg Oral Daily    b complex-C-E-zinc  1 tablet Oral Daily    Dulaglutide  1.5 mg Subcutaneous Weekly    vitamin C  1,000 mg Oral Daily    vitamin E  400 Units Oral Daily    sodium chloride flush  10 mL Intravenous 2 times per day    enoxaparin  40 mg Subcutaneous Daily    cefepime  2 g Intravenous Q8H    insulin lispro  0-12 Units Subcutaneous TID WC    insulin lispro  0-6 Units Subcutaneous Nightly    influenza virus vaccine  0.5 mL Intramuscular Once     Continuous Infusions:   dextrose      sodium chloride 125 mL/hr at 09/26/19 2042     PRN Meds:glucose, dextrose, glucagon (rDNA), dextrose, acetaminophen, albuterol sulfate HFA, nitroGLYCERIN, sodium chloride flush, traMADol **OR** traMADol, ondansetron  I/O last 3 completed shifts:   In: 3352 [P.O.:1080; I.V.:2376]  Out: 2500 [Urine:2125; Stool:375]  I/O this shift:  In: -   Out: 3650 [Urine:2850; Stool:800]    Intake/Output Summary (Last 24 hours) at 9/27/2019 0633  Last data filed at 9/27/2019 2550  Gross per 24 hour   Intake 2630 ml   Output 5250 ml   Net -2620 ml     CBC:   Recent Labs     09/25/19  0344 09/26/19  0511 09/27/19  0323   WBC 20.1* 23.8* 24.9*   HGB 10.7* 11.4* 10.8*    398 372     BMP:    Recent Labs     09/25/19  0344 09/26/19  0511 09/27/19  0323    138 133*   K 4.1 4.8 3.5    103 101   CO2 24 25 21   BUN 27* 21 17   CREATININE 1.1 1.1 1.0   GLUCOSE 87 62* 123*         Objective:   Vitals: /61   Pulse 97   Temp 98.5 °F (36.9 °C) (Oral)   Resp 18   Ht 6' 3\" (1.905 m)   Wt (!) 375 lb (170.1 kg)   SpO2 95%   BMI 46.87 kg/m²   General appearance: alert and cooperative with exam, morbidly OBESE  HEENT: Head: Normal, normocephalic, atraumatic. Eye: Normal external eye, conjunctiva, lids cornea, SAÚL. Nose: Normal external nose, mucus membranes and septum. Neck: no adenopathy,  supple, symmetrical, trachea midline and thyroid not enlarged, symmetric, no tenderness/mass/nodules  Lungs: clear to auscultation bilaterally  Heart:  regular rate and rhythm  Abdomen: soft, non-tender; bowel sounds normal; no masses,  no organomegaly  Extremities: S/p right Hip Surgery, STAPLES in place  Neurologic: Mental status: Alert, oriented, thought content appropriate  DERM: Large Decubitus ulcer in gluteal area, s/p debridement    Assessment and Plan:   Principal Problem:   Sepsis ([address] - Secondary to Proteus Mirabilis, source Urinary Tract, urine and blood c/s +ve for Proteus Mirabilis  ARF on CKD, stable  UTI - proteus, on antibioitcs  Decubitus Ulcer- s/p debridement  Discharge Planning - to SNF, working on bed availability  D/w Nursing staff,  antibiotics to be changed to PO by surgeon prior to discharge,since she has declined to give input, will consult ID.  Patient will be on cipro for urosepsis on discharge. If ID input is not obtained, will consider discharge on Bactrim , since the old wound c/s showing staph aureus and urine and Blood c/s showing proteus are both sensitive to bactrim. Staff to reach out to orthopedic to remove stable from hip surgery. S/p hip fracture: d/w nurse, reach out to [facility] again, see if the staples can be removed.     Electronically signed by Milton Deleon MD on 9/27/2019 at 6:33 AM

## 2019-09-27 NOTE — PROGRESS NOTES
[facility]  consulted by Dr. Maria Luz Osorio for monitoring and adjustment. Indication for treatment: S/p R hemiarthroplasty 8/29/19, gluteal abscess  Goal trough: 10-15 mcg/mL     Pertinent Laboratory Values:   Temp Readings from Last 3 Encounters:   09/27/19 97.4 °F (36.3 °C) (Oral)   09/02/19 97.9 °F (36.6 °C) (Oral)   08/29/19 97.2 °F (36.2 °C)     Recent Labs     09/25/19  0344 09/26/19  0511 09/27/19  0323   WBC 20.1* 23.8* 24.9*     Recent Labs     09/25/19  0344 09/26/19  0511 09/27/19  0323   BUN 27* 21 17   CREATININE 1.1 1.1 1.0     Estimated Creatinine Clearance: 119 mL/min (based on SCr of 1 mg/dL). Intake/Output Summary (Last 24 hours) at 9/27/2019 1346  Last data filed at 9/27/2019 0940  Gross per 24 hour   Intake 2520 ml   Output 4275 ml   Net -1755 ml       Pertinent Cultures:  Date    Source    Results  9/21   Blood    Proteus   9/21   Urine    Proteus            Vancomycin level:   TROUGH:    Recent Labs     09/26/19  0511   VANCOTROUGH 17.6     RANDOM:    No results for input(s): VANCORANDOM in the last 72 hours. Assessment:  · WBC and temperature: Elevated  · SCr, BUN, and urine output: stable  · Day(s) of therapy: 8  · Vancomycin level: therapeutic     Plan:  · Mr. Valerio Hernandez continues on vancomycin 1500 mg q18h IVPB   · Repeat trough in 3 days   · Pharmacy will continue to monitor patient and adjust therapy as indicated    Thank you for the consult.   Key Cruz RPh  9/27/2019 1:46 PM

## 2019-09-27 NOTE — PLAN OF CARE
Problem: Falls - Risk of:  Goal: Will remain free from falls  Description  Will remain free from falls  9/27/2019 0833 by Sienna Groves RN  Outcome: Ongoing  9/26/2019 2113 by Maninder Washington RN  Outcome: Ongoing  Goal: Absence of physical injury  Description  Absence of physical injury  9/27/2019 0833 by Sienna Groves RN  Outcome: Ongoing  9/26/2019 2113 by Maninder Washington RN  Outcome: Ongoing     Problem: Risk for Impaired Skin Integrity  Goal: Tissue integrity - skin and mucous membranes  Description  Structural intactness and normal physiological function of skin and  mucous membranes.   9/27/2019 9985 by Sienna Groves RN  Outcome: Ongoing  9/26/2019 2113 by Maninder Washington RN  Outcome: Ongoing     Problem: Discharge Planning:  Goal: Discharged to appropriate level of care  Description  Discharged to appropriate level of care  9/27/2019 0833 by Sienna Groves RN  Outcome: Ongoing  9/26/2019 2113 by Maninder Washington RN  Outcome: Ongoing     Problem: Gas Exchange - Impaired:  Goal: Levels of oxygenation will improve  Description  Levels of oxygenation will improve  9/27/2019 0833 by Sienna Groves RN  Outcome: Ongoing  9/26/2019 2113 by Maninder Washington RN  Outcome: Ongoing     Problem: Infection, Septic Shock:  Goal: Will show no infection signs and symptoms  Description  Will show no infection signs and symptoms  9/27/2019 0833 by Sienna Groves RN  Outcome: Ongoing  9/26/2019 2113 by Maninder Washington RN  Outcome: Ongoing     Problem: Serum Glucose Level - Abnormal:  Goal: Ability to maintain appropriate glucose levels will improve  Description  Ability to maintain appropriate glucose levels will improve  9/27/2019 0833 by Sienna Groves RN  Outcome: Ongoing  9/26/2019 2113 by Maninder Washington RN  Outcome: Ongoing     Problem: Tissue Perfusion, Altered:  Goal: Circulatory function within specified parameters  Description  Circulatory function within specified parameters  9/27/2019 1608 by Antwno Perez RN  Outcome: Ongoing  9/26/2019 2113 by Alfie David RN  Outcome: Ongoing     Problem: Venous Thromboembolism:  Goal: Will show no signs or symptoms of venous thromboembolism  Description  Will show no signs or symptoms of venous thromboembolism  9/27/2019 0833 by Antwon Perez RN  Outcome: Ongoing  9/26/2019 2113 by Alfie David RN  Outcome: Ongoing  Goal: Absence of signs or symptoms of impaired coagulation  Description  Absence of signs or symptoms of impaired coagulation  9/27/2019 0833 by Antwon Perez RN  Outcome: Ongoing  9/26/2019 2113 by Alfie David RN  Outcome: Ongoing     Problem: Pain:  Goal: Pain level will decrease  Description  Pain level will decrease  9/27/2019 0833 by Antwon Perez RN  Outcome: Ongoing  9/26/2019 2113 by Alfie David RN  Outcome: Ongoing  Goal: Control of acute pain  Description  Control of acute pain  9/27/2019 0833 by Antwon Perez RN  Outcome: Ongoing  9/26/2019 2113 by Alfie David RN  Outcome: Ongoing  Goal: Control of chronic pain  Description  Control of chronic pain  9/27/2019 0833 by Antwon Perez RN  Outcome: Ongoing  9/26/2019 2113 by Alfie David RN  Outcome: Ongoing     Problem: Nutrition  Goal: Optimal nutrition therapy  9/27/2019 0833 by Antwon Perez RN  Outcome: Ongoing  9/26/2019 2113 by Alfie David RN  Outcome: Ongoing

## 2019-09-27 NOTE — PROGRESS NOTES
Patient transferred to , room 4024 by bed with paperwork and personal belongings.  Update given to MARLENE Mast RN

## 2019-09-27 NOTE — PROGRESS NOTES
Called nijames, Desiree Summers, at [phone], no answer. Called nephewAle, at [phone], updated on room assignment.   Susie Tracy RN

## 2019-09-28 NOTE — PROGRESS NOTES
General Surgery-Dr. Julisa Toro Day: 9    Chief Complaint on Admission: infected decub ulcer      Subjective:     Pain mostly controlled. Is having dressings changed. Denies F/C. Tolerating PO. C/o confusion intermittently    ROS:  Review of Systems  Reviewed. Negative except as above. Allergies  Bee venom          Diagnosis Date    Arthritis     Asthma     Chronic kidney disease     stage 3, seen by Dr. Juan Francisco Rios Diabetes mellitus ([facility]     H/O cardiac catheterization 2017    H/O cardiovascular stress test ,     CARDIOLITE: EF->51%    Hyperlipidemia     Hypertension     Obesity     Pressure ulcer of coccygeal region, unstageable ([facility] 09/10/2019    Thyroid disease        Objective:     Vitals:    19 0915   BP: (!) 154/70   Pulse: 101   Resp: 28   Temp: 98.8 °F (37.1 °C)   SpO2: 94%       TEMPERATURE:  Current -Temp: 98.8 °F (37.1 °C); Max - Temp  Av.5 °F (36.9 °C)  Min: 97.9 °F (36.6 °C)  Max: 99 °F (37.2 °C)    I/O this shift:  In: 240 [P.O.:240]  Out: 1250 [Urine:1250]I/O last 3 completed shifts: In: 930 [P.O.:360; I.V.:20; IV Piggyback:550]  Out: 1300 [Urine:1300]      Physical Exam:  Physical Exam   Laying in bed supine. NAD at rest.  AT. NC. Breathing unlabored. RRR. Obese, soft, NT, ND, no PS.  +Lujan. Dressing in place. Appropriately ttp. No active drainage. +rectal tube. FRANZ. Warm, dry.        Scheduled Meds:   insulin glargine  75 Units Subcutaneous Nightly    insulin lispro  0-6 Units Subcutaneous 2 times per day    vancomycin  1,500 mg Intravenous Q18H    ciprofloxacin  500 mg Oral 2 times per day    collagenase   Topical BID    aspirin  81 mg Oral Daily    mometasone-formoterol  2 puff Inhalation BID    buPROPion  300 mg Oral Daily    canagliflozin  300 mg Oral Daily    ezetimibe  10 mg Oral Daily    fluticasone  1 spray Each Nostril Daily    ocuvite-lutein  1 tablet Oral Daily    pantoprazole  40 mg Oral QAM AC    sertraline 100 mg Oral BID    simvastatin  40 mg Oral Daily    b complex-C-E-zinc  1 tablet Oral Daily    Dulaglutide  1.5 mg Subcutaneous Weekly    vitamin C  1,000 mg Oral Daily    vitamin E  400 Units Oral Daily    sodium chloride flush  10 mL Intravenous 2 times per day    enoxaparin  40 mg Subcutaneous Daily    cefepime  2 g Intravenous Q8H    insulin lispro  0-6 Units Subcutaneous Nightly     ContinuousInfusions:   dextrose       PRN Meds:glucose, dextrose, glucagon (rDNA), dextrose, acetaminophen, albuterol sulfate HFA, nitroGLYCERIN, sodium chloride flush, traMADol **OR** traMADol, ondansetron      Labs/Imaging Results:   Lab Results   Component Value Date    WBC 20.4 (H) 09/28/2019    HGB 11.0 (L) 09/28/2019    HCT 35.4 (L) 09/28/2019    MCV 96.5 09/28/2019     09/28/2019     Lab Results   Component Value Date     (L) 09/28/2019    K 3.6 09/28/2019     09/28/2019    CO2 22 09/28/2019    BUN 16 09/28/2019    CREATININE 1.0 09/28/2019    GLUCOSE 167 (H) 09/28/2019    CALCIUM 7.7 (L) 09/28/2019    PROT 6.1 (L) 09/20/2019    LABALBU 2.2 (L) 09/28/2019    BILITOT 1.1 (H) 09/20/2019    ALKPHOS 143 (H) 09/20/2019     (H) 09/20/2019    ALT 98 (H) 09/20/2019    LABGLOM >60 09/28/2019    GFRAA >60 09/28/2019       Assessment:     78 y/o M POD 5 s/p excisional debridement down to bone of infected pressure ulcer    Plan:       -Leukocytosis Concerned there is involvement of underlying bone (coccyx /sacrum). Apparently no ID available until next week at earliest. Ortho was c/s'd, noted drainage from previous hip incision today. Planning on evaluation in OR tomorrow to see if there is communication between the sacral wound and the hip. Will be available to evaluate in the OR tomorrow    -May still require transfer to facility where ID is available for concern for osteo.        Electronically signed by Meghann Marcial MD on 9/28/2019 at 1:56 PM

## 2019-09-28 NOTE — PROGRESS NOTES
Nephrology Progress Note  9/28/2019 6:58 PM        Subjective:   Admit Date: 9/20/2019  PCP: Sara Young MD     This is a late entry. Pt seen earlier today      Interval History: no major event     Diet: better    ROS:  Some MICHAEL    Data:     Current med's:    insulin glargine  75 Units Subcutaneous Nightly    insulin lispro  0-6 Units Subcutaneous 2 times per day    vancomycin  1,500 mg Intravenous Q18H    ciprofloxacin  500 mg Oral 2 times per day    collagenase   Topical BID    aspirin  81 mg Oral Daily    mometasone-formoterol  2 puff Inhalation BID    buPROPion  300 mg Oral Daily    canagliflozin  300 mg Oral Daily    ezetimibe  10 mg Oral Daily    fluticasone  1 spray Each Nostril Daily    ocuvite-lutein  1 tablet Oral Daily    pantoprazole  40 mg Oral QAM AC    sertraline  100 mg Oral BID    simvastatin  40 mg Oral Daily    b complex-C-E-zinc  1 tablet Oral Daily    Dulaglutide  1.5 mg Subcutaneous Weekly    vitamin C  1,000 mg Oral Daily    vitamin E  400 Units Oral Daily    sodium chloride flush  10 mL Intravenous 2 times per day    enoxaparin  40 mg Subcutaneous Daily    cefepime  2 g Intravenous Q8H    insulin lispro  0-6 Units Subcutaneous Nightly      dextrose           I/O last 3 completed shifts: In: 12 [P.O.:240;  I.V.:10; IV Piggyback:550]  Out: 2550 [Urine:2550]    CBC:   Recent Labs     09/26/19  0511 09/27/19  0323 09/28/19  0605   WBC 23.8* 24.9* 20.4*   HGB 11.4* 10.8* 11.0*    372 369          Recent Labs     09/26/19  0511 09/27/19  0323 09/28/19  0605    133* 134*   K 4.8 3.5 3.6    101 101   CO2 25 21 22   BUN 21 17 16   CREATININE 1.1 1.0 1.0   GLUCOSE 62* 123* 167*       Lab Results   Component Value Date    CALCIUM 7.7 (L) 09/28/2019    PHOS 2.1 (L) 09/28/2019       Objective:     Vitals: /61   Pulse 91   Temp 98.1 °F (36.7 °C) (Oral)   Resp 24   Ht 6' 3\" (1.905 m)   Wt (!) 375 lb (170.1 kg)   SpO2 94%   BMI 46.87 kg/m² General appearance:  No ac distress-   HEENT:   No conj pallor  Neck:  supple  Lungs:  No crackles  Heart:  myriam  Abdomen: soft  Extremities:  + edema       Problem List :         Impression :     1. VIRGILIO/ CKD stage 3 - recovering   2. Low willie likely from access total body water better   3. DM/ hip sx and infection     Recommendation/Plan  :     1. Off IVF  2. May need loop - he is with SGLT 2 inh so may have good diuresis with it   3. Will se how H does  4.  God BS control  5. 'follow clinically       Monica Philippe MD

## 2019-09-28 NOTE — PROGRESS NOTES
Family Medicine Progress Note  9/28/2019 8:46 AM  Subjective:   Admit Date: 9/20/2019  PCP: Michael Roland MD  Diet: Dietary Nutrition Supplements: Wound Healing Oral Supplement  DIET CARB CONTROL;  Pain is:None  Nausea:None  Bowel Movement/Flatus yes    Interval History: admitted for decubitus ulcer , gluteal area, large, s/p debridement, currently on antibiotics and wound care, doing well. Also has UTI with proteus - started cipro, complete course of atb. Had diarrhea, but not proper stool sample for c diff. If ok with surgeon, will need to get off other antibiotics prior to transfer to [facility]. There is a concern for osteomyelitis, will consult his ortho. Restful night. Rest of ROS -ve    Data:   Scheduled Meds:   vancomycin  1,500 mg Intravenous Q18H    ciprofloxacin  500 mg Oral 2 times per day    collagenase   Topical BID    aspirin  81 mg Oral Daily    mometasone-formoterol  2 puff Inhalation BID    buPROPion  300 mg Oral Daily    canagliflozin  300 mg Oral Daily    ezetimibe  10 mg Oral Daily    fluticasone  1 spray Each Nostril Daily    insulin glargine  74 Units Subcutaneous Nightly    ocuvite-lutein  1 tablet Oral Daily    pantoprazole  40 mg Oral QAM AC    sertraline  100 mg Oral BID    simvastatin  40 mg Oral Daily    b complex-C-E-zinc  1 tablet Oral Daily    Dulaglutide  1.5 mg Subcutaneous Weekly    vitamin C  1,000 mg Oral Daily    vitamin E  400 Units Oral Daily    sodium chloride flush  10 mL Intravenous 2 times per day    enoxaparin  40 mg Subcutaneous Daily    cefepime  2 g Intravenous Q8H    insulin lispro  0-12 Units Subcutaneous TID WC    insulin lispro  0-6 Units Subcutaneous Nightly     Continuous Infusions:   dextrose       PRN Meds:glucose, dextrose, glucagon (rDNA), dextrose, acetaminophen, albuterol sulfate HFA, nitroGLYCERIN, sodium chloride flush, traMADol **OR** traMADol, ondansetron  I/O last 3 completed shifts:   In: 930 [P.O.:360; I.V.:20; IV aureus and urine and Blood c/s showing proteus are both sensitive to bactrim. Staff to reach out to orthopedic to remove stable from hip surgery. Also he will need to be evaluated for osteomyelitis  S/p hip fracture: d/w nurse, reach out to [facility] again, see if the staples can be removed and evaluate for osteomyelitis.     Electronically signed by Reggie Milner MD on 9/28/2019 at 8:46 AM

## 2019-09-28 NOTE — PROGRESS NOTES
[address] [facility]  consulted by Dr. Thuan Wick for monitoring and adjustment. Indication for treatment: S/p R hemiarthroplasty 8/29/19, gluteal abscess  Goal trough: 10-15 mcg/mL     Pertinent Laboratory Values:   Temp Readings from Last 3 Encounters:   09/28/19 98.8 °F (37.1 °C) (Oral)   09/02/19 97.9 °F (36.6 °C) (Oral)   08/29/19 97.2 °F (36.2 °C)     Recent Labs     09/26/19  0511 09/27/19  0323 09/28/19  0605   WBC 23.8* 24.9* 20.4*     Recent Labs     09/26/19  0511 09/27/19  0323 09/28/19  0605   BUN 21 17 16   CREATININE 1.1 1.0 1.0     Estimated Creatinine Clearance: 119 mL/min (based on SCr of 1 mg/dL). Intake/Output Summary (Last 24 hours) at 9/28/2019 1635  Last data filed at 9/28/2019 0952  Gross per 24 hour   Intake 800 ml   Output 1250 ml   Net -450 ml       Pertinent Cultures:  Date    Source    Results  9/21   Blood    Proteus   9/21   Urine    Proteus            Vancomycin level:   TROUGH:    Recent Labs     09/26/19  0511   VANCOTROUGH 17.6     RANDOM:    No results for input(s): VANCORANDOM in the last 72 hours. Assessment:  · WBC and temperature: WBC slightly improved down to 20.4, pt afebrile  · SCr, BUN, and urine output: SCR normal , output good  · Day(s) of therapy: 9  · Vancomycin level: 9/26 - therapeutic @17.6     Plan:  · Mr. Abraham Marcial continues on vancomycin 1500 mg q18h IVPB   · Repeat trough on 9/30 @05:30   · Pharmacy will continue to monitor patient and adjust therapy as indicated    Thank you for the consult. Neysa Feil Joeseph Canavan  9/28/2019 4:35 PM

## 2019-09-28 NOTE — PROGRESS NOTES
Anika Kellogg (1950)    Daily Progress Note-  Mikayla Giron PA-C                   Today's Date:     9/28/2019          Subjective:      Pt seen for evaluation of RIGHT hip incision as staples are still in place. He is post-op for Hemiarthroplasty after fracture on 8/29/19. Pt is hospitalized currently for large sacral decubitus ulcer. Pt resting comfortably. Denies RIGHT hip pain. States staples have not yet been removed as ordered by Dr. Cindy Tariq. Pain rated pain is perceived as mild (1-3  pain scale)  Denies shortness of breath or chest pain. Objective:     Patient Vitals for the past 4 hrs:   BP Temp Temp src Pulse Resp SpO2   09/28/19 0915 (!) 154/70 98.8 °F (37.1 °C) Oral 101 28 94 %     I/O last 3 completed shifts: In: 80 [P.O.:360; I.V.:20; IV Piggyback:550]  Out: 1300 [Urine:1300]      Physical Exam:   Orientation:  alert and oriented to person, place and time    Right Lower Extremity    Incision: Staples in place over a healing incision. Slight erythema over the incision line due to staples. With pressure on the proximal end of the incision, a moderate amount of cloudy yellow fluid came from an opening there. Lower Extremity Motor :    Moving lower extremities without difficulty today. Able to dorsiflex and plantar flex foot/ankle. Lower Extremity Sensory:   Neurovascularly intact to gross sensation and touch in lower extremities. Pulses:    present 1+ bilaterally lower extremities.       LABS   CBC:   Recent Labs     09/26/19  0511 09/27/19  0323 09/28/19  0605   WBC 23.8* 24.9* 20.4*   HGB 11.4* 10.8* 11.0*    372 369     BMP:    Recent Labs     09/26/19  0511 09/27/19  0323 09/28/19  0605    133* 134*   K 4.8 3.5 3.6    101 101   CO2 25 21 22   BUN 21 17 16   CREATININE 1.1 1.0 1.0   GLUCOSE 62* 123* 167* Medications   Meds:    insulin glargine  75 Units Subcutaneous Nightly    insulin lispro  0-6 Units Subcutaneous 2 times per day    vancomycin  1,500 mg Intravenous Q18H    ciprofloxacin  500 mg Oral 2 times per day    collagenase   Topical BID    aspirin  81 mg Oral Daily    mometasone-formoterol  2 puff Inhalation BID    buPROPion  300 mg Oral Daily    canagliflozin  300 mg Oral Daily    ezetimibe  10 mg Oral Daily    fluticasone  1 spray Each Nostril Daily    ocuvite-lutein  1 tablet Oral Daily    pantoprazole  40 mg Oral QAM AC    sertraline  100 mg Oral BID    simvastatin  40 mg Oral Daily    b complex-C-E-zinc  1 tablet Oral Daily    Dulaglutide  1.5 mg Subcutaneous Weekly    vitamin C  1,000 mg Oral Daily    vitamin E  400 Units Oral Daily    sodium chloride flush  10 mL Intravenous 2 times per day    enoxaparin  40 mg Subcutaneous Daily    cefepime  2 g Intravenous Q8H    insulin lispro  0-6 Units Subcutaneous Nightly       Assessment and Plan     1. Right hip hemiarthroplasty (DOS:  8/29/19) with drainage from incision  2. Sacral decubitus ulcer (s/p debridement 9/23/19)    PLAN:  With the findings as above and drainage from the incision at 4 weeks post-op, recommend I&D tomorrow. Discussed this plan with Dr. Zohra Miranda who will be called at the time of surgery to evaluate the decubitus ulcer and potential communication with the incision. Pt is currently on Vancomycin, Cipro, and Maxipime. Continue. Risks and benefits were discussed with the patient and he agrees to proceed with surgery.        Radha Coep PA-C

## 2019-09-28 NOTE — FLOWSHEET NOTE
Physical Therapy Treatment Note  Name: Nilay Menchaca MRN: [id number] :   1950   Date:  2019   Admission Date: 2019 Room:  [facility]8808-   Restrictions/Precautions:          Communication with other providers:  Nurse states ok for treatment, ortho PA states wound debridement tomorrow, some concern regarding infection to implant  Subjective:  Patient states:  No wanting to to much right now, not comfortable. Finally agrees to leg exs  Pain:   Location, Type, Intensity (0/10 to 10/10):    Objective:    Observation:  Pt in bed, alert,     Treatment, including education/measures:  AAROM BLE plus pt assist with leg , isometrics. Max x 2 to reposition to head of bed for exs. pt assisted with pulling up with use of bed rails. Positioned pt sup with RLE out of IR/add. Pt request just to stay in this position for now. Educated on importance of not keeping knee flexed and RLE add and IR. Educated to Attune RTD as on hand out with leg  daily and put chair in seat position for meals and pressure relief. Assessment / Impression:       Patient's tolerance of treatment:  fair  Adverse Reaction: x  Significant change in status and impact:  x  Barriers to improvement:  Wound needing debridment  Plan for Next Session:    Consult if PT needs to see pt prior to resuming therapy  Time in:  1113  Time out:  1151  Timed treatment minutes:  38  Total treatment time:  3/38= 2 TA, 1 TE    Previously filed items:  Social/Functional History  Additional Comments: Has been in SNF but had not progressed to gait or transfers. Has been mostly bed level since last hopistalizatoin. Short term goals  Time Frame for Short term goals: 1 week  Short term goal 1: Patient will perform supine to sit with mod A x2. Short term goal 2: Patient will perform STS with max A x2 and RW.   Short term goal 3: Patient will sit EOB x15 min mod I.       Electronically signed by:    Thiago Sheets, [place] [id number]  2019, 11:28 AM

## 2019-09-28 NOTE — CONSULTS
Relation Age of Onset    High Blood Pressure Mother     Cancer Mother     Cancer Father     Stroke Maternal Grandfather     Diabetes Paternal Grandmother     Heart Disease Paternal Grandfather      REVIEW OF SYSTEMS:  Review of System Done as noted above     PHYSICAL EXAM:      Vitals:    BP (!) 154/70   Pulse 101   Temp 98.8 °F (37.1 °C) (Oral)   Resp 28   Ht 6' 3\" (1.905 m)   Wt (!) 375 lb (170.1 kg)   SpO2 94%   BMI 46.87 kg/m²     CONSTITUTIONAL:  awake, feels t, cooperative, appears stated age   EYES:  vision intact Fundoscopic Exam not performed   ENT:Normal  NECK:  Supple, No JVD. Thyroid Exam:Normal   LUNGS:  Has Vesicular Breath Sounds,   CARDIOVASCULAR:  Normal apical impulse, regular rate and rhythm, normal S1 and S2, no S3 or S4, and has no  murmur   ABDOMEN:  No scars, normal bowel sounds, soft, non-distended, non-tender, no masses palpated, no hepatolienomegaly  Musculoskeletal: Normal  Extremities: Normal, peripheral pulses normal, , has no edema decubitus ulcers in the gluteal region extensive  NEUROLOGIC:  Awake, alert, oriented to name, place and time. Cranial nerves II-XII are grossly intact. Motor is  intact. Sensory neuropathy.  ,  and gait is abnormal and mostly bed ridden    DATA:    CBC:   Recent Labs     09/26/19  0511 09/27/19  0323 09/28/19  0605   WBC 23.8* 24.9* 20.4*   HGB 11.4* 10.8* 11.0*    372 369    CMP:  Recent Labs     09/26/19  0511 09/27/19  0323 09/28/19  0605    133* 134*   K 4.8 3.5 3.6    101 101   CO2 25 21 22   BUN 21 17 16   CREATININE 1.1 1.0 1.0   CALCIUM 8.0* 7.4* 7.7*   LABALBU 2.3* 2.0* 2.2*     Lipids:   Lab Results   Component Value Date    CHOL 190 01/27/2014    HDL 54 01/27/2014    TRIG 157 01/27/2014     Glucose:   Recent Labs     09/27/19  2208 09/28/19  0230 09/28/19  0748   POCGLU 137* 175* 145*     Hemoglobin A1C:   Lab Results   Component Value Date    LABA1C 6.1 09/04/2019     Free T4: No results found for: T4FREE  Free T3: No results found for: FT3  TSH High Sensitivity:   Lab Results   Component Value Date    TSH 3.079 10/29/2013       Xr Chest Portable    Result Date: 9/23/2019  EXAMINATION: ONE XRAY VIEW OF THE CHEST 9/22/2019 5:55 am COMPARISON: 9/20/2019 HISTORY: ORDERING SYSTEM PROVIDED HISTORY: SOB TECHNOLOGIST PROVIDED HISTORY: Reason for exam:->SOB Reason for Exam: SOB Acuity: Unknown Type of Exam: Unknown Follow-up FINDINGS: Low lung volumes with vascular crowding. No overt pulmonary edema or pneumothorax. Stable left lower lobe atelectasis and small effusion. Unchanged cardiomediastinal silhouette.      Stable chest.       Scheduled Medicines   Medications:    insulin glargine  75 Units Subcutaneous Nightly    insulin lispro  0-6 Units Subcutaneous 2 times per day    vancomycin  1,500 mg Intravenous Q18H    ciprofloxacin  500 mg Oral 2 times per day    collagenase   Topical BID    aspirin  81 mg Oral Daily    mometasone-formoterol  2 puff Inhalation BID    buPROPion  300 mg Oral Daily    canagliflozin  300 mg Oral Daily    ezetimibe  10 mg Oral Daily    fluticasone  1 spray Each Nostril Daily    ocuvite-lutein  1 tablet Oral Daily    pantoprazole  40 mg Oral QAM AC    sertraline  100 mg Oral BID    simvastatin  40 mg Oral Daily    b complex-C-E-zinc  1 tablet Oral Daily    Dulaglutide  1.5 mg Subcutaneous Weekly    vitamin C  1,000 mg Oral Daily    vitamin E  400 Units Oral Daily    sodium chloride flush  10 mL Intravenous 2 times per day    enoxaparin  40 mg Subcutaneous Daily    cefepime  2 g Intravenous Q8H    insulin lispro  0-6 Units Subcutaneous Nightly      Infusions:    dextrose           IMPRESSION    Patient Active Problem List   Diagnosis    Hypertension    Hyperlipidemia    Asthma    Diabetes mellitus ([facility] [address]    H/O cardiovascular stress test    Obesity    Chronic kidney disease, stage III (moderate) (HCC)    Hypertensive kidney disease with CKD stage III (HCC)    Depression  SOBOE (shortness of breath on exertion)    Abnormal cardiovascular stress test    Fracture of epiphysis (separation) (upper) of neck of femur, closed (HCC)    Pressure injury of skin of coccygeal region    Sepsis ([facility] [address]         RECOMMENDATIONS:      1. Reviewed POC blood glucose . Labs and X ray results   2. Reviewed Home and Current Medicines   3. Will Start On meal/ Correction bolus Humalog/ Lantus Insulin regime / OHGD   4. Monitor Blood glucose frequently   5. Modify  the dose of Insulin/ OHGD  as needed        Will follow with you  Again thank you for sharing pt's care with me.      Truly yours,       Jayda Rayo MD

## 2019-09-28 NOTE — PROGRESS NOTES
Patient rested off and on during the night. Ultram given for c/o pain. Rectal wilkerson and wilkerson catheter remain intact. Patient repositioned side to side to relieve pressure on wounds. Heels elevated off bed. Denies c/o nausea.        Vitals:    09/28/19 0405   BP: 123/61   Pulse: 93   Resp: 27   Temp: 99 °F (37.2 °C)   SpO2: 94%

## 2019-09-29 NOTE — ANESTHESIA PRE PROCEDURE
Department of Anesthesiology  Preprocedure Note       Name:  Millie Cabrera   Age:  77 y.o.  :  1950                                          MRN:  [id number]         Date:  2019      Surgeon: Celso Cockayne):  MD Jennifer Angelo MD    Procedure: HIP INCISION AND DRAINAGE (Right )  DECUBITUS ULCER DEBRIDEMENT REPAIR (N/A Coccyx)    Medications prior to admission:   Prior to Admission medications    Medication Sig Start Date End Date Taking?  Authorizing Provider   enoxaparin (LOVENOX) 40 MG/0.4ML injection Inject 0.4 mLs into the skin daily 9/3/19 10/3/19  Ivania Wheatley MD   TRULICITY 1.5 VE/5.6ZX SOPN Inject into the skin once a week 19   Historical Provider, MD   nitroGLYCERIN (NITROSTAT) 0.4 MG SL tablet Place 1 tablet under the tongue every 5 minutes as needed for Chest pain 6/10/19   Brice Ivan APRN - CNP   ezetimibe (ZETIA) 10 MG tablet Take 10 mg by mouth daily    Historical Provider, MD   acetaminophen (TYLENOL) 500 MG tablet Take 500 mg by mouth every 6 hours as needed for Pain    Historical Provider, MD   SUPER B COMPLEX/C PO Take 1 capsule by mouth daily    Historical Provider, MD   Cinnamon 500 MG TABS Take 1,000 mg by mouth daily    Historical Provider, MD   vitamin E 400 UNIT capsule Take 400 Units by mouth daily    Historical Provider, MD   vitamin C (ASCORBIC ACID) 500 MG tablet Take 1,000 mg by mouth daily     Historical Provider, MD   insulin lispro (HUMALOG) 100 UNIT/ML injection vial Inject into the skin 3 times daily (before meals)    Historical Provider, MD   insulin glargine (LANTUS) 100 UNIT/ML injection vial Inject 74 Units into the skin nightly     Historical Provider, MD   benazepril (LOTENSIN) 20 MG tablet Take 1 tablet by mouth daily 9/15/16   Reji Pope MD   fluticasone (FLONASE) 50 MCG/ACT nasal spray as needed  3/22/16   Historical Provider, MD   Multiple Vitamins-Minerals (VISION VITAMINS PO) Take by mouth daily    Historical Provider, MD   omeprazole (PRILOSEC) 20 MG capsule Take 20 mg by mouth daily. Historical Provider, MD   [facility] Wort 300 MG CAPS Take 2 capsules by mouth. Historical Provider, MD   Canagliflozin (INVOKANA) 300 MG TABS Take  by mouth daily. Historical Provider, MD   budesonide-formoterol (SYMBICORT) 80-4.5 MCG/ACT AERO Inhale 2 puffs into the lungs as needed     Historical Provider, MD   Albuterol Sulfate (PROAIR HFA IN) Inhale into the lungs as needed     Historical Provider, MD   buPROPion (WELLBUTRIN XL) 300 MG XL tablet Take 300 mg by mouth daily. 2/19/13   Historical Provider, MD   sertraline (ZOLOFT) 100 MG tablet Take 100 mg by mouth 2 times daily. 2/10/13   Historical Provider, MD   simvastatin (ZOCOR) 40 MG tablet Take 40 mg by mouth daily     Historical Provider, MD   aspirin 81 MG EC tablet Take 81 mg by mouth daily.       Historical Provider, MD       Current medications:    Current Facility-Administered Medications   Medication Dose Route Frequency Provider Last Rate Last Dose    HYDROmorphone (DILAUDID) injection 0.5 mg  0.5 mg Intravenous Q6H PRN Isael Damon MD   0.5 mg at 09/29/19 0649    insulin glargine (LANTUS) injection vial 75 Units  75 Units Subcutaneous Nightly Vincent Parr MD   Stopped at 09/28/19 2142    insulin lispro (HUMALOG) injection vial 0-6 Units  0-6 Units Subcutaneous 2 times per day Vincent Parr MD   1 Units at 09/29/19 0157    vancomycin (VANCOCIN) 1,500 mg in dextrose 5 % 500 mL IVPB  1,500 mg Intravenous Q18H Rolando Hernadez II, MD   Stopped at 09/28/19 2023    ciprofloxacin (CIPRO) tablet 500 mg  500 mg Oral 2 times per day Isael Damon MD   500 mg at 09/28/19 2037    collagenase ointment   Topical BID Rolando Hernadez II, MD        glucose (GLUTOSE) 40 % oral gel 15 g  15 g Oral PRN Rockmaxim Boykin MD        dextrose 50 % IV solution  12.5 g Intravenous PRN Rock Boykin MD        glucagon (rDNA) injection 1 mg  1 mg Intramuscular PRN Rock Pascual Villavicencio MD        dextrose 5 % solution  100 mL/hr Intravenous PRN Jaky Middleton MD        acetaminophen (TYLENOL) tablet 500 mg  500 mg Oral Q6H PRN Aide Webb II, MD   500 mg at 09/29/19 0305    albuterol sulfate  (90 Base) MCG/ACT inhaler 2 puff  2 puff Inhalation Q4H PRN Aide Webb II, MD   2 puff at 09/23/19 1027    aspirin EC tablet 81 mg  81 mg Oral Daily Aide Webb II, MD   81 mg at 09/28/19 0930    mometasone-formoterol (DULERA) 200-5 MCG/ACT inhaler 2 puff  2 puff Inhalation BID Aide Webb II, MD   2 puff at 09/28/19 2130    buPROPion (WELLBUTRIN XL) extended release tablet 300 mg  300 mg Oral Daily Aide Webb II, MD   300 mg at 09/28/19 0931    canagliflozin (INVOKANA) tablet 300 mg  300 mg Oral Daily Aide Webb II, MD   Stopped at 09/23/19 1157    ezetimibe (ZETIA) tablet 10 mg  10 mg Oral Daily Aide Webb II, MD   Stopped at 09/23/19 1157    fluticasone (FLONASE) 50 MCG/ACT nasal spray 1 spray  1 spray Each Nostril Daily Aide Webb II, MD   1 spray at 09/28/19 1348    antioxidant multivitamin (OCUVITE) tablet  1 tablet Oral Daily Aide Webb II, MD   1 tablet at 09/28/19 0929    nitroGLYCERIN (NITROSTAT) SL tablet 0.4 mg  0.4 mg Sublingual Q5 Min PRN Aide Webb II, MD        pantoprazole (PROTONIX) tablet 40 mg  40 mg Oral QAM AC Aide Webb II, MD   40 mg at 09/28/19 0602    sertraline (ZOLOFT) tablet 100 mg  100 mg Oral BID Aide Webb II, MD   100 mg at 09/28/19 2038    simvastatin (ZOCOR) tablet 40 mg  40 mg Oral Daily Aide Webb II, MD   40 mg at 09/28/19 2037    b complex-C-E-zinc (STRESS FORMULA W/ ZINC) 1 tablet  1 tablet Oral Daily Aide Webb II, MD   1 tablet at 09/28/19 0930    Dulaglutide SOPN 1.5 mg  1.5 mg Subcutaneous Weekly Aide Webb II, MD        vitamin C (ASCORBIC ACID) tablet 1,000 mg  1,000 mg Oral Daily Aide Webb II, MD   1,000 mg at 09/28/19 0934    vitamin E capsule 400 Units  400 Units Oral Daily Jian Perez II, MD   400 Units at 09/28/19 0933    sodium chloride flush 0.9 % injection 10 mL  10 mL Intravenous 2 times per day Jian Perez II, MD   10 mL at 09/28/19 2037    sodium chloride flush 0.9 % injection 10 mL  10 mL Intravenous PRN Jian Perez II, MD        enoxaparin (LOVENOX) injection 40 mg  40 mg Subcutaneous Daily Jian Perez II, MD   40 mg at 09/28/19 0934    cefepime (MAXIPIME) 2 g in dextrose 5 % 50 mL IVPB  2 g Intravenous Q8H Jian Perez II, MD   Stopped at 09/29/19 0130    insulin lispro (HUMALOG) injection vial 0-6 Units  0-6 Units Subcutaneous Nightly Jian Perez II, MD   1 Units at 09/24/19 2119    traMADol (ULTRAM) tablet 50 mg  50 mg Oral Q6H PRN Jian Perez II, MD   50 mg at 09/27/19 6480    Or    traMADol (ULTRAM) tablet 100 mg  100 mg Oral Q6H PRN Jian Perez II, MD   100 mg at 09/28/19 2343    ondansetron (ZOFRAN) injection 4 mg  4 mg Intravenous Q6H PRN Jian Perez II, MD   4 mg at 09/20/19 2110       Allergies:     Allergies   Allergen Reactions    Bee Venom Shortness Of Breath       Problem List:    Patient Active Problem List   Diagnosis Code    Hypertension I10    Hyperlipidemia E78.5    Asthma J45.909    Diabetes mellitus ([facility] E11.9    H/O cardiovascular stress test Z92.89    Obesity E66.9    Chronic kidney disease, stage III (moderate) ([facility]) N18.3    Hypertensive kidney disease with CKD stage III ([facility]) I12.9, N18.3    Depression F32.9    SOBOE (shortness of breath on exertion) R06.02    Abnormal cardiovascular stress test R94.39    Fracture of epiphysis (separation) (upper) of neck of femur, closed ([facility]) S72.023A    Pressure injury of skin of coccygeal region L89.159    Sepsis ([facility]) A41.9       Past Medical History:        Diagnosis Date    Arthritis     Asthma     Chronic kidney disease     stage 3, seen by Dr. Chelsea Goldstein Diabetes mellitus ([facility]     H/O cardiac catheterization 05/04/2017    H/O cardiovascular stress test 6/07, 12/08    CARDIOLITE: EF->51%    Hyperlipidemia     Hypertension     Obesity     Pressure ulcer of coccygeal region, unstageable ([address] 09/10/2019    Thyroid disease        Past Surgical History:        Procedure Laterality Date    CARPAL TUNNEL RELEASE  2005    DIAGNOSTIC CARDIAC CATH LAB PROCEDURE  12/05    NO SIGNIFICANT CAD    EYE SURGERY      HEMIARTHROPLASTY HIP Right 8/29/2019    HIP HEMIARTHROPLASTY performed by Angela Lopez MD at [address] [place] [address]  09/23/2019    of stage 4 wound on coccyx, by Dr. Mitchel Dallas N/A 9/23/2019    EXCISIONAL DEBRIDEMENT OF SACRAL PRESSURE ULCER performed by Hamlet Amato MD at [facility]         Social History:    Social History     Tobacco Use    Smoking status: Never Smoker    Smokeless tobacco: Never Used   Substance Use Topics    Alcohol use: No                                Counseling given: Not Answered      Vital Signs (Current):   Vitals:    09/28/19 1815 09/28/19 1933 09/28/19 2041 09/29/19 0430   BP: 131/61  (!) 141/63 129/69   Pulse: 91 96 97 91   Resp: 24 21 23 22   Temp: 36.7 °C (98.1 °F)  36.4 °C (97.5 °F) 36.7 °C (98 °F)   TempSrc: Oral  Oral Oral   SpO2:   94% 93%   Weight:       Height:                                                  BP Readings from Last 3 Encounters:   09/29/19 129/69   09/23/19 112/67   09/02/19 99/67       NPO Status: Time of last liquid consumption: 0000                        Time of last solid consumption: 0000                        Date of last liquid consumption: 09/29/19                        Date of last solid food consumption: 09/29/19    BMI:   Wt Readings from Last 3 Encounters:   09/27/19 (!) 375 lb (170.1 kg)   08/28/19 (!) 403 lb 8 oz (183 kg)   06/10/19 (!) 375 lb 3.2 oz (170.2 kg)     Body mass index is 46.87 kg/m².     CBC:   Lab Results   Component Value Date    WBC 20.8 09/29/2019    RBC

## 2019-09-29 NOTE — OP NOTE
[address] [facility] Physicians    PATIENT: Rai Deleon 1950   MRN: [id number]    DATE: 9/29/2019    SURGEONS:   Carlene Lazcano MD  [address] Nikolai Lew MD    CASE ID: [id number]     PROCEDURE(S) PERFORMED:      Panel 1  HIP INCISION AND DRAINAGE: 94108 (CPT®)  Panel 2  DECUBITUS ULCER DEBRIDEMENT, REMOVAL OF THE COCCYX, WOUND VAC PLACEMENT: 28462 (CPT®)    PREOPERATIVE DIAGNOSIS:  infected right hip, sacral decubitus ulcer    POSTOPERATIVE DIAGNOSIS:   same    INDICATIONS: Rai Deleon is a 77 y.o. male presenting with an incisional infection of the right hip and history of a sacral decubitus status-post debridement with Dr. Talat Shepherd, with need for further debridement for which debridement and wound vac placement has been discussed. The risks, benefits, potential complications and possible alternatives of the procedure were discussed in detail, including complications of but not limited to infection, bleeding, anesthesia-related complications, death, injury to surrounding structures, pain, poor wound healing, or need for additional interventions. All questions were answered. The patient wished to proceed and consent was documented in the medical record. FINDINGS:   Sacral decubitus ulcer, osteomyelitis of the coccyx    ANESTHESIA:   General endotracheal anesthesia  Anesthesiologist: Say Sales MD  CRNA: LYNDA Sanchez CRNA    STAFF:   Scrub Person First: Rio Carrillo.  Shirley Damico  Physician Assistant: Jacquelyn Hernández PA-C    ESTIMATED BLOOD LOSS: Minimal    SPECIMEN(S):   ID Type Source Tests Collected by Time Destination   1 : right hip culture Joint/Joint Fluid Hip BODY FLUID CULTURE Julian Luna MD 9/29/2019 1037    2 : tissue culture sacrum Specimen Antonio Paris MD 9/29/2019 1049    A : coccyx Specimen Coccyx SURGICAL PATHOLOGY Julian Luna MD 9/29/2019 1057        DRAINS & IMPLANTS:  * No implants in log *     COMPLICATIONS: none    DISPOSITION: PACU - hemodynamically stable. CONDITION: stable     PROCEDURE IN DETAIL:  Prior to beginning the procedure, informed consent was obtained and consent was documented in the medical record. The patient was brought to the operating room and positioned in left lateral decubitus position on the operating table. Anesthesia was initiated and a time out was performed in which all were in agreement. Appropriate perioperative antibiotics were administered and the patient was prepped and draped in the usual sterile fashion. [For Dr. Jluis Ross portion of the procedure, please refer to his separately dictated operative note]    The sacral decubitus ulcer was inspected and appeared consistent with his history of previous debridement, with the wound bed containing a mixture of fibrinous material, necrotic muscle, and osteomyelitis of the sacrum and coccyx. Excisional debridement was performed including subcutaneous tissue, muscle/fascia and bone. Using curette, forceps and electrocautery, fibrin, slough and ischemic muscle was debrided to healthy appearing tissue. The area debrided was approximately 12 x 10 cm. The coccyx appeared to have osteomyelitis was was resected and sent for pathology. A tissue culture was also sent. A silver sponge was cut to size and placed subcutaneously in the wound. The wound was deep, requiring one full thickness piece of sponge and 2 pieces of bivalved sponge to fill the space. The drape film was used to cover, and an opening was made for the track pad, which was then applied. The vac was tested and had good seal and suction. The vac dimensions were approximately [id number]. The procedure was concluded. The patient tolerated the procedure well with no apparent complications and was transferred to PACU - hemodynamically stable. Counts were reported correct at the end of the case. I was present and primarily performed all critical portions of the case. Electronically signed:   Prateek Givens MD 9/29/2019 11:26 AM

## 2019-09-29 NOTE — PROGRESS NOTES
Family Medicine Progress Note  9/29/2019 7:22 AM  Subjective:   Admit Date: 9/20/2019  PCP: Damon Bernal MD  Diet: Dietary Nutrition Supplements: Wound Healing Oral Supplement  Diet NPO, After Midnight Exceptions are: Sips with Meds  Pain is:None  Nausea:None  Bowel Movement/Flatus yes    Interval History: admitted for decubitus ulcer , gluteal area, large, s/p debridement, currently on antibiotics and wound care, doing well. Also has UTI with proteus - started cipro, complete course of atb. There is a concern for osteomyelitis, ortho has been consulted. Restful night. Rest of ROS -ve    Data:   Scheduled Meds:   insulin glargine  75 Units Subcutaneous Nightly    insulin lispro  0-6 Units Subcutaneous 2 times per day    vancomycin  1,500 mg Intravenous Q18H    ciprofloxacin  500 mg Oral 2 times per day    collagenase   Topical BID    aspirin  81 mg Oral Daily    mometasone-formoterol  2 puff Inhalation BID    buPROPion  300 mg Oral Daily    canagliflozin  300 mg Oral Daily    ezetimibe  10 mg Oral Daily    fluticasone  1 spray Each Nostril Daily    ocuvite-lutein  1 tablet Oral Daily    pantoprazole  40 mg Oral QAM AC    sertraline  100 mg Oral BID    simvastatin  40 mg Oral Daily    b complex-C-E-zinc  1 tablet Oral Daily    Dulaglutide  1.5 mg Subcutaneous Weekly    vitamin C  1,000 mg Oral Daily    vitamin E  400 Units Oral Daily    sodium chloride flush  10 mL Intravenous 2 times per day    enoxaparin  40 mg Subcutaneous Daily    cefepime  2 g Intravenous Q8H    insulin lispro  0-6 Units Subcutaneous Nightly     Continuous Infusions:   dextrose       PRN Meds:HYDROmorphone, glucose, dextrose, glucagon (rDNA), dextrose, acetaminophen, albuterol sulfate HFA, nitroGLYCERIN, sodium chloride flush, traMADol **OR** traMADol, ondansetron  I/O last 3 completed shifts: In: 36 [P.O.:720; I.V.:10]  Out: 3450 [Urine:3150; Stool:300]  No intake/output data recorded.     Intake/Output Summary (Last 24 hours) at 9/29/2019 0722  Last data filed at 9/29/2019 0430  Gross per 24 hour   Intake 730 ml   Output 3450 ml   Net -2720 ml     CBC:   Recent Labs     09/27/19 0323 09/28/19 0605 09/29/19  0526   WBC 24.9* 20.4* 20.8*   HGB 10.8* 11.0* 11.1*    369 409     BMP:    Recent Labs     09/27/19 0323 09/28/19 0605 09/29/19  0526   * 134* 135   K 3.5 3.6 3.4*    101 101   CO2 21 22 24   BUN 17 16 14   CREATININE 1.0 1.0 1.0   GLUCOSE 123* 167* 146*         Objective:   Vitals: /69   Pulse 91   Temp 98 °F (36.7 °C) (Oral)   Resp 22   Ht 6' 3\" (1.905 m)   Wt (!) 375 lb (170.1 kg)   SpO2 93%   BMI 46.87 kg/m²   General appearance: alert and cooperative with exam, morbidly OBESE  HEENT: Head: Normal, normocephalic, atraumatic. Eye: Normal external eye, conjunctiva, lids cornea, SAÚL. Nose: Normal external nose, mucus membranes and septum. Neck: no adenopathy,  supple, symmetrical, trachea midline and thyroid not enlarged, symmetric, no tenderness/mass/nodules  Lungs: clear to auscultation bilaterally  Heart:  regular rate and rhythm  Abdomen: soft, non-tender; bowel sounds normal; no masses,  no organomegaly  Extremities: S/p right Hip Surgery, STAPLES in place  Neurologic: Mental status: Alert, oriented, thought content appropriate  DERM: Large Decubitus ulcer in gluteal area, s/p debridement    Assessment and Plan:   Principal Problem:   Sepsis ([address] - Secondary to Proteus Mirabilis, source Urinary Tract, urine and blood c/s +ve for Proteus Mirabilis  ARF on CKD, stable  UTI - proteus, on antibioitcs  Decubitus Ulcer- s/p debridement  Discharge Planning - to SNF, working on bed availability  D/w Nursing staff,   Patient will be on cipro for urosepsis on discharge. If ID input is not obtained, will consider discharge on Bactrim , since the old wound c/s showing staph aureus and urine and Blood c/s showing proteus are both sensitive to bactrim.   S/p hip fracture: evaluate for osteomyelitis. Ortho consulted.  Probable procedure planned today    Electronically signed by Leonel Jacob MD on 9/29/2019 at 7:22 AM

## 2019-09-29 NOTE — BRIEF OP NOTE
Brief Postoperative Note  ______________________________________________________________    Patient: Jose Granados  YOB: 1950  MRN: [id number]  Date of Procedure: 9/29/2019    Pre-Op Diagnosis: infected right hip, sacral decubitus ulcer    Post-Op Diagnosis: Same       Procedure(s):  HIP INCISION AND DRAINAGE  DECUBITUS ULCER DEBRIDEMENT REPAIR    Anesthesia: General    Surgeon(s):  Allison Lesch, MD Monia Killian, MD    Assistant: Vonna Holstein    Estimated Blood Loss (mL): less than 50     Complications: None    Specimens:   * No specimens in log *    Implants:  * No implants in log *      Drains:   Rectal Tube With balloon (Active)   Stool Appearance Watery 9/28/2019  6:28 PM   Stool Color Brown 9/28/2019  6:28 PM   Stool Amount Small 9/28/2019  6:28 PM   Rectal Tube Output 300 ml 9/28/2019  6:28 PM       Urethral Catheter Latex 16 fr (Active)   $ Urethral catheter insertion $ Not inserted for procedure 9/26/2019  7:30 AM   Catheter Indications Stage III or IV perineal and sacral wound OR full thickness perineal/lower extremity burns in incontinent patients 9/28/2019  8:44 PM   Securement Device Date Changed 09/26/19 9/26/2019  7:30 AM   Site Assessment No urethral drainage 9/28/2019  8:44 PM   Urine Color Orange 9/29/2019  4:30 AM   Urine Appearance Clear 9/29/2019  4:30 AM   Urine Odor Malodorous 9/29/2019  4:30 AM   Output (mL) 450 mL 9/29/2019  8:04 AM       [REMOVED] Urethral Catheter Non-latex 16 fr (Removed)   Catheter Indications Perioperative use in selected surgeries including but not limited to urologic, pelvic or need for intraoperative monitoring of urinary output due to prolonged surgery, large volume infusion or need for diuretic therapy in surgery 8/31/2019 12:05 AM   Site Assessment [place] 8/31/2019 12:05 AM   Urine Color Sheri 8/31/2019  6:24 AM   Urine Appearance Clear 8/31/2019  6:24 AM   Urine Odor Malodorous 8/31/2019  6:24 AM   Output (mL) 500 mL 8/31/2019  6:24 AM [REMOVED] Urethral Catheter (Removed)   $ Urethral catheter insertion $ Not inserted for procedure 9/1/2019  6:47 AM   Catheter Indications Acute urinary retention/obstruction 9/2/2019 11:00 AM   Site Assessment Pink 9/2/2019 11:00 AM   Urine Color Sheri 9/2/2019 11:00 AM   Urine Appearance Cloudy 9/2/2019 11:00 AM   Output (mL) 525 mL 9/2/2019  7:09 AM       Findings: see dictation    Raysa You MD  Date: 9/29/2019  Time: 10:36 AM

## 2019-09-29 NOTE — PROGRESS NOTES
[facility]  consulted by Dr. Yasmine Pruitt for monitoring and adjustment. Indication for treatment: S/p R hemiarthroplasty 8/29/19, gluteal abscess  Goal trough: 10-15 mcg/mL  Other Antimicrobials:  Cefepime     Pertinent Laboratory Values:   Temp Readings from Last 3 Encounters:   09/29/19 98.1 °F (36.7 °C) (Oral)   09/29/19 97.8 °F (36.6 °C)   09/02/19 97.9 °F (36.6 °C) (Oral)     Recent Labs     09/27/19  0323 09/28/19  0605 09/29/19  0526   WBC 24.9* 20.4* 20.8*     Recent Labs     09/27/19  0323 09/28/19  0605 09/29/19  0526   BUN 17 16 14   CREATININE 1.0 1.0 1.0     Estimated Creatinine Clearance: 119 mL/min (based on SCr of 1 mg/dL). Intake/Output Summary (Last 24 hours) at 9/29/2019 1608  Last data filed at 9/29/2019 1245  Gross per 24 hour   Intake 1490 ml   Output 3025 ml   Net -1535 ml       Pertinent Cultures:  Date    Source    Results  9/21   Blood    Proteus   9/21   Urine    Proteus            Vancomycin level:   TROUGH:    No results for input(s): VANCOTROUGH in the last 72 hours. RANDOM:    No results for input(s): VANCORANDOM in the last 72 hours. Assessment:  · WBC and temperature: WBC steady @20.8, pt afebrile  · SCr, BUN, and urine output: SCR normal , output good  · Day(s) of therapy: 10  · Vancomycin level: 9/26 - therapeutic @17.6     Plan:  · Mr. Rodriguez Line continues on vancomycin 1500 mg q18h IVPB   · Repeat trough on 9/30 @05:30   · Pharmacy will continue to monitor patient and adjust therapy as indicated    Thank you for the consult. Onofre Tomlinson  9/29/2019 4:08 PM

## 2019-09-29 NOTE — BRIEF OP NOTE
GENERAL SURGERY BRIEF OPERATIVE NOTE  [address] [facility]    PATIENT: Mark Smith 1950   MRN: [id number]    DATE: 9/29/2019    SURGEON: Alexys Longo MD    CASE ID: [id number]     PROCEDURE(S) PERFORMED:      Panel 1  HIP INCISION AND DRAINAGE: 19656 (CPT®)  Panel 2  DECUBITUS ULCER DEBRIDEMENT, REMOVAL OF THE COCCYX, WOUND VAC PLACEMENT: 05421 (CPT®)    PREOPERATIVE DIAGNOSIS:  infected right hip, sacral decubitus ulcer    POSTOPERATIVE DIAGNOSIS:   same    INDICATIONS: Mark Smith is a 77 y.o. male presenting with an incisional infection of the right hip and history of a sacral decubitus status-post debridement with Dr. Irina Washington, with need for further debridement for which debridement and wound vac placement has been discussed. The risks, benefits, potential complications and possible alternatives of the procedure were discussed in detail, including complications of but not limited to infection, bleeding, anesthesia-related complications, death, injury to surrounding structures, pain, poor wound healing, or need for additional interventions. All questions were answered. The patient wished to proceed and consent was documented in the medical record. FINDINGS:   Sacral decubitus ulcer, osteomyelitis of the coccyx    ANESTHESIA:   General endotracheal anesthesia  Anesthesiologist: Madina Swartz MD  CRNA: LYNDA Shrestha - CRNA    STAFF:   Scrub Person First: Romayne Mariscal.  Brant Aguilar  Physician Assistant: Jean Claude Blount PA-C    ESTIMATED BLOOD LOSS: Minimal    SPECIMEN(S):   ID Type Source Tests Collected by Time Destination   1 : right hip culture Joint/Joint Fluid Hip BODY FLUID CULTURE Tiffanie Horton MD 9/29/2019 1037    2 : tissue culture sacrum Specimen Katharine Burger MD 9/29/2019 1049    A : coccyx Specimen Coccyx SURGICAL PATHOLOGY Tiffanie Horton MD 9/29/2019 1057        DRAINS & IMPLANTS:  * No implants in log *     COMPLICATIONS: none    DISPOSITION: PACU - hemodynamically stable.      CONDITION: stable     Electronically signed:   Nette Yeager MD 9/29/2019 11:26 AM

## 2019-09-29 NOTE — PROGRESS NOTES
Notified Rajesh Keenan of patient's surgery. Questions answered. Radha Liz requested to be contacted by staff with an update once patient is out of surgery. He stated ok to leave a message at [phone].

## 2019-09-29 NOTE — PROGRESS NOTES
Progress Note( Dr. Zoie Castro)  9/29/2019  Subjective:   Admit Date: 9/20/2019  PCP: Damon Bernal MD    Admitted For : Extensive deep decubitus ulcer in the gluteal region    Consulted For: Better control of blood glucose    Interval History: Went for debridement this morning    Denies any chest pains,   Denies SOB . Denies nausea or vomiting. No new bowel or bladder symptoms. Intake/Output Summary (Last 24 hours) at 9/29/2019 1530  Last data filed at 9/29/2019 1245  Gross per 24 hour   Intake 1490 ml   Output 3025 ml   Net -1535 ml       DATA    CBC:   Recent Labs     09/27/19  0323 09/28/19  0605 09/29/19  0526   WBC 24.9* 20.4* 20.8*   HGB 10.8* 11.0* 11.1*    369 409    CMP:  Recent Labs     09/27/19  0323 09/28/19  0605 09/29/19  0526   * 134* 135   K 3.5 3.6 3.4*    101 101   CO2 21 22 24   BUN 17 16 14   CREATININE 1.0 1.0 1.0   CALCIUM 7.4* 7.7* 7.8*   LABALBU 2.0* 2.2* 2.3*     Lipids:   Lab Results   Component Value Date    CHOL 190 01/27/2014    HDL 54 01/27/2014    TRIG 157 01/27/2014     Glucose:  Recent Labs     09/29/19  0151 09/29/19  0747 09/29/19  1216   POCGLU 153* 132* 137*     [id number]:  Lab Results   Component Value Date    LABA1C 6.1 09/04/2019     High Sensitivity TSH:   Lab Results   Component Value Date    TSHHS 3.079 10/29/2013     Free T3: No results found for: FT3  Free T4:No results found for: T4FREE    No results found.     Scheduled Medicines   Medications:    insulin glargine  75 Units Subcutaneous Nightly    insulin lispro  0-6 Units Subcutaneous 2 times per day    vancomycin  1,500 mg Intravenous Q18H    ciprofloxacin  500 mg Oral 2 times per day    collagenase   Topical BID    aspirin  81 mg Oral Daily    mometasone-formoterol  2 puff Inhalation BID    buPROPion  300 mg Oral Daily    canagliflozin  300 mg Oral Daily    ezetimibe  10 mg Oral Daily    fluticasone  1 spray Each Nostril Daily    ocuvite-lutein  1 tablet Oral Daily    pantoprazole  40 mg Oral QAM AC    sertraline  100 mg Oral BID    simvastatin  40 mg Oral Daily    b complex-C-E-zinc  1 tablet Oral Daily    Dulaglutide  1.5 mg Subcutaneous Weekly    vitamin C  1,000 mg Oral Daily    vitamin E  400 Units Oral Daily    sodium chloride flush  10 mL Intravenous 2 times per day    enoxaparin  40 mg Subcutaneous Daily    cefepime  2 g Intravenous Q8H    insulin lispro  0-6 Units Subcutaneous Nightly      Infusions:    sodium chloride 1,000 mL (09/29/19 1228)    dextrose           Objective:   Vitals: /73   Pulse 94   Temp 98.1 °F (36.7 °C) (Oral)   Resp 18   Ht 6' 3\" (1.905 m)   Wt (!) 375 lb (170.1 kg)   SpO2 96%   BMI 46.87 kg/m²   General appearance: alert confused cooperative with exam  Neck: no JVD or bruit  Thyroid : Normal lobes   Lungs: Has Vesicular Breath sounds   Heart:  regular rate and rhythm  Abdomen: soft, non-tender; bowel sounds normal; no masses,  no organomegaly  Musculoskeletal: Normal  Extremities: extremities normal, , no edema decubitus ulcers extensive on gluteal region  Neurologic:  Awake, confused, oriented to name, place and time. Cranial nerves II-XII are grossly intact. Motor is  intact. Sensory neuropathy. ,  and gait i normal and mostly bed ridden    Assessment:     Patient Active Problem List:     Hypertension     Hyperlipidemia     Asthma     Diabetes mellitus ([address]     H/O cardiovascular stress test     Obesity     Chronic kidney disease, stage III (moderate) (HCC)     Hypertensive kidney disease with CKD stage III (HCC)     Depression     SOBOE (shortness of breath on exertion)     Abnormal cardiovascular stress test     Fracture of epiphysis (separation) (upper) of neck of femur, closed (HCC)     Pressure injury of skin of coccygeal region     Sepsis ([address]      Plan:     1. Reviewed POC blood glucose . Labs and X ray results   2. Reviewed Current Medicines   3.  On meal/ Correction bolus Humalog/ Basal Lantus Insulin regime /  4. Monitor Blood glucose frequently   5. Modified  the dose of Insulin/ other medicines as needed   6. Will follow     .      Bhavna Tong MD

## 2019-09-30 NOTE — PROGRESS NOTES
Progress Note( Dr. Richie Cornejo)  9/30/2019  Subjective:   Admit Date: 9/20/2019  PCP: Sheldon Morris MD    Admitted For : Extensive deep decubitus ulcer in the gluteal region    Consulted For: Better control of blood glucose    Interval History: Went for debridement     Denies any chest pains,   Denies SOB . Denies nausea or vomiting. No new bowel or bladder symptoms. Intake/Output Summary (Last 24 hours) at 9/30/2019 0630  Last data filed at 9/30/2019 0527  Gross per 24 hour   Intake 2539 ml   Output 2775 ml   Net -236 ml       DATA    CBC:   Recent Labs     09/28/19  0605 09/29/19  0526   WBC 20.4* 20.8*   HGB 11.0* 11.1*    409    CMP:  Recent Labs     09/28/19  0605 09/29/19  0526   * 135   K 3.6 3.4*    101   CO2 22 24   BUN 16 14   CREATININE 1.0 1.0   CALCIUM 7.7* 7.8*   LABALBU 2.2* 2.3*     Lipids:   Lab Results   Component Value Date    CHOL 190 01/27/2014    HDL 54 01/27/2014    TRIG 157 01/27/2014     Glucose:  Recent Labs     09/29/19  1706 09/29/19  2021 09/30/19  0209   POCGLU 185* 221* 163*     [id number]:  Lab Results   Component Value Date    LABA1C 6.1 09/04/2019     High Sensitivity TSH:   Lab Results   Component Value Date    TSHHS 3.079 10/29/2013     Free T3: No results found for: FT3  Free T4:No results found for: T4FREE    No results found.     Scheduled Medicines   Medications:    insulin glargine  75 Units Subcutaneous Nightly    insulin lispro  0-6 Units Subcutaneous 2 times per day    vancomycin  1,500 mg Intravenous Q18H    ciprofloxacin  500 mg Oral 2 times per day    collagenase   Topical BID    aspirin  81 mg Oral Daily    mometasone-formoterol  2 puff Inhalation BID    buPROPion  300 mg Oral Daily    canagliflozin  300 mg Oral Daily    ezetimibe  10 mg Oral Daily    fluticasone  1 spray Each Nostril Daily    ocuvite-lutein  1 tablet Oral Daily    pantoprazole  40 mg Oral QAM AC    sertraline  100 mg Oral BID    simvastatin  40 mg Oral Daily  b complex-C-E-zinc  1 tablet Oral Daily    Dulaglutide  1.5 mg Subcutaneous Weekly    vitamin C  1,000 mg Oral Daily    vitamin E  400 Units Oral Daily    sodium chloride flush  10 mL Intravenous 2 times per day    enoxaparin  40 mg Subcutaneous Daily    cefepime  2 g Intravenous Q8H    insulin lispro  0-6 Units Subcutaneous Nightly      Infusions:    sodium chloride 1,000 mL (09/29/19 1228)    dextrose           Objective:   Vitals: BP (!) 122/58   Pulse 86   Temp 98 °F (36.7 °C) (Oral)   Resp 18   Ht 6' 3\" (1.905 m)   Wt (!) 407 lb 8 oz (184.8 kg)   SpO2 97%   BMI 50.93 kg/m²   General appearance: alert confused cooperative with exam  Neck: no JVD or bruit  Thyroid : Normal lobes   Lungs: Has Vesicular Breath sounds   Heart:  regular rate and rhythm  Abdomen: soft, non-tender; bowel sounds normal; no masses,  no organomegaly  Musculoskeletal: Normal  Extremities: extremities normal, , no edema decubitus ulcers extensive on gluteal region  Neurologic:  Awake, confused, oriented to name, place and time. Cranial nerves II-XII are grossly intact. Motor is  intact. Sensory neuropathy. ,  and gait i normal and mostly bed ridden    Assessment:     Patient Active Problem List:     Hypertension     Hyperlipidemia     Asthma     Diabetes mellitus ([address]     H/O cardiovascular stress test     Obesity     Chronic kidney disease, stage III (moderate) (HCC)     Hypertensive kidney disease with CKD stage III (HCC)     Depression     SOBOE (shortness of breath on exertion)     Abnormal cardiovascular stress test     Fracture of epiphysis (separation) (upper) of neck of femur, closed (HCC)     Pressure injury of skin of coccygeal region     Sepsis ([address]      Plan:     1. Reviewed POC blood glucose . Labs and X ray results   2. Reviewed Current Medicines   3. On meal/ Correction bolus Humalog/ Basal Lantus Insulin regime /  4. Monitor Blood glucose frequently   5.  Modified  the dose of Insulin/ other medicines as needed   6. Will follow     .      Treasure Camacho MD

## 2019-09-30 NOTE — PROGRESS NOTES
Pt had a new onset PVC trigemini (14 run) at around 0143 and another PVC trigemini (12 run) at around 0615. Dr Andres Stands informed thru perfect serve. Dr Shiv Yanez ordered a cardiology consult at 6300.

## 2019-09-30 NOTE — PROGRESS NOTES
Both  Wound Vac draiinng-but mostly from the rt hip-which is bloody,wound vac working-suction at 125, has large pool of blood still at end of the hip  Incision, rectal tube intact, wilkerson output goof,  Pain relieved with the tramadol,

## 2019-09-30 NOTE — CONSULTS
Infectious Disease Consult Note  2019   Patient Name: Danay George : 1950   Impression   Proteus mirabilis bacteremia secondary to complicated UTI  o Difficult to see if he had asymptomatic bacteriuria which infected his decubitus ulcer. This is a likely scenario as his blood culture was also positive for anaerobic gram-negative bacilli. However as he had pyuria, a complicated UTI seems probable as well.  Probable infected sacral decubitus ulcer   Leukocytosis  o Recent history of right hemiarthroplasty on 8321 complicated by hematoma. Now status post excisional debridement of sacral pressure ulcer on 2019; status post hip incision and drainage, decubitus ulcer debridement, removal of the coccyx, wound VAC placement on 2019.  o Unable to evaluate the patient prior to surgery, however, surgical culture from sacral debridement is positive for enterococcus and culture of the right hip is positive for GPC. Therefore, infected decubitus ulcer, infected hemiarthroplasty are to be considered. o Persistent diarrhea, present prior to admission, use of ciprofloxacin, raises the question of C. difficile   Multi-morbidity: per PMHx morbid obesity, diabetes mellitus, bilateral renal cysts  Plan:   Discontinue ciprofloxacin   Continue vancomycin, cefepime. Add metronidazole   Repeat blood cultures   Check C. difficile   Follow-up culture reports and change antibiotics. .    Thank you for allowing me to consult in the care of this patient.  ------------------------  REASON FOR CONSULT: Infective syndrome   Requested by: Dr. Angelica Rubio is a 77 y.o.  male who has been in an SNF receiving physical therapy, after he underwent a right hip hemiarthroplasty for right femoral fracture following a fall. He was admitted 2019 for further evaluation and management of possible infection of a known decubitus ulcer.   Patient has been treated with doxycycline but showed no Obesity     Pressure ulcer of coccygeal region, unstageable ([address] 09/10/2019    Thyroid disease       Past Surgical History:   Procedure Laterality Date    CARPAL TUNNEL RELEASE  2005    DIAGNOSTIC CARDIAC CATH LAB PROCEDURE  12/05    NO SIGNIFICANT CAD    EYE SURGERY      HEMIARTHROPLASTY HIP Right 8/29/2019    HIP HEMIARTHROPLASTY performed by Jesse Jaime MD at [address] Right 9/29/2019    HIP INCISION AND DRAINAGE performed by Jesse Jaime MD at [address] [place] [address]  09/23/2019    of stage 4 wound on coccyx, by Dr. Kal Ma N/A 9/23/2019    EXCISIONAL DEBRIDEMENT OF SACRAL PRESSURE ULCER performed by Aurora Alaniz MD at [address] [place] [address] N/A 9/29/2019    DECUBITUS ULCER DEBRIDEMENT REPAIR performed by Mohini Woodson MD at [address]         Family History   Problem Relation Age of Onset    High Blood Pressure Mother     Cancer Mother     Cancer Father     Stroke Maternal Grandfather     Diabetes Paternal Grandmother     Heart Disease Paternal Grandfather       Infectious disease related family history - not contibutory. SOCIAL HISTORY  Social History     Tobacco Use    Smoking status: Never Smoker    Smokeless tobacco: Never Used   Substance Use Topics    Alcohol use: No       Born:   Lived   Occupation:   No recent travel of significance.  No recent unusual exposures.  NO pets    ? ALLERGIES  Allergies   Allergen Reactions    Bee Venom Shortness Of Breath      MEDICATIONS  Reviewed and are per the chart/EMR. ?   Antibiotics:   Vancomycin  Cefepime  Ciprofloxacin  ?  -------------------------------------------------------------------------------------------------------------------    Vital Signs:  Vitals:    09/30/19 1525   BP: 125/70   Pulse:    Resp: 27   Temp: 98.1 °F (36.7 °C)   SpO2: 93%         Exam:    VS: noted; wt 184.8 kg  Gen: alert and oriented X3, no distress  Skin: no stigmata of endocarditis  Wounds: Wound VAC over the sacrum and right hip  HEMT: AT/NC Oropharynx pink, moist, and without lesions or exudates; dentition in good state of repair  Eyes: PERRLA, EOMI, conjunctiva pink, sclera anicteric. Neck: Supple. Trachea midline. No LAD. Chest: no distress and CTA. Good air movement. Heart: RRR and no MRG. Abd: soft, non-distended, no tenderness, no hepatomegaly. Normoactive bowel sounds. Ext: no clubbing, cyanosis, or edema  Catheter Site: without erythema or tenderness  Neuro: Mental status intact. CN 2-12 intact and no focal sensory or motor deficits    ? Diagnostic Studies: reviewed  ? ? I have examined this patient and available medical records on this date and have made the above observations, conclusions and recommendations.   Electronically signed by: Electronically signed by Chela Briones MD on 9/30/2019 at 5:23 PM

## 2019-09-30 NOTE — PROGRESS NOTES
Nephrology Progress Note  9/30/2019 10:17 AM  Subjective:   Admit Date: 9/20/2019  PCP: Sachin Padilla MD  Interval History: pt appear doing better and monitor pain     Diet: Dietary Nutrition Supplements: Wound Healing Oral Supplement  DIET GENERAL;  Pain is: Moderate      Data:   Scheduled Meds:   magnesium sulfate  1 g Intravenous Once    insulin lispro  0-6 Units Subcutaneous TID WC    insulin glargine  75 Units Subcutaneous Nightly    insulin lispro  0-6 Units Subcutaneous 2 times per day    vancomycin  1,500 mg Intravenous Q18H    ciprofloxacin  500 mg Oral 2 times per day    collagenase   Topical BID    aspirin  81 mg Oral Daily    mometasone-formoterol  2 puff Inhalation BID    buPROPion  300 mg Oral Daily    canagliflozin  300 mg Oral Daily    ezetimibe  10 mg Oral Daily    fluticasone  1 spray Each Nostril Daily    ocuvite-lutein  1 tablet Oral Daily    pantoprazole  40 mg Oral QAM AC    sertraline  100 mg Oral BID    simvastatin  40 mg Oral Daily    b complex-C-E-zinc  1 tablet Oral Daily    Dulaglutide  1.5 mg Subcutaneous Weekly    vitamin C  1,000 mg Oral Daily    vitamin E  400 Units Oral Daily    sodium chloride flush  10 mL Intravenous 2 times per day    enoxaparin  40 mg Subcutaneous Daily    cefepime  2 g Intravenous Q8H     Continuous Infusions:   sodium chloride 1,000 mL (09/29/19 1228)    dextrose       PRN Meds:HYDROmorphone, glucose, dextrose, glucagon (rDNA), dextrose, acetaminophen, albuterol sulfate HFA, nitroGLYCERIN, sodium chloride flush, traMADol **OR** traMADol, ondansetron  I/O last 3 completed shifts: In: 2539 [P.O.:360; I.V.:2129; IV Piggyback:50]  Out: 0047 [Urine:2150; Drains:325; Other:250; Blood:50]  No intake/output data recorded.     Intake/Output Summary (Last 24 hours) at 9/30/2019 1017  Last data filed at 9/30/2019 0527  Gross per 24 hour   Intake 2539 ml   Output 2325 ml   Net 214 ml     CBC:   Recent Labs     09/28/19  0605 09/29/19  0585 09/30/19  0553   WBC 20.4* 20.8* 20.1*   HGB 11.0* 11.1* 10.4*    409 374     BMP:    Recent Labs     09/28/19  0605 09/29/19  0526 09/30/19  0553   * 135 140   K 3.6 3.4* 3.8    101 104   CO2 22 24 25   BUN 16 14 17   CREATININE 1.0 1.0 1.2   GLUCOSE 167* 146* 132*     Hepatic:   No results for input(s): AST, ALT, ALB, BILITOT, ALKPHOS in the last 72 hours. Troponin: No results for input(s): TROPONINI in the last 72 hours. BNP: No results for input(s): BNP in the last 72 hours. Lipids: No results for input(s): CHOL, HDL in the last 72 hours. Invalid input(s): LDLCALCU  ABGs:   Lab Results   Component Value Date    PO2ART 89 09/20/2019    ZWR5JXD 32.0 09/20/2019     INR: No results for input(s): INR in the last 72 hours.   Renal Labs  Albumin:    Lab Results   Component Value Date    LABALBU 2.3 09/30/2019     Calcium:    Lab Results   Component Value Date    CALCIUM 7.8 09/30/2019     Phosphorus:    Lab Results   Component Value Date    PHOS 3.1 09/30/2019     U/A:    Lab Results   Component Value Date    NITRU NEGATIVE 09/21/2019    NITRU Negative 10/24/2018    COLORU MICHAEL 09/21/2019    PHUR 5.5 10/24/2018    LABCAST Present 12/12/2016    LABCAST Hyaline casts 12/12/2016    WBCUA 125 09/21/2019    RBCUA 29 09/21/2019    MUCUS RARE 09/21/2019    TRICHOMONAS NONE SEEN 09/06/2019    YEAST Present 12/12/2016    BACTERIA NEGATIVE 09/21/2019    CLARITYU SLIGHTLY CLOUDY 09/21/2019    SPECGRAV 1.010 09/21/2019    UROBILINOGEN <2.0 09/21/2019    BILIRUBINUR NEGATIVE 09/21/2019    BLOODU MODERATE 09/21/2019    GLUCOSEU 3+ 10/24/2018    KETUA NEGATIVE 09/21/2019     ABG:    Lab Results   Component Value Date    VCP1VVS 32.0 09/20/2019    PO2ART 89 09/20/2019    SGN2NYL 22.2 09/20/2019     HgBA1c:    Lab Results   Component Value Date    LABA1C 6.1 09/04/2019     Microalbumen/Creatinine ratio:  No components found for: RUCREAT          Objective:   Vitals: BP (!) 122/58   Pulse 86   Temp 98 °F (36.7 °C) (Oral)   Resp 18   Ht 6' 3\" (1.905 m)   Wt (!) 407 lb 8 oz (184.8 kg)   SpO2 94%   BMI 50.93 kg/m²   General appearance: awake weak  HEENT: Head: Normal, normocephalic, atraumatic.   Neck: supple, symmetrical, trachea midline  Lungs: diminished breath sounds bilaterally  Heart: S1, S2 normal  Abdomen: abnormal findings:  soft nt obese  Extremities: edema + tender surgery site better  Neurologic: Mental status: alertness: awake      Patient Active Problem List:     Hypertension     Hyperlipidemia     Asthma     Diabetes mellitus ([facility]     H/O cardiovascular stress test     Obesity     Chronic kidney disease, stage III (moderate) (HCC)     Hypertensive kidney disease with CKD stage III (HCC)     Depression     SOBOE (shortness of breath on exertion)     Abnormal cardiovascular stress test     Fracture of epiphysis (separation) (upper) of neck of femur, closed (HCC)     Pressure ulcer of coccygeal region, unstageable (HCC)     Sepsis (HCC)    Assessment and Plan:      IMP:  1 septic shock  2 s/p hip surgery with wound infection  3 arf from atn/contrast on ckd 3  4 anxiety/confusion  5 resp distress with hypoxia    Plan     1 afebrile, wbc improved monitor   2 fu wound care and plan as pain better  3 renal improved to baseline  4 affect stable today  5 o2 stable   Work on Humphrey Steward MD

## 2019-09-30 NOTE — PROGRESS NOTES
Occupational Therapy  . Occupational Therapy Treatment Note  Name: Anika Kellogg MRN: [id number] :   1950   Date:  2019   Admission Date: 2019 Room:  90959462-[id number]     Restrictions/Precautions:    General precautions; Fall Risk; position/activity restrictions: coccygeal wound    Communication with other providers:  Nurse Karen Hobson cleared pt for Tx session. Co-Tx c PTA. Notified nurse at end of Tx session for pt's increased pain level. Dr. Wilner Pulido cleared pt for EOB participation as tolerated. Subjective:  Patient states:  Pt agreeable to Tx session, reports increasing pain c movement. Pain:   Location, Type, Intensity (0/10 to 10/10): Initially 5/10, then identified 9/10, R hip, RLE c movement. Objective:    Observation:  Pt received in partial chair position (locked) in bariatric bed + trapeze. Pt required increased time to perform all portions of bed mobility and repositioning, exhibiting mild SOB, s/s of pain behaviors and became diaphoretic, requiring multiple rest breaks. Objective Measures:  Rectal Lujan, Lujan catheter, IV RUE active infusion, Wound vac to R hip, Telemetry, HR 94, RR 19, on room air. All values stable throughout Tx session. Treatment, including education:  Therapeutic Activity Training:   Therapeutic activity training was instructed today. Cues were given for safety, sequence, UE/LE placement, awareness, and balance. Activities performed today included bed mobility training and functional reaching c BUE c use of bed features to increase strength and act tolerance. Scooting upward in bed: Dep x2 + bed features. This therapist provided cues for hand placement / sequencing to assist c UB re-positioning and for AROM of BUE by reaching to head board. Pt able to perform stretching, unable to pull upwards c BUE. Pt educated for rationale for midline positioning and use of frequent changes of position.    See PT note for additional details for use of AE for BLE strengthening and positioning of BLE. Safety  Patient safely in bed at end of session, with call light/phone in reach, and nursing aware. All therapeutic intervention performed c emphasis on BUE strengthening to increase indep c bed mobility and for education for frequent changes of position c emphasis on midline body positioning, including BLE in neutral position. Assessment / Impression:        Patient's tolerance of treatment:  Fair   Adverse Reaction: None  Significant change in status and impact:  none  Barriers to improvement:  Pain, SOB, diaphoretic c mobility, managed c use of bed features, AE, and increased time to perform. Plan for Next Session:    Continue per OT  c plan to address EOB activity and progress to transfer training as pt able to tolerate. Time in:  1310  Time out:  1348  Timed treatment minutes:  38  Total treatment time:  38    Electronically signed by:    LOREN Collier  9/30/2019, 12:55 PM    Previously filed values:    Goals:  By d/c or goals met:  Pt will perform all bed mobility with ModAx2 in prep for EOB/OOB activity. Pt will perform all functional transfers with MaxAx2 and appropriate use of LRD to bed, toilet, chair in prep for increased functional independence. Pt will perform UB ADLs with CGA in seated EOB to increase functional independence. Pt will maintain standing c RW 30 seconds in prep for ADL and gait. Pt will participate in therex/therax c emphasis on strength, activity tolerance,  safety, DANIA tasks.

## 2019-09-30 NOTE — PROGRESS NOTES
General Surgery- [facility] Day: 11    Chief Complaint on Admission: infected decub ulcer      Subjective:     Pt went to OR with Ortho and Gen Surg yesterday for repeat debridement and I&D of R hip incision  Pt denies pain. Denies F/C. Tolerating PO. Denies issues with wound vacs. ROS:  Review of Systems   Constitutional: Positive for fatigue. Negative for fever. HENT: Negative. Eyes: Negative. Respiratory: Negative. Cardiovascular: Negative. Gastrointestinal: Negative. Endocrine: Negative. Genitourinary: Negative. Musculoskeletal: Positive for myalgias. Skin: Positive for wound. Allergic/Immunologic: Negative. Neurological: Negative. Hematological: Negative. Psychiatric/Behavioral: Negative. Reviewed. Negative except as above. Allergies  Bee venom          Diagnosis Date    Arthritis     Asthma     Chronic kidney disease     stage 3, seen by Dr. Keo Kim Diabetes mellitus ([facility]     H/O cardiac catheterization 2017    H/O cardiovascular stress test ,     CARDIOLITE: EF->51%    Hyperlipidemia     Hypertension     Obesity     Pressure ulcer of coccygeal region, unstageable ([facility] 09/10/2019    Thyroid disease        Objective:     Vitals:    19 0742   BP:    Pulse:    Resp: 18   Temp:    SpO2: 94%       TEMPERATURE:  Current -Temp: 98 °F (36.7 °C); Max - Temp  Av °F (36.7 °C)  Min: 97.6 °F (36.4 °C)  Max: 98.2 °F (36.8 °C)    No intake/output data recorded. I/O last 3 completed shifts: In: 2539 [P.O.:360; I.V.:2129; IV Piggyback:50]  Out: 0991 [Urine:2150; Drains:325; Other:250; Blood:50]      Physical Exam:  Physical Exam   Laying in bed supine. NAD at rest. Comfortable. AT. NC.  EOMI. PERRLA. Breathing unlabored. RRR. Obese, soft, NT, ND, no PS.  +Lujan. + Rectal tube. +R hip with vac in place and good seal. + sacral vac in place with good seal. Both connect via Y-connector and have bloody output. FRANZ.  Warm, dry.       Scheduled Meds:   magnesium sulfate  1 g Intravenous Once    insulin lispro  0-6 Units Subcutaneous TID WC    insulin glargine  75 Units Subcutaneous Nightly    insulin lispro  0-6 Units Subcutaneous 2 times per day    vancomycin  1,500 mg Intravenous Q18H    ciprofloxacin  500 mg Oral 2 times per day    collagenase   Topical BID    aspirin  81 mg Oral Daily    mometasone-formoterol  2 puff Inhalation BID    buPROPion  300 mg Oral Daily    canagliflozin  300 mg Oral Daily    ezetimibe  10 mg Oral Daily    fluticasone  1 spray Each Nostril Daily    ocuvite-lutein  1 tablet Oral Daily    pantoprazole  40 mg Oral QAM AC    sertraline  100 mg Oral BID    simvastatin  40 mg Oral Daily    b complex-C-E-zinc  1 tablet Oral Daily    Dulaglutide  1.5 mg Subcutaneous Weekly    vitamin C  1,000 mg Oral Daily    vitamin E  400 Units Oral Daily    sodium chloride flush  10 mL Intravenous 2 times per day    enoxaparin  40 mg Subcutaneous Daily    cefepime  2 g Intravenous Q8H     ContinuousInfusions:   sodium chloride 1,000 mL (09/29/19 1228)    dextrose       PRN Meds:HYDROmorphone, glucose, dextrose, glucagon (rDNA), dextrose, acetaminophen, albuterol sulfate HFA, nitroGLYCERIN, sodium chloride flush, traMADol **OR** traMADol, ondansetron      Labs/Imaging Results:   Lab Results   Component Value Date    WBC 20.1 (H) 09/30/2019    HGB 10.4 (L) 09/30/2019    HCT 33.7 (L) 09/30/2019    MCV 97.1 09/30/2019     09/30/2019     Lab Results   Component Value Date     09/30/2019    K 3.8 09/30/2019     09/30/2019    CO2 25 09/30/2019    BUN 17 09/30/2019    CREATININE 1.2 09/30/2019    GLUCOSE 132 (H) 09/30/2019    CALCIUM 7.8 (L) 09/30/2019    PROT 6.1 (L) 09/20/2019    LABALBU 2.3 (L) 09/30/2019    BILITOT 1.1 (H) 09/20/2019    ALKPHOS 143 (H) 09/20/2019     (H) 09/20/2019    ALT 98 (H) 09/20/2019    LABGLOM >60 09/30/2019    GFRAA >60 09/30/2019       Assessment:     68

## 2019-09-30 NOTE — PROGRESS NOTES
Progress note    Onesimo Easley    9/30/2019    Subjective:     Patient had surgical debridement of Coccygeal decubitus yesterday and has been doing good. Medication side effects: none. Scheduled Meds:   insulin lispro  0-6 Units Subcutaneous TID WC    insulin glargine  75 Units Subcutaneous Nightly    insulin lispro  0-6 Units Subcutaneous 2 times per day    vancomycin  1,500 mg Intravenous Q18H    ciprofloxacin  500 mg Oral 2 times per day    collagenase   Topical BID    aspirin  81 mg Oral Daily    mometasone-formoterol  2 puff Inhalation BID    buPROPion  300 mg Oral Daily    canagliflozin  300 mg Oral Daily    ezetimibe  10 mg Oral Daily    fluticasone  1 spray Each Nostril Daily    ocuvite-lutein  1 tablet Oral Daily    pantoprazole  40 mg Oral QAM AC    sertraline  100 mg Oral BID    simvastatin  40 mg Oral Daily    b complex-C-E-zinc  1 tablet Oral Daily    Dulaglutide  1.5 mg Subcutaneous Weekly    vitamin C  1,000 mg Oral Daily    vitamin E  400 Units Oral Daily    sodium chloride flush  10 mL Intravenous 2 times per day    enoxaparin  40 mg Subcutaneous Daily    cefepime  2 g Intravenous Q8H     Continuous Infusions:   sodium chloride 1,000 mL (09/29/19 1228)    dextrose       PRN Meds:HYDROmorphone, glucose, dextrose, glucagon (rDNA), dextrose, acetaminophen, albuterol sulfate HFA, nitroGLYCERIN, sodium chloride flush, traMADol **OR** traMADol, ondansetron    Review of Systems  Pertinent items are noted in HPI. Objective:     Patient Vitals for the past 8 hrs:   Resp SpO2   09/30/19 0742 18 94 %     I/O last 3 completed shifts: In: 2539 [P.O.:360; I.V.:2129; IV Piggyback:50]  Out: 0806 [Urine:2150; Drains:325; Other:250; Blood:50]  No intake/output data recorded. BP (!) 122/58   Pulse 86   Temp 98 °F (36.7 °C) (Oral)   Resp 18   Ht 6' 3\" (1.905 m)   Wt (!) 407 lb 8 oz (184.8 kg)   SpO2 94%   BMI 50.93 kg/m²   General appearance: alert and cooperative.   Lungs: components found for: [place]  PTT:    Lab Results   Component Value Date    APTT 29.1 05/03/2017   [APTT  Last 3 Troponin:  No results found for: TROPONINI  ABG:    Lab Results   Component Value Date    TUQ3QHA 32.0 09/20/2019    PO2ART 89 09/20/2019    CIO2PSL 22.2 09/20/2019         Assessment:     Principal Problem:    Sepsis ([facility] [address]  Active Problems:    Hypertension    Hyperlipidemia    Asthma    Diabetes mellitus ([facility] [address]    Obesity    Chronic kidney disease, stage III (moderate) ([facility])    Depression    Pressure injury of skin of coccygeal region  Resolved Problems:    * No resolved hospital problems. *      Plan:   He is Afebrile, WBC coming down, BP stable. Responding to current antibiotics  S/P surgery for hip abscess, debridement of decubitus ulcer  Concern for osteomyelitis, reconsulted ID  Patient had PVC's last night-cardiology consulted.   Noted recommendation for limited Echo for IE   Noted ID recommendation for Flagyll in addition to Vanc and Cefepime       Nikolai TORRES M.D.  9/30/2019

## 2019-09-30 NOTE — PROGRESS NOTES
[address] [facility]  consulted by Dr. Veronika Macario for monitoring and adjustment. Indication for treatment: S/p R hemiarthroplasty 8/29/19, gluteal abscess  Goal trough: 10-15 mcg/mL  Other Antimicrobials:  Cefepime     Pertinent Laboratory Values:   Temp Readings from Last 3 Encounters:   09/30/19 98 °F (36.7 °C) (Oral)   09/29/19 97.8 °F (36.6 °C)   09/02/19 97.9 °F (36.6 °C) (Oral)     Recent Labs     09/28/19  0605 09/29/19  0526 09/30/19  0553   WBC 20.4* 20.8* 20.1*     Recent Labs     09/28/19  0605 09/29/19  0526 09/30/19  0553   BUN 16 14 17   CREATININE 1.0 1.0 1.2     Estimated Creatinine Clearance: 104 mL/min (based on SCr of 1.2 mg/dL). Intake/Output Summary (Last 24 hours) at 9/30/2019 0913  Last data filed at 9/30/2019 0527  Gross per 24 hour   Intake 2539 ml   Output 2325 ml   Net 214 ml       Pertinent Cultures:  Date    Source    Results  9/21   Blood    Proteus   9/21   Urine    Proteus            Vancomycin level:   TROUGH:    Recent Labs     09/30/19  0553   VANCOTROUGH 18.5     RANDOM:    No results for input(s): VANCORANDOM in the last 72 hours. Assessment:  WBC and temperature: WBC remains elevated @ 21.0, pt afebrile  SCr, BUN, and urine output: SCR wnl , output good  Day(s) of therapy: 11  Vancomycin level: 9/26 - therapeutic @17.6                                       9/30 - therapeutic @ 18.5    Plan:  Mr. Bella Small continues on vancomycin 1500 mg q18h IVPB   Trough level therapeutic  Pharmacy will continue to monitor patient and adjust therapy as indicated    Thank you for the consult.   Gogo Yan, [address]  9/30/2019 9:13 AM

## 2019-09-30 NOTE — PLAN OF CARE
Problem: Falls - Risk of:  Goal: Will remain free from falls  Description  Will remain free from falls  Outcome: Ongoing  Goal: Absence of physical injury  Description  Absence of physical injury  Outcome: Ongoing     Problem: Risk for Impaired Skin Integrity  Goal: Tissue integrity - skin and mucous membranes  Description  Structural intactness and normal physiological function of skin and  mucous membranes.   Outcome: Ongoing     Problem: Discharge Planning:  Goal: Discharged to appropriate level of care  Description  Discharged to appropriate level of care  Outcome: Ongoing     Problem: Infection, Septic Shock:  Goal: Will show no infection signs and symptoms  Description  Will show no infection signs and symptoms  Outcome: Ongoing     Problem: Pain:  Goal: Pain level will decrease  Description  Pain level will decrease  Outcome: Ongoing  Goal: Control of acute pain  Description  Control of acute pain  Outcome: Ongoing  Goal: Control of chronic pain  Description  Control of chronic pain  Outcome: Ongoing     Problem: Venous Thromboembolism:  Goal: Will show no signs or symptoms of venous thromboembolism  Description  Will show no signs or symptoms of venous thromboembolism  Outcome: Ongoing  Goal: Absence of signs or symptoms of impaired coagulation  Description  Absence of signs or symptoms of impaired coagulation  Outcome: Ongoing     Problem: Nutrition  Goal: Optimal nutrition therapy  Outcome: Ongoing

## 2019-09-30 NOTE — PROGRESS NOTES
Physical Therapy  . Physical Therapy Treatment Note  Name: Jonah Velez MRN: [id number] :   1950   Date:  2019   Admission Date: 2019 Room:  [facility]-       Restrictions/Precautions:        General Precautions, Fall Risk  Coccygeal wound  Communication with other providers:  MARLENE James cleared patient for physical therapy. Notified MARLENE Rayo of patient's request for pain medication (MARLENE James did not answer phone). CoTx with Gerard Kruse. Follow up with Dr. Reggie Harding after session as she discussed the importance of him transferring OOB. Subjective:  Patient states: \"It feels like you're ripping my back side open. \"   Pain:   Location, Type, Intensity (0/10 to 10/10):  5/10 right hip prior to session, 9/10 right hip after session    Objective:    Observation:  Patient in partial chair position (locked) in bariatric bed + trapeze  upon therapist arrival. Rectal wilkerson. Wilkerson. Wound vac R hip. IV. Tele. Patient required increased time to perform mobility and repositioning with frequent rest breaks to manage activity tolerance. Patient diaphoretic after session. A&O x4  Vitals: HR 94, R 19    Treatment, including education/measures:  Transfers  Scooting :Attempted scooting up in bed with trapeze and then with HOB bedrail. Patient unable to utilize these bed features stating, \"It feels like my chest is stretching apart. \"   Patient required dependent assistance x2 for positioning in bed including scooting. Patient required maximal assistance plus additional education for use of leg  for positioning of lower extremities. Patient with poor tolerance of bed mobility becoming diaphoretic, SOB and fatigued. Therapeutic Exercise:  Therapeutic exercises were instructed today. Instructions were given for technique, safety, recruitment, and rationale. Instructions were verbal and/or tactile.   Education with emphasis on B LE strengthening to increase independence, frequent positional changes and proper body positioning in neutral.    Patient utilizing leg  to reposition left lower extremity and right lower extremity for proper joint alignment and positioning. Patient with difficulty tolerating right lower extremity in correct position with maximal education given. Patient's right lower extremity was significantly internally rotated upon therapist entry. Towel roll applied to help with positioning. Safety  Patient left safely in the bed, with call light/phone in reach with alarm applied. Assessment / Impression:    Patient's tolerance of treatment:  Fair   Adverse Reaction: none  Significant change in status and impact:  none  Barriers to improvement:  Pain with low level mobility, decreased strength, decreased endurance  Discharge plan: SNF    Plan for Next Session:    Transfers when able    Time in:  1310  Time out:  1348  Timed treatment minutes:  38  Total treatment time:  45    Previously filed items:  Social/Functional History  Additional Comments: Has been in SNF but had not progressed to gait or transfers. Has been mostly bed level since last hopistalizatoin. Short term goals  Time Frame for Short term goals: 1 week  Short term goal 1: Patient will perform supine to sit with mod A x2. Short term goal 2: Patient will perform STS with max A x2 and RW.   Short term goal 3: Patient will sit EOB x15 min mod I.       Electronically signed by:    Alfredo Guzman PTA [id number]

## 2019-10-01 NOTE — PROGRESS NOTES
Pt slept through most of the night. No c/o voiced at this time. Resting in bed, call light within reach.

## 2019-10-01 NOTE — PROGRESS NOTES
General Surgery-Dr. Jc Artist Day: 12    Chief Complaint on Admission: infected decub ulcer      Subjective:     Pt had vac changes today by Wound Team  Was seen by ID and Abx adjusted / modified   Pain controlled  Denies F/C. Tolerating PO.    ROS:  Review of Systems   Constitutional: Positive for fatigue. Negative for fever. HENT: Negative. Eyes: Negative. Respiratory: Negative. Cardiovascular: Negative. Gastrointestinal: Negative. Endocrine: Negative. Genitourinary: Negative. Musculoskeletal: Positive for myalgias. Skin: Positive for wound. Allergic/Immunologic: Negative. Neurological: Negative. Hematological: Negative. Psychiatric/Behavioral: Negative. Reviewed. Negative except as above. Allergies  Bee venom          Diagnosis Date    Arthritis     Asthma     Chronic kidney disease     stage 3, seen by Dr. Mayo Arellano Diabetes mellitus ([facility]     H/O cardiac catheterization 2017    H/O cardiovascular stress test ,     CARDIOLITE: EF->51%    Hyperlipidemia     Hypertension     Obesity     Pressure ulcer of coccygeal region, unstageable ([facility] 09/10/2019    Thyroid disease        Objective:     Vitals:    10/01/19 0844   BP:    Pulse:    Resp: 24   Temp:    SpO2:        TEMPERATURE:  Current -Temp: 97.7 °F (36.5 °C); Max - Temp  Av °F (36.7 °C)  Min: 97.7 °F (36.5 °C)  Max: 98.3 °F (36.8 °C)    I/O this shift: In: 480 [P.O.:480]  Out: - I/O last 3 completed shifts: In: 3943 [I.V.:1205; IV Piggyback:450]  Out: 4785 [Urine:2675; Drains:410; Stool:500]      Physical Exam:  Physical Exam   Laying in bed supine. NAD at rest. Comfortable. AT. NC.  EOMI. PERRLA. Breathing unlabored. RRR. Obese, soft, NT, ND, no PS.  +Lujan. + Rectal tube. +R hip with vac in place and good seal. + sacral vac in place with good seal. Both connect via Y-connector and have bloody output--less dark than yesterday. FRANZ. Warm, dry.        Scheduled

## 2019-10-01 NOTE — PROGRESS NOTES
Cardiology Progress Note     Today's Plan: CPM   Will check lytes in am     Admit Date:  9/20/2019    Consult reason/ Seen today for: PVCc    Subjective and  Overnight Events:  He is resting in bed- notes he sat on the side of the bed with therapy today. Denies any chest pain- notes there is mld SOB with activity. Telemetry SR  Occasional PVC    Assessment / Plan / Recommendation:     1. PVC- K 3.2- being replaced- recommend to keep K 3.5-4.0 and Magnesium  2. Holter monitor is on and pending  2. No significant CAD per review of [facility]   3. Echo with normal EF- no vegetation seen- has moderate to severe AR      History of Presenting Illness:    Chief complain on admission : 77 y. o.year old who is admitted for  Chief Complaint   Patient presents with    Wound Infection     Hip surgery in past month. Infection. Past medical history:    has a past medical history of Arthritis, Asthma, Chronic kidney disease, Diabetes mellitus ([address] H/O cardiac catheterization, H/O cardiovascular stress test, Hyperlipidemia, Hypertension, Obesity, Pressure ulcer of coccygeal region, unstageable ([address] and Thyroid disease. Past surgical history:   has a past surgical history that includes Retinal detachment surgery; eye surgery; Carpal tunnel release (2005); Diagnostic Cardiac Cath Lab Procedure (12/05); HEMIARTHROPLASTY HIP (Right, 8/29/2019); Pressure ulcer debridement (09/23/2019); Pressure ulcer debridement (N/A, 9/23/2019); incision and drainage (Right, 9/29/2019); and Pressure ulcer debridement (N/A, 9/29/2019). Social History:   reports that he has never smoked. He has never used smokeless tobacco. He reports that he does not drink alcohol or use drugs.   Family history:  family history includes Cancer in his father and mother; Diabetes in his paternal grandmother; Heart Disease in his paternal grandfather; High Blood Pressure in his mother; Stroke in his maternal grandfather. Allergies   Allergen Reactions    Bee Venom Shortness Of Breath       Review of Systems:   All 14 systems were reviewed and are negative  Except for the positive findings which are documented     BP (!) 142/77   Pulse 92   Temp 97.7 °F (36.5 °C) (Oral)   Resp 24   Ht 6' 3\" (1.905 m)   Wt (!) 407 lb 8 oz (184.8 kg)   SpO2 94%   BMI 50.93 kg/m²       Intake/Output Summary (Last 24 hours) at 10/1/2019 1518  Last data filed at 10/1/2019 1048  Gross per 24 hour   Intake 2135 ml   Output 3435 ml   Net -1300 ml       Physical Exam:  Constitutional:  Morbidly obese No acute distress  HENT:  Normocephalic, Atraumatic, Bilateral external ears normal,  Nose normal.   Neck- trachea is midline  Eyes:  PERRL, Conjunctiva normal  Respiratory:  Decreased  breath sounds, No respiratory distress, No wheezing, No chest tenderness. Cardiovascular:  Normal heart rate, Normal rhythm, 3/6 diastolic murmurs appreciated, No rubs appreciated, No gallops appreciated, JVP not elevated  Abdomen/GI:  Large soft  Musculoskeletal:  No tenderness, No cyanosis, No clubbing. Integument:  Warm, Dry : wound vac noted to right hip   Lymphatic:  No lymphadenopathy noted.    Neurologic:  Alert & oriented  Psychiatric:  Affect and Mood :pleasant     Medications:    insulin lispro  0-6 Units Subcutaneous TID WC    metroNIDAZOLE  500 mg Intravenous Q8H    insulin glargine  75 Units Subcutaneous Nightly    insulin lispro  0-6 Units Subcutaneous 2 times per day    vancomycin  1,500 mg Intravenous Q18H    aspirin  81 mg Oral Daily    mometasone-formoterol  2 puff Inhalation BID    buPROPion  300 mg Oral Daily    canagliflozin  300 mg Oral Daily    ezetimibe  10 mg Oral Daily    fluticasone  1 spray Each Nostril Daily    ocuvite-lutein  1 tablet Oral Daily    pantoprazole  40 mg Oral QAM AC    sertraline  100 mg Oral BID    simvastatin  40 mg Oral Daily    b complex-C-E-zinc  1 tablet Oral Daily    Dulaglutide  1.5 mg Subcutaneous Weekly    vitamin C  1,000 mg Oral Daily    vitamin E  400 Units Oral Daily    sodium chloride flush  10 mL Intravenous 2 times per day    enoxaparin  40 mg Subcutaneous Daily    cefepime  2 g Intravenous Q8H      sodium chloride 50 mL/hr at 10/01/19 0533    dextrose       HYDROmorphone, glucose, dextrose, glucagon (rDNA), dextrose, acetaminophen, albuterol sulfate HFA, nitroGLYCERIN, sodium chloride flush, traMADol **OR** traMADol, ondansetron    Lab Data:  CBC:   Recent Labs     09/29/19  0526 09/30/19  0553 10/01/19  0525   WBC 20.8* 20.1* 16.7*   HGB 11.1* 10.4* 10.3*   HCT 35.1* 33.7* 33.5*   MCV 94.4 97.1 95.7    374 337     BMP:   Recent Labs     09/29/19  0526 09/30/19  0553 10/01/19  0525    140 134*   K 3.4* 3.8 3.2*    104 100   CO2 24 25 24   PHOS 2.2* 3.1 2.9   BUN 14 17 20   CREATININE 1.0 1.2 1.3     PT/INR: No results for input(s): PROTIME, INR in the last 72 hours. BNP:  No results for input(s): PROBNP in the last 72 hours. TROPONIN: No results for input(s): TROPONINT in the last 72 hours. ECHO : 09/30/2019  This is a limited study The patient had a full study echocardiogram   9/20/2019. .Limited study due to patients body habitus.   Left ventricular function is normal, EF is estimated at 55-60%.   No clear evidence of vegetations noted. Complete echo 09/23/2019  Left ventricular systolic function is normal.   Ejection fraction is visually estimated at 50-55%.   Mild left ventricular hypertrophy.   Grade I diastolic dysfunction.   Moderately dilated atrium bilaterally.   Sclerotic, but non-stenotic aortic valve.   Moderate to severe aortic regurgitation is noted ( ms).   Mild mitral annular calcification is present.   No evidence of any pericardial effusion.   \"Starry nilo\" hepatic appearance.     Impression:  Principal Problem:    Sepsis ([address]  Active Problems:    Hypertension    Hyperlipidemia    Asthma    Diabetes mellitus ([facility]    Obesity    Chronic kidney disease, stage III (moderate) (H[facility])    Depression    Pressure injury of skin of coccygeal region  Resolved Problems:    * No resolved hospital problems. *       All labs, medications and tests reviewed by myself, continue all other medications of all above medical condition listed as is except for changes mentioned above. Thank you   Please call with questions. Electronically signed by Teo Johnson MD on 10/1/2019 at 3:18 PM     I have seen ,spoken to  and examined this patient personally, independently of the nurse practitioner. I have reviewed the hospital care given to date and reviewed all pertinent labs and imaging. The plan was developed mutually at the time of the visit with the patient,  NP  and myself. I have spoken with patient, nursing staff and provided written and verbal instructions . The above note has been reviewed and I agree with the assessment, diagnosis, and treatment plan with changes made by me as follows     CARDIOLOGY ATTENDING ADDENDUM    HPI:  I have reviewed the above HPI  And agree with above   Maritza Trejo is a 77 y. o.year old who and presents with had concerns including Wound Infection (Hip surgery in past month. Infection. ). Chief Complaint   Patient presents with    Wound Infection     Hip surgery in past month. Infection. Interval history:  No arythmias    Physical Exam:  General:  Awake, alert, NAD  Head:normal  Eye:normal  Neck:  No JVD   Chest:  Clear to auscultation, respiration easy  Cardiovascular:  RRR S1S2  Abdomen:   nontender  Extremities:  no edema  Pulses; palpable  Neuro: grossly normal      MEDICAL DECISION MAKING;    I agree with the above plan, which was planned by myself and discussed with NP.   PVCs are better  Will FU as needed        Hemant Scott MD [address] [facility]

## 2019-10-01 NOTE — CONSULTS
Grandmother     Heart Disease Paternal Grandfather        SOCIAL HISTORY    Social History     Tobacco Use    Smoking status: Never Smoker    Smokeless tobacco: Never Used   Substance Use Topics    Alcohol use: No    Drug use: No       ALLERGIES    Allergies   Allergen Reactions    Bee Venom Shortness Of Breath       MEDICATIONS    No current facility-administered medications on file prior to encounter. Current Outpatient Medications on File Prior to Encounter   Medication Sig Dispense Refill    enoxaparin (LOVENOX) 40 MG/0.4ML injection Inject 0.4 mLs into the skin daily 30 Syringe 0    TRULICITY 1.5 WV/3.7KI SOPN Inject into the skin once a week  2    nitroGLYCERIN (NITROSTAT) 0.4 MG SL tablet Place 1 tablet under the tongue every 5 minutes as needed for Chest pain 25 tablet 2    ezetimibe (ZETIA) 10 MG tablet Take 10 mg by mouth daily      acetaminophen (TYLENOL) 500 MG tablet Take 500 mg by mouth every 6 hours as needed for Pain      SUPER B COMPLEX/C PO Take 1 capsule by mouth daily      Cinnamon 500 MG TABS Take 1,000 mg by mouth daily      vitamin E 400 UNIT capsule Take 400 Units by mouth daily      vitamin C (ASCORBIC ACID) 500 MG tablet Take 1,000 mg by mouth daily       insulin lispro (HUMALOG) 100 UNIT/ML injection vial Inject into the skin 3 times daily (before meals)      insulin glargine (LANTUS) 100 UNIT/ML injection vial Inject 74 Units into the skin nightly       benazepril (LOTENSIN) 20 MG tablet Take 1 tablet by mouth daily 90 tablet 3    fluticasone (FLONASE) 50 MCG/ACT nasal spray as needed       Multiple Vitamins-Minerals (VISION VITAMINS PO) Take by mouth daily      omeprazole (PRILOSEC) 20 MG capsule Take 20 mg by mouth daily.  St Johns Wort 300 MG CAPS Take 2 capsules by mouth.  Canagliflozin (INVOKANA) 300 MG TABS Take  by mouth daily.       budesonide-formoterol (SYMBICORT) 80-4.5 MCG/ACT AERO Inhale 2 puffs into the lungs as needed       Albuterol Sulfate (PROAIR HFA IN) Inhale into the lungs as needed       buPROPion (WELLBUTRIN XL) 300 MG XL tablet Take 300 mg by mouth daily.  sertraline (ZOLOFT) 100 MG tablet Take 100 mg by mouth 2 times daily.  simvastatin (ZOCOR) 40 MG tablet Take 40 mg by mouth daily       aspirin 81 MG EC tablet Take 81 mg by mouth daily.              Objective:      BP (!) 142/77   Pulse 92   Temp 97.7 °F (36.5 °C) (Oral)   Resp 24   Ht 6' 3\" (1.905 m)   Wt (!) 407 lb 8 oz (184.8 kg)   SpO2 94%   BMI 50.93 kg/m²   Alfonso Risk Score: Alfonso Scale Score: 15    LABS    CBC:   Lab Results   Component Value Date    WBC 16.7 10/01/2019    RBC 3.50 10/01/2019    HGB 10.3 10/01/2019    HCT 33.5 10/01/2019    MCV 95.7 10/01/2019    MCH 29.4 10/01/2019    MCHC 30.7 10/01/2019    RDW 14.3 10/01/2019     10/01/2019    MPV 9.3 10/01/2019     CMP:    Lab Results   Component Value Date     10/01/2019    K 3.2 10/01/2019     10/01/2019    CO2 24 10/01/2019    BUN 20 10/01/2019    CREATININE 1.3 10/01/2019    GFRAA >60 10/01/2019    LABGLOM 55 10/01/2019    GLUCOSE 135 10/01/2019    PROT 6.1 09/20/2019    LABALBU 2.1 10/01/2019    CALCIUM 7.8 10/01/2019    BILITOT 1.1 09/20/2019    ALKPHOS 143 09/20/2019     09/20/2019    ALT 98 09/20/2019     Albumin:    Lab Results   Component Value Date    LABALBU 2.1 10/01/2019     PT/INR:    Lab Results   Component Value Date    PROTIME 11.5 05/03/2017    PROTIME 10.6 01/26/2012    INR 1.01 05/03/2017     HgBA1c:    Lab Results   Component Value Date    LABA1C 6.1 09/04/2019         Assessment:     Patient Active Problem List   Diagnosis    Hypertension    Hyperlipidemia    Asthma    Diabetes mellitus ([facility] [address]    H/O cardiovascular stress test    Obesity    Chronic kidney disease, stage III (moderate) (HCC)    Hypertensive kidney disease with CKD stage III (HCC)    Depression    SOBOE (shortness of breath on exertion)    Abnormal cardiovascular stress test    Fracture of epiphysis (separation) (upper) of neck of femur, closed (HCC)    Pressure injury of skin of coccygeal region    Sepsis [facility])       Measurements:  Negative Pressure Wound Therapy Hip Right (Active)   Wound Type Surgical 10/1/2019 11:30 AM   Unit Type KCI 10/1/2019 11:30 AM   Dressing Type Silver impregnated foam 10/1/2019 11:30 AM   Number of pieces used 3 10/1/2019 11:30 AM   Cycle Continuous 10/1/2019 11:30 AM   Target Pressure (mmHg) 125 10/1/2019 11:30 AM   Canister changed? No 10/1/2019 11:30 AM   Dressing Status Changed 10/1/2019 11:30 AM   Dressing Changed Changed/New 10/1/2019 11:30 AM   Drainage Amount Large 10/1/2019 11:30 AM   Drainage Description Serosanguinous 10/1/2019 11:30 AM   Output (ml) 110 ml 10/1/2019  5:33 AM   Wound Assessment Pink;Yellow;Red 10/1/2019 11:30 AM   Annika-wound Assessment Intact 10/1/2019 11:30 AM   Odor None 10/1/2019 11:30 AM   Number of days: 2       Negative Pressure Wound Therapy Coccyx (Active)   Wound Type Pressure ulcer: Stage IV 10/1/2019 11:30 AM   Unit Type KCI 10/1/2019 11:30 AM   Dressing Type Silver impregnated foam 10/1/2019 11:30 AM   Number of pieces used 3 10/1/2019 11:30 AM   Cycle Continuous 10/1/2019 11:30 AM   Target Pressure (mmHg) 125 10/1/2019 11:30 AM   Canister changed? No 10/1/2019 11:30 AM   Dressing Status Changed 10/1/2019 11:30 AM   Dressing Changed Changed/New 10/1/2019 11:30 AM   Drainage Amount Large 10/1/2019 11:30 AM   Drainage Description Serosanguinous 10/1/2019 11:30 AM   Wound Assessment Black;Red;Yellow 10/1/2019 11:30 AM   Annika-wound Assessment Yellow-brown; Maceration;Pink;Red 10/1/2019 11:30 AM   Number of days: 0       Wound 09/21/19 Coccyx (Active)   Wound Pressure Stage  4 10/1/2019 11:30 AM   Dressing Status Changed 10/1/2019 11:30 AM   Dressing Changed Changed/New 10/1/2019 11:30 AM   Dressing/Treatment Other (comment) 10/1/2019  8:30 AM   Wound Cleansed Rinsed/Irrigated with saline 10/1/2019 11:30 AM   Dressing Change Due 09/28/19 9/30/2019  9:50 AM   Wound Length (cm) 9 cm 10/1/2019 11:30 AM   Wound Width (cm) 8 cm 10/1/2019 11:30 AM   Wound Depth (cm) 5 cm 10/1/2019 11:30 AM   Wound Surface Area (cm^2) 72 cm^2 10/1/2019 11:30 AM   Change in Wound Size % (l*w) 21.74 10/1/2019 11:30 AM   Wound Volume (cm^3) 360 cm^3 10/1/2019 11:30 AM   Wound Healing % -161 10/1/2019 11:30 AM   Distance Tunneling (cm) 0 cm 10/1/2019 11:30 AM   Tunneling Position ___ O'Clock 0 10/1/2019 11:30 AM   Undermining Starts ___ O'Clock 9 10/1/2019 11:30 AM   Undermining Ends___ O'Clock 5 10/1/2019 11:30 AM   Undermining Maxium Distance (cm) 4.2 10/1/2019 11:30 AM   Wound Assessment Black;Red;Yellow 10/1/2019 11:30 AM   Drainage Amount Large 10/1/2019 11:30 AM   Drainage Description Serosanguinous 10/1/2019 11:30 AM   Odor None 10/1/2019 11:30 AM   Margins Defined edges 10/1/2019 11:30 AM   Exposed structure Muscle 10/1/2019 11:30 AM   Annika-wound Assessment Maceration;Red;Yellow-brown 10/1/2019 11:30 AM   Non-staged Wound Description Full thickness 10/1/2019 11:30 AM   Red%Wound Bed 50 10/1/2019 11:30 AM   Yellow%Wound Bed 40 10/1/2019 11:30 AM   Black%Wound Bed 10 10/1/2019 11:30 AM   Purple%Wound Bed 80 9/30/2019  9:50 AM   Number of days: 10       Wound 09/21/19 Hip Left cluster/DTI (Active)   Wound Other 9/26/2019  7:30 AM   Dressing Status Clean;Dry; Intact 10/1/2019  8:30 AM   Dressing Changed Changed/New 10/1/2019  8:30 AM   Dressing/Treatment Other (comment) 10/1/2019  8:30 AM   Wound Cleansed Rinsed/Irrigated with saline 9/26/2019  7:30 AM   Dressing Change Due 09/27/19 10/1/2019  8:30 AM   Wound Length (cm) 8 cm 9/24/2019 11:00 AM   Wound Width (cm) 6.5 cm 9/24/2019 11:00 AM   Wound Surface Area (cm^2) 52 cm^2 9/24/2019 11:00 AM   Distance Tunneling (cm) 0 cm 9/24/2019 11:00 AM   Tunneling Position ___ O'Clock 0 9/24/2019 11:00 AM   Undermining Starts ___ O'Clock 0 9/24/2019 11:00 AM   Undermining Ends___ O'Clock 0 9/24/2019 11:00 AM   Undermining Maxium Distance (cm) 0 9/24/2019 11:00 AM   Wound Assessment Purple;Pink;Red 10/1/2019  8:30 AM   Drainage Amount None 10/1/2019  8:30 AM   Drainage Description Serosanguinous 10/1/2019  8:30 AM   Odor None 10/1/2019  8:30 AM   Annika-wound Assessment Pink 10/1/2019  8:30 AM   Purple%Wound Bed 100 10/1/2019  8:30 AM   Number of days: 10       Wound 09/23/19 Ankle Anterior;Right;Posterior (Active)   Wound Pressure Stage  2 10/1/2019 11:30 AM   Dressing Status Reinforced 10/1/2019 11:30 AM   Dressing Changed Dressing reinforced 10/1/2019 11:30 AM   Dressing/Treatment Other (comment) 9/30/2019  9:50 AM   Wound Cleansed Rinsed/Irrigated with saline 9/23/2019  2:30 PM   Dressing Change Due 09/27/19 9/30/2019  9:50 AM   Wound Length (cm) 0.2 cm 10/1/2019 11:30 AM   Wound Width (cm) 0.2 cm 10/1/2019 11:30 AM   Wound Depth (cm) 0.1 cm 9/23/2019  2:30 PM   Wound Surface Area (cm^2) 0.04 cm^2 10/1/2019 11:30 AM   Change in Wound Size % (l*w) 90 10/1/2019 11:30 AM   Wound Volume (cm^3) 0.04 cm^3 9/23/2019  2:30 PM   Distance Tunneling (cm) 0 cm 10/1/2019 11:30 AM   Tunneling Position ___ O'Clock 0 10/1/2019 11:30 AM   Undermining Starts ___ O'Clock 0 10/1/2019 11:30 AM   Undermining Ends___ O'Clock 0 10/1/2019 11:30 AM   Undermining Maxium Distance (cm) 0 10/1/2019 11:30 AM   Wound Assessment Brown 10/1/2019 11:30 AM   Drainage Amount None 10/1/2019 11:30 AM   Drainage Description Serosanguinous 9/30/2019  9:50 AM   Odor None 10/1/2019 11:30 AM   Margins Attached edges 10/1/2019 11:30 AM   Annika-wound Assessment Pink 10/1/2019 11:30 AM   Non-staged Wound Description Full thickness 9/30/2019  9:50 AM   [place]%Wound Bed 50 9/30/2019  9:50 AM   Yellow%Wound Bed 50 9/30/2019  9:50 AM   Other%Wound Bed 100 10/1/2019 11:30 AM   Number of days: 7       Wound 10/01/19 Hip Right (Active)   Wound Non-Healing Surgical 10/1/2019 11:30 AM   Dressing Status Changed 10/1/2019 11:30 AM   Dressing Changed Changed/New 10/1/2019 11:30 AM   Wound Cleansed Rinsed/Irrigated with saline 10/1/2019 11:30 AM   Wound Length (cm) 22.5 cm 10/1/2019 11:30 AM   Wound Width (cm) 3.2 cm 10/1/2019 11:30 AM   Wound Depth (cm) 3.5 cm 10/1/2019 11:30 AM   Wound Surface Area (cm^2) 72 cm^2 10/1/2019 11:30 AM   Wound Volume (cm^3) 252 cm^3 10/1/2019 11:30 AM   Distance Tunneling (cm) 0 cm 10/1/2019 11:30 AM   Tunneling Position ___ O'Clock 0 10/1/2019 11:30 AM   Undermining Starts ___ O'Clock 0 10/1/2019 11:30 AM   Undermining Ends___ O'Clock 0 10/1/2019 11:30 AM   Undermining Maxium Distance (cm) 0 10/1/2019 11:30 AM   Wound Assessment Pink;Yellow;Red 10/1/2019 11:30 AM   Drainage Amount Large 10/1/2019 11:30 AM   Drainage Description Serosanguinous 10/1/2019 11:30 AM   Odor None 10/1/2019 11:30 AM   Margins Defined edges 10/1/2019 11:30 AM   Dash-wound Assessment Intact; Clean 10/1/2019 11:30 AM   Non-staged Wound Description Full thickness 10/1/2019 11:30 AM   [place]%Wound Bed 50 10/1/2019 11:30 AM   Red%Wound Bed 30 10/1/2019 11:30 AM   Yellow%Wound Bed 20 10/1/2019 11:30 AM   Number of days: 0       Response to treatment:  With complaints of pain. Pain Assessment:  Severity:  8/10  Quality of pain: sharp  Wound Pain Timing/Severity: moderate  Premedicated: yes    Plan:     Plan of Care:     [Wound 09/23/19 Ankle Anterior;Right;Posterior-Dressing/Treatment: (mepilex border)  Wound 10/01/19 Hip Right-Dressing/Treatment: (duoderm to dash wound, mepitel to distal, silver foam)  Wound 09/21/19 Coccyx-Dressing/Treatment: (aquacel AG to macerated edges, duoderm, stoma ring, silver )  Wound 09/21/19 Hip Left cluster/DTI-Dressing/Treatment: Other (comment)(mepilex)    Pt in bed. Positioned to left side in bed with pillows with max assist. Coccyx and right hip wounds Y connected to wound vac. Vac dressings removed. Large clot noted to distal right hip incision. No active bleeding noted. Coccyx edges macerated and moist. Mastisol and duoderm applied to right hip periwound.  Mepitel applied to wound base at distal end of wound. Silver foam applied. Coccyx wound distal edges Aquacel AG applied, duoderm, and drape. Stoma ring applied to above anus. Silver foam applied into wound base. Connected to vac 125 mmHg. Adequate seal obtained with extra draping applied. Pt not tolerate positioning well. Heels elevated off of bed. Left posterior ankle with dry eschar. Mepilex border peeled back and reapplied. Positioned to right side with pillow support. Specialty Bed Required : yes  [] Low Air Loss   [x] Pressure Redistribution  [] Fluid Immersion  [] Bariatric  [] Total Pressure Relief  [] Other:     Discharge Plan:  Placement for patient upon discharge: tbd  Hospice Care: no  Patient appropriate for Outpatient [address]: [facility]    Patient/Caregiver Teaching:  Level of patient/caregiver understanding able to:   Voiced understanding regarding wound care.         Electronically signed by Mary Mcgee RN,  on 10/1/2019 at 1:28 PM

## 2019-10-01 NOTE — PROGRESS NOTES
Physical Therapy    Physical Therapy Treatment Note  Name: Jose Granados MRN: [id number] :   1950   Date:  10/1/2019   Admission Date: 2019 [facility]  [id number]   Restrictions/Precautions:          Communication with other providers:  Per nurse ok to tx and notified at end of tx pt returned to bed and wound vac chamber almost full. Co-tx with CARL for safety and therapy aid also present . Subjective:  Patient states:  Pt needs encouragement and education to participate in tx. Pain:   Location, Type, Intensity (0/10 to 10/10): Pt reports \" not really having pain\" when asked at end of tx. Objective:    Observation:  Alert and oriented with wound vac on right hip  Treatment, including education/measures:  Bed placed in chair position x 7minutes to acclimate to sitting and CARL had pt doing modified sit-ups using bed rail. anf then pt was pushing feet against foot board for some resistance x 10 reps. Bed then returned to sup with HOB up and using trap on foot board and rails pt was able to transfer sup to sit with max/dependent of 3. Pt was able to sit EOB x 10 minutes with min assist of 2 to position legs for wide SHARON and then sba and cues to not lean to far forward and for posture. Pt also talked with one of his doctors while in sitting. Sit to sup with bed flat and dependent of 3; one at each leg and one at trunk. Scooting to [facility] with bed in trendelenburg and pt using bed rails and pushing with feet max assist of 2 using sheet. Safety  Patient left safely in the bed, with call light/phone in reach with alarm applied. Gait belt was used for transfers and gait. Assessment / Impression:       Patient's tolerance of treatment:  good   Adverse Reaction: na  Significant change in status and impact:  na  Barriers to improvement:  pt's large size, strength, and safety  Plan for Next Session:    Cont.  POC  Time in:  1310  Time out:  1403  Timed treatment minutes:  53  Total treatment time: 48    Previously filed items:  Social/Functional History  Additional Comments: Has been in SNF but had not progressed to gait or transfers. Has been mostly bed level since last hopistalizatoin. Short term goals  Time Frame for Short term goals: 1 week  Short term goal 1: Patient will perform supine to sit with mod A x2. Short term goal 2: Patient will perform STS with max A x2 and RW.   Short term goal 3: Patient will sit EOB x15 min mod I.       Electronically signed by:    Ramya Olguin PTA  10/1/2019, 8:32 AM

## 2019-10-01 NOTE — CARE COORDINATION
Anderson Briggs from [facility] pt approved to come whenever he is medically ready . Cm will continue to follow.

## 2019-10-01 NOTE — CARE COORDINATION
[address] Transitions Interview     10/1/2019    Patient: Christ Peres Patient : 1950   MRN: [id number]  Reason for Admission: wound infection  RARS: Readmission Risk Score: 32         Spoke with:  patient      Readmission Risk  Patient Active Problem List   Diagnosis    Hypertension    Hyperlipidemia    Asthma    Diabetes mellitus ([facility] [address]    H/O cardiovascular stress test    Obesity    Chronic kidney disease, stage III (moderate) (HCC)    Hypertensive kidney disease with CKD stage III ([facility] [address]    Depression    SOBOE (shortness of breath on exertion)    Abnormal cardiovascular stress test    Fracture of epiphysis (separation) (upper) of neck of femur, closed (HCC)    Pressure injury of skin of coccygeal region    Sepsis [facility])       Inpatient Assessment  Care Transitions Summary    Care Transitions Inpatient Review  Medication Review  Are you able to afford your medications?:  Yes  How often do you have difficulty taking your medications?:  I always take them as prescribed. Housing Review  Who do you live with?:  Alone  Are you an active caregiver in your home?:  No  Social Support  Do you have a ?:  No  Do you have a [address] [place] [address]?:  No  Durable Medical Equipment  Patient Home Equipment:  Wound Vac  Functional Review  Ability to seek help/take action for Emergent/Urgent situations i.e. fire, crime, inclement weather or health crisis. :  Dependent  Ability handle personal hygiene needs (bathing/dressing/grooming):  Dependent  Ability to manage medications:  Dependent  Ability to prepare food:  Dependent  Ability to maintain home (clean home, laundry):  Dependent  Ability to drive and/or has transportation:  Dependent  Ability to do shopping:  Dependent  Ability to manage finances:  Dependent  Is patient able to live independently?:  No  Hearing and Vision  Visual Impairment:  None  Hearing Impairment:  None  Care Transitions Interventions         Follow Up: Spoke with patient with no family present. Oriented to the role of Care Transition with business card given. Patient reports that he returned to the facility with infection to decubitus ulcer. Patient was at [facility] for short term rehab. Patient reports that he lives alone and plans to return to [facility] at discharge for PT/OT. Denies any questions or concerns. Patient is agreeable to continued Care Transition. Confirmed CTN contact information. Encouraged call back if needs arise.    Future Appointments   Date Time Provider Jammie May   5/15/2020  9:30 AM Merlinda Hawks, MD [id number] [facility]   6/15/2020  1:00 PM Archana Agarwal MD [facility]       Health Maintenance  Health Maintenance Due   Topic Date Due    Diabetic microalbuminuria test  01/27/2015    Lipid screen  01/27/2015       Meron Coon RN

## 2019-10-01 NOTE — PROGRESS NOTES
Normoactive bowel sounds. Ext: no clubbing, cyanosis, or edema  Catheter Site: without erythema or tenderness  Neuro: Mental status intact. CN 2-12 intact and no focal sensory or motor deficits     Radiologic / Imaging / TESTING  No results found.      Labs:    Recent Results (from the past 24 hour(s))   POCT Glucose    Collection Time: 09/30/19 11:59 AM   Result Value Ref Range    POC Glucose 184 (H) 70 - 99 MG/DL   POCT Glucose    Collection Time: 09/30/19  4:57 PM   Result Value Ref Range    POC Glucose 195 (H) 70 - 99 MG/DL   POCT Glucose    Collection Time: 09/30/19 10:52 PM   Result Value Ref Range    POC Glucose 173 (H) 70 - 99 MG/DL   POCT Glucose    Collection Time: 10/01/19  1:44 AM   Result Value Ref Range    POC Glucose 137 (H) 70 - 99 MG/DL   CBC auto differential    Collection Time: 10/01/19  5:25 AM   Result Value Ref Range    WBC 16.7 (H) 4.0 - 10.5 K/CU MM    RBC 3.50 (L) 4.6 - 6.2 M/CU MM    Hemoglobin 10.3 (L) 13.5 - 18.0 GM/DL    Hematocrit 33.5 (L) 42 - 52 %    MCV 95.7 78 - 100 FL    MCH 29.4 27 - 31 PG    MCHC 30.7 (L) 32.0 - 36.0 %    RDW 14.3 11.7 - 14.9 %    Platelets 457 044 - 723 K/CU MM    MPV 9.3 7.5 - 11.1 FL    Differential Type AUTOMATED DIFFERENTIAL     Segs Relative 80.7 (H) 36 - 66 %    Lymphocytes % 7.1 (L) 24 - 44 %    Monocytes % 6.9 (H) 0 - 4 %    Eosinophils % 2.9 0 - 3 %    Basophils % 0.5 0 - 1 %    Segs Absolute 13.5 K/CU MM    Lymphocytes Absolute 1.2 K/CU MM    Monocytes Absolute 1.2 K/CU MM    Eosinophils Absolute 0.5 K/CU MM    Basophils Absolute 0.1 K/CU MM    Nucleated RBC % 0.0 %    Total Nucleated RBC 0.0 K/CU MM    Total Immature Neutrophil 0.31 K/CU MM    Immature Neutrophil % 1.9 (H) 0 - 0.43 %   Renal Function Panel    Collection Time: 10/01/19  5:25 AM   Result Value Ref Range    Sodium 134 (L) 135 - 145 MMOL/L    Potassium 3.2 (L) 3.5 - 5.1 MMOL/L    Chloride 100 99 - 110 mMol/L    CO2 24 21 - 32 MMOL/L    Anion Gap 10 4 - 16    BUN 20 6 - 23 MG/DL CREATININE 1.3 0.9 - 1.3 MG/DL    Glucose 135 (H) 70 - 99 MG/DL    Calcium 7.8 (L) 8.3 - 10.6 MG/DL    GFR Non- 55 (L) >60 mL/min/1.73m2    GFR African American >60 >60 mL/min/1.73m2    Alb 2.1 (L) 3.4 - 5.0 GM/DL    Phosphorus 2.9 2.5 - 4.9 MG/DL   POCT Glucose    Collection Time: 10/01/19  7:45 AM   Result Value Ref Range    POC Glucose 109 (H) 70 - 99 MG/DL     CULTURE results: Invalid input(s): BLOOD CULTURE,  URINE CULTURE, SURGICAL CULTURE    Diagnosis:  Patient Active Problem List   Diagnosis    Hypertension    Hyperlipidemia    Asthma    Diabetes mellitus ([address]    H/O cardiovascular stress test    Obesity    Chronic kidney disease, stage III (moderate) (HCC)    Hypertensive kidney disease with CKD stage III (HCC)    Depression    SOBOE (shortness of breath on exertion)    Abnormal cardiovascular stress test    Fracture of epiphysis (separation) (upper) of neck of femur, closed (HCC)    Pressure injury of skin of coccygeal region    Sepsis ([address]       Active Problems  Principal Problem:    Sepsis ([address]  Active Problems:    Hypertension    Hyperlipidemia    Asthma    Diabetes mellitus ([address]    Obesity    Chronic kidney disease, stage III (moderate) (HCC)    Depression    Pressure injury of skin of coccygeal region  Resolved Problems:    * No resolved hospital problems.  *    Electronically signed by: Electronically signed by Martha Bean MD on 10/1/2019 at 8:58 AM

## 2019-10-01 NOTE — PROGRESS NOTES
Occupational Therapy  . Occupational Therapy Treatment Note  Name: Channing Guzman MRN: [id number] :   1950   Date:  10/1/2019   Admission Date: 2019 Room:  6109/7721-O     Restrictions/Precautions:    General precautions; Fall Risk    Communication with other providers:  Nurse Beny Judd cleared pt for Tx session. Co-Tx c PTA Morena Sahni    Subjective:  Patient states: \"Oh, can we try the sitting edge of bed next time? \"  Pt educated for role of OT and rationale for therapeutic intervention and c max encouragement for active participation as tolerated, pt agreeable to co-Tx. Pain:   Location, Type, Intensity (0/10 to 10/10):  6/10, R hip, RLE, tailbone    Objective:    Observation:  Pt received in semi-fowlers. Objective Measures:  Telemetry , RR 25, on room air c O2 2L NC active next to pt Rectal tube, Lujan catheter, Wound vac to R hip. Treatment, including education:  Therapeutic Activity Training:   Therapeutic activity training was instructed today. Cues were given for safety, sequence, UE/LE placement, awareness, and balance. Activities performed today included bed mobility training, sup-sit, c emphasis on sitting tolerance EOB. Rolling: Max A x2 + cues for use of bed features, including trapeze    Pt required Max A x2 + max cues for use of B bed rails to center in midline, as well as Max A x2 + cues for use of leg  to position BLE for pt to sit midline. Prior for attempt for supine to sit, pt positioned in chair position c cues for functional reaching on bed rails to pull self forward x5 reps c emphasis on BUE strengthening. Supine to sit: Max A to Dep x3 + sequential cues + bed features to sit EOB. Sit to supine: Dep x3  Scooting: Max A x3 to scoot to EOB, Max A x2 c bed in Trendelenburg + pt following cues for pull c BUE on headboard and push c LLE to reposition upward in bed.   Sitting balance / tolerance: Min A to SBA for intermittent assist to reposition LLE for wide SHARON and cues for pt to use BUE to sit midline at EOB, exhibited 10 minutes sitting tolerance. All therapeutic intervention performed c emphasis on bed mobility, BUE functional reaching, dynamic balance / sitting tolerance to inc strength, endurance and act tolerance for inc Indep c ADL tasks, and in preparation for functional transfers. Safety  Patient safely in bed, repositioned onto left side per nursing request at end of session, with call light/phone in reach, and nursing aware. Assessment / Impression:        Patient's tolerance of treatment:  Well   Adverse Reaction: None  Significant change in status and impact:  None  Barriers to improvement:  Pain, wound L hip, generalized weakness    Plan for Next Session:    Continue per OT POC c plan to continue addressing bed mobility c emphasis on EOB activity, including ADL participation. Time in:  1310  Time out:  1403  Timed treatment minutes:  53  Total treatment time:  53    Electronically signed by:    LOREN Zacarias  10/1/2019, 1:16 PM    Previously filed values:    Goals:  By d/c or goals met:  Pt will perform all bed mobility with ModAx2 in prep for EOB/OOB activity. Pt will perform all functional transfers with MaxAx2 and appropriate use of LRD to bed, toilet, chair in prep for increased functional independence. Pt will perform UB ADLs with CGA in seated EOB to increase functional independence. Pt will maintain standing c RW 30 seconds in prep for ADL and gait. Pt will participate in therex/therax c emphasis on strength, activity tolerance,  safety, DANIA tasks.

## 2019-10-01 NOTE — PLAN OF CARE
Problem: Falls - Risk of:  Goal: Will remain free from falls  Description  Will remain free from falls  Outcome: Ongoing  Goal: Absence of physical injury  Description  Absence of physical injury  Outcome: Ongoing     Problem: Risk for Impaired Skin Integrity  Goal: Tissue integrity - skin and mucous membranes  Description  Structural intactness and normal physiological function of skin and  mucous membranes.   Outcome: Ongoing     Problem: Discharge Planning:  Goal: Discharged to appropriate level of care  Description  Discharged to appropriate level of care  Outcome: Ongoing     Problem: Gas Exchange - Impaired:  Goal: Levels of oxygenation will improve  Description  Levels of oxygenation will improve  Outcome: Ongoing     Problem: Infection, Septic Shock:  Goal: Will show no infection signs and symptoms  Description  Will show no infection signs and symptoms  Outcome: Ongoing     Problem: Serum Glucose Level - Abnormal:  Goal: Ability to maintain appropriate glucose levels will improve  Description  Ability to maintain appropriate glucose levels will improve  Outcome: Ongoing     Problem: Tissue Perfusion, Altered:  Goal: Circulatory function within specified parameters  Description  Circulatory function within specified parameters  Outcome: Ongoing     Problem: Venous Thromboembolism:  Goal: Will show no signs or symptoms of venous thromboembolism  Description  Will show no signs or symptoms of venous thromboembolism  Outcome: Ongoing  Goal: Absence of signs or symptoms of impaired coagulation  Description  Absence of signs or symptoms of impaired coagulation  Outcome: Ongoing     Problem: Pain:  Goal: Pain level will decrease  Description  Pain level will decrease  Outcome: Ongoing  Goal: Control of acute pain  Description  Control of acute pain  Outcome: Ongoing  Goal: Control of chronic pain  Description  Control of chronic pain  Outcome: Ongoing     Problem: Nutrition  Goal: Optimal nutrition

## 2019-10-01 NOTE — PROGRESS NOTES
[address]  consulted by Dr. Jericho Burkett for monitoring and adjustment. Indication for treatment: S/p R hemiarthroplasty 8/29/19, sacral decubitus ulcer   Goal trough: 10-15 mcg/mL     Pertinent Laboratory Values:   Temp Readings from Last 3 Encounters:   10/01/19 97.7 °F (36.5 °C) (Oral)   09/29/19 97.8 °F (36.6 °C)   09/02/19 97.9 °F (36.6 °C) (Oral)     Recent Labs     09/29/19  0526 09/30/19  0553 10/01/19  0525   WBC 20.8* 20.1* 16.7*     Recent Labs     09/29/19  0526 09/30/19  0553 10/01/19  0525   BUN 14 17 20   CREATININE 1.0 1.2 1.3     Estimated Creatinine Clearance: 96 mL/min (based on SCr of 1.3 mg/dL). Intake/Output Summary (Last 24 hours) at 10/1/2019 0934  Last data filed at 10/1/2019 0533  Gross per 24 hour   Intake 1655 ml   Output 3585 ml   Net -1930 ml       Pertinent Cultures:  Date    Source    Results  9/20   Blood    Proteus   9/29   R hip culture   Staph aureus  9/29   Sacral culture   Enterococcus     Vancomycin level:   TROUGH:    Recent Labs     09/30/19  0553   VANCOTROUGH 18.5     RANDOM:  No results for input(s): VANCORANDOM in the last 72 hours. Assessment:  · WBC and temperature: slowly trending down   · SCr, BUN, and urine output: stable  · Day(s) of therapy: 12  · Vancomycin level: therapeutic    Plan:  · Mr. Niall Judge continues on vancomycin 1500 mg q18h IVPB   · Repeat trough in several days   · Pharmacy will continue to monitor patient and adjust therapy as indicated    Thank you for the consult.   Frannie Soliz RPh  10/1/2019 9:34 AM

## 2019-10-01 NOTE — PROGRESS NOTES
Nephrology Progress Note  10/1/2019 5:46 PM  Subjective:   Admit Date: 9/20/2019  PCP: Patti Sneed MD  Interval History:  Pt doing ok overall    Diet: Dietary Nutrition Supplements: Wound Healing Oral Supplement  DIET GENERAL;  Pain is: Moderate      Data:   Scheduled Meds:   insulin lispro  0-6 Units Subcutaneous TID WC    metroNIDAZOLE  500 mg Intravenous Q8H    insulin glargine  75 Units Subcutaneous Nightly    insulin lispro  0-6 Units Subcutaneous 2 times per day    vancomycin  1,500 mg Intravenous Q18H    aspirin  81 mg Oral Daily    mometasone-formoterol  2 puff Inhalation BID    buPROPion  300 mg Oral Daily    canagliflozin  300 mg Oral Daily    ezetimibe  10 mg Oral Daily    fluticasone  1 spray Each Nostril Daily    ocuvite-lutein  1 tablet Oral Daily    pantoprazole  40 mg Oral QAM AC    sertraline  100 mg Oral BID    simvastatin  40 mg Oral Daily    b complex-C-E-zinc  1 tablet Oral Daily    Dulaglutide  1.5 mg Subcutaneous Weekly    vitamin C  1,000 mg Oral Daily    vitamin E  400 Units Oral Daily    sodium chloride flush  10 mL Intravenous 2 times per day    enoxaparin  40 mg Subcutaneous Daily    cefepime  2 g Intravenous Q8H     Continuous Infusions:   sodium chloride 1,000 mL (10/01/19 1744)    dextrose       PRN Meds:HYDROmorphone, glucose, dextrose, glucagon (rDNA), dextrose, acetaminophen, albuterol sulfate HFA, nitroGLYCERIN, sodium chloride flush, traMADol **OR** traMADol, ondansetron  I/O last 3 completed shifts: In: 2135 [P.O.:480; I.V.:1205; IV Piggyback:450]  Out: 3234 [Urine:2675; Drains:260; Stool:500]  No intake/output data recorded.     Intake/Output Summary (Last 24 hours) at 10/1/2019 1746  Last data filed at 10/1/2019 1048  Gross per 24 hour   Intake 2135 ml   Output 2535 ml   Net -400 ml     CBC:   Recent Labs     09/29/19  0526 09/30/19  0553 10/01/19  0525   WBC 20.8* 20.1* 16.7*   HGB 11.1* 10.4* 10.3*    374 337     BMP:    Recent Labs 09/29/19  0526 09/30/19  0553 10/01/19  0525    140 134*   K 3.4* 3.8 3.2*    104 100   CO2 24 25 24   BUN 14 17 20   CREATININE 1.0 1.2 1.3   GLUCOSE 146* 132* 135*     Hepatic:   No results for input(s): AST, ALT, ALB, BILITOT, ALKPHOS in the last 72 hours. Troponin: No results for input(s): TROPONINI in the last 72 hours. BNP: No results for input(s): BNP in the last 72 hours. Lipids: No results for input(s): CHOL, HDL in the last 72 hours. Invalid input(s): LDLCALCU  ABGs:   Lab Results   Component Value Date    PO2ART 89 09/20/2019    SZI8DUH 32.0 09/20/2019     INR: No results for input(s): INR in the last 72 hours.   Renal Labs  Albumin:    Lab Results   Component Value Date    LABALBU 2.1 10/01/2019     Calcium:    Lab Results   Component Value Date    CALCIUM 7.8 10/01/2019     Phosphorus:    Lab Results   Component Value Date    PHOS 2.9 10/01/2019     U/A:    Lab Results   Component Value Date    NITRU NEGATIVE 09/21/2019    NITRU Negative 10/24/2018    COLORU MICHAEL 09/21/2019    PHUR 5.5 10/24/2018    LABCAST Present 12/12/2016    LABCAST Hyaline casts 12/12/2016    WBCUA 125 09/21/2019    RBCUA 29 09/21/2019    MUCUS RARE 09/21/2019    TRICHOMONAS NONE SEEN 09/06/2019    YEAST Present 12/12/2016    BACTERIA NEGATIVE 09/21/2019    CLARITYU SLIGHTLY CLOUDY 09/21/2019    SPECGRAV 1.010 09/21/2019    UROBILINOGEN <2.0 09/21/2019    BILIRUBINUR NEGATIVE 09/21/2019    BLOODU MODERATE 09/21/2019    GLUCOSEU 3+ 10/24/2018    KETUA NEGATIVE 09/21/2019     ABG:    Lab Results   Component Value Date    IWH7EWR 32.0 09/20/2019    PO2ART 89 09/20/2019    LZZ4UNX 22.2 09/20/2019     HgBA1c:    Lab Results   Component Value Date    LABA1C 6.1 09/04/2019     Microalbumen/Creatinine ratio:  No components found for: RUCREAT          Objective:   Vitals: BP (!) 158/68   Pulse 96   Temp 99.3 °F (37.4 °C) (Oral)   Resp 30   Ht 6' 3\" (1.905 m)   Wt (!) 407 lb 8 oz (184.8 kg)   SpO2 95%   BMI 50.93 kg/m²   General appearance: awake weak  HEENT: Head: Normal, normocephalic, atraumatic.   Neck: supple, symmetrical, trachea midline  Lungs: diminished breath sounds bilaterally  Heart: S1, S2 normal  Abdomen: abnormal findings:  soft nt obese  Extremities: edema + tender surgery site better  Neurologic: Mental status: alertness: awake      Patient Active Problem List:     Hypertension     Hyperlipidemia     Asthma     Diabetes mellitus ([address]     H/O cardiovascular stress test     Obesity     Chronic kidney disease, stage III (moderate) (HCC)     Hypertensive kidney disease with CKD stage III (HCC)     Depression     SOBOE (shortness of breath on exertion)     Abnormal cardiovascular stress test     Fracture of epiphysis (separation) (upper) of neck of femur, closed (HCC)     Pressure ulcer of coccygeal region, unstageable (HCC)     Sepsis (HCC)    Assessment and Plan:      IMP:  1 septic shock  2 s/p hip surgery with wound infection  3 arf from atn/contrast on ckd 3  4 anxiety/confusion  5 resp distress with hypoxia    Plan     1 on abx and bp stable  2 replete K and monitor na  3 renal stable  4 wound care  5 o2 stable  Work on rehab                   Silvia Reece MD

## 2019-10-02 NOTE — PROGRESS NOTES
Progress note    Nilay Menchaca    10/2/2019    Subjective:     Patient on wound vac for his hip and decub ulcers. Medication side effects: none. Scheduled Meds:   insulin glargine  65 Units Subcutaneous Nightly    ampicillin-sulbactam  3 g Intravenous Q6H    anidulafungin  200 mg Intravenous Once    And    [START ON 10/3/2019] anidulafungin  100 mg Intravenous Q24H    doxycycline hyclate  100 mg Oral 2 times per day    insulin lispro  0-6 Units Subcutaneous TID WC    insulin lispro  0-6 Units Subcutaneous 2 times per day    aspirin  81 mg Oral Daily    mometasone-formoterol  2 puff Inhalation BID    buPROPion  300 mg Oral Daily    canagliflozin  300 mg Oral Daily    ezetimibe  10 mg Oral Daily    fluticasone  1 spray Each Nostril Daily    ocuvite-lutein  1 tablet Oral Daily    pantoprazole  40 mg Oral QAM AC    sertraline  100 mg Oral BID    simvastatin  40 mg Oral Daily    b complex-C-E-zinc  1 tablet Oral Daily    Dulaglutide  1.5 mg Subcutaneous Weekly    vitamin C  1,000 mg Oral Daily    vitamin E  400 Units Oral Daily    sodium chloride flush  10 mL Intravenous 2 times per day    enoxaparin  40 mg Subcutaneous Daily     Continuous Infusions:   sodium chloride 1,000 mL (10/01/19 1744)    dextrose       PRN Meds:HYDROmorphone, glucose, dextrose, glucagon (rDNA), dextrose, acetaminophen, albuterol sulfate HFA, nitroGLYCERIN, sodium chloride flush, traMADol **OR** traMADol, ondansetron    Review of Systems  Pertinent items are noted in HPI. Objective:     Patient Vitals for the past 8 hrs:   BP Temp Temp src Pulse Resp SpO2   10/02/19 0938 (!) 124/55 98.6 °F (37 °C) Oral 88 26 94 %   10/02/19 0809 -- -- -- -- 23 --     I/O last 3 completed shifts: In: 2635.8 [P.O.:480; I.V.:1205.8;  IV Piggyback:950]  Out: 2214 [Urine:1075; Drains:135]  I/O this shift:  In: 240 [P.O.:240]  Out: -     BP (!) 124/55   Pulse 88   Temp 98.6 °F (37 °C) (Oral)   Resp 26   Ht 6' 3\" (1.905 m)   Wt (!) 407 lb 8 oz (184.8 kg)   SpO2 94%   BMI 50.93 kg/m²   General appearance: alert and cooperative. Lungs: clear to auscultation bilaterally  Heart: regular rate and rhythm, S1, S2 normal, no murmur, click, rub or gallop  Abdomen: soft, non-tender; bowel sounds normal; no masses,  no organomegaly  Extremities: extremities normal, atraumatic, no cyanosis or edema  Skin: Skin color, texture, turgor normal. No rashes or lesions        Labs and imaging reviewed.    CBC with Differential:    Lab Results   Component Value Date    WBC 17.9 10/02/2019    RBC 3.63 10/02/2019    HGB 10.8 10/02/2019    HCT 34.9 10/02/2019     10/02/2019    MCV 96.1 10/02/2019    MCH 29.8 10/02/2019    MCHC 30.9 10/02/2019    RDW 14.0 10/02/2019    SEGSPCT 84.1 10/02/2019    BANDSPCT 3 09/29/2019    LYMPHOPCT 6.0 10/02/2019    PROMYELOPCT 2 09/26/2019    MONOPCT 6.0 10/02/2019    MYELOPCT 1 09/29/2019    BASOPCT 0.4 10/02/2019    MONOSABS 1.1 10/02/2019    LYMPHSABS 1.1 10/02/2019    EOSABS 0.3 10/02/2019    BASOSABS 0.1 10/02/2019    DIFFTYPE AUTOMATED DIFFERENTIAL 10/02/2019     CMP:    Lab Results   Component Value Date     10/02/2019    K 3.3 10/02/2019     10/02/2019    CO2 25 10/02/2019    BUN 24 10/02/2019    CREATININE 1.2 10/02/2019    GFRAA >60 10/02/2019    LABGLOM >60 10/02/2019    GLUCOSE 105 10/02/2019    PROT 6.1 09/20/2019    LABALBU 2.4 10/02/2019    CALCIUM 7.8 10/02/2019    BILITOT 1.1 09/20/2019    ALKPHOS 143 09/20/2019     09/20/2019    ALT 98 09/20/2019     BMP:    Lab Results   Component Value Date     10/02/2019    K 3.3 10/02/2019     10/02/2019    CO2 25 10/02/2019    BUN 24 10/02/2019    LABALBU 2.4 10/02/2019    CREATININE 1.2 10/02/2019    CALCIUM 7.8 10/02/2019    GFRAA >60 10/02/2019    LABGLOM >60 10/02/2019    GLUCOSE 105 10/02/2019     Sodium:    Lab Results   Component Value Date     10/02/2019     Potassium:    Lab Results   Component Value Date    K 3.3 10/02/2019 Calcium:    Lab Results   Component Value Date    CALCIUM 7.8 10/02/2019     Warfarin PT/INR:  No components found for: [facility]  PTT:    Lab Results   Component Value Date    APTT 29.1 05/03/2017   [APTT  Last 3 Troponin:  No results found for: TROPONINI  ABG:    Lab Results   Component Value Date    MOU7VRE 32.0 09/20/2019    PO2ART 89 09/20/2019    ZWY7HYT 22.2 09/20/2019         Assessment:     Principal Problem:    Sepsis ([facility]  Active Problems:    Hypertension    Hyperlipidemia    Asthma    Diabetes mellitus ([facility]    Obesity    Chronic kidney disease, stage III (moderate) ([facility])    Depression    Pressure injury of skin of coccygeal region    PVC (premature ventricular contraction)  Resolved Problems:    * No resolved hospital problems. *      Plan:   Decubitus Ulcer-status post excisional debridement of sacral pressure ulcer on 9/23/2019; status post hip incision and drainage, decubitus ulcer debridement, removal of the coccyx, wound VAC placement on 9/29/2019.    Osteomyelitis Coccyx  Enterococcus Sacral wound  Staph aureus-Hip wound  Noted ID recommendation to continue Metronidazole, Cefipime and Vanc  Noted cardiology note on PVC  He is progressing with PT      Gisela TORRES M.D.  10/2/2019

## 2019-10-02 NOTE — ADT AUTH CERT
[facility] PROGRESS    OR on 19    OR note:    PATIENT NAME: Kiet Rasmussen                      :        1950    MED REC NO:   [id number]                          ROOM:       4024    ACCOUNT NO:   [de-identified]                           ADMIT DATE: 2019    PROVIDER:     Yessica Bocanegra MD         DATE OF PROCEDURE:  2019         PREOPERATIVE DIAGNOSIS:  Right hip cellulitis/abscess.         POSTOPERATIVE DIAGNOSIS:  Right hip cellulitis/abscess.         PROCEDURE PERFORMED:  Right hip incision irrigation and debridement.         SURGEON: Yessica Bocanegra MD         ASSISTANT: Hamzah Castrejon PA-C         ANESTHESIA:  General.         ESTIMATED BLOOD LOSS:  Wound VAC applied.         COMPLICATIONS:  None.         DISPOSITION:  Stable to recovery.         INDICATIONS FOR PROCEDURE:  The patient is a 71-year-old male who was    treated approximately three weeks ago for right hip fracture.  The    patient also developed a large decubitus ulcer and has undergone    decubitus ulcer debridement.  The patient developed increased drainage    at the proximal aspect of the incision.  The patient is also noted to    have elevated white count despite being on multiple antibiotics and with    no identified persistent source at the sacral ulceration.  I recommended    formal irrigation and debridement of the skin incision due to the    increased drainage.  The patient elected to proceed with surgery    following our detailed discussions.         TECHNIQUE:  Following administration of a general anesthetic, timeout    was called verifying right hip as the operative site indicating that    preoperative antibiotics had been given.  The patient was placed in the    lateral decubitus position with the right hip up and the right hip was    prepped and draped in a sterile fashion.  The previously utilized right    hip skin incision all staples were removed and the skin incision was    fully opened and developed.  Copious amounts of fluid and tissue    consistent with previous hematoma/soft tissue trauma was encountered. Deep cultures were obtained. Gearline Yumi was no frankly visible pyogenic    material.  Following 3 liters of irrigation with a Pulsavac, the soft    tissue envelope appeared well granulated and healthy appearing.  The IT    band failed to fully heal distally.  A silver VAC dressing was applied. The IT band deficit will be addressed at second look in 48 hours.  Once    the [facility] dressing was fully placed and functioning, the patient was    readied for decubitus ulcer evaluation and treatment by Dr. Inna Mckay MD         Vs:    BP (!) 122/58   Pulse 86   Temp 98 °F (36.7 °C) (Oral)   Resp 18   Ht 6' 3\"    Physical Exam:         General Appearance:  Poor  In bed    Head: normocephalic         Eyes: normal, noninjected conjunctiva         ENT: normal mucosa, noninjected throat, normal         NECK: No JVP    No thyromegaly                   Cardiovascular: No thrills palpated     Auscultation: Normal S1 and S2,  no murmur     carotid bruit no     Abdominal Aorta no bruit         Respiratory:      Breath sounds Diminshed bilaterally         Extremities:  2+ Edema clubbing ,   no cyanosis         SKIN: Warm and well perfused, no pallor or cyanosis         Vascular exam:  Pedal Pulses: palp  bilaterally               Abdomen:  No masses or tenderness. No organomegaly noted.         Neurological:  Oriented to time, place, and person    No focal neurological deficit noted.     Psychiatric:normal mood, no anxiety    Radiology testing/Labs pertinent:    Results for Rosana Randolph (MRN [id number]) as of 9/30/2019 17:21    9/29/2019 05:26    Sodium: 135    Potassium: 3.4 (L)    Chloride: 101    CO2: 24    BUN: 14    Creatinine: 1.0    Anion Gap: 10    GFR Non-: >60    GFR African American: >60    Glucose: 146 (H)    Calcium: 7.8 (L)    Phosphorus: 2.2 (L) Albumin: 2.3 (L)    WBC: 20.8 (H)    RBC: 3.72 (L)    Hemoglobin Quant: 11.1 (L)    Hematocrit: 35.1 (L)    MCV: 94.4    MCH: 29.8    MCHC: 31.6 (L)    MPV: 9.2    RDW: 13.9    Platelet Count: 484    Lymphocyte %: 7.0 (L)    Monocytes %: 12.0 (H)    Eosinophils %: 1.0    Lymphocytes Absolute: 1.5    Monocytes Absolute: 2.5    Eosinophils Absolute: 0.2    Differential Type: MANUAL DIFFERENTIAL    Segs Relative: 76.0 (H)    Segs Absolute: 15.8    Bands Relative: 3 (L)    Bands Absolute: 0.62    Myelocytes Absolute: 0.21    Myelocyte Percent: 1 (H)    Polychromasia: 1+    Macrocytes: 1+    9/30/19 labs    Results for Prabhu Soni (MRN [id number]) as of 9/30/2019 17:21    9/30/2019 05:53    Sodium: 140    Potassium: 3.8    Chloride: 104    CO2: 25    BUN: 17    Creatinine: 1.2    Anion Gap: 11    GFR Non-: >60    GFR African American: >60    Glucose: 132 (H)    Calcium: 7.8 (L)    Phosphorus: 3.1    Albumin: 2.3 (L)    Vancomycin Tr: 18.5    DOSE AMOUNT: DOSE AMT.  GIVEN - 1500mg    DOSE TIME: DOSE TIME GIVEN - 0600    WBC: 20.1 (H)    RBC: 3.47 (L)    Hemoglobin Quant: 10.4 (L)    Hematocrit: 33.7 (L)    MCV: 97.1    MCH: 30.0    MCHC: 30.9 (L)    MPV: 9.2    RDW: 14.2    Platelet Count: 039    Lymphocyte %: 10.0 (L)    Eosinophils %: 3.0    Lymphocytes Absolute: 2.0    Eosinophils Absolute: 0.6    Differential Type: MANUAL DIFFERENTIAL    Segs Relative: 87.0 (H)    Segs Absolute: 17.5    Meds-name, dose, route,rate    Scheduled Meds:insulin lispro, 0-6 Units, Subcutaneous, TID WC    metroNIDAZOLE, 500 mg, Intravenous, Q8H    insulin glargine, 75 Units, Subcutaneous, Nightly    insulin lispro, 0-6 Units, Subcutaneous, 2 times per day    vancomycin, 1,500 mg, Intravenous, Q18H    collagenase, , Topical, BID    aspirin, 81 mg, Oral, Daily    mometasone-formoterol, 2 puff, Inhalation, BID    buPROPion, 300 mg, Oral, Daily    canagliflozin, 300 mg, Oral, Daily    ezetimibe, 10 mg, Oral, Daily fluticasone, 1 spray, Each Nostril, Daily    ocuvite-lutein, 1 tablet, Oral, Daily    pantoprazole, 40 mg, Oral, QAM AC    sertraline, 100 mg, Oral, BID    simvastatin, 40 mg, Oral, Daily    b complex-C-E-zinc, 1 tablet, Oral, Daily    Dulaglutide, 1.5 mg, Subcutaneous, Weekly    vitamin C, 1,000 mg, Oral, Daily    vitamin E, 400 Units, Oral, Daily    sodium chloride flush, 10 mL, Intravenous, 2 times per day    enoxaparin, 40 mg, Subcutaneous, Daily    cefepime, 2 g, Intravenous, Q8H    Continuous Infusions:sodium chloride Last Rate: 1,000 mL (09/29/19 1228)    dextrose    PRN Meds:. HYDROmorphone, glucose, dextrose, glucagon (rDNA), dextrose, acetaminophen, albuterol sulfate HFA, nitroGLYCERIN, sodium chloride flush, traMADol **OR** traMADol, ondansetron    Treatment plan-attending, consults    Cardio:    Assessment/Recommendations:          - PVCs  check lytes holter, give mag    - Normal EF    - LHC has been normal  Multiple times    - liimited  echo for IE as has ?  Pos bld Cx         Nephrology:    Assessment and Plan:              IMP:    1 septic shock    2 s/p hip surgery with wound infection    3 arf from atn/contrast on ckd 3    4 anxiety/confusion    5 resp distress with hypoxia         Plan      1 afebrile, wbc improved monitor    2 fu wound care and plan as pain better    3 renal improved to baseline    4 affect stable today    5 o2 stable    Work on [employer] planning                   General Surgery:    Assessment:         76 y/o M POD s/p excisional debridement down to bone of infected pressure ulcer (9/23) and repeat I&D of right hip (9/29) and repeat debridement and excision of coccyx (9/29)         Plan:              -Wound vac care    -Abx    -F/u cx    -ID c/s    -Medical mgt    -PT/OT    Orders       Sepsis and Other Febrile Illness, without Focal Infection - Care Day 7 (9/27/2019) by Shoshana Nguyen RN         Review Status Review Entered   Completed 9/30/2019 17:25       Criteria Review      Care Hemoglobin Quant: 10.8 (L)    Hematocrit: 34.6 (L)    MCV: 95.3    MCH: 29.8    MCHC: 31.2 (L)    MPV: 9.6    RDW: 13.7    Platelet Count: 007    Lymphocyte %: 9.0 (L)    Monocytes %: 6.0 (H)    Lymphocytes Absolute: 2.2    Monocytes Absolute: 1.5    Differential Type: MANUAL DIFFERENTIAL    Segs Relative: 83.0 (H)    Segs Absolute: 20.7    Myelocytes Absolute: 0.50    Myelocyte Percent: 2 (H)    Meds-name, dose, route,rate    Scheduled Meds:insulin lispro, 0-6 Units, Subcutaneous, TID WC    metroNIDAZOLE, 500 mg, Intravenous, Q8H    insulin glargine, 75 Units, Subcutaneous, Nightly    insulin lispro, 0-6 Units, Subcutaneous, 2 times per day    vancomycin, 1,500 mg, Intravenous, Q18H    collagenase, , Topical, BID    aspirin, 81 mg, Oral, Daily    mometasone-formoterol, 2 puff, Inhalation, BID    buPROPion, 300 mg, Oral, Daily    canagliflozin, 300 mg, Oral, Daily    ezetimibe, 10 mg, Oral, Daily    fluticasone, 1 spray, Each Nostril, Daily    ocuvite-lutein, 1 tablet, Oral, Daily    pantoprazole, 40 mg, Oral, QAM AC    sertraline, 100 mg, Oral, BID    simvastatin, 40 mg, Oral, Daily    b complex-C-E-zinc, 1 tablet, Oral, Daily    Dulaglutide, 1.5 mg, Subcutaneous, Weekly    vitamin C, 1,000 mg, Oral, Daily    vitamin E, 400 Units, Oral, Daily    sodium chloride flush, 10 mL, Intravenous, 2 times per day    enoxaparin, 40 mg, Subcutaneous, Daily    cefepime, 2 g, Intravenous, Q8H    Continuous Infusions:sodium chloride Last Rate: 1,000 mL (09/29/19 1228)    dextrose    PRN Meds:. HYDROmorphone, glucose, dextrose, glucagon (rDNA), dextrose, acetaminophen, albuterol sulfate HFA, nitroGLYCERIN, sodium chloride flush, traMADol **OR** traMADol, ondansetron    Treatment plan-attending, consults:    Internal Medicine:    Assessment and Plan:    Principal Problem:    Sepsis ([facility] [address] - Secondary to Proteus Mirabilis, source Urinary Tract, urine and blood c/s +ve for Proteus Mirabilis    ARF on CKD, stable    UTI - proteus, on antibioitcs    Decubitus Ulcer- s/p debridement    Discharge Planning - to SNF, working on bed availability    D/w Nursing staff,  antibiotics to be changed to PO by surgeon prior to discharge,since she has declined to give input, will consult ID. Patient will be on cipro for urosepsis on discharge. If ID input is not obtained, will consider discharge on Bactrim , since the old wound c/s showing staph aureus and urine and Blood c/s showing proteus are both sensitive to bactrim. Staff to reach out to orthopedic to remove stable from hip surgery. S/p hip fracture: d/w nurse, reach out to [facility] again, see if the staples can be removed.         General Surgery:    Assessment:         78 y/o M POD 4 s/p excisional debridement down to bone of infected pressure ulcer         Plan:              -Leukocytosis Concerned there is involvement of underlying bone (coccyx /sacrum). Apparently no ID available until next week at earliest. Ortho was c/s'd but do not see note from them.         -D/w pt's nurse recommendation to facility where ID is available for concern for osteo.         Nephrology:    Impression :         1. VIRGILIO/ CKD stage 3- better    2. LE edema - d/c IVF    3. Underlying multiple d z- Dm etc    4. recent hip sx and infection -         Recommendation/Plan  :         1. D.C IVF    2. Po food/ fluid' adjust abx as more data becomes available    3.  Good BS control    4. 'follow clinically    Orders       Sepsis and Other Febrile Illness, without Focal Infection - Care Day 5 (9/25/2019) by Kaykay Steele RN         Review Status Review Entered   Completed 9/30/2019 17:20       Criteria Review      Care Day: 5 Care Date: 9/25/2019 Level of Care:    Guideline Day 2    Clinical Status    (X) * Hemodynamic stability    (X) * Hypoxemia absent    ( ) * Tachypnea absent    Activity    (X) Activity as tolerated    9/30/2019 5:20 PM EDT by Boy Hurtado pt/ot ordered    Routes    (X) Possible IV fluids 9/30/2019 5:20 PM EDT by Jadyn Quintana @ 50    (X) Parenteral or oral medications    (X) Diet as tolerated    Interventions    (X) WBC    9/30/2019 5:20 PM EDT by Mauro Joyner      wbc 20.1    Medications    ( ) Possible antimicrobial treatment    9/30/2019 5:20 PM EDT by Mauro Joyner      iv cefipime TID    (X) Possible DVT prophylaxis    * Milestone   Additional Notes   9/25/19    VS :    Vitals: BP (!) 115/59   Pulse 92   Temp 97.8 °F (36.6 °C) (Oral)   Resp 20   Ht 6' 3\" (1.905 m)   Wt (!) 375 lb (170.1 kg)   SpO2 96%   BMI 46.87 kg/m²    Physical Exam:    General appearance: awake weak    HEENT: Head: Normal, normocephalic, atraumatic.     Neck: supple, symmetrical, trachea midline    Lungs: diminished breath sounds bilaterally    Heart: S1, S2 normal    Abdomen: abnormal findings:  soft nt obese    Extremities: edema + tender surgery site    Neurologic: Mental status: alertness: anxious    Radiology testing/Labs pertinent:    Results for Kati Nava (MRN [id number]) as of 9/30/2019 17:21    9/25/2019 03:44    Sodium: 139    Potassium: 4.1    Chloride: 106    CO2: 24    BUN: 27 (H)    Creatinine: 1.1    Anion Gap: 9    GFR Non-: >60    GFR African American: >60    Glucose: 87    Calcium: 7.9 (L)    Phosphorus: 2.1 (L)    Albumin: 2.2 (L)    WBC: 20.1 (H)    RBC: 3.56 (L)    Hemoglobin Quant: 10.7 (L)    Hematocrit: 34.5 (L)    MCV: 96.9    MCH: 30.1    MCHC: 31.0 (L)    MPV: 9.7    RDW: 13.9    Platelet Count: 092    Lymphocyte %: 10.0 (L)    Eosinophils %: 1.0    Lymphocytes Absolute: 2.0    Eosinophils Absolute: 0.2    Differential Type: MANUAL DIFFERENTIAL    Segs Relative: 84.0 (H)    Segs Absolute: 16.9    Bands Relative: 4 (L)    Bands Absolute: 0.80    Metamyelocytes Relative: 1 (H)    Metamyelocytes Absolute: 0.20    Meds-name, dose, route,rate    Scheduled Meds:insulin lispro, 0-6 Units, Subcutaneous, TID WC    metroNIDAZOLE, 500 mg, Intravenous, Q8H insulin glargine, 75 Units, Subcutaneous, Nightly    insulin lispro, 0-6 Units, Subcutaneous, 2 times per day    vancomycin, 1,500 mg, Intravenous, Q18H    collagenase, , Topical, BID    aspirin, 81 mg, Oral, Daily    mometasone-formoterol, 2 puff, Inhalation, BID    buPROPion, 300 mg, Oral, Daily    canagliflozin, 300 mg, Oral, Daily    ezetimibe, 10 mg, Oral, Daily    fluticasone, 1 spray, Each Nostril, Daily    ocuvite-lutein, 1 tablet, Oral, Daily    pantoprazole, 40 mg, Oral, QAM AC    sertraline, 100 mg, Oral, BID    simvastatin, 40 mg, Oral, Daily    b complex-C-E-zinc, 1 tablet, Oral, Daily    Dulaglutide, 1.5 mg, Subcutaneous, Weekly    vitamin C, 1,000 mg, Oral, Daily    vitamin E, 400 Units, Oral, Daily    sodium chloride flush, 10 mL, Intravenous, 2 times per day    enoxaparin, 40 mg, Subcutaneous, Daily    cefepime, 2 g, Intravenous, Q8H    Continuous Infusions:sodium chloride Last Rate: 1,000 mL (09/29/19 1228)    dextrose    PRN Meds:. HYDROmorphone, glucose, dextrose, glucagon (rDNA), dextrose, acetaminophen, albuterol sulfate HFA, nitroGLYCERIN, sodium chloride flush, traMADol **OR** traMADol, ondansetron    Treatment plan-attending, consults    Nephro:    Assessment and Plan:              IMP:    1 septic shock    2 s/p hip surgery with wound infection    3 arf from atn/contrast on ckd 3    4 anxiety/confusion    5 resp distress with hypoxia         Plan      1 bp stable    2 wound care and pain control    3 renal stable    4 monitor affect and more awake    5 o2 stable for now    Need rehab    General Surgery:    Assessment:         78 y/o M POD 2 s/p excisional debridement down to bone of infected pressure ulcer         Plan:              -Local wound care by Wound Nurse Team appreciated. Pt may ultimately require wound vac vs coverage.  If needs coverage will require plastic surgery c/s.         -Pain control.         -Leukocytosis improving.         -F/u pathology.         -Continue ABx per primary team.         -After d/c, will need to f/u with me in office 7-10 days post op. Internal medicine:    Assessment and Plan:    Principal Problem:    Sepsis ([address] - Secondary to Proteus Mirabilis, source Urinary Tract, urine and blood c/s +ve for Proteus Mirabilis    ARF on CKD, stable    UTI - proteus, on antibioitcs    Decubitus Ulcer- s/p debridement    Discharge Planning - to SNF, working on bed availability    D/w Nursing staff,  antibiotics to be changed to PO by surgeon prior to discharge,    Staff to reach out to orthopedic to remove stable from hip surgery.

## 2019-10-02 NOTE — PROGRESS NOTES
Progress Note( Dr. Sanjuanita Sotelo)  10/2/2019  Subjective:   Admit Date: 9/20/2019  PCP: Jennifer Partida MD    Admitted For : Extensive deep decubitus ulcer in the gluteal region    Consulted For: Better control of blood glucose    Interval History: Went for debridement and now hs wounf vacuum     Denies any chest pains,   Denies SOB . Denies nausea or vomiting. No new bowel or bladder symptoms. Intake/Output Summary (Last 24 hours) at 10/2/2019 0811  Last data filed at 10/2/2019 0540  Gross per 24 hour   Intake 2635.83 ml   Output 1210 ml   Net 1425.83 ml       DATA    CBC:   Recent Labs     09/30/19  0553 10/01/19  0525 10/02/19  0504   WBC 20.1* 16.7* 17.9*   HGB 10.4* 10.3* 10.8*    337 317    CMP:  Recent Labs     09/30/19  0553 10/01/19  0525 10/02/19  0504    134* 139   K 3.8 3.2* 3.3*    100 104   CO2 25 24 25   BUN 17 20 24*   CREATININE 1.2 1.3 1.2   CALCIUM 7.8* 7.8* 7.8*   LABALBU 2.3* 2.1* 2.4*     Lipids:   Lab Results   Component Value Date    CHOL 190 01/27/2014    HDL 54 01/27/2014    TRIG 157 01/27/2014     Glucose:  Recent Labs     10/01/19  1641 10/01/19  2142 10/02/19  0237   POCGLU 191* 187* 122*     [id number]:  Lab Results   Component Value Date    LABA1C 6.1 09/04/2019     High Sensitivity TSH:   Lab Results   Component Value Date    TSHHS 3.079 10/29/2013     Free T3: No results found for: FT3  Free T4:No results found for: T4FREE    No results found.     Scheduled Medicines   Medications:    insulin glargine  65 Units Subcutaneous Nightly    insulin lispro  0-6 Units Subcutaneous TID WC    metroNIDAZOLE  500 mg Intravenous Q8H    insulin lispro  0-6 Units Subcutaneous 2 times per day    vancomycin  1,500 mg Intravenous Q18H    aspirin  81 mg Oral Daily    mometasone-formoterol  2 puff Inhalation BID    buPROPion  300 mg Oral Daily    canagliflozin  300 mg Oral Daily    ezetimibe  10 mg Oral Daily    fluticasone  1 spray Each Nostril Daily    Insulin regime /  4. Monitor Blood glucose frequently   5. Modified  the dose of Insulin/ other medicines as needed   6. Will follow     .      Tanja Brewer MD

## 2019-10-02 NOTE — PROGRESS NOTES
Nguyen Ross (1950)    Daily Progress Note-  Jenifer Crockett PA-C                     Today's Date:   10/2/2019          Subjective:     Pt resting in bed. States pain is mild in RIGHT hip. Wound VAC changed yesterday. Pain level is 3/10. Denies chest pain or shortness of breath. Objective:     Patient Vitals for the past 6 hrs:   BP Temp Temp src Pulse Resp SpO2   10/02/19 0938 (!) 124/55 98.6 °F (37 °C) Oral 88 26 94 %   10/02/19 0809 -- -- -- -- 23 --     I/O last 3 completed shifts: In: 2635.8 [P.O.:480; I.V.:1205.8; IV Piggyback:950]  Out: 1995 [Urine:1075; Drains:135]  DRAIN/TUBE OUTPUT:  Negative Pressure Wound Therapy Hip Right-Output (ml): 135 ml    Physical Exam:       General: alert, appears older than stated age, cooperative and morbidly obese   Wound: RIGHT hip with wound VAC in place. No surrounding erythema.      DVT Exam: No evidence of DVT seen on physical exam.       Data Review  CBC:   Recent Labs     09/30/19  0553 10/01/19  0525 10/02/19  0504   WBC 20.1* 16.7* 17.9*   HGB 10.4* 10.3* 10.8*    337 317         Meds:    insulin glargine  65 Units Subcutaneous Nightly    ampicillin-sulbactam  3 g Intravenous Q6H    anidulafungin  200 mg Intravenous Once    And    [START ON 10/3/2019] anidulafungin  100 mg Intravenous Q24H    doxycycline hyclate  100 mg Oral 2 times per day    insulin lispro  0-6 Units Subcutaneous TID WC    insulin lispro  0-6 Units Subcutaneous 2 times per day    aspirin  81 mg Oral Daily    mometasone-formoterol  2 puff Inhalation BID    buPROPion  300 mg Oral Daily    canagliflozin  300 mg Oral Daily    ezetimibe  10 mg Oral Daily    fluticasone  1 spray Each Nostril Daily    ocuvite-lutein  1 tablet Oral Daily    pantoprazole  40 mg Oral QAM AC    sertraline  100 mg Oral BID    simvastatin  40 mg Oral Daily    b complex-C-E-zinc  1 tablet Oral Daily    Dulaglutide  1.5 mg Subcutaneous Weekly    vitamin C  1,000 mg Oral Daily    vitamin E  400 Units Oral Daily    sodium chloride flush  10 mL Intravenous 2 times per day    enoxaparin  40 mg Subcutaneous Daily                                                   Assessment:      1. Right Hip Incision I&D - POD #3      Plan:      Postoperative Day # 3          Hospital Day: 13     1:  WBAT to surgical side. Physical therapy to work with patient  2:  Continue Deep venous thrombosis prophylaxis   -Lovenox    -ambulation as able  3:  Continue Pain Control  4. Continue wound VAC to RIGHT hip incision. Change 3x/wk. 5.  Culture results from RIGHT hip demonstrate scant staph aureus. Sensitive to all abx, including Vancomycin.   4.  Discharge planning.-Skilled [facility], [place]-  10/2/2019

## 2019-10-02 NOTE — PROGRESS NOTES
PHARMACY VANCOMYCIN MONITORING SERVICE    Vancomycin therapy has been discontinued by Dr. Fry Payment, please re-consult pharmacy if resumed.      Precious Tena, SaranyaD, BCPS

## 2019-10-02 NOTE — PROGRESS NOTES
Nutrition Assessment    Type and Reason for Visit: Reassess    Nutrition Recommendations:   · Continue current diet and supplements    Nutrition Assessment: Pt intake varies after surgery but is still consuming % of his meals. Pt remains at low risk at this time. Malnutrition Assessment:  · Malnutrition Status: At risk for malnutrition  · Context: Acute illness or injury  · Findings of the 6 clinical characteristics of malnutrition (Minimum of 2 out of 6 clinical characteristics is required to make the diagnosis of moderate or severe Protein Calorie Malnutrition based on AND/ASPEN Guidelines):  1. Energy Intake-Less than or equal to 75% of estimated energy requirement, Greater than or equal to 7 days    2. Weight Loss-No significant weight loss, in 1 year  3. Fat Loss-No significant subcutaneous fat loss, Orbital  4. Muscle Loss-No significant muscle mass loss, Clavicles (pectoralis and deltoids)  5. Fluid Accumulation-No significant fluid accumulation  6.  Strength-Not measured    Nutrition Risk Level:  Moderate    Nutrient Needs:  · Estimated Daily Total Kcal: 2568 based on MSJ  · Estimated Daily Protein (g): >178 (>2 g/kg IBW)  · Estimated Daily Total Fluid (ml/day): 2568 based on 1 mL/kcal    Nutrition Diagnosis:   · Problem: Increased nutrient needs  · Etiology: related to Acute injury/trauma     Signs and symptoms:  as evidenced by Presence of wounds    Objective Information:  · Wound Type: Pressure Ulcer, Unstageable  · Current Nutrition Therapies:  · Oral Diet Orders: General   · Oral Diet intake: 51-75%, %, 26-50%  · Oral Nutrition Supplement (ONS) Orders: Wound Healing Oral Supplement  · ONS intake: Unable to assess  · Anthropometric Measures:  · Ht: 6' 3\" (190.5 cm)   · Current Body Wt: 407 lb (184.6 kg)  · Admission Body Wt: 392 lb (177.8 kg)  · Usual Body Wt: (ANSON)  · % Weight Change: weight flucuations with fluid intake  · Ideal Body Wt: 196 lb (88.9 kg), % Ideal Body 208%  · BMI

## 2019-10-02 NOTE — PROGRESS NOTES
Infectious Disease Progress Note  10/2/2019   Patient Name: Jessica Zaldivar : 1950   Impression  · Proteus mirabilis bacteremia secondary to complicated UTI  ? Afebrile   · Infected sacral decubitus ulcer with coccyx abscess and osteomyelitis  · Right hip wound infection   ? S/p  right hip hemiarthroplasty on 4302 complicated by hematoma. ? status post excisional debridement of sacral pressure ulcer on 2019; status post hip incision and drainage, decubitus ulcer debridement, removal of the coccyx, wound VAC placement on 2019.  ? Afebrile, leukocytosis persists  ? Sacral cx: Enterococcus, Yeast  ? Right hip culture: MSSA  ? Histopathology of coccyx: Abscess and osteomyelitis  · Diarrhea  ? C diff PCR negative  ? Diarrhea appears to be lessening  · Multi-morbidity: per PMHx morbid obesity, diabetes mellitus, bilateral renal cysts  Plan:  · discontinue vancomycin, cefepime and metronidazole  · Start Unasyn, Eraxis, and doxycycline (will need 2 weeks of abx)  · F/u Repeat blood cultures  · Follow-up culture reports  · Check CRP and ESR     Ongoing Antimicrobial Therapy  Unasyn 10/2  Eraxis 10/2  Doxycycline 10/2  ? Completed Antimicrobial Therapy  Ciprofloxacin -  Vancomycin -10/2  Cefepime - 10/2  Metronidazole -10/2  ? History:? Interval history noted: Sepsis, infected sacral decubitus wound, and   Denies n/v/d/f or untoward effects of antibiotics  Physical Exam:  Vital Signs: /78   Pulse 89   Temp 98.3 °F (36.8 °C) (Oral)   Resp 23   Ht 6' 3\" (1.905 m)   Wt (!) 407 lb 8 oz (184.8 kg)   SpO2 95%   BMI 50.93 kg/m²     Gen: alert and oriented X3, no distress  Skin: no stigmata of endocarditis  Wounds: Wound VAC over the sacrum and right hip  HEMT: AT/NC Oropharynx pink, moist, and without lesions or exudates; dentition in good state of repair  Eyes: PERRLA, EOMI, conjunctiva pink, sclera anicteric. Neck: Supple. Trachea midline. No LAD.   Chest: no distress and CTA. Good air movement. Heart: RRR and no MRG. Abd: soft, non-distended, no tenderness, no hepatomegaly. Normoactive bowel sounds. Ext: no clubbing, cyanosis, or edema  Catheter Site: without erythema or tenderness  Neuro: Mental status intact. CN 2-12 intact and no focal sensory or motor deficits     Radiologic / Imaging / TESTING  No results found.      Labs:    Recent Results (from the past 24 hour(s))   POCT Glucose    Collection Time: 10/01/19 12:31 PM   Result Value Ref Range    POC Glucose 163 (H) 70 - 99 MG/DL   POCT Glucose    Collection Time: 10/01/19  4:41 PM   Result Value Ref Range    POC Glucose 191 (H) 70 - 99 MG/DL   POCT Glucose    Collection Time: 10/01/19  9:42 PM   Result Value Ref Range    POC Glucose 187 (H) 70 - 99 MG/DL   POCT Glucose    Collection Time: 10/02/19  2:37 AM   Result Value Ref Range    POC Glucose 122 (H) 70 - 99 MG/DL   CBC auto differential    Collection Time: 10/02/19  5:04 AM   Result Value Ref Range    WBC 17.9 (H) 4.0 - 10.5 K/CU MM    RBC 3.63 (L) 4.6 - 6.2 M/CU MM    Hemoglobin 10.8 (L) 13.5 - 18.0 GM/DL    Hematocrit 34.9 (L) 42 - 52 %    MCV 96.1 78 - 100 FL    MCH 29.8 27 - 31 PG    MCHC 30.9 (L) 32.0 - 36.0 %    RDW 14.0 11.7 - 14.9 %    Platelets 820 070 - 843 K/CU MM    MPV 9.1 7.5 - 11.1 FL    Differential Type AUTOMATED DIFFERENTIAL     Segs Relative 84.1 (H) 36 - 66 %    Lymphocytes % 6.0 (L) 24 - 44 %    Monocytes % 6.0 (H) 0 - 4 %    Eosinophils % 1.9 0 - 3 %    Basophils % 0.4 0 - 1 %    Segs Absolute 15.0 K/CU MM    Lymphocytes Absolute 1.1 K/CU MM    Monocytes Absolute 1.1 K/CU MM    Eosinophils Absolute 0.3 K/CU MM    Basophils Absolute 0.1 K/CU MM    Nucleated RBC % 0.0 %    Total Nucleated RBC 0.0 K/CU MM    Total Immature Neutrophil 0.28 K/CU MM    Immature Neutrophil % 1.6 (H) 0 - 0.43 %   Renal Function Panel    Collection Time: 10/02/19  5:04 AM   Result Value Ref Range    Sodium 139 135 - 145 MMOL/L    Potassium 3.3 (L) 3.5 - 5.1 MMOL/L Chloride 104 99 - 110 mMol/L    CO2 25 21 - 32 MMOL/L    Anion Gap 10 4 - 16    BUN 24 (H) 6 - 23 MG/DL    CREATININE 1.2 0.9 - 1.3 MG/DL    Glucose 105 (H) 70 - 99 MG/DL    Calcium 7.8 (L) 8.3 - 10.6 MG/DL    GFR Non-African American >60 >60 mL/min/1.73m2    GFR African American >60 >60 mL/min/1.73m2    Alb 2.4 (L) 3.4 - 5.0 GM/DL    Phosphorus 2.3 (L) 2.5 - 4.9 MG/DL   Magnesium    Collection Time: 10/02/19  5:04 AM   Result Value Ref Range    Magnesium 1.5 (L) 1.8 - 2.4 mg/dl   POCT Glucose    Collection Time: 10/02/19  8:50 AM   Result Value Ref Range    POC Glucose 87 70 - 99 MG/DL     CULTURE results: Invalid input(s): BLOOD CULTURE,  URINE CULTURE, SURGICAL CULTURE    Diagnosis:  Patient Active Problem List   Diagnosis    Hypertension    Hyperlipidemia    Asthma    Diabetes mellitus ([address]    H/O cardiovascular stress test    Obesity    [facility], [facility]) [facility]    Hypertensive kidney disease with CKD stage III (HCC)    Depression    SOBOE (shortness of breath on exertion)    Abnormal cardiovascular stress test    Fracture of epiphysis (separation) (upper) of neck of femur, closed ([facility])    Pressure injury of skin of coccygeal region    Sepsis ([address]    PVC (premature ventricular contraction)       Active Problems  Principal Problem:    Sepsis ([address]  Active Problems:    Hypertension    Hyperlipidemia    Asthma    Diabetes mellitus (HCC)    Obesity    Chronic kidney disease, stage III (moderate) ([facility])    Depression    Pressure injury of skin of coccygeal region    PVC (premature ventricular contraction)  Resolved Problems:    * No resolved hospital problems.  *    Electronically signed by: Electronically signed by Deidre Bejarano MD on 10/2/2019 at 9:24 AM

## 2019-10-02 NOTE — CARE COORDINATION
Received call from Paige at [facility] that precert has  and updated PT/OT notes needed for precert/ placed whiteboard note. Per Paige at [facility] is actually good through today if pt is ready to admit. Reviewed chart/ per notes discharge once plan from ID confirmed. Sent PS to Dr. Candi Abarca for follow up. Per Dr. Candi Abarca he is still waiting for final culture info before discharge, updated Paige at [facility].

## 2019-10-02 NOTE — PLAN OF CARE
Problem: Falls - Risk of:  Goal: Will remain free from falls  Description  Will remain free from falls  Outcome: Ongoing  Goal: Absence of physical injury  Description  Absence of physical injury  Outcome: Ongoing     Problem: Risk for Impaired Skin Integrity  Goal: Tissue integrity - skin and mucous membranes  Description  Structural intactness and normal physiological function of skin and  mucous membranes.   Outcome: Ongoing     Problem: Discharge Planning:  Goal: Discharged to appropriate level of care  Description  Discharged to appropriate level of care  Outcome: Ongoing     Problem: Gas Exchange - Impaired:  Goal: Levels of oxygenation will improve  Description  Levels of oxygenation will improve  Outcome: Ongoing     Problem: Infection, Septic Shock:  Goal: Will show no infection signs and symptoms  Description  Will show no infection signs and symptoms  Outcome: Ongoing     Problem: Serum Glucose Level - Abnormal:  Goal: Ability to maintain appropriate glucose levels will improve  Description  Ability to maintain appropriate glucose levels will improve  Outcome: Ongoing     Problem: Tissue Perfusion, Altered:  Goal: Circulatory function within specified parameters  Description  Circulatory function within specified parameters  Outcome: Ongoing     Problem: Venous Thromboembolism:  Goal: Will show no signs or symptoms of venous thromboembolism  Description  Will show no signs or symptoms of venous thromboembolism  Outcome: Ongoing  Goal: Absence of signs or symptoms of impaired coagulation  Description  Absence of signs or symptoms of impaired coagulation  Outcome: Ongoing     Problem: Pain:  Goal: Pain level will decrease  Description  Pain level will decrease  Outcome: Ongoing  Goal: Control of acute pain  Description  Control of acute pain  Outcome: Ongoing  Goal: Control of chronic pain  Description  Control of chronic pain  Outcome: Ongoing     Problem: Nutrition  Goal: Optimal nutrition therapy  Outcome: Ongoing

## 2019-10-02 NOTE — PROGRESS NOTES
appearance: alert and cooperative. Lungs: clear to auscultation bilaterally  Heart: regular rate and rhythm, S1, S2 normal, no murmur, click, rub or gallop  Abdomen: soft, non-tender; bowel sounds normal; no masses,  no organomegaly  Extremities: extremities normal, atraumatic, no cyanosis or edema  Skin: Skin color, texture, turgor normal. No rashes or lesions        Labs and imaging reviewed.    CBC with Differential:    Lab Results   Component Value Date    WBC 16.7 10/01/2019    RBC 3.50 10/01/2019    HGB 10.3 10/01/2019    HCT 33.5 10/01/2019     10/01/2019    MCV 95.7 10/01/2019    MCH 29.4 10/01/2019    MCHC 30.7 10/01/2019    RDW 14.3 10/01/2019    SEGSPCT 80.7 10/01/2019    BANDSPCT 3 09/29/2019    LYMPHOPCT 7.1 10/01/2019    PROMYELOPCT 2 09/26/2019    MONOPCT 6.9 10/01/2019    MYELOPCT 1 09/29/2019    BASOPCT 0.5 10/01/2019    MONOSABS 1.2 10/01/2019    LYMPHSABS 1.2 10/01/2019    EOSABS 0.5 10/01/2019    BASOSABS 0.1 10/01/2019    DIFFTYPE AUTOMATED DIFFERENTIAL 10/01/2019     CMP:    Lab Results   Component Value Date     10/01/2019    K 3.2 10/01/2019     10/01/2019    CO2 24 10/01/2019    BUN 20 10/01/2019    CREATININE 1.3 10/01/2019    GFRAA >60 10/01/2019    LABGLOM 55 10/01/2019    GLUCOSE 135 10/01/2019    PROT 6.1 09/20/2019    LABALBU 2.1 10/01/2019    CALCIUM 7.8 10/01/2019    BILITOT 1.1 09/20/2019    ALKPHOS 143 09/20/2019     09/20/2019    ALT 98 09/20/2019     BMP:    Lab Results   Component Value Date     10/01/2019    K 3.2 10/01/2019     10/01/2019    CO2 24 10/01/2019    BUN 20 10/01/2019    LABALBU 2.1 10/01/2019    CREATININE 1.3 10/01/2019    CALCIUM 7.8 10/01/2019    GFRAA >60 10/01/2019    LABGLOM 55 10/01/2019    GLUCOSE 135 10/01/2019     Sodium:    Lab Results   Component Value Date     10/01/2019     Potassium:    Lab Results   Component Value Date    K 3.2 10/01/2019     Calcium:    Lab Results   Component Value Date    CALCIUM 7.8 10/01/2019     Warfarin PT/INR:  No components found for: Blank Dye  PTT:    Lab Results   Component Value Date    APTT 29.1 05/03/2017   [APTT  Last 3 Troponin:  No results found for: TROPONINI  ABG:    Lab Results   Component Value Date    TYF3VWA 32.0 09/20/2019    PO2ART 89 09/20/2019    NFJ2TSR 22.2 09/20/2019         Assessment:     Principal Problem:    Sepsis ([facility]  Active Problems:    Hypertension    Hyperlipidemia    Asthma    Diabetes mellitus ([facility]    Obesity    Chronic kidney disease, stage III (moderate) (HCC)    Depression    Pressure injury of skin of coccygeal region    PVC (premature ventricular contraction)  Resolved Problems:    * No resolved hospital problems. *      Plan:   He is Afebrile, WBC coming down, BP stable.  Responding to current antibiotics  S/P surgery for hip abscess, debridement of decubitus ulcer  Spoke to ID -awaiting sensitivity results         Jeevan TORRES M.D.  10/2/2019

## 2019-10-02 NOTE — PROGRESS NOTES
Progress Note( Dr. Richie Cornejo)  10/2/2019  Subjective:   Admit Date: 9/20/2019  PCP: Sheldon Morris MD    Admitted For : Extensive deep decubitus ulcer in the gluteal region    Consulted For: Better control of blood glucose    Interval History: Went for debridement     Denies any chest pains,   Denies SOB . Denies nausea or vomiting. No new bowel or bladder symptoms. Intake/Output Summary (Last 24 hours) at 10/2/2019 0100  Last data filed at 10/1/2019 2314  Gross per 24 hour   Intake 2135 ml   Output 2310 ml   Net -175 ml       DATA    CBC:   Recent Labs     09/29/19  0526 09/30/19  0553 10/01/19  0525   WBC 20.8* 20.1* 16.7*   HGB 11.1* 10.4* 10.3*    374 337    CMP:  Recent Labs     09/29/19  0526 09/30/19  0553 10/01/19  0525    140 134*   K 3.4* 3.8 3.2*    104 100   CO2 24 25 24   BUN 14 17 20   CREATININE 1.0 1.2 1.3   CALCIUM 7.8* 7.8* 7.8*   LABALBU 2.3* 2.3* 2.1*     Lipids:   Lab Results   Component Value Date    CHOL 190 01/27/2014    HDL 54 01/27/2014    TRIG 157 01/27/2014     Glucose:  Recent Labs     10/01/19  1231 10/01/19  1641 10/01/19  2142   POCGLU 163* 191* 187*     [id number]:  Lab Results   Component Value Date    LABA1C 6.1 09/04/2019     High Sensitivity TSH:   Lab Results   Component Value Date    TSHHS 3.079 10/29/2013     Free T3: No results found for: FT3  Free T4:No results found for: T4FREE    No results found.     Scheduled Medicines   Medications:    insulin lispro  0-6 Units Subcutaneous TID WC    metroNIDAZOLE  500 mg Intravenous Q8H    insulin glargine  75 Units Subcutaneous Nightly    insulin lispro  0-6 Units Subcutaneous 2 times per day    vancomycin  1,500 mg Intravenous Q18H    aspirin  81 mg Oral Daily    mometasone-formoterol  2 puff Inhalation BID    buPROPion  300 mg Oral Daily    canagliflozin  300 mg Oral Daily    ezetimibe  10 mg Oral Daily    fluticasone  1 spray Each Nostril Daily    ocuvite-lutein  1 tablet Oral Daily    5. Modified  the dose of Insulin/ other medicines as needed   6. Will follow     .      Azra Trejo MD

## 2019-10-02 NOTE — PROGRESS NOTES
Nephrology Progress Note  10/2/2019 2:10 PM  Subjective:   Admit Date: 9/20/2019  PCP: Samuel Pinto MD  Interval History: pt appear stable today    Diet: Dietary Nutrition Supplements: Wound Healing Oral Supplement  DIET GENERAL;  Pain is: Moderate      Data:   Scheduled Meds:   insulin glargine  65 Units Subcutaneous Nightly    ampicillin-sulbactam  3 g Intravenous Q6H    anidulafungin  200 mg Intravenous Once    And    [START ON 10/3/2019] anidulafungin  100 mg Intravenous Q24H    doxycycline hyclate  100 mg Oral 2 times per day    insulin lispro  0-6 Units Subcutaneous TID WC    insulin lispro  0-6 Units Subcutaneous 2 times per day    aspirin  81 mg Oral Daily    mometasone-formoterol  2 puff Inhalation BID    buPROPion  300 mg Oral Daily    canagliflozin  300 mg Oral Daily    ezetimibe  10 mg Oral Daily    fluticasone  1 spray Each Nostril Daily    ocuvite-lutein  1 tablet Oral Daily    pantoprazole  40 mg Oral QAM AC    sertraline  100 mg Oral BID    simvastatin  40 mg Oral Daily    b complex-C-E-zinc  1 tablet Oral Daily    Dulaglutide  1.5 mg Subcutaneous Weekly    vitamin C  1,000 mg Oral Daily    vitamin E  400 Units Oral Daily    sodium chloride flush  10 mL Intravenous 2 times per day    enoxaparin  40 mg Subcutaneous Daily     Continuous Infusions:   sodium chloride 1,000 mL (10/01/19 1744)    dextrose       PRN Meds:HYDROmorphone, glucose, dextrose, glucagon (rDNA), dextrose, acetaminophen, albuterol sulfate HFA, nitroGLYCERIN, sodium chloride flush, traMADol **OR** traMADol, ondansetron  I/O last 3 completed shifts: In: 2635.8 [P.O.:480; I.V.:1205.8;  IV Piggyback:950]  Out: 4703 [Urine:1075; Drains:135]  I/O this shift:  In: 240 [P.O.:240]  Out: -     Intake/Output Summary (Last 24 hours) at 10/2/2019 1410  Last data filed at 10/2/2019 1015  Gross per 24 hour   Intake 2395.83 ml   Output 1210 ml   Net 1185.83 ml     CBC:   Recent Labs     09/30/19  0553 10/01/19  0586 (37 °C) (Oral)   Resp 26   Ht 6' 3\" (1.905 m)   Wt (!) 407 lb 8 oz (184.8 kg)   SpO2 94%   BMI 50.93 kg/m²   General appearance: awake weak  HEENT: Head: Normal, normocephalic, atraumatic.   Neck: supple, symmetrical, trachea midline  Lungs: diminished breath sounds bilaterally  Heart: S1, S2 normal  Abdomen: abnormal findings:  soft nt obese  Extremities: edema + tender surgery site better  Neurologic: Mental status: alertness: awake      Patient Active Problem List:     Hypertension     Hyperlipidemia     Asthma     Diabetes mellitus ([facility] [address]     H/O cardiovascular stress test     Obesity     Chronic kidney disease, stage III (moderate) (HCC)     Hypertensive kidney disease with CKD stage III (HCC)     Depression     SOBOE (shortness of breath on exertion)     Abnormal cardiovascular stress test     Fracture of epiphysis (separation) (upper) of neck of femur, closed (HCC)     Pressure ulcer of coccygeal region, unstageable (HCC)     Sepsis (HCC)    Assessment and Plan:      IMP:  1 bacteremia  2 s/p hip surgery with wound infection  3 arf from atn/contrast on ckd 3  4 anxiety/confusion  5 resp distress with hypoxia    Plan     1 sp surgery wound care on abx  2 fu ortho rec  3 resovled arf and monitor with ckd 3 from htn as atn better  4 affect stable  5 o2 stable  6 replete K   Will follow                     Carolyn Alexandra MD

## 2019-10-02 NOTE — PROGRESS NOTES
dry.       Scheduled Meds:   insulin glargine  65 Units Subcutaneous Nightly    ampicillin-sulbactam  3 g Intravenous Q6H    anidulafungin  200 mg Intravenous Once    And    [START ON 10/3/2019] anidulafungin  100 mg Intravenous Q24H    doxycycline hyclate  100 mg Oral 2 times per day    insulin lispro  0-6 Units Subcutaneous TID WC    insulin lispro  0-6 Units Subcutaneous 2 times per day    aspirin  81 mg Oral Daily    mometasone-formoterol  2 puff Inhalation BID    buPROPion  300 mg Oral Daily    canagliflozin  300 mg Oral Daily    ezetimibe  10 mg Oral Daily    fluticasone  1 spray Each Nostril Daily    ocuvite-lutein  1 tablet Oral Daily    pantoprazole  40 mg Oral QAM AC    sertraline  100 mg Oral BID    simvastatin  40 mg Oral Daily    b complex-C-E-zinc  1 tablet Oral Daily    Dulaglutide  1.5 mg Subcutaneous Weekly    vitamin C  1,000 mg Oral Daily    vitamin E  400 Units Oral Daily    sodium chloride flush  10 mL Intravenous 2 times per day    enoxaparin  40 mg Subcutaneous Daily     ContinuousInfusions:   sodium chloride 1,000 mL (10/01/19 1584)    dextrose       PRN Meds:HYDROmorphone, glucose, dextrose, glucagon (rDNA), dextrose, acetaminophen, albuterol sulfate HFA, nitroGLYCERIN, sodium chloride flush, traMADol **OR** traMADol, ondansetron      Labs/Imaging Results:   Lab Results   Component Value Date    WBC 17.9 (H) 10/02/2019    HGB 10.8 (L) 10/02/2019    HCT 34.9 (L) 10/02/2019    MCV 96.1 10/02/2019     10/02/2019     Lab Results   Component Value Date     10/02/2019    K 3.3 (L) 10/02/2019     10/02/2019    CO2 25 10/02/2019    BUN 24 (H) 10/02/2019    CREATININE 1.2 10/02/2019    GLUCOSE 105 (H) 10/02/2019    CALCIUM 7.8 (L) 10/02/2019    PROT 6.1 (L) 09/20/2019    LABALBU 2.4 (L) 10/02/2019    BILITOT 1.1 (H) 09/20/2019    ALKPHOS 143 (H) 09/20/2019     (H) 09/20/2019    ALT 98 (H) 09/20/2019    LABGLOM >60 10/02/2019    GFRAA >60 10/02/2019 Assessment:     78 y/o M POD s/p excisional debridement down to bone of infected pressure ulcer (9/23) and repeat I&D of right hip (9/29) and repeat debridement and excision of coccyx (9/29)    Plan:       -Wound vac care appreciated. Changes as scheduled. -ID consult reviewed and Abx changes noted. Appreciate input and recs. -Medical mgt  -PT/OT  -Diet as tolerated  -Blood cx x2 pending. Prelim are both negative to date.  -Surgery cx is prelim. Pending completion.  -Body fluid cx is completed (right hip). -Final path for coccyx was ulcer with acute osteomyelitis.  -Likely d/c once cx finalized and Abx courses set by ID.          Electronically signed by Juanita Lopez II, MD on 10/2/2019 at 1:58 PM

## 2019-10-03 NOTE — CARE COORDINATION
Message from [employer] stating prior auth only good through 1159pm.  PS to Dr Shiv Yanez to see if pt can discharge today. Cm will continue to follow.

## 2019-10-03 NOTE — PROGRESS NOTES
Alert and oriented. Wound vac to buttocks and hip remain clean dry intact and functioning properly. VS WDl for this patient. No BS coverage needed during shift required. Prn pain med given once this shift, effective.

## 2019-10-03 NOTE — PROGRESS NOTES
Nephrology Progress Note  10/3/2019 1:34 PM  Subjective:   Admit Date: 9/20/2019  PCP: Gloria Flores MD     Interval History:  Rehab will attempt get patient up today.   -he is voicing no pressing concerns during our visit. Diet: Dietary Nutrition Supplements: Wound Healing Oral Supplement  DIET GENERAL;    Data:   Scheduled Meds:   insulin glargine  50 Units Subcutaneous Nightly    ampicillin-sulbactam  3 g Intravenous Q6H    anidulafungin  100 mg Intravenous Q24H    doxycycline hyclate  100 mg Oral 2 times per day    insulin lispro  0-6 Units Subcutaneous TID WC    insulin lispro  0-6 Units Subcutaneous 2 times per day    aspirin  81 mg Oral Daily    mometasone-formoterol  2 puff Inhalation BID    buPROPion  300 mg Oral Daily    canagliflozin  300 mg Oral Daily    ezetimibe  10 mg Oral Daily    fluticasone  1 spray Each Nostril Daily    ocuvite-lutein  1 tablet Oral Daily    pantoprazole  40 mg Oral QAM AC    sertraline  100 mg Oral BID    simvastatin  40 mg Oral Daily    b complex-C-E-zinc  1 tablet Oral Daily    Dulaglutide  1.5 mg Subcutaneous Weekly    vitamin C  1,000 mg Oral Daily    vitamin E  400 Units Oral Daily    sodium chloride flush  10 mL Intravenous 2 times per day    enoxaparin  40 mg Subcutaneous Daily     Continuous Infusions:   dextrose       PRN Meds:HYDROmorphone, glucose, dextrose, glucagon (rDNA), dextrose, acetaminophen, albuterol sulfate HFA, nitroGLYCERIN, sodium chloride flush, traMADol **OR** traMADol, ondansetron  I/O last 3 completed shifts:   In: 240 [P.O.:240]  Out: 7223 [Urine:1185; Drains:500]  I/O this shift:  In: -   Out: 2000 [Urine:1000; Stool:1000]    Intake/Output Summary (Last 24 hours) at 10/3/2019 1334  Last data filed at 10/3/2019 1012  Gross per 24 hour   Intake --   Output 3685 ml   Net -3685 ml     CBC:   Recent Labs     10/01/19  0525 10/02/19  0504 10/03/19  0452   WBC 16.7* 17.9* 17.8*   HGB 10.3* 10.8* 10.3*    317 314     BMP: Recent Labs     10/01/19  0525 10/02/19  0504 10/03/19  0452   * 139 137   K 3.2* 3.3* 3.2*    104 103   CO2 24 25 23   BUN 20 24* 21   CREATININE 1.3 1.2 1.2   GLUCOSE 135* 105* 119*     Hepatic:   No results for input(s): AST, ALT, ALB, BILITOT, ALKPHOS in the last 72 hours. Troponin: No results for input(s): TROPONINI in the last 72 hours. BNP: No results for input(s): BNP in the last 72 hours. Lipids: No results for input(s): CHOL, HDL in the last 72 hours. Invalid input(s): LDLCALCU  ABGs:   Lab Results   Component Value Date    PO2ART 89 09/20/2019    LWD6WVC 32.0 09/20/2019     INR: No results for input(s): INR in the last 72 hours.   Renal Labs  Albumin:    Lab Results   Component Value Date    LABALBU 2.1 10/03/2019     Calcium:    Lab Results   Component Value Date    CALCIUM 7.7 10/03/2019     Phosphorus:    Lab Results   Component Value Date    PHOS 2.3 10/03/2019     U/A:    Lab Results   Component Value Date    NITRU NEGATIVE 09/21/2019    NITRU Negative 10/24/2018    COLORU MICHAEL 09/21/2019    PHUR 5.5 10/24/2018    LABCAST Present 12/12/2016    LABCAST Hyaline casts 12/12/2016    WBCUA 125 09/21/2019    RBCUA 29 09/21/2019    MUCUS RARE 09/21/2019    TRICHOMONAS NONE SEEN 09/06/2019    YEAST Present 12/12/2016    BACTERIA NEGATIVE 09/21/2019    CLARITYU SLIGHTLY CLOUDY 09/21/2019    SPECGRAV 1.010 09/21/2019    UROBILINOGEN <2.0 09/21/2019    BILIRUBINUR NEGATIVE 09/21/2019    BLOODU MODERATE 09/21/2019    GLUCOSEU 3+ 10/24/2018    KETUA NEGATIVE 09/21/2019     ABG:    Lab Results   Component Value Date    QNZ0WDX 32.0 09/20/2019    PO2ART 89 09/20/2019    RVK3ZIN 22.2 09/20/2019     HgBA1c:    Lab Results   Component Value Date    LABA1C 6.1 09/04/2019     Microalbumen/Creatinine ratio:  No components found for: RUCREAT          Objective:   Vitals: /62   Pulse 91   Temp 98.6 °F (37 °C) (Oral)   Resp 28   Ht 6' 3\" (1.905 m)   Wt (!) 407 lb 8 oz (184.8 kg)   SpO2 94% BMI 50.93 kg/m²      General appearance: awake weak  HEENT: Head: Normal, normocephalic, atraumatic. Neck: supple, symmetrical, trachea midline  Lungs: diminished breath sounds bilaterally  Heart: S1, S2 normal  Abdomen: abnormal findings:  soft nt obese  Extremities: edema + tender surgery site better  Neurologic: Mental status: alertness: awake    General appearance: awake and answering questions appropriately  HEENT: Head: normocephalic, atraumatic. Neck: supple, symmetrical, trachea midline  Cardiovascular: normal S1 and S2  Pulmonary: diminished lung sounds bilaterally   Abdomen:  soft / non-tender   Extremities: + edema to the bilateral lower legs     Patient Active Problem List:     Hypertension     Hyperlipidemia     Asthma     Diabetes mellitus ([address]     H/O cardiovascular stress test     Obesity     Chronic kidney disease, stage III (moderate) ([facility])     Hypertensive kidney disease with CKD stage III (HCC)     Depression     SOBOE (shortness of breath on exertion)     Abnormal cardiovascular stress test     Fracture of epiphysis (separation) (upper) of neck of femur, closed ([facility])     Pressure ulcer of coccygeal region, unstageable ([address]     Sepsis ([address]    Assessment and Plan:      IMP:    1. VIRGILIO on stage 3 CKD  2. Infected decubitus ulcer / coccyx abscess / osteomyelitis        Proteus Mirabilis bacteremia secondary to UTI   3. DM  4. HTN   5. Hypokalemia   6. Hypocalcemia   7. Hypophosphatemia   8.   Right Hip Wound infection     Plan      1.   -resolved VIRGILIO   -uop: 1.18 liters via wilkerson   2.   -followed by ID and General Surgery   -on Eraxis / unasyn / Doxycycline   3.   -on Lantus / SSI and Invokana  4.    -recent systolics: 624 - 924; no routine BP meds   -PRN clonidine for elevated blood pressure   5.   -given oral KCL today; magnesium ordered in am   6.   -latest calcium: 7.7 / corrected 9.2  7.   -will likely improve with increased oral food intake   8.   -followed by Ortho; rehab will attempt to get patient up today     Electronically signed by LYNDA Garcia - CNP          See note from today from dr Spencer Zapata

## 2019-10-03 NOTE — PROGRESS NOTES
Progress Note( Dr. Marta Medeiros)  10/3/2019  Subjective:   Admit Date: 9/20/2019  PCP: Gill Moreau MD    Admitted For : Extensive deep decubitus ulcer in the gluteal region    Consulted For: Better control of blood glucose    Interval History: Went for debridement and now hs wounf vacuum     Denies any chest pains,   Denies SOB . Denies nausea or vomiting. No new bowel or bladder symptoms. Intake/Output Summary (Last 24 hours) at 10/3/2019 0752  Last data filed at 10/2/2019 2250  Gross per 24 hour   Intake 240 ml   Output 1685 ml   Net -1445 ml       DATA    CBC:   Recent Labs     10/01/19  0525 10/02/19  0504 10/03/19  0452   WBC 16.7* 17.9* 17.8*   HGB 10.3* 10.8* 10.3*    317 314    CMP:  Recent Labs     10/01/19  0525 10/02/19  0504 10/03/19  0452   * 139 137   K 3.2* 3.3* 3.2*    104 103   CO2 24 25 23   BUN 20 24* 21   CREATININE 1.3 1.2 1.2   CALCIUM 7.8* 7.8* 7.7*   LABALBU 2.1* 2.4* 2.1*     Lipids:   Lab Results   Component Value Date    CHOL 190 01/27/2014    HDL 54 01/27/2014    TRIG 157 01/27/2014     Glucose:  Recent Labs     10/02/19  1714 10/02/19  2054 10/03/19  0225   POCGLU 135* 133* 123*     [id number]:  Lab Results   Component Value Date    LABA1C 6.1 09/04/2019     High Sensitivity TSH:   Lab Results   Component Value Date    TSHHS 3.079 10/29/2013     Free T3: No results found for: FT3  Free T4:No results found for: T4FREE    No results found.     Scheduled Medicines   Medications:    potassium chloride  40 mEq Oral Once    insulin glargine  50 Units Subcutaneous Nightly    ampicillin-sulbactam  3 g Intravenous Q6H    anidulafungin  100 mg Intravenous Q24H    doxycycline hyclate  100 mg Oral 2 times per day    insulin lispro  0-6 Units Subcutaneous TID WC    insulin lispro  0-6 Units Subcutaneous 2 times per day    aspirin  81 mg Oral Daily    mometasone-formoterol  2 puff Inhalation BID    buPROPion  300 mg Oral Daily    canagliflozin  300 mg Oral Daily  ezetimibe  10 mg Oral Daily    fluticasone  1 spray Each Nostril Daily    ocuvite-lutein  1 tablet Oral Daily    pantoprazole  40 mg Oral QAM AC    sertraline  100 mg Oral BID    simvastatin  40 mg Oral Daily    b complex-C-E-zinc  1 tablet Oral Daily    Dulaglutide  1.5 mg Subcutaneous Weekly    vitamin C  1,000 mg Oral Daily    vitamin E  400 Units Oral Daily    sodium chloride flush  10 mL Intravenous 2 times per day    enoxaparin  40 mg Subcutaneous Daily      Infusions:    dextrose           Objective:   Vitals: /69   Pulse 95   Temp 98.1 °F (36.7 °C) (Oral)   Resp 25   Ht 6' 3\" (1.905 m)   Wt (!) 407 lb 8 oz (184.8 kg)   SpO2 95%   BMI 50.93 kg/m²   General appearance: alert confused cooperative with exam  Neck: no JVD or bruit  Thyroid : Normal lobes   Lungs: Has Vesicular Breath sounds   Heart:  regular rate and rhythm  Abdomen: soft, non-tender; bowel sounds normal; no masses,  no organomegaly  Musculoskeletal: Normal  Extremities: extremities normal, , no edema decubitus ulcers extensive on gluteal region , has wound vacuum   Neurologic:  Awake, confused, oriented to name, place and time. Cranial nerves II-XII are grossly intact. Motor is  intact. Sensory neuropathy. ,  and gait i normal and mostly bed ridden    Assessment:     Patient Active Problem List:     Hypertension     Hyperlipidemia     Asthma     Diabetes mellitus ([address]     H/O cardiovascular stress test     Obesity     Chronic kidney disease, stage III (moderate) ([facility])     Hypertensive kidney disease with CKD stage III (HCC)     Depression     SOBOE (shortness of breath on exertion)     Abnormal cardiovascular stress test     Fracture of epiphysis (separation) (upper) of neck of femur, closed (HCC)     Pressure injury of skin of coccygeal region     Sepsis ([address]      Plan:     1. Reviewed POC blood glucose . Labs and X ray results   2. Reviewed Current Medicines   3.  On meal/ Correction bolus Humalog/ Basal Lantus Insulin regime /  4. Monitor Blood glucose frequently   5. Modified  the dose of Insulin/ other medicines as needed   6. Will follow     .      Jayda Rayo MD

## 2019-10-03 NOTE — CONSULTS
HISTORY    Social History     Tobacco Use    Smoking status: Never Smoker    Smokeless tobacco: Never Used   Substance Use Topics    Alcohol use: No    Drug use: No       ALLERGIES    Allergies   Allergen Reactions    Bee Venom Shortness Of Breath       MEDICATIONS    No current facility-administered medications on file prior to encounter. Current Outpatient Medications on File Prior to Encounter   Medication Sig Dispense Refill    enoxaparin (LOVENOX) 40 MG/0.4ML injection Inject 0.4 mLs into the skin daily 30 Syringe 0    TRULICITY 1.5 HARJINDER/1.0VN SOPN Inject into the skin once a week  2    nitroGLYCERIN (NITROSTAT) 0.4 MG SL tablet Place 1 tablet under the tongue every 5 minutes as needed for Chest pain 25 tablet 2    ezetimibe (ZETIA) 10 MG tablet Take 10 mg by mouth daily      acetaminophen (TYLENOL) 500 MG tablet Take 500 mg by mouth every 6 hours as needed for Pain      SUPER B COMPLEX/C PO Take 1 capsule by mouth daily      Cinnamon 500 MG TABS Take 1,000 mg by mouth daily      vitamin E 400 UNIT capsule Take 400 Units by mouth daily      vitamin C (ASCORBIC ACID) 500 MG tablet Take 1,000 mg by mouth daily       insulin lispro (HUMALOG) 100 UNIT/ML injection vial Inject into the skin 3 times daily (before meals)      insulin glargine (LANTUS) 100 UNIT/ML injection vial Inject 74 Units into the skin nightly       benazepril (LOTENSIN) 20 MG tablet Take 1 tablet by mouth daily 90 tablet 3    fluticasone (FLONASE) 50 MCG/ACT nasal spray as needed       Multiple Vitamins-Minerals (VISION VITAMINS PO) Take by mouth daily      omeprazole (PRILOSEC) 20 MG capsule Take 20 mg by mouth daily.  St Johns Wort 300 MG CAPS Take 2 capsules by mouth.  Canagliflozin (INVOKANA) 300 MG TABS Take  by mouth daily.       budesonide-formoterol (SYMBICORT) 80-4.5 MCG/ACT AERO Inhale 2 puffs into the lungs as needed       Albuterol Sulfate (PROAIR HFA IN) Inhale into the lungs as needed       buPROPion (WELLBUTRIN XL) 300 MG XL tablet Take 300 mg by mouth daily.  sertraline (ZOLOFT) 100 MG tablet Take 100 mg by mouth 2 times daily.  simvastatin (ZOCOR) 40 MG tablet Take 40 mg by mouth daily       aspirin 81 MG EC tablet Take 81 mg by mouth daily.              Objective:      /62   Pulse 91   Temp 98.6 °F (37 °C) (Oral)   Resp 28   Ht 6' 3\" (1.905 m)   Wt (!) 407 lb 8 oz (184.8 kg)   SpO2 94%   BMI 50.93 kg/m²   Alfonso Risk Score: Alfonso Scale Score: 16    LABS    CBC:   Lab Results   Component Value Date    WBC 17.8 10/03/2019    RBC 3.49 10/03/2019    HGB 10.3 10/03/2019    HCT 33.2 10/03/2019    MCV 95.1 10/03/2019    MCH 29.5 10/03/2019    MCHC 31.0 10/03/2019    RDW 14.1 10/03/2019     10/03/2019    MPV 9.2 10/03/2019     CMP:    Lab Results   Component Value Date     10/03/2019    K 3.2 10/03/2019     10/03/2019    CO2 23 10/03/2019    BUN 21 10/03/2019    CREATININE 1.2 10/03/2019    GFRAA >60 10/03/2019    LABGLOM >60 10/03/2019    GLUCOSE 119 10/03/2019    PROT 6.1 09/20/2019    LABALBU 2.1 10/03/2019    CALCIUM 7.7 10/03/2019    BILITOT 1.1 09/20/2019    ALKPHOS 143 09/20/2019     09/20/2019    ALT 98 09/20/2019     Albumin:    Lab Results   Component Value Date    LABALBU 2.1 10/03/2019     PT/INR:    Lab Results   Component Value Date    PROTIME 11.5 05/03/2017    PROTIME 10.6 01/26/2012    INR 1.01 05/03/2017     HgBA1c:    Lab Results   Component Value Date    LABA1C 6.1 09/04/2019         Assessment:     Patient Active Problem List   Diagnosis    Hypertension    Hyperlipidemia    Asthma    [facility]    H/O cardiovascular stress test    Obesity    Chronic kidney disease, stage III (moderate) (HCC)    Hypertensive kidney disease with CKD stage III (HCC)    Depression    SOBOE (shortness of breath on exertion)    Abnormal cardiovascular stress test    Fracture of epiphysis (separation) (upper) of neck of femur, closed [facility]    Pressure injury of skin of coccygeal region    Sepsis ([facility]    PVC (premature ventricular contraction)       Measurements:  Negative Pressure Wound Therapy Hip Right (Active)   Wound Type Surgical 10/2/2019  8:53 PM   Unit Type KCI 10/1/2019 11:30 AM   Dressing Type Silver impregnated foam 10/2/2019  8:53 PM   Number of pieces used 3 10/1/2019 11:30 AM   Cycle Continuous 10/2/2019  8:53 PM   Target Pressure (mmHg) 125 10/2/2019  8:53 PM   Canister changed? No 10/2/2019  8:53 PM   Dressing Status Clean;Dry; Intact 10/2/2019  8:53 PM   Dressing Changed Changed/New 10/1/2019  9:24 PM   Drainage Amount Large 10/2/2019  8:53 PM   Drainage Description Serosanguinous 10/2/2019  8:53 PM   Output (ml) 500 ml 10/2/2019 10:25 PM   Wound Assessment Pink;Yellow;Red 10/2/2019  5:40 AM   Annika-wound Assessment Intact 10/2/2019  5:40 AM   Odor None 10/1/2019  9:24 PM   Number of days: 4       Negative Pressure Wound Therapy Coccyx (Active)   Wound Type Pressure ulcer: Stage IV 10/2/2019  8:53 PM   Unit Type KCI 10/1/2019 11:30 AM   Dressing Type Silver impregnated foam 10/2/2019  8:53 PM   Number of pieces used 3 10/1/2019 11:30 AM   Cycle Continuous 10/2/2019  8:53 PM   Target Pressure (mmHg) 125 10/2/2019  8:53 PM   Canister changed? No 10/2/2019  8:53 PM   Dressing Status Changed 10/1/2019  9:24 PM   Dressing Changed Changed/New 10/1/2019  9:24 PM   Drainage Amount Large 10/1/2019  9:24 PM   Drainage Description Serosanguinous 10/1/2019  9:24 PM   Wound Assessment Black;Red;Yellow 10/1/2019  9:24 PM   Annika-wound Assessment Yellow-brown; Maceration;Pink;Red 10/1/2019  9:24 PM   Number of days: 2       Wound 09/21/19 Coccyx (Active)   Wound Other 10/2/2019  8:53 PM   Dressing Status Dry;Clean; Intact 10/2/2019  8:53 PM   Dressing Changed Changed/New 10/1/2019  9:24 PM   Dressing/Treatment Other (comment) 10/1/2019  8:30 AM   Wound Cleansed Rinsed/Irrigated with saline 10/1/2019 11:30 AM   Dressing Change Due 09/28/19 10/1/2019  9:24 PM   Wound Length (cm) 9 cm 10/1/2019 11:30 AM   Wound Width (cm) 8 cm 10/1/2019 11:30 AM   Wound Depth (cm) 5 cm 10/1/2019 11:30 AM   Wound Surface Area (cm^2) 72 cm^2 10/1/2019 11:30 AM   Change in Wound Size % (l*w) 21.74 10/1/2019 11:30 AM   Wound Volume (cm^3) 360 cm^3 10/1/2019 11:30 AM   Wound Healing % -161 10/1/2019 11:30 AM   Distance Tunneling (cm) 0 cm 10/1/2019 11:30 AM   Tunneling Position ___ O'Clock 0 10/1/2019 11:30 AM   Undermining Starts ___ O'Clock 9 10/1/2019 11:30 AM   Undermining Ends___ O'Clock 5 10/1/2019 11:30 AM   Undermining Maxium Distance (cm) 4.2 10/1/2019 11:30 AM   Wound Assessment Other (Comment) 10/2/2019  8:53 PM   Drainage Amount Large 10/1/2019  9:24 PM   Drainage Description Serosanguinous 10/1/2019  9:24 PM   Odor None 10/1/2019  9:24 PM   Margins Defined edges 10/1/2019  9:24 PM   Exposed structure Muscle 10/1/2019  9:24 PM   Annika-wound Assessment Other (Comment) 10/2/2019  8:53 PM   Non-staged Wound Description Full thickness 10/1/2019  9:24 PM   Red%Wound Bed 50 10/1/2019  9:24 PM   Yellow%Wound Bed 40 10/1/2019  9:24 PM   Black%Wound Bed 10 10/1/2019  9:24 PM   Purple%Wound Bed 80 10/1/2019  9:24 PM   Number of days: 12       Wound 09/21/19 Hip Left cluster/DTI (Active)   Wound Other 9/26/2019  7:30 AM   Dressing Status Clean;Dry; Intact 10/2/2019  8:53 PM   Dressing Changed Changed/New 10/2/2019  8:53 PM   Dressing/Treatment Other (comment) 10/1/2019  9:24 PM   Wound Cleansed Rinsed/Irrigated with saline 9/26/2019  7:30 AM   Dressing Change Due 09/27/19 10/1/2019  9:24 PM   Wound Length (cm) 8 cm 9/24/2019 11:00 AM   Wound Width (cm) 6.5 cm 9/24/2019 11:00 AM   Wound Surface Area (cm^2) 52 cm^2 9/24/2019 11:00 AM   Distance Tunneling (cm) 0 cm 9/24/2019 11:00 AM   Tunneling Position ___ O'Clock 0 9/24/2019 11:00 AM   Undermining Starts ___ O'Clock 0 9/24/2019 11:00 AM   Undermining Ends___ O'Clock 0 9/24/2019 11:00 AM   Undermining Maxium Distance (cm) 0 9/24/2019 11:00 AM   Wound Assessment Other (Comment) 10/2/2019  8:53 PM   Drainage Amount None 10/1/2019  9:24 PM   Drainage Description Serosanguinous 10/1/2019  9:24 PM   Odor None 10/1/2019  9:24 PM   Annika-wound Assessment Other (Comment) 10/2/2019  8:53 PM   Purple%Wound Bed 100 10/1/2019  9:24 PM   Number of days: 12       Wound 09/23/19 Ankle Anterior;Right;Posterior (Active)   Wound Pressure Stage  2 10/1/2019 11:30 AM   Dressing Status Reinforced 10/1/2019  9:24 PM   Dressing Changed Dressing reinforced 10/1/2019  9:24 PM   Dressing/Treatment Other (comment) 9/30/2019  9:50 AM   Wound Cleansed Rinsed/Irrigated with saline 9/23/2019  2:30 PM   Dressing Change Due 09/27/19 10/1/2019  9:24 PM   Wound Length (cm) 0.2 cm 10/1/2019 11:30 AM   Wound Width (cm) 0.2 cm 10/1/2019 11:30 AM   Wound Depth (cm) 0.1 cm 9/23/2019  2:30 PM   Wound Surface Area (cm^2) 0.04 cm^2 10/1/2019 11:30 AM   Change in Wound Size % (l*w) 90 10/1/2019 11:30 AM   Wound Volume (cm^3) 0.04 cm^3 9/23/2019  2:30 PM   Distance Tunneling (cm) 0 cm 10/1/2019 11:30 AM   Tunneling Position ___ O'Clock 0 10/1/2019 11:30 AM   Undermining Starts ___ O'Clock 0 10/1/2019 11:30 AM   Undermining Ends___ O'Clock 0 10/1/2019 11:30 AM   Undermining Maxium Distance (cm) 0 10/1/2019 11:30 AM   Wound Assessment Brown 10/1/2019  9:24 PM   Drainage Amount None 10/1/2019  9:24 PM   Drainage Description Serosanguinous 10/1/2019  9:24 PM   Odor None 10/1/2019  9:24 PM   Margins Attached edges 10/1/2019  9:24 PM   Annika-wound Assessment Pink 10/1/2019  9:24 PM   Non-staged Wound Description Full thickness 10/1/2019  9:24 PM   [place]%Wound Bed 50 10/1/2019  9:24 PM   Yellow%Wound Bed 50 10/1/2019  9:24 PM   Other%Wound Bed 100 10/1/2019 11:30 AM   Number of days: 9       Wound 10/01/19 Hip Right (Active)   Wound Non-Healing Surgical 10/1/2019 11:30 AM   Dressing Status Changed 10/1/2019  9:24 PM   Dressing Changed Changed/New 10/1/2019  9:24 PM   Wound Cleansed Rinsed/Irrigated with saline 10/1/2019 11:30 AM   Wound Length (cm) 22.5 cm 10/1/2019 11:30 AM   Wound Width (cm) 3.2 cm 10/1/2019 11:30 AM   Wound Depth (cm) 3.5 cm 10/1/2019 11:30 AM   Wound Surface Area (cm^2) 72 cm^2 10/1/2019 11:30 AM   Wound Volume (cm^3) 252 cm^3 10/1/2019 11:30 AM   Distance Tunneling (cm) 0 cm 10/1/2019 11:30 AM   Tunneling Position ___ O'Clock 0 10/1/2019 11:30 AM   Undermining Starts ___ O'Clock 0 10/1/2019 11:30 AM   Undermining Ends___ O'Clock 0 10/1/2019 11:30 AM   Undermining Maxium Distance (cm) 0 10/1/2019 11:30 AM   Wound Assessment Pink;Yellow;Red 10/1/2019  9:24 PM   Drainage Amount Large 10/1/2019  9:24 PM   Drainage Description Serosanguinous 10/1/2019  9:24 PM   Odor None 10/1/2019  9:24 PM   Margins Defined edges 10/1/2019  9:24 PM   Annika-wound Assessment Intact; Clean 10/1/2019  9:24 PM   Non-staged Wound Description Full thickness 10/1/2019  9:24 PM   [place]%Wound Bed 50 10/1/2019  9:24 PM   Red%Wound Bed 30 10/1/2019  9:24 PM   Yellow%Wound Bed 20 10/1/2019  9:24 PM   Number of days: 2       Response to treatment:  With complaints of pain. Pain Assessment:  Severity:  8/10  Quality of pain: ache  Wound Pain Timing/Severity: intermittent  Premedicated: yes    Plan:     Plan of Care:     Called to pt's room by nurse. Stated wound vac came off. Vac due to be changed tomorrow. Coccyx vac with distal end needing secured. Secured with extra drape. Adequate seal obtained. Repositioned to right side due to pt's c/o left hip pain. Mepilex border noted to left hip. Pt stated feels better. Spoke with nurse regarding dressing to be changed tomorrow and need to premedicate for dressing change.      Specialty Bed Required : yes  [] Low Air Loss   [x] Pressure Redistribution  [] Fluid Immersion  [x] Bariatric  [] Total Pressure Relief  [] Other:     Discharge Plan:  Placement for patient upon discharge: tbd  Hospice Care: no  Patient appropriate for [address]:

## 2019-10-03 NOTE — PROGRESS NOTES
General Surgery- [facility] Day: 14    Chief Complaint on Admission: infected decub ulcer      Subjective:     Denies complaints overnight. Currently having nursing team wash / clean him up. Tolerating PO without N/V. Denies F/C. Pain controlled. ROS:  Review of Systems   Constitutional: Positive for fatigue. Negative for fever. HENT: Negative. Eyes: Negative. Respiratory: Negative. Cardiovascular: Negative. Gastrointestinal: Negative. Endocrine: Negative. Genitourinary: Negative. Musculoskeletal: Positive for myalgias. Skin: Positive for wound. Allergic/Immunologic: Negative. Neurological: Negative. Hematological: Negative. Psychiatric/Behavioral: Negative. Reviewed. Negative except as above. Allergies  Bee venom          Diagnosis Date    Arthritis     Asthma     Chronic kidney disease     stage 3, seen by Dr. Lopez San Diabetes mellitus ([facility]     H/O cardiac catheterization 2017    H/O cardiovascular stress test ,     CARDIOLITE: EF->51%    Hyperlipidemia     Hypertension     Obesity     Pressure ulcer of coccygeal region, unstageable ([facility] 09/10/2019    Thyroid disease        Objective:     Vitals:    10/03/19 0921   BP: 126/62   Pulse: 91   Resp: 28   Temp: 98.6 °F (37 °C)   SpO2: 94%       TEMPERATURE:  Current -Temp: 98.6 °F (37 °C); Max - Temp  Av.2 °F (36.8 °C)  Min: 97.8 °F (36.6 °C)  Max: 98.6 °F (37 °C)    I/O this shift:  In: -   Out: 2000 [Urine:1000; Stool:1000]I/O last 3 completed shifts: In: 240 [P.O.:240]  Out: 7163 [Urine:1185; Drains:500]      Physical Exam:  Physical Exam   Laying in bed on side currently being cleaned up. NAD. Comfortable. AT. NC.  EOMI. PERRLA. Breathing unlabored. RRR. Obese, soft, NT, ND, no PS.  +Lujan. + Rectal tube. +R hip with vac in place and good seal. + sacral vac in place with lower portion open. Both connect via Y-connector and have bloody output.  Pt turned with (9/23) and repeat I&D of right hip (9/29) and repeat debridement and excision of coccyx (9/29)    Plan:       -Wound vac care appreciated. Changes as scheduled. Will need changed today as the sacral portion has been disrupted. -ID consult reviewed and Abx changes noted. Appreciate input and recs. -Medical mgt  -PT/OT  -Diet as tolerated  -Blood cx x2 pending. Prelim are both negative to date.  -Surgery cx is prelim. Pending completion. Still pending.   -Body fluid cx is completed (right hip). -Final path for coccyx was ulcer with acute osteomyelitis.  -Likely d/c once cx finalized and Abx courses set by ID.   -Once d/c'd, pt should f/u with me in office in 7-10 days. D/w pt that ideally he would not need rectal tube and be able to use bed pan or bedside toilet. He has been cleared by Ortho for WBAT. Concern with rectal tube is stool is possibly getting into wound. D/w pt that if he is not able to use bed pain or bedside toilet that he may ultimately require diverting colostomy. He does not currently want this.          Electronically signed by Daysi Houston II, MD on 10/3/2019 at 10:39 AM

## 2019-10-03 NOTE — PROGRESS NOTES
Occupational Therapy  . Occupational Therapy Treatment Note  Name: Jose Granados MRN: [id number] :   1950   Date:  10/3/2019   Admission Date: 2019 Room:  [address] [place] [address]     Restrictions/Precautions:    General precautions; Fall Risk     Communication with other providers:  Nurse Amelia Griggs cleared pt forTx session. Co-Tx c PTA Cynthea Pillow. Notified Nurse Amelia Griggs at end of session of pt's c/o of pain, possible leaking around rectal tube. .   Subjective:  Patient states:  Pt initially cited fatigue as limiting factor for participation but c education / rationale for active participation in therapeutic intervention, pt agreeable to \"try\". Pain:   Location, Type, Intensity (0/10 to 10/10):  7/10, R hip and rectal aera. Objective:    Observation:  Pt received in supine. Pt required max time to perform c multiple rest breaks PRN to manage act tolerance. Objective Measures:  Bariatric bed c trapeze, Lujan catheter, rectal tube, Wound fac to R hip, Telemetry, HR 97, RR 26 on approach, on room air. Monitoring for O2 during EOB as detailed below. Treatment, including education:  Therapeutic Activity Training:   Therapeutic activity training was instructed today. Cues were given for safety, sequence, UE/LE placement, awareness, and balance. Activities performed today included bed mobility training, sup-sit, and c emphasis on unsupported sitting at EOB. Rolling: Mod A x2 to roll to Left side, pt does roll fully onto R hip + bed features (rails and trapeze). Supine to sit: Max A x2 + max cues c cues + incorporation of pt active participation c leg   Sit to supine: Max A to Dep x2 + use of trapeze  Scooting: Max A x2 for scoot to EOB, Max A x2 c bed in Trendelenburg + cues for trapeze / bed rails for upward scoot in bed  Sitting balance / tolerance: SBA seated EOB c 1-2 UE support x12 minutes.  During initial portion of sitting EOB, pt reported \"dizziness\" and was cued for use of PLB technique and monitored c O2 sat 88% and HR up to 115, which quickly resolved to 94% O2 sat and 103 HR. All therapeutic intervention performed c emphasis on dynamic balance / standing tolerance to inc strength, endurance and act tolerance for inc Indep c ADL tasks, func transfers / mobility. Assessment / Impression:        Patient's tolerance of treatment:  Fair +   Adverse Reaction: None  Significant change in status and impact:  None  Barriers to improvement:  Deconditioning, pain    Plan for Next Session:    Continue per OT POC. Time in:  1315  Time out:  1405  Timed treatment minutes:  65  Total treatment time:  65  Electronically signed by:    LOREN Duarte  10/3/2019, 2:20 PM    Previously filed values:    Goals:  By d/c or goals met:  Pt will perform all bed mobility with ModAx2 in prep for EOB/OOB activity. Pt will perform all functional transfers with MaxAx2 and appropriate use of LRD to bed, toilet, chair in prep for increased functional independence. Pt will perform UB ADLs with CGA in seated EOB to increase functional independence. Pt will maintain standing c RW 30 seconds in prep for ADL and gait.   Pt will participate in therex/therax c emphasis on strength, activity tolerance,  safety, DANIA tasks.

## 2019-10-03 NOTE — PROGRESS NOTES
Infectious Disease Progress Note  10/3/2019   Patient Name: John Pearson : 1950   Impression  · Proteus mirabilis bacteremia secondary to complicated UTI  ? Afebrile   · Infected sacral decubitus ulcer with coccyx abscess and osteomyelitis  · Right hip wound infection   ? S/p  right hip hemiarthroplasty on  complicated by hematoma. ? status post excisional debridement of sacral pressure ulcer on 2019; status post hip incision and drainage, decubitus ulcer debridement, removal of the coccyx, wound VAC placement on 2019.  ? Afebrile, leukocytosis persists  ? Sacral cx: Enterococcus, Yeast  ? Right hip culture: MSSA  ? Histopathology of coccyx: Abscess and osteomyelitis  · Diarrhea  ? C diff PCR negative  ? Diarrhea appears to be lessening  · Multi-morbidity: per PMHx morbid obesity, diabetes mellitus, bilateral renal cysts  Plan:  · Continue Unasyn, Eraxis, and doxycycline (will need at least 2 weeks of abx)  · F/u Repeat blood cultures  · Follow-up culture reports-yeast      Ongoing Antimicrobial Therapy  Unasyn 10/2  Eraxis 10/2  Doxycycline 10/2  ? Completed Antimicrobial Therapy  Ciprofloxacin -  Vancomycin -10/2  Cefepime - 10/2  Metronidazole -10/2  ? History:? Interval history noted: Sepsis, infected sacral decubitus wound, and   Denies n/v/d/f or untoward effects of antibiotics  Physical Exam:  Vital Signs: /69   Pulse 95   Temp 98.1 °F (36.7 °C) (Oral)   Resp 25   Ht 6' 3\" (1.905 m)   Wt (!) 407 lb 8 oz (184.8 kg)   SpO2 95%   BMI 50.93 kg/m²     Gen: alert and oriented X3, no distress  Skin: no stigmata of endocarditis  Wounds: Wound VAC over the sacrum and right hip  HEMT: AT/NC Oropharynx pink, moist, and without lesions or exudates; dentition in good state of repair  Eyes: PERRLA, EOMI, conjunctiva pink, sclera anicteric. Neck: Supple. Trachea midline. No LAD. Chest: no distress and CTA. Good air movement. Heart: RRR and no MRG.    Abd: 1.3 MG/DL    Glucose 119 (H) 70 - 99 MG/DL    Calcium 7.7 (L) 8.3 - 10.6 MG/DL    GFR Non-African American >60 >60 mL/min/1.73m2    GFR African American >60 >60 mL/min/1.73m2    Alb 2.1 (L) 3.4 - 5.0 GM/DL    Phosphorus 2.3 (L) 2.5 - 4.9 MG/DL   POCT Glucose    Collection Time: 10/03/19  8:29 AM   Result Value Ref Range    POC Glucose 112 (H) 70 - 99 MG/DL     CULTURE results: Invalid input(s): BLOOD CULTURE,  URINE CULTURE, SURGICAL CULTURE    Diagnosis:  Patient Active Problem List   Diagnosis    Hypertension    Hyperlipidemia    Asthma    Diabetes mellitus ([address]    H/O cardiovascular stress test    Obesity    Chronic kidney disease, stage III (moderate) (HCC)    Hypertensive kidney disease with CKD stage III (HCC)    Depression    SOBOE (shortness of breath on exertion)    Abnormal cardiovascular stress test    Fracture of epiphysis (separation) (upper) of neck of femur, closed (HCC)    Pressure injury of skin of coccygeal region    Sepsis ([address]    PVC (premature ventricular contraction)       Active Problems  Principal Problem:    Sepsis ([address]  Active Problems:    Hypertension    Hyperlipidemia    Asthma    Diabetes mellitus (HCC)    Obesity    Chronic kidney disease, stage III (moderate) (HCC)    Depression    Pressure injury of skin of coccygeal region    PVC (premature ventricular contraction)  Resolved Problems:    * No resolved hospital problems.  *    Electronically signed by: Electronically signed by Keren Cain MD on 10/3/2019 at 9:18 AM

## 2019-10-03 NOTE — PROGRESS NOTES
Physical Therapy    Physical Therapy Treatment Note  Name: John Pearson MRN: [id number] :   1950   Date:  10/3/2019   Admission Date: 2019 Room:  [address] [place] [address]   Restrictions/Precautions:        HEMIARTHROPLASTY HIP (Right, 2019); Pressure ulcer  With wound vac  Communication with other providers:  Per nurse ok to tx and was notified at end of tx pt c/o rectal tube causing rectal pain and possible leakage and nurse reports aware of this. Co-tx with CARL  Subjective:  Patient states:  Pt needs education and encouragement to participate in tx session. Pain:   Location, Type, Intensity (0/10 to 10/10):  Rated pain at end of tx 7 and nurse notified. Objective:    Observation:  Alert and oriented with wound vac on right hip and buttocks  Treatment, including education/measures:  Pt max assist of 2 to scoot to [facility] with bed in trendelenburg and pt using trapez  Pt was able to to use trapez and lift bottom off bed to place pillows under both hips at end of tx and pillows placed under legs at end of tx session. Max assist of 2 for sup to sit with pt using bed features  Max/dependent for sit to sup with pt using bed features  Pt sat EOB x 12 minutes and was able to kick legs and do aps but very self limiting. Attempted to have pt work on posture and stretching but refused. Pt was able to sit EOB x 12 minutes but c/o feeling very fatigued. Safety  Patient left safely in the bed, with call light/phone in reach with alarm applied. Gait belt was used for transfers and gait. Assessment / Impression:       Patient's tolerance of treatment:  fair   Adverse Reaction: O2 dropped to 88% when first sitting but with cues for purse lip breathing returned to 90's. Heart rate also increased to 115 but with in a few minutes returned to 102. Significant change in status and impact:  na  Barriers to improvement:  Pt is self limiting and needs encouragement and educations. Plan for Next Session:    Cont.  POC  Time in: 1315  Time out:  1405  Timed treatment minutes:  50  Total treatment time:  50`    Previously filed items:  Social/Functional History  Additional Comments: Has been in SNF but had not progressed to gait or transfers. Has been mostly bed level since last hopistalizatoin. Short term goals  Time Frame for Short term goals: 1 week  Short term goal 1: Patient will perform supine to sit with mod A x2. Short term goal 2: Patient will perform STS with max A x2 and RW.   Short term goal 3: Patient will sit EOB x15 min mod I.       Electronically signed by:    Aurora Rick PTA  10/3/2019, 8:17 AM

## 2019-10-04 NOTE — PROGRESS NOTES
Infectious Disease Progress Note  10/4/2019   Patient Name: Christ Peres : 1950   Impression  · Proteus mirabilis bacteremia secondary to complicated UTI  ? Afebrile   · Infected sacral decubitus ulcer with coccyx abscess and osteomyelitis  · Right hip wound infection   ? S/p  right hip hemiarthroplasty on  complicated by hematoma. ? status post excisional debridement of sacral pressure ulcer on 2019; status post hip incision and drainage, decubitus ulcer debridement, removal of the coccyx, wound VAC placement on 2019.  ? Afebrile, leukocytosis persists  ? Sacral cx: Enterococcus, Yeast  ? Right hip culture: MSSA (2 colonies), very low growth  ? Histopathology of coccyx: Abscess and osteomyelitis  · Diarrhea  ? C diff PCR negative  ? Diarrhea appears to be lessening  · Multi-morbidity: per PMHx morbid obesity, diabetes mellitus, bilateral renal cysts  Plan:  · D/c doxycycline  · Continue Unasyn 3g IV q 6h, Eraxis 100 mg IV daily for 4 weeks (end-date 10/29/19)  Weekly labs drawn on Monday during the course of treatment  CBC with differential, CMP, ESR, CRP  Fax results to Attn: Minal Grace Infectious Diseases Staff  · # [phone]  · Follow-up culture reports-yeast  · See in 3 weeks       Ongoing Antimicrobial Therapy  Unasyn 10/2  Eraxis 10/2    ? Completed Antimicrobial Therapy  Ciprofloxacin -  Vancomycin -10/2  Cefepime - 10/2  Metronidazole -10/2  Doxycycline 10/2-  ? History:? Interval history noted: Sepsis, infected sacral decubitus wound, and   Denies n/v/d/f or untoward effects of antibiotics, doing quite well,   Physical Exam:  Vital Signs: BP (!) 142/67   Pulse 93   Temp 97.9 °F (36.6 °C) (Oral)   Resp 19   Ht 6' 3\" (1.905 m)   Wt (!) 407 lb 8 oz (184.8 kg)   SpO2 93%   BMI 50.93 kg/m²     Gen: alert and oriented X3, no distress  Skin: no stigmata of endocarditis  Wounds: Wound VAC over the sacrum and right hip  HEMT: AT/NC Oropharynx pink, moist, and without lesions or exudates; dentition in good state of repair  Eyes: PERRLA, EOMI, conjunctiva pink, sclera anicteric. Neck: Supple. Trachea midline. No LAD. Chest: no distress and CTA. Good air movement. Heart: RRR and no MRG. Abd: soft, non-distended, no tenderness, no hepatomegaly. Normoactive bowel sounds. Ext: no clubbing, cyanosis, or edema  Catheter Site: without erythema or tenderness  Neuro: Mental status intact. CN 2-12 intact and no focal sensory or motor deficits     Radiologic / Imaging / TESTING  No results found.      Labs:    Recent Results (from the past 24 hour(s))   POCT Glucose    Collection Time: 10/03/19 11:59 AM   Result Value Ref Range    POC Glucose 98 70 - 99 MG/DL   POCT Glucose    Collection Time: 10/03/19  4:26 PM   Result Value Ref Range    POC Glucose 135 (H) 70 - 99 MG/DL   POCT Glucose    Collection Time: 10/03/19  8:15 PM   Result Value Ref Range    POC Glucose 135 (H) 70 - 99 MG/DL   POCT Glucose    Collection Time: 10/04/19  3:31 AM   Result Value Ref Range    POC Glucose 125 (H) 70 - 99 MG/DL   CBC auto differential    Collection Time: 10/04/19  7:53 AM   Result Value Ref Range    WBC 12.9 (H) 4.0 - 10.5 K/CU MM    RBC 3.64 (L) 4.6 - 6.2 M/CU MM    Hemoglobin 10.7 (L) 13.5 - 18.0 GM/DL    Hematocrit 34.5 (L) 42 - 52 %    MCV 94.8 78 - 100 FL    MCH 29.4 27 - 31 PG    MCHC 31.0 (L) 32.0 - 36.0 %    RDW 14.4 11.7 - 14.9 %    Platelets 716 935 - 411 K/CU MM    MPV 9.0 7.5 - 11.1 FL    Differential Type AUTOMATED DIFFERENTIAL     Segs Relative 82.4 (H) 36 - 66 %    Lymphocytes % 7.0 (L) 24 - 44 %    Monocytes % 7.0 (H) 0 - 4 %    Eosinophils % 1.9 0 - 3 %    Basophils % 0.6 0 - 1 %    Segs Absolute 10.7 K/CU MM    Lymphocytes Absolute 0.9 K/CU MM    Monocytes Absolute 0.9 K/CU MM    Eosinophils Absolute 0.2 K/CU MM    Basophils Absolute 0.1 K/CU MM    Nucleated RBC % 0.0 %    Total Nucleated RBC 0.0 K/CU MM    Total Immature Neutrophil 0.14 K/CU MM    Immature Neutrophil % 1.1 (H) 0 - 0.43 %   Renal Function Panel    Collection Time: 10/04/19  7:53 AM   Result Value Ref Range    Sodium 136 135 - 145 MMOL/L    Potassium 3.4 (L) 3.5 - 5.1 MMOL/L    Chloride 101 99 - 110 mMol/L    CO2 25 21 - 32 MMOL/L    Anion Gap 10 4 - 16    BUN 18 6 - 23 MG/DL    CREATININE 1.2 0.9 - 1.3 MG/DL    Glucose 147 (H) 70 - 99 MG/DL    Calcium 7.8 (L) 8.3 - 10.6 MG/DL    GFR Non-African American >60 >60 mL/min/1.73m2    GFR African American >60 >60 mL/min/1.73m2    Alb 2.3 (L) 3.4 - 5.0 GM/DL    Phosphorus 2.3 (L) 2.5 - 4.9 MG/DL   Magnesium    Collection Time: 10/04/19  7:53 AM   Result Value Ref Range    Magnesium 1.5 (L) 1.8 - 2.4 mg/dl   POCT Glucose    Collection Time: 10/04/19  8:31 AM   Result Value Ref Range    POC Glucose 133 (H) 70 - 99 MG/DL     CULTURE results: Invalid input(s): BLOOD CULTURE,  URINE CULTURE, SURGICAL CULTURE    Diagnosis:  Patient Active Problem List   Diagnosis    Hypertension    Hyperlipidemia    Asthma    Diabetes mellitus ([address]    H/O cardiovascular stress test    Obesity    Chronic kidney disease, stage III (moderate) (HCC)    Hypertensive kidney disease with CKD stage III (HCC)    Depression    SOBOE (shortness of breath on exertion)    Abnormal cardiovascular stress test    Fracture of epiphysis (separation) (upper) of neck of femur, closed (HCC)    Pressure injury of skin of coccygeal region    Sepsis ([address]    PVC (premature ventricular contraction)       Active Problems  Principal Problem:    Sepsis (HCC)  Active Problems:    Hypertension    Hyperlipidemia    Asthma    Diabetes mellitus (HCC)    Obesity    Chronic kidney disease, stage III (moderate) (HCC)    Depression    Pressure injury of skin of coccygeal region    PVC (premature ventricular contraction)  Resolved Problems:    * No resolved hospital problems.  *    Electronically signed by: Electronically signed by Servando Dwyer MD on 10/4/2019 at 9:29 AM

## 2019-10-04 NOTE — PROGRESS NOTES
regular rate and rhythm, S1, S2 normal, no murmur, click, rub or gallop  Abdomen: soft, non-tender; bowel sounds normal; no masses,  no organomegaly  Extremities: extremities normal, atraumatic, no cyanosis or edema  Skin: Skin color, texture, turgor normal. No rashes or lesions        Labs and imaging reviewed.    CBC with Differential:    Lab Results   Component Value Date    WBC 17.8 10/03/2019    RBC 3.49 10/03/2019    HGB 10.3 10/03/2019    HCT 33.2 10/03/2019     10/03/2019    MCV 95.1 10/03/2019    MCH 29.5 10/03/2019    MCHC 31.0 10/03/2019    RDW 14.1 10/03/2019    SEGSPCT 86.2 10/03/2019    BANDSPCT 3 09/29/2019    LYMPHOPCT 4.8 10/03/2019    PROMYELOPCT 2 09/26/2019    MONOPCT 5.8 10/03/2019    MYELOPCT 1 09/29/2019    BASOPCT 0.4 10/03/2019    MONOSABS 1.0 10/03/2019    LYMPHSABS 0.9 10/03/2019    EOSABS 0.3 10/03/2019    BASOSABS 0.1 10/03/2019    DIFFTYPE AUTOMATED DIFFERENTIAL 10/03/2019     CMP:    Lab Results   Component Value Date     10/03/2019    K 3.2 10/03/2019     10/03/2019    CO2 23 10/03/2019    BUN 21 10/03/2019    CREATININE 1.2 10/03/2019    GFRAA >60 10/03/2019    LABGLOM >60 10/03/2019    GLUCOSE 119 10/03/2019    PROT 6.1 09/20/2019    LABALBU 2.1 10/03/2019    CALCIUM 7.7 10/03/2019    BILITOT 1.1 09/20/2019    ALKPHOS 143 09/20/2019     09/20/2019    ALT 98 09/20/2019     BMP:    Lab Results   Component Value Date     10/03/2019    K 3.2 10/03/2019     10/03/2019    CO2 23 10/03/2019    BUN 21 10/03/2019    LABALBU 2.1 10/03/2019    CREATININE 1.2 10/03/2019    CALCIUM 7.7 10/03/2019    GFRAA >60 10/03/2019    LABGLOM >60 10/03/2019    GLUCOSE 119 10/03/2019     Sodium:    Lab Results   Component Value Date     10/03/2019     Potassium:    Lab Results   Component Value Date    K 3.2 10/03/2019     Calcium:    Lab Results   Component Value Date    CALCIUM 7.7 10/03/2019     Warfarin PT/INR:  No components found for: PTPATWAR, PTINRWAR  PTT:

## 2019-10-04 NOTE — CONSULTS
[address] [id number] Continence Nurse  Consult Note       Onesimo Easley  AGE: 77 y.o.    GENDER: male  : 1950  TODAY'S DATE:  10/4/2019    Subjective:     Reason for  Evaluation and Assessment: wound vac change      Onesimo Easley is a 77 y.o. male referred by:   [x] Physician  [] Nursing  [] Other:     Wound Identification:  Wound Type: pressure and non-healing surgical  Contributing Factors: diabetes, chronic pressure, decreased mobility, obesity and incontinence of stool        PAST MEDICAL HISTORY        Diagnosis Date    Arthritis     Asthma     Chronic kidney disease     stage 3, seen by Dr. Rissa Hazel Diabetes mellitus ([facility]     H/O cardiac catheterization 2017    H/O cardiovascular stress test ,     CARDIOLITE: EF->51%    Hyperlipidemia     Hypertension     Obesity     [facility]    Thyroid disease        PAST SURGICAL HISTORY    Past Surgical History:   Procedure Laterality Date    CARPAL TUNNEL RELEASE      DIAGNOSTIC CARDIAC CATH LAB PROCEDURE      NO SIGNIFICANT CAD    EYE SURGERY      HEMIARTHROPLASTY HIP Right 2019    HIP HEMIARTHROPLASTY performed by Pierre Rankin MD at [address] [place] [address] Right 2019    HIP INCISION AND DRAINAGE performed by Pierre Rankin MD at [address] [place] [address]  2019    of stage 4 wound on coccyx, by Dr. Kyara Thomas N/A 2019    EXCISIONAL DEBRIDEMENT OF SACRAL PRESSURE ULCER performed by Kingsley Jones MD at [address] [place] [address] N/A 2019    DECUBITUS ULCER DEBRIDEMENT REPAIR performed by Phylicia Alaniz MD at [address] [place] [address]    Family History   Problem Relation Age of Onset    High Blood Pressure Mother     Cancer Mother     Cancer Father     Stroke Maternal Grandfather     Diabetes Paternal Grandmother  Heart Disease Paternal Grandfather        SOCIAL HISTORY    Social History     Tobacco Use    Smoking status: Never Smoker    Smokeless tobacco: Never Used   Substance Use Topics    Alcohol use: No    Drug use: No       ALLERGIES    Allergies   Allergen Reactions    Bee Venom Shortness Of Breath       MEDICATIONS    No current facility-administered medications on file prior to encounter. Current Outpatient Medications on File Prior to Encounter   Medication Sig Dispense Refill    TRULICITY 1.5 HS/4.1GQ SOPN Inject into the skin once a week  2    nitroGLYCERIN (NITROSTAT) 0.4 MG SL tablet Place 1 tablet under the tongue every 5 minutes as needed for Chest pain 25 tablet 2    ezetimibe (ZETIA) 10 MG tablet Take 10 mg by mouth daily      acetaminophen (TYLENOL) 500 MG tablet Take 500 mg by mouth every 6 hours as needed for Pain      SUPER B COMPLEX/C PO Take 1 capsule by mouth daily      Cinnamon 500 MG TABS Take 1,000 mg by mouth daily      vitamin E 400 UNIT capsule Take 400 Units by mouth daily      vitamin C (ASCORBIC ACID) 500 MG tablet Take 1,000 mg by mouth daily       insulin lispro (HUMALOG) 100 UNIT/ML injection vial Inject into the skin 3 times daily (before meals)      insulin glargine (LANTUS) 100 UNIT/ML injection vial Inject 74 Units into the skin nightly       benazepril (LOTENSIN) 20 MG tablet Take 1 tablet by mouth daily 90 tablet 3    fluticasone (FLONASE) 50 MCG/ACT nasal spray as needed       Multiple Vitamins-Minerals (VISION VITAMINS PO) Take by mouth daily      omeprazole (PRILOSEC) 20 MG capsule Take 20 mg by mouth daily.  St Johns Wort 300 MG CAPS Take 2 capsules by mouth.  Canagliflozin (INVOKANA) 300 MG TABS Take  by mouth daily.       budesonide-formoterol (SYMBICORT) 80-4.5 MCG/ACT AERO Inhale 2 puffs into the lungs as needed       Albuterol Sulfate (PROAIR HFA IN) Inhale into the lungs as needed       buPROPion (WELLBUTRIN XL) 300 MG XL tablet Take Length (cm) 9 cm 10/1/2019 11:30 AM   Wound Width (cm) 8 cm 10/1/2019 11:30 AM   Wound Depth (cm) 5 cm 10/1/2019 11:30 AM   Wound Surface Area (cm^2) 72 cm^2 10/1/2019 11:30 AM   Change in Wound Size % (l*w) 21.74 10/1/2019 11:30 AM   Wound Volume (cm^3) 360 cm^3 10/1/2019 11:30 AM   Wound Healing % -161 10/1/2019 11:30 AM   Distance Tunneling (cm) 0 cm 10/1/2019 11:30 AM   Tunneling Position ___ O'Clock 0 10/1/2019 11:30 AM   Undermining Starts ___ O'Clock 9 10/1/2019 11:30 AM   Undermining Ends___ O'Clock 5 10/1/2019 11:30 AM   Undermining Maxium Distance (cm) 4.2 10/1/2019 11:30 AM   Wound Assessment Other (Comment) 10/4/2019 12:01 PM   Drainage Amount Large 10/4/2019  9:20 AM   Drainage Description Serosanguinous 10/4/2019  9:20 AM   Odor None 10/4/2019  9:20 AM   Margins Defined edges 10/4/2019  9:20 AM   Exposed structure Muscle 10/4/2019  9:20 AM   Annika-wound Assessment Other (Comment) 10/4/2019 12:01 PM   Non-staged Wound Description Full thickness 10/4/2019  9:20 AM   Red%Wound Bed 50 10/1/2019  9:24 PM   Yellow%Wound Bed 40 10/1/2019  9:24 PM   Black%Wound Bed 10 10/1/2019  9:24 PM   Purple%Wound Bed 80 10/1/2019  9:24 PM   Number of days: 13       Wound 09/21/19 Hip Left cluster/DTI (Active)   Wound Deep tissue/Injury 10/4/2019  9:20 AM   Dressing Status Clean;Dry; Intact 10/4/2019 12:01 PM   Dressing Changed Changed/New 10/4/2019  9:20 AM   Dressing/Treatment Other (comment) 10/1/2019  9:24 PM   Wound Cleansed Rinsed/Irrigated with saline 10/4/2019  9:20 AM   Dressing Change Due 09/27/19 10/1/2019  9:24 PM   Wound Length (cm) 4 cm 10/4/2019  9:20 AM   Wound Width (cm) 3.5 cm 10/4/2019  9:20 AM   Wound Depth (cm) 0.1 cm 10/4/2019  9:20 AM   Wound Surface Area (cm^2) 14 cm^2 10/4/2019  9:20 AM   Change in Wound Size % (l*w) 73.08 10/4/2019  9:20 AM   Wound Volume (cm^3) 1.4 cm^3 10/4/2019  9:20 AM   Distance Tunneling (cm) 0 cm 10/4/2019  9:20 AM   Tunneling Position ___ O'Clock 0 10/4/2019  9:20 AM Undermining Starts ___ O'Clock 0 10/4/2019  9:20 AM   Undermining Ends___ O'Clock 0 10/4/2019  9:20 AM   Undermining Maxium Distance (cm) 0 10/4/2019  9:20 AM   Wound Assessment Other (Comment) 10/4/2019 12:01 PM   Drainage Amount Small 10/4/2019  9:20 AM   Drainage Description Serosanguinous 10/4/2019  9:20 AM   Odor None 10/4/2019  9:20 AM   Margins Undefined edges 10/4/2019  9:20 AM   Annika-wound Assessment Other (Comment) 10/4/2019 12:01 PM   Non-staged Wound Description Full thickness 10/4/2019  9:20 AM   Whitten%Wound Bed 40 10/4/2019  9:20 AM   Purple%Wound Bed 100 10/1/2019  9:24 PM   Other%Wound Bed 60 10/4/2019  9:20 AM   Number of days: 13       Wound 10/01/19 Hip Right (Active)   Wound Non-Healing Surgical 10/4/2019  9:20 AM   Dressing Status Clean;Dry; Intact 10/4/2019 12:01 PM   Dressing Changed Changed/New 10/4/2019  9:20 AM   Wound Cleansed Rinsed/Irrigated with saline 10/4/2019  9:20 AM   Wound Length (cm) 22.5 cm 10/1/2019 11:30 AM   Wound Width (cm) 3.2 cm 10/1/2019 11:30 AM   Wound Depth (cm) 3.5 cm 10/1/2019 11:30 AM   Wound Surface Area (cm^2) 72 cm^2 10/1/2019 11:30 AM   Wound Volume (cm^3) 252 cm^3 10/1/2019 11:30 AM   Distance Tunneling (cm) 0 cm 10/1/2019 11:30 AM   Tunneling Position ___ O'Clock 0 10/1/2019 11:30 AM   Undermining Starts ___ O'Clock 0 10/1/2019 11:30 AM   Undermining Ends___ O'Clock 0 10/1/2019 11:30 AM   Undermining Maxium Distance (cm) 0 10/1/2019 11:30 AM   Wound Assessment Red;Yellow 10/4/2019  9:20 AM   Drainage Amount Large 10/4/2019  9:20 AM   Drainage Description Serosanguinous 10/4/2019  9:20 AM   Odor None 10/4/2019  9:20 AM   Margins Defined edges 10/4/2019  9:20 AM   Annika-wound Assessment Clean; Intact 10/4/2019  9:20 AM   Non-staged Wound Description Full thickness 10/4/2019  9:20 AM   Whitten%Wound Bed 50 10/1/2019  9:24 PM   Red%Wound Bed 30 10/1/2019  9:24 PM   Yellow%Wound Bed 20 10/1/2019  9:24 PM   Number of days: 3       Wound 10/04/19 Thigh Anterior;Left;Proximal intact blister (Active)   Wound Other 10/4/2019  9:20 AM   Dressing Status Changed 10/4/2019  9:20 AM   Dressing Changed Changed/New 10/4/2019  9:20 AM   Wound Cleansed Rinsed/Irrigated with saline 10/4/2019  9:20 AM   Wound Length (cm) 1.5 cm 10/4/2019  9:20 AM   Wound Width (cm) 2.5 cm 10/4/2019  9:20 AM   Wound Depth (cm) 0 cm 10/4/2019  9:20 AM   Wound Surface Area (cm^2) 3.75 cm^2 10/4/2019  9:20 AM   Wound Volume (cm^3) 0 cm^3 10/4/2019  9:20 AM   Wound Assessment Fluid filled blister 10/4/2019  9:20 AM   Drainage Amount None 10/4/2019  9:20 AM   Odor None 10/4/2019  9:20 AM   Margins Attached edges 10/4/2019  9:20 AM   Dash-wound Assessment Clean;Dry 10/4/2019  9:20 AM   Number of days: 0       Response to treatment:  With complaints of pain. Pain Assessment:  Severity:  5/10  Quality of pain: sharp  Wound Pain Timing/Severity: intermittent  Premedicated: yes    Plan:     Plan of Care:      Wound 10/01/19 Hip Right-Dressing/Treatment: (duoderm dash wound, mepitel distal wound, silver foam)  Wound 10/04/19 Thigh Anterior;Left;Proximal intact blister-Dressing/Treatment: (mepilex border)  Wound 09/21/19 Coccyx-Dressing/Treatment: (duoderm periwound, ostomy ring, aquacel, silver foarm)  Wound 09/21/19 Hip Left cluster/DTI-Dressing/Treatment: (mepilex border)    Pt in bed. Incontinent small stool around rectal tube. Dash care done and linens changed. Positioned to right side to evaluate DTI left hip. Cluster noted and mepilex border changed. Fluid filled blister noted to left upper thigh. Mepilex border applied. Positioned to left side in order to change right hip/coccyx vacs. Wounds irrigated with NS. Duoderm applied dash wound and ostomy ring applied to distal end of coccyx wound to maintain seal from anus. Aquacel applied to open area dash wound. Silver foam applied with track to non bony area right leg. Adequate seal obtained.  Pt tolerated procedure well with premedication with IV pain meds. Positioned to right side in bed per pt request.   Specialty Bed Required : yes  [] Low Air Loss   [x] Pressure Redistribution  [] Fluid Immersion  [x] Bariatric  [] Total Pressure Relief  [] Other:     Discharge Plan:  Placement for patient upon discharge: tbd  Hospice Care: no  Patient appropriate for Outpatient [address]: [facility]    Patient/Caregiver Teaching:  Level of patient/caregiver understanding able to:   Voiced understanding regarding wound care.         Electronically signed by Alejandra Huerta RN,  on 10/4/2019 at 12:36 PM

## 2019-10-04 NOTE — CARE COORDINATION
Received referral for ARU. Reviewed patients clinicals and PT/OT notes. Patient is not ARU appropriate. Left Xochitl HILL.   Thank you for the referral.

## 2019-10-04 NOTE — PROGRESS NOTES
General Surgery- [facility] Day: 15    Chief Complaint on Admission: infected decub ulcer      Subjective:     Denies complaints overnight. Currently working with therapist.  Brigitte Elliot without N/V. Denies F/C. Pain controlled. Denies issues with wound vac. ROS:  Review of Systems   Constitutional: Positive for fatigue. Negative for fever. HENT: Negative. Eyes: Negative. Respiratory: Negative. Cardiovascular: Negative. Gastrointestinal: Negative. Endocrine: Negative. Genitourinary: Negative. Musculoskeletal: Positive for myalgias. Skin: Positive for wound. Allergic/Immunologic: Negative. Neurological: Negative. Hematological: Negative. Psychiatric/Behavioral: Negative. Reviewed. Negative except as above. Allergies  Bee venom          Diagnosis Date    Arthritis     Asthma     Chronic kidney disease     stage 3, seen by Dr. Liz Ragsdale Diabetes mellitus ([facility]     H/O cardiac catheterization 2017    H/O cardiovascular stress test ,     CARDIOLITE: EF->51%    Hyperlipidemia     Hypertension     Obesity     Pressure ulcer of coccygeal region, unstageable ([facility] 09/10/2019    Thyroid disease        Objective:     Vitals:    10/04/19 1137   BP:    Pulse:    Resp: 24   Temp:    SpO2:        TEMPERATURE:  Current -Temp: 98.4 °F (36.9 °C); Max - Temp  Av °F (36.7 °C)  Min: 97.3 °F (36.3 °C)  Max: 98.4 °F (36.9 °C)    I/O this shift:  In: -   Out: 1350 [Urine:950; Drains:400]I/O last 3 completed shifts: In: 5 [P.O.:720]  Out: 2750 [Urine:1750; Stool:1000]      Physical Exam:  Physical Exam   Laying in bed on side currently being cleaned up. NAD. Comfortable. AT. NC.  EOMI. PERRLA. Breathing unlabored. RRR. Obese, soft, NT, ND, no PS.  +Lujan. + Rectal tube. +R hip with vac in place and good seal. + sacral vac in place  Both connect via Y-connector and have bloody output (lighter than yesterday). FRANZ. Warm, dry. Scheduled Meds:   insulin glargine  40 Units Subcutaneous Nightly    ampicillin-sulbactam  3 g Intravenous Q6H    anidulafungin  100 mg Intravenous Q24H    doxycycline hyclate  100 mg Oral 2 times per day    insulin lispro  0-6 Units Subcutaneous TID WC    insulin lispro  0-6 Units Subcutaneous 2 times per day    aspirin  81 mg Oral Daily    mometasone-formoterol  2 puff Inhalation BID    buPROPion  300 mg Oral Daily    canagliflozin  300 mg Oral Daily    ezetimibe  10 mg Oral Daily    fluticasone  1 spray Each Nostril Daily    ocuvite-lutein  1 tablet Oral Daily    pantoprazole  40 mg Oral QAM AC    sertraline  100 mg Oral BID    simvastatin  40 mg Oral Daily    b complex-C-E-zinc  1 tablet Oral Daily    Dulaglutide  1.5 mg Subcutaneous Weekly    vitamin C  1,000 mg Oral Daily    vitamin E  400 Units Oral Daily    sodium chloride flush  10 mL Intravenous 2 times per day    enoxaparin  40 mg Subcutaneous Daily     ContinuousInfusions:   dextrose       PRN Meds:cloNIDine, HYDROmorphone, glucose, dextrose, glucagon (rDNA), dextrose, acetaminophen, albuterol sulfate HFA, nitroGLYCERIN, sodium chloride flush, traMADol **OR** traMADol, ondansetron      Labs/Imaging Results:   Lab Results   Component Value Date    WBC 12.9 (H) 10/04/2019    HGB 10.7 (L) 10/04/2019    HCT 34.5 (L) 10/04/2019    MCV 94.8 10/04/2019     10/04/2019     Lab Results   Component Value Date     10/04/2019    K 3.4 (L) 10/04/2019     10/04/2019    CO2 25 10/04/2019    BUN 18 10/04/2019    CREATININE 1.2 10/04/2019    GLUCOSE 147 (H) 10/04/2019    CALCIUM 7.8 (L) 10/04/2019    PROT 6.1 (L) 09/20/2019    LABALBU 2.3 (L) 10/04/2019    BILITOT 1.1 (H) 09/20/2019    ALKPHOS 143 (H) 09/20/2019     (H) 09/20/2019    ALT 98 (H) 09/20/2019    LABGLOM >60 10/04/2019    GFRAA >60 10/04/2019       Assessment:     76 y/o M POD s/p excisional debridement down to bone of infected pressure ulcer (9/23) and repeat I&D of right hip (9/29) and repeat debridement and excision of coccyx (9/29)    Plan:     -Leukocytosis improving (12.9 today). -Wound vac changes as scheduled. -ID consult reviewed and Abx changes noted. Appreciate input and recs. -Medical mgt  -PT/OT  -Diet as tolerated  -Blood cx x2 pending. Prelim are both negative to date.  -Surgery cx is prelim. Pending completion. Still showing as prelim in computer.   -Body fluid cx is completed (right hip). -Final path for coccyx was ulcer with acute osteomyelitis.  -Likely d/c once cx finalized and Abx courses set by ID.   -Once d/c'd, pt should f/u with me in office in 7-10 days. D/w pt that ideally he would not need rectal tube and be able to use bed pan or bedside toilet. He has been cleared by Ortho for WBAT. Concern with rectal tube is stool is possibly getting into wound. D/w pt that if he is not able to use bed pain or bedside toilet that he may ultimately require diverting colostomy. He does not currently want this.          Electronically signed by Racheal Lockhart II, MD on 10/4/2019 at 11:48 AM

## 2019-10-04 NOTE — PROGRESS NOTES
Alert and oriented. wound vac on and functioning properly. Denies pain or discomfort during shift. Prn zofran given for nausea, effective. No concerns noted at this time.

## 2019-10-04 NOTE — PROGRESS NOTES
Progress note    Jose Granados    10/4/2019    Subjective:     Patient on wound vac for his hip and decub ulcers. Doing good with PT  Medication side effects: none. Scheduled Meds:   insulin glargine  40 Units Subcutaneous Nightly    ampicillin-sulbactam  3 g Intravenous Q6H    anidulafungin  100 mg Intravenous Q24H    doxycycline hyclate  100 mg Oral 2 times per day    insulin lispro  0-6 Units Subcutaneous TID WC    insulin lispro  0-6 Units Subcutaneous 2 times per day    aspirin  81 mg Oral Daily    mometasone-formoterol  2 puff Inhalation BID    buPROPion  300 mg Oral Daily    canagliflozin  300 mg Oral Daily    ezetimibe  10 mg Oral Daily    fluticasone  1 spray Each Nostril Daily    ocuvite-lutein  1 tablet Oral Daily    pantoprazole  40 mg Oral QAM AC    sertraline  100 mg Oral BID    simvastatin  40 mg Oral Daily    b complex-C-E-zinc  1 tablet Oral Daily    Dulaglutide  1.5 mg Subcutaneous Weekly    vitamin C  1,000 mg Oral Daily    vitamin E  400 Units Oral Daily    sodium chloride flush  10 mL Intravenous 2 times per day    enoxaparin  40 mg Subcutaneous Daily     Continuous Infusions:   dextrose       PRN Meds:cloNIDine, HYDROmorphone, glucose, dextrose, glucagon (rDNA), dextrose, acetaminophen, albuterol sulfate HFA, nitroGLYCERIN, sodium chloride flush, traMADol **OR** traMADol, ondansetron    Review of Systems  Pertinent items are noted in HPI. Objective:     Patient Vitals for the past 8 hrs:   BP Temp Temp src Pulse Resp SpO2   10/04/19 1137 -- -- -- -- 24 --   10/04/19 1015 130/72 98.4 °F (36.9 °C) Oral 94 23 95 %     I/O last 3 completed shifts: In: 720 [P.O.:720]  Out: 2750 [Urine:1750; Stool:1000]  I/O this shift:  In: -   Out: 1350 [Urine:950; Drains:400]    /72   Pulse 94   Temp 98.4 °F (36.9 °C) (Oral)   Resp 24   Ht 6' 3\" (1.905 m)   Wt (!) 407 lb 8 oz (184.8 kg)   SpO2 95%   BMI 50.93 kg/m²   General appearance: alert and cooperative.   Lungs: clear to auscultation bilaterally  Heart: regular rate and rhythm, S1, S2 normal, no murmur, click, rub or gallop  Abdomen: soft, non-tender; bowel sounds normal; no masses,  no organomegaly  Extremities: extremities normal, atraumatic, no cyanosis or edema  Skin: Skin color, texture, turgor normal. No rashes or lesions        Labs and imaging reviewed.    CBC with Differential:    Lab Results   Component Value Date    WBC 12.9 10/04/2019    RBC 3.64 10/04/2019    HGB 10.7 10/04/2019    HCT 34.5 10/04/2019     10/04/2019    MCV 94.8 10/04/2019    MCH 29.4 10/04/2019    MCHC 31.0 10/04/2019    RDW 14.4 10/04/2019    SEGSPCT 82.4 10/04/2019    BANDSPCT 3 09/29/2019    LYMPHOPCT 7.0 10/04/2019    PROMYELOPCT 2 09/26/2019    MONOPCT 7.0 10/04/2019    MYELOPCT 1 09/29/2019    BASOPCT 0.6 10/04/2019    MONOSABS 0.9 10/04/2019    LYMPHSABS 0.9 10/04/2019    EOSABS 0.2 10/04/2019    BASOSABS 0.1 10/04/2019    DIFFTYPE AUTOMATED DIFFERENTIAL 10/04/2019     CMP:    Lab Results   Component Value Date     10/04/2019    K 3.4 10/04/2019     10/04/2019    CO2 25 10/04/2019    BUN 18 10/04/2019    CREATININE 1.2 10/04/2019    GFRAA >60 10/04/2019    LABGLOM >60 10/04/2019    GLUCOSE 147 10/04/2019    PROT 6.1 09/20/2019    LABALBU 2.3 10/04/2019    CALCIUM 7.8 10/04/2019    BILITOT 1.1 09/20/2019    ALKPHOS 143 09/20/2019     09/20/2019    ALT 98 09/20/2019     BMP:    Lab Results   Component Value Date     10/04/2019    K 3.4 10/04/2019     10/04/2019    CO2 25 10/04/2019    BUN 18 10/04/2019    LABALBU 2.3 10/04/2019    CREATININE 1.2 10/04/2019    CALCIUM 7.8 10/04/2019    GFRAA >60 10/04/2019    LABGLOM >60 10/04/2019    GLUCOSE 147 10/04/2019     Sodium:    Lab Results   Component Value Date     10/04/2019     Potassium:    Lab Results   Component Value Date    K 3.4 10/04/2019     Calcium:    Lab Results   Component Value Date    CALCIUM 7.8 10/04/2019     Warfarin PT/INR:  No components found for: Anisha Lee  PTT:    Lab Results   Component Value Date    APTT 29.1 05/03/2017   [APTT  Last 3 Troponin:  No results found for: TROPONINI  ABG:    Lab Results   Component Value Date    BIK4QND 32.0 09/20/2019    PO2ART 89 09/20/2019    PAY0IVQ 22.2 09/20/2019         Assessment:     Principal Problem:    Sepsis ([facility]  Active Problems:    Hypertension    Hyperlipidemia    Asthma    Diabetes mellitus ([facility]    Obesity    Chronic kidney disease, stage III (moderate) (HCC)    Depression    Pressure injury of skin of coccygeal region    PVC (premature ventricular contraction)  Resolved Problems:    * No resolved hospital problems. *      Plan:   Decubitus Ulcer-status post excisional debridement of sacral pressure ulcer on 9/23/2019; status post hip incision and drainage, decubitus ulcer debridement, removal of the coccyx, wound VAC placement on 9/29/2019. Osteomyelitis Coccyx  Enterococcus Sacral wound  Staph aureus-Hip wound  Spoke to ID,  Wants patient on Unasyn IV and Eraxis IV for 4 weeks.   OK with him to discharge to SNF  Spoke to  who will work on Certification to [place] [id number] or back to SNF      Ganga TORRES M.D.  10/4/2019

## 2019-10-04 NOTE — CARE COORDINATION
Spoke with Dr Reggie Harding pt not wanting to return to [facility], he wants ARU. Called Referral to [facility]. CM will continue to follow.

## 2019-10-04 NOTE — PROGRESS NOTES
Physical Therapy    Physical Therapy Treatment Note  Name: Nguyen Ross MRN: [id number] :   1950   Date:  10/4/2019   Admission Date: 2019 Room:  [address] [place] [address]   Restrictions/Precautions:           Communication with other providers:   presents, states pt needs to progress toward using bed pan or to toilet to decrease risk of infection from BM into wound and get rectal tube removed. Sates colostomy may be in order if infection sets in or not able to progress to standing to get to toilet. Subjective:  Patient states:  Feels he is improving, just had wound vac treatment. Pain:   Location, Type, Intensity (0/10 to 10/10):  6-7/10 around tailbone, states they removed part of tailbone. Objective:    Observation:  Pt in bed upon arrival , agreeable to therapy  Treatment, including education/measures:  Pt completed AROM BLE x 20  Set pt up with heavy resistance T band over bed frame for resisted UE and core strengthening to facilitate transfers. Pt educated to complete ex 2 day. Assessment / Impression:       Patient's tolerance of treatment:  fair   Adverse Reaction: pain in tailbone area with   Significant change in status and impact:  Recent wound debridment  Barriers to improvement:  Wound pain. Plan for Next Session:    Progress transfers. Time in:  1141  Time out:  1234  Timed treatment minutes:  53  Total treatment time:  = 4 TE    Previously filed items:  Social/Functional History  Additional Comments: Has been in SNF but had not progressed to gait or transfers. Has been mostly bed level since last hopistalizatoin. Short term goals  Time Frame for Short term goals: 1 week  Short term goal 1: Patient will perform supine to sit with mod A x2. Short term goal 2: Patient will perform STS with max A x2 and RW.   Short term goal 3: Patient will sit EOB x15 min mod I.       Electronically signed by:    Francois Johansen PTA [id number]  10/4/2019, 11:51 AM

## 2019-10-04 NOTE — PROGRESS NOTES
97%   BMI 50.93 kg/m²     General appearance:  No ac distress  HEENT:  No striking conj pallor  Neck:  supple  Lungs:  Limited ant exam only- no gross crackles  Heart:  Seems RRR  Abdomen: soft  Extremities:  ++ LE edema mainly below knee       Problem List :         Impression :     1. SHERLYN/ CKD stage  3 -  Stable  2. Infected hip surgery- S/p incision nd drain last week   3. Unde;lying DM / anemia   4. Skin / soft tissue and bone infection  with abx   5. Low mg/K- replete     Recommendation/Plan  :     1. He is waiting ro ARU  2. Good BS control  3. Watch  for iatrogenic and nosocomial complication  4. He has risk for Sherlyn with ongoing infection and antimicrobial agent   5.  Monitor periodically renal Fx       Stephanie Arana MD

## 2019-10-05 NOTE — PROGRESS NOTES
Progress note    Irma Blocker    10/5/2019    Subjective:     Patient on wound vac for his hip and decub ulcers. No new complaints. Medication side effects: none. Scheduled Meds:   insulin glargine  30 Units Subcutaneous Nightly    lidocaine PF  5 mL Intradermal Once    sodium chloride flush  10 mL Intravenous 2 times per day    potassium chloride  40 mEq Oral BID WC    ampicillin-sulbactam  3 g Intravenous Q6H    anidulafungin  100 mg Intravenous Q24H    insulin lispro  0-6 Units Subcutaneous TID WC    insulin lispro  0-6 Units Subcutaneous 2 times per day    aspirin  81 mg Oral Daily    mometasone-formoterol  2 puff Inhalation BID    buPROPion  300 mg Oral Daily    canagliflozin  300 mg Oral Daily    ezetimibe  10 mg Oral Daily    fluticasone  1 spray Each Nostril Daily    ocuvite-lutein  1 tablet Oral Daily    pantoprazole  40 mg Oral QAM AC    sertraline  100 mg Oral BID    simvastatin  40 mg Oral Daily    b complex-C-E-zinc  1 tablet Oral Daily    Dulaglutide  1.5 mg Subcutaneous Weekly    vitamin C  1,000 mg Oral Daily    vitamin E  400 Units Oral Daily    sodium chloride flush  10 mL Intravenous 2 times per day    enoxaparin  40 mg Subcutaneous Daily     Continuous Infusions:   dextrose       PRN Meds:sodium chloride flush, calcium carbonate, cloNIDine, HYDROmorphone, glucose, dextrose, glucagon (rDNA), dextrose, acetaminophen, albuterol sulfate HFA, nitroGLYCERIN, sodium chloride flush, traMADol **OR** traMADol, ondansetron    Review of Systems  Pertinent items are noted in HPI. Objective:     Patient Vitals for the past 8 hrs:   BP Temp Temp src Pulse Resp SpO2   10/05/19 1657 139/67 97.5 °F (36.4 °C) Oral 92 16 97 %   10/05/19 1330 (!) 143/72 98.1 °F (36.7 °C) Oral 97 18 --     I/O last 3 completed shifts: In: 2223 [P.O.:960; IV Piggyback:330]  Out: 2025 [Urine:1625; Drains:400]  No intake/output data recorded.     /67   Pulse 92   Temp 97.5 °F (36.4 °C) (Oral) Resp 16   Ht 6' 3\" (1.905 m)   Wt (!) 401 lb 9.6 oz (182.2 kg)   SpO2 97%   BMI 50.20 kg/m²   General appearance: alert and cooperative. Lungs: clear to auscultation bilaterally  Heart: regular rate and rhythm, S1, S2 normal, no murmur, click, rub or gallop  Abdomen: soft, non-tender; bowel sounds normal; no masses,  no organomegaly  Extremities: extremities normal, atraumatic, no cyanosis or edema  Skin: Skin color, texture, turgor normal. No rashes or lesions        Labs and imaging reviewed.    CBC with Differential:    Lab Results   Component Value Date    WBC 12.9 10/04/2019    RBC 3.64 10/04/2019    HGB 10.7 10/04/2019    HCT 34.5 10/04/2019     10/04/2019    MCV 94.8 10/04/2019    MCH 29.4 10/04/2019    MCHC 31.0 10/04/2019    RDW 14.4 10/04/2019    SEGSPCT 82.4 10/04/2019    BANDSPCT 3 09/29/2019    LYMPHOPCT 7.0 10/04/2019    PROMYELOPCT 2 09/26/2019    MONOPCT 7.0 10/04/2019    MYELOPCT 1 09/29/2019    BASOPCT 0.6 10/04/2019    MONOSABS 0.9 10/04/2019    LYMPHSABS 0.9 10/04/2019    EOSABS 0.2 10/04/2019    BASOSABS 0.1 10/04/2019    DIFFTYPE AUTOMATED DIFFERENTIAL 10/04/2019     CMP:    Lab Results   Component Value Date     10/04/2019    K 3.4 10/04/2019     10/04/2019    CO2 25 10/04/2019    BUN 18 10/04/2019    CREATININE 1.2 10/04/2019    GFRAA >60 10/04/2019    LABGLOM >60 10/04/2019    GLUCOSE 147 10/04/2019    PROT 6.1 09/20/2019    LABALBU 2.3 10/04/2019    CALCIUM 7.8 10/04/2019    BILITOT 1.1 09/20/2019    ALKPHOS 143 09/20/2019     09/20/2019    ALT 98 09/20/2019     BMP:    Lab Results   Component Value Date     10/04/2019    K 3.4 10/04/2019     10/04/2019    CO2 25 10/04/2019    BUN 18 10/04/2019    LABALBU 2.3 10/04/2019    CREATININE 1.2 10/04/2019    CALCIUM 7.8 10/04/2019    GFRAA >60 10/04/2019    LABGLOM >60 10/04/2019    GLUCOSE 147 10/04/2019     Sodium:    Lab Results   Component Value Date     10/04/2019     Potassium:    Lab Results Component Value Date    K 3.4 10/04/2019     Calcium:    Lab Results   Component Value Date    CALCIUM 7.8 10/04/2019     Warfarin PT/INR:  No components found for: Kristan Parr  PTT:    Lab Results   Component Value Date    APTT 29.1 05/03/2017   [APTT  Last 3 Troponin:  No results found for: TROPONINI  ABG:    Lab Results   Component Value Date    OWW2PFX 32.0 09/20/2019    PO2ART 89 09/20/2019    QIM5QFY 22.2 09/20/2019         Assessment:     Principal Problem:    Sepsis ([facility]  Active Problems:    Hypertension    Hyperlipidemia    Asthma    Diabetes mellitus ([facility]    Obesity    Chronic kidney disease, stage III (moderate) (HCC)    Depression    Pressure injury of skin of coccygeal region    PVC (premature ventricular contraction)  Resolved Problems:    * No resolved hospital problems. *      Plan:   Decubitus Ulcer-status post excisional debridement of sacral pressure ulcer on 9/23/2019; status post hip incision and drainage, decubitus ulcer debridement, removal of the coccyx, wound VAC placement on 9/29/2019.    Osteomyelitis Coccyx  Enterococcus Sacral wound  Staph aureus-Hip wound  Noted recommendation by Dr. Eda Lisa and Kimberly Angeles certification for placement to ARU or ECF    Lela TORRES M.D.  10/5/2019

## 2019-10-05 NOTE — PROGRESS NOTES
General Surgery-[facility] Day: 16    ChiefComplaint on Admission: decubitus wound    Subjective:     Arnold Pena is a 77 y.o. male with s/p excisional debridement down to bone of infected pressure ulcer () and repeat I&D of right hip () and repeat debridement and excision of coccyx () . Patient denies abdominal pain. Tolerating Dietary Nutrition Supplements: Wound Healing Oral Supplement  DIET GENERAL;. + BM.     ROS:  Review of Systems   Constitutional: Negative for chills and fever. HENT: Negative for ear pain, mouth sores, sore throat and tinnitus. Eyes: Negative for photophobia, redness and itching. Respiratory: Negative for apnea, choking and stridor. Cardiovascular: Negative for chest pain and palpitations. Gastrointestinal: Positive for abdominal pain. Negative for anal bleeding, constipation and rectal pain. Endocrine: Negative for polydipsia. Genitourinary: Negative for enuresis, flank pain and hematuria. Musculoskeletal: Negative for back pain, joint swelling and myalgias. Skin: Positive for wound. Negative for color change and pallor. Allergic/Immunologic: Negative for environmental allergies. Neurological: Negative for syncope and speech difficulty. Psychiatric/Behavioral: Negative for confusion and hallucinations. Allergies  Bee venom          Diagnosis Date    Arthritis     Asthma     Chronic kidney disease     stage 3, seen by Dr. Hernandez [place] Diabetes mellitus ([facility]     H/O cardiac catheterization 2017    H/O cardiovascular stress test ,     CARDIOLITE: EF->51%    Hyperlipidemia     Hypertension     Obesity     Pressure ulcer of coccygeal region, unstageable ([facility] 09/10/2019    Thyroid disease        Objective:     Vitals:    10/05/19 0839   BP:    Pulse:    Resp: 19   Temp:    SpO2: 96%       TEMPERATURE:  Current - Temp: 98.2 °F (36.8 °C);  Max - Temp  Av °F (36.7 °C)  Min: 97.4 °F (36.3 °C)  Max: 98.4 °F (36.9 °C)    No intake/output data recorded. I/O last 3 completed shifts: In: 80 [P.O.:480; IV Piggyback:330]  Out: 6127 [Urine:2575; Drains:800]      Physical Exam:    Physical Exam   Constitutional: He is oriented to person, place, and time. He appears well-developed and well-nourished. HENT:   Head: Normocephalic. Eyes: Pupils are equal, round, and reactive to light. Neck: Normal range of motion. Neck supple. Cardiovascular: Normal rate. Pulmonary/Chest: Effort normal.   Abdominal: Soft. He exhibits no distension and no mass. There is no tenderness. There is no rebound and no guarding. Musculoskeletal: Normal range of motion. Neurological: He is alert and oriented to person, place, and time. Skin: Skin is warm. Wound VAC stable and intact   Psychiatric: He has a normal mood and affect.            Scheduled Meds:   insulin glargine  40 Units Subcutaneous Nightly    lidocaine PF  5 mL Intradermal Once    sodium chloride flush  10 mL Intravenous 2 times per day    potassium chloride  40 mEq Oral BID WC    ampicillin-sulbactam  3 g Intravenous Q6H    anidulafungin  100 mg Intravenous Q24H    insulin lispro  0-6 Units Subcutaneous TID WC    insulin lispro  0-6 Units Subcutaneous 2 times per day    aspirin  81 mg Oral Daily    mometasone-formoterol  2 puff Inhalation BID    buPROPion  300 mg Oral Daily    canagliflozin  300 mg Oral Daily    ezetimibe  10 mg Oral Daily    fluticasone  1 spray Each Nostril Daily    ocuvite-lutein  1 tablet Oral Daily    pantoprazole  40 mg Oral QAM AC    sertraline  100 mg Oral BID    simvastatin  40 mg Oral Daily    b complex-C-E-zinc  1 tablet Oral Daily    Dulaglutide  1.5 mg Subcutaneous Weekly    vitamin C  1,000 mg Oral Daily    vitamin E  400 Units Oral Daily    sodium chloride flush  10 mL Intravenous 2 times per day    enoxaparin  40 mg Subcutaneous Daily     Continuous Infusions:   dextrose       PRN Meds:sodium chloride flush, calcium carbonate, cloNIDine, HYDROmorphone, glucose, dextrose, glucagon (rDNA), dextrose, acetaminophen, albuterol sulfate HFA, nitroGLYCERIN, sodium chloride flush, traMADol **OR** traMADol, ondansetron      Labs/Imaging Results:   Lab Results   Component Value Date    WBC 12.9 (H) 10/04/2019    HGB 10.7 (L) 10/04/2019    HCT 34.5 (L) 10/04/2019    MCV 94.8 10/04/2019     10/04/2019     Lab Results   Component Value Date     10/04/2019    K 3.4 (L) 10/04/2019     10/04/2019    CO2 25 10/04/2019    BUN 18 10/04/2019    CREATININE 1.2 10/04/2019    GLUCOSE 147 (H) 10/04/2019    CALCIUM 7.8 (L) 10/04/2019    PROT 6.1 (L) 09/20/2019    LABALBU 2.3 (L) 10/04/2019    BILITOT 1.1 (H) 09/20/2019    ALKPHOS 143 (H) 09/20/2019     (H) 09/20/2019    ALT 98 (H) 09/20/2019    LABGLOM >60 10/04/2019    GFRAA >60 10/04/2019       Assessment:     Patient Active Problem List:     Hypertension     Hyperlipidemia     Asthma     Diabetes mellitus ([address]     H/O cardiovascular stress test     Obesity     Chronic kidney disease, stage III (moderate) (HCC)     Hypertensive kidney disease with CKD stage III (HCC)     Depression     SOBOE (shortness of breath on exertion)     Abnormal cardiovascular stress test     Fracture of epiphysis (separation) (upper) of neck of femur, closed (HCC)     Pressure injury of skin of coccygeal region     Sepsis ([address]     PVC (premature ventricular contraction)      Plan:       Cont wound care  Rehab placement        Dominga Rabago MD

## 2019-10-05 NOTE — PROGRESS NOTES
Nephrology Progress Note  10/5/2019 2:47 PM        Subjective:   Admit Date: 9/20/2019  PCP: Nicole Santos MD    Interval History: slowly improving     Diet: spme    ROS:  Hallucination, pain , good UOP - has wilkerson     Data:     Current meds:    insulin glargine  30 Units Subcutaneous Nightly    lidocaine PF  5 mL Intradermal Once    sodium chloride flush  10 mL Intravenous 2 times per day    potassium chloride  40 mEq Oral BID WC    ampicillin-sulbactam  3 g Intravenous Q6H    anidulafungin  100 mg Intravenous Q24H    insulin lispro  0-6 Units Subcutaneous TID WC    insulin lispro  0-6 Units Subcutaneous 2 times per day    aspirin  81 mg Oral Daily    mometasone-formoterol  2 puff Inhalation BID    buPROPion  300 mg Oral Daily    canagliflozin  300 mg Oral Daily    ezetimibe  10 mg Oral Daily    fluticasone  1 spray Each Nostril Daily    ocuvite-lutein  1 tablet Oral Daily    pantoprazole  40 mg Oral QAM AC    sertraline  100 mg Oral BID    simvastatin  40 mg Oral Daily    b complex-C-E-zinc  1 tablet Oral Daily    Dulaglutide  1.5 mg Subcutaneous Weekly    vitamin C  1,000 mg Oral Daily    vitamin E  400 Units Oral Daily    sodium chloride flush  10 mL Intravenous 2 times per day    enoxaparin  40 mg Subcutaneous Daily      dextrose           I/O last 3 completed shifts:   In: 810 [P.O.:480; IV Piggyback:330]  Out: 0033 [Urine:2575; Drains:800]    CBC:   Recent Labs     10/03/19  0452 10/04/19  0753   WBC 17.8* 12.9*   HGB 10.3* 10.7*    303          Recent Labs     10/03/19  0452 10/04/19  0753    136   K 3.2* 3.4*    101   CO2 23 25   BUN 21 18   CREATININE 1.2 1.2   GLUCOSE 119* 147*       Lab Results   Component Value Date    CALCIUM 7.8 (L) 10/04/2019    PHOS 2.3 (L) 10/04/2019       Objective:     Vitals: BP (!) 143/72   Pulse 97   Temp 98.1 °F (36.7 °C) (Oral)   Resp 18   Ht 6' 3\" (1.905 m)   Wt (!) 401 lb 9.6 oz (182.2 kg)   SpO2 96%   BMI 50.20 kg/m² General appearance:  No av distress, no overt confusion  HEENT:  No conj pallor  Neck:  supple  Lungs:  No gross crackles - although very limited exam nabil posterior lung field   Heart:  irregular  Abdomen: soft  Extremities:  ++ edema       Problem List :         Impression :     1. VIRGILIO/ ckd stage 3 a- stable  Good UOP  2. Sepsis- infected hip / decub etc with pain and adverse effects from pain med's   3. Has anemia multifactorial- DM     Recommendation/Plan  :     1. Good BS control  2. BMP in am as she is with KCL   3. He remains in risk for VIRGILIO  4. follow clinically  5. ]? minimize opioids   6.        Spring Mike MD

## 2019-10-05 NOTE — PROGRESS NOTES
Rested in bed throughout shift refusing to be turned at times. Lujan catheter connection site came loose with urine output saturating bed (unable to measure output accurately). Gown and linens changed, bath wipes used for hygiene. Rectal tube remains in place. Wound vac functioning. Canister changed x 1.  PO pain medication given as ordered and requested.

## 2019-10-05 NOTE — PLAN OF CARE
Problem: Falls - Risk of:  Goal: Will remain free from falls  Description  Will remain free from falls  Outcome: Ongoing  Goal: Absence of physical injury  Description  Absence of physical injury  Outcome: Ongoing     Problem: Risk for Impaired Skin Integrity  Goal: Tissue integrity - skin and mucous membranes  Description  Structural intactness and normal physiological function of skin and  mucous membranes.   Outcome: Ongoing     Problem: Discharge Planning:  Goal: Discharged to appropriate level of care  Description  Discharged to appropriate level of care  Outcome: Ongoing     Problem: Gas Exchange - Impaired:  Goal: Levels of oxygenation will improve  Description  Levels of oxygenation will improve  Outcome: Ongoing     Problem: Infection, Septic Shock:  Goal: Will show no infection signs and symptoms  Description  Will show no infection signs and symptoms  Outcome: Ongoing     Problem: Serum Glucose Level - Abnormal:  Goal: Ability to maintain appropriate glucose levels will improve  Description  Ability to maintain appropriate glucose levels will improve  Outcome: Ongoing     Problem: Tissue Perfusion, Altered:  Goal: Circulatory function within specified parameters  Description  Circulatory function within specified parameters  Outcome: Ongoing     Problem: Venous Thromboembolism:  Goal: Will show no signs or symptoms of venous thromboembolism  Description  Will show no signs or symptoms of venous thromboembolism  Outcome: Ongoing  Goal: Absence of signs or symptoms of impaired coagulation  Description  Absence of signs or symptoms of impaired coagulation  Outcome: Ongoing     Problem: Pain:  Goal: Pain level will decrease  Description  Pain level will decrease  Outcome: Ongoing  Goal: Control of acute pain  Description  Control of acute pain  Outcome: Ongoing  Goal: Control of chronic pain  Description  Control of chronic pain  Outcome: Ongoing     Problem: Nutrition  Goal: Optimal nutrition [FreeTextEntry3] : I recommended stopping the anticholinergic medication and will double the tamsulosin and start finasteride. I also instructed him not to strain to void but detritus v naturally as he will developed an inhibitory reflex of the external sphincter causing further obstruction. He complained that the tamsulosin Eskalith some degree of ejaculatory dysfunction and a recommended that he not take this around times one expects to be having relations. He will follow up in one month with a urodynamic study and a check of progress therapy  Outcome: Ongoing

## 2019-10-06 NOTE — PROGRESS NOTES
Progress Note( Dr. Bjorn Hernandez)    Subjective:   Admit Date: 9/20/2019  PCP: Michael Roland MD    Admitted For : Extensive deep decubitus ulcer in the gluteal region    Consulted For: Better control of blood glucose    Interval History: Went for debridement and now hs wounf vacuum     Denies any chest pains,   Denies SOB . Denies nausea or vomiting. No new bowel or bladder symptoms. Intake/Output Summary (Last 24 hours) at 10/6/2019 1043  Last data filed at 10/6/2019 0648  Gross per 24 hour   Intake 1250 ml   Output 800 ml   Net 450 ml       DATA    CBC:   Recent Labs     10/04/19  0753   WBC 12.9*   HGB 10.7*       CMP:  Recent Labs     10/04/19  0753 10/06/19  0642    135   K 3.4* 3.7    100   CO2 25 27   BUN 18 15   CREATININE 1.2 1.2   CALCIUM 7.8* 7.7*   LABALBU 2.3*  --      Lipids:   Lab Results   Component Value Date    CHOL 190 01/27/2014    HDL 54 01/27/2014    TRIG 157 01/27/2014     Glucose:  Recent Labs     10/05/19  2127 10/06/19  0152 10/06/19  0748   POCGLU 101* 119* 115*     [id number]:  Lab Results   Component Value Date    LABA1C 6.1 09/04/2019     High Sensitivity TSH:   Lab Results   Component Value Date    TSHHS 3.079 10/29/2013     Free T3: No results found for: FT3  Free T4:No results found for: T4FREE    No results found.     Scheduled Medicines   Medications:    insulin glargine  30 Units Subcutaneous Nightly    lidocaine PF  5 mL Intradermal Once    sodium chloride flush  10 mL Intravenous 2 times per day    ampicillin-sulbactam  3 g Intravenous Q6H    anidulafungin  100 mg Intravenous Q24H    insulin lispro  0-6 Units Subcutaneous TID WC    insulin lispro  0-6 Units Subcutaneous 2 times per day    aspirin  81 mg Oral Daily    mometasone-formoterol  2 puff Inhalation BID    buPROPion  300 mg Oral Daily    canagliflozin  300 mg Oral Daily    ezetimibe  10 mg Oral Daily    fluticasone  1 spray Each Nostril Daily    ocuvite-lutein  1 tablet Oral Daily needed   6. Will follow     .      Treasure Camacho MD

## 2019-10-06 NOTE — PROGRESS NOTES
Progress Note( Dr. Dianna Raygoza)    Subjective:   Admit Date: 9/20/2019  PCP: Nirmal Kirk MD    Admitted For : Extensive deep decubitus ulcer in the gluteal region    Consulted For: Better control of blood glucose    Interval History: Went for debridement and now hs wounf vacuum     Denies any chest pains,   Denies SOB . Denies nausea or vomiting. No new bowel or bladder symptoms. Intake/Output Summary (Last 24 hours) at 10/6/2019 1042  Last data filed at 10/6/2019 0648  Gross per 24 hour   Intake 1250 ml   Output 800 ml   Net 450 ml       DATA    CBC:   Recent Labs     10/04/19  0753   WBC 12.9*   HGB 10.7*       CMP:  Recent Labs     10/04/19  0753 10/06/19  0642    135   K 3.4* 3.7    100   CO2 25 27   BUN 18 15   CREATININE 1.2 1.2   CALCIUM 7.8* 7.7*   LABALBU 2.3*  --      Lipids:   Lab Results   Component Value Date    CHOL 190 01/27/2014    HDL 54 01/27/2014    TRIG 157 01/27/2014     Glucose:  Recent Labs     10/05/19  2127 10/06/19  0152 10/06/19  0748   POCGLU 101* 119* 115*     [id number]:  Lab Results   Component Value Date    LABA1C 6.1 09/04/2019     High Sensitivity TSH:   Lab Results   Component Value Date    TSHHS 3.079 10/29/2013     Free T3: No results found for: FT3  Free T4:No results found for: T4FREE    No results found.     Scheduled Medicines   Medications:    insulin glargine  30 Units Subcutaneous Nightly    lidocaine PF  5 mL Intradermal Once    sodium chloride flush  10 mL Intravenous 2 times per day    ampicillin-sulbactam  3 g Intravenous Q6H    anidulafungin  100 mg Intravenous Q24H    insulin lispro  0-6 Units Subcutaneous TID WC    insulin lispro  0-6 Units Subcutaneous 2 times per day    aspirin  81 mg Oral Daily    mometasone-formoterol  2 puff Inhalation BID    buPROPion  300 mg Oral Daily    canagliflozin  300 mg Oral Daily    ezetimibe  10 mg Oral Daily    fluticasone  1 spray Each Nostril Daily    ocuvite-lutein  1 tablet Oral Daily  pantoprazole  40 mg Oral QAM AC    sertraline  100 mg Oral BID    simvastatin  40 mg Oral Daily    b complex-C-E-zinc  1 tablet Oral Daily    Dulaglutide  1.5 mg Subcutaneous Weekly    vitamin C  1,000 mg Oral Daily    vitamin E  400 Units Oral Daily    sodium chloride flush  10 mL Intravenous 2 times per day    enoxaparin  40 mg Subcutaneous Daily      Infusions:    dextrose           Objective:   Vitals: /78   Pulse 90   Temp 97.7 °F (36.5 °C) (Oral)   Resp 28   Ht 6' 3\" (1.905 m)   Wt (!) 401 lb 9.6 oz (182.2 kg)   SpO2 95%   BMI 50.20 kg/m²   General appearance: alert confused cooperative with exam  Neck: no JVD or bruit  Thyroid : Normal lobes   Lungs: Has Vesicular Breath sounds   Heart:  regular rate and rhythm  Abdomen: soft, non-tender; bowel sounds normal; no masses,  no organomegaly  Musculoskeletal: Normal  Extremities: extremities normal, , no edema decubitus ulcers extensive on gluteal region , has wound vacuum   Neurologic:  Awake, confused, oriented to name, place and time. Cranial nerves II-XII are grossly intact. Motor is  intact. Sensory neuropathy. ,  and gait i normal and mostly bed ridden    Assessment:     Patient Active Problem List:     Hypertension     Hyperlipidemia     Asthma     Diabetes mellitus ([address]     H/O cardiovascular stress test     Obesity     Chronic kidney disease, stage III (moderate) (HCC)     Hypertensive kidney disease with CKD stage III (HCC)     Depression     SOBOE (shortness of breath on exertion)     Abnormal cardiovascular stress test     Fracture of epiphysis (separation) (upper) of neck of femur, closed (HCC)     Pressure injury of skin of coccygeal region     Sepsis ([address]      Plan:     1. Reviewed POC blood glucose . Labs and X ray results   2. Reviewed Current Medicines   3. On meal/ Correction bolus Humalog/ Basal Lantus Insulin regime /  4. Monitor Blood glucose frequently   5.  Modified  the dose of Insulin/ other medicines as needed   6. Will follow     .      Azra Trejo MD

## 2019-10-06 NOTE — ADT AUTH CERT
Utilization Reviews         Sepsis and Other Febrile Illness, without Focal Infection - Care Day 14 (10/4/2019) by Rohini Denson         Review Status Review Entered   Completed 10/4/2019 13:36       Criteria Review      Care Day: 14 Care Date: 10/4/2019 Level of Care: Inpatient Floor    Guideline Day 4    Level Of Care    ( ) Floor to discharge [J]    10/4/2019 1:36 PM EDT by Pratima Ventura      m/s unit    (X) Complete discharge planning    10/4/2019 1:36 PM EDT by Trang Munson is snf    Clinical Status    (X) * Hemodynamic stability    10/4/2019 1:36 PM EDT by Helene Esquivel      98.4  °F (36.9  °C)  94  23  130/72    (X) * Fever absent or acceptable for next level of care    (X) * Hypoxemia absent    (X) * Tachypnea absent    ( ) * Cultures negative or infection identified and under adequate treatment    10/4/2019 1:36 PM EDT by Pratima Ventura      Osteomyelitis Coccyx  Enterococcus Sacral wound  Staph aureus-Hip wound    Surgery cx is prelim. Pending completion.    cx need finalized and abx course set by ID    (X) * Mental status at baseline    (X) * Metabolic derangement (eg, dehydration, acidosis) absent    10/4/2019 1:36 PM EDT by Rob Ochoa Results for Desire Holiday (MRN [id number]) as of 10/4/2019 13:31    10/4/2019 07:53  Sodium: 136  Potassium: 3.4 (L)  Chloride: 101  CO2: 25  BUN: 18  Creatinine: 1.2    (X) * End-organ dysfunction (eg, myocardial ischemia, renal failure) absent    (X) * Discharge plans and education understood    Activity    (X) * Ambulatory    Routes    (X) * Oral hydration, medications [K]    (X) Usual diet    10/4/2019 1:36 PM EDT by Helene Esquivel      general diet    Interventions    (X) WBC    10/4/2019 1:36 PM EDT by Helene Esquivel      wbc 12.9    9/30/2019 5:32 PM EDT by Anayeli Solorio      WBC 20.8    Medications    ( ) * Antimicrobial treatment not necessary or treatment at next level of care arranged [E]    10/4/2019 1:36 PM EDT by Pratima Ventura      iv unasyn q6h, iv eraxis 10/4/2019 13:36       Criteria Review      Care Day: 13 Care Date: 10/3/2019 Level of Care:    Guideline Day 4    Level Of Care    ( ) Floor to discharge [J]    10/4/2019 1:36 PM EDT by Anita Ventura      m/s unit    ( ) Complete discharge planning    10/4/2019 1:36 PM EDT by Broderick Miranda      plan is snf    Clinical Status    (X) * Hemodynamic stability    10/4/2019 1:36 PM EDT by Broderick Miranda      98.4  °F (36.9  °C)  94  23  130/72    (X) * Fever absent or acceptable for next level of care    (X) * Hypoxemia absent    (X) * Tachypnea absent    ( ) * Cultures negative or infection identified and under adequate treatment    10/4/2019 1:36 PM EDT by Broderick Miranda      Osteomyelitis Coccyx  Enterococcus Sacral wound  Staph aureus-Hip wound    Surgery cx is prelim. Pending completion.    cx need finalized and abx course set by ID    ( ) * Mental status at baseline    ( ) * Metabolic derangement (eg, dehydration, acidosis) absent    10/4/2019 1:36 PM EDT by Hannah Mars Results for Ayanna Brower (MRN [id number]) as of 10/4/2019 13:31    10/4/2019 07:53  Sodium: 136  Potassium: 3.4 (L)  Chloride: 101  CO2: 25  BUN: 18  Creatinine: 1.2    ( ) * End-organ dysfunction (eg, myocardial ischemia, renal failure) absent    ( ) * Discharge plans and education understood    Activity    (X) * Ambulatory    Routes    (X) * Oral hydration, medications [K]    ( ) Usual diet    10/4/2019 1:36 PM EDT by Broderick Miranda      general diet    Interventions    (X) WBC    10/4/2019 1:36 PM EDT by Broderick Miranda      wbc 12.9    9/30/2019 5:32 PM EDT by Cheral Pallas      WBC 20.8    Medications    ( ) * Antimicrobial treatment not necessary or treatment at next level of care arranged [E]    10/4/2019 1:36 PM EDT by Broderick Miranda      iv unasyn q6h, iv eraxis qd    * Milestone       Sepsis and Other Febrile Illness, without Focal Infection - Care Day 12 (10/2/2019) by Dayna Dominguez         Review Status Review Entered   Completed 10/4/2019 13:36     Criteria Review      Care Day: 12 Care Date: 10/2/2019 Level of Care:    Guideline Day 4    Level Of Care    ( ) Floor to discharge [J]    10/4/2019 1:36 PM EDT by Geo Ventura      m/s unit    ( ) Complete discharge planning    10/4/2019 1:36 PM EDT by Veronika Gabriel plan is snf    Clinical Status    (X) * Hemodynamic stability    10/4/2019 1:36 PM EDT by Lou Rodriguez      98.4  °F (36.9  °C)  94  23  130/72    (X) * Fever absent or acceptable for next level of care    (X) * Hypoxemia absent    (X) * Tachypnea absent    ( ) * Cultures negative or infection identified and under adequate treatment    10/4/2019 1:36 PM EDT by Lou Rodriguez      Osteomyelitis Coccyx  Enterococcus Sacral wound  Staph aureus-Hip wound    Surgery cx is prelim. Pending completion.    cx need finalized and abx course set by ID    ( ) * Mental status at baseline    ( ) * Metabolic derangement (eg, dehydration, acidosis) absent    10/4/2019 1:36 PM EDT by Veronika Gabriel Results for Nereida Oneill (MRN [id number]) as of 10/4/2019 13:31    10/4/2019 07:53  Sodium: 136  Potassium: 3.4 (L)  Chloride: 101  CO2: 25  BUN: 18  Creatinine: 1.2    ( ) * End-organ dysfunction (eg, myocardial ischemia, renal failure) absent    ( ) * Discharge plans and education understood    Activity    (X) * Ambulatory    Routes    (X) * Oral hydration, medications [K]    ( ) Usual diet    10/4/2019 1:36 PM EDT by Lou Rodriguez      general diet    Interventions    (X) WBC    10/4/2019 1:36 PM EDT by Lou Rodriguez      wbc 12.9    9/30/2019 5:32 PM EDT by Kae Hanna      WBC 20.8    Medications    ( ) * Antimicrobial treatment not necessary or treatment at next level of care arranged [E]    10/4/2019 1:36 PM EDT by Lou Rodriguez      iv unasyn q6h, iv eraxis qd    * Milestone       Sepsis and Other Febrile Illness, without Focal Infection - Care Day 11 (10/1/2019) by Nkechi Stein         Review Status Review Entered   Completed 10/4/2019 13:36       Criteria Review Care Day: 11 Care Date: 10/1/2019 Level of Care:    Guideline Day 4    Level Of Care    ( ) Floor to discharge [J]    10/4/2019 1:36 PM EDT by Carolyn Ventura      m/s unit    ( ) Complete discharge planning    10/4/2019 1:36 PM EDT by Janine Araya plan is snf    Clinical Status    (X) * Hemodynamic stability    10/4/2019 1:36 PM EDT by Suzan Miller      98.4  °F (36.9  °C)  94  23  130/72    (X) * Fever absent or acceptable for next level of care    (X) * Hypoxemia absent    (X) * Tachypnea absent    ( ) * Cultures negative or infection identified and under adequate treatment    10/4/2019 1:36 PM EDT by Suzan Miller      Osteomyelitis Coccyx  Enterococcus Sacral wound  Staph aureus-Hip wound    Surgery cx is prelim. Pending completion.    cx need finalized and abx course set by ID    ( ) * Mental status at baseline    ( ) * Metabolic derangement (eg, dehydration, acidosis) absent    10/4/2019 1:36 PM EDT by Janine Araya Results for Kati Nava (MRN [id number]) as of 10/4/2019 13:31    10/4/2019 07:53  Sodium: 136  Potassium: 3.4 (L)  Chloride: 101  CO2: 25  BUN: 18  Creatinine: 1.2    ( ) * End-organ dysfunction (eg, myocardial ischemia, renal failure) absent    ( ) * Discharge plans and education understood    Activity    (X) * Ambulatory    Routes    (X) * Oral hydration, medications [K]    ( ) Usual diet    10/4/2019 1:36 PM EDT by Suzan Miller      general diet    Interventions    (X) WBC    10/4/2019 1:36 PM EDT by Suzan Miller      wbc 12.9    9/30/2019 5:32 PM EDT by Mauor Joyner      WBC 20.8    Medications    ( ) * Antimicrobial treatment not necessary or treatment at next level of care arranged [E]    10/4/2019 1:36 PM EDT by Carolyn Ventura      iv unasyn q6h, iv eraxis qd    * Milestone

## 2019-10-06 NOTE — PROGRESS NOTES
Pt has been resting watching TV for the majority of the day. Pain complaint of rectum area with radiating pain to the front of both legs that was only slightly relieved with Tylenol. Repositioning patient helped some according to pt. No insulin given today as blood sugar has remained below parameters.

## 2019-10-06 NOTE — PROGRESS NOTES
Each Nostril Daily    ocuvite-lutein  1 tablet Oral Daily    pantoprazole  40 mg Oral QAM AC    sertraline  100 mg Oral BID    simvastatin  40 mg Oral Daily    b complex-C-E-zinc  1 tablet Oral Daily    Dulaglutide  1.5 mg Subcutaneous Weekly    vitamin C  1,000 mg Oral Daily    vitamin E  400 Units Oral Daily    sodium chloride flush  10 mL Intravenous 2 times per day    enoxaparin  40 mg Subcutaneous Daily      Infusions:    dextrose           Objective:   Vitals: /78   Pulse 90   Temp 97.7 °F (36.5 °C) (Oral)   Resp 28   Ht 6' 3\" (1.905 m)   Wt (!) 401 lb 9.6 oz (182.2 kg)   SpO2 95%   BMI 50.20 kg/m²   General appearance: alert confused cooperative with exam  Neck: no JVD or bruit  Thyroid : Normal lobes   Lungs: Has Vesicular Breath sounds   Heart:  regular rate and rhythm  Abdomen: soft, non-tender; bowel sounds normal; no masses,  no organomegaly  Musculoskeletal: Normal  Extremities: extremities normal, , no edema decubitus ulcers extensive on gluteal region , has wound vacuum   Neurologic:  Awake, confused, oriented to name, place and time. Cranial nerves II-XII are grossly intact. Motor is  intact. Sensory neuropathy. ,  and gait i normal and mostly bed ridden    Assessment:     Patient Active Problem List:     Hypertension     Hyperlipidemia     Asthma     Diabetes mellitus ([address]     H/O cardiovascular stress test     Obesity     Chronic kidney disease, stage III (moderate) (HCC)     Hypertensive kidney disease with CKD stage III (HCC)     Depression     SOBOE (shortness of breath on exertion)     Abnormal cardiovascular stress test     Fracture of epiphysis (separation) (upper) of neck of femur, closed (HCC)     Pressure injury of skin of coccygeal region     Sepsis ([address]      Plan:     1. Reviewed POC blood glucose . Labs and X ray results   2. Reviewed Current Medicines   3. On meal/ Correction bolus Humalog/ Basal Lantus Insulin regime /  4.  Monitor Blood glucose frequently 5. Modified  the dose of Insulin/ other medicines as needed   6. Will follow     .      Marlo Helms MD

## 2019-10-06 NOTE — PROGRESS NOTES
Nephrology Progress Note  10/6/2019 2:36 PM        Subjective:   Admit Date: 9/20/2019  PCP: Kimberlyn Haas MD    Interval History: had PICC RUE    Diet: some    ROS:  Nightmare , lo UOP ? Error - has wilkerson  And  rectal tube has di rrhea     Data:     Current med's:    insulin glargine  30 Units Subcutaneous Nightly    sodium chloride flush  10 mL Intravenous 2 times per day    ampicillin-sulbactam  3 g Intravenous Q6H    anidulafungin  100 mg Intravenous Q24H    insulin lispro  0-6 Units Subcutaneous TID WC    insulin lispro  0-6 Units Subcutaneous 2 times per day    aspirin  81 mg Oral Daily    mometasone-formoterol  2 puff Inhalation BID    buPROPion  300 mg Oral Daily    canagliflozin  300 mg Oral Daily    ezetimibe  10 mg Oral Daily    fluticasone  1 spray Each Nostril Daily    ocuvite-lutein  1 tablet Oral Daily    pantoprazole  40 mg Oral QAM AC    sertraline  100 mg Oral BID    simvastatin  40 mg Oral Daily    b complex-C-E-zinc  1 tablet Oral Daily    Dulaglutide  1.5 mg Subcutaneous Weekly    vitamin C  1,000 mg Oral Daily    vitamin E  400 Units Oral Daily    sodium chloride flush  10 mL Intravenous 2 times per day    enoxaparin  40 mg Subcutaneous Daily      dextrose           I/O last 3 completed shifts:   In: 1250 [P.O.:720; IV Piggyback:530]  Out: 800 [Urine:600; Stool:200]    CBC:   Recent Labs     10/04/19  0753   WBC 12.9*   HGB 10.7*             Recent Labs     10/04/19  0753 10/06/19  0642    135   K 3.4* 3.7    100   CO2 25 27   BUN 18 15   CREATININE 1.2 1.2   GLUCOSE 147* 119*       Lab Results   Component Value Date    CALCIUM 7.7 (L) 10/06/2019    PHOS 2.4 (L) 10/06/2019       Objective:     Vitals: /78   Pulse 90   Temp 97.7 °F (36.5 °C) (Oral)   Resp 28   Ht 6' 3\" (1.905 m)   Wt (!) 401 lb 9.6 oz (182.2 kg)   SpO2 95%   BMI 50.20 kg/m²     General appearance:  No ac distress  HEENT:  No conj pallor  Neck:  supple  Lungs:  Limited exam , no crackles  Heart:  Seems RRR  Abdomen: soft  Extremities:  ++ edema / hip wound vac       Problem List :         Impression :     1. VIRGILIO/ CKD stage 3 a - stable  2. Low ca likely for low alb- check ionized ca if necessary and asymptomatic   3. Sepsis- with wound vac and antimicrobial   4. Anemia- acceptable   5. Has DM    Recommendation/Plan  :     1. Watch UOP  2. Id necessary ionized ca  3. No need to [address] asymptomatic low  Ca   4. Follow clinically  5.  Minimize any unnecessary med's       Mary Hay MD

## 2019-10-07 NOTE — PROGRESS NOTES
Occupational Therapy  . Occupational Therapy Treatment Note  Name: Nguyen Ross MRN: [id number] :   1950   Date:  10/7/2019   Admission Date: 2019 Room:  [facility]4193-     Restrictions/Precautions:    General precautions; fall risk    Communication with other providers:  Nato Rangel cleared pt for Tx session. Co-Tx c PTA Paige. Subjective:  Patient states:  Pt agreeable to co-Tx session. \"I'm doing better today. I would like to try to stand up at the edge of the bed. \"   Pain:   Location, Type, Intensity (0/10 to 10/10):  7/10, RLE    Objective:    Observation:  Pt received in supine completing bed level exercises c PTA Paige. Objective Measures:   (increased to 123 c edge of bed activity), RR 24. Lujan, rectal tube, wound vac ro R hip, IV saline lock    Treatment, including education:  Therapeutic Activity Training:   Therapeutic activity training was instructed today. Cues were given for safety, sequence, UE/LE placement, awareness, and balance. Activities performed today included bed mobility training, sup-sit, sit-stand transfer training (preparation for sit<>stands)  Rolling: Min A (trunk) + Mod A (BLE) to roll R<>L + verbal / tactile cues for use of bed features. Scooting: Dep x2-3 + trapeze  Supine to sit: Max A x2 + use of trapeze + verbal / tactile cues for sequencing, use of bed rail. Sit to supine: Max A x2  Sitting balance / tolerance: SBA x15 minutes c 1-2 UE support. Transfer Training for sit<.>stands: Max A x2 for minimal partial stand to clear buttocks ~2 inches off bed, unable to perform full stand. Pt performed 2 additional attempts for sit to stands c emphasis on BLE strengthening in preparation for functional transfers. All therapeutic intervention performed c emphasis on dynamic balance / standing tolerance to inc strength, endurance and act tolerance for inc Indep c ADL tasks, and in preparation for functional transfers.       Safety  Patient safely in bed at end of session, positioned on R side per nursing request, with call light/phone in reach, and nursing aware. Gait belt was used for sitting EOB and functional transfer training. Assessment / Impression:        Patient's tolerance of treatment:  Well   Adverse Reaction: Increased heart rate, managed c rest breaks PRN  Significant change in status and impact:  None  Barriers to improvement:  Pain    Plan for Next Session:    Continue per OT POC c plan to continue transfer training for sit<>stands. Time in:  1510  Time out:  1555  Timed treatment minutes:  45  Total treatment time:  45    Electronically signed by:    LOREN Mahoney  10/7/2019, 3:05 PM    Previously filed values:    Goals:  By d/c or goals met:  Pt will perform all bed mobility with ModAx2 in prep for EOB/OOB activity. Pt will perform all functional transfers with MaxAx2 and appropriate use of LRD to bed, toilet, chair in prep for increased functional independence. Pt will perform UB ADLs with CGA in seated EOB to increase functional independence. Pt will maintain standing c RW 30 seconds in prep for ADL and gait. Pt will participate in therex/therax c emphasis on strength, activity tolerance,  safety, DANIA tasks.

## 2019-10-07 NOTE — PROGRESS NOTES
No results for input(s): AST, ALT, ALB, BILITOT, ALKPHOS in the last 72 hours. Troponin: No results for input(s): TROPONINI in the last 72 hours. BNP: No results for input(s): BNP in the last 72 hours. Lipids: No results for input(s): CHOL, HDL in the last 72 hours. Invalid input(s): LDLCALCU  ABGs:   Lab Results   Component Value Date    PO2ART 89 09/20/2019    JNT3ZFA 32.0 09/20/2019     INR: No results for input(s): INR in the last 72 hours. Renal Labs  Albumin:    Lab Results   Component Value Date    LABALBU 2.3 10/04/2019     Calcium:    Lab Results   Component Value Date    CALCIUM 7.7 10/06/2019     Phosphorus:    Lab Results   Component Value Date    PHOS 2.4 10/06/2019     U/A:    Lab Results   Component Value Date    NITRU NEGATIVE 09/21/2019    NITRU Negative 10/24/2018    COLORU MICHAEL 09/21/2019    PHUR 5.5 10/24/2018    LABCAST Present 12/12/2016    LABCAST Hyaline casts 12/12/2016    WBCUA 125 09/21/2019    RBCUA 29 09/21/2019    MUCUS RARE 09/21/2019    TRICHOMONAS NONE SEEN 09/06/2019    YEAST Present 12/12/2016    BACTERIA NEGATIVE 09/21/2019    CLARITYU SLIGHTLY CLOUDY 09/21/2019    SPECGRAV 1.010 09/21/2019    UROBILINOGEN <2.0 09/21/2019    BILIRUBINUR NEGATIVE 09/21/2019    BLOODU MODERATE 09/21/2019    GLUCOSEU 3+ 10/24/2018    KETUA NEGATIVE 09/21/2019     ABG:    Lab Results   Component Value Date    RGA1IQS 32.0 09/20/2019    PO2ART 89 09/20/2019    FIR9TGQ 22.2 09/20/2019     HgBA1c:    Lab Results   Component Value Date    LABA1C 6.1 09/04/2019     Microalbumen/Creatinine ratio:  No components found for: RUCREAT          Objective:   Vitals: /76   Pulse 94   Temp 98 °F (36.7 °C) (Oral)   Resp 24   Ht 6' 3\" (1.905 m)   Wt (!) 394 lb (178.7 kg)   SpO2 96%   BMI 49.25 kg/m²   General appearance: awake weak  HEENT: Head: Normal, normocephalic, atraumatic.   Neck: supple, symmetrical, trachea midline  Lungs: diminished breath sounds bilaterally  Heart: S1, S2 normal  Abdomen: abnormal findings:  soft nt obese  Extremities: edema + mild tender  Neurologic: Mental status: alertness: awake      Patient Active Problem List:     Hypertension     Hyperlipidemia     Asthma     Diabetes mellitus ([address]     H/O cardiovascular stress test     Obesity     Chronic kidney disease, stage III (moderate) (HCC)     Hypertensive kidney disease with CKD stage III (HCC)     Depression     SOBOE (shortness of breath on exertion)     Abnormal cardiovascular stress test     Fracture of epiphysis (separation) (upper) of neck of femur, closed (HCC)     Pressure ulcer of coccygeal region, unstageable ([address]     Sepsis ([address]    Assessment and Plan:      IMP:  1 bacteremia  2 s/p hip surgery with wound infection  3 arf from atn/contrast on ckd 3  4 anxiety/confusion  5 resp distress with hypoxia    Plan     1 on abx therapy no fever  2 sp wound care and fu rehab aru if able  3 resolved arf and ckd 3 from htn and obesity monitor  4 affect stable today  5 o2 stable  Overall improved  Ok for rehab                 Nancy Elliott MD

## 2019-10-07 NOTE — PROGRESS NOTES
 sodium chloride flush  10 mL Intravenous 2 times per day    ampicillin-sulbactam  3 g Intravenous Q6H    anidulafungin  100 mg Intravenous Q24H    insulin lispro  0-6 Units Subcutaneous TID WC    insulin lispro  0-6 Units Subcutaneous 2 times per day    aspirin  81 mg Oral Daily    mometasone-formoterol  2 puff Inhalation BID    buPROPion  300 mg Oral Daily    canagliflozin  300 mg Oral Daily    ezetimibe  10 mg Oral Daily    fluticasone  1 spray Each Nostril Daily    ocuvite-lutein  1 tablet Oral Daily    pantoprazole  40 mg Oral QAM AC    sertraline  100 mg Oral BID    simvastatin  40 mg Oral Daily    b complex-C-E-zinc  1 tablet Oral Daily    vitamin C  1,000 mg Oral Daily    vitamin E  400 Units Oral Daily    sodium chloride flush  10 mL Intravenous 2 times per day    enoxaparin  40 mg Subcutaneous Daily     ContinuousInfusions:   dextrose       PRN Meds:sodium chloride flush, calcium carbonate, cloNIDine, HYDROmorphone, glucose, dextrose, glucagon (rDNA), dextrose, acetaminophen, albuterol sulfate HFA, nitroGLYCERIN, sodium chloride flush, traMADol **OR** traMADol, ondansetron      Labs/Imaging Results:   Lab Results   Component Value Date    WBC 12.9 (H) 10/04/2019    HGB 10.7 (L) 10/04/2019    HCT 34.5 (L) 10/04/2019    MCV 94.8 10/04/2019     10/04/2019     Lab Results   Component Value Date     10/06/2019    K 3.7 10/06/2019     10/06/2019    CO2 27 10/06/2019    BUN 15 10/06/2019    CREATININE 1.2 10/06/2019    GLUCOSE 119 (H) 10/06/2019    CALCIUM 7.7 (L) 10/06/2019    PROT 6.1 (L) 09/20/2019    LABALBU 2.3 (L) 10/04/2019    BILITOT 1.1 (H) 09/20/2019    ALKPHOS 143 (H) 09/20/2019     (H) 09/20/2019    ALT 98 (H) 09/20/2019    LABGLOM >60 10/06/2019    GFRAA >60 10/06/2019       Assessment:     76 y/o M POD s/p excisional debridement down to bone of infected pressure ulcer (9/23) and repeat I&D of right hip (9/29) and repeat debridement and excision of coccyx (9/29)    Plan:     -Leukocytosis stable / improving  -Wound vac changes as scheduled. -ID consult reviewed and Abx changes noted. Appreciate input and recs. -Medical mgt  -PT/OT  -Diet as tolerated  -Blood cx x2 pending. Prelim are both negative to date.  -Surgery cx is now complete.  -Body fluid cx is completed (right hip). -Final path for coccyx was ulcer with acute osteomyelitis.  -Likely d/c once cx finalized and Abx courses set by ID.   -Once d/c'd, pt should f/u with me in office in 7-10 days. D/w pt that ideally he would not need rectal tube and be able to use bed pan or bedside toilet. He has been cleared by Ortho for WBAT. Concern with rectal tube is stool is possibly getting into wound. D/w pt that if he is not able to use bed pain or bedside toilet that he may ultimately require diverting colostomy. He does not currently want this.          Electronically signed by Racheal Lockhart II, MD on 10/7/2019 at 9:21 AM

## 2019-10-07 NOTE — PROGRESS NOTES
Infectious Disease Progress Note  10/7/2019   Patient Name: Onesimo Easley : 1950   Impression  · Proteus mirabilis bacteremia secondary to complicated UTI  ? Afebrile   · Infected sacral decubitus ulcer with coccyx abscess and osteomyelitis  · Right hip wound infection   ? S/p  right hip hemiarthroplasty on 1417 complicated by hematoma. ? status post excisional debridement of sacral pressure ulcer on 2019; status post hip incision and drainage, decubitus ulcer debridement, removal of the coccyx, wound VAC placement on 2019.  ? Afebrile, leukocytosis persists  ? Sacral cx: Enterococcus spp , Candida albicans, Clostridium hathewayi   ? Right hip culture: MSSA (2 colonies), very low growth  ? Histopathology of coccyx: Abscess and osteomyelitis  · Diarrhea  ? C diff PCR negative  ? Diarrhea appears to be lessening  · Multi-morbidity: per PMHx morbid obesity, diabetes mellitus, bilateral renal cysts  Plan:  · Discontinue Eraxis, start fluconazole  · Continue Unasyn 3g IV q 6h, fluconazole 600 mg p.o. daily for 4 weeks (end-date 10/29/19)  Weekly labs drawn on Monday during the course of treatment  CBC with differential, CMP, ESR, CRP  Fax results to Attn: [facility] Infectious Diseases Staff  · # [phone]  · See in 3 weeks       Ongoing Antimicrobial Therapy  Unasyn 10/2  Fluconazole 10/7  ? Completed Antimicrobial Therapy  Ciprofloxacin -  Vancomycin -10/2  Cefepime - 10/2  Metronidazole -10/2  Doxycycline 10/2-  Eraxis 10/2-  ? History:? Interval history noted: Sepsis, infected sacral decubitus wound, and   Denies n/v/d/f or untoward effects of antibiotics, doing quite well,   Physical Exam:  Vital Signs: /76   Pulse 94   Temp 98 °F (36.7 °C) (Oral)   Resp 24   Ht 6' 3\" (1.905 m)   Wt (!) 394 lb (178.7 kg)   SpO2 96%   BMI 49.25 kg/m²     Gen: alert and oriented X3, no distress  Skin: no stigmata of endocarditis  Wounds: Wound VAC over the sacrum and right Problems:    Hypertension    Hyperlipidemia    Asthma    Diabetes mellitus ([facility] [address]    Obesity    Chronic kidney disease, stage III (moderate) (HCC)    Depression    Pressure injury of skin of coccygeal region    PVC (premature ventricular contraction)  Resolved Problems:    * No resolved hospital problems.  *    Electronically signed by: Electronically signed by Brittny Chisholm MD on 10/7/2019 at 10:35 AM

## 2019-10-07 NOTE — PROGRESS NOTES
Progress note    Lindsay Mora    10/7/2019    Subjective:     Patient on wound vac for his hip and decub ulcers. No new complaints. Medication side effects: none. Scheduled Meds:   insulin glargine  30 Units Subcutaneous Nightly    sodium chloride flush  10 mL Intravenous 2 times per day    ampicillin-sulbactam  3 g Intravenous Q6H    anidulafungin  100 mg Intravenous Q24H    insulin lispro  0-6 Units Subcutaneous TID WC    insulin lispro  0-6 Units Subcutaneous 2 times per day    aspirin  81 mg Oral Daily    mometasone-formoterol  2 puff Inhalation BID    buPROPion  300 mg Oral Daily    canagliflozin  300 mg Oral Daily    ezetimibe  10 mg Oral Daily    fluticasone  1 spray Each Nostril Daily    ocuvite-lutein  1 tablet Oral Daily    pantoprazole  40 mg Oral QAM AC    sertraline  100 mg Oral BID    simvastatin  40 mg Oral Daily    b complex-C-E-zinc  1 tablet Oral Daily    vitamin C  1,000 mg Oral Daily    vitamin E  400 Units Oral Daily    sodium chloride flush  10 mL Intravenous 2 times per day    enoxaparin  40 mg Subcutaneous Daily     Continuous Infusions:   dextrose       PRN Meds:sodium chloride flush, calcium carbonate, cloNIDine, HYDROmorphone, glucose, dextrose, glucagon (rDNA), dextrose, acetaminophen, albuterol sulfate HFA, nitroGLYCERIN, sodium chloride flush, traMADol **OR** traMADol, ondansetron    Review of Systems  Pertinent items are noted in HPI. Objective:     Patient Vitals for the past 8 hrs:   BP Temp Temp src Pulse Resp SpO2   10/07/19 1715 (!) 144/79 98.2 °F (36.8 °C) Oral 98 20 93 %     I/O last 3 completed shifts: In: 2902 [P.O.:1220; IV Piggyback:200]  Out: 2125 [Urine:1125; Stool:1000]  I/O this shift:  In: -   Out: 1600 [Urine:1600]    BP (!) 144/79   Pulse 98   Temp 98.2 °F (36.8 °C) (Oral)   Resp 20   Ht 6' 3\" (1.905 m)   Wt (!) 394 lb (178.7 kg)   SpO2 93%   BMI 49.25 kg/m²   General appearance: alert and cooperative.   Lungs: clear to auscultation bilaterally  Heart: regular rate and rhythm, S1, S2 normal, no murmur, click, rub or gallop  Abdomen: soft, non-tender; bowel sounds normal; no masses,  no organomegaly  Extremities: extremities normal, atraumatic, no cyanosis or edema  Skin: Skin color, texture, turgor normal. No rashes or lesions        Labs and imaging reviewed.    CBC with Differential:    Lab Results   Component Value Date    WBC 12.9 10/04/2019    RBC 3.64 10/04/2019    HGB 10.7 10/04/2019    HCT 34.5 10/04/2019     10/04/2019    MCV 94.8 10/04/2019    MCH 29.4 10/04/2019    MCHC 31.0 10/04/2019    RDW 14.4 10/04/2019    SEGSPCT 82.4 10/04/2019    BANDSPCT 3 09/29/2019    LYMPHOPCT 7.0 10/04/2019    PROMYELOPCT 2 09/26/2019    MONOPCT 7.0 10/04/2019    MYELOPCT 1 09/29/2019    BASOPCT 0.6 10/04/2019    MONOSABS 0.9 10/04/2019    LYMPHSABS 0.9 10/04/2019    EOSABS 0.2 10/04/2019    BASOSABS 0.1 10/04/2019    DIFFTYPE AUTOMATED DIFFERENTIAL 10/04/2019     CMP:    Lab Results   Component Value Date     10/06/2019    K 3.7 10/06/2019     10/06/2019    CO2 27 10/06/2019    BUN 15 10/06/2019    CREATININE 1.2 10/06/2019    GFRAA >60 10/06/2019    LABGLOM >60 10/06/2019    GLUCOSE 119 10/06/2019    PROT 6.1 09/20/2019    LABALBU 2.3 10/04/2019    CALCIUM 7.7 10/06/2019    BILITOT 1.1 09/20/2019    ALKPHOS 143 09/20/2019     09/20/2019    ALT 98 09/20/2019     BMP:    Lab Results   Component Value Date     10/06/2019    K 3.7 10/06/2019     10/06/2019    CO2 27 10/06/2019    BUN 15 10/06/2019    LABALBU 2.3 10/04/2019    CREATININE 1.2 10/06/2019    CALCIUM 7.7 10/06/2019    GFRAA >60 10/06/2019    LABGLOM >60 10/06/2019    GLUCOSE 119 10/06/2019     Sodium:    Lab Results   Component Value Date     10/06/2019     Potassium:    Lab Results   Component Value Date    K 3.7 10/06/2019     Calcium:    Lab Results   Component Value Date    CALCIUM 7.7 10/06/2019     Warfarin PT/INR:  No components found for: Zen Charlton  PTT:    Lab Results   Component Value Date    APTT 29.1 05/03/2017   [APTT  Last 3 Troponin:  No results found for: TROPONINI  ABG:    Lab Results   Component Value Date    JSN6NMX 32.0 09/20/2019    PO2ART 89 09/20/2019    WNT8CDO 22.2 09/20/2019         Assessment:     Principal Problem:    Sepsis ([facility] [address]  Active Problems:    Hypertension    Hyperlipidemia    Asthma    Diabetes mellitus ([facility] [address]    Obesity    Chronic kidney disease, stage III (moderate) (HCC)    Depression    Pressure injury of skin of coccygeal region    PVC (premature ventricular contraction)  Resolved Problems:    * No resolved hospital problems. *      Plan:   Decubitus Ulcer-status post excisional debridement of sacral pressure ulcer on 9/23/2019; status post hip incision and drainage, decubitus ulcer debridement, removal of the coccyx, wound VAC placement on 9/29/2019.    Osteomyelitis Coccyx  Enterococcus Sacral wound  Staph aureus-Hip wound  Continue IV antibiotics  Noted recommendation by Dr. Samara Kelley certification for placement to [facility]    Vahid TORRES M.D.  10/7/2019

## 2019-10-07 NOTE — CONSULTS
[address] [address] Continence Nurse  Consult Note       Irma Roth  AGE: 77 y.o.    GENDER: male  : 1950  TODAY'S DATE:  10/7/2019    Subjective:     Reason for Evaluation and Assessment:  Wound care assessment rt hip/rt buttocks/ lt hip dti and stage 2       Irma Blocker is a 77 y.o. male referred by:   [x] Physician  [] Nursing  [] Other:     Wound Identification:  Wound Type: pressure and non-healing surgical  Contributing Factors: diabetes, chronic pressure and decreased mobility        PAST MEDICAL HISTORY        Diagnosis Date    Arthritis     Asthma     Chronic kidney disease     stage 3, seen by Dr. Julia Urrutia Diabetes mellitus ([facility] [address]     H/O cardiac catheterization 2017    H/O cardiovascular stress test ,     CARDIOLITE: EF->51%    Hyperlipidemia     Hypertension     Obesity     Pressure ulcer of coccygeal region, unstageable ([facility] [address] 09/10/2019    Thyroid disease        PAST SURGICAL HISTORY    Past Surgical History:   Procedure Laterality Date    CARPAL TUNNEL RELEASE      DIAGNOSTIC CARDIAC CATH LAB PROCEDURE      NO SIGNIFICANT CAD    EYE SURGERY      HEMIARTHROPLASTY HIP Right 2019    HIP HEMIARTHROPLASTY performed by Lakesha Nolasco MD at [place] [id number] Right 2019    HIP INCISION AND DRAINAGE performed by Lakesha Nolasco MD at [address] [place] [address]  2019    of stage 4 wound on coccyx, by Dr. Lusi Foster N/A 2019    EXCISIONAL DEBRIDEMENT OF SACRAL PRESSURE ULCER performed by Enrico Benjamin MD at [address] [place] [address] N/A 2019    DECUBITUS ULCER DEBRIDEMENT REPAIR performed by Mitali Johnson MD at [address] [place] [address]    Family History   Problem Relation Age of Onset    High Blood Pressure Mother     Cancer Mother     Cancer Father     Stroke Maternal Grandfather     Diabetes Paternal Grandmother     Heart Disease Paternal Grandfather        SOCIAL HISTORY    Social History     Tobacco Use    Smoking status: Never Smoker    Smokeless tobacco: Never Used   Substance Use Topics    Alcohol use: No    Drug use: No       ALLERGIES    Allergies   Allergen Reactions    Bee Venom Shortness Of Breath       MEDICATIONS    No current facility-administered medications on file prior to encounter. Current Outpatient Medications on File Prior to Encounter   Medication Sig Dispense Refill    TRULICITY 1.5 XM/2.0OM SOPN Inject into the skin once a week  2    nitroGLYCERIN (NITROSTAT) 0.4 MG SL tablet Place 1 tablet under the tongue every 5 minutes as needed for Chest pain 25 tablet 2    ezetimibe (ZETIA) 10 MG tablet Take 10 mg by mouth daily      acetaminophen (TYLENOL) 500 MG tablet Take 500 mg by mouth every 6 hours as needed for Pain      SUPER B COMPLEX/C PO Take 1 capsule by mouth daily      Cinnamon 500 MG TABS Take 1,000 mg by mouth daily      vitamin E 400 UNIT capsule Take 400 Units by mouth daily      vitamin C (ASCORBIC ACID) 500 MG tablet Take 1,000 mg by mouth daily       insulin lispro (HUMALOG) 100 UNIT/ML injection vial Inject into the skin 3 times daily (before meals)      insulin glargine (LANTUS) 100 UNIT/ML injection vial Inject 74 Units into the skin nightly       benazepril (LOTENSIN) 20 MG tablet Take 1 tablet by mouth daily 90 tablet 3    fluticasone (FLONASE) 50 MCG/ACT nasal spray as needed       Multiple Vitamins-Minerals (VISION VITAMINS PO) Take by mouth daily      omeprazole (PRILOSEC) 20 MG capsule Take 20 mg by mouth daily.  St Johns Wort 300 MG CAPS Take 2 capsules by mouth.  Canagliflozin (INVOKANA) 300 MG TABS Take  by mouth daily.       budesonide-formoterol (SYMBICORT) 80-4.5 MCG/ACT AERO Inhale 2 puffs into the lungs as needed       Albuterol Sulfate (PROAIR HFA IN) Inhale into the lungs as needed       buPROPion (WELLBUTRIN XL) skin of coccygeal region    Sepsis ([facility] [address]    PVC (premature ventricular contraction)       Measurements:  Negative Pressure Wound Therapy Hip Right (Active)   Wound Type Surgical 10/7/2019  9:00 AM   Unit Type KCI 10/7/2019  9:00 AM   Dressing Type Silver impregnated foam 10/7/2019  9:00 AM   Number of pieces used 5 10/7/2019  9:00 AM   Cycle Continuous 10/7/2019  9:00 AM   Target Pressure (mmHg) 125 10/7/2019  9:00 AM   Canister changed? No 10/7/2019  9:00 AM   Dressing Status Clean;Dry; Intact 10/7/2019  9:00 AM   Dressing Changed Changed/New 10/7/2019  9:00 AM   Drainage Amount Large 10/7/2019  9:00 AM   Drainage Description Serosanguinous 10/7/2019  9:00 AM   Dressing Change Due 10/09/19 10/7/2019  9:00 AM   Output (ml) 275 ml 10/4/2019  8:07 PM   Wound Assessment Red 10/7/2019  9:00 AM   Annika-wound Assessment Intact 10/7/2019  9:00 AM   Odor None 10/7/2019  9:00 AM   Number of days: 7       Negative Pressure Wound Therapy Coccyx (Active)   Wound Type Pressure ulcer: Stage IV;Surgical 10/7/2019  9:00 AM   Unit Type kci 10/7/2019  9:00 AM   Dressing Type Silver impregnated foam 10/7/2019  9:00 AM   Number of pieces used 3 10/4/2019  9:20 AM   Cycle Continuous 10/7/2019  9:00 AM   Target Pressure (mmHg) 125 10/7/2019  9:00 AM   Canister changed? No 10/7/2019  9:59 AM   Dressing Status Clean;Dry; Intact 10/7/2019  9:59 AM   Dressing Changed Changed/New 10/7/2019  9:59 AM   Drainage Amount Large 10/7/2019  9:59 AM   Drainage Description Serosanguinous 10/7/2019  9:59 AM   Dressing Change Due 10/09/19 10/7/2019  9:59 AM   Output (ml) 125 ml 10/5/2019  6:12 AM   Wound Assessment Red;Yellow 10/7/2019  9:59 AM   Annika-wound Assessment Intact 10/7/2019  9:59 AM   Odor None 10/7/2019  9:59 AM   Number of days: 6       Wound 09/21/19 Coccyx (Active)   Wound Pressure Unstageable 10/4/2019  9:20 AM   Dressing Status Clean;Dry; Intact 10/7/2019  9:59 AM   Dressing Changed Changed/New 10/7/2019  9:59 AM   Dressing/Treatment Vacuum dressing 10/7/2019  9:59 AM   Wound Cleansed Rinsed/Irrigated with saline 10/7/2019  9:59 AM   Dressing Change Due 10/09/19 10/7/2019  9:59 AM   Wound Length (cm) 9 cm 10/1/2019 11:30 AM   Wound Width (cm) 8 cm 10/1/2019 11:30 AM   Wound Depth (cm) 5 cm 10/1/2019 11:30 AM   Wound Surface Area (cm^2) 72 cm^2 10/1/2019 11:30 AM   Change in Wound Size % (l*w) 21.74 10/1/2019 11:30 AM   Wound Volume (cm^3) 360 cm^3 10/1/2019 11:30 AM   Wound Healing % -161 10/1/2019 11:30 AM   Distance Tunneling (cm) 0 cm 10/7/2019  9:59 AM   Tunneling Position ___ O'Clock 0 10/7/2019  9:59 AM   Undermining Starts ___ O'Clock 9 10/7/2019  9:59 AM   Undermining Ends___ O'Clock 5 10/7/2019  9:59 AM   Undermining Maxium Distance (cm) 4.2 10/1/2019 11:30 AM   Wound Assessment Red 10/7/2019  9:59 AM   Drainage Amount Large 10/7/2019  9:59 AM   Drainage Description Serosanguinous 10/7/2019  9:59 AM   Odor None 10/7/2019  9:59 AM   Margins Defined edges 10/7/2019  9:59 AM   Exposed structure Muscle 10/4/2019  9:20 AM   Annika-wound Assessment Intact 10/7/2019  9:59 AM   Non-staged Wound Description Full thickness 10/7/2019  9:59 AM   Red%Wound Bed 70 10/7/2019  9:59 AM   Yellow%Wound Bed 30 10/7/2019  9:59 AM   Black%Wound Bed 10 10/1/2019  9:24 PM   Purple%Wound Bed 80 10/1/2019  9:24 PM   Number of days: 16       Wound 09/21/19 Hip Left cluster/DTI now with stage 2 (Active)   Wound Pressure Stage  2 10/7/2019  9:59 AM   Dressing Status Clean;Dry; Intact 10/7/2019  9:59 AM   Dressing Changed Changed/New 10/7/2019  9:59 AM   Dressing/Treatment Other (comment) 10/1/2019  9:24 PM   Wound Cleansed Rinsed/Irrigated with saline 10/7/2019  9:59 AM   Dressing Change Due 09/27/19 10/1/2019  9:24 PM   Wound Length (cm) 4 cm 10/4/2019  9:20 AM   Wound Width (cm) 3.5 cm 10/4/2019  9:20 AM   Wound Depth (cm) 0.1 cm 10/4/2019  9:20 AM   Wound Surface Area (cm^2) 14 cm^2 10/4/2019  9:20 AM   Change in Wound Size % (l*w) 73.08 10/4/2019  9:20 AM   Wound Volume (cm^3) 1.4 cm^3 10/4/2019  9:20 AM   Distance Tunneling (cm) 0 cm 10/7/2019  9:59 AM   Tunneling Position ___ O'Clock 0 10/7/2019  9:59 AM   Undermining Starts ___ O'Clock 0 10/7/2019  9:59 AM   Undermining Ends___ O'Clock 0 10/7/2019  9:59 AM   Undermining Maxium Distance (cm) 0 10/7/2019  9:59 AM   Wound Assessment Pink;Purple;Yellow 10/7/2019  9:59 AM   Drainage Amount Moderate 10/7/2019  9:59 AM   Drainage Description Serosanguinous 10/7/2019  9:59 AM   Odor None 10/7/2019  9:59 AM   Margins Defined edges 10/7/2019  9:59 AM   Annika-wound Assessment Other (Comment) 10/5/2019  8:11 PM   Non-staged Wound Description Full thickness 10/7/2019  9:59 AM   [place]%Wound Bed 40 10/7/2019  9:59 AM   Yellow%Wound Bed 20 10/7/2019  9:59 AM   Purple%Wound Bed 40 10/7/2019  9:59 AM   Other%Wound Bed 60 10/4/2019  9:20 AM   Number of days: 16       Wound 10/01/19 Hip Right (Active)   Wound Non-Healing Surgical 10/7/2019  9:59 AM   Dressing Status Clean;Dry; Intact 10/7/2019  9:59 AM   Dressing Changed Changed/New 10/7/2019  9:59 AM   Dressing/Treatment Vacuum dressing 10/7/2019  9:59 AM   Wound Cleansed Rinsed/Irrigated with saline 10/7/2019  9:59 AM   Dressing Change Due 10/09/19 10/7/2019  9:59 AM   Wound Length (cm) 22.5 cm 10/1/2019 11:30 AM   Wound Width (cm) 3.2 cm 10/1/2019 11:30 AM   Wound Depth (cm) 3.5 cm 10/1/2019 11:30 AM   Wound Surface Area (cm^2) 72 cm^2 10/1/2019 11:30 AM   Wound Volume (cm^3) 252 cm^3 10/1/2019 11:30 AM   Distance Tunneling (cm) 0 cm 10/7/2019  9:59 AM   Tunneling Position ___ O'Clock 0 10/7/2019  9:59 AM   Undermining Starts ___ O'Clock 0 10/7/2019  9:59 AM   Undermining Ends___ O'Clock 0 10/7/2019  9:59 AM   Undermining Maxium Distance (cm) 0 10/7/2019  9:59 AM   Wound Assessment Red 10/7/2019  9:59 AM   Drainage Amount Large 10/7/2019  9:59 AM   Drainage Description Serosanguinous 10/7/2019  9:59 AM   Odor None 10/7/2019  9:59 AM   Margins Defined edges 10/7/2019  9:59 AM   Annika-wound Assessment Clean; Intact 10/4/2019  9:20 AM   Non-staged Wound Description Full thickness 10/7/2019  9:59 AM   [place]%Wound Bed 50 10/1/2019  9:24 PM   Red%Wound Bed 100 10/7/2019  9:59 AM   Yellow%Wound Bed 20 10/1/2019  9:24 PM   Number of days: 6       Wound 10/04/19 Thigh Anterior;Left;Proximal intact blister (Active)   Wound Other 10/4/2019  9:20 AM   Dressing Status Changed 10/4/2019  9:20 AM   Dressing Changed Changed/New 10/4/2019  9:20 AM   Wound Cleansed Rinsed/Irrigated with saline 10/4/2019  9:20 AM   Wound Length (cm) 1.5 cm 10/4/2019  9:20 AM   Wound Width (cm) 2.5 cm 10/4/2019  9:20 AM   Wound Depth (cm) 0 cm 10/4/2019  9:20 AM   Wound Surface Area (cm^2) 3.75 cm^2 10/4/2019  9:20 AM   Wound Volume (cm^3) 0 cm^3 10/4/2019  9:20 AM   Wound Assessment Fluid filled blister 10/4/2019  9:20 AM   Drainage Amount None 10/4/2019  9:20 AM   Odor None 10/4/2019  9:20 AM   Margins Attached edges 10/4/2019  9:20 AM   Dash-wound Assessment Clean;Dry 10/4/2019  9:20 AM   Number of days: 3       Response to treatment:  Well tolerated by patient.      Pain Assessment:  Severity:  5  Quality of pain: sore  Wound Pain Timing/Severity: when moved and dressing changed  Premedicated: no    Plan:     Plan of Care: [REMOVED] Wound 09/21/19 Hip Left;Lateral 2 fluid filled blisters -Dressing/Treatment: Open to air  [REMOVED] Wound 09/23/19 Ankle Anterior;Right;Posterior-Dressing/Treatment: (mepilex border)  [REMOVED] Incision 09/29/19 Hip Right;Lateral-Dressing/Treatment: (wound vac)  Wound 10/01/19 Hip Right-Dressing/Treatment: Vacuum dressing(duoderm to dash, silver foam x 1 )  Wound 10/04/19 Thigh Anterior;Left;Proximal intact blister-Dressing/Treatment: (mepilex border)  [REMOVED] Incision 08/29/19 Hip Right-Dressing/Treatment: (wound vac)  Wound 09/21/19 Coccyx-Dressing/Treatment: Vacuum dressing(silver foam, duoderm to periwound )  Wound 09/21/19 Hip Left cluster/DTI now with stage 2-Dressing/Treatment: (aquacel mepilex border )     Wound care dressing changes done dr Corwin Anaya came into room observed wounds informed him of rt hip looks deeper dr Rueda Neighbor is ortho surgeon following for the hip wound  heels floated pt turned to rt side pt is from [address] [place], [place] [address]way does not want to return there awaiting possible placement to [facility]    Specialty Bed Required : yes  [] Low Air Loss   [] Pressure Redistribution  [] Fluid Immersion  [x] Bariatric  [] Total Pressure Relief  [] Other:     Discharge Plan:  Placement for patient upon discharge: pt may go to [facility]  Hospice Care:no  Patient appropriate for Outpatient [address]: [facility]     Patient/Caregiver Teaching:  Level of patient/caregiver understanding able to: patient verbalized understanding        Electronically signed by Reese Yanes.  MARLENE Montiel,  on 10/7/2019 at 11:50 AM

## 2019-10-07 NOTE — PROGRESS NOTES
tablet Oral Daily    pantoprazole  40 mg Oral QAM AC    sertraline  100 mg Oral BID    simvastatin  40 mg Oral Daily    b complex-C-E-zinc  1 tablet Oral Daily    vitamin C  1,000 mg Oral Daily    vitamin E  400 Units Oral Daily    sodium chloride flush  10 mL Intravenous 2 times per day    enoxaparin  40 mg Subcutaneous Daily      Infusions:    dextrose           Objective:   Vitals: /75   Pulse 93   Temp 98 °F (36.7 °C) (Oral)   Resp 24   Ht 6' 3\" (1.905 m)   Wt (!) 394 lb (178.7 kg)   SpO2 95%   BMI 49.25 kg/m²   General appearance: alert confused cooperative with exam  Neck: no JVD or bruit  Thyroid : Normal lobes   Lungs: Has Vesicular Breath sounds   Heart:  regular rate and rhythm  Abdomen: soft, non-tender; bowel sounds normal; no masses,  no organomegaly  Musculoskeletal: Normal  Extremities: extremities normal, , no edema decubitus ulcers extensive on gluteal region , has wound vacuum   Neurologic:  Awake, confused, oriented to name, place and time. Cranial nerves II-XII are grossly intact. Motor is  intact. Sensory neuropathy. ,  and gait i normal and mostly bed ridden    Assessment:     Patient Active Problem List:     Hypertension     Hyperlipidemia     Asthma     Diabetes mellitus ([address]     H/O cardiovascular stress test     Obesity     Chronic kidney disease, stage III (moderate) (HCC)     Hypertensive kidney disease with CKD stage III (HCC)     Depression     SOBOE (shortness of breath on exertion)     Abnormal cardiovascular stress test     Fracture of epiphysis (separation) (upper) of neck of femur, closed (HCC)     Pressure injury of skin of coccygeal region     Sepsis ([address]      Plan:     1. Reviewed POC blood glucose . Labs and X ray results   2. Reviewed Current Medicines   3. On meal/ Correction bolus Humalog/ Basal Lantus Insulin regime /  4. Monitor Blood glucose frequently   5. Modified  the dose of Insulin/ other medicines as needed   6. Will follow     .      Bossman Thurman True Fuentes MD

## 2019-10-08 NOTE — PROGRESS NOTES
Physical Therapy    Physical Therapy Treatment Note  Name: Gene Montelongo MRN: [id number] :   1950   Date:  10/8/2019   Admission Date: 2019 Room:  [address] [place] [address]   Restrictions/Precautions:      HEMIARTHROPLASTY HIP (Right, 2019); Pressure ulcer  With wound vac     Communication with other providers:  Per nurse ok to tx.per discussion with Moira Sewell evaluating PT pt is more motivated and would benefit from rehab stay on ARU. Subjective:  Patient states: motivated and agreeable to tx. Pain:   Location, Type, Intensity (0/10 to 10/10):  8  Objective:    Observation:  Alert and oriented with wound vac. Treatment, including education/measures: Air mattress turned off at start of tx and then re-inflated at end of tx  HOB elevated and pt using bed rails and max assist of 2 to lean back off bed to place walking harness. Bed then returned to sup and pt rolling left and right with max assist of 4 to position lower portion of sling. pt then transferred to sitting EOB. Max x 3 and mod assist of one for sup to sit   When attempting to put waist strap on it was not long enough to reach around pt. Attempted sit to stand at walker x 3 reps to remove lower  lower straps. Pt was able to sit EOB x 20 minutes but then becoming diaphoretic. And needing to return to sup. Max assist of 4 sit to sup  After returning to bed pt appeared to feel better and then needing dependent of 2 and min assist of 2 for scooting to [facility] with bed in trendelenburg. Safety  Patient left safely in the bed, with call light/phone in reach . Gait belt was used for transfers and gait. Assessment / Impression:       Patient's tolerance of treatment:  fair   Adverse Reaction: diaphoretic and HR up to 129 while sitting  Significant change in status and impact:  na  Barriers to improvement:  Strength and safety  Plan for Next Session:    Cont. POC  Time in:  840  Time out:  1005  Timed treatment minutes:  85  Total treatment time: 80    Previously filed items:  Social/Functional History  Additional Comments: Has been in SNF but had not progressed to gait or transfers. Has been mostly bed level since last hopistalizatoin. Short term goals  Time Frame for Short term goals: 1 week  Short term goal 1: Patient will perform supine to sit with mod A x2. Short term goal 2: Patient will perform STS with max A x2 and RW.   Short term goal 3: Patient will sit EOB x15 min mod I.       Electronically signed by:    Maren Ramirez PTA  10/8/2019, 8:35 AM

## 2019-10-08 NOTE — PROGRESS NOTES
Nutrition Assessment    Type and Reason for Visit: Reassess    Nutrition Recommendations:   · Continue current diet and supplements    Nutrition Assessment: Pt continues to consume % of meals. Still receiving wound healing oral supplement. Malnutrition Assessment:  · Malnutrition Status: At risk for malnutrition  · Context: Acute illness or injury  · Findings of the 6 clinical characteristics of malnutrition (Minimum of 2 out of 6 clinical characteristics is required to make the diagnosis of moderate or severe Protein Calorie Malnutrition based on AND/ASPEN Guidelines):  1. Energy Intake-Less than or equal to 75% of estimated energy requirement, Greater than or equal to 7 days    2. Weight Loss-No significant weight loss, in 1 year  3. Fat Loss-No significant subcutaneous fat loss, Orbital  4. Muscle Loss-No significant muscle mass loss, Clavicles (pectoralis and deltoids)  5. Fluid Accumulation-No significant fluid accumulation,    6.  Strength-Not measured    Nutrition Risk Level:  Moderate    Nutrient Needs:  · Estimated Daily Total Kcal: 2568 based on MSJ  · Estimated Daily Protein (g): >178 (>2 g/kg IBW)  · Estimated Daily Total Fluid (ml/day): 2568 based on 1 mL/kcal    Nutrition Diagnosis:   · Problem: Increased nutrient needs  · Etiology: related to Acute injury/trauma     Signs and symptoms:  as evidenced by Presence of wounds    Objective Information:  · Wound Type: Pressure Ulcer, Unstageable  · Current Nutrition Therapies:  · Oral Diet Orders: General   · Oral Diet intake: 26-50%, 51-75%, %  · Oral Nutrition Supplement (ONS) Orders: Wound Healing Oral Supplement  · ONS intake: Unable to assess  · Anthropometric Measures:  · Ht: 6' 3\" (190.5 cm)   · Current Body Wt: 394 lb (178.7 kg)  · Admission Body Wt: 392 lb (177.8 kg)  · Usual Body Wt: (ANSON)  · % Weight Change: weight flucuations with fluid intake  · Ideal Body Wt: 196 lb (88.9 kg), % Ideal Body 201%  · BMI Classification: BMI > or equal to 40.0 Obese Class III(49.3)    Nutrition Interventions:   Continue current diet, Continue current ONS  Continued Inpatient Monitoring, Education Not Indicated, Coordination of Care    Nutrition Evaluation:   · Evaluation: Progressing toward goals   · Goals: Pt will consume >75% of all meals and supplements provided    · Monitoring: Meal Intake, Supplement Intake, Wound Healing, Weight, Pertinent Labs    Electronically signed by Earline Smith RD, LD on 10/8/19 at 10:02 AM    Contact Number: [phone]

## 2019-10-08 NOTE — PROGRESS NOTES
input(s): AST, ALT, ALB, BILITOT, ALKPHOS in the last 72 hours. Troponin: No results for input(s): TROPONINI in the last 72 hours. BNP: No results for input(s): BNP in the last 72 hours. Lipids: No results for input(s): CHOL, HDL in the last 72 hours. Invalid input(s): LDLCALCU  ABGs:   Lab Results   Component Value Date    PO2ART 89 09/20/2019    IIS0KHI 32.0 09/20/2019     INR: No results for input(s): INR in the last 72 hours. Renal Labs  Albumin:    Lab Results   Component Value Date    LABALBU 2.3 10/04/2019     Calcium:    Lab Results   Component Value Date    CALCIUM 7.7 10/06/2019     Phosphorus:    Lab Results   Component Value Date    PHOS 2.4 10/06/2019     U/A:    Lab Results   Component Value Date    NITRU NEGATIVE 09/21/2019    NITRU Negative 10/24/2018    COLORU MICHAEL 09/21/2019    PHUR 5.5 10/24/2018    LABCAST Present 12/12/2016    LABCAST Hyaline casts 12/12/2016    WBCUA 125 09/21/2019    RBCUA 29 09/21/2019    MUCUS RARE 09/21/2019    TRICHOMONAS NONE SEEN 09/06/2019    YEAST Present 12/12/2016    BACTERIA NEGATIVE 09/21/2019    CLARITYU SLIGHTLY CLOUDY 09/21/2019    SPECGRAV 1.010 09/21/2019    UROBILINOGEN <2.0 09/21/2019    BILIRUBINUR NEGATIVE 09/21/2019    BLOODU MODERATE 09/21/2019    GLUCOSEU 3+ 10/24/2018    KETUA NEGATIVE 09/21/2019     ABG:    Lab Results   Component Value Date    RJY2IJD 32.0 09/20/2019    PO2ART 89 09/20/2019    DIW5EXP 22.2 09/20/2019     HgBA1c:    Lab Results   Component Value Date    LABA1C 6.1 09/04/2019     Microalbumen/Creatinine ratio:  No components found for: RUCREAT          Objective:   Vitals: /61   Pulse 97   Temp 97.5 °F (36.4 °C) (Oral)   Resp 24   Ht 6' 3\" (1.905 m)   Wt (!) 394 lb (178.7 kg)   SpO2 92%   BMI 49.25 kg/m²   General appearance: awake weak  HEENT: Head: Normal, normocephalic, atraumatic.   Neck: supple, symmetrical, trachea midline  Lungs: diminished breath sounds bilaterally  Heart: S1, S2 normal  Abdomen: abnormal findings:  soft nt obese  Extremities: edema +  Neurologic: Mental status: alertness: awake      Patient Active Problem List:     Hypertension     Hyperlipidemia     Asthma     Diabetes mellitus ([address]     H/O cardiovascular stress test     Obesity     Chronic kidney disease, stage III (moderate) (HCC)     Hypertensive kidney disease with CKD stage III (HCC)     Depression     SOBOE (shortness of breath on exertion)     Abnormal cardiovascular stress test     Fracture of epiphysis (separation) (upper) of neck of femur, closed ([facility])     Pressure ulcer of coccygeal region, unstageable ([address]     Sepsis ([facility]    Assessment and Plan:      IMP:  1 bacteremia  2 s/p hip surgery with wound infection  3 arf from atn/contrast on ckd 3  4 anxiety/confusion  5 resp distress with hypoxia    Plan     1 treat for multiple infection wound and blood   2 rehab as able  3 fu bmp in am  4 affect stable  5 o2 stbale  Await dc planning               Jaye Lux MD

## 2019-10-08 NOTE — PROGRESS NOTES
Onesimo Easley is a 77 y.o. male patient. Pt turned around 2qh adequately. Pt given pain medications when possible. Given bath overnight. Tolerated night okay. Tried to make sure wilkerson was draining properly, and rectal tube remained patent overnight. Tolerating turning better overnight.      Current Facility-Administered Medications   Medication Dose Route Frequency Provider Last Rate Last Dose    pravastatin (PRAVACHOL) tablet 80 mg  80 mg Oral Nightly Rissa Ahuja MD   80 mg at 10/07/19 2140    fluconazole (DIFLUCAN) tablet 600 mg  600 mg Oral Daily Rissa Ahuja MD        insulin glargine (LANTUS) injection vial 30 Units  30 Units Subcutaneous Nightly Jose Herrera MD   30 Units at 10/07/19 2146    sodium chloride flush 0.9 % injection 10 mL  10 mL Intravenous 2 times per day Rissa Ahuja MD   10 mL at 10/07/19 2141    sodium chloride flush 0.9 % injection 10 mL  10 mL Intravenous PRN Rissa Ahuja MD   10 mL at 10/06/19 1708    calcium carbonate (TUMS) chewable tablet 500 mg  500 mg Oral 4x Daily PRN Brandon Wheat MD   500 mg at 10/04/19 2317    cloNIDine (CATAPRES) tablet 0.1 mg  0.1 mg Oral TID PRN Mazin Montgomery APRN - DAQUAN        ampicillin-sulbactam (UNASYN) 3 g ivpb minibag  3 g Intravenous Q6H Rissa Ahuja MD   Stopped at 10/08/19 0033    insulin lispro (HUMALOG) injection vial 0-6 Units  0-6 Units Subcutaneous TID  Jose Herrera MD   1 Units at 10/07/19 1257    HYDROmorphone (DILAUDID) injection 0.5 mg  0.5 mg Intravenous Q6H PRN Pierre Rankin MD   0.5 mg at 10/04/19 5513    insulin lispro (HUMALOG) injection vial 0-6 Units  0-6 Units Subcutaneous 2 times per day Pierre Rankin MD   1 Units at 10/07/19 2146    glucose (GLUTOSE) 40 % oral gel 15 g  15 g Oral PRN Pierre Rankin MD        dextrose 50 % IV solution  12.5 g Intravenous PRN Pierre Rankin MD        glucagon (rDNA) injection 1 mg  1 mg Intramuscular PRN Pierre Rankin MD       Gladystine Mower of motion. Pulses: Distal pulses are intact. Neurological: Patient is alert and oriented to person, place and time. Skin:  Dry and cool. There is ulceration. Assessment:    Condition: In stable condition.          Marii Parker RN  10/8/2019

## 2019-10-08 NOTE — PROGRESS NOTES
Pt has been resting most of day. Therapy went in and tried to get him up to chair, was only able to sit up to bedside, they will come back and try again tomorrow. No complaints of pain, just mild discomfort. Turned q2h. No insulin coverage on my shift, BS parameters were not met.

## 2019-10-08 NOTE — PROGRESS NOTES
General Surgery- [facility] Day: 19    Chief Complaint on Admission: infected decub ulcer      Subjective:     Denies current complaints. Comfortable watching TV. Tolerating PO without N/V. Denies F/C. Pain controlled. Denies issues with wound vac. ROS:  Review of Systems   Constitutional: Positive for fatigue. Negative for fever. HENT: Negative. Eyes: Negative. Respiratory: Negative. Cardiovascular: Negative. Gastrointestinal: Negative. Endocrine: Negative. Genitourinary: Negative. Musculoskeletal: Positive for myalgias. Skin: Positive for wound. Allergic/Immunologic: Negative. Neurological: Negative. Hematological: Negative. Psychiatric/Behavioral: Negative. Reviewed. Negative except as above. Allergies  Bee venom          Diagnosis Date    Arthritis     Asthma     Chronic kidney disease     stage 3, seen by Dr. Vi Lee Diabetes mellitus ([facility]     H/O cardiac catheterization 2017    H/O cardiovascular stress test ,     CARDIOLITE: EF->51%    Hyperlipidemia     Hypertension     Obesity     Pressure ulcer of coccygeal region, unstageable ([facility] 09/10/2019    Thyroid disease        Objective:     Vitals:    10/08/19 1149   BP:    Pulse:    Resp:    Temp:    SpO2: 92%       TEMPERATURE:  Current -Temp: 97.5 °F (36.4 °C); Max - Temp  Av.8 °F (36.6 °C)  Min: 97.5 °F (36.4 °C)  Max: 98.2 °F (36.8 °C)    No intake/output data recorded. I/O last 3 completed shifts: In: 740 [P.O.:740]  Out: 2750 [Urine:1750; Stool:1000]      Physical Exam:  Physical Exam   Laying in bed on back. NAD. Comfortable. AT. NC.  EOMI. PERRLA. Breathing unlabored. RRR. Obese, soft, NT, ND, no PS.  +Lujan. + Rectal tube. Wound vac in place with good seal.   FRANZ. Warm, dry.        Scheduled Meds:   pravastatin  80 mg Oral Nightly    fluconazole  600 mg Oral Daily    insulin glargine  30 Units Subcutaneous Nightly    sodium chloride flush  10 mL Intravenous 2 times per day    ampicillin-sulbactam  3 g Intravenous Q6H    insulin lispro  0-6 Units Subcutaneous TID WC    insulin lispro  0-6 Units Subcutaneous 2 times per day    aspirin  81 mg Oral Daily    mometasone-formoterol  2 puff Inhalation BID    buPROPion  300 mg Oral Daily    canagliflozin  300 mg Oral Daily    ezetimibe  10 mg Oral Daily    fluticasone  1 spray Each Nostril Daily    ocuvite-lutein  1 tablet Oral Daily    pantoprazole  40 mg Oral QAM AC    sertraline  100 mg Oral BID    b complex-C-E-zinc  1 tablet Oral Daily    vitamin C  1,000 mg Oral Daily    vitamin E  400 Units Oral Daily    sodium chloride flush  10 mL Intravenous 2 times per day    enoxaparin  40 mg Subcutaneous Daily     ContinuousInfusions:   dextrose       PRN Meds:sodium chloride flush, calcium carbonate, cloNIDine, HYDROmorphone, glucose, dextrose, glucagon (rDNA), dextrose, acetaminophen, albuterol sulfate HFA, nitroGLYCERIN, sodium chloride flush, traMADol **OR** traMADol, ondansetron      Labs/Imaging Results:   Lab Results   Component Value Date    WBC 12.9 (H) 10/04/2019    HGB 10.7 (L) 10/04/2019    HCT 34.5 (L) 10/04/2019    MCV 94.8 10/04/2019     10/04/2019     Lab Results   Component Value Date     10/06/2019    K 3.7 10/06/2019     10/06/2019    CO2 27 10/06/2019    BUN 15 10/06/2019    CREATININE 1.2 10/06/2019    GLUCOSE 119 (H) 10/06/2019    CALCIUM 7.7 (L) 10/06/2019    PROT 6.1 (L) 09/20/2019    LABALBU 2.3 (L) 10/04/2019    BILITOT 1.1 (H) 09/20/2019    ALKPHOS 143 (H) 09/20/2019     (H) 09/20/2019    ALT 98 (H) 09/20/2019    LABGLOM >60 10/06/2019    GFRAA >60 10/06/2019       Assessment:     76 y/o M POD s/p excisional debridement down to bone of infected pressure ulcer (9/23) and repeat I&D of right hip (9/29) and repeat debridement and excision of coccyx (9/29)    Plan:     -Leukocytosis stable / improving  -Wound vac changes as scheduled.    -ID consult

## 2019-10-08 NOTE — PROGRESS NOTES
Progress note    Marquita Perales    10/8/2019    Subjective:     Patient on wound vac for his hip and decub ulcers. No new complaints. Medication side effects: none. Scheduled Meds:   pravastatin  80 mg Oral Nightly    fluconazole  600 mg Oral Daily    insulin glargine  30 Units Subcutaneous Nightly    sodium chloride flush  10 mL Intravenous 2 times per day    ampicillin-sulbactam  3 g Intravenous Q6H    insulin lispro  0-6 Units Subcutaneous TID WC    insulin lispro  0-6 Units Subcutaneous 2 times per day    aspirin  81 mg Oral Daily    mometasone-formoterol  2 puff Inhalation BID    buPROPion  300 mg Oral Daily    canagliflozin  300 mg Oral Daily    ezetimibe  10 mg Oral Daily    fluticasone  1 spray Each Nostril Daily    ocuvite-lutein  1 tablet Oral Daily    pantoprazole  40 mg Oral QAM AC    sertraline  100 mg Oral BID    b complex-C-E-zinc  1 tablet Oral Daily    vitamin C  1,000 mg Oral Daily    vitamin E  400 Units Oral Daily    sodium chloride flush  10 mL Intravenous 2 times per day    enoxaparin  40 mg Subcutaneous Daily     Continuous Infusions:   dextrose       PRN Meds:sodium chloride flush, calcium carbonate, cloNIDine, HYDROmorphone, glucose, dextrose, glucagon (rDNA), dextrose, acetaminophen, albuterol sulfate HFA, nitroGLYCERIN, sodium chloride flush, traMADol **OR** traMADol, ondansetron    Review of Systems  Pertinent items are noted in HPI. Objective:     Patient Vitals for the past 8 hrs:   BP Temp Temp src Pulse Resp SpO2   10/08/19 1149 -- -- -- -- -- 92 %   10/08/19 1023 127/61 97.5 °F (36.4 °C) Oral 97 24 92 %     I/O last 3 completed shifts: In: 740 [P.O.:740]  Out: 2750 [Urine:1750; Stool:1000]  No intake/output data recorded. /61   Pulse 97   Temp 97.5 °F (36.4 °C) (Oral)   Resp 24   Ht 6' 3\" (1.905 m)   Wt (!) 394 lb (178.7 kg)   SpO2 92%   BMI 49.25 kg/m²   General appearance: alert and cooperative.   Lungs: clear to auscultation bilaterally  Heart: regular rate and rhythm, S1, S2 normal, no murmur, click, rub or gallop  Abdomen: soft, non-tender; bowel sounds normal; no masses,  no organomegaly  Extremities: extremities normal, atraumatic, no cyanosis or edema  Skin: Skin color, texture, turgor normal. No rashes or lesions        Labs and imaging reviewed.    CBC with Differential:    Lab Results   Component Value Date    WBC 12.9 10/04/2019    RBC 3.64 10/04/2019    HGB 10.7 10/04/2019    HCT 34.5 10/04/2019     10/04/2019    MCV 94.8 10/04/2019    MCH 29.4 10/04/2019    MCHC 31.0 10/04/2019    RDW 14.4 10/04/2019    SEGSPCT 82.4 10/04/2019    BANDSPCT 3 09/29/2019    LYMPHOPCT 7.0 10/04/2019    PROMYELOPCT 2 09/26/2019    MONOPCT 7.0 10/04/2019    MYELOPCT 1 09/29/2019    BASOPCT 0.6 10/04/2019    MONOSABS 0.9 10/04/2019    LYMPHSABS 0.9 10/04/2019    EOSABS 0.2 10/04/2019    BASOSABS 0.1 10/04/2019    DIFFTYPE AUTOMATED DIFFERENTIAL 10/04/2019     CMP:    Lab Results   Component Value Date     10/06/2019    K 3.7 10/06/2019     10/06/2019    CO2 27 10/06/2019    BUN 15 10/06/2019    CREATININE 1.2 10/06/2019    GFRAA >60 10/06/2019    LABGLOM >60 10/06/2019    GLUCOSE 119 10/06/2019    PROT 6.1 09/20/2019    LABALBU 2.3 10/04/2019    CALCIUM 7.7 10/06/2019    BILITOT 1.1 09/20/2019    ALKPHOS 143 09/20/2019     09/20/2019    ALT 98 09/20/2019     BMP:    Lab Results   Component Value Date     10/06/2019    K 3.7 10/06/2019     10/06/2019    CO2 27 10/06/2019    BUN 15 10/06/2019    LABALBU 2.3 10/04/2019    CREATININE 1.2 10/06/2019    CALCIUM 7.7 10/06/2019    GFRAA >60 10/06/2019    LABGLOM >60 10/06/2019    GLUCOSE 119 10/06/2019     Sodium:    Lab Results   Component Value Date     10/06/2019     Potassium:    Lab Results   Component Value Date    K 3.7 10/06/2019     Calcium:    Lab Results   Component Value Date    CALCIUM 7.7 10/06/2019     Warfarin PT/INR:  No components found for: Octaviomaggi Cerda  PTT:    Lab Results   Component Value Date    APTT 29.1 05/03/2017   [APTT  Last 3 Troponin:  No results found for: TROPONINI  ABG:    Lab Results   Component Value Date    SYY2AQG 32.0 09/20/2019    PO2ART 89 09/20/2019    NFG0NJL 22.2 09/20/2019         Assessment:     Principal Problem:    Sepsis ([facility]  Active Problems:    Hypertension    Hyperlipidemia    Asthma    Diabetes mellitus ([facility]    Obesity    Chronic kidney disease, stage III (moderate) (HCC)    Depression    Pressure injury of skin of coccygeal region    PVC (premature ventricular contraction)  Resolved Problems:    * No resolved hospital problems. *      Plan:   Decubitus Ulcer-status post excisional debridement of sacral pressure ulcer on 9/23/2019; status post hip incision and drainage, decubitus ulcer debridement, removal of the coccyx, wound VAC placement on 9/29/2019. Osteomyelitis Coccyx  Enterococcus Sacral wound  Staph aureus-Hip wound  Continue IV antibiotics  Noted recommendation by Dr. Karissa Reyes certification for placement to [facility]  Spoke to Dr. Alexandr Burger about his hip sutures. Apparently this was a new set of sutires placed and he was texting Dr. Anthony Payton to follow up on patient.     Ykoo TORRES M.D.  10/8/2019

## 2019-10-08 NOTE — PROGRESS NOTES
Occupational Therapy  . Occupational Therapy Treatment Note  Name: Anika Kellogg MRN: [id number] :   1950   Date:  10/8/2019   Admission Date: 2019 Room:  [address] [place] [address]-O     Restrictions/Precautions:    General precautions; Fall risk; R posterior hip precautions. Communication with other providers:  Nurse Tiburcio Klinefelter cleared pt for Tx session. Co-Tx c PTA Lacy Marin. Subjective:  Patient states:  Pt agreeable to Tx session. Pain:   Location, Type, Intensity (0/10 to 10/10):  8/10, rectal pain    Objective:    Observation:   c increase to 129, managed c cues for rest breaks and breathing techniques, RR 21. Pt became diaphoretic and exhibited decreased act tolerance, and was returned to bed. Objective Measures:  Rectal tube, Wound vac R hip, Lujan catheter, RUE saline lock, Bariatric bed c trapeze    Treatment, including education:  Therapeutic Activity Training:   Therapeutic activity training was instructed today. Cues were given for safety, sequence, UE/LE placement, awareness, and balance. Activities performed today included bed mobility training, sup-sit, and c emphasis on sit<>stand transfer training. See PT notes for additional details. Educational review for posterior hip precautions, pt required mod to max cues for compliance. Air mattress turned off at beginning of Tx, re-inflated at end of Tx. Rolling: Min A x1-2 for multiple rolling R<>L + cues for use of bed features and BLE placement as well as pillows between BLE to maintain hip precautions. Supine to sit: Max A x2 + Mod A for BLE  Sit to supine: Max A x4 (for decreased act tolerance)  Scooting: Dep x2 + cues for compliance to hip precautions for upward scoot c Trendelenburg)  Sitting balance / tolerance: Varied SBA c BUE self-support to Min A x2 + max cues for postural cues for trunk extension x20 minutes.     Initial plan was for sit to stand transfer training using walking harness and [employer], however, harness was not a proper fit (placed / removed during bed mobility) and this was not utilized. Transfer training was addressed by preparatory sequencing for x3 for attempted sit to stands. Pt unable to fully clear buttocks and c forward flexion and weight shifting. All therapeutic intervention performed c emphasis on bed mobility, dynamic balance / sitting tolerance and preparatory sequencing of initiating sit to stands to inc strength, endurance and act tolerance for inc Indep c ADL tasks, and in preparation for functional transfers. Safety  Patient safely in bed (air mattress re-inflated) at end of session, with call light/phone in reach, and nursing aware. Gait belt was used for func transfers. Fall mats placed. Assessment / Impression:  Based on pt's increasing tolerance for EOB activity and increased level of participation, this therapist feels pt is now more likely able to tolerate therapeutic intervention q 3 hrs daily and would benefit from continued therapeutic intervention in IP Rehab. Plan to discuss this c evaluating therapist for possible re-evaluation. Patient's tolerance of treatment:  Fair   Adverse Reaction: Increased heart rate and diaphoretic, managed c rest breaks and return to bed. Significant change in status and impact:  Pt tolerates increasing amount of time seated EOB, up to 20 minutes this date, exhibits increased motivation for participation. Barriers to improvement:  Pain, Generalized deconditioning, wound vac to R hip    Plan for Next Session:    Continue per OT POC c plan to continue addressing transfer training. Time in:  0840   Time out:  1005  Timed treatment minutes:  85  Total treatment time:  85  Electronically signed by:    LOREN Silva  10/8/2019, 8:44 AM    Previously filed values:    Goals:  By d/c or goals met:  Pt will perform all bed mobility with ModAx2 in prep for EOB/OOB activity.    Pt will perform all functional transfers with MaxAx2 and appropriate use of LRD to bed, toilet, chair in prep for increased functional independence. Pt will perform UB ADLs with CGA in seated EOB to increase functional independence. Pt will maintain standing c RW 30 seconds in prep for ADL and gait. Pt will participate in therex/therax c emphasis on strength, activity tolerance,  safety, DANIA tasks.

## 2019-10-09 NOTE — PROGRESS NOTES
Occupational Therapy  . Occupational Therapy Treatment Note  Name: Korey Mcdaniels MRN: [id number] :   1950   Date:  10/9/2019   Admission Date: 2019 Room:  [address] [place], [place] [address]U   Restrictions/Precautions:    General precautions; Fall Risk, R  Posterior hip precautions    Communication with other providers:  Nurse Bella Ortega cleared pt for Tx session. Co-Tx c PTA Jada Graff. Subjective:  Patient states:  \"I'm trying to wake up. \" Pt also stated, \"I know I have to work to get better. \"   Pain:   Location, Type, Intensity (0/10 to 10/10):  0/10, denies pain at rest, reported increased to 4/10 pain when up in chair. Objective:    Observation:  Pt received in supine in bariatric bed, eyes closed, aroused c verbal greetings. Pt requires increased time to manage changes in position. Objective Measures:  Wound vac R hip, Rectal tube, Lujan catheter, Telemetry, HR 98, RR 14, RUE IV saline lock. HR remained <120 throughout Tx session, including during seated position in chair. Treatment, including education:  Therapeutic Activity Training:   Therapeutic activity training was instructed today. Cues were given for safety, sequence, UE/LE placement, awareness, and balance. Activities performed today included bed mobility training, and sitting tolerance OOB up in chair. Rolling: Mod A x2 + cues use of bed rail and trapeze  With emphasis on increasing trunk strength and sitting tolerance, pt was Dep x3 for [facility] Lift into bariatric chair, and c Min A to reposition LLE for wide SHARON and cues for correction to sit midline, pt tolerated 15 minutes. Pt returned to bed via [facility] Lift (no attempt this date for sit<>stand as increasing trunk strength and act tolerance was focus of this Tx session). With emphasis on trunk strengthening, sitting tolerance during functional tasks, pt performed face washing while maintaining upright position in chair.   Pt exhibited pain c movements of RLE, required cues for compliance to posterior hip precautions during bed mobility (use of folded pillow between BLE to maintain). See PT note for additional details. All therapeutic intervention performed c emphasis on bed mobility, trunk strengthening, and compliance to RLE posterior hip precautions, to increase strength and act tolerance for increased Indep c ADLs and in preparation for functional transfers. Assessment / Impression:        Patient's tolerance of treatment:  Well   Adverse Reaction: None  Significant change in status and impact:  None  Barriers to improvement:  Generalized deconditioning, R posterior hip precautions, wound vac, anxiety (managed c increased time to perform / reposition). Pt appears to respond best c quiet environment, and simple, direct commands. Plan for Next Session:    Continue per OT POC. Time in:  1055  Time out:  1205  Timed treatment minutes:  70  Total treatment time:  70    Electronically signed by:    LOREN Duarte  10/9/2019, 10:59 AM    Previously filed values:       Goals:  By d/c or goals met:  Pt will perform all bed mobility with ModAx2 in prep for EOB/OOB activity. Pt will perform all functional transfers with MaxAx2 and appropriate use of LRD to bed, toilet, chair in prep for increased functional independence. Pt will perform UB ADLs with CGA in seated EOB to increase functional independence. Pt will maintain standing c RW 30 seconds in prep for ADL and gait. Pt will participate in therex/therax c emphasis on strength, activity tolerance,  safety, DANIA tasks.

## 2019-10-09 NOTE — PROGRESS NOTES
Nephrology Progress Note  10/9/2019 3:07 PM  Subjective:   Admit Date: 9/20/2019  PCP: Michael Roland MD  Interval History: pt getting beetter    Diet: Dietary Nutrition Supplements: Wound Healing Oral Supplement  DIET GENERAL;  Pain is: Moderate      Data:   Scheduled Meds:   insulin glargine  15 Units Subcutaneous Nightly    pravastatin  80 mg Oral Nightly    fluconazole  600 mg Oral Daily    sodium chloride flush  10 mL Intravenous 2 times per day    ampicillin-sulbactam  3 g Intravenous Q6H    insulin lispro  0-6 Units Subcutaneous TID WC    insulin lispro  0-6 Units Subcutaneous 2 times per day    aspirin  81 mg Oral Daily    mometasone-formoterol  2 puff Inhalation BID    buPROPion  300 mg Oral Daily    canagliflozin  300 mg Oral Daily    ezetimibe  10 mg Oral Daily    fluticasone  1 spray Each Nostril Daily    ocuvite-lutein  1 tablet Oral Daily    pantoprazole  40 mg Oral QAM AC    sertraline  100 mg Oral BID    b complex-C-E-zinc  1 tablet Oral Daily    vitamin C  1,000 mg Oral Daily    vitamin E  400 Units Oral Daily    sodium chloride flush  10 mL Intravenous 2 times per day    enoxaparin  40 mg Subcutaneous Daily     Continuous Infusions:   dextrose       PRN Meds:sodium chloride flush, calcium carbonate, cloNIDine, HYDROmorphone, glucose, dextrose, glucagon (rDNA), dextrose, acetaminophen, albuterol sulfate HFA, nitroGLYCERIN, sodium chloride flush, traMADol **OR** traMADol, ondansetron  I/O last 3 completed shifts: In: 18 [P.O.:500; I.V.:10]  Out: 1975 [Urine:1125; Stool:850]  No intake/output data recorded. Intake/Output Summary (Last 24 hours) at 10/9/2019 1507  Last data filed at 10/9/2019 1419  Gross per 24 hour   Intake 510 ml   Output 1975 ml   Net -1465 ml     CBC:   No results for input(s): WBC, HGB, PLT in the last 72 hours.   BMP:    Recent Labs     10/09/19  0534      K 3.3*      CO2 27   BUN 13   CREATININE 1.0   GLUCOSE 110*     Hepatic:   No results for input(s): AST, ALT, ALB, BILITOT, ALKPHOS in the last 72 hours. Troponin: No results for input(s): TROPONINI in the last 72 hours. BNP: No results for input(s): BNP in the last 72 hours. Lipids: No results for input(s): CHOL, HDL in the last 72 hours. Invalid input(s): LDLCALCU  ABGs:   Lab Results   Component Value Date    PO2ART 89 09/20/2019    NMS8YLI 32.0 09/20/2019     INR: No results for input(s): INR in the last 72 hours. Renal Labs  Albumin:    Lab Results   Component Value Date    LABALBU 2.3 10/04/2019     Calcium:    Lab Results   Component Value Date    CALCIUM 8.0 10/09/2019     Phosphorus:    Lab Results   Component Value Date    PHOS 2.4 10/06/2019     U/A:    Lab Results   Component Value Date    NITRU NEGATIVE 09/21/2019    NITRU Negative 10/24/2018    COLORU MICHAEL 09/21/2019    PHUR 5.5 10/24/2018    LABCAST Present 12/12/2016    LABCAST Hyaline casts 12/12/2016    WBCUA 125 09/21/2019    RBCUA 29 09/21/2019    MUCUS RARE 09/21/2019    TRICHOMONAS NONE SEEN 09/06/2019    YEAST Present 12/12/2016    BACTERIA NEGATIVE 09/21/2019    CLARITYU SLIGHTLY CLOUDY 09/21/2019    SPECGRAV 1.010 09/21/2019    UROBILINOGEN <2.0 09/21/2019    BILIRUBINUR NEGATIVE 09/21/2019    BLOODU MODERATE 09/21/2019    GLUCOSEU 3+ 10/24/2018    KETUA NEGATIVE 09/21/2019     ABG:    Lab Results   Component Value Date    NLA1VNM 32.0 09/20/2019    PO2ART 89 09/20/2019    HTO6JFG 22.2 09/20/2019     HgBA1c:    Lab Results   Component Value Date    LABA1C 6.1 09/04/2019     Microalbumen/Creatinine ratio:  No components found for: RUCREAT          Objective:   Vitals: /77   Pulse 102   Temp 98.1 °F (36.7 °C) (Oral)   Resp 20   Ht 6' 3\" (1.905 m)   Wt (!) 394 lb (178.7 kg)   SpO2 90%   BMI 49.25 kg/m²   General appearance: awake weak  HEENT: Head: Normal, normocephalic, atraumatic.   Neck: supple, symmetrical, trachea midline  Lungs: diminished breath sounds bilaterally  Heart: S1, S2 normal  Abdomen: abnormal findings:  soft nt obese  Extremities: edema +  Neurologic: Mental status: alertness: awake      Patient Active Problem List:     Hypertension     Hyperlipidemia     Asthma     Diabetes mellitus ([address]     H/O cardiovascular stress test     Obesity     Chronic kidney disease, stage III (moderate) (HCC)     Hypertensive kidney disease with CKD stage III (HCC)     Depression     SOBOE (shortness of breath on exertion)     Abnormal cardiovascular stress test     Fracture of epiphysis (separation) (upper) of neck of femur, closed ([facility])     Pressure ulcer of coccygeal region, unstageable ([address]     Sepsis ([address]    Assessment and Plan:      IMP:  1 bacteremia  2 s/p hip surgery with wound infection  3 arf from atn/contrast on ckd 3  4 anxiety/confusion  5 resp distress with hypoxia    Plan     1 on abx  2 wound care  3 renal stable  4 affect baseline  5 o2 stbale  Await rehab           Rosalba Rowe MD

## 2019-10-09 NOTE — PROGRESS NOTES
I was notified by bedside RN Donovan Downing that the patient is reporting chest pain. I assessed the patient. Patient lying in bed, on 2L o2  (baseline), reports sob, describes the chest pain as a feeling of stretching/tearing in the midsternal region radiating to bilateral jaws rated it at 4/10 but increasing in intensity. Hurts with deep breaths. Plan:  -Stat egk  -ASA 324MG PO once  -Nitro SL then gutt nitro if unrelieved th  -stat troponin then q6, mag, K  -Notify cardiology and attending  -Stat cta chest/dissection CT. In fluids post contrast.   -Titrate o2 as needed to keep sats above 92%      Followed up on the patient after an hour. Patient rated chest pain at 1/10 with overall improved symptoms. Trop of 0.074. Will continue to trend. Mag replaced.  Cardiology to evaluate in am.

## 2019-10-09 NOTE — PROGRESS NOTES
Got in report that pt felt short of breath. Assessed pt and pt stated he had more of a discomfort, pressure/ stretching feeling on his L chest. Asked pt if this was new and said he had it when he was younger, but denied ever getting nitroglycerin for pain. RN administered nitro in case and MD paged. HR at 100 during administration. Stable after first dose of nitro. Pt stated after 2 mins of administration the pain has eased slightly. Vitals remained fairly stable with HR becoming slightly tachy during administration of nitro. Cardiologist consulted with NP hospitalist. CT, EKG, and labs ordered.      Montgomery Aschoff, RN

## 2019-10-09 NOTE — PROGRESS NOTES
Daily    pantoprazole  40 mg Oral QAM AC    sertraline  100 mg Oral BID    b complex-C-E-zinc  1 tablet Oral Daily    vitamin C  1,000 mg Oral Daily    vitamin E  400 Units Oral Daily    sodium chloride flush  10 mL Intravenous 2 times per day    enoxaparin  40 mg Subcutaneous Daily      Infusions:    dextrose           Objective:   Vitals: /76   Pulse 96   Temp 98.1 °F (36.7 °C) (Oral)   Resp 23   Ht 6' 3\" (1.905 m)   Wt (!) 394 lb (178.7 kg)   SpO2 92%   BMI 49.25 kg/m²   General appearance: alert confused cooperative with exam  Neck: no JVD or bruit  Thyroid : Normal lobes   Lungs: Has Vesicular Breath sounds   Heart:  regular rate and rhythm  Abdomen: soft, non-tender; bowel sounds normal; no masses,  no organomegaly  Musculoskeletal: Normal  Extremities: extremities normal, , no edema decubitus ulcers extensive on gluteal region , has wound vacuum   Neurologic:  Awake, confused, oriented to name, place and time. Cranial nerves II-XII are grossly intact. Motor is  intact. Sensory neuropathy. ,  and gait i normal and mostly bed ridden    Assessment:     Patient Active Problem List:     Hypertension     Hyperlipidemia     Asthma     Diabetes mellitus ([address]     H/O cardiovascular stress test     Obesity     Chronic kidney disease, stage III (moderate) (HCC)     Hypertensive kidney disease with CKD stage III (HCC)     Depression     SOBOE (shortness of breath on exertion)     Abnormal cardiovascular stress test     Fracture of epiphysis (separation) (upper) of neck of femur, closed (HCC)     Pressure injury of skin of coccygeal region     Sepsis ([address]      Plan:     1. Reviewed POC blood glucose . Labs and X ray results   2. Reviewed Current Medicines   3. On meal/ Correction bolus Humalog/ Basal Lantus Insulin regime /  4. Monitor Blood glucose frequently   5. Modified  the dose of Insulin/ other medicines as needed   6. Will follow     .      Brandi Coyle MD

## 2019-10-09 NOTE — PROGRESS NOTES
Progress note    Channing Guzman    10/9/2019    Subjective:     Patient on wound vac for his hip and decub ulcers. No new complaints. Medication side effects: none. Scheduled Meds:   insulin glargine  15 Units Subcutaneous Nightly    pravastatin  80 mg Oral Nightly    fluconazole  600 mg Oral Daily    sodium chloride flush  10 mL Intravenous 2 times per day    ampicillin-sulbactam  3 g Intravenous Q6H    insulin lispro  0-6 Units Subcutaneous TID WC    insulin lispro  0-6 Units Subcutaneous 2 times per day    aspirin  81 mg Oral Daily    mometasone-formoterol  2 puff Inhalation BID    buPROPion  300 mg Oral Daily    canagliflozin  300 mg Oral Daily    ezetimibe  10 mg Oral Daily    fluticasone  1 spray Each Nostril Daily    ocuvite-lutein  1 tablet Oral Daily    pantoprazole  40 mg Oral QAM AC    sertraline  100 mg Oral BID    b complex-C-E-zinc  1 tablet Oral Daily    vitamin C  1,000 mg Oral Daily    vitamin E  400 Units Oral Daily    sodium chloride flush  10 mL Intravenous 2 times per day    enoxaparin  40 mg Subcutaneous Daily     Continuous Infusions:   dextrose       PRN Meds:sodium chloride flush, calcium carbonate, cloNIDine, HYDROmorphone, glucose, dextrose, glucagon (rDNA), dextrose, acetaminophen, albuterol sulfate HFA, nitroGLYCERIN, sodium chloride flush, traMADol **OR** traMADol, ondansetron    Review of Systems  Pertinent items are noted in HPI. Objective:     Patient Vitals for the past 8 hrs:   BP Temp Temp src Pulse Resp SpO2   10/09/19 1502 131/77 98.1 °F (36.7 °C) Oral 102 20 --   10/09/19 1225 136/70 97.6 °F (36.4 °C) Oral 95 20 90 %   10/09/19 1210 -- -- -- -- 17 --     I/O last 3 completed shifts: In: 18 [P.O.:500; I.V.:10]  Out: 1975 [Urine:1125; Stool:850]  No intake/output data recorded.     /77   Pulse 102   Temp 98.1 °F (36.7 °C) (Oral)   Resp 20   Ht 6' 3\" (1.905 m)   Wt (!) 394 lb (178.7 kg)   SpO2 90%   BMI 49.25 kg/m²   General appearance: alert and cooperative. Lungs: clear to auscultation bilaterally  Heart: regular rate and rhythm, S1, S2 normal, no murmur, click, rub or gallop  Abdomen: soft, non-tender; bowel sounds normal; no masses,  no organomegaly  Extremities: extremities normal, atraumatic, no cyanosis or edema  Skin: Skin color, texture, turgor normal. No rashes or lesions        Labs and imaging reviewed.    CBC with Differential:    Lab Results   Component Value Date    WBC 12.9 10/04/2019    RBC 3.64 10/04/2019    HGB 10.7 10/04/2019    HCT 34.5 10/04/2019     10/04/2019    MCV 94.8 10/04/2019    MCH 29.4 10/04/2019    MCHC 31.0 10/04/2019    RDW 14.4 10/04/2019    SEGSPCT 82.4 10/04/2019    BANDSPCT 3 09/29/2019    LYMPHOPCT 7.0 10/04/2019    PROMYELOPCT 2 09/26/2019    MONOPCT 7.0 10/04/2019    MYELOPCT 1 09/29/2019    BASOPCT 0.6 10/04/2019    MONOSABS 0.9 10/04/2019    LYMPHSABS 0.9 10/04/2019    EOSABS 0.2 10/04/2019    BASOSABS 0.1 10/04/2019    DIFFTYPE AUTOMATED DIFFERENTIAL 10/04/2019     CMP:    Lab Results   Component Value Date     10/09/2019    K 3.3 10/09/2019     10/09/2019    CO2 27 10/09/2019    BUN 13 10/09/2019    CREATININE 1.0 10/09/2019    GFRAA >60 10/09/2019    LABGLOM >60 10/09/2019    GLUCOSE 110 10/09/2019    PROT 6.1 09/20/2019    LABALBU 2.3 10/04/2019    CALCIUM 8.0 10/09/2019    BILITOT 1.1 09/20/2019    ALKPHOS 143 09/20/2019     09/20/2019    ALT 98 09/20/2019     BMP:    Lab Results   Component Value Date     10/09/2019    K 3.3 10/09/2019     10/09/2019    CO2 27 10/09/2019    BUN 13 10/09/2019    LABALBU 2.3 10/04/2019    CREATININE 1.0 10/09/2019    CALCIUM 8.0 10/09/2019    GFRAA >60 10/09/2019    LABGLOM >60 10/09/2019    GLUCOSE 110 10/09/2019     Sodium:    Lab Results   Component Value Date     10/09/2019     Potassium:    Lab Results   Component Value Date    K 3.3 10/09/2019     Calcium:    Lab Results   Component Value Date    CALCIUM 8.0 10/09/2019

## 2019-10-09 NOTE — PROGRESS NOTES
Physical Therapy    Physical Therapy Treatment Note  Name: Lindsay Mora MRN: [id number] :   1950   Date:  10/9/2019   Admission Date: 2019 Room:  [address] [place] [address]   Restrictions/Precautions:        HEMIARTHROPLASTY HIP (Right, 2019); Pressure ulcer  With wound vac     Communication with other providers:  Per nurse ok to tx and came to room while pt up in chair and OT educated on use of lift pad with nilo lift. Subjective:  Patient states:  Pt agreeable to tx  Pain:   Location, Type, Intensity (0/10 to 10/10): Only rated pain 4 at end of tx   Objective:    Observation:  Alert and oriented. Pt needs to move slowly and often requesting to stop and rest.  Treatment, including education/measures: At start of tx pt was in bed with bed flat and head of bed elevated slowly to acclimate pt to sitting position and was able to perform UE/LE AROM. Pt then returned to sup with bed flat for pad placement  Pt rolled left and right with rails and mod assist of 2 and one to place lift pad. Pt  then slowly raises and transferred to chair . Pt was able to maintain sitting up in chair x 15 minute with HR increased to 118 but then returned to 100' and tolerated well. Pt fatigued and needed encouragement and support to complete 15 minutes. Pt was able to work on ADLs/reffer to OT note. Pt was returned to bed with nilo lift and made comfortable. Safety  Patient left safely in the bed, with call light/phone in reach. Assessment / Impression:       Patient's tolerance of treatment:  good  Adverse Reaction: na  Significant change in status and impact:  na  Barriers to improvement:  Strength and safety  Plan for Next Session:    Cont. POC  Time in:  1055  Time out:  1205  Timed treatment minutes:  70  Total treatment time:  79    Previously filed items:  Social/Functional History  Additional Comments: Has been in SNF but had not progressed to gait or transfers. Has been mostly bed level since last hopistalizatoin.    Short term goals  Time Frame for Short term goals: 1 week  Short term goal 1: Patient will perform supine to sit with mod A x2. Short term goal 2: Patient will perform STS with max A x2 and RW.   Short term goal 3: Patient will sit EOB x15 min mod I.       Electronically signed by:    Alethea Butler PTA  10/9/2019, 8:14 AM

## 2019-10-09 NOTE — PLAN OF CARE
specified parameters  10/9/2019 0014 by Kirti Conner LPN  Outcome: Ongoing  10/8/2019 1530 by Coco Salgado LPN  Outcome: Ongoing     Problem: Venous Thromboembolism:  Goal: Will show no signs or symptoms of venous thromboembolism  Description  Will show no signs or symptoms of venous thromboembolism  10/9/2019 0014 by Kirti Conner LPN  Outcome: Ongoing  10/8/2019 1530 by Coco Salgado LPN  Outcome: Ongoing  Goal: Absence of signs or symptoms of impaired coagulation  Description  Absence of signs or symptoms of impaired coagulation  10/9/2019 0014 by Kirti Conner LPN  Outcome: Ongoing  10/8/2019 1530 by Coco Salgado LPN  Outcome: Ongoing     Problem: Pain:  Goal: Pain level will decrease  Description  Pain level will decrease  10/9/2019 0014 by Kirti Conner LPN  Outcome: Ongoing  10/8/2019 1530 by Coco Salgado LPN  Outcome: Ongoing  Goal: Control of acute pain  Description  Control of acute pain  10/9/2019 0014 by Kirti Conner LPN  Outcome: Ongoing  10/8/2019 1530 by Coco Salgado LPN  Outcome: Ongoing  Goal: Control of chronic pain  Description  Control of chronic pain  10/9/2019 0014 by Kirti Conner LPN  Outcome: Ongoing  10/8/2019 1530 by Coco Salgado LPN  Outcome: Ongoing     Problem: Nutrition  Goal: Optimal nutrition therapy  10/9/2019 0014 by Kirti Conner LPN  Outcome: Ongoing  10/8/2019 1530 by Coco Salgado LPN  Outcome: Ongoing

## 2019-10-09 NOTE — PROGRESS NOTES
General Surgery-Dr. Zully Valentin Day: 21    Chief Complaint on Admission: infected decub ulcer      Subjective:     Denies current complaints, somewhat uncomfortable with position. Currently watching TV. Tolerating PO without N/V. Denies F/C. Pain controlled. Denies issues with wound vac - it was changed this AM.     ROS:  Review of Systems   Constitutional: Positive for fatigue. Negative for fever. HENT: Negative. Eyes: Negative. Respiratory: Negative. Cardiovascular: Negative. Gastrointestinal: Negative. Endocrine: Negative. Genitourinary: Negative. Musculoskeletal: Positive for myalgias. Skin: Positive for wound. Allergic/Immunologic: Negative. Neurological: Negative. Hematological: Negative. Psychiatric/Behavioral: Negative. Reviewed. Negative except as above. Allergies  Bee venom          Diagnosis Date    Arthritis     Asthma     Chronic kidney disease     stage 3, seen by Dr. Stevenson Ayers Diabetes mellitus ([email or web address]     H/O cardiac catheterization 2017    H/O cardiovascular stress test ,     CARDIOLITE: EF->51%    Hyperlipidemia     Hypertension     Obesity     Pressure ulcer of coccygeal region, unstageable ([email or web address] 09/10/2019    Thyroid disease        Objective:     Vitals:    10/09/19 0820   BP: (!) 150/73   Pulse: 93   Resp: 20   Temp: 98.5 °F (36.9 °C)   SpO2: 95%       TEMPERATURE:  Current -Temp: 98.5 °F (36.9 °C); Max - Temp  Av.8 °F (36.6 °C)  Min: 97 °F (36.1 °C)  Max: 98.5 °F (36.9 °C)    No intake/output data recorded. I/O last 3 completed shifts: In: 20 [P.O.:20]  Out:  [Urine:1125; Stool:850]      Physical Exam:  Physical Exam   Laying in bed on back. NAD. Comfortable at rest.  AT. NC.  EOMI. PERRLA. Breathing unlabored. RRR. Obese, soft, NT, ND, no PS.  +Lujan. + Rectal tube. Wound vac in place with good seals. Healthy wound edges. FRANZ. Warm, dry.        Scheduled Meds:   insulin glargine  15 Units Subcutaneous Nightly    pravastatin  80 mg Oral Nightly    fluconazole  600 mg Oral Daily    sodium chloride flush  10 mL Intravenous 2 times per day    ampicillin-sulbactam  3 g Intravenous Q6H    insulin lispro  0-6 Units Subcutaneous TID WC    insulin lispro  0-6 Units Subcutaneous 2 times per day    aspirin  81 mg Oral Daily    mometasone-formoterol  2 puff Inhalation BID    buPROPion  300 mg Oral Daily    canagliflozin  300 mg Oral Daily    ezetimibe  10 mg Oral Daily    fluticasone  1 spray Each Nostril Daily    ocuvite-lutein  1 tablet Oral Daily    pantoprazole  40 mg Oral QAM AC    sertraline  100 mg Oral BID    b complex-C-E-zinc  1 tablet Oral Daily    vitamin C  1,000 mg Oral Daily    vitamin E  400 Units Oral Daily    sodium chloride flush  10 mL Intravenous 2 times per day    enoxaparin  40 mg Subcutaneous Daily     ContinuousInfusions:   dextrose       PRN Meds:sodium chloride flush, calcium carbonate, cloNIDine, HYDROmorphone, glucose, dextrose, glucagon (rDNA), dextrose, acetaminophen, albuterol sulfate HFA, nitroGLYCERIN, sodium chloride flush, traMADol **OR** traMADol, ondansetron      Labs/Imaging Results:   Lab Results   Component Value Date    WBC 12.9 (H) 10/04/2019    HGB 10.7 (L) 10/04/2019    HCT 34.5 (L) 10/04/2019    MCV 94.8 10/04/2019     10/04/2019     Lab Results   Component Value Date     10/09/2019    K 3.3 (L) 10/09/2019     10/09/2019    CO2 27 10/09/2019    BUN 13 10/09/2019    CREATININE 1.0 10/09/2019    GLUCOSE 110 (H) 10/09/2019    CALCIUM 8.0 (L) 10/09/2019    PROT 6.1 (L) 09/20/2019    LABALBU 2.3 (L) 10/04/2019    BILITOT 1.1 (H) 09/20/2019    ALKPHOS 143 (H) 09/20/2019     (H) 09/20/2019    ALT 98 (H) 09/20/2019    LABGLOM >60 10/09/2019    GFRAA >60 10/09/2019       Assessment:     76 y/o M POD s/p excisional debridement down to bone of infected pressure ulcer (9/23) and repeat I&D of right hip (9/29) and repeat debridement and excision of coccyx (9/29)    Plan:       -Wound vac changes as scheduled. Called and left message with [facility] wound vac rep, Natasha Sorto, @ [phone]. Possible consideration to irrigating wound vac to sacral region. OK from my standpoint. D/w  as well. -ID consult reviewed and Abx changes noted. Appreciate input and recs. -Medical mgt  -PT/OT  -Diet as tolerated  -Blood cx x2 pending. Prelim are both negative to date.  -Surgery cx is now complete.  -Body fluid cx is completed (right hip). -Final path for coccyx was ulcer with acute osteomyelitis.  -Likely d/c once cx finalized and Abx courses set by ID.   -Once d/c'd, pt should f/u with me in office in 7-10 days. D/w pt that ideally he would not need rectal tube and be able to use bed pan or bedside toilet. He has been cleared by Ortho for WBAT. Concern with rectal tube is stool is possibly getting into wound. D/w pt that if he is not able to use bed pain or bedside toilet that he may ultimately require diverting colostomy. He does not currently want this.          Electronically signed by Karthik Soler II, MD on 10/9/2019 at 9:36 AM

## 2019-10-09 NOTE — CONSULTS
[address] [id number] Continence Nurse  Consult Note       Neha Munroe  AGE: 77 y.o.    GENDER: male  : 1950  TODAY'S DATE:  10/9/2019    Subjective:     Reason for [facility] Evaluation and Assessment: wound vac change      Neha Munroe is a 77 y.o. male referred by:   [x] Physician  [] Nursing  [] Other:     Wound Identification:  Wound Type: pressure and non-healing surgical  Contributing Factors: diabetes, chronic pressure, decreased mobility, obesity and incontinence of stool        PAST MEDICAL HISTORY        Diagnosis Date    Arthritis     Asthma     Chronic kidney disease     stage 3, seen by Dr. Stevenson Ayers Diabetes mellitus ([facility]     H/O cardiac catheterization 2017    H/O cardiovascular stress test ,     CARDIOLITE: EF->51%    Hyperlipidemia     Hypertension     Obesity     Pressure ulcer of coccygeal region, unstageable ([facility] 09/10/2019    Thyroid disease        PAST SURGICAL HISTORY    Past Surgical History:   Procedure Laterality Date    CARPAL TUNNEL RELEASE      DIAGNOSTIC CARDIAC CATH LAB PROCEDURE      NO SIGNIFICANT CAD    EYE SURGERY      HEMIARTHROPLASTY HIP Right 2019    HIP HEMIARTHROPLASTY performed by Trae Ramirez MD at [address] [place] [address] Right 2019    HIP INCISION AND DRAINAGE performed by Trae Ramirez MD at [address] [place] [address]  2019    of stage 4 wound on coccyx, by Dr. Jermaine Mendenhall N/A 2019    EXCISIONAL DEBRIDEMENT OF SACRAL PRESSURE ULCER performed by Gilles Stevens MD at [address] [place] [address] N/A 2019    DECUBITUS ULCER DEBRIDEMENT REPAIR performed by Laurel Lewis MD at [address] [place] [address]    Family History   Problem Relation Age of Onset    High Blood Pressure Mother     Cancer Mother     Cancer Father     Stroke Maternal Grandfather     Diabetes Paternal Grandmother  Heart Disease Paternal Grandfather        SOCIAL HISTORY    Social History     Tobacco Use    Smoking status: Never Smoker    Smokeless tobacco: Never Used   Substance Use Topics    Alcohol use: No    Drug use: No       ALLERGIES    Allergies   Allergen Reactions    Bee Venom Shortness Of Breath       MEDICATIONS    No current facility-administered medications on file prior to encounter. Current Outpatient Medications on File Prior to Encounter   Medication Sig Dispense Refill    TRULICITY 1.5 WX/5.4TU SOPN Inject into the skin once a week  2    nitroGLYCERIN (NITROSTAT) 0.4 MG SL tablet Place 1 tablet under the tongue every 5 minutes as needed for Chest pain 25 tablet 2    ezetimibe (ZETIA) 10 MG tablet Take 10 mg by mouth daily      acetaminophen (TYLENOL) 500 MG tablet Take 500 mg by mouth every 6 hours as needed for Pain      SUPER B COMPLEX/C PO Take 1 capsule by mouth daily      Cinnamon 500 MG TABS Take 1,000 mg by mouth daily      vitamin E 400 UNIT capsule Take 400 Units by mouth daily      vitamin C (ASCORBIC ACID) 500 MG tablet Take 1,000 mg by mouth daily       insulin lispro (HUMALOG) 100 UNIT/ML injection vial Inject into the skin 3 times daily (before meals)      insulin glargine (LANTUS) 100 UNIT/ML injection vial Inject 74 Units into the skin nightly       benazepril (LOTENSIN) 20 MG tablet Take 1 tablet by mouth daily 90 tablet 3    fluticasone (FLONASE) 50 MCG/ACT nasal spray as needed       Multiple Vitamins-Minerals (VISION VITAMINS PO) Take by mouth daily      omeprazole (PRILOSEC) 20 MG capsule Take 20 mg by mouth daily.  St Johns Wort 300 MG CAPS Take 2 capsules by mouth.  Canagliflozin (INVOKANA) 300 MG TABS Take  by mouth daily.       budesonide-formoterol (SYMBICORT) 80-4.5 MCG/ACT AERO Inhale 2 puffs into the lungs as needed       Albuterol Sulfate (PROAIR HFA IN) Inhale into the lungs as needed       buPROPion (WELLBUTRIN XL) 300 MG XL tablet Take 300 mg by mouth daily.  sertraline (ZOLOFT) 100 MG tablet Take 100 mg by mouth 2 times daily.  simvastatin (ZOCOR) 40 MG tablet Take 40 mg by mouth daily       aspirin 81 MG EC tablet Take 81 mg by mouth daily.              Objective:      BP (!) 150/73   Pulse 93   Temp 98.5 °F (36.9 °C) (Oral)   Resp 20   Ht 6' 3\" (1.905 m)   Wt (!) 394 lb (178.7 kg)   SpO2 95%   BMI 49.25 kg/m²   Alfonso Risk Score: Alfonso Scale Score: 14    LABS    CBC:   Lab Results   Component Value Date    WBC 12.9 10/04/2019    RBC 3.64 10/04/2019    HGB 10.7 10/04/2019    HCT 34.5 10/04/2019    MCV 94.8 10/04/2019    MCH 29.4 10/04/2019    MCHC 31.0 10/04/2019    RDW 14.4 10/04/2019     10/04/2019    MPV 9.0 10/04/2019     CMP:    Lab Results   Component Value Date     10/09/2019    K 3.3 10/09/2019     10/09/2019    CO2 27 10/09/2019    BUN 13 10/09/2019    CREATININE 1.0 10/09/2019    GFRAA >60 10/09/2019    LABGLOM >60 10/09/2019    GLUCOSE 110 10/09/2019    PROT 6.1 09/20/2019    LABALBU 2.3 10/04/2019    CALCIUM 8.0 10/09/2019    BILITOT 1.1 09/20/2019    ALKPHOS 143 09/20/2019     09/20/2019    ALT 98 09/20/2019     Albumin:    Lab Results   Component Value Date    LABALBU 2.3 10/04/2019     PT/INR:    Lab Results   Component Value Date    PROTIME 11.5 05/03/2017    PROTIME 10.6 01/26/2012    INR 1.01 05/03/2017     HgBA1c:    Lab Results   Component Value Date    LABA1C 6.1 09/04/2019         Assessment:     Patient Active Problem List   Diagnosis    Hypertension    Hyperlipidemia    Asthma    Diabetes mellitus ([facility]    H/O cardiovascular stress test    Obesity    Chronic kidney disease, stage III (moderate) (HCC)    Hypertensive kidney disease with CKD stage III (HCC)    Depression    SOBOE (shortness of breath on exertion)    Abnormal cardiovascular stress test    Fracture of epiphysis (separation) (upper) of neck of femur, closed (HCC)    Pressure injury of skin of coccygeal region    [facility]    PVC (premature ventricular contraction)       Measurements:  Negative Pressure Wound Therapy Hip Right (Active)   Wound Type Surgical 10/9/2019  9:00 AM   Unit Type KCI 10/9/2019  9:00 AM   Dressing Type Silver impregnated foam 10/9/2019  9:00 AM   Number of pieces used 3 10/9/2019  9:00 AM   Cycle Continuous 10/9/2019  9:00 AM   Target Pressure (mmHg) 125 10/9/2019  9:00 AM   Canister changed? Yes 10/8/2019 11:08 PM   Dressing Status Changed 10/9/2019  9:00 AM   Dressing Changed Changed/New 10/9/2019  9:00 AM   Drainage Amount Large 10/9/2019  9:00 AM   Drainage Description Serosanguinous 10/9/2019  9:00 AM   Dressing Change Due 10/09/19 10/8/2019 11:08 PM   Output (ml) 275 ml 10/4/2019  8:07 PM   Wound Assessment Red;Pink;Yellow 10/9/2019  9:00 AM   Annika-wound Assessment Dry; Intact 10/9/2019  9:00 AM   Odor None 10/8/2019 11:08 PM   Number of days: 9       Negative Pressure Wound Therapy Coccyx (Active)   Wound Type Pressure ulcer: Stage IV 10/9/2019  9:00 AM   Unit Type KCI 10/9/2019  9:00 AM   Dressing Type Silver impregnated foam 10/9/2019  9:00 AM   Number of pieces used 3 10/9/2019  9:00 AM   Cycle Continuous 10/9/2019  9:00 AM   Target Pressure (mmHg) 125 10/9/2019  9:00 AM   Canister changed?  No 10/8/2019 11:08 PM   Dressing Status Changed 10/9/2019  9:00 AM   Dressing Changed Changed/New 10/9/2019  9:00 AM   Drainage Amount Large 10/9/2019  9:00 AM   Drainage Description Serosanguinous 10/9/2019  9:00 AM   Dressing Change Due 10/09/19 10/8/2019 11:08 PM   Output (ml) 125 ml 10/5/2019  6:12 AM   Wound Assessment Brown;Yellow;Red 10/9/2019  9:00 AM   Annika-wound Assessment Pink 10/9/2019  9:00 AM   Odor None 10/8/2019 11:08 PM   Number of days: 7       Wound 09/21/19 Coccyx (Active)   Wound Pressure Stage  4 10/9/2019  9:00 AM   Dressing Status Changed 10/9/2019  9:00 AM   Dressing Changed Changed/New 10/9/2019  9:00 AM   Dressing/Treatment Vacuum dressing 10/8/2019 11:08 PM Wound Cleansed Rinsed/Irrigated with saline 10/9/2019  9:00 AM   Dressing Change Due 10/09/19 10/8/2019 11:08 PM   Wound Length (cm) 9 cm 10/1/2019 11:30 AM   Wound Width (cm) 8 cm 10/1/2019 11:30 AM   Wound Depth (cm) 5 cm 10/1/2019 11:30 AM   Wound Surface Area (cm^2) 72 cm^2 10/1/2019 11:30 AM   Change in Wound Size % (l*w) 21.74 10/1/2019 11:30 AM   Wound Volume (cm^3) 360 cm^3 10/1/2019 11:30 AM   Wound Healing % -161 10/1/2019 11:30 AM   Distance Tunneling (cm) 0 cm 10/7/2019  9:59 AM   Tunneling Position ___ O'Clock 0 10/7/2019  9:59 AM   Undermining Starts ___ O'Clock 9 10/7/2019  9:59 AM   Undermining Ends___ O'Clock 5 10/7/2019  9:59 AM   Undermining Maxium Distance (cm) 4.2 10/1/2019 11:30 AM   Wound Assessment Yellow;Red;Brown 10/9/2019  9:00 AM   Drainage Amount Large 10/9/2019  9:00 AM   Drainage Description Serosanguinous 10/9/2019  9:00 AM   Odor None 10/9/2019  9:00 AM   Margins Defined edges 10/9/2019  9:00 AM   Exposed structure Muscle 10/9/2019  9:00 AM   Annika-wound Assessment Red;Pink 10/9/2019  9:00 AM   Non-staged Wound Description Full thickness 10/9/2019  9:00 AM   Red%Wound Bed 70 10/8/2019 11:08 PM   Yellow%Wound Bed 30 10/8/2019 11:08 PM   Black%Wound Bed 10 10/8/2019 11:08 PM   Purple%Wound Bed 80 10/8/2019 11:08 PM   Number of days: 18       Wound 09/21/19 Hip Left cluster/DTI now with stage 2 (Active)   Wound Pressure Stage  2 10/7/2019  9:59 AM   Dressing Status Clean;Dry; Intact 10/8/2019 11:08 PM   Dressing Changed Changed/New 10/7/2019  9:59 AM   Dressing/Treatment Other (comment) 10/1/2019  9:24 PM   Wound Cleansed Rinsed/Irrigated with saline 10/7/2019  9:59 AM   Dressing Change Due 09/27/19 10/8/2019 11:08 PM   Wound Length (cm) 4 cm 10/4/2019  9:20 AM   Wound Width (cm) 3.5 cm 10/4/2019  9:20 AM   Wound Depth (cm) 0.1 cm 10/4/2019  9:20 AM   Wound Surface Area (cm^2) 14 cm^2 10/4/2019  9:20 AM   Change in Wound Size % (l*w) 73.08 10/4/2019  9:20 AM   Wound Volume (cm^3) 1.4 cm^3 10/4/2019  9:20 AM   Distance Tunneling (cm) 0 cm 10/7/2019  9:59 AM   Tunneling Position ___ O'Clock 0 10/7/2019  9:59 AM   Undermining Starts ___ O'Clock 0 10/7/2019  9:59 AM   Undermining Ends___ O'Clock 0 10/7/2019  9:59 AM   Undermining Maxium Distance (cm) 0 10/7/2019  9:59 AM   Wound Assessment Pink;Purple;Yellow 10/8/2019 11:08 PM   Drainage Amount None 10/8/2019 11:08 PM   Drainage Description Serosanguinous 10/8/2019 11:08 PM   Odor None 10/8/2019 11:08 PM   Margins Defined edges 10/8/2019 11:08 PM   Annika-wound Assessment Other (Comment) 10/8/2019 11:08 PM   Non-staged Wound Description Full thickness 10/8/2019 11:08 PM   [place]%Wound Bed 40 10/8/2019 11:08 PM   Yellow%Wound Bed 20 10/8/2019 11:08 PM   Purple%Wound Bed 40 10/8/2019 11:08 PM   Other%Wound Bed 60 10/4/2019  9:20 AM   Number of days: 18       Wound 10/01/19 Hip Right (Active)   Wound Non-Healing Surgical 10/9/2019  9:00 AM   Dressing Status Changed 10/9/2019  9:00 AM   Dressing Changed Changed/New 10/9/2019  9:00 AM   Dressing/Treatment Vacuum dressing 10/7/2019  9:59 AM   Wound Cleansed Rinsed/Irrigated with saline 10/9/2019  9:00 AM   Dressing Change Due 10/09/19 10/8/2019 11:08 PM   Wound Length (cm) 22.5 cm 10/1/2019 11:30 AM   Wound Width (cm) 3.2 cm 10/1/2019 11:30 AM   Wound Depth (cm) 3.5 cm 10/1/2019 11:30 AM   Wound Surface Area (cm^2) 72 cm^2 10/1/2019 11:30 AM   Wound Volume (cm^3) 252 cm^3 10/1/2019 11:30 AM   Distance Tunneling (cm) 0 cm 10/7/2019  9:59 AM   Tunneling Position ___ O'Clock 0 10/7/2019  9:59 AM   Undermining Starts ___ O'Clock 0 10/7/2019  9:59 AM   Undermining Ends___ O'Clock 0 10/7/2019  9:59 AM   Undermining Maxium Distance (cm) 0 10/7/2019  9:59 AM   Wound Assessment Red;Pink;Yellow 10/9/2019  9:00 AM   Drainage Amount Large 10/9/2019  9:00 AM   Drainage Description Serosanguinous 10/9/2019  9:00 AM   Odor None 10/9/2019  9:00 AM   Margins Defined edges 10/9/2019  9:00 AM   Annika-wound Assessment Dry; Intact 10/9/2019  9:00 AM   Non-staged Wound Description Full thickness 10/9/2019  9:00 AM   [place]%Wound Bed 50 10/8/2019 11:08 PM   Red%Wound Bed 100 10/8/2019 11:08 PM   Yellow%Wound Bed 20 10/8/2019 11:08 PM   Number of days: 7       Wound 10/04/19 Thigh Anterior;Left;Proximal intact blister (Active)   Wound Other 10/7/2019  9:36 PM   Dressing Status Other (Comment) 10/7/2019  9:36 PM   Dressing Changed Changed/New 10/4/2019  9:20 AM   Wound Cleansed Rinsed/Irrigated with saline 10/4/2019  9:20 AM   Wound Length (cm) 1.5 cm 10/4/2019  9:20 AM   Wound Width (cm) 2.5 cm 10/4/2019  9:20 AM   Wound Depth (cm) 0 cm 10/4/2019  9:20 AM   Wound Surface Area (cm^2) 3.75 cm^2 10/4/2019  9:20 AM   Wound Volume (cm^3) 0 cm^3 10/4/2019  9:20 AM   Wound Assessment Fluid filled blister 10/8/2019 11:08 PM   Drainage Amount None 10/8/2019 11:08 PM   Odor None 10/8/2019 11:08 PM   Margins Attached edges 10/8/2019 11:08 PM   Annika-wound Assessment Clean;Dry; Intact 10/8/2019 11:08 PM   Number of days: 5       Response to treatment:  With complaints of pain. Pain Assessment:  Severity:  4/10  Quality of pain: ache  Wound Pain Timing/Severity: intermittent  Premedicated: yes    Plan:     Plan of Care:       Wound 10/01/19 Hip Right-Dressing/Treatment: (duoderm to periwound silver foam)    Wound 09/21/19 Coccyx-Dressing/Treatment: (duoderm periwound, silver foam)      Pt in bed. Wound vac dressings to right hip and coccyx changed. Canister changed. Large amount of serosanginous drainage. Pictures taken. Right hip wound red and pink with deeper area to distal wound. Small yellow slough. Duoderm applied periwound to both wounds. Ostomy ring applied to distal coccyx wound. Silver foam and drape applied. Adequate seal obtained to 125 mmHG continuous suction. Nursing student in room with pt. Repositioned to left side with pillow support. Tolerated well.  Message left for Kay CULP for Dr. Taylor Loges to update with right hip wound assessment-new pictures in chart. Specialty Bed Required : yes  [x] Low Air Loss   [] Pressure Redistribution  [] Fluid Immersion  [x] Bariatric  [] Total Pressure Relief  [] Other:     Discharge Plan:  Placement for patient upon discharge: tbd  Hospice Care: no  Patient appropriate for Outpatient [address]: [facility]    Patient/Caregiver Teaching:  Level of patient/caregiver understanding able to:   Voiced understanding regarding wound care.         Electronically signed by Missael Patricio RN,  on 10/9/2019 at 10:38 AM

## 2019-10-09 NOTE — CONSULTS
Wound vac dressing changed to vera carolee cleanse choice dressing on sacrum wound with KCI rep at bedside. Irrigation of saline 16 ml for 2 minutes with  2 hours of suction at 125 MMHG. Pt tolerated well. Drape intake and seal intake.

## 2019-10-10 NOTE — PROGRESS NOTES
Occupational Therapy  Chart reviewed. Pt has had a decline in medical status and transferred to ICU. Will re-evaluate once medically stable.      [address], OTR/L, [place]   [id number]   8:16 AM, 10/10/2019

## 2019-10-10 NOTE — FLOWSHEET NOTE
Pt transferred to ICU via bed x2 RNs from . Pt is transfer to Progressive care but no beds available at this time. Pt alert and oriented SL and in no apparent distress. Pt immediately connected to ICU monitor and oriented to new room. Pt skin assessment completed with Landry Johansen RN. Pt has existing wound vac to right hip and coccyx. Buttocks with redness/excoriation. DTI left hip area, scattered bruising noted. Pt followed by wound care.

## 2019-10-10 NOTE — PROGRESS NOTES
Pt arrive to unit bed 2023. Pt arrived in bariatric bed. Skin assessment completed with Khanh GIRON. Pt has wound to right hip and coccyx. Wound vac in place. Pt has scattered scabs and bruising. Scab to right wrist, blister to left thigh. Redness to coccyx and skin folds. Pt alert and oriented. Belongings include cell phone, wooden [place] cane, black wire thing. Pt refusing turned at this time. Pt oriented to room. Call light in reach and bed alarm on.

## 2019-10-10 NOTE — PLAN OF CARE
Problem: Falls - Risk of:  Goal: Will remain free from falls  Description  Will remain free from falls  Outcome: Met This Shift  Goal: Absence of physical injury  Description  Absence of physical injury  Outcome: Met This Shift     Problem: Pain:  Goal: Pain level will decrease  Description  Pain level will decrease  Outcome: Met This Shift  Goal: Control of acute pain  Description  Control of acute pain  Outcome: Met This Shift  Goal: Control of chronic pain  Description  Control of chronic pain  Outcome: Met This Shift     Problem: Risk for Impaired Skin Integrity  Goal: Tissue integrity - skin and mucous membranes  Description  Structural intactness and normal physiological function of skin and  mucous membranes.   Outcome: Ongoing     Problem: Gas Exchange - Impaired:  Goal: Levels of oxygenation will improve  Description  Levels of oxygenation will improve  Outcome: Ongoing     Problem: Infection, Septic Shock:  Goal: Will show no infection signs and symptoms  Description  Will show no infection signs and symptoms  Outcome: Ongoing

## 2019-10-10 NOTE — PROGRESS NOTES
Nephrology Progress Note  10/10/2019 2:39 PM  Subjective:   Admit Date: 9/20/2019  PCP: Sergey Marcial MD  Interval History: pt to icu with cp last night and still some pain today seen on ntg gtt    Diet: Diet NPO Effective Now  Pain is: Moderate      Data:   Scheduled Meds:   insulin glargine  15 Units Subcutaneous Nightly    pravastatin  80 mg Oral Nightly    fluconazole  600 mg Oral Daily    sodium chloride flush  10 mL Intravenous 2 times per day    ampicillin-sulbactam  3 g Intravenous Q6H    insulin lispro  0-6 Units Subcutaneous TID WC    insulin lispro  0-6 Units Subcutaneous 2 times per day    aspirin  81 mg Oral Daily    mometasone-formoterol  2 puff Inhalation BID    buPROPion  300 mg Oral Daily    canagliflozin  300 mg Oral Daily    ezetimibe  10 mg Oral Daily    fluticasone  1 spray Each Nostril Daily    ocuvite-lutein  1 tablet Oral Daily    pantoprazole  40 mg Oral QAM AC    sertraline  100 mg Oral BID    b complex-C-E-zinc  1 tablet Oral Daily    vitamin C  1,000 mg Oral Daily    vitamin E  400 Units Oral Daily    enoxaparin  40 mg Subcutaneous Daily     Continuous Infusions:   nitroGLYCERIN 5 mcg/min (10/10/19 0627)    sodium chloride      dextrose       PRN Meds:sodium chloride flush, calcium carbonate, cloNIDine, HYDROmorphone, glucose, dextrose, glucagon (rDNA), dextrose, acetaminophen, albuterol sulfate HFA, traMADol **OR** traMADol, ondansetron  I/O last 3 completed shifts:   In: 490 [P.O.:480; I.V.:10]  Out: 850 [Urine:850]  I/O this shift:  In: -   Out: 200 [Urine:200]    Intake/Output Summary (Last 24 hours) at 10/10/2019 1439  Last data filed at 10/10/2019 0800  Gross per 24 hour   Intake --   Output 1050 ml   Net -1050 ml     CBC:   Recent Labs     10/10/19  0650   WBC 13.0*   HGB 10.5*        BMP:    Recent Labs     10/09/19  0534 10/09/19  2000 10/10/19  0650     --  134*   K 3.3* 3.7 3.9     --  99   CO2 27  --  26   BUN 13  --  14   CREATININE 1.0  --  1.0   GLUCOSE 110*  --  139*     Hepatic:   No results for input(s): AST, ALT, ALB, BILITOT, ALKPHOS in the last 72 hours. Troponin: No results for input(s): TROPONINI in the last 72 hours. BNP: No results for input(s): BNP in the last 72 hours. Lipids: No results for input(s): CHOL, HDL in the last 72 hours. Invalid input(s): LDLCALCU  ABGs:   Lab Results   Component Value Date    PO2ART 89 09/20/2019    LSM5LCQ 32.0 09/20/2019     INR: No results for input(s): INR in the last 72 hours. Renal Labs  Albumin:    Lab Results   Component Value Date    LABALBU 2.3 10/04/2019     Calcium:    Lab Results   Component Value Date    CALCIUM 7.7 10/10/2019     Phosphorus:    Lab Results   Component Value Date    PHOS 3.5 10/10/2019     U/A:    Lab Results   Component Value Date    NITRU NEGATIVE 09/21/2019    NITRU Negative 10/24/2018    COLORU MICHAEL 09/21/2019    PHUR 5.5 10/24/2018    LABCAST Present 12/12/2016    LABCAST Hyaline casts 12/12/2016    WBCUA 125 09/21/2019    RBCUA 29 09/21/2019    MUCUS RARE 09/21/2019    TRICHOMONAS NONE SEEN 09/06/2019    YEAST Present 12/12/2016    BACTERIA NEGATIVE 09/21/2019    CLARITYU SLIGHTLY CLOUDY 09/21/2019    SPECGRAV 1.010 09/21/2019    UROBILINOGEN <2.0 09/21/2019    BILIRUBINUR NEGATIVE 09/21/2019    BLOODU MODERATE 09/21/2019    GLUCOSEU 3+ 10/24/2018    KETUA NEGATIVE 09/21/2019     ABG:    Lab Results   Component Value Date    LDZ4SXE 32.0 09/20/2019    PO2ART 89 09/20/2019    LUJ5UOP 22.2 09/20/2019     HgBA1c:    Lab Results   Component Value Date    LABA1C 6.1 09/04/2019     Microalbumen/Creatinine ratio:  No components found for: RUCREAT          Objective:   Vitals: BP (!) 140/75   Pulse 102   Temp 98.2 °F (36.8 °C) (Oral)   Resp 17   Ht 6' 3\" (1.905 m)   Wt (!) 410 lb (186 kg)   SpO2 96%   BMI 51.25 kg/m²   General appearance: awake weak  HEENT: Head: Normal, normocephalic, atraumatic.   Neck: supple, symmetrical, trachea midline  Lungs: diminished breath sounds bilaterally  Heart: S1, S2 normal  Abdomen: abnormal findings:  soft nt obese  Extremities: edema +  Neurologic: Mental status: alertness: awake      Patient Active Problem List:     Hypertension     Hyperlipidemia     Asthma     Diabetes mellitus ([address]     H/O cardiovascular stress test     Obesity     Chronic kidney disease, stage III (moderate) (HCC)     Hypertensive kidney disease with CKD stage III (HCC)     Depression     SOBOE (shortness of breath on exertion)     Abnormal cardiovascular stress test     Fracture of epiphysis (separation) (upper) of neck of femur, closed (HCC)     Pressure ulcer of coccygeal region, unstageable (HCC)     Sepsis ([facility])    Assessment and Plan:      IMP:  1 bacteremia  2 s/p hip surgery with wound infection  3 arf from atn/contrast on ckd 3  4 anxiety/confusion  5 resp distress with hypoxia    Plan     1 wound care and rehab as able  2 maintain abx therapy  3 renal stable  4 affect monitor  5 o2 stable  Fu cardio rec and monitor cardiac status  Seen this am           Krissy Allen MD

## 2019-10-10 NOTE — PROGRESS NOTES
1153  Pt transferred to room 2023 via bariatric bed. No c/o at this time. No distress noted at this time. Belongings taken.

## 2019-10-10 NOTE — PROGRESS NOTES
Reviewed notes and spoke to wound care nurse regarding the hip incision. Wound vac still in place and being changed regularly. Wound care nurse states healing is progressing and wound is more shallow except at distal end. She does not feel surgical closure is possible yet. Will continue current care with wound vac. May be discharged with wound vac and changes 3x/wk. Will recheck in office in 2 weeks. Continue PT/OT, ambulation, up to chair to relieve pressure on incision.     Electronically signed by Fredy Zhang PA-C on 10/10/2019 at 7:40 AM

## 2019-10-10 NOTE — PROGRESS NOTES
Progress Note( Dr. Henrry Gil)  10/10/2019  Subjective:   Admit Date: 9/20/2019  PCP: Tj Farooq MD    Admitted For : Extensive deep decubitus ulcer in the gluteal region    Consulted For: Better control of blood glucose    Interval History: Went for debridement and now hs wounf vacuum   Needs lot less insulin both Humalog and Lantus  Pt was transferred to ICU as he developed chest pain and rapid response was calling    Denies any chest pains,   Denies SOB . Denies nausea or vomiting. No new bowel or bladder symptoms. Intake/Output Summary (Last 24 hours) at 10/10/2019 0725  Last data filed at 10/9/2019 1924  Gross per 24 hour   Intake 490 ml   Output 500 ml   Net -10 ml       DATA    CBC:   Recent Labs     10/10/19  0650   WBC 13.0*   HGB 10.5*       CMP:  Recent Labs     10/09/19  0534 10/09/19  2000     --    K 3.3* 3.7     --    CO2 27  --    BUN 13  --    CREATININE 1.0  --    CALCIUM 8.0*  --      Lipids:   Lab Results   Component Value Date    CHOL 190 01/27/2014    HDL 54 01/27/2014    TRIG 157 01/27/2014     Glucose:  Recent Labs     10/09/19  1706 10/09/19  2308 10/10/19  0320   POCGLU 160* 155* 148*     JxwqhapvysR1G:  Lab Results   Component Value Date    LABA1C 6.1 09/04/2019     High Sensitivity TSH:   Lab Results   Component Value Date    TSHHS 3.079 10/29/2013     Free T3: No results found for: FT3  Free T4:No results found for: T4FREE    No results found.     Scheduled Medicines   Medications:    insulin glargine  15 Units Subcutaneous Nightly    pravastatin  80 mg Oral Nightly    fluconazole  600 mg Oral Daily    sodium chloride flush  10 mL Intravenous 2 times per day    ampicillin-sulbactam  3 g Intravenous Q6H    insulin lispro  0-6 Units Subcutaneous TID WC    insulin lispro  0-6 Units Subcutaneous 2 times per day    aspirin  81 mg Oral Daily    mometasone-formoterol  2 puff Inhalation BID    buPROPion  300 mg Oral Daily    canagliflozin  300 mg Oral Daily    ezetimibe  10 mg Oral Daily    fluticasone  1 spray Each Nostril Daily    ocuvite-lutein  1 tablet Oral Daily    pantoprazole  40 mg Oral QAM AC    sertraline  100 mg Oral BID    b complex-C-E-zinc  1 tablet Oral Daily    vitamin C  1,000 mg Oral Daily    vitamin E  400 Units Oral Daily    enoxaparin  40 mg Subcutaneous Daily      Infusions:    nitroGLYCERIN 5 mcg/min (10/10/19 0627)    sodium chloride      dextrose           Objective:   Vitals: /80   Pulse 96   Temp 98.2 °F (36.8 °C) (Oral)   Resp 27   Ht 6' 3\" (1.905 m)   Wt (!) 410 lb (186 kg)   SpO2 96%   BMI 51.25 kg/m²   General appearance: alert confused cooperative with exam  Neck: no JVD or bruit  Thyroid : Normal lobes   Lungs: Has Vesicular Breath sounds   Heart:  regular rate and rhythm  Abdomen: soft, non-tender; bowel sounds normal; no masses,  no organomegaly  Musculoskeletal: Normal  Extremities: extremities normal, , no edema decubitus ulcers extensive on gluteal region , has wound vacuum   Neurologic:  Awake, confused, oriented to name, place and time. Cranial nerves II-XII are grossly intact. Motor is  intact. Sensory neuropathy. ,  and gait i normal and mostly bed ridden    Assessment:     Patient Active Problem List:     Hypertension     Hyperlipidemia     Asthma     Diabetes mellitus ([address]     H/O cardiovascular stress test     Obesity     Chronic kidney disease, stage III (moderate) (HCC)     Hypertensive kidney disease with CKD stage III (HCC)     Depression     SOBOE (shortness of breath on exertion)     Abnormal cardiovascular stress test     Fracture of epiphysis (separation) (upper) of neck of femur, closed (HCC)     Pressure injury of skin of coccygeal region     Sepsis ([address]      Plan:     1. Reviewed POC blood glucose . Labs and X ray results   2. Reviewed Current Medicines   3. On meal/ Correction bolus Humalog/ Basal Lantus Insulin regime /  4. Monitor Blood glucose frequently   5.  Modified  the dose of Insulin/ other medicines as needed   6. Will follow     .      Tanja Brewer MD

## 2019-10-10 NOTE — PROGRESS NOTES
Physical Therapy  Pt was having chest pain last night and is trans to ICU for a nitro drip. Will place pt on hold, please re-order when pt is able to participate in PT. Iza Carney.  Florentin Antonio PTA

## 2019-10-10 NOTE — PROGRESS NOTES
10/10/19 0800 118/81 98 °F (36.7 °C) Oral 101 25 96 % --   10/10/19 0745 (!) 145/86 -- -- 97 28 97 % --   10/10/19 0730 -- -- -- 98 -- -- --   10/10/19 0702 137/80 -- -- 96 27 96 % --   10/10/19 0602 129/85 -- -- 103 17 97 % --   10/10/19 0542 128/74 98.2 °F (36.8 °C) Oral 95 22 -- 6' 3\" (1.905 m)     I/O last 3 completed shifts: In: 56 [P.O.:480; I.V.:10]  Out: 850 [Urine:850]  I/O this shift:  In: -   Out: 200 [Urine:200]    BP (!) 140/75   Pulse 102   Temp 98.2 °F (36.8 °C) (Oral)   Resp 17   Ht 6' 3\" (1.905 m)   Wt (!) 410 lb (186 kg)   SpO2 96%   BMI 51.25 kg/m²   General appearance: alert and cooperative. Lungs: clear to auscultation bilaterally  Heart: regular rate and rhythm, S1, S2 normal, no murmur, click, rub or gallop  Abdomen: soft, non-tender; bowel sounds normal; no masses,  no organomegaly  Extremities: extremities normal, atraumatic, no cyanosis or edema  Skin: Skin color, texture, turgor normal. No rashes or lesions        Labs and imaging reviewed.    CBC with Differential:    Lab Results   Component Value Date    WBC 13.0 10/10/2019    RBC 3.57 10/10/2019    HGB 10.5 10/10/2019    HCT 34.2 10/10/2019     10/10/2019    MCV 95.8 10/10/2019    MCH 29.4 10/10/2019    MCHC 30.7 10/10/2019    RDW 14.6 10/10/2019    SEGSPCT 82.4 10/04/2019    BANDSPCT 3 09/29/2019    LYMPHOPCT 7.0 10/04/2019    PROMYELOPCT 2 09/26/2019    MONOPCT 7.0 10/04/2019    MYELOPCT 1 09/29/2019    BASOPCT 0.6 10/04/2019    MONOSABS 0.9 10/04/2019    LYMPHSABS 0.9 10/04/2019    EOSABS 0.2 10/04/2019    BASOSABS 0.1 10/04/2019    DIFFTYPE AUTOMATED DIFFERENTIAL 10/04/2019     CMP:    Lab Results   Component Value Date     10/10/2019    K 3.9 10/10/2019    CL 99 10/10/2019    CO2 26 10/10/2019    BUN 14 10/10/2019    CREATININE 1.0 10/10/2019    GFRAA >60 10/10/2019    LABGLOM >60 10/10/2019    GLUCOSE 139 10/10/2019    PROT 6.1 09/20/2019    LABALBU 2.3 10/04/2019    CALCIUM 7.7 10/10/2019    BILITOT 1.1 09/20/2019    ALKPHOS 143 09/20/2019     09/20/2019    ALT 98 09/20/2019     BMP:    Lab Results   Component Value Date     10/10/2019    K 3.9 10/10/2019    CL 99 10/10/2019    CO2 26 10/10/2019    BUN 14 10/10/2019    LABALBU 2.3 10/04/2019    CREATININE 1.0 10/10/2019    CALCIUM 7.7 10/10/2019    GFRAA >60 10/10/2019    LABGLOM >60 10/10/2019    GLUCOSE 139 10/10/2019     Sodium:    Lab Results   Component Value Date     10/10/2019     Potassium:    Lab Results   Component Value Date    K 3.9 10/10/2019     Calcium:    Lab Results   Component Value Date    CALCIUM 7.7 10/10/2019     Warfarin PT/INR:  No components found for: PTPATWAR, PTINRWAR  PTT:    Lab Results   Component Value Date    APTT 29.1 05/03/2017   [APTT  Last 3 Troponin:  No results found for: TROPONINI  ABG:    Lab Results   Component Value Date    UHE7OLU 32.0 09/20/2019    PO2ART 89 09/20/2019    NZS6SLF 22.2 09/20/2019         Assessment:     Principal Problem:    Sepsis ([facility]  Active Problems:    Hypertension    Hyperlipidemia    Asthma    Diabetes mellitus ([facility]    Obesity    Chronic kidney disease, stage III (moderate) (HCC)    Depression    Pressure injury of skin of coccygeal region    PVC (premature ventricular contraction)  Resolved Problems:    * No resolved hospital problems. *      Plan:   Decubitus Ulcer-status post excisional debridement of sacral pressure ulcer on 9/23/2019; status post hip incision and drainage, decubitus ulcer debridement, removal of the coccyx, wound VAC placement on 9/29/2019. Osteomyelitis Coccyx  Enterococcus Sacral wound  Staph aureus-Hip wound  Continue IV antibiotics  Noted po KCl replacement.   Noted recommendation by Dr. Dante Medeiros  Troponin  Transfer to Step down by crdiology on IV NTG and he still has chest pain  Plan for cardiac cath      Jamie TORRES M.D.  10/10/2019

## 2019-10-10 NOTE — PROGRESS NOTES
1045  Report called to [facility]; questions answered. Pt educated on transfer post-angiogram.  Questions answered. Belongings gathered. No c/o at this time.

## 2019-10-10 NOTE — FLOWSHEET NOTE
Called 4E to speak with Victor Hugo Farooq concerning patient family being informed.   Per Victor Hugo Farooq she had not called patient family yet but she stated she wouldl

## 2019-10-10 NOTE — CARE COORDINATION
Received call from Paige at [phone] [address] rajat Guy completed.   LIZ left for [employer].

## 2019-10-10 NOTE — PROGRESS NOTES
Around 0430, pt started to feel same chest pain pressure and discomfort. Called out for nurse, and RN gave another tablet of nitroglycerin. Order was than D/c'd after third dose overnight. RN paged hospitalist and was ordered to start nitroglycerin drip. Inappropriate for floor, so NP put in transfer orders for pt. Gave pt some tums, and tramadol to also help see if pain was eased. Pt rated pain of 5/10. RN transferred pt to ICU to start nitro drip.         Nino Marx RN

## 2019-10-10 NOTE — PROGRESS NOTES
I&D of right hip (9/29) and repeat debridement and excision of coccyx (9/29)    Plan:     -Pt reportedly going for cardiac cath today to evaluate as source of chest pain.   -Wound vac changes as scheduled. Continue irrigating wound vac to sacral region. Appreciate Mert Pulido with [facility] for irrigating vac supplies. -ID consult reviewed and Abx changes noted. Appreciate input and recs. -Medical mgt  -PT/OT  -Diet as tolerated  -Blood cx x2 pending. Prelim are both negative to date.  -Surgery cx is now complete.  -Body fluid cx is completed (right hip). -Final path for coccyx was ulcer with acute osteomyelitis.  -Likely d/c once cx finalized and Abx courses set by ID.   -Once d/c'd, pt should f/u with me in office in 7-10 days. D/w pt that ideally he would not need rectal tube and be able to use bed pan or bedside toilet. He has been cleared by Ortho for WBAT. Concern with rectal tube is stool is possibly getting into wound. D/w pt that if he is not able to use bed pain or bedside toilet that he may ultimately require diverting colostomy. He does not currently want this.          Electronically signed by Sruthi Powell II, MD on 10/10/2019 at 11:47 AM

## 2019-10-10 NOTE — PROGRESS NOTES
Progress Note( Dr. Zoie Castro)  10/9/2019  Subjective:   Admit Date: 9/20/2019  PCP: Damon Bernal MD    Admitted For : Extensive deep decubitus ulcer in the gluteal region    Consulted For: Better control of blood glucose    Interval History: Went for debridement and now hs wounf vacuum   Needs lot less insulin both Humalog and Lantus    Denies any chest pains,   Denies SOB . Denies nausea or vomiting. No new bowel or bladder symptoms. Intake/Output Summary (Last 24 hours) at 10/9/2019 2318  Last data filed at 10/9/2019 1924  Gross per 24 hour   Intake 510 ml   Output 1600 ml   Net -1090 ml       DATA    CBC:   No results for input(s): WBC, HGB, PLT in the last 72 hours. CMP:  Recent Labs     10/09/19  0534 10/09/19  2000     --    K 3.3* 3.7     --    CO2 27  --    BUN 13  --    CREATININE 1.0  --    CALCIUM 8.0*  --      Lipids:   Lab Results   Component Value Date    CHOL 190 01/27/2014    HDL 54 01/27/2014    TRIG 157 01/27/2014     Glucose:  Recent Labs     10/09/19  1209 10/09/19  1706 10/09/19  2308   POCGLU 104* 160* 155*     OkusepqfabD2S:  Lab Results   Component Value Date    LABA1C 6.1 09/04/2019     High Sensitivity TSH:   Lab Results   Component Value Date    TSHHS 3.079 10/29/2013     Free T3: No results found for: FT3  Free T4:No results found for: T4FREE    No results found.     Scheduled Medicines   Medications:    insulin glargine  15 Units Subcutaneous Nightly    magnesium sulfate  4 g Intravenous Once    pravastatin  80 mg Oral Nightly    fluconazole  600 mg Oral Daily    sodium chloride flush  10 mL Intravenous 2 times per day    ampicillin-sulbactam  3 g Intravenous Q6H    insulin lispro  0-6 Units Subcutaneous TID WC    insulin lispro  0-6 Units Subcutaneous 2 times per day    aspirin  81 mg Oral Daily    mometasone-formoterol  2 puff Inhalation BID    buPROPion  300 mg Oral Daily    canagliflozin  300 mg Oral Daily    ezetimibe  10 mg Oral Daily    fluticasone  1 spray Each Nostril Daily    ocuvite-lutein  1 tablet Oral Daily    pantoprazole  40 mg Oral QAM AC    sertraline  100 mg Oral BID    b complex-C-E-zinc  1 tablet Oral Daily    vitamin C  1,000 mg Oral Daily    vitamin E  400 Units Oral Daily    enoxaparin  40 mg Subcutaneous Daily      Infusions:    nitroGLYCERIN      sodium chloride      dextrose           Objective:   Vitals: /73   Pulse 99   Temp 97.8 °F (36.6 °C) (Oral)   Resp 24   Ht 6' 3\" (1.905 m)   Wt (!) 394 lb (178.7 kg)   SpO2 96%   BMI 49.25 kg/m²   General appearance: alert confused cooperative with exam  Neck: no JVD or bruit  Thyroid : Normal lobes   Lungs: Has Vesicular Breath sounds   Heart:  regular rate and rhythm  Abdomen: soft, non-tender; bowel sounds normal; no masses,  no organomegaly  Musculoskeletal: Normal  Extremities: extremities normal, , no edema decubitus ulcers extensive on gluteal region , has wound vacuum   Neurologic:  Awake, confused, oriented to name, place and time. Cranial nerves II-XII are grossly intact. Motor is  intact. Sensory neuropathy. ,  and gait i normal and mostly bed ridden    Assessment:     Patient Active Problem List:     Hypertension     Hyperlipidemia     Asthma     Diabetes mellitus ([address]     H/O cardiovascular stress test     Obesity     Chronic kidney disease, stage III (moderate) (HCC)     Hypertensive kidney disease with CKD stage III (HCC)     Depression     SOBOE (shortness of breath on exertion)     Abnormal cardiovascular stress test     Fracture of epiphysis (separation) (upper) of neck of femur, closed (HCC)     Pressure injury of skin of coccygeal region     Sepsis ([address]      Plan:     1. Reviewed POC blood glucose . Labs and X ray results   2. Reviewed Current Medicines   3. On meal/ Correction bolus Humalog/ Basal Lantus Insulin regime /  4. Monitor Blood glucose frequently   5. Modified  the dose of Insulin/ other medicines as needed   6. Will follow     . Cierra Blair MD

## 2019-10-10 NOTE — PROGRESS NOTES
Infectious Disease Progress Note  10/10/2019   Patient Name: Marquita Perales : 1950   Impression  · Proteus mirabilis bacteremia secondary to complicated UTI  ? resolved  · Chest pain  ? On nitroglycerin gtt. Being evaluated by cardiology  · Infected sacral decubitus ulcer with coccyx abscess and osteomyelitis  · Right hip wound infection   ? S/p  right hip hemiarthroplasty on  complicated by hematoma. ? status post excisional debridement of sacral pressure ulcer on 2019; status post hip incision and drainage, decubitus ulcer debridement, removal of the coccyx, wound VAC placement on 2019.  ? Sacral cx: Enterococcus spp , Candida albicans, Clostridium hathewayi, Bacteroides caccae  ? Right hip culture: MSSA (2 colonies), very low growth  ? Histopathology of coccyx: Abscess and osteomyelitis  · Diarrhea  ? C diff PCR negative  ? Diarrhea appears to be lessening  · Multi-morbidity: per PMHx morbid obesity, diabetes mellitus, bilateral renal cysts  Plan:  · Continue Unasyn 3g IV q 6h, fluconazole 600 mg p.o. daily for 4 weeks (end-date 10/29/19)  Weekly labs drawn on Monday during the course of treatment  CBC with differential, CMP, ESR, CRP  Fax results to Attn: [facility] Staff  · # [phone]  · See in 3 weeks   · See prn      Ongoing Antimicrobial Therapy  Unasyn 10/2  Fluconazole 10/7  ? Completed Antimicrobial Therapy  Ciprofloxacin -  Vancomycin -10/2  Cefepime - 10/2  Metronidazole -10/2  Doxycycline 10/2-  Eraxis 10/2-  ? History:? Interval history noted: Sepsis, infected sacral decubitus wound, and   Denies n/v/d/f or untoward effects of antibiotics, chest pain radiates to the back, and lasting more than an hour.   Physical Exam:  Vital Signs: BP (!) 140/75   Pulse 102   Temp 98.2 °F (36.8 °C) (Oral)   Resp 17   Ht 6' 3\" (1.905 m)   Wt (!) 410 lb (186 kg)   SpO2 96%   BMI 51.25 kg/m²     Gen: alert and oriented X3, no distress  Skin: no stigmata of endocarditis  Wounds: Wound VAC over the sacrum and right hip  HEMT: AT/NC Oropharynx pink, moist, and without lesions or exudates; dentition in good state of repair  Eyes: PERRLA, EOMI, conjunctiva pink, sclera anicteric. Neck: Supple. Trachea midline. No LAD. Chest: no distress and CTA. Good air movement. Heart: RRR and no MRG. Abd: soft, non-distended, no tenderness, no hepatomegaly. Normoactive bowel sounds. Ext: no clubbing, cyanosis, or edema  Catheter Site: without erythema or tenderness  Neuro: Mental status intact. CN 2-12 intact and no focal sensory or motor deficits     Radiologic / Imaging / TESTING  No results found. Labs:    Labs reviewed. CULTURE results: Invalid input(s): BLOOD CULTURE,  URINE CULTURE, SURGICAL CULTURE    Diagnosis:  Patient Active Problem List   Diagnosis    Hypertension    Hyperlipidemia    Asthma    Diabetes mellitus ([address]    H/O cardiovascular stress test    Obesity    Chronic kidney disease, stage III (moderate) (HCC)    Hypertensive kidney disease with CKD stage III (HCC)    Depression    SOBOE (shortness of breath on exertion)    Abnormal cardiovascular stress test    Fracture of epiphysis (separation) (upper) of neck of femur, closed (HCC)    Pressure injury of skin of coccygeal region    Sepsis ([address]    PVC (premature ventricular contraction)       Active Problems  Principal Problem:    Sepsis ([address]  Active Problems:    Hypertension    Hyperlipidemia    Asthma    Diabetes mellitus (HCC)    Obesity    Chronic kidney disease, stage III (moderate) (HCC)    Depression    Pressure injury of skin of coccygeal region    PVC (premature ventricular contraction)  Resolved Problems:    * No resolved hospital problems.  *    Electronically signed by: Electronically signed by Keren Cain MD on 10/10/2019 at 3:11 PM

## 2019-10-10 NOTE — PLAN OF CARE
Problem: Falls - Risk of:  Goal: Will remain free from falls  Description  Will remain free from falls  10/10/2019 1542 by Francis Govea RN  Outcome: Ongoing  10/10/2019 1435 by Mary Lou Pemberton RN  Outcome: Ongoing  10/10/2019 0613 by Dianna Blue RN  Outcome: Met This Shift  Goal: Absence of physical injury  Description  Absence of physical injury  10/10/2019 1542 by Francis Govea RN  Outcome: Ongoing  10/10/2019 1435 by Mary Lou Pemberton RN  Outcome: Ongoing  10/10/2019 0613 by Dianna Blue RN  Outcome: Met This Shift     Problem: Risk for Impaired Skin Integrity  Goal: Tissue integrity - skin and mucous membranes  Description  Structural intactness and normal physiological function of skin and  mucous membranes.   10/10/2019 1542 by Francis Govea RN  Outcome: Ongoing  10/10/2019 1435 by Mary Lou Pemberton RN  Outcome: Ongoing  10/10/2019 0708 by Dianna Blue RN  Outcome: Ongoing     Problem: Discharge Planning:  Goal: Discharged to appropriate level of care  Description  Discharged to appropriate level of care  10/10/2019 1542 by Francis Govea RN  Outcome: Ongoing  10/10/2019 1435 by Mary Lou Pemberton RN  Outcome: Ongoing     Problem: Gas Exchange - Impaired:  Goal: Levels of oxygenation will improve  Description  Levels of oxygenation will improve  10/10/2019 1542 by Francis Govea RN  Outcome: Ongoing  10/10/2019 1435 by Mary Lou Pemberton RN  Outcome: Ongoing  10/10/2019 0613 by Dianna Blue RN  Outcome: Ongoing     Problem: Infection, Septic Shock:  Goal: Will show no infection signs and symptoms  Description  Will show no infection signs and symptoms  10/10/2019 1542 by Francis Govea RN  Outcome: Ongoing  10/10/2019 1435 by Mary Lou Pemberton RN  Outcome: Ongoing  10/10/2019 0613 by Dianna Blue RN  Outcome: Ongoing     Problem: Serum Glucose Level - Abnormal:  Goal: Ability to maintain appropriate glucose levels will improve  Description  Ability to maintain appropriate glucose levels will improve  10/10/2019 1542 by Radha Krueger RN  Outcome: Ongoing  10/10/2019 1435 by Марина Tony RN  Outcome: Ongoing     Problem: Tissue Perfusion, Altered:  Goal: Circulatory function within specified parameters  Description  Circulatory function within specified parameters  10/10/2019 1542 by Radha Krueger RN  Outcome: Ongoing  10/10/2019 1435 by Марина Tony RN  Outcome: Ongoing     Problem: Venous Thromboembolism:  Goal: Will show no signs or symptoms of venous thromboembolism  Description  Will show no signs or symptoms of venous thromboembolism  10/10/2019 1542 by Radha Krueger RN  Outcome: Ongoing  10/10/2019 1435 by Марина Tony RN  Outcome: Ongoing  Goal: Absence of signs or symptoms of impaired coagulation  Description  Absence of signs or symptoms of impaired coagulation  10/10/2019 1542 by Radha Krueger RN  Outcome: Ongoing  10/10/2019 1435 by Марина Tony RN  Outcome: Ongoing     Problem: Pain:  Goal: Pain level will decrease  Description  Pain level will decrease  10/10/2019 1542 by Radha Krueger RN  Outcome: Ongoing  10/10/2019 1435 by Марина Tony RN  Outcome: Ongoing  10/10/2019 0613 by Lora Mina RN  Outcome: Met This Shift  Goal: Control of acute pain  Description  Control of acute pain  10/10/2019 1542 by Radha Krueger RN  Outcome: Ongoing  10/10/2019 1435 by Марина Tony RN  Outcome: Ongoing  10/10/2019 0613 by Lora Mina RN  Outcome: Met This Shift  Goal: Control of chronic pain  Description  Control of chronic pain  10/10/2019 1542 by Radha Krueger RN  Outcome: Ongoing  10/10/2019 1435 by Марина Tony RN  Outcome: Ongoing  10/10/2019 0613 by Lora Mina RN  Outcome: Met This Shift     Problem: Nutrition  Goal: Optimal nutrition therapy  10/10/2019 1542 by Radha Krueger RN  Outcome: Ongoing  10/10/2019 1435 by Марина Tony RN  Outcome: Ongoing

## 2019-10-10 NOTE — CONSULTS
[address], [address] [facility]                                  CONSULTATION    PATIENT NAME: Geetha Evans                      :        1950  MED REC NO:   [id number]                          ROOM:         ACCOUNT NO:   [de-identified]                           ADMIT DATE: 2019  PROVIDER:     Jameel Graves MD    CONSULT DATE:  10/10/2019    REFERRING PROVIDER:  Brandon Wheat MD    REASON FOR CONSULTATION:  Complaints of chest pain. HISTORY OF PRESENT ILLNESS:  The patient is a 41-year-old morbidly obese  white gentleman who has hospitalized with infected decubitus wound and  also he had to have orthopedic surgery as well during this  hospitalization. The patient has been mostly bed bound, but last night  while on the regular floor, he started complaining of severe squeezing  chest pain, but his EKG did not reveal any new abnormalities, but  severity of the chest pain continued, hence he was put on IV  nitroglycerin therapy. He was shifted to intensive care unit. Nurse  practitioner hospitalist, who called me, was worried about possibility  of PE. The patient says he has been having this significant chest pain, which  could be getting worse with taking deep breath, but does not get  affected with palpation, has been there since last night, although the  intensity waxes and wanes. No associated other features described, and  now aggravating or relieving factors noted. PAST MEDICAL HISTORY:  Significant for known history of hypertension,  hyperlipidemia, diabetes, morbid obesity, chronic renal failure, thyroid  disease problem. He had had diagnostic cardiac catheterization in  2012 and again in 2017, and he does not have any significant  coronary artery disease at all.     PAST SURGICAL HISTORY:  Significant for carpal tunnel release surgery,  right hemiarthroplasty of the hip, eye surgery, and surgery for adrenal  detachment. SOCIAL HISTORY:  There is no history of alcohol or tobacco abuse. FAMILY HISTORY:  Reportedly, mother had high blood pressure and had  cancer. Father had cancer too. REVIEW OF SYSTEMS:  Mainly significant for the wounds and orthopedic  problems as described plus complaints of chest pain now. ALLERGIES:  He is allergic to BEE VENOM. MEDICATIONS:  The patient is currently being treated with Unasyn   antibiotic. He is on IV nitroglycerin drip, Zetia 10 mg daily, Zoloft  100 mg two times a day, Protonix 40 mg daily, tramadol 50 mg every six  hourly. He is on Invokana 300 mg daily, aspirin 81 mg daily, Diflucan  600 mg daily, Pravachol 80 mg daily, Catapres 0.1 mg three times a day. PHYSICAL EXAMINATION:  GENERAL:  A morbidly obese white male patient, not in acute distress. VITAL SIGNS:  He is afebrile, heart rate is about 102 beats per minute  and regular, blood pressure is 140/75 mmHg. HEENT:  Reveals head to be atraumatic and normocephalic. Extraocular  movements intact. Eye examination reveals conjunctivae to be pink. There is no jaundice. SKIN:  Very significant for back wound. Wound Care is involved. NECK:  Supple. It is difficult to assess JVD. Carotid upstrokes are  intact. I did not hear bruits. HEART:  Reveals regular rate and rhythm. First and second heart sounds  are heard normally. There is no loud rub or murmur. LUNGS:  Clear to auscultation. ABDOMEN:  Obese. Bowel sounds are present. There is no organomegaly. RECTAL/GENITAL:  Deferred. EXTREMITIES:  Do not reveal cyanosis, clubbing, but there is 1+ pitting  edema noted of the legs. NEUROLOGICAL:  He is alert and oriented x3. Grossly, the examination is  nonfocal.    LABORATORY DATA:  Reveals normal electrolytes, BUN, and creatinine. His  troponin T levels have been somewhat elevated at 0.074, 0.071, and  0.076. Hemoglobin is 10.5 gm.     The patient's 12-lead EKG reveals sinus rhythm with occasional premature  ventricular complexes, right bundle-branch block, and left anterior  fascicular block. PVCs noted. The patient's troponin levels are elevated at 0.074 and 0.076. ASSESSMENT AND PLAN:  This 78-year-old gentleman with no previously  known coronary artery disease had couple of caths in the years past,  last one being about two and a half years ago, now complaining of severe  chest pain and ongoing symptoms. His troponin is suggestive of  non-ST-elevation myocardial infarction. Hence, I suggest continue  current medical management, but subject him to diagnostic cardiac  catheterization for further risk of stratification and further  management plans will be based on cath findings.         Omero Vaughn MD    D: 10/10/2019 16:18:33       T: 10/10/2019 17:18:38     [email or web address]  Job#: [id number]     Doc#: [id number]    CC:

## 2019-10-11 NOTE — PROGRESS NOTES
Per case management to this nurse, pt refusing to go to [address] [place] [address].  Dr being notified pt wont be discharging tonight

## 2019-10-11 NOTE — PROGRESS NOTES
reports that he has never smoked. He has never used smokeless tobacco. He reports that he does not drink alcohol or use drugs. Family history:  family history includes Cancer in his father and mother; Diabetes in his paternal grandmother; Heart Disease in his paternal grandfather; High Blood Pressure in his mother; Stroke in his maternal grandfather. Allergies   Allergen Reactions    Bee Venom Shortness Of Breath       Review of Systems:   unchanged from previous documentation    BP (!) 142/80   Pulse 100   Temp 98 °F (36.7 °C) (Oral)   Resp 21   Ht 6' 3\" (1.905 m)   Wt (!) 399 lb 6.4 oz (181.2 kg)   SpO2 94%   BMI 49.92 kg/m²       Intake/Output Summary (Last 24 hours) at 10/11/2019 1104  Last data filed at 10/11/2019 0427  Gross per 24 hour   Intake 0 ml   Output 1050 ml   Net -1050 ml       Physical Exam:  Constitutional:  Well developed, Well nourished, No acute distress, Non-toxic appearance. Morbid obesity. HENT:  Normocephalic, Atraumatic, Bilateral external ears normal, Oropharynx moist  Eyes:  PERRL, Conjunctiva normal, No discharge. Respiratory:  diminished breath sounds, No respiratory distress, No wheezing, No chest tenderness. Cardiovascular:  Normal heart rate, Normal rhythm, No murmurs appreciated, No rubs appreciated, No gallops appreciated, JVP not elevated  Abdomen/GI:  Bowel sounds normal, Soft, No tenderness, No masses, No pulsatile masses. Musculoskeletal:  Intact distal pulses, No edema, No tenderness, No cyanosis, No clubbing. Integument:  Warm, Dry, No erythema, No rash. Wound vac to sacrum  Lymphatic:  No lymphadenopathy noted.    Neurologic:  Alert & oriented x 3  Psychiatric:  Affect  and  Mood :no change    Telemetry Reviewed:   Sinus rhythm     Medications:    sodium chloride flush  10 mL Intravenous 2 times per day    insulin glargine  15 Units Subcutaneous Nightly    pravastatin  80 mg Oral Nightly    fluconazole  600 mg Oral Daily    sodium chloride flush  10 mL Intravenous 2 times per day    ampicillin-sulbactam  3 g Intravenous Q6H    insulin lispro  0-6 Units Subcutaneous TID WC    insulin lispro  0-6 Units Subcutaneous 2 times per day    aspirin  81 mg Oral Daily    mometasone-formoterol  2 puff Inhalation BID    buPROPion  300 mg Oral Daily    canagliflozin  300 mg Oral Daily    ezetimibe  10 mg Oral Daily    fluticasone  1 spray Each Nostril Daily    ocuvite-lutein  1 tablet Oral Daily    pantoprazole  40 mg Oral QAM AC    sertraline  100 mg Oral BID    b complex-C-E-zinc  1 tablet Oral Daily    vitamin C  1,000 mg Oral Daily    vitamin E  400 Units Oral Daily    enoxaparin  40 mg Subcutaneous Daily      sodium chloride      dextrose       sodium chloride flush, acetaminophen, magnesium hydroxide, sodium chloride flush, calcium carbonate, cloNIDine, HYDROmorphone, glucose, dextrose, glucagon (rDNA), dextrose, acetaminophen, albuterol sulfate HFA, traMADol **OR** traMADol, ondansetron    Lab Data:  CBC:   Recent Labs     10/10/19  0650   WBC 13.0*   HGB 10.5*   HCT 34.2*   MCV 95.8        BMP:   Recent Labs     10/09/19  0534 10/09/19  2000 10/10/19  0650     --  134*   K 3.3* 3.7 3.9     --  99   CO2 27  --  26   PHOS  --   --  3.5   BUN 13  --  14   CREATININE 1.0  --  1.0     PT/INR: No results for input(s): PROTIME, INR in the last 72 hours. BNP:  No results for input(s): PROBNP in the last 72 hours. TROPONIN:   Recent Labs     10/10/19  1418 10/10/19  2036 10/11/19  0309   TROPONINT 0.076* 0.072* 0.083*        ECHO :   Echocardiogram 9/30/2019    This is a limited study The patient had a full study echocardiogram   9/20/2019. .Limited study due to patients body habitus.   Left ventricular function is normal, EF is estimated at 55-60%.   No clear evidence of vegetations noted.        All labs, medications and tests reviewed by myself , continue all other medications of all above medical condition listed as is except for changes mentioned above. Thank you very much for consult , please call with questions. Electronically signed by LYNDA Cervantes CNP on 10/11/2019 at 11:04 AM      I have seen ,spoken to  and examined this patient personally, independently of the nurse practitioner. I have reviewed the hospital care given to date and reviewed all pertinent labs and imaging. The plan was developed mutually at the time of the visit with the patient, Clinical Nurse Practitioner  and myself. I have spoken with patient, nursing staff and provided written and verbal instructions . The above note has been reviewed and I agree with the assessment, diagnosis, and treatment plan with changes made by me as follows     CARDIOLOGY ATTENDING ADDENDUM    HPI:  I have reviewed the above HPI  And agree with above   Cherelle Barnett is a 77 y. o.year old who and presents with had concerns including Wound Infection (Hip surgery in past month. Infection. ). Chief Complaint   Patient presents with    Wound Infection     Hip surgery in past month. Infection. Interval history:  Denies chest pain now    Physical Exam:  General:  Awake, alert, NAD  Head:normal  Eye:normal  Neck:  No JVD   Chest:  Clear to auscultation, respiration easy  Cardiovascular:  RRR S1S2  Abdomen:   nontender  Extremities:  1+ edema  Pulses; palpable  Neuro: grossly normal      MEDICAL DECISION MAKING;    I agree with the above plan, which was planned by myself and discussed with NP.     Benny Deras MD [facility]

## 2019-10-11 NOTE — PROGRESS NOTES
term goals  Time Frame for Short term goals: 1 week  Short term goal 1: Patient will perform supine to sit with mod A x2. Short term goal 2: Patient will perform STS with max A x2 and RW.   Short term goal 3: Patient will sit EOB x15 min mod I.       Electronically signed by:    Raj Favre PTA  10/11/2019, 8:23 AM

## 2019-10-11 NOTE — CONSULTS
Consult cancelled per protocol. Pt has patent PICC placed 10/6 and Nitro gtt has been discontinued. Therapeutic needs met at this time.

## 2019-10-11 NOTE — PROGRESS NOTES
General Surgery-Dr. Radha Montalvo Day: 22    Chief Complaint on Admission: infected decub ulcer      Subjective:     Events noted - pt went for cardiac cath yesterday (reviewed results). Pt currently denies SOB or CP. Denies pain at wound vac site. Denies F/C.     ROS:  Review of Systems   Constitutional: Positive for fatigue. Negative for fever. HENT: Negative. Eyes: Negative. Respiratory: Negative. Cardiovascular: Negative. Gastrointestinal: Negative. Endocrine: Negative. Genitourinary: Negative. Musculoskeletal: Positive for myalgias. Skin: Positive for wound. Allergic/Immunologic: Negative. Neurological: Negative. Hematological: Negative. Psychiatric/Behavioral: Negative. Also did have some chest pain last night, improved now. Allergies  Bee venom          Diagnosis Date    Arthritis     Asthma     Chronic kidney disease     stage 3, seen by Dr. Todd Krishnamurthy Diabetes mellitus ([place])     H/O cardiac catheterization 2017    H/O cardiovascular stress test ,     CARDIOLITE: EF->51%    Hyperlipidemia     Hypertension     Obesity     Pressure ulcer of coccygeal region, unstageable ([place]) 09/10/2019    Thyroid disease        Objective:     Vitals:    10/11/19 1000   BP: (!) 142/80   Pulse: 100   Resp: 21   Temp:    SpO2: 94%       TEMPERATURE:  Current -Temp: 98 °F (36.7 °C); Max - Temp  Av.3 °F (36.8 °C)  Min: 97.9 °F (36.6 °C)  Max: 99 °F (37.2 °C)    No intake/output data recorded. I/O last 3 completed shifts: In: 0   Out: 1250 [Urine:1050; Drains:150; Stool:50]      Physical Exam:  Physical Exam   Laying in bed on back. NAD. Comfortable at rest.  AT. NC.  EOMI. PERRLA. Breathing unlabored. RRR. Obese, soft, NT, ND, no PS.  +Lujan. + Rectal tube. Irrigating Wound vac in place with good seals. Healthy wound edges. The drainage is light s/s. FRANZ. Warm, dry.        Scheduled Meds:   sodium chloride flush  10 mL Intravenous 2 times per day    insulin glargine  15 Units Subcutaneous Nightly    pravastatin  80 mg Oral Nightly    fluconazole  600 mg Oral Daily    sodium chloride flush  10 mL Intravenous 2 times per day    ampicillin-sulbactam  3 g Intravenous Q6H    insulin lispro  0-6 Units Subcutaneous TID WC    insulin lispro  0-6 Units Subcutaneous 2 times per day    aspirin  81 mg Oral Daily    mometasone-formoterol  2 puff Inhalation BID    buPROPion  300 mg Oral Daily    canagliflozin  300 mg Oral Daily    ezetimibe  10 mg Oral Daily    fluticasone  1 spray Each Nostril Daily    ocuvite-lutein  1 tablet Oral Daily    pantoprazole  40 mg Oral QAM AC    sertraline  100 mg Oral BID    b complex-C-E-zinc  1 tablet Oral Daily    vitamin C  1,000 mg Oral Daily    vitamin E  400 Units Oral Daily    enoxaparin  40 mg Subcutaneous Daily     ContinuousInfusions:   sodium chloride      dextrose       PRN Meds:sodium chloride flush, acetaminophen, magnesium hydroxide, sodium chloride flush, calcium carbonate, cloNIDine, HYDROmorphone, glucose, dextrose, glucagon (rDNA), dextrose, acetaminophen, albuterol sulfate HFA, traMADol **OR** traMADol, ondansetron      Labs/Imaging Results:   Lab Results   Component Value Date    WBC 13.0 (H) 10/10/2019    HGB 10.5 (L) 10/10/2019    HCT 34.2 (L) 10/10/2019    MCV 95.8 10/10/2019     10/10/2019     Lab Results   Component Value Date     (L) 10/10/2019    K 3.9 10/10/2019    CL 99 10/10/2019    CO2 26 10/10/2019    BUN 14 10/10/2019    CREATININE 1.0 10/10/2019    GLUCOSE 139 (H) 10/10/2019    CALCIUM 7.7 (L) 10/10/2019    PROT 6.1 (L) 09/20/2019    LABALBU 2.3 (L) 10/04/2019    BILITOT 1.1 (H) 09/20/2019    ALKPHOS 143 (H) 09/20/2019     (H) 09/20/2019    ALT 98 (H) 09/20/2019    LABGLOM >60 10/10/2019    GFRAA >60 10/10/2019       Assessment:     76 y/o M POD s/p excisional debridement down to bone of infected pressure ulcer (9/23) and repeat I&D of right hip (9/29) and repeat debridement and excision of coccyx (9/29)    Plan:        -Wound vac changes as scheduled. Continue irrigating wound vac to sacral region. Appreciate Sasha Clark with I for irrigating vac supplies. -ID consult reviewed and Abx changes noted. Appreciate input and recs. Pt going to be on Unasyn and fluconazole for 4 weeks (ending 10/29/19). -Medical mgt    -PT/OT    -Diet as tolerated    -Once d/c'd, pt should f/u with me in office in 7-10 days. D/w pt that ideally he would not need rectal tube and be able to use bed pan or bedside toilet. He has been cleared by Ortho for WBAT. Concern with rectal tube is stool is possibly getting into wound. D/w pt that if he is not able to use bed pain or bedside toilet that he may ultimately require diverting colostomy. He does not currently want this.          Electronically signed by Sue Scott II, MD on 10/11/2019 at 10:46 AM

## 2019-10-11 NOTE — PROGRESS NOTES
Infectious Disease Progress Note  10/11/2019   Patient Name: Anika Kellogg : 1950   Impression  · Proteus mirabilis bacteremia secondary to complicated UTI  ? resolved  · Chest pain  ? Improved. · Infected sacral decubitus ulcer with coccyx abscess and osteomyelitis  · Right hip wound infection   ? S/p  right hip hemiarthroplasty on 2964 complicated by hematoma. ? status post excisional debridement of sacral pressure ulcer on 2019; status post hip incision and drainage, decubitus ulcer debridement, removal of the coccyx, wound VAC placement on 2019.  ? Sacral cx: Enterococcus spp , Candida albicans, Clostridium hathewayi, Bacteroides caccae  ? Right hip culture: MSSA (2 colonies), very low growth  ? Histopathology of coccyx: Abscess and osteomyelitis  · Diarrhea  ? C diff PCR negative  · Multi-morbidity: per PMHx morbid obesity, diabetes mellitus, bilateral renal cysts  Plan:  · Continue Unasyn 3g IV q 6h, fluconazole 600 mg p.o. daily for 4 weeks (end-date 10/29/19)  Weekly labs drawn on Monday during the course of treatment  CBC with differential, CMP, ESR, CRP  Fax results to Attn: Minal Grace [facility] Staff  · # [phone]  · See in 3 weeks   · See prn      Ongoing Antimicrobial Therapy  Unasyn 10/2  Fluconazole 10/7  ? Completed Antimicrobial Therapy  Ciprofloxacin -  Vancomycin -10/2  Cefepime - 10/2  Metronidazole -10/2  Doxycycline 10/2-  Eraxis 10/2-  ? History:? Interval history noted: Sepsis, infected sacral decubitus wound, and   Denies n/v/d/f or untoward effects of antibiotics, chest pain has improved   Physical Exam:  Vital Signs: /82   Pulse 99   Temp 97.9 °F (36.6 °C) (Oral)   Resp 21   Ht 6' 3\" (1.905 m)   Wt (!) 399 lb 6.4 oz (181.2 kg)   SpO2 98%   BMI 49.92 kg/m²     Gen: alert and oriented X3, no distress  Skin: no stigmata of endocarditis  Wounds: Wound VAC over the sacrum and right hip  HEMT: AT/NC Oropharynx pink, moist, and without lesions or exudates; dentition in good state of repair  Eyes: PERRLA, EOMI, conjunctiva pink, sclera anicteric. Neck: Supple. Trachea midline. No LAD. Chest: no distress and CTA. Good air movement. Heart: RRR and no MRG. Abd: soft, non-distended, no tenderness, no hepatomegaly. Normoactive bowel sounds. Ext: no clubbing, cyanosis, or edema  Catheter Site: without erythema or tenderness  Neuro: Mental status intact. CN 2-12 intact and no focal sensory or motor deficits     Radiologic / Imaging / TESTING  No results found. Labs:    Labs reviewed. CULTURE results: Invalid input(s): BLOOD CULTURE,  URINE CULTURE, SURGICAL CULTURE    Diagnosis:  Patient Active Problem List   Diagnosis    Hypertension    Hyperlipidemia    Asthma    Diabetes mellitus ([address]    H/O cardiovascular stress test    Obesity    Chronic kidney disease, stage III (moderate) (HCC)    Hypertensive kidney disease with CKD stage III (HCC)    Depression    SOBOE (shortness of breath on exertion)    Abnormal cardiovascular stress test    Fracture of epiphysis (separation) (upper) of neck of femur, closed (HCC)    Pressure injury of skin of coccygeal region    Sepsis ([address]    PVC (premature ventricular contraction)    Septicemia ([address]       Active Problems  Principal Problem:    Sepsis ([address]  Active Problems:    Hypertension    Hyperlipidemia    Asthma    Diabetes mellitus (HCC)    Obesity    Chronic kidney disease, stage III (moderate) (HCC)    Depression    Pressure injury of skin of coccygeal region    PVC (premature ventricular contraction)    Septicemia ([address]  Resolved Problems:    * No resolved hospital problems.  *    Electronically signed by: Electronically signed by Alexsander Quiroz MD on 10/11/2019 at 6:12 PM

## 2019-10-11 NOTE — CARE COORDINATION
Notified by patient 's nurse Rhett Bautista that patient would like to speak with . Spoke with patient at bedside. Patient stated that he does not want to return to [place] and would like  to check and see if he would qualify for [facility]. Patient also stated that he would like to speak with the Financial Counselor to see if he would qualify for Medicaid. Phoned Susan in [facility] with referral and had to leave a .  Phoned Jammie Strange at [facility] with referral

## 2019-10-11 NOTE — PROGRESS NOTES
26   BUN 13  --  14   CREATININE 1.0  --  1.0   GLUCOSE 110*  --  139*     Hepatic:   No results for input(s): AST, ALT, ALB, BILITOT, ALKPHOS in the last 72 hours. Troponin: No results for input(s): TROPONINI in the last 72 hours. BNP: No results for input(s): BNP in the last 72 hours. Lipids: No results for input(s): CHOL, HDL in the last 72 hours. Invalid input(s): LDLCALCU  ABGs:   Lab Results   Component Value Date    PO2ART 89 09/20/2019    EWG3UOC 32.0 09/20/2019     INR: No results for input(s): INR in the last 72 hours. Renal Labs  Albumin:    Lab Results   Component Value Date    LABALBU 2.3 10/04/2019     Calcium:    Lab Results   Component Value Date    CALCIUM 7.7 10/10/2019     Phosphorus:    Lab Results   Component Value Date    PHOS 3.5 10/10/2019     U/A:    Lab Results   Component Value Date    NITRU NEGATIVE 09/21/2019    NITRU Negative 10/24/2018    COLORU MICHAEL 09/21/2019    PHUR 5.5 10/24/2018    LABCAST Present 12/12/2016    LABCAST Hyaline casts 12/12/2016    WBCUA 125 09/21/2019    RBCUA 29 09/21/2019    MUCUS RARE 09/21/2019    TRICHOMONAS NONE SEEN 09/06/2019    YEAST Present 12/12/2016    BACTERIA NEGATIVE 09/21/2019    CLARITYU SLIGHTLY CLOUDY 09/21/2019    SPECGRAV 1.010 09/21/2019    UROBILINOGEN <2.0 09/21/2019    BILIRUBINUR NEGATIVE 09/21/2019    BLOODU MODERATE 09/21/2019    GLUCOSEU 3+ 10/24/2018    KETUA NEGATIVE 09/21/2019     ABG:    Lab Results   Component Value Date    PJO8KFP 32.0 09/20/2019    PO2ART 89 09/20/2019    DUR8MRI 22.2 09/20/2019     HgBA1c:    Lab Results   Component Value Date    LABA1C 6.1 09/04/2019     Microalbumen/Creatinine ratio:  No components found for: RUCREAT          Objective:   Vitals: /82   Pulse 99   Temp 97.9 °F (36.6 °C) (Oral)   Resp 21   Ht 6' 3\" (1.905 m)   Wt (!) 399 lb 6.4 oz (181.2 kg)   SpO2 98%   BMI 49.92 kg/m²   General appearance: awake weak  HEENT: Head: Normal, normocephalic, atraumatic.   Neck: supple, symmetrical, trachea midline  Lungs: diminished breath sounds bilaterally  Heart: S1, S2 normal  Abdomen: abnormal findings:  soft nt obese  Extremities: edema +  Neurologic: Mental status: alertness: awake      Patient Active Problem List:     Hypertension     Hyperlipidemia     Asthma     Diabetes mellitus ([address]     H/O cardiovascular stress test     Obesity     Chronic kidney disease, stage III (moderate) (HCC)     Hypertensive kidney disease with CKD stage III (HCC)     Depression     SOBOE (shortness of breath on exertion)     Abnormal cardiovascular stress test     Fracture of epiphysis (separation) (upper) of neck of femur, closed ([facility])     Pressure ulcer of coccygeal region, unstageable ([facility])     Sepsis ([facility])    Assessment and Plan:      IMP:  1 bacteremia  2 s/p hip surgery with wound infection  3 arf from atn/contrast on ckd 3  4 anxiety/confusion  5 resp distress with hypoxia    Plan     1 treated infection  2 wound care  3 renal stable post [facility]  4 affect stable  5 o2 monitor negative [facility]  Rehab as able           Jv Mora MD

## 2019-10-11 NOTE — PROGRESS NOTES
notified - Jessica Parks pt refusing to go to [place] as case management set up for pt. Pt given list to pick new place. Pt will not be discharging tonight. Discharge order will need canceled.  Thanks

## 2019-10-11 NOTE — CARE COORDINATION
Received call from Paige at [facility] . She stated that precert has been approved. Notified physician via perfect serve. Paige stated that she has the wound VAC. CM let her know that per nursing the [facility] is an irrigating one. ANGELA also let Paige know that patient will be discharged on IV ATB Ampicillin also . She is checking on both items and will return call to CM .

## 2019-10-11 NOTE — PROGRESS NOTES
Progress note    Carlos A Barillas    10/11/2019    Subjective:     Patient on wound vac for his hip and decub ulcers. Had cardiac cath today. Medication side effects: none. Scheduled Meds:   collagenase   Topical Daily    sodium chloride flush  10 mL Intravenous 2 times per day    insulin glargine  15 Units Subcutaneous Nightly    pravastatin  80 mg Oral Nightly    fluconazole  600 mg Oral Daily    sodium chloride flush  10 mL Intravenous 2 times per day    ampicillin-sulbactam  3 g Intravenous Q6H    insulin lispro  0-6 Units Subcutaneous TID WC    insulin lispro  0-6 Units Subcutaneous 2 times per day    aspirin  81 mg Oral Daily    mometasone-formoterol  2 puff Inhalation BID    buPROPion  300 mg Oral Daily    canagliflozin  300 mg Oral Daily    ezetimibe  10 mg Oral Daily    fluticasone  1 spray Each Nostril Daily    ocuvite-lutein  1 tablet Oral Daily    pantoprazole  40 mg Oral QAM AC    sertraline  100 mg Oral BID    b complex-C-E-zinc  1 tablet Oral Daily    vitamin C  1,000 mg Oral Daily    vitamin E  400 Units Oral Daily    enoxaparin  40 mg Subcutaneous Daily     Continuous Infusions:   sodium chloride      dextrose       PRN Meds:sodium chloride flush, acetaminophen, magnesium hydroxide, sodium chloride flush, calcium carbonate, cloNIDine, HYDROmorphone, glucose, dextrose, glucagon (rDNA), dextrose, acetaminophen, albuterol sulfate HFA, traMADol **OR** traMADol, ondansetron    Review of Systems  Pertinent items are noted in HPI. Objective:     Patient Vitals for the past 8 hrs:   BP Temp Temp src Pulse Resp SpO2   10/11/19 1124 -- -- -- 99 -- --   10/11/19 1115 -- -- -- -- 21 98 %   10/11/19 1104 134/82 97.9 °F (36.6 °C) Oral 100 21 96 %   10/11/19 1000 (!) 142/80 -- -- 100 21 94 %     I/O last 3 completed shifts: In: 360 [P.O.:360]  Out: 1050 [Urine:850; Drains:150; Stool:50]  No intake/output data recorded.     /82   Pulse 99   Temp 97.9 °F (36.6 °C) (Oral)   Resp 21   Ht 6' 3\" (1.905 m)   Wt (!) 399 lb 6.4 oz (181.2 kg)   SpO2 98%   BMI 49.92 kg/m²   General appearance: alert and cooperative. Lungs: clear to auscultation bilaterally  Heart: regular rate and rhythm, S1, S2 normal, no murmur, click, rub or gallop  Abdomen: soft, non-tender; bowel sounds normal; no masses,  no organomegaly  Extremities: extremities normal, atraumatic, no cyanosis or edema  Skin: Skin color, texture, turgor normal. No rashes or lesions        Labs and imaging reviewed.    CBC with Differential:    Lab Results   Component Value Date    WBC 13.0 10/10/2019    RBC 3.57 10/10/2019    HGB 10.5 10/10/2019    HCT 34.2 10/10/2019     10/10/2019    MCV 95.8 10/10/2019    MCH 29.4 10/10/2019    MCHC 30.7 10/10/2019    RDW 14.6 10/10/2019    SEGSPCT 82.4 10/04/2019    BANDSPCT 3 09/29/2019    LYMPHOPCT 7.0 10/04/2019    PROMYELOPCT 2 09/26/2019    MONOPCT 7.0 10/04/2019    MYELOPCT 1 09/29/2019    BASOPCT 0.6 10/04/2019    MONOSABS 0.9 10/04/2019    LYMPHSABS 0.9 10/04/2019    EOSABS 0.2 10/04/2019    BASOSABS 0.1 10/04/2019    DIFFTYPE AUTOMATED DIFFERENTIAL 10/04/2019     CMP:    Lab Results   Component Value Date     10/10/2019    K 3.9 10/10/2019    CL 99 10/10/2019    CO2 26 10/10/2019    BUN 14 10/10/2019    CREATININE 1.0 10/10/2019    GFRAA >60 10/10/2019    LABGLOM >60 10/10/2019    GLUCOSE 139 10/10/2019    PROT 6.1 09/20/2019    LABALBU 2.3 10/04/2019    CALCIUM 7.7 10/10/2019    BILITOT 1.1 09/20/2019    ALKPHOS 143 09/20/2019     09/20/2019    ALT 98 09/20/2019     BMP:    Lab Results   Component Value Date     10/10/2019    K 3.9 10/10/2019    CL 99 10/10/2019    CO2 26 10/10/2019    BUN 14 10/10/2019    LABALBU 2.3 10/04/2019    CREATININE 1.0 10/10/2019    CALCIUM 7.7 10/10/2019    GFRAA >60 10/10/2019    LABGLOM >60 10/10/2019    GLUCOSE 139 10/10/2019     Sodium:    Lab Results   Component Value Date     10/10/2019     Potassium:    Lab Results

## 2019-10-11 NOTE — CONSULTS
[address] [id number] Continence Nurse  Consult Note       Rai Deleon  AGE: 77 y.o.    GENDER: male  : 1950  TODAY'S DATE:  10/11/2019    Subjective:     Reason for Evaluation and Assessment: wound assessment and vac change      Rai Deleon is a 77 y.o. male referred by:   [x] Physician  [] Nursing  [] Other:     Wound Identification:  Wound Type: pressure and non-healing surgical  Contributing Factors: diabetes, chronic pressure, decreased mobility, obesity and incontinence of stool        PAST MEDICAL HISTORY        Diagnosis Date    Arthritis     Asthma     Chronic kidney disease     stage 3, seen by Dr. Fang Abreu Diabetes mellitus [facility]     H/O cardiac catheterization 2017    H/O cardiovascular stress test ,     CARDIOLITE: EF->51%    Hyperlipidemia     Hypertension     Obesity     Pressure ulcer of coccygeal region, unstageable ([facility] 09/10/2019    Thyroid disease        PAST SURGICAL HISTORY    Past Surgical History:   Procedure Laterality Date    CARPAL TUNNEL RELEASE      DIAGNOSTIC CARDIAC CATH LAB PROCEDURE      NO SIGNIFICANT CAD    EYE SURGERY      HEMIARTHROPLASTY HIP Right 2019    HIP HEMIARTHROPLASTY performed by Julian Luna MD at [facility] 2019    HIP INCISION AND DRAINAGE performed by Julian Luna MD at [address] [place] [address]  2019    of stage 4 wound on coccyx, by Dr. Sosa Begum N/A 2019    EXCISIONAL DEBRIDEMENT OF SACRAL PRESSURE ULCER performed by Josiane Green MD at [address] [place] [address] N/A 2019    DECUBITUS ULCER DEBRIDEMENT REPAIR performed by Boris Pope MD at [address] [place] [address]    Family History   Problem Relation Age of Onset    High Blood Pressure Mother     Cancer Mother     Cancer Father     Stroke Maternal Grandfather     Diabetes Paternal Grandmother     Heart Disease Paternal Grandfather        SOCIAL HISTORY    Social History     Tobacco Use    Smoking status: Never Smoker    Smokeless tobacco: Never Used   Substance Use Topics    Alcohol use: No    Drug use: No       ALLERGIES    Allergies   Allergen Reactions    Bee Venom Shortness Of Breath       MEDICATIONS    No current facility-administered medications on file prior to encounter. Current Outpatient Medications on File Prior to Encounter   Medication Sig Dispense Refill    TRULICITY 1.5 QT/8.0XU SOPN Inject into the skin once a week  2    nitroGLYCERIN (NITROSTAT) 0.4 MG SL tablet Place 1 tablet under the tongue every 5 minutes as needed for Chest pain 25 tablet 2    ezetimibe (ZETIA) 10 MG tablet Take 10 mg by mouth daily      acetaminophen (TYLENOL) 500 MG tablet Take 500 mg by mouth every 6 hours as needed for Pain      SUPER B COMPLEX/C PO Take 1 capsule by mouth daily      Cinnamon 500 MG TABS Take 1,000 mg by mouth daily      vitamin E 400 UNIT capsule Take 400 Units by mouth daily      vitamin C (ASCORBIC ACID) 500 MG tablet Take 1,000 mg by mouth daily       insulin lispro (HUMALOG) 100 UNIT/ML injection vial Inject into the skin 3 times daily (before meals)      insulin glargine (LANTUS) 100 UNIT/ML injection vial Inject 74 Units into the skin nightly       benazepril (LOTENSIN) 20 MG tablet Take 1 tablet by mouth daily 90 tablet 3    fluticasone (FLONASE) 50 MCG/ACT nasal spray as needed       Multiple Vitamins-Minerals (VISION VITAMINS PO) Take by mouth daily      omeprazole (PRILOSEC) 20 MG capsule Take 20 mg by mouth daily.  St Johns Wort 300 MG CAPS Take 2 capsules by mouth.  Canagliflozin (INVOKANA) 300 MG TABS Take  by mouth daily.       budesonide-formoterol (SYMBICORT) 80-4.5 MCG/ACT AERO Inhale 2 puffs into the lungs as needed       Albuterol Sulfate (PROAIR HFA IN) Inhale into the lungs as needed       buPROPion (WELLBUTRIN XL) 300 MG XL tablet Take 300 mg by mouth daily.  sertraline (ZOLOFT) 100 MG tablet Take 100 mg by mouth 2 times daily.  simvastatin (ZOCOR) 40 MG tablet Take 40 mg by mouth daily       aspirin 81 MG EC tablet Take 81 mg by mouth daily.              Objective:      /82   Pulse 99   Temp 97.9 °F (36.6 °C) (Oral)   Resp 21   Ht 6' 3\" (1.905 m)   Wt (!) 399 lb 6.4 oz (181.2 kg)   SpO2 98%   BMI 49.92 kg/m²   Alfonso Risk Score: Alfonso Scale Score: 13    LABS    CBC:   Lab Results   Component Value Date    WBC 13.0 10/10/2019    RBC 3.57 10/10/2019    HGB 10.5 10/10/2019    HCT 34.2 10/10/2019    MCV 95.8 10/10/2019    MCH 29.4 10/10/2019    MCHC 30.7 10/10/2019    RDW 14.6 10/10/2019     10/10/2019    MPV 9.3 10/10/2019     CMP:    Lab Results   Component Value Date     10/10/2019    K 3.9 10/10/2019    CL 99 10/10/2019    CO2 26 10/10/2019    BUN 14 10/10/2019    CREATININE 1.0 10/10/2019    GFRAA >60 10/10/2019    LABGLOM >60 10/10/2019    GLUCOSE 139 10/10/2019    PROT 6.1 09/20/2019    LABALBU 2.3 10/04/2019    CALCIUM 7.7 10/10/2019    BILITOT 1.1 09/20/2019    ALKPHOS 143 09/20/2019     09/20/2019    ALT 98 09/20/2019     Albumin:    Lab Results   Component Value Date    LABALBU 2.3 10/04/2019     PT/INR:    Lab Results   Component Value Date    PROTIME 11.5 05/03/2017    PROTIME 10.6 01/26/2012    INR 1.01 05/03/2017     HgBA1c:    Lab Results   Component Value Date    LABA1C 6.1 09/04/2019         Assessment:     Patient Active Problem List   Diagnosis    Hypertension    Hyperlipidemia    Asthma    Diabetes mellitus ([address]    H/O cardiovascular stress test    Obesity    Chronic kidney disease, stage III (moderate) (HCC)    Hypertensive kidney disease with CKD stage III (HCC)    Depression    SOBOE (shortness of breath on exertion)    Abnormal cardiovascular stress test    Fracture of epiphysis (separation) (upper) of neck of femur, closed (HCC)    Pressure injury of skin of coccygeal region    [facility]    PVC (premature ventricular contraction)    Septicemia ([facility]       Measurements:  Negative Pressure Wound Therapy Hip Right (Active)   Wound Type Surgical 10/11/2019  1:30 PM   Unit Type KCI 10/11/2019  1:30 PM   Dressing Type Silver impregnated foam 10/11/2019  1:30 PM   Number of pieces used 1 10/11/2019  1:30 PM   Cycle Continuous 10/11/2019  1:30 PM   Target Pressure (mmHg) 125 10/11/2019  1:30 PM   Irrigation Solution Normal Saline 10/10/2019  8:16 PM   Canister changed? Yes 10/8/2019 11:08 PM   Dressing Status Changed 10/11/2019  1:30 PM   Dressing Changed Changed/New 10/11/2019  1:30 PM   Drainage Amount Moderate 10/11/2019  1:30 PM   Drainage Description Serosanguinous 10/11/2019  1:30 PM   Dressing Change Due 10/09/19 10/8/2019 11:08 PM   Output (ml) 150 ml 10/11/2019  3:23 AM   Wound Assessment Pink;Yellow 10/11/2019  1:30 PM   Annika-wound Assessment Dry;Clean 10/11/2019  1:30 PM   Odor None 10/11/2019  1:30 PM   Number of days: 12       Negative Pressure Wound Therapy Coccyx (Active)   Wound Type Pressure ulcer: Stage IV 10/11/2019  1:30 PM   Unit Type KCI 10/11/2019  1:30 PM   Dressing Type Other (Comment) 10/11/2019  1:30 PM   Number of pieces used 5 10/11/2019  1:30 PM   Cycle Intermittent 10/11/2019  1:30 PM   Target Pressure (mmHg) 125 10/11/2019  1:30 PM   Irrigation Solution Normal Saline 10/11/2019  1:30 PM   Dwell Volume 20 mL 10/11/2019  1:30 PM   Soak Duration 2 minutes 10/11/2019  1:30 PM   Soak Frequency 2 hours 10/11/2019  1:30 PM   Canister changed?  No 10/11/2019  1:30 PM   Dressing Status Changed 10/11/2019  1:30 PM   Dressing Changed Changed/New 10/11/2019  1:30 PM   Drainage Amount Large 10/11/2019  1:30 PM   Drainage Description Serosanguinous 10/11/2019  1:30 PM   Dressing Change Due 10/09/19 10/8/2019 11:08 PM   Output (ml) 125 ml 10/5/2019  6:12 AM   Wound Assessment Purple;Red;Yellow 10/11/2019  1:30 PM   Annika-wound Assessment Red;Purple 10/11/2019  1:30 PM   Odor None 10/11/2019  1:30 PM   Number of days: 10       Wound 09/21/19 Coccyx (Active)   Wound Pressure Unstageable 10/11/2019  1:30 PM   Dressing Status Changed 10/11/2019  1:30 PM   Dressing Changed Changed/New 10/11/2019  1:30 PM   Dressing/Treatment Vacuum dressing 10/11/2019 11:24 AM   Wound Cleansed Rinsed/Irrigated with saline 10/11/2019  1:30 PM   Dressing Change Due 10/09/19 10/8/2019 11:08 PM   Wound Length (cm) 9 cm 10/1/2019 11:30 AM   Wound Width (cm) 8 cm 10/1/2019 11:30 AM   Wound Depth (cm) 5 cm 10/1/2019 11:30 AM   Wound Surface Area (cm^2) 72 cm^2 10/1/2019 11:30 AM   Change in Wound Size % (l*w) 21.74 10/1/2019 11:30 AM   Wound Volume (cm^3) 360 cm^3 10/1/2019 11:30 AM   Wound Healing % -161 10/1/2019 11:30 AM   Distance Tunneling (cm) 0 cm 10/7/2019  9:59 AM   Tunneling Position ___ O'Clock 0 10/7/2019  9:59 AM   Undermining Starts ___ O'Clock 9 10/7/2019  9:59 AM   Undermining Ends___ O'Clock 5 10/7/2019  9:59 AM   Undermining [place] Distance (cm) 4.2 10/1/2019 11:30 AM   Wound Assessment Purple;Red;Yellow 10/11/2019  1:30 PM   Drainage Amount Moderate 10/11/2019  1:30 PM   Drainage Description Serosanguinous 10/11/2019  1:30 PM   Odor None 10/11/2019  1:30 PM   Margins Defined edges 10/11/2019  1:30 PM   Exposed structure Muscle 10/11/2019  1:30 PM   Annika-wound Assessment Pink;Purple 10/11/2019  1:30 PM   Non-staged Wound Description Full thickness 10/11/2019  1:30 PM   Red%Wound Bed 70 10/8/2019 11:08 PM   Yellow%Wound Bed 30 10/8/2019 11:08 PM   Black%Wound Bed 10 10/8/2019 11:08 PM   Purple%Wound Bed 80 10/8/2019 11:08 PM   Number of days: 20       Wound 09/21/19 Hip Left cluster/DTI now with stage 2 (Active)   Wound Deep tissue/Injury 10/11/2019  9:30 AM   Dressing Status Clean;Dry; Intact 10/11/2019 11:24 AM   Dressing Changed Changed/New 10/11/2019  9:30 AM   Dressing/Treatment Other (comment) 10/11/2019 11:24 AM   Wound Cleansed Rinsed/Irrigated with saline 10/11/2019  9:30 AM   Dressing Change Due 09/27/19 10/8/2019 11:08 PM   Wound Length (cm) 4 cm 10/4/2019  9:20 AM   Wound Width (cm) 3.5 cm 10/4/2019  9:20 AM   Wound Depth (cm) 0.1 cm 10/4/2019  9:20 AM   Wound Surface Area (cm^2) 14 cm^2 10/4/2019  9:20 AM   Change in Wound Size % (l*w) 73.08 10/4/2019  9:20 AM   Wound Volume (cm^3) 1.4 cm^3 10/4/2019  9:20 AM   Distance Tunneling (cm) 0 cm 10/7/2019  9:59 AM   Tunneling Position ___ O'Clock 0 10/7/2019  9:59 AM   Undermining Starts ___ O'Clock 0 10/7/2019  9:59 AM   Undermining Ends___ O'Clock 0 10/7/2019  9:59 AM   Undermining [place] Distance (cm) 0 10/7/2019  9:59 AM   Wound Assessment Pink;Purple;Yellow 10/11/2019 11:24 AM   Drainage Amount None 10/11/2019 11:24 AM   Drainage Description Serosanguinous 10/11/2019  9:30 AM   Odor None 10/11/2019  9:30 AM   Margins Undefined edges 10/11/2019  9:30 AM   Annika-wound Assessment Pink 10/11/2019  9:30 AM   Non-staged Wound Description Full thickness 10/11/2019  9:30 AM   [place]%Wound Bed 20 10/11/2019  9:30 AM   Yellow%Wound Bed 40 10/11/2019  9:30 AM   Black%Wound Bed 40 10/11/2019  9:30 AM   Purple%Wound Bed 40 10/8/2019 11:08 PM   Other%Wound Bed 60 10/4/2019  9:20 AM   Number of days: 20       Wound 10/01/19 Hip Right (Active)   Wound Non-Healing Surgical 10/11/2019  1:30 PM   Dressing Status Changed 10/11/2019  1:30 PM   Dressing Changed Changed/New 10/11/2019  1:30 PM   Dressing/Treatment Vacuum dressing 10/11/2019 11:24 AM   Wound Cleansed Rinsed/Irrigated with saline 10/11/2019  1:30 PM   Dressing Change Due 10/09/19 10/8/2019 11:08 PM   Wound Length (cm) 22.5 cm 10/1/2019 11:30 AM   Wound Width (cm) 3.2 cm 10/1/2019 11:30 AM   Wound Depth (cm) 3.5 cm 10/1/2019 11:30 AM   Wound Surface Area (cm^2) 72 cm^2 10/1/2019 11:30 AM   Wound Volume (cm^3) 252 cm^3 10/1/2019 11:30 AM   Distance Tunneling (cm) 0 cm 10/7/2019  9:59 AM   Tunneling Position ___ O'Clock 0 10/7/2019  9:59 AM   Undermining Starts ___ O'Clock 0 10/7/2019  9:59 AM   Undermining Ends___ O'Clock 0 10/7/2019  9:59 AM   Undermining Max[place] Distance (cm) 0 10/7/2019  9:59 AM   Wound Assessment Pink;Yellow 10/11/2019  1:30 PM   Drainage Amount Moderate 10/11/2019  1:30 PM   Drainage Description Serosanguinous 10/11/2019  1:30 PM   Odor None 10/11/2019  1:30 PM   Margins Defined edges 10/11/2019  1:30 PM   Dash-wound Assessment Clean; Intact 10/11/2019  1:30 PM   Non-staged Wound Description Full thickness 10/11/2019  1:30 PM   [place]%Wound Bed 50 10/8/2019 11:08 PM   Red%Wound Bed 100 10/8/2019 11:08 PM   Yellow%Wound Bed 20 10/8/2019 11:08 PM   Number of days: 10       Wound 10/04/19 Thigh Anterior;Left;Proximal intact blister (Active)   Wound Other 10/10/2019 12:03 PM   Dressing Status Clean;Dry; Intact 10/11/2019 11:24 AM   Dressing Changed Changed/New 10/11/2019  9:30 AM   Dressing/Treatment Other (comment) 10/11/2019 11:24 AM   Wound Cleansed Rinsed/Irrigated with saline 10/11/2019  9:30 AM   Wound Length (cm) 1.5 cm 10/4/2019  9:20 AM   Wound Width (cm) 2.5 cm 10/4/2019  9:20 AM   Wound Depth (cm) 0 cm 10/4/2019  9:20 AM   Wound Surface Area (cm^2) 3.75 cm^2 10/4/2019  9:20 AM   Wound Volume (cm^3) 0 cm^3 10/4/2019  9:20 AM   Wound Assessment Fluid filled blister 10/11/2019 11:24 AM   Drainage Amount None 10/11/2019 11:24 AM   Drainage Description Yellow 10/11/2019 11:24 AM   Odor None 10/11/2019 11:24 AM   Margins Attached edges 10/11/2019  9:30 AM   Dash-wound Assessment Clean;Dry; Intact 10/11/2019 11:24 AM   Number of days: 7       Response to treatment:  With complaints of pain.      Pain Assessment:  Severity:  6/10  Quality of pain: spasm  Wound Pain Timing/Severity: intermittent  Premedicated: yes    Plan:     Plan of Care:       Wound 10/01/19 Hip Right-Dressing/Treatment: (duoderm dash wound silver foam)  Wound 10/04/19 Thigh Anterior;Left;Proximal intact blister-Dressing/Treatment: Other (comment)(mepilex)    Wound 09/21/19 Coccyx-Dressing/Treatment: (duoderm, ostomy ring periwound, veraflow cleanse choice)  Wound 09/21/19 Hip Left cluster/DTI now with stage 2-Dressing/Treatment: Other (comment)(mepilex)    0930 Pt in bed. Awaiting veraflo dressing from [facility] representative for coccyx wound. Blister to left groin pink and dried. Not open. Mepilex border changed. Left hip DTI dressing changed. Aquacel and mepilex border applied. Requested Santyl from Dr. Colton Scheuermann. [facility] with dressing in afternoon. Repositioned to right side with pillow support. 1330 Pt in bed. [place] assist with dressing change. Old vac dressings removed to coccyx and right hip. Cleansed with NS. Duoderm and ostomy rings applied to periwound to coccyx. Veraflow cleanse choice dressing applied and secured with drape. Adequate seal obtained. 20 ml NS soak for 2 minutes every 20 minutes programmed. Right hip cleansed with NS. Duoderm applied dash wound. Silver foam and drape applied. Adequate seal obtained. Pt positioned to left side with pillow support. Tolerated procedure well. Spoke with CM regarding pt not wanting to return to [place]. Specialty Bed Required : yes  [x] Low Air Loss   [] Pressure Redistribution  [] Fluid Immersion  [x] Bariatric  [] Total Pressure Relief  [] Other:     Discharge Plan:  Placement for patient upon discharge: SNF  Hospice Care: no  Patient appropriate for Outpatient [address]: [facility]    Patient/Caregiver Teaching:  Level of patient/caregiver understanding able to:   Voiced understanding regarding wound care.         Electronically signed by Mary Mcgee RN,  on 10/11/2019 at 4:12 PM

## 2019-10-12 NOTE — PROGRESS NOTES
Nephrology Progress Note  10/12/2019 12:00 PM  Subjective:   Admit Date: 9/20/2019  PCP: James Dowell MD  Interval History: pt aware dc today    Diet: DIET CARB CONTROL;  Pain is: Moderate      Data:   Scheduled Meds:   collagenase   Topical Daily    sodium chloride flush  10 mL Intravenous 2 times per day    insulin glargine  15 Units Subcutaneous Nightly    pravastatin  80 mg Oral Nightly    fluconazole  600 mg Oral Daily    sodium chloride flush  10 mL Intravenous 2 times per day    ampicillin-sulbactam  3 g Intravenous Q6H    insulin lispro  0-6 Units Subcutaneous TID WC    insulin lispro  0-6 Units Subcutaneous 2 times per day    aspirin  81 mg Oral Daily    mometasone-formoterol  2 puff Inhalation BID    buPROPion  300 mg Oral Daily    canagliflozin  300 mg Oral Daily    ezetimibe  10 mg Oral Daily    fluticasone  1 spray Each Nostril Daily    ocuvite-lutein  1 tablet Oral Daily    pantoprazole  40 mg Oral QAM AC    sertraline  100 mg Oral BID    b complex-C-E-zinc  1 tablet Oral Daily    vitamin C  1,000 mg Oral Daily    vitamin E  400 Units Oral Daily    enoxaparin  40 mg Subcutaneous Daily     Continuous Infusions:   sodium chloride      dextrose       PRN Meds:sodium chloride flush, acetaminophen, magnesium hydroxide, sodium chloride flush, calcium carbonate, cloNIDine, HYDROmorphone, glucose, dextrose, glucagon (rDNA), dextrose, acetaminophen, albuterol sulfate HFA, traMADol **OR** traMADol, ondansetron  I/O last 3 completed shifts: In: 12 [P.O.:600;  I.V.:200]  Out: 1800 [Urine:800; Stool:1000]  I/O this shift:  In: -   Out: 475 [Drains:475]    Intake/Output Summary (Last 24 hours) at 10/12/2019 1200  Last data filed at 10/12/2019 0800  Gross per 24 hour   Intake 440 ml   Output 2275 ml   Net -1835 ml     CBC:   Recent Labs     10/10/19  0650   WBC 13.0*   HGB 10.5*        BMP:    Recent Labs     10/09/19  2000 10/10/19  0650 10/12/19  0507   NA  --  134* 139   K 3.7 3.9 3.4*   CL  --  99 103   CO2  --  26 29   BUN  --  14 13   CREATININE  --  1.0 1.0   GLUCOSE  --  139* 128*     Hepatic:   No results for input(s): AST, ALT, ALB, BILITOT, ALKPHOS in the last 72 hours. Troponin: No results for input(s): TROPONINI in the last 72 hours. BNP: No results for input(s): BNP in the last 72 hours. Lipids: No results for input(s): CHOL, HDL in the last 72 hours. Invalid input(s): LDLCALCU  ABGs:   Lab Results   Component Value Date    PO2ART 89 09/20/2019    LVQ5YHT 32.0 09/20/2019     INR: No results for input(s): INR in the last 72 hours.   Renal Labs  Albumin:    Lab Results   Component Value Date    LABALBU 2.3 10/04/2019     Calcium:    Lab Results   Component Value Date    CALCIUM 7.8 10/12/2019     Phosphorus:    Lab Results   Component Value Date    PHOS 3.5 10/10/2019     U/A:    Lab Results   Component Value Date    NITRU NEGATIVE 09/21/2019    NITRU Negative 10/24/2018    COLORU MICHAEL 09/21/2019    PHUR 5.5 10/24/2018    LABCAST Present 12/12/2016    LABCAST Hyaline casts 12/12/2016    WBCUA 125 09/21/2019    RBCUA 29 09/21/2019    MUCUS RARE 09/21/2019    TRICHOMONAS NONE SEEN 09/06/2019    YEAST Present 12/12/2016    BACTERIA NEGATIVE 09/21/2019    CLARITYU SLIGHTLY CLOUDY 09/21/2019    SPECGRAV 1.010 09/21/2019    UROBILINOGEN <2.0 09/21/2019    BILIRUBINUR NEGATIVE 09/21/2019    BLOODU MODERATE 09/21/2019    GLUCOSEU 3+ 10/24/2018    KETUA NEGATIVE 09/21/2019     ABG:    Lab Results   Component Value Date    SLV4DUH 32.0 09/20/2019    PO2ART 89 09/20/2019    KUI7GNZ 22.2 09/20/2019     HgBA1c:    Lab Results   Component Value Date    LABA1C 6.1 09/04/2019     Microalbumen/Creatinine ratio:  No components found for: RUCREAT          Objective:   Vitals: /70   Pulse 93   Temp 98.7 °F (37.1 °C) (Oral)   Resp 18   Ht 6' 3\" (1.905 m)   Wt (!) 399 lb 6.4 oz (181.2 kg)   SpO2 98%   BMI 49.92 kg/m²   General appearance: awake weak  HEENT: Head: Normal, normocephalic, atraumatic.   Neck: supple, symmetrical, trachea midline  Lungs: diminished breath sounds bilaterally  Heart: S1, S2 normal  Abdomen: abnormal findings:  soft nt obese  Extremities: edema +  Neurologic: Mental status: alertness: awake      Patient Active Problem List:     Hypertension     Hyperlipidemia     Asthma     Diabetes mellitus ([facility] [address]     H/O cardiovascular stress test     Obesity     Chronic kidney disease, stage III (moderate) (HCC)     Hypertensive kidney disease with CKD stage III (HCC)     Depression     SOBOE (shortness of breath on exertion)     Abnormal cardiovascular stress test     Fracture of epiphysis (separation) (upper) of neck of femur, closed ([facility])     Pressure ulcer of coccygeal region, unstageable ([facility])     Sepsis ([facility])    Assessment and Plan:      IMP:  1 hypokalemia  2 s/p hip surgery with wound infection  3 arf from atn/contrast on ckd 3  4 anxiety/confusion  5 resp distress with hypoxia    Plan     1 replete K  2 rehab and wound care  3 renal stable after contrast  4 affect stable  5 o2 monitor holding stable  Ok for Marino Eisenmenger, MD

## 2019-10-12 NOTE — PROGRESS NOTES
Progress Note( Dr. Sanjuanita Sotelo)  10/11/2019  Subjective:   Admit Date: 9/20/2019  PCP: Jennifer Partida MD    Admitted For : Extensive deep decubitus ulcer in the gluteal region    Consulted For: Better control of blood glucose    Interval History: Went for debridement and now hs wounf vacuum   Needs lot less insulin both Humalog and Lantus  Pt was transferred to ICU work-up for chest pain were negative so she she was transferred back to the stepdown care unit. He is doing okay    Denies any chest pains,   Denies SOB . Denies nausea or vomiting. No new bowel or bladder symptoms. Intake/Output Summary (Last 24 hours) at 10/11/2019 2208  Last data filed at 10/11/2019 1900  Gross per 24 hour   Intake 600 ml   Output 1300 ml   Net -700 ml       DATA    CBC:   Recent Labs     10/10/19  0650   WBC 13.0*   HGB 10.5*       CMP:  Recent Labs     10/09/19  0534 10/09/19  2000 10/10/19  0650     --  134*   K 3.3* 3.7 3.9     --  99   CO2 27  --  26   BUN 13  --  14   CREATININE 1.0  --  1.0   CALCIUM 8.0*  --  7.7*     Lipids:   Lab Results   Component Value Date    CHOL 190 01/27/2014    HDL 54 01/27/2014    TRIG 157 01/27/2014     Glucose:  Recent Labs     10/11/19  1226 10/11/19  1638 10/11/19  2145   POCGLU 140* 117* 135*     [id number]:  Lab Results   Component Value Date    LABA1C 6.1 09/04/2019     High Sensitivity TSH:   Lab Results   Component Value Date    TSHHS 3.079 10/29/2013     Free T3: No results found for: FT3  Free T4:No results found for: T4FREE    No results found.     Scheduled Medicines   Medications:    collagenase   Topical Daily    sodium chloride flush  10 mL Intravenous 2 times per day    insulin glargine  15 Units Subcutaneous Nightly    pravastatin  80 mg Oral Nightly    fluconazole  600 mg Oral Daily    sodium chloride flush  10 mL Intravenous 2 times per day    ampicillin-sulbactam  3 g Intravenous Q6H    insulin lispro  0-6 Units Subcutaneous TID WC    insulin lispro  0-6 Units Subcutaneous 2 times per day    aspirin  81 mg Oral Daily    mometasone-formoterol  2 puff Inhalation BID    buPROPion  300 mg Oral Daily    canagliflozin  300 mg Oral Daily    ezetimibe  10 mg Oral Daily    fluticasone  1 spray Each Nostril Daily    ocuvite-lutein  1 tablet Oral Daily    pantoprazole  40 mg Oral QAM AC    sertraline  100 mg Oral BID    b complex-C-E-zinc  1 tablet Oral Daily    vitamin C  1,000 mg Oral Daily    vitamin E  400 Units Oral Daily    enoxaparin  40 mg Subcutaneous Daily      Infusions:    sodium chloride      dextrose           Objective:   Vitals: /86   Pulse 102   Temp 97.9 °F (36.6 °C) (Oral)   Resp 26   Ht 6' 3\" (1.905 m)   Wt (!) 399 lb 6.4 oz (181.2 kg)   SpO2 98%   BMI 49.92 kg/m²   General appearance: alert confused cooperative with exam  Neck: no JVD or bruit  Thyroid : Normal lobes   Lungs: Has Vesicular Breath sounds   Heart:  regular rate and rhythm  Abdomen: soft, non-tender; bowel sounds normal; no masses,  no organomegaly  Musculoskeletal: Normal  Extremities: extremities normal, , no edema decubitus ulcers extensive on gluteal region , has wound vacuum   Neurologic:  Awake, confused, oriented to name, place and time. Cranial nerves II-XII are grossly intact. Motor is  intact. Sensory neuropathy. ,  and gait i normal and mostly bed ridden    Assessment:     Patient Active Problem List:     Hypertension     Hyperlipidemia     Asthma     Diabetes mellitus ([address]     H/O cardiovascular stress test     Obesity     Chronic kidney disease, stage III (moderate) (HCC)     Hypertensive kidney disease with CKD stage III (HCC)     Depression     SOBOE (shortness of breath on exertion)     Abnormal cardiovascular stress test     Fracture of epiphysis (separation) (upper) of neck of femur, closed (HCC)     Pressure injury of skin of coccygeal region     Sepsis ([address]      Plan:     1. Reviewed POC blood glucose .  Labs and X ray results 2. Reviewed Current Medicines   3. On meal/ Correction bolus Humalog/ Basal Lantus Insulin regime /  4. Monitor Blood glucose frequently   5. Modified  the dose of Insulin/ other medicines as needed   6. Will follow     .      Eliseo Rivera MD

## 2019-10-12 NOTE — PROGRESS NOTES
Physical Therapy    Physical Therapy Treatment Note  Name: John Pearson MRN: [id number] :   1950   Date:  10/12/2019   Admission Date: 2019 Room:  -A   Restrictions/Precautions:          Communication with other providers:  Per nurse ok and pt will be allowed to use right UE after 6:00 pm. Pt has discharge order but  having difficult arranging transportation. Subjective:  Patient states:  Pt agreeable to bed ex  Pain:   Location, Type, Intensity (0/10 to 10/10): Pt request pain meds at end of tx when nurse came to room. Pt did c/o some pain while doing ex and needing frequent rest and rated pain at end of tx 8. Objective:    Observation:  Alert and oriented   Treatment, including education/measures:  Pt is able to roll with mod assist to remove and replace pillows under shoulder and right hip. Pt was able to tolerate HOB being raised but needed increased time and to stop x2 to acclimate to sitting. With black thera band pt was able to do 20 reps shoulder pull down and then horz abd/add with left UE only  10 reps x 2 with leg  assist heel slides  10 reps x2 with blanket roll between foot and foot board working on hip/knee extension. Safety  Patient left safely in the bed, with call light/phone in reach with alarm applied. Gait belt was used for transfers and gait. Assessment / Impression:       Patient's tolerance of treatment:  good  Adverse Reaction: na  Significant change in status and impact:  na  Barriers to improvement:  Pt needs to be in room with nilo lift for safety for pt and staff. Plan for Next Session:    Cont. POC  Time in:  1330  Time out:  1410  Timed treatment minutes:  40  Total treatment time:  40    Previously filed items:  Social/Functional History  Additional Comments: Has been in SNF but had not progressed to gait or transfers. Has been mostly bed level since last hopistalizatoin.    Short term goals  Time Frame for Short term goals: 1 week  Short term goal 1: Patient will perform supine to sit with mod A x2. Short term goal 2: Patient will perform STS with max A x2 and RW.   Short term goal 3: Patient will sit EOB x15 min mod I.       Electronically signed by:    Tyesha Howe PTA  10/12/2019, 8:22 AM

## 2019-10-12 NOTE — PROGRESS NOTES
Progress Note( Dr. Warren Yao)  10/12/2019  Subjective:   Admit Date: 9/20/2019  PCP: Samuel Pinto MD    Admitted For : Extensive deep decubitus ulcer in the gluteal region    Consulted For: Better control of blood glucose    Interval History: Went for debridement and now hs wounf vacuum   Needs lot less insulin both Humalog and Lantus  Pt was transferred to ICU work-up for chest pain were negative so she she was transferred back to the stepdown care unit. He is doing okay    Denies any chest pains,   Denies SOB . Denies nausea or vomiting. No new bowel or bladder symptoms. Intake/Output Summary (Last 24 hours) at 10/12/2019 1523  Last data filed at 10/12/2019 0800  Gross per 24 hour   Intake 440 ml   Output 2275 ml   Net -1835 ml       DATA    CBC:   Recent Labs     10/10/19  0650   WBC 13.0*   HGB 10.5*       CMP:  Recent Labs     10/09/19  2000 10/10/19  0650 10/12/19  0507   NA  --  134* 139   K 3.7 3.9 3.4*   CL  --  99 103   CO2  --  26 29   BUN  --  14 13   CREATININE  --  1.0 1.0   CALCIUM  --  7.7* 7.8*     Lipids:   Lab Results   Component Value Date    CHOL 190 01/27/2014    HDL 54 01/27/2014    TRIG 157 01/27/2014     Glucose:  Recent Labs     10/12/19  0147 10/12/19  0826 10/12/19  1220   POCGLU 140* 117* 143*     TruvzutghgM1O:  Lab Results   Component Value Date    LABA1C 6.1 09/04/2019     High Sensitivity TSH:   Lab Results   Component Value Date    TSHHS 3.079 10/29/2013     Free T3: No results found for: FT3  Free T4:No results found for: T4FREE    No results found.     Scheduled Medicines   Medications:    collagenase   Topical Daily    sodium chloride flush  10 mL Intravenous 2 times per day    insulin glargine  15 Units Subcutaneous Nightly    pravastatin  80 mg Oral Nightly    fluconazole  600 mg Oral Daily    sodium chloride flush  10 mL Intravenous 2 times per day    ampicillin-sulbactam  3 g Intravenous Q6H    insulin lispro  0-6 Units Subcutaneous TID WC    insulin lispro  0-6 Units Subcutaneous 2 times per day    aspirin  81 mg Oral Daily    mometasone-formoterol  2 puff Inhalation BID    buPROPion  300 mg Oral Daily    canagliflozin  300 mg Oral Daily    ezetimibe  10 mg Oral Daily    fluticasone  1 spray Each Nostril Daily    ocuvite-lutein  1 tablet Oral Daily    pantoprazole  40 mg Oral QAM AC    sertraline  100 mg Oral BID    b complex-C-E-zinc  1 tablet Oral Daily    vitamin C  1,000 mg Oral Daily    vitamin E  400 Units Oral Daily    enoxaparin  40 mg Subcutaneous Daily      Infusions:    sodium chloride      dextrose           Objective:   Vitals: /70   Pulse 92   Temp 98.8 °F (37.1 °C) (Oral)   Resp 26   Ht 6' 3\" (1.905 m)   Wt (!) 399 lb 6.4 oz (181.2 kg)   SpO2 98%   BMI 49.92 kg/m²   General appearance: alert confused cooperative with exam  Neck: no JVD or bruit  Thyroid : Normal lobes   Lungs: Has Vesicular Breath sounds   Heart:  regular rate and rhythm  Abdomen: soft, non-tender; bowel sounds normal; no masses,  no organomegaly  Musculoskeletal: Normal  Extremities: extremities normal, , no edema decubitus ulcers extensive on gluteal region , has wound vacuum   Neurologic:  Awake, confused, oriented to name, place and time. Cranial nerves II-XII are grossly intact. Motor is  intact. Sensory neuropathy. ,  and gait i normal and mostly bed ridden    Assessment:     Patient Active Problem List:     Hypertension     Hyperlipidemia     Asthma     Diabetes mellitus ([address]     H/O cardiovascular stress test     Obesity     Chronic kidney disease, stage III (moderate) ([facility])     Hypertensive kidney disease with CKD stage III (HCC)     Depression     SOBOE (shortness of breath on exertion)     Abnormal cardiovascular stress test     Fracture of epiphysis (separation) (upper) of neck of femur, closed (HCC)     Pressure injury of skin of coccygeal region     Sepsis ([address]      Plan:     1. Reviewed POC blood glucose .  Labs and X ray results 2. Reviewed Current Medicines   3. On meal/ Correction bolus Humalog/ Basal Lantus Insulin regime /  4. Monitor Blood glucose frequently   5. Modified  the dose of Insulin/ other medicines as needed   6. Will follow     .      Christian Lopez MD

## 2019-10-12 NOTE — PROGRESS NOTES
Date    K 3.4 10/12/2019     Calcium:    Lab Results   Component Value Date    CALCIUM 7.8 10/12/2019     Warfarin PT/INR:  No components found for: Mirian Montano  PTT:    Lab Results   Component Value Date    APTT 29.1 05/03/2017   [APTT  Last 3 Troponin:  No results found for: TROPONINI  ABG:    Lab Results   Component Value Date    FRL6CSI 32.0 09/20/2019    PO2ART 89 09/20/2019    DTH6RDI 22.2 09/20/2019         Assessment:     Principal Problem:    Sepsis ([facility] [address]  Active Problems:    Hypertension    Hyperlipidemia    Asthma    Diabetes mellitus ([facility] [address]    Obesity    Chronic kidney disease, stage III (moderate) (HCC)    Depression    Pressure injury of skin of coccygeal region    PVC (premature ventricular contraction)    Septicemia ([facility] [address]  Resolved Problems:    * No resolved hospital problems. *      Plan:   Decubitus Ulcer-status post excisional debridement of sacral pressure ulcer on 9/23/2019; status post hip incision and drainage, decubitus ulcer debridement, removal of the coccyx, wound VAC placement on 9/29/2019. Osteomyelitis Coccyx  Enterococcus Sacral wound  Staph aureus-Hip wound  Continue IV antibiotics  Noted po KCl replacement. Was discharged to SNF but patient did not want to go to [facility] so discharge cancelled  Noted Case workers efforts to make arrangements for patient discharge.   Can be sent to [facility] when ever transportation can be arranged    Martha TORRES M.D.  10/12/2019

## 2019-10-12 NOTE — CARE COORDINATION
LSW called [facility] [phone] and had to leave a message asking for a return call. LSW called [facility] and they do not have a bariatric transport available today. LSW is not able to get transportation. Pt will have to stay and [facility] will have to  restart his precert on Monday.

## 2019-10-13 NOTE — PROGRESS NOTES
mometasone-formoterol  2 puff Inhalation BID    buPROPion  300 mg Oral Daily    canagliflozin  300 mg Oral Daily    ezetimibe  10 mg Oral Daily    fluticasone  1 spray Each Nostril Daily    ocuvite-lutein  1 tablet Oral Daily    pantoprazole  40 mg Oral QAM AC    sertraline  100 mg Oral BID    b complex-C-E-zinc  1 tablet Oral Daily    vitamin C  1,000 mg Oral Daily    vitamin E  400 Units Oral Daily    enoxaparin  40 mg Subcutaneous Daily      Infusions:    sodium chloride      dextrose           Objective:   Vitals: /73   Pulse 98   Temp 99.2 °F (37.3 °C) (Oral)   Resp 28   Ht 6' 3\" (1.905 m)   Wt (!) 402 lb (182.3 kg)   SpO2 95%   BMI 50.25 kg/m²   General appearance: alert confused cooperative with exam  Neck: no JVD or bruit  Thyroid : Normal lobes   Lungs: Has Vesicular Breath sounds   Heart:  regular rate and rhythm  Abdomen: soft, non-tender; bowel sounds normal; no masses,  no organomegaly  Musculoskeletal: Normal  Extremities: extremities normal, , no edema decubitus ulcers extensive on gluteal region , has wound vacuum   Neurologic:  Awake, confused, oriented to name, place and time. Cranial nerves II-XII are grossly intact. Motor is  intact. Sensory neuropathy. ,  and gait i normal and mostly bed ridden    Assessment:     Patient Active Problem List:     Hypertension     Hyperlipidemia     Asthma     Diabetes mellitus ([address]     H/O cardiovascular stress test     Obesity     Chronic kidney disease, stage III (moderate) (HCC)     Hypertensive kidney disease with CKD stage III (HCC)     Depression     SOBOE (shortness of breath on exertion)     Abnormal cardiovascular stress test     Fracture of epiphysis (separation) (upper) of neck of femur, closed (HCC)     Pressure injury of skin of coccygeal region     Sepsis ([address]      Plan:     1. Reviewed POC blood glucose . Labs and X ray results   2. Reviewed Current Medicines   3.  On meal/ Correction bolus Humalog/ Basal Lantus Insulin regime /  4. Monitor Blood glucose frequently   5. Modified  the dose of Insulin/ other medicines as needed   6. Will follow     .      Vesta Tineo MD

## 2019-10-13 NOTE — PROGRESS NOTES
50.25 kg/m²   General appearance: alert and cooperative. Lungs: clear to auscultation bilaterally  Heart: regular rate and rhythm, S1, S2 normal, no murmur, click, rub or gallop  Abdomen: soft, non-tender; bowel sounds normal; no masses,  no organomegaly  Extremities: extremities normal, atraumatic, no cyanosis or edema  Skin: Skin color, texture, turgor normal. No rashes or lesions        Labs and imaging reviewed.    CBC with Differential:    Lab Results   Component Value Date    WBC 13.0 10/10/2019    RBC 3.57 10/10/2019    HGB 10.5 10/10/2019    HCT 34.2 10/10/2019     10/10/2019    MCV 95.8 10/10/2019    MCH 29.4 10/10/2019    MCHC 30.7 10/10/2019    RDW 14.6 10/10/2019    SEGSPCT 82.4 10/04/2019    BANDSPCT 3 09/29/2019    LYMPHOPCT 7.0 10/04/2019    PROMYELOPCT 2 09/26/2019    MONOPCT 7.0 10/04/2019    MYELOPCT 1 09/29/2019    BASOPCT 0.6 10/04/2019    MONOSABS 0.9 10/04/2019    LYMPHSABS 0.9 10/04/2019    EOSABS 0.2 10/04/2019    BASOSABS 0.1 10/04/2019    DIFFTYPE AUTOMATED DIFFERENTIAL 10/04/2019     CMP:    Lab Results   Component Value Date     10/12/2019    K 3.4 10/12/2019     10/12/2019    CO2 29 10/12/2019    BUN 13 10/12/2019    CREATININE 1.0 10/12/2019    GFRAA >60 10/12/2019    LABGLOM >60 10/12/2019    GLUCOSE 128 10/12/2019    PROT 6.1 09/20/2019    LABALBU 2.3 10/04/2019    CALCIUM 7.8 10/12/2019    BILITOT 1.1 09/20/2019    ALKPHOS 143 09/20/2019     09/20/2019    ALT 98 09/20/2019     BMP:    Lab Results   Component Value Date     10/12/2019    K 3.4 10/12/2019     10/12/2019    CO2 29 10/12/2019    BUN 13 10/12/2019    LABALBU 2.3 10/04/2019    CREATININE 1.0 10/12/2019    CALCIUM 7.8 10/12/2019    GFRAA >60 10/12/2019    LABGLOM >60 10/12/2019    GLUCOSE 128 10/12/2019     Sodium:    Lab Results   Component Value Date     10/12/2019     Potassium:    Lab Results   Component Value Date    K 3.4 10/12/2019     Calcium:    Lab Results   Component Value Date    CALCIUM 7.8 10/12/2019     Warfarin PT/INR:  No components found for: Lilia Nelson  PTT:    Lab Results   Component Value Date    APTT 29.1 05/03/2017   [APTT  Last 3 Troponin:  No results found for: TROPONINI  ABG:    Lab Results   Component Value Date    XZM2RLI 32.0 09/20/2019    PO2ART 89 09/20/2019    DWV9UUW 22.2 09/20/2019         Assessment:     Principal Problem:    Sepsis ([facility] [address]  Active Problems:    Hypertension    Hyperlipidemia    Asthma    Diabetes mellitus ([facility] [address]    Obesity    Chronic kidney disease, stage III (moderate) (HCC)    Depression    Pressure injury of skin of coccygeal region    PVC (premature ventricular contraction)    Septicemia ([facility] [address]  Resolved Problems:    * No resolved hospital problems. *      Plan:   Decubitus Ulcer-status post excisional debridement of sacral pressure ulcer on 9/23/2019; status post hip incision and drainage, decubitus ulcer debridement, removal of the coccyx, wound VAC placement on 9/29/2019. Osteomyelitis Coccyx  Enterococcus Sacral wound  Staph aureus-Hip wound  Continue IV antibiotics  Noted po KCl replacement. Was discharged to SNF but patient did not want to go to [place] so discharge cancelled  Noted Case workers efforts to make arrangements for patient discharge. Can be sent to [place] when ever transportation can be arranged  Fecal incontinence inspite of rectal tube.  Discontinue rectal tube and try bed stephanie TORRES M.D.  10/13/2019

## 2019-10-13 NOTE — PROGRESS NOTES
Nephrology Progress Note  10/13/2019 9:09 AM  Subjective:   Admit Date: 9/20/2019  PCP: Damon Bernal MD  Interval History: pt awake weak     Diet: DIET CARB CONTROL;  Pain is: Moderate      Data:   Scheduled Meds:   insulin glargine  10 Units Subcutaneous Nightly    collagenase   Topical Daily    sodium chloride flush  10 mL Intravenous 2 times per day    pravastatin  80 mg Oral Nightly    fluconazole  600 mg Oral Daily    sodium chloride flush  10 mL Intravenous 2 times per day    ampicillin-sulbactam  3 g Intravenous Q6H    insulin lispro  0-6 Units Subcutaneous TID WC    insulin lispro  0-6 Units Subcutaneous 2 times per day    aspirin  81 mg Oral Daily    mometasone-formoterol  2 puff Inhalation BID    buPROPion  300 mg Oral Daily    canagliflozin  300 mg Oral Daily    ezetimibe  10 mg Oral Daily    fluticasone  1 spray Each Nostril Daily    ocuvite-lutein  1 tablet Oral Daily    pantoprazole  40 mg Oral QAM AC    sertraline  100 mg Oral BID    b complex-C-E-zinc  1 tablet Oral Daily    vitamin C  1,000 mg Oral Daily    vitamin E  400 Units Oral Daily    enoxaparin  40 mg Subcutaneous Daily     Continuous Infusions:   sodium chloride      dextrose       PRN Meds:sodium chloride flush, acetaminophen, magnesium hydroxide, sodium chloride flush, calcium carbonate, cloNIDine, HYDROmorphone, glucose, dextrose, glucagon (rDNA), dextrose, acetaminophen, albuterol sulfate HFA, traMADol **OR** traMADol, ondansetron  I/O last 3 completed shifts: In: 1400 [P.O.:1200; I.V.:200]  Out: 2500 [Urine:1000; Drains:475; UBADA:0175]  I/O this shift:  In: -   Out: 500 [Drains:500]    Intake/Output Summary (Last 24 hours) at 10/13/2019 0909  Last data filed at 10/13/2019 0848  Gross per 24 hour   Intake 1400 ml   Output 2525 ml   Net -1125 ml     CBC:   No results for input(s): WBC, HGB, PLT in the last 72 hours.   BMP:    Recent Labs     10/12/19  0507      K 3.4*      CO2 29   BUN 13 CREATININE 1.0   GLUCOSE 128*     Hepatic:   No results for input(s): AST, ALT, ALB, BILITOT, ALKPHOS in the last 72 hours. Troponin: No results for input(s): TROPONINI in the last 72 hours. BNP: No results for input(s): BNP in the last 72 hours. Lipids: No results for input(s): CHOL, HDL in the last 72 hours. Invalid input(s): LDLCALCU  ABGs:   Lab Results   Component Value Date    PO2ART 89 09/20/2019    AGJ1KZH 32.0 09/20/2019     INR: No results for input(s): INR in the last 72 hours. Renal Labs  Albumin:    Lab Results   Component Value Date    LABALBU 2.3 10/04/2019     Calcium:    Lab Results   Component Value Date    CALCIUM 7.8 10/12/2019     Phosphorus:    Lab Results   Component Value Date    PHOS 3.5 10/10/2019     U/A:    Lab Results   Component Value Date    NITRU NEGATIVE 09/21/2019    NITRU Negative 10/24/2018    COLORU MICHAEL 09/21/2019    PHUR 5.5 10/24/2018    LABCAST Present 12/12/2016    LABCAST Hyaline casts 12/12/2016    WBCUA 125 09/21/2019    RBCUA 29 09/21/2019    MUCUS RARE 09/21/2019    TRICHOMONAS NONE SEEN 09/06/2019    YEAST Present 12/12/2016    BACTERIA NEGATIVE 09/21/2019    CLARITYU SLIGHTLY CLOUDY 09/21/2019    SPECGRAV 1.010 09/21/2019    UROBILINOGEN <2.0 09/21/2019    BILIRUBINUR NEGATIVE 09/21/2019    BLOODU MODERATE 09/21/2019    GLUCOSEU 3+ 10/24/2018    KETUA NEGATIVE 09/21/2019     ABG:    Lab Results   Component Value Date    NGM2XGM 32.0 09/20/2019    PO2ART 89 09/20/2019    DJI8ERT 22.2 09/20/2019     HgBA1c:    Lab Results   Component Value Date    LABA1C 6.1 09/04/2019     Microalbumen/Creatinine ratio:  No components found for: RUCREAT          Objective:   Vitals: /74   Pulse 90   Temp 98.7 °F (37.1 °C) (Oral)   Resp 24   Ht 6' 3\" (1.905 m)   Wt (!) 402 lb (182.3 kg)   SpO2 95%   BMI 50.25 kg/m²   General appearance: awake weak  HEENT: Head: Normal, normocephalic, atraumatic.   Neck: supple, symmetrical, trachea midline  Lungs: diminished breath sounds bilaterally  Heart: S1, S2 normal  Abdomen: abnormal findings:  soft nt obese  Extremities: edema +  Neurologic: Mental status: alertness: awake      Patient Active Problem List:     Hypertension     Hyperlipidemia     Asthma     Diabetes mellitus ([address]     H/O cardiovascular stress test     Obesity     Chronic kidney disease, stage III (moderate) (HCC)     Hypertensive kidney disease with CKD stage III (HCC)     Depression     SOBOE (shortness of breath on exertion)     Abnormal cardiovascular stress test     Fracture of epiphysis (separation) (upper) of neck of femur, closed (HCC)     Pressure ulcer of coccygeal region, unstageable (HCC)     Sepsis (HCC)    Assessment and Plan:      IMP:  1 hypokalemia  2 s/p hip surgery with wound infection  3 arf from atn/contrast on ckd 3  4 anxiety/confusion  5 resp distress with hypoxia    Plan     1 fu K in am   2 plan rehab in am  3 renal stable overall  4 affect stable  5 o2 stable  Dc plan ecf in am         Silvia Reece MD

## 2019-10-14 NOTE — CONSULTS
[address] [id number] Continence Nurse  Consult Note       Irma Roth  AGE: 77 y.o.    GENDER: male  : 1950  TODAY'S DATE:  10/14/2019    Subjective:     Reason for  Evaluation and Assessment: wound vac change      Irma Roth is a 77 y.o. male referred by:   [x] Physician  [] Nursing  [] Other:     Wound Identification:  Wound Type: pressure and non-healing surgical  Contributing Factors: diabetes, chronic pressure, decreased mobility, shear force, obesity and incontinence of stool        PAST MEDICAL HISTORY        Diagnosis Date    Arthritis     Asthma     Chronic kidney disease     stage 3, seen by Dr. Julia Urrutia Diabetes mellitus ([facility]     H/O cardiac catheterization 2017    H/O cardiovascular stress test ,     CARDIOLITE: EF->51%    Hyperlipidemia     Hypertension     Obesity     [facility] ulcer [facility]geal region[facility] un[facility]able [facility] 09/10/2019    Thyroid disease        PAST SURGICAL HISTORY    Past Surgical History:   Procedure Laterality Date    CARPAL TUNNEL RELEASE      DIAGNOSTIC CARDIAC CATH LAB PROCEDURE      NO SIGNIFICANT CAD    EYE SURGERY      HEMIARTHROPLASTY HIP Right 2019    HIP HEMIARTHROPLASTY performed by Lakesha Nolasco MD at [address] [place] [address] Right 2019    HIP INCISION AND DRAINAGE performed by Lakesha Nolasco MD at [address] [place] [address]  2019    of stage 4 wound on coccyx, by Dr. Luis Foster N/A 2019    EXCISIONAL DEBRIDEMENT OF SACRAL PRESSURE ULCER performed by Enrico Benjamin MD at [address] [place] [address] N/A 2019    DECUBITUS ULCER DEBRIDEMENT REPAIR performed by Mitali Johnson MD at [address] [place] [address]    Family History   Problem Relation Age of Onset    High Blood Pressure Mother     Cancer Mother     Cancer Father     Stroke Maternal Grandfather     Diabetes Paternal MG EC tablet Take 81 mg by mouth daily.              Objective:      /61   Pulse 91   Temp 97.6 °F (36.4 °C) (Oral)   Resp 19   Ht 6' 3\" (1.905 m)   Wt (!) 401 lb (181.9 kg)   SpO2 98%   BMI 50.12 kg/m²   Alfonso Risk Score: Alfonso Scale Score: 14    LABS    CBC:   Lab Results   Component Value Date    WBC 13.0 10/10/2019    RBC 3.57 10/10/2019    HGB 10.5 10/10/2019    HCT 34.2 10/10/2019    MCV 95.8 10/10/2019    MCH 29.4 10/10/2019    MCHC 30.7 10/10/2019    RDW 14.6 10/10/2019     10/10/2019    MPV 9.3 10/10/2019     CMP:    Lab Results   Component Value Date     10/14/2019    K 3.7 10/14/2019     10/14/2019    CO2 27 10/14/2019    BUN 12 10/14/2019    CREATININE 1.0 10/14/2019    GFRAA >60 10/14/2019    LABGLOM >60 10/14/2019    GLUCOSE 116 10/14/2019    PROT 6.1 09/20/2019    LABALBU 2.3 10/04/2019    CALCIUM 7.9 10/14/2019    BILITOT 1.1 09/20/2019    ALKPHOS 143 09/20/2019     09/20/2019    ALT 98 09/20/2019     Albumin:    Lab Results   Component Value Date    LABALBU 2.3 10/04/2019     PT/INR:    Lab Results   Component Value Date    PROTIME 11.5 05/03/2017    PROTIME 10.6 01/26/2012    INR 1.01 05/03/2017     HgBA1c:    Lab Results   Component Value Date    LABA1C 6.1 09/04/2019         Assessment:     Patient Active Problem List   Diagnosis    Hypertension    Hyperlipidemia    Asthma    Diabetes mellitus ([address]    H/O cardiovascular stress test    Obesity    Chronic kidney disease, stage III (moderate) (HCC)    Hypertensive kidney disease with CKD stage III (HCC)    Depression    SOBOE (shortness of breath on exertion)    Abnormal cardiovascular stress test    Fracture of epiphysis (separation) (upper) of neck of femur, closed ([facility]    Pressure injury of skin of coccygeal region    Sepsis ([address]    PVC (premature ventricular contraction)    Septicemia (HCC)       Measurements:  Negative Pressure Wound Therapy Hip Right (Active)   Wound Type Surgical 10/14/2019 10:30 AM   Unit Type KCI 10/14/2019 10:30 AM   Dressing Type Silver impregnated foam 10/14/2019 10:30 AM   Number of pieces used 1 10/14/2019 10:30 AM   Cycle Continuous 10/14/2019 10:30 AM   Target Pressure (mmHg) Other (Comment) 10/14/2019 10:30 AM   Irrigation Solution Normal Saline 10/13/2019  8:48 AM   Canister changed? No 10/14/2019 10:30 AM   Dressing Status Changed 10/14/2019 10:30 AM   Dressing Changed Changed/New 10/14/2019 10:30 AM   Drainage Amount Moderate 10/14/2019 10:30 AM   Drainage Description Serosanguinous 10/14/2019 10:30 AM   Dressing Change Due 10/09/19 10/8/2019 11:08 PM   Output (ml) 425 ml 10/14/2019  9:13 AM   Wound Assessment Red;Pink;Yellow 10/14/2019 10:30 AM   Annika-wound Assessment Dry;Clean 10/14/2019 10:30 AM   Odor None 10/14/2019 10:30 AM   Number of days: 14       Negative Pressure Wound Therapy Coccyx (Active)   Wound Type Pressure ulcer: Stage IV 10/14/2019 10:30 AM   Unit Type KCI 10/14/2019 10:30 AM   Dressing Type Other (Comment) 10/14/2019 10:30 AM   Number of pieces used 4 10/14/2019 10:30 AM   Cycle Intermittent 10/14/2019 10:30 AM   Target Pressure (mmHg) Other (Comment) 10/14/2019 10:30 AM   Irrigation Solution Normal Saline 10/14/2019 10:30 AM   Dwell Volume 16 mL 10/14/2019 10:30 AM   Soak Duration 2 minutes 10/14/2019 10:30 AM   Soak Frequency 2 hours 10/14/2019 10:30 AM   Canister changed? No 10/14/2019 10:30 AM   Dressing Status Changed 10/14/2019 10:30 AM   Dressing Changed Changed/New 10/14/2019 10:30 AM   Drainage Amount Moderate 10/14/2019 10:30 AM   Drainage Description Serosanguinous 10/14/2019 10:30 AM   Dressing Change Due 10/09/19 10/8/2019 11:08 PM   Output (ml) 125 ml 10/5/2019  6:12 AM   Wound Assessment Red;Yellow;Brown 10/14/2019 10:30 AM   Annika-wound Assessment Yellow-brown; Maceration; Red 10/14/2019 10:30 AM   Odor None 10/14/2019 10:30 AM   Number of days: 12       Wound 09/21/19 Coccyx (Active)   Wound Pressure Stage  4 10/14/2019 10:30 AM   Dressing Status Changed 10/14/2019 10:30 AM   Dressing Changed Changed/New 10/14/2019 10:30 AM   Dressing/Treatment Vacuum dressing 10/14/2019 10:30 AM   Wound Cleansed Rinsed/Irrigated with saline 10/14/2019 10:30 AM   Dressing Change Due 10/09/19 10/8/2019 11:08 PM   Wound Length (cm) 10 cm 10/14/2019 10:30 AM   Wound Width (cm) 8 cm 10/14/2019 10:30 AM   Wound Depth (cm) 3.5 cm 10/14/2019 10:30 AM   Wound Surface Area (cm^2) 80 cm^2 10/14/2019 10:30 AM   Change in Wound Size % (l*w) 13.04 10/14/2019 10:30 AM   Wound Volume (cm^3) 280 cm^3 10/14/2019 10:30 AM   Wound Healing % -103 10/14/2019 10:30 AM   Distance Tunneling (cm) 0 cm 10/14/2019 10:30 AM   Tunneling Position ___ O'Clock 0 10/14/2019 10:30 AM   Undermining Starts ___ O'Clock 10 10/14/2019 10:30 AM   Undermining Ends___ O'Clock 5 10/14/2019 10:30 AM   Undermining Maxium Distance (cm) 4.6 10/14/2019 10:30 AM   Wound Assessment Yellow;Red;Brown 10/14/2019 10:30 AM   Drainage Amount Moderate 10/14/2019 10:30 AM   Drainage Description Serosanguinous 10/14/2019 10:30 AM   Odor None 10/14/2019 10:30 AM   Margins Defined edges 10/14/2019 10:30 AM   Exposed structure Muscle 10/14/2019 10:30 AM   Annika-wound Assessment Maceration;Red;Yellow-brown 10/14/2019 10:30 AM   Non-staged Wound Description Full thickness 10/14/2019 10:30 AM   Red%Wound Bed 50 10/14/2019 10:30 AM   Yellow%Wound Bed 30 10/14/2019 10:30 AM   Black%Wound Bed 10 10/8/2019 11:08 PM   Purple%Wound Bed 80 10/8/2019 11:08 PM   Other%Wound Bed 20 10/14/2019 10:30 AM   Number of days: 23       Wound 09/21/19 Hip Left cluster/DTI now with stage 2 (Active)   Wound Deep tissue/Injury 10/11/2019  9:30 AM   Dressing Status Clean;Dry; Intact 10/14/2019  9:13 AM   Dressing Changed Changed/New 10/11/2019  9:30 AM   Dressing/Treatment Other (comment) 10/14/2019  9:13 AM   Wound Cleansed Rinsed/Irrigated with saline 10/11/2019  9:30 AM   Dressing Change Due 09/27/19 10/8/2019 11:08 PM   Wound Length (cm) 4 cm 10/4/2019  9:20 AM   Wound Width (cm) 3.5 cm 10/4/2019  9:20 AM   Wound Depth (cm) 0.1 cm 10/4/2019  9:20 AM   Wound Surface Area (cm^2) 14 cm^2 10/4/2019  9:20 AM   Change in Wound Size % (l*w) 73.08 10/4/2019  9:20 AM   Wound Volume (cm^3) 1.4 cm^3 10/4/2019  9:20 AM   Distance Tunneling (cm) 0 cm 10/7/2019  9:59 AM   Tunneling Position ___ O'Clock 0 10/7/2019  9:59 AM   Undermining Starts ___ O'Clock 0 10/7/2019  9:59 AM   Undermining Ends___ O'Clock 0 10/7/2019  9:59 AM   Undermining Maxium Distance (cm) 0 10/7/2019  9:59 AM   Wound Assessment Pink;Purple;Yellow 10/14/2019  9:13 AM   Drainage Amount None 10/14/2019  9:13 AM   Drainage Description Serosanguinous 10/11/2019  9:30 AM   Odor None 10/11/2019  9:30 AM   Margins Undefined edges 10/11/2019  9:30 AM   Annika-wound Assessment Pink 10/11/2019  9:30 AM   Non-staged Wound Description Full thickness 10/11/2019  9:30 AM   [place]%Wound Bed 20 10/11/2019  9:30 AM   Yellow%Wound Bed 40 10/11/2019  9:30 AM   Black%Wound Bed 40 10/11/2019  9:30 AM   Purple%Wound Bed 40 10/8/2019 11:08 PM   Other%Wound Bed 60 10/4/2019  9:20 AM   Number of days: 23       Wound 10/01/19 Hip Right (Active)   Wound Non-Healing Surgical 10/14/2019 10:30 AM   Dressing Status Changed 10/14/2019 10:30 AM   Dressing Changed Changed/New 10/14/2019 10:30 AM   Dressing/Treatment Vacuum dressing 10/14/2019 10:30 AM   Wound Cleansed Rinsed/Irrigated with saline 10/14/2019 10:30 AM   Dressing Change Due 10/09/19 10/8/2019 11:08 PM   Wound Length (cm) 18 cm 10/14/2019 10:30 AM   Wound Width (cm) 2.9 cm 10/14/2019 10:30 AM   Wound Depth (cm) 4 cm 10/14/2019 10:30 AM   Wound Surface Area (cm^2) 52.2 cm^2 10/14/2019 10:30 AM   Change in Wound Size % (l*w) 27.5 10/14/2019 10:30 AM   Wound Volume (cm^3) 208.8 cm^3 10/14/2019 10:30 AM   Wound Healing % 17 10/14/2019 10:30 AM   Distance Tunneling (cm) 0 cm 10/14/2019 10:30 AM   Tunneling Position ___ O'Clock 0 10/14/2019 10:30 AM   Undermining Starts ___ O'Clock 0 10/14/2019 10:30 AM   Undermining Ends___ O'Clock 0 10/14/2019 10:30 AM   Undermining Maxium Distance (cm) 0 10/14/2019 10:30 AM   Wound Assessment Yellow;Red;Pink 10/14/2019 10:30 AM   Drainage Amount Moderate 10/14/2019 10:30 AM   Drainage Description Serosanguinous 10/14/2019 10:30 AM   Odor None 10/14/2019 10:30 AM   Margins Defined edges 10/14/2019 10:30 AM   Dash-wound Assessment Clean; Intact 10/14/2019 10:30 AM   Non-staged Wound Description Full thickness 10/14/2019 10:30 AM   [place]%Wound Bed 50 10/14/2019 10:30 AM   Red%Wound Bed 30 10/14/2019 10:30 AM   Yellow%Wound Bed 20 10/14/2019 10:30 AM   Number of days: 12       Wound 10/04/19 Thigh Anterior;Left;Proximal intact blister (Active)   Wound Other 10/10/2019 12:03 PM   Dressing Status Clean;Dry; Intact 10/14/2019  9:13 AM   Dressing Changed Changed/New 10/11/2019  9:30 AM   Dressing/Treatment Other (comment) 10/14/2019  9:13 AM   Wound Cleansed Rinsed/Irrigated with saline 10/11/2019  9:30 AM   Wound Length (cm) 1.5 cm 10/4/2019  9:20 AM   Wound Width (cm) 2.5 cm 10/4/2019  9:20 AM   Wound Depth (cm) 0 cm 10/4/2019  9:20 AM   Wound Surface Area (cm^2) 3.75 cm^2 10/4/2019  9:20 AM   Wound Volume (cm^3) 0 cm^3 10/4/2019  9:20 AM   Wound Assessment Fluid filled blister 10/12/2019  2:20 AM   Drainage Amount None 10/14/2019  9:13 AM   Drainage Description Yellow 10/12/2019  2:20 AM   Odor None 10/14/2019  9:13 AM   Margins Attached edges 10/11/2019  9:30 AM   Dash-wound Assessment Clean;Dry; Intact 10/14/2019  9:13 AM   Number of days: 10       Response to treatment:  With complaints of pain. Pain Assessment:  Severity:  6/10  Quality of pain: sharp  Wound Pain Timing/Severity: intermittent  Premedicated: yes    Plan:     Plan of Care:     [  Wound 10/01/19 Hip Right-Dressing/Treatment: Vacuum dressing(duoderm to dash wound)  Wound 09/21/19 Coccyx-Dressing/Treatment: Vacuum dressing(duoderm, aquacel, and stoma rings to dash wound)    Pt in bed. Nurse in room. Stated having trouble with seal on coccyx dressing. Lower part of dressing not secure. Pt incontinent of stool. Annika care done and pad changed. Dressings to right hip and coccyx removed. Pictures taken and measurements. Cleansed all with NS. Duoderm applied periwound to right hip and silver foam applied. Aquacel applied to open areas annika wound then duoderm and ostomy rings applied to distal wound to separate from anus. Veraflo cleanse choice dressing applied and drape. Adequate seal obtained. Settings to 16 ml NS soak for 2 minutes every 2 hours. Natasha Sorto I representative suggested increasing pressure to 150 mmHg for better results. Perfectserve Dr. Ifeanyi Martinez and he is agreeable. Pt incontinent of BM again so annika care done and pad changed. Repositioned in bed with pillow support. Heels intact. Pt stated is planning on going to [place] soon. Specialty Bed Required : yes  [x] Low Air Loss   [] Pressure Redistribution  [] Fluid Immersion  [x] Bariatric  [] Total Pressure Relief  [] Other:     Discharge Plan:  Placement for patient upon discharge: SNF  Hospice Care: no  Patient appropriate for Outpatient [address]: [facility]    Patient/Caregiver Teaching:  Level of patient/caregiver understanding able to:   Voiced understanding regarding wound care.         Electronically signed by Baylee Mirza RN,  on 10/14/2019 at 11:29 AM

## 2019-10-14 NOTE — PROGRESS NOTES
139   K 3.4* 3.7    101   CO2 29 27   BUN 13 12   CREATININE 1.0 1.0   GLUCOSE 128* 116*     Hepatic:   No results for input(s): AST, ALT, ALB, BILITOT, ALKPHOS in the last 72 hours. Troponin: No results for input(s): TROPONINI in the last 72 hours. BNP: No results for input(s): BNP in the last 72 hours. Lipids: No results for input(s): CHOL, HDL in the last 72 hours. Invalid input(s): LDLCALCU  ABGs:   Lab Results   Component Value Date    PO2ART 89 09/20/2019    [id number] 32.0 09/20/2019     INR: No results for input(s): INR in the last 72 hours. Renal Labs  Albumin:    Lab Results   Component Value Date    LABALBU 2.3 10/04/2019     Calcium:    Lab Results   Component Value Date    CALCIUM 7.9 10/14/2019     Phosphorus:    Lab Results   Component Value Date    PHOS 3.5 10/10/2019     U/A:    Lab Results   Component Value Date    NITRU NEGATIVE 09/21/2019    NITRU Negative 10/24/2018    COLORU MICHAEL 09/21/2019    PHUR 5.5 10/24/2018    LABCAST Present 12/12/2016    LABCAST Hyaline casts 12/12/2016    WBCUA 125 09/21/2019    RBCUA 29 09/21/2019    MUCUS RARE 09/21/2019    TRICHOMONAS NONE SEEN 09/06/2019    YEAST Present 12/12/2016    BACTERIA NEGATIVE 09/21/2019    CLARITYU SLIGHTLY CLOUDY 09/21/2019    SPECGRAV 1.010 09/21/2019    UROBILINOGEN <2.0 09/21/2019    BILIRUBINUR NEGATIVE 09/21/2019    BLOODU MODERATE 09/21/2019    GLUCOSEU 3+ 10/24/2018    KETUA NEGATIVE 09/21/2019     ABG:    Lab Results   Component Value Date    RZE3JAZ 32.0 09/20/2019    PO2ART 89 09/20/2019    YZV1RTR 22.2 09/20/2019     HgBA1c:    Lab Results   Component Value Date    LABA1C 6.1 09/04/2019     Microalbumen/Creatinine ratio:  No components found for: RUCREAT          Objective:   Vitals: /61   Pulse 91   Temp 97.6 °F (36.4 °C) (Oral)   Resp 19   Ht 6' 3\" (1.905 m)   Wt (!) 401 lb (181.9 kg)   SpO2 98%   BMI 50.12 kg/m²   General appearance: awake weak  HEENT: Head: Normal, normocephalic, atraumatic.   Neck:

## 2019-10-14 NOTE — PROGRESS NOTES
General Surgery-Dr. Alyse Tyson Day: 25    Chief Complaint on Admission: infected decub ulcer      Subjective:     Weekend events noted. Pt without complaints today. Had rectal wilkerson removed. Had wound vac changed. Denies current pain. Tolerating PO. Denies N/V. Denies F/C.    ROS:  Review of Systems   Constitutional: Positive for fatigue. Negative for fever. HENT: Negative. Eyes: Negative. Respiratory: Negative. Cardiovascular: Negative. Gastrointestinal: Negative. Endocrine: Negative. Genitourinary: Negative. Musculoskeletal: Positive for myalgias. Skin: Positive for wound. Allergic/Immunologic: Negative. Neurological: Negative. Hematological: Negative. Psychiatric/Behavioral: Negative. Allergies  Bee venom          Diagnosis Date    Arthritis     Asthma     Chronic kidney disease     stage 3, seen by Dr. Haydee Santos Diabetes mellitus ([facility]     H/O cardiac catheterization 2017    H/O cardiovascular stress test ,     CARDIOLITE: EF->51%    Hyperlipidemia     Hypertension     Obesity     Pressure ulcer of coccygeal region, unstageable ([facility] 09/10/2019    Thyroid disease        Objective:     Vitals:    10/14/19 0809   BP: 105/61   Pulse: 91   Resp: 19   Temp: 97.6 °F (36.4 °C)   SpO2:        TEMPERATURE:  Current -Temp: 97.6 °F (36.4 °C); Max - Temp  Av.3 °F (36.8 °C)  Min: 97.6 °F (36.4 °C)  Max: 99.2 °F (37.3 °C)    I/O this shift:  In: -   Out: 425 [Drains:425]I/O last 3 completed shifts: In: 680 [P.O.:480; I.V.:200]  Out: 1050 [Urine:550; Drains:500]      Physical Exam:  Physical Exam   Laying in bed on back. NAD. Comfortable at rest.  AT. NC.  EOMI. PERRLA. Breathing unlabored. RRR. Obese, soft, NT, ND, no PS.  +Wilkerson. Irrigating Wound vac in place with good seals. Healthy wound edges. The drainage is light s/s. FRANZ. Warm, dry.        Scheduled Meds:   rOPINIRole  1 mg Oral Nightly    insulin glargine  10 Units ulcer (9/23) and repeat I&D of right hip (9/29) and repeat debridement and excision of coccyx (9/29)    Plan:     -Pt awaiting transport to rehab. Supposedly happening today. F/u with me in 7-10 days.      -Wound vac changes as scheduled. Continue irrigating wound vac to sacral region. Appreciate Kemi Sylvester with [facility] for irrigating vac supplies. -ID consult reviewed and Abx changes noted. Appreciate input and recs. Pt going to be on Unasyn and fluconazole for 4 weeks (ending 10/29/19). -Medical mgt    -PT/OT    -Diet as tolerated    -Once d/c'd, pt should f/u with me in office in 7-10 days. D/w pt that ideally he would not need rectal tube and be able to use bed pan or bedside toilet. He has been cleared by Ortho for WBAT. Concern with rectal tube is stool is possibly getting into wound. D/w pt that if he is not able to use bed pain or bedside toilet that he may ultimately require diverting colostomy. He does not currently want this.          Electronically signed by Satya Harrison II, MD on 10/14/2019 at 11:57 AM

## 2019-10-14 NOTE — DISCHARGE SUMMARY
Physician Discharge Summary     Patient ID:  Stephany Fitch  [id number]  89 y.o.  1950    Admit date: 9/20/2019    Discharge date and time: 10/14/2019  3:45 PM     Admitting Physician: Arturo Nava MD     Discharge Physician: Shavon Perez      Admission Diagnoses: Septicemia ([facility] [A41.9]  Hypoxia [R09.02]  Elevated liver enzymes [R74.8]  Sepsis ([facility] [A41.9]  Fever, unspecified fever cause [R50.9]  Leukocytosis, unspecified type [D72.829]  Infected decubitus ulcer, unspecified ulcer stage [L89.90, L08.9]    Discharge Diagnoses: Septicemia ([facility] [A41.9]  Hypoxia [R09.02]  Elevated liver enzymes [R74.8]  Sepsis ([facility] [A41.9]  Fever, unspecified fever cause [R50.9]  Leukocytosis, unspecified type [D72.829]  Infected decubitus ulcer, unspecified ulcer stage [L89.90, L08  Hypertension    Hyperlipidemia    Asthma    Diabetes mellitus ([facility]    Obesity    Chronic kidney disease, stage III (moderate) ([facility]    Depression    s/p [facility] surgery    Hospital Course: Patient admitted with sepsis and treated per sepsis protocol with IV fluids, IV antibiotics. Wound care consulted for his decub ulcer, Dr. Vida Weaver consult for renal failure, Dr. Isacc Chery consulted for his hip wound, SSI for his DM. He was placed on IV Vanc and cefipime and surgeon debrided his sacral wound and placed him on wound vac. 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 Patient improved with debridement and antibiotics and he was transferred out of ICU to Step down on the 24th. His renal function improved. ID was consulted as there was concern for osteomyelitis. ID added Flagyll to antibiotics. Patient had osteomyelitis of coccygeal wound. Enterococcus grown from sacral wound and Staph aureus from Hip wound. After prolonged hospitalisation he was discharged to SNF on Unasyn for 4 weeks and Flucanazole. He was to follow up with surgery in 7-10 days.       Discharged Condition: fair    Consults: cardiology, nephrology, general surgery, orthopedic surgery, ID and Wound care    Significant Diagnostic Studies: microbiology: blood culture: negative,  and wound culture: positive for enterococcus and Staph Aureus    Treatments: antibiotics: vancomycin, Unasyn, metronidazole and cefipime and surgery: Debridement    Disposition: [facility]    Patient Instructions:   Current Discharge Medication List      START taking these medications    Details   rOPINIRole (REQUIP) 1 MG tablet Take 1 tablet by mouth nightly  Qty: 30 tablet, Refills: 0      calcium carbonate (TUMS) 500 MG chewable tablet Take 1 tablet by mouth 2 times daily  Qty: 60 tablet, Refills: 0      collagenase 250 UNIT/GM ointment Apply topically daily.   Qty: 1 Tube, Refills: 0         CONTINUE these medications which have CHANGED    Details   acetaminophen (TYLENOL) 500 MG tablet Take 1 tablet by mouth every 6 hours as needed for Pain or Fever  Qty: 120 tablet, Refills: 3      insulin glargine (LANTUS) 100 UNIT/ML injection vial Inject 15 Units into the skin nightly  Qty: 1 vial, Refills: 3         CONTINUE these medications which have NOT CHANGED    Details   TRULICITY 1.5 UH/1.1EN SOPN Inject into the skin once a week  Refills: 2      nitroGLYCERIN (NITROSTAT) 0.4 MG SL tablet Place 1 tablet under the tongue every 5 minutes as needed for Chest pain  Qty: 25 tablet, Refills: 2      ezetimibe (ZETIA) 10 MG tablet Take 10 mg by mouth daily      SUPER B COMPLEX/C PO Take 1 capsule by mouth daily      Cinnamon 500 MG TABS Take 1,000 mg by mouth daily      vitamin E 400 UNIT capsule Take 400 Units by mouth daily      vitamin C (ASCORBIC ACID) 500 MG tablet Take 1,000 mg by mouth daily       insulin lispro (HUMALOG) 100 UNIT/ML injection vial Inject into the skin 3 times daily (before meals)      benazepril (LOTENSIN) 20 MG tablet Take 1 tablet by mouth daily  Qty: 90 tablet, Refills: 3      fluticasone (FLONASE) 50 MCG/ACT nasal spray as needed       Multiple Vitamins-Minerals (VISION VITAMINS PO)

## 2019-10-14 NOTE — PROGRESS NOTES
Pt going to [address] today by [employer]  transport. Report given to nurse at [facility]. Single lumen picc staying in for long term IV antibiotics, pt also going with wilkerson catheter. Pt's nephew Eleonora Kirby) called and message left to notify of discharge. Wound Vac removed and wet to dry dressings placed to coccyx wound and R hip wound.

## 2019-11-05 PROBLEM — L89.154 PRESSURE INJURY OF SACRAL REGION, STAGE 4 (HCC): Status: ACTIVE | Noted: 2019-01-01

## 2019-11-05 PROBLEM — L97.113: Status: ACTIVE | Noted: 2019-01-01

## 2019-11-05 PROBLEM — T81.89XA NONHEALING SURGICAL WOUND: Status: ACTIVE | Noted: 2019-01-01

## 2019-11-11 PROBLEM — R77.8 TROPONIN LEVEL ELEVATED: Status: ACTIVE | Noted: 2019-01-01

## 2019-11-13 PROBLEM — F01.518 VASCULAR DEMENTIA WITH BEHAVIOR DISTURBANCE: Chronic | Status: ACTIVE | Noted: 2019-01-01

## 2019-11-13 PROBLEM — F33.41 RECURRENT MAJOR DEPRESSIVE DISORDER, IN PARTIAL REMISSION (HCC): Chronic | Status: ACTIVE | Noted: 2019-01-01

## 2019-11-13 PROBLEM — F22 DELUSIONAL IDEAS (HCC): Status: ACTIVE | Noted: 2019-01-01

## 2019-12-23 NOTE — ED NOTES
Patient has a PICC line second set of blood cultures needs to be drawn on picc line     Brittani Newman  12/23/19 4572

## 2019-12-23 NOTE — ED NOTES
Bed: ED-30  Expected date:   Expected time:   Means of arrival:   Comments:  squad Johnathan Spatz  12/23/19 2636

## 2019-12-23 NOTE — PROGRESS NOTES
Medication History  [facility]    Patient Name: Maribell Sanchez 1950     Medication history has been completed by: Prabhakar Boogie CPhT    Source(s) of information: [facility]     Primary Care Physician: Vamsi Dsouza MD     Pharmacy: [facility]    Allergies as of 12/23/2019 - Review Complete 12/23/2019   Allergen Reaction Noted    Bee venom Shortness Of Breath 10/25/2017        Prior to Admission medications    Medication Sig Start Date End Date Taking? Authorizing Provider   ipratropium-albuterol (DUONEB) 0.5-2.5 (3) MG/3ML SOLN nebulizer solution Inhale 1 vial into the lungs every 6 hours   Yes Historical Provider, MD   calcium carbonate (TUMS) 500 MG chewable tablet Take 1 tablet by mouth 2 times daily 11/27/19 12/27/19 Yes Karin Giles MD   montelukast (SINGULAIR) 10 MG tablet Take 1 tablet by mouth nightly 11/27/19  Yes Della Bronson MD   memantine (NAMENDA) 5 MG tablet Take 1 tablet by mouth nightly 11/27/19  Yes Della Bronson MD   meropenem (MERREM) infusion Infuse 2,000 mg intravenously every 8 hours Compound per protocol. 11/27/19 12/28/19 Yes Della Bronson MD   furosemide (LASIX) 20 MG tablet Take 20 mg by mouth daily afternoon   Yes Historical Provider, MD   baclofen (LIORESAL) 10 MG tablet Take 10 mg by mouth every 8 hours    Yes Historical Provider, MD   fluticasone-vilanterol (BREO ELLIPTA) 100-25 MCG/INH AEPB inhaler Inhale 1 puff into the lungs daily   Yes Historical Provider, MD   furosemide (LASIX) 40 MG tablet Take 40 mg by mouth every morning    Yes Historical Provider, MD   potassium chloride (KLOR-CON M) 20 MEQ extended release tablet Take 20 mEq by mouth 3 times daily   Yes Historical Provider, MD   spironolactone (ALDACTONE) 25 MG tablet Take 25 mg by mouth every morning    Yes Historical Provider, MD   traMADol (ULTRAM) 50 MG tablet Take 50 mg by mouth every 6 hours as needed for Pain.    Yes Historical Provider, MD   rOPINIRole (REQUIP) 1 MG

## 2019-12-23 NOTE — ED PROVIDER NOTES
[address] ENCOUNTER      Pt Name: Sascha Concepcion  MRN: [id number]  [place] 1950  Date of evaluation: 12/23/2019  Provider: Trell Chilel MD    CHIEF COMPLAINT       Chief Complaint   Patient presents with    Altered Mental Status     since yesterday    Wound Infection     coccyx         HISTORY OF PRESENT ILLNESS      Sascha Concepcion is a 77 y.o. male who presents to the emergency department  for   Chief Complaint   Patient presents with    Altered Mental Status     since yesterday    Wound Infection     coccyx       77 yom presents from his facility with report of altered mental status and possible sepsis. He has a known decubitus ulcer and has undergone prior debridement. He has a PICC line in place and is currently on vancomycin and meropenem according to his facility's records. He is a poor historian. No collateral historians were available in the emergency department. He denies any acute complaints in the emergency department. He does complain of pain in his buttock. He denies any abdominal pain or chest pain. He denies any fever or chills. He is afebrile upon presentation. Blood pressure is systolic around 264K on presentation. He is answering questions. He is moving all extremities spontaneously. He is following commands. Nursing Notes, Triage Notes & Vital Signs were reviewed. REVIEW OF SYSTEMS    (2-9 systems for level 4, 10 or more for level 5)     Review of Systems   Constitutional: Negative for chills and fever. HENT: Negative for congestion, rhinorrhea and sore throat. Eyes: Negative for pain and discharge. Respiratory: Negative for cough and shortness of breath. Cardiovascular: Negative for chest pain. Gastrointestinal: Negative for abdominal pain, nausea and vomiting. Endocrine: Negative for polydipsia and polyuria.    Genitourinary: Negative for dysuria and flank mouth 2 times daily. 2/10/13  Yes Historical Provider, MD   aspirin 81 MG EC tablet Take 81 mg by mouth daily.      Yes Historical Provider, MD   Trolamine Salicylate (ASPERCREME) 10 % LOTN Apply topically every 4 hours as needed for Pain    Historical Provider, MD   glucagon, rDNA, 1 MG injection Inject 1 mg into the muscle as needed for Low blood sugar (Blood glucose less than 70 mg/dL and patient NOT ALERT or NPO and does not have IV access.) 11/27/19 11/21/20  Claudene Nevin, MD   nitroGLYCERIN (NITROSTAT) 0.4 MG SL tablet Place 1 tablet under the tongue every 5 minutes as needed for Chest pain 6/10/19   LYNDA Bernal - CNP   fluticasone [facility]) 50 MCG/ACT nasal spray 1 spray by Nasal route daily as needed  3/22/16   Historical Provider, MD   Albuterol Sulfate (PROAIR HFA IN) Inhale 1 puff into the lungs every 6 hours as needed     Historical Provider, MD        Patient Active Problem List   Diagnosis    Hypertension    Hyperlipidemia    Asthma    Diabetes mellitus ([address]    H/O cardiovascular stress test    Obesity    Chronic kidney disease, stage III (moderate) ([address]    Hypertensive kidney disease with CKD stage III ([address]    Depression    SOBOE (shortness of breath on exertion)    Abnormal cardiovascular stress test    Fracture of epiphysis (separation) (upper) of neck of femur, closed (HCC)    Pressure injury of skin of coccygeal region    Sepsis ([address]    PVC (premature ventricular contraction)    Septicemia ([address]    WD-Pressure injury of sacral region, stage 4 ([address]    WD-Nonhealing surgical wound    WD-Skin ulcer of right hip with necrosis of muscle ([address]    Troponin level elevated    Recurrent major depressive disorder, in partial remission ([address]    Delusional ideas ([address]    Vascular dementia with behavior disturbance ([address]         SURGICAL HISTORY       Past Surgical History:   Procedure Laterality Date    CARPAL TUNNEL RELEASE  2005    DIAGNOSTIC CARDIAC CATH LAB PROCEDURE 12/05    NO SIGNIFICANT CAD    EYE SURGERY      HEMIARTHROPLASTY HIP Right 8/29/2019    HIP HEMIARTHROPLASTY performed by Elzbieta Nguyen MD at [address] Right 9/29/2019    HIP INCISION AND DRAINAGE performed by Elzbieta Nguyen MD at [address] Right 11/15/2019    HIP INCISION AND DRAINAGE RIGHT CLOSURE OF INCISION WITH HEMOVAC performed by Elzbieta Nguyen MD at [address] [facility]  09/23/2019    of stage 4 wound on coccyx, by Dr. Carlos Thompson N/A 9/23/2019    EXCISIONAL DEBRIDEMENT OF SACRAL PRESSURE ULCER performed by Soto Alcazar MD at [address] [facility] N/A 9/29/2019    DECUBITUS ULCER [facility] performed by Mu Chávez MD [facility] [address] [facility]       Previous Medications    ACETAMINOPHEN (TYLENOL) 500 MG TABLET    Take 1 tablet by mouth every 6 hours as needed for Pain or Fever    ALBUTEROL SULFATE (PROAIR HFA IN)    Inhale 1 puff into the lungs every 6 hours as needed     ASPIRIN 81 MG EC TABLET    Take 81 mg by mouth daily. BACLOFEN (LIORESAL) 10 MG TABLET    Take 10 mg by mouth every 8 hours     BENAZEPRIL (LOTENSIN) 20 MG TABLET    Take 1 tablet by mouth daily    BUPROPION (WELLBUTRIN XL) 300 MG XL TABLET    Take 300 mg by mouth daily.     CALCIUM CARBONATE (TUMS) 500 MG CHEWABLE TABLET    Take 1 tablet by mouth 2 times daily    CANAGLIFLOZIN (INVOKANA) 300 MG TABS    Take 300 mg by mouth daily     EZETIMIBE (ZETIA) 10 MG TABLET    Take 10 mg by mouth nightly     FLUTICASONE (FLONASE) 50 MCG/ACT NASAL SPRAY    1 spray by Nasal route daily as needed     FLUTICASONE-VILANTEROL (BREO ELLIPTA) 100-25 MCG/INH AEPB INHALER    Inhale 1 puff into the lungs daily    FUROSEMIDE (LASIX) 20 MG TABLET    Take 20 mg by mouth daily afternoon    FUROSEMIDE (LASIX) 40 MG TABLET    Take 40 mg by mouth every morning GLUCAGON, RDNA, 1 MG INJECTION    Inject 1 mg into the muscle as needed for Low blood sugar (Blood glucose less than 70 mg/dL and patient NOT ALERT or NPO and does not have IV access.)    INSULIN GLARGINE (LANTUS) 100 UNIT/ML INJECTION VIAL    Inject 15 Units into the skin nightly    INSULIN LISPRO (HUMALOG) 100 UNIT/ML INJECTION VIAL    Inject into the skin 3 times daily (before meals) 10/28/19 Sliding scale per nursing facility    IPRATROPIUM-ALBUTEROL (DUONEB) 0.5-2.5 (3) MG/3ML SOLN NEBULIZER SOLUTION    Inhale 1 vial into the lungs every 6 hours    MEMANTINE (NAMENDA) 5 MG TABLET    Take 1 tablet by mouth nightly    MEROPENEM (MERREM) INFUSION    Infuse 2,000 mg intravenously every 8 hours Compound per protocol. MONTELUKAST (SINGULAIR) 10 MG TABLET    Take 1 tablet by mouth nightly    MULTIPLE VITAMINS-MINERALS (VISION VITAMINS PO)    Take 1 capsule by mouth daily     NITROGLYCERIN (NITROSTAT) 0.4 MG SL TABLET    Place 1 tablet under the tongue every 5 minutes as needed for Chest pain    OMEPRAZOLE (PRILOSEC) 20 MG CAPSULE    Take 20 mg by mouth daily. POTASSIUM CHLORIDE (KLOR-CON M) 20 MEQ EXTENDED RELEASE TABLET    Take 20 mEq by mouth 3 times daily    ROPINIROLE (REQUIP) 1 MG TABLET    Take 1 tablet by mouth nightly    SERTRALINE (ZOLOFT) 100 MG TABLET    Take 100 mg by mouth 2 times daily. SPIRONOLACTONE (ALDACTONE) 25 MG TABLET    Take 25 mg by mouth every morning     SUPER B COMPLEX/C PO    Take 1 capsule by mouth daily    TRAMADOL (ULTRAM) 50 MG TABLET    Take 50 mg by mouth every 6 hours as needed for Pain.     TROLAMINE SALICYLATE (ASPERCREME) 10 % LOTN    Apply topically every 4 hours as needed for Pain    TRULICITY 1.5 TR/1.6ZZ SOPN    Inject 1.5 mg into the skin once a week     VITAMIN C (ASCORBIC ACID) 500 MG TABLET    Take 1,000 mg by mouth daily     VITAMIN E 400 UNIT CAPSULE    Take 400 Units by mouth daily       ALLERGIES     Bee venom    FAMILY HISTORY       Family History   Problem Notable for the following components:    WBC 17.0 (*)     Hemoglobin 13.1 (*)     Hematocrit 41.2 (*)     MCHC 31.8 (*)     RDW 16.0 (*)     Segs Relative 79.2 (*)     Lymphocytes % 6.1 (*)     Monocytes % 8.8 (*)     Eosinophils % 5.1 (*)     All other components within normal limits   URINALYSIS - Abnormal; Notable for the following components:    Color, UA MICHAEL (*)     Clarity, UA SLIGHTLY CLOUDY (*)     Glucose, Urine 50 (*)     Ketones, Urine SMALL (*)     Blood, Urine SMALL (*)     Protein,  (*)     Leukocyte Esterase, Urine MODERATE (*)     RBC, UA 34 (*)     WBC,  (*)     Mucus, UA RARE (*)     All other components within normal limits   CULTURE BLOOD #1   CULTURE BLOOD #2   URINE CULTURE   LACTIC ACID, PLASMA   BLOOD GAS, VENOUS       All other labs were within normal range or not returned as of this dictation. EMERGENCY DEPARTMENT COURSE and DIFFERENTIAL DIAGNOSIS/MDM:   Vitals:    Vitals:    12/23/19 1407 12/23/19 1414 12/23/19 1432   BP: (!) 101/59 (!) 101/59 82/60   Pulse: 103 103 99   Resp: 20 15 16   Temp: 98.8 °F (37.1 °C)     TempSrc: Oral     SpO2: 93% 93% 93%   Weight: (!) 310 lb (140.6 kg)     Height: 6' 3\" (1.905 m)             MDM  Number of Diagnoses or Management Options  VIRGILIO (acute kidney injury) ([facility]): Altered mental status, unspecified altered mental status type:   Leukocytosis, unspecified type:   Diagnosis management comments: 77 yom presents from his facility with concern for sepsis and altered mental status. He has history of a known decubitus ulcer which has undergone debridement previously. He does have a PICC line in place. According to records from his facility, he is on vancomycin and meropenem. No clear restraints were available in the emergency department. He is afebrile upon presentation. His systolic blood pressures were mildly low in the systolics of 273. Labs are obtained. He does have leukocytosis of 17. This is decreased from prior values.   He

## 2019-12-24 PROBLEM — G93.41 METABOLIC ENCEPHALOPATHY: Status: ACTIVE | Noted: 2019-01-01

## 2019-12-24 PROBLEM — I95.9 HYPOTENSION: Status: ACTIVE | Noted: 2019-01-01

## 2019-12-24 NOTE — CARE COORDINATION
Patient is from [place]. Will contact for update. Mike Torres RN    [address]; spoke to Alli Abdalla. Patient is out of bed hold days; will need a precert to return.  Mike Torres RN

## 2019-12-24 NOTE — DISCHARGE INSTR - COC
Continuity of Care Form    Patient Name: Codey Gerardo   :  1950  MRN:  [id number]    Admit date:  2019  Discharge date:  ***    Code Status Order: Full Code   Advance Directives:   Advance Care Flowsheet Documentation     Date/Time Healthcare Directive Type of Healthcare Directive Copy in [address] Agent's Name Healthcare Agent's Phone Number    [phone]  No, patient does not have an advance directive for healthcare treatment -- -- -- -- --          Admitting Physician:  Chloe Palma MD  PCP: Katiuska Broderick MD    Discharging Nurse: [facility] Unit/Room#: 2129/2129-A  Discharging Unit Phone Number: ***    Emergency Contact:   Extended Emergency Contact Information  Primary Emergency Contact: [address] Phone: [phone]  Relation: Niece/Nephew  Secondary Emergency Contact: Jovi Matthews [address] Phone: [phone]  Relation: Niece/Nephew    Past Surgical History:  Past Surgical History:   Procedure Laterality Date    CARPAL TUNNEL RELEASE      DIAGNOSTIC CARDIAC CATH LAB PROCEDURE      NO SIGNIFICANT CAD    EYE SURGERY      HEMIARTHROPLASTY HIP Right 2019    HIP HEMIARTHROPLASTY performed by Vincent Angulo MD at [address] [place] [address] Right 2019    HIP INCISION AND DRAINAGE performed by Vincent Angulo MD at [address] [place] [address] Right 11/15/2019    HIP INCISION AND DRAINAGE RIGHT CLOSURE OF INCISION WITH HEMOVAC performed by Vincent Angulo MD at [address] [place] [address]  2019    of stage 4 wound on coccyx, by Dr. Dennis Pimentel N/A 2019    EXCISIONAL DEBRIDEMENT OF SACRAL PRESSURE ULCER performed by Stevenson Zimmer MD at [address] [place] [address] N/A 2019    DECUBITUS ULCER [address] performed by Lisa Zendejas MD at [address] [place] [address] Immunization History:   Immunization History   Administered Date(s) Administered    Influenza Vaccine, unspecified formulation 09/01/2017, 10/05/2018    Influenza, Quadv, IM, PF (6 mo and older Fluzone, Flulaval, Fluarix, and 3 yrs and older Afluria) 09/27/2019       Active Problems:  Patient Active Problem List   Diagnosis Code    Hypertension I10    Hyperlipidemia E78.5    Asthma J45.909    Diabetes mellitus ([facility] E11.9    H/O cardiovascular stress test Z92.89    Obesity E66.9    Chronic kidney disease, stage III (moderate) ([facility]) N18.3    Hypertensive kidney disease with CKD stage III ([facility]) I12.9, N18.3    Depression F32.9    SOBOE (shortness of breath on exertion) R06.02    Abnormal cardiovascular stress test R94.39    Fracture of epiphysis (separation) (upper) of neck of femur, closed ([facility]) S72.023A    Pressure injury of skin of coccygeal region L89.159    Sepsis ([facility] A41.9    PVC (premature ventricular contraction) I49.3    Septicemia ([facility]) A41.9    WD-Pressure injury of sacral region, stage 4 ([facility]) L89.154    WD-Nonhealing surgical wound T81.89XA    WD-Skin ulcer of right hip with necrosis of muscle ([facility]) L97.113    Troponin level elevated R79.89    Recurrent major depressive disorder, in partial remission ([facility]) F33.41    Delusional ideas ([facility] F22    Vascular dementia with behavior disturbance ([facility]) F01.51       Isolation/Infection:   Isolation          C Diff Contact        Patient Infection Status     Infection Onset Added Last Indicated Last Indicated By Review Planned Expiration Resolved Resolved By    None active    Resolved    C-diff Rule Out  12/23/19 12/24/19 C difficile Molecular/PCR (Ordered)   12/24/19 Rule-Out Order Resulted          Nurse Assessment:  Last Vital Signs: /63   Pulse 85   Temp 98.3 °F (36.8 °C) (Axillary)   Resp 19   Ht 6' 3\" (1.905 m)   Wt 297 lb 6.4 oz (134.9 kg)   SpO2 99%   BMI 37.17 kg/m²     Last documented pain score (0-10 scale): Pain Level: 0  Last Weight:   Wt Readings from Last 1 Encounters:   19 297 lb 6.4 oz (134.9 kg)     Mental Status:  {IP PT MENTAL STATUS:96445}    IV Access:  { NIMESH IV ACCESS:010001340}    Nursing Mobility/ADLs:  Walking   {CHP DME OXRI:177849426}  Transfer  {CHP DME GFOK:260202251}  Bathing  {P DME HFAO:227704209}  Dressing  {CHP DME FJAI:098037811}  Toileting  {CHP DME TOHO:274100435}  Feeding  {P DME HCBF:422793022}  Med Admin  {P DME DZQ}  Med Delivery   { NIMESH MED Delivery:086393930}    Wound Care Documentation and Therapy:  Negative Pressure Wound Therapy Hip Right (Active)   Wound Type Surgical 2019  8:00 PM   Unit Type prevena 2019  8:27 PM   Dressing Type Other (Comment) 2019 10:30 AM   Number of pieces used 1 2019  9:00 AM   Cycle Continuous 2019  8:00 PM   Target Pressure (mmHg) 125 2019  8:00 PM   Canister changed? No 2019  8:00 PM   Dressing Status Changed 2019 10:30 AM   Dressing Changed Changed/New 2019 10:30 AM   Drainage Amount None 2019 10:30 AM   Drainage Description Serosanguinous 11/15/2019  4:08 AM   Output (ml) 550 ml 2019  5:56 AM   Wound Assessment Other (Comment) 2019  8:00 PM   Annika-wound Assessment Other (Comment) 2019  8:00 PM   Odor Strong 11/15/2019  4:08 AM   Number of days: 86       Negative Pressure Wound Therapy Coccyx (Active)   Wound Type Pressure ulcer: Stage IV 2019  9:54 PM   Unit Type KCI 2019  9:45 AM   Dressing Type Black foam 2019  9:54 PM   Number of pieces used 3 2019  9:45 AM   Cycle Continuous 2019  9:54 PM   Target Pressure (mmHg) 125 2019  9:54 PM   Canister changed? No 2019  9:45 AM   Dressing Status Clean;Dry; Intact 2019  9:54 PM   Dressing Changed Changed/New 2019  9:45 AM   Drainage Amount Moderate 2019  9:45 AM   Drainage Description Serosanguinous 2019  9:45 AM   Dressing Change Due 19 11/25/2019 11:00 AM   Output (ml) 50 ml 11/26/2019  6:55 PM   Wound Assessment Red;Yellow 11/27/2019  9:45 AM   Annika-wound Assessment Pink 11/27/2019  9:45 AM   Odor None 11/27/2019  9:45 AM   Number of days: 84       Wound 09/21/19 Coccyx (Active)   Wound Pressure Stage  4 12/24/2019 12:11 AM   Dressing Status Clean;Dry; Intact 12/24/2019 12:05 PM   Dressing Changed Changed/New 12/24/2019  7:48 AM   Dressing/Treatment ABD 12/24/2019 12:05 PM   Wound Cleansed Soap and water 12/24/2019  7:48 AM   Dressing Change Due 12/25/19 12/24/2019 12:05 PM   Wound Length (cm) 8.5 cm 11/25/2019 11:00 AM   Wound Width (cm) 7 cm 11/25/2019 11:00 AM   Wound Depth (cm) 3.7 cm 11/25/2019 11:00 AM   Wound Surface Area (cm^2) 59.5 cm^2 11/25/2019 11:00 AM   Change in Wound Size % (l*w) 35.33 11/25/2019 11:00 AM   Wound Volume (cm^3) 220.15 cm^3 11/25/2019 11:00 AM   Wound Healing % -60 11/25/2019 11:00 AM   Distance Tunneling (cm) 0 cm 11/25/2019 11:00 AM   Tunneling Position ___ O'Clock 0 11/25/2019 11:00 AM   Undermining Starts ___ O'Clock 7 11/25/2019 11:00 AM   Undermining Ends___ O'Clock 2 11/25/2019 11:00 AM   Undermining Maxium Distance (cm) 4.5 11/25/2019 11:00 AM   Wound Assessment Yellow;Red 12/24/2019 12:05 PM   Drainage Amount Small 12/24/2019 12:05 PM   Drainage Description Serosanguinous 12/24/2019 12:05 PM   Odor None 12/24/2019 12:05 PM   Margins Defined edges 11/27/2019  9:45 AM   Exposed structure Muscle 11/20/2019 12:00 PM   Annika-wound Assessment Pink 11/27/2019  9:45 AM   Non-staged Wound Description Full thickness 11/27/2019  9:45 AM   [place]%Wound Bed 30 11/25/2019 11:00 AM   Red%Wound Bed 40 11/25/2019 11:00 AM   Yellow%Wound Bed 20 11/25/2019 11:00 AM   Black%Wound Bed 10 11/25/2019 11:00 AM   Number of days: 94       Wound 09/21/19 Hip Left cluster/DTI now with stage 2 (Active)   Wound Pressure Stage  2 11/25/2019  8:53 PM   Dressing Status Clean;Dry; Intact 11/27/2019  9:54 PM   Dressing Changed Changed/New 11/26/2019  4:03 PM   Dressing/Treatment Moisturizing cream 12/24/2019 12:05 PM   Wound Cleansed Rinsed/Irrigated with saline 11/26/2019  4:03 PM   Dressing Change Due 11/24/19 11/23/2019  3:10 PM   Wound Length (cm) 2.2 cm 11/25/2019 11:00 AM   Wound Width (cm) 1.3 cm 11/25/2019 11:00 AM   Wound Depth (cm) 0.1 cm 11/25/2019 11:00 AM   Wound Surface Area (cm^2) 2.86 cm^2 11/25/2019 11:00 AM   Change in Wound Size % (l*w) 94.5 11/25/2019 11:00 AM   Wound Volume (cm^3) 0.29 cm^3 11/25/2019 11:00 AM   Wound Healing % 79 11/25/2019 11:00 AM   Distance Tunneling (cm) 0 cm 11/25/2019 11:00 AM   Tunneling Position ___ O'Clock 0 11/25/2019 11:00 AM   Undermining Starts ___ O'Clock 0 11/25/2019 11:00 AM   Undermining Ends___ O'Clock 0 11/25/2019 11:00 AM   Undermining Maxium Distance (cm) 0 11/25/2019 11:00 AM   Wound Assessment Pink;Red;Yellow 11/25/2019 11:00 AM   Drainage Amount Moderate 11/25/2019 11:00 AM   Drainage Description Serosanguinous; Yellow 11/25/2019 11:00 AM   Odor None 11/25/2019 11:00 AM   Margins Defined edges 11/25/2019 11:00 AM   Annika-wound Assessment Intact 11/25/2019 11:00 AM   Non-staged Wound Description Full thickness 11/25/2019 11:00 AM   [place]%Wound Bed 30 11/25/2019 11:00 AM   Red%Wound Bed 3 11/25/2019 11:00 AM   Yellow%Wound Bed 40 11/25/2019 11:00 AM   Number of days: 94       Wound 10/01/19 Hip Right (Active)   Dressing Changed Changed/New 11/27/2019  9:54 PM   Dressing/Treatment ABD; Dry Dressing 11/27/2019  9:54 PM   Wound Cleansed Rinsed/Irrigated with saline 11/27/2019 10:15 AM   Dressing Change Due 11/27/19 11/26/2019  4:03 PM   Wound Length (cm) 19 cm 11/11/2019 10:00 AM   Wound Width (cm) 2.3 cm 11/11/2019 10:00 AM   Wound Depth (cm) 1.5 cm 11/11/2019 10:00 AM   Wound Surface Area (cm^2) 43.7 cm^2 11/11/2019 10:00 AM   Change in Wound Size % (l*w) 39.31 11/11/2019 10:00 AM   Wound Volume (cm^3) 65.55 cm^3 11/11/2019 10:00 AM   Wound Healing % 74 11/11/2019 10:00 AM   Distance Tunneling (cm) 0 cm 11/25/2019 11:00 AM   Tunneling Position ___ O'Clock 0 11/25/2019 11:00 AM   Undermining Starts ___ O'Clock 12 11/12/2019  9:00 AM   Undermining Ends___ O'Clock 12 11/12/2019  9:00 AM   Undermining Maxium Distance (cm) 2.8 11/12/2019  9:00 AM   Wound Assessment Red 11/27/2019  9:54 PM   Drainage Amount Large 11/27/2019  9:54 PM   Drainage Description Serosanguinous 11/27/2019  9:54 PM   Odor None 11/27/2019  9:54 PM   Margins Attached edges 11/27/2019  9:54 PM   Annika-wound Assessment Intact 11/27/2019  9:54 PM   Non-staged Wound Description Not applicable 67/49/6680 61:80 AM   Red%Wound Bed 100 11/25/2019 11:00 AM   Number of days: 84        Elimination:  Continence:   · Bowel: {YES / US:92435}  · Bladder: {YES / TW:46128}  Urinary Catheter: {Urinary Catheter:508695440}   Colostomy/Ileostomy/Ileal Conduit: {YES / CA:23118}       Date of Last BM: ***    Intake/Output Summary (Last 24 hours) at 12/24/2019 1230  Last data filed at 12/24/2019 0644  Gross per 24 hour   Intake 1363.58 ml   Output 1500 ml   Net -136.42 ml     I/O last 3 completed shifts:   In: 1363.6 [I.V.:663.6; IV Piggyback:700]  Out: 1500 [Urine:1500]    Safety Concerns:     [address] Safety Concerns:890438709}    Impairments/Disabilities:      [address] Impairments/Disabilities:555549820}      Patient's personal belongings (please select all that are sent with patient):  {CHP DME Belongings:355707204}    RN SIGNATURE:  {Esignature:286732752}    CASE MANAGEMENT/SOCIAL WORK SECTION    Inpatient Status Date: ***    Readmission Risk Assessment Score:  Readmission Risk              Risk of Unplanned Readmission:        44           Discharging to Facility/ Agency   · Name:   · Address:  · Phone:  · Fax:      / signature: {Esignature:371963815}    PHYSICIAN SECTION    Prognosis: {Prognosis:7117924618}    Condition at Discharge: [facility] Patient Condition:186742098}    Rehab Potential (if transferring to Rehab): {Prognosis:8153248057}    Recommended Labs or Other Treatments After Discharge: ***  Nutrition Therapy:  Current Nutrition Therapy:   [address] [place] NIMESH Diet List:850081800}    Routes of Feeding: {CHP DME Other Feedings:089661208}  Liquids: {Slp liquid thickness:42234}  Daily Fluid Restriction: {CHP DME Yes amt example:503425539}  Last Modified Barium Swallow with Video (Video Swallowing Test): {Done Not Done UEDY:741906354}    Treatments at the Time of Hospital Discharge:   Respiratory Treatments: ***  Oxygen Therapy:  {Therapy; copd oxygen:67406}  Ventilator:    {[facility] Vent EJKP:673025175}    Rehab Therapies: {THERAPEUTIC INTERVENTION:9595141866}  Weight Bearing Status/Restrictions: {[facility] Weight Bearin}  Other Medical Equipment (for information only, NOT a DME order):  {EQUIPMENT:355293210}  Other Treatments: ***        Physician Certification: I certify the above information and transfer of Bakari Montilla  is necessary for the continuing treatment of the diagnosis listed and that he requires {Admit to Appropriate Level of Care:82549} for {GREATER/LESS:182404541} 30 days.      Update Admission H&P: {CHP DME Changes in [facility]:757208595}    PHYSICIAN SIGNATURE:  {Esignature:706781705}

## 2019-12-24 NOTE — H&P
MD MARICRUZ at [address] [place] [address] N/A 9/29/2019    DECUBITUS ULCER DEBRIDEMENT REPAIR performed by Zohreh Velasquez MD at [address]         Medications Prior to Admission:   Medications Prior to Admission: ipratropium-albuterol (DUONEB) 0.5-2.5 (3) MG/3ML SOLN nebulizer solution, Inhale 1 vial into the lungs every 6 hours  calcium carbonate (TUMS) 500 MG chewable tablet, Take 1 tablet by mouth 2 times daily  montelukast (SINGULAIR) 10 MG tablet, Take 1 tablet by mouth nightly  memantine (NAMENDA) 5 MG tablet, Take 1 tablet by mouth nightly  meropenem (MERREM) infusion, Infuse 2,000 mg intravenously every 8 hours Compound per protocol. furosemide (LASIX) 20 MG tablet, Take 20 mg by mouth daily afternoon  baclofen (LIORESAL) 10 MG tablet, Take 10 mg by mouth every 8 hours   fluticasone-vilanterol (BREO ELLIPTA) 100-25 MCG/INH AEPB inhaler, Inhale 1 puff into the lungs daily  furosemide (LASIX) 40 MG tablet, Take 40 mg by mouth every morning   potassium chloride (KLOR-CON M) 20 MEQ extended release tablet, Take 20 mEq by mouth 3 times daily  spironolactone (ALDACTONE) 25 MG tablet, Take 25 mg by mouth every morning   traMADol (ULTRAM) 50 MG tablet, Take 50 mg by mouth every 6 hours as needed for Pain.   rOPINIRole (REQUIP) 1 MG tablet, Take 1 tablet by mouth nightly  acetaminophen (TYLENOL) 500 MG tablet, Take 1 tablet by mouth every 6 hours as needed for Pain or Fever  insulin glargine (LANTUS) 100 UNIT/ML injection vial, Inject 15 Units into the skin nightly  TRULICITY 1.5 EZ/1.6PL SOPN, Inject 1.5 mg into the skin once a week   ezetimibe (ZETIA) 10 MG tablet, Take 10 mg by mouth nightly   SUPER B COMPLEX/C PO, Take 1 capsule by mouth daily  vitamin E 400 UNIT capsule, Take 400 Units by mouth daily  vitamin C (ASCORBIC ACID) 500 MG tablet, Take 1,000 mg by mouth daily   insulin lispro (HUMALOG) 100 UNIT/ML injection vial, Inject into the skin 3 times daily (before meals) 10/28/19 Sliding scale per nursing facility  benazepril (LOTENSIN) 20 MG tablet, Take 1 tablet by mouth daily  Multiple Vitamins-Minerals (VISION VITAMINS PO), Take 1 capsule by mouth daily   omeprazole (PRILOSEC) 20 MG capsule, Take 20 mg by mouth daily. Canagliflozin (INVOKANA) 300 MG TABS, Take 300 mg by mouth daily   buPROPion (WELLBUTRIN XL) 300 MG XL tablet, Take 300 mg by mouth daily. sertraline (ZOLOFT) 100 MG tablet, Take 100 mg by mouth 2 times daily. aspirin 81 MG EC tablet, Take 81 mg by mouth daily. Trolamine Salicylate (ASPERCREME) 10 % LOTN, Apply topically every 4 hours as needed for Pain  glucagon, rDNA, 1 MG injection, Inject 1 mg into the muscle as needed for Low blood sugar (Blood glucose less than 70 mg/dL and patient NOT ALERT or NPO and does not have IV access.)  nitroGLYCERIN (NITROSTAT) 0.4 MG SL tablet, Place 1 tablet under the tongue every 5 minutes as needed for Chest pain  fluticasone (FLONASE) 50 MCG/ACT nasal spray, 1 spray by Nasal route daily as needed   Albuterol Sulfate (PROAIR HFA IN), Inhale 1 puff into the lungs every 6 hours as needed     Allergies:  Bee venom    Social History:   TOBACCO:   reports that he has never smoked. He has never used smokeless tobacco.  ETOH:   reports no history of alcohol use. DRUGS:   reports no history of drug use.     Family History:       Problem Relation Age of Onset    High Blood Pressure Mother     Cancer Mother     Cancer Father     Stroke Maternal Grandfather     Diabetes Paternal Grandmother     Heart Disease Paternal Grandfather      REVIEW OF SYSTEMS:  CONSTITUTIONAL:  negative  EYES:  negative  HEENT:  negative  RESPIRATORY:  negative  CARDIOVASCULAR:  negative  GASTROINTESTINAL:  negative  GENITOURINARY:  Urine in catheter is cloudy  HEMATOLOGIC/LYMPHATIC:  negative  ALLERGIC/IMMUNOLOGIC:  negative  ENDOCRINE:  negative  MUSCULOSKELETAL:  negative  NEUROLOGICAL:  Slight droop of mouth to the left  BEHAVIOR/PSYCH: Change in mental status  PHYSICAL EXAM:    Vitals:  BP 98/62   Pulse 93   Temp 97.8 °F (36.6 °C) (Oral)   Resp 12   Ht 6' 3\" (1.905 m)   Wt 297 lb 6.4 oz (134.9 kg)   SpO2 95%   BMI 37.17 kg/m²     CONSTITUTIONAL:  awake, alert, cooperative, no apparent distress, and appears stated age  EYES:  Lids and lashes normal, pupils equal, round and reactive to light, extra ocular muscles intact, sclera clear, conjunctiva normal  ENT:  Normocephalic, without obvious abnormality, atraumatic, sinuses nontender on palpation, external ears without lesions, oral pharynx with moist mucus membranes, tonsils without erythema or exudates, gums normal and good dentition. NECK:  Supple, symmetrical, trachea midline, no adenopathy, thyroid symmetric, not enlarged and no tenderness, skin normal  HEMATOLOGIC/LYMPHATICS:  no cervical lymphadenopathy  BACK:  Symmetric, no curvature, spinous processes are non-tender on palpation, paraspinous muscles are non-tender on palpation, no costal vertebral tenderness  LUNGS:  No increased work of breathing, good air exchange, clear to auscultation bilaterally, no crackles or wheezing  CARDIOVASCULAR:  Normal apical impulse, regular rate and rhythm, normal S1 and S2, no S3 or S4, and no murmur noted  ABDOMEN:  No scars, normal bowel sounds, soft, non-distended, non-tender, no masses palpated, no hepatosplenomegally  MUSCULOSKELETAL:  Right hip wound his healed. Decub ulcer of coccyex and sacrum  NEUROLOGIC:  Awake, alert, oriented to name, place and time. Cranial nerves II-XII are grossly intact. Motor is 5 out of 5 bilaterally. Cerebellar finger to nose, heel to shin intact. Sensory is intact.   Babinski down going, Romberg negative, and gait is normal.  SKIN:  no bruising or bleeding    DATA:  CBC with Differential:    Lab Results   Component Value Date    WBC 14.8 12/24/2019    RBC 4.52 12/24/2019    HGB 11.9 12/24/2019    HCT 38.7 12/24/2019     12/24/2019    MCV 85.6 12/24/2019 MCH 26.3 12/24/2019    MCHC 30.7 12/24/2019    RDW 16.2 12/24/2019    SEGSPCT 73.0 12/24/2019    BANDSPCT 3 09/29/2019    LYMPHOPCT 9.3 12/24/2019    PROMYELOPCT 2 09/26/2019    MONOPCT 8.6 12/24/2019    MYELOPCT 1 09/29/2019    BASOPCT 0.3 12/24/2019    MONOSABS 1.3 12/24/2019    LYMPHSABS 1.4 12/24/2019    EOSABS 1.2 12/24/2019    BASOSABS 0.0 12/24/2019    DIFFTYPE AUTOMATED DIFFERENTIAL 12/24/2019     CMP:    Lab Results   Component Value Date     12/24/2019    K 4.7 12/24/2019     12/24/2019    CO2 26 12/24/2019    BUN 41 12/24/2019    CREATININE 2.1 12/24/2019    GFRAA 38 12/24/2019    LABGLOM 32 12/24/2019    GLUCOSE 77 12/24/2019    PROT 4.8 12/24/2019    LABALBU 2.5 12/24/2019    CALCIUM 8.7 12/24/2019    BILITOT 0.5 12/24/2019    ALKPHOS 123 12/24/2019    AST 16 12/24/2019    ALT 8 12/24/2019     PT/INR:    Lab Results   Component Value Date    PROTIME 11.5 05/03/2017    PROTIME 10.6 01/26/2012    INR 1.01 05/03/2017     Last 3 Troponin:  No results found for: TROPONINI  U/A:    Lab Results   Component Value Date    COLORU MICHAEL 12/23/2019    PROTEINU 100 12/23/2019    PHUR 5.5 10/24/2018    LABCAST Present 12/12/2016    LABCAST Hyaline casts 12/12/2016    WBCUA 213 12/23/2019    RBCUA 34 12/23/2019    MUCUS RARE 12/23/2019    TRICHOMONAS NONE SEEN 12/23/2019    YEAST FEW 12/23/2019    BACTERIA NEGATIVE 12/23/2019    CLARITYU SLIGHTLY CLOUDY 12/23/2019    SPECGRAV 1.021 12/23/2019    LEUKOCYTESUR MODERATE 12/23/2019    UROBILINOGEN 1 12/23/2019    BILIRUBINUR NEGATIVE 12/23/2019    BLOODU SMALL 12/23/2019    GLUCOSEU 3+ 10/24/2018     ABG:    Lab Results   Component Value Date    PH 7.38 11/12/2019    BE 1  MINUS   11/12/2019    O2SAT 95.5 11/12/2019       Radiology:  Chest Xray: No acute disease  Pelvic x Ray:    No finding of bony destruction that would be worrisome for osteomyelitis. Osteopenia present.  No acute abnormality.  No significant change from the   prior study.        EKG:

## 2019-12-24 NOTE — PROGRESS NOTES
FURTHER VANCOMYCIN    Random levels daily, re-dose when < 15 (will likely be several days from now)   [address] [place] [address] will continue to monitor renal function, clinical status & recommend de-escalation if appropriate    Thank you for the consult,   Ariana Boateng RP, PharmD, BCPS

## 2019-12-24 NOTE — PLAN OF CARE
Problem: Falls - Risk of:  Goal: Will remain free from falls  Description  Will remain free from falls  Outcome: Ongoing  Goal: Absence of physical injury  Description  Absence of physical injury  Outcome: Ongoing     Problem: Risk for Impaired Skin Integrity  Goal: Tissue integrity - skin and mucous membranes  Description  Structural intactness and normal physiological function of skin and  mucous membranes. Outcome: Ongoing     Problem: Discharge Planning:  Goal: Participates in care planning  Description  Participates in care planning  Outcome: Ongoing  Goal: Discharged to appropriate level of care  Description  Discharged to appropriate level of care  Outcome: Ongoing     Problem: Airway Clearance - Ineffective:  Goal: Ability to maintain a clear airway will improve  Description  Ability to maintain a clear airway will improve  Outcome: Ongoing     Problem: Anxiety/Stress:  Goal: Level of anxiety will decrease  Description  Level of anxiety will decrease  Outcome: Ongoing     Problem: Aspiration:  Goal: Absence of aspiration  Description  Absence of aspiration  Outcome: Ongoing     Problem:  Bowel Function - Altered:  Goal: Bowel elimination is within specified parameters  Description  Bowel elimination is within specified parameters  Outcome: Ongoing     Problem: Cardiac Output - Decreased:  Goal: Hemodynamic stability will improve  Description  Hemodynamic stability will improve  Outcome: Ongoing     Problem: Fluid Volume - Imbalance:  Goal: Absence of imbalanced fluid volume signs and symptoms  Description  Absence of imbalanced fluid volume signs and symptoms  Outcome: Ongoing     Problem: Gas Exchange - Impaired:  Goal: Levels of oxygenation will improve  Description  Levels of oxygenation will improve  Outcome: Ongoing     Problem: Mental Status - Impaired:  Goal: Mental status will be restored to baseline  Description  Mental status will be restored to baseline  Outcome: Ongoing     Problem: Nutrition Deficit:  Goal: Ability to achieve adequate nutritional intake will improve  Description  Ability to achieve adequate nutritional intake will improve  Outcome: Ongoing     Problem: Pain:  Goal: Pain level will decrease  Description  Pain level will decrease  Outcome: Ongoing  Goal: Control of acute pain  Description  Control of acute pain  Outcome: Ongoing  Goal: Control of chronic pain  Description  Control of chronic pain  Outcome: Ongoing

## 2019-12-24 NOTE — ED NOTES
Report given to Nica Smallwood on ICU. This nurse to take up patient.      Primitivo Carroll, RN  12/23/19 2037

## 2019-12-24 NOTE — CONSULTS
No results for input(s): TROPONINI in the last 72 hours. Mg, Phos: No results for input(s): MG, PHOS in the last 72 hours. ABGs:   Lab Results   Component Value Date    PO2ART 92 11/12/2019    BZD3YXW 41.0 11/12/2019     INR: No results for input(s): INR in the last 72 hours. -----------------------------------------------------------------  Patient Active Problem List   Diagnosis Code    Hypertension I10    Hyperlipidemia E78.5    Asthma J45.909    Diabetes mellitus ([facility] E11.9    H/O cardiovascular stress test Z92.89    Obesity E66.9    Chronic kidney disease, stage III (moderate) ([facility]) N18.3    Hypertensive kidney disease with CKD stage III ([facility]) I12.9, N18.3    Depression F32.9    SOBOE (shortness of breath on exertion) R06.02    Abnormal cardiovascular stress test R94.39    Fracture of epiphysis (separation) (upper) of neck of femur, closed ([facility]) S72.023A    Pressure injury of skin of coccygeal region L89.159    Sepsis ([facility]) A41.9    PVC (premature ventricular contraction) I49.3    Septicemia ([facility]) A41.9    WD-Pressure injury of sacral region, stage 4 ([facility]) L89.154    WD-Nonhealing surgical wound T81.89XA    WD-Skin ulcer of right hip with necrosis of muscle ([facility]) L97.113    Troponin level elevated R79.89    Recurrent major depressive disorder, in partial remission ([facility]) F33.41    Delusional ideas ([facility] F22    Vascular dementia with behavior disturbance ([facility]) F01.51     Assessment and Recommendations     Impression   1. VIRGILIO on stage 3 CKD  -likely multi-factorial etiology for VIRGILIO including: intravascular   Volume depletion accentuated by use of: Lasix / ACEI. consider exposure to nephrotoxic agents including  Vanco with recently elevated Vanco levels. Also, sepsis  With hemodynamic instability should also be considered       2. Metabolic Encephalopathy   3. Probable sepsis   4. Hypotension   5. DM  6. Sacral Decub  7.   Right Facial droop     PLAN   1.    -uop: 1.5 liters in now, felt arf from atn and sepsis  4 wound care for healing wounds  5 monitor affect and check head ct and neuro eval in case possible cva etiology  6 ssi  7 na and K imrpoved  Will follow and monitor            Electronically signed by Jesus Alberto Dodge MD on 12/24/2019 at 12:31 PM

## 2019-12-24 NOTE — CONSULTS
Lalo Paiz MD.  Section of General Neurology - Adult  Consult Note        Reason for Consult:    Requesting Physician:  No referring provider defined for this encounter.   Thank you for your kind referral.    CHIEF COMPLAINT:           HISTORY OF PRESENT ILLNESS:              The patient is a 77 y.o. male with a history of     Past Medical History:        Diagnosis Date    Arthritis     Asthma     Chronic kidney disease     stage 3, seen by Dr. Kaela Baltazar Diabetes mellitus ([facility]     H/O cardiac catheterization 05/04/2017    H/O cardiovascular stress test 6/07, 12/08    CARDIOLITE: EF->51%    Hyperlipidemia     Hypertension     Obesity     Pressure ulcer of coccygeal region, unstageable ([facility] 09/10/2019    Thyroid disease      Past Surgical History:        Procedure Laterality Date    CARPAL TUNNEL RELEASE  2005    DIAGNOSTIC CARDIAC CATH LAB PROCEDURE  12/05    NO SIGNIFICANT CAD    EYE SURGERY      HEMIARTHROPLASTY HIP Right 8/29/2019    HIP HEMIARTHROPLASTY performed by Tank Carmona MD at [address] [place] [address] Right 9/29/2019    HIP INCISION AND DRAINAGE performed by Tank Carmona MD at [address] [place] [address] Right 11/15/2019    HIP INCISION AND DRAINAGE RIGHT CLOSURE OF INCISION WITH HEMOVAC performed by Tank Carmona MD at [address] [place] [address]  09/23/2019    of stage 4 wound on coccyx, by Dr. Reza Steward N/A 9/23/2019    EXCISIONAL DEBRIDEMENT OF SACRAL PRESSURE ULCER performed by Wilner Magallanes MD at [address] [place] [address] N/A 9/29/2019    DECUBITUS ULCER DEBRIDEMENT REPAIR performed by Suni Gonzalez MD at [address]       Current Medications:   Current Facility-Administered Medications: vancomycin (VANCOCIN) intermittent dosing (placeholder), , Other, RX Placeholder  norepinephrine (LEVOPHED) 16 mg in dextrose 5% 250 mL infusion, 2 mcg/min, Intravenous, Continuous  acetaminophen (TYLENOL) tablet 500 mg, 500 mg, Oral, Q6H PRN  aspirin EC tablet 81 mg, 81 mg, Oral, Daily  buPROPion (WELLBUTRIN XL) extended release tablet 300 mg, 300 mg, Oral, Daily  fluticasone (FLONASE) 50 MCG/ACT nasal spray 1 spray, 1 spray, Nasal, Daily PRN  mometasone-formoterol (DULERA) 200-5 MCG/ACT inhaler 2 puff, 2 puff, Inhalation, BID  glucagon (rDNA) injection 1 mg, 1 mg, Intramuscular, PRN  insulin glargine (LANTUS) injection vial 15 Units, 15 Units, Subcutaneous, Nightly  ipratropium-albuterol (DUONEB) nebulizer solution 3 mL, 1 vial, Inhalation, Q6H  memantine (NAMENDA) tablet 5 mg, 5 mg, Oral, Nightly  montelukast (SINGULAIR) tablet 10 mg, 10 mg, Oral, Nightly  nitroGLYCERIN (NITROSTAT) SL tablet 0.4 mg, 0.4 mg, Sublingual, Q5 Min PRN  pantoprazole (PROTONIX) tablet 40 mg, 40 mg, Oral, QAM AC  rOPINIRole (REQUIP) tablet 1 mg, 1 mg, Oral, Nightly  sertraline (ZOLOFT) tablet 100 mg, 100 mg, Oral, BID  spironolactone (ALDACTONE) tablet 25 mg, 25 mg, Oral, QAM  traMADol (ULTRAM) tablet 50 mg, 50 mg, Oral, Q6H PRN  trolamine salicylate (ASPERCREME) 10 % cream, , Topical, Q4H PRN  vitamin C (ASCORBIC ACID) tablet 1,000 mg, 1,000 mg, Oral, Daily  vitamin E capsule 400 Units, 400 Units, Oral, Daily  sodium chloride flush 0.9 % injection 10 mL, 10 mL, Intravenous, 2 times per day  sodium chloride flush 0.9 % injection 10 mL, 10 mL, Intravenous, PRN  enoxaparin (LOVENOX) injection 40 mg, 40 mg, Subcutaneous, Daily  0.9 % sodium chloride infusion, , Intravenous, Continuous  insulin lispro (HUMALOG) injection vial 0-12 Units, 0-12 Units, Subcutaneous, TID WC  insulin lispro (HUMALOG) injection vial 0-6 Units, 0-6 Units, Subcutaneous, Nightly  meropenem (MERREM) 2 g in sodium chloride 0.9 % 100 mL IVPB, 2 g, Intravenous, Q8H  Allergies:  Bee venom    Social History:  TOBACCO:   reports that he has never smoked.  He has never used smokeless tobacco.  ETOH:   reports no history of alcohol use. DRUGS:   reports no history of drug use. Family History:       Problem Relation Age of Onset    High Blood Pressure Mother     Cancer Mother     Cancer Father     Stroke Maternal Grandfather     Diabetes Paternal Grandmother     Heart Disease Paternal Grandfather        REVIEW OF SYSTEMS:  CONSTITUTIONAL:  negative  HEENT:  negative  RESPIRATORY:  negative  CARDIOVASCULAR:  negative  GASTROINTESTINAL:  negative  GENITOURINARY:  negative  MUSCULOSKELETAL:  negative  BEHAVIOR/PSYCH:  Negative    PHYSICAL EXAM  ------------------------  Vitals:  /62   Pulse 79   Temp 98.3 °F (36.8 °C) (Axillary)   Resp 16   Ht 6' 3\" (1.905 m)   Wt 297 lb 6.4 oz (134.9 kg)   SpO2 99%   BMI 37.17 kg/m²      General:  Awake, alert, oriented X 3. Well developed, well nourished, well groomed. No apparent distress. HEENT:  Normocephalic, atraumatic. Pupils equal, round, reactive to light. No scleral icterus. No conjunctival injection. Normal lips, teeth, and gums. No nasal discharge. Neck:  Supple  Heart:  RRR, no murmurs, gallops, rubs  Lungs:  CTA bilaterally, bilat symmetrical expansion, no wheeze, rales, or rhonchi  Abdomen: Bowel sounds present, soft, nontender, no masses, no organomegaly, no peritoneal signs  Extremities:  No clubbing, cyanosis, or edema  Skin:  Warm and dry, no open lesions or rash  Breast: deferred  Rectal: deferred  Genitalia:  deferred    NEUROLOGICAL EXAM  ---------------------------------    Mental Status Exam:             Alert and oriented times three,follows commands,speech and language intact    Cranial [id number] Intact.         Cranial nerve II           Visual acuity:  normal                 Cranial nerve III           Pupils:  equal, round, reactive to light      Cranial nerves III, IV, VI           Extraocular Movements: intact      Cranial nerve V           Facial sensation:  intact      Cranial nerve VII           Facial strength: intact      Cranial nerve VIII           Hearing:  intact      Cranial nerve IX           Palate:  intact      Cranial nerve XI         Shoulder shrug:  intact      Cranial nerve XII          Tongue movement:  normal    Motor:    Drift:  absent  Motor exam is symmetrical 5 out of 5 all extremities bilaterally  Tone:  normal  Abnormal Movements:  Absent    DTRs-2+ biceps,triceps,brachioradialis,knee jerks and ankle jerks bilaterally symmetrical.  Toes-downgoing bilaterally            Sensory:normal sensation              CBC with Differential:    Lab Results   Component Value Date    WBC 14.8 12/24/2019    RBC 4.52 12/24/2019    HGB 11.9 12/24/2019    HCT 38.7 12/24/2019     12/24/2019    MCV 85.6 12/24/2019    MCH 26.3 12/24/2019    MCHC 30.7 12/24/2019    RDW 16.2 12/24/2019    SEGSPCT 73.0 12/24/2019    BANDSPCT 3 09/29/2019    LYMPHOPCT 9.3 12/24/2019    PROMYELOPCT 2 09/26/2019    MONOPCT 8.6 12/24/2019    MYELOPCT 1 09/29/2019    BASOPCT 0.3 12/24/2019    MONOSABS 1.3 12/24/2019    LYMPHSABS 1.4 12/24/2019    EOSABS 1.2 12/24/2019    BASOSABS 0.0 12/24/2019    DIFFTYPE AUTOMATED DIFFERENTIAL 12/24/2019     CMP:    Lab Results   Component Value Date     12/24/2019    K 4.7 12/24/2019     12/24/2019    CO2 26 12/24/2019    BUN 41 12/24/2019    CREATININE 2.1 12/24/2019    GFRAA 38 12/24/2019    LABGLOM 32 12/24/2019    GLUCOSE 77 12/24/2019    PROT 4.8 12/24/2019    LABALBU 2.5 12/24/2019    CALCIUM 8.7 12/24/2019    BILITOT 0.5 12/24/2019    ALKPHOS 123 12/24/2019    AST 16 12/24/2019    ALT 8 12/24/2019     BMP:    Lab Results   Component Value Date     12/24/2019    K 4.7 12/24/2019     12/24/2019    CO2 26 12/24/2019    BUN 41 12/24/2019    LABALBU 2.5 12/24/2019    CREATININE 2.1 12/24/2019    CALCIUM 8.7 12/24/2019    GFRAA 38 12/24/2019    LABGLOM 32 12/24/2019    GLUCOSE 77 12/24/2019     PT/INR:    Lab Results   Component Value Date    PROTIME 11.5 05/03/2017    PROTIME 10.6 01/26/2012    INR 1.01

## 2019-12-24 NOTE — PROGRESS NOTES
Patient came in with a rock, this nurse attempted to call [facility] to see when it had been changed last but no answer will try again in the AM

## 2019-12-24 NOTE — PROGRESS NOTES
Spoke with Ca Abebe, nurse from [facility] where pt resides. She stated that pt does not stand, he is a selma lift to get oob. I also asked if his wilkerson had been changed recently because our documentation states that it was last changed November 26, 2019. Ca Abebe stated that to her knowledge, it is the same catheter. Spoke with Dr. Lorrie Schmitz this  who gave verbal orders to change to new wilkerson once urine had been collected and sent to lab if it had not already been changed out at [facility].

## 2019-12-24 NOTE — PROGRESS NOTES
· Evaluation: Goals set   · Goals: Pt will consume >75% of all meals and supplements provided     · Monitoring: Meal Intake, Supplement Intake, Wound Healing, Mental Status/Confusion, Weight, Pertinent Labs    Electronically signed by Sascha Hall RD, [place]ON on 12/24/19 at 10:37 AM    Contact Number: [phone]

## 2019-12-24 NOTE — PROGRESS NOTES
Patient arrived to room 2129 from the ER, moved patient to his bed, skin assessment done by this nurse and Bob Telles RN, feet are flaky (put lotion on them), cleaned patient up, has a large decub ulcer on his coccyx, a surgical scar on his right hip and bruising scattered

## 2019-12-24 NOTE — PROGRESS NOTES
Pulse 93   Temp 97.8 °F (36.6 °C) (Oral)   Resp 12   Ht 6' 3\" (1.905 m)   Wt 297 lb 6.4 oz (134.9 kg)   SpO2 95%   BMI 37.17 kg/m²   General appearance: alert and cooperative. Lungs: clear to auscultation bilaterally  Heart: regular rate and rhythm, S1, S2 normal, no murmur, click, rub or gallop  Abdomen: soft, non-tender; bowel sounds normal; no masses,  no organomegaly  Extremities: extremities normal, atraumatic, no cyanosis or edema  Skin: Sacral and coccygeal decub  CNS:  Moves all extremities. He is able to puff up his cheeks, doubt CVA        Labs and imaging reviewed.   CBC with Differential:    Lab Results   Component Value Date    WBC 14.8 12/24/2019    RBC 4.52 12/24/2019    HGB 11.9 12/24/2019    HCT 38.7 12/24/2019     12/24/2019    MCV 85.6 12/24/2019    MCH 26.3 12/24/2019    MCHC 30.7 12/24/2019    RDW 16.2 12/24/2019    SEGSPCT 73.0 12/24/2019    BANDSPCT 3 09/29/2019    LYMPHOPCT 9.3 12/24/2019    PROMYELOPCT 2 09/26/2019    MONOPCT 8.6 12/24/2019    MYELOPCT 1 09/29/2019    BASOPCT 0.3 12/24/2019    MONOSABS 1.3 12/24/2019    LYMPHSABS 1.4 12/24/2019    EOSABS 1.2 12/24/2019    BASOSABS 0.0 12/24/2019    DIFFTYPE AUTOMATED DIFFERENTIAL 12/24/2019     CMP:    Lab Results   Component Value Date     12/24/2019    K 4.7 12/24/2019     12/24/2019    CO2 26 12/24/2019    BUN 41 12/24/2019    CREATININE 2.1 12/24/2019    GFRAA 38 12/24/2019    LABGLOM 32 12/24/2019    GLUCOSE 77 12/24/2019    PROT 4.8 12/24/2019    LABALBU 2.5 12/24/2019    CALCIUM 8.7 12/24/2019    BILITOT 0.5 12/24/2019    ALKPHOS 123 12/24/2019    AST 16 12/24/2019    ALT 8 12/24/2019     BMP:    Lab Results   Component Value Date     12/24/2019    K 4.7 12/24/2019     12/24/2019    CO2 26 12/24/2019    BUN 41 12/24/2019    LABALBU 2.5 12/24/2019    CREATININE 2.1 12/24/2019    CALCIUM 8.7 12/24/2019    GFRAA 38 12/24/2019    LABGLOM 32 12/24/2019    GLUCOSE 77 12/24/2019     Sodium:    Lab Results   Component Value Date     12/24/2019     Potassium:    Lab Results   Component Value Date    K 4.7 12/24/2019     Calcium:    Lab Results   Component Value Date    CALCIUM 8.7 12/24/2019     Warfarin PT/INR:  No components found for: Orimaggi Lasso  PTT:    Lab Results   Component Value Date    APTT 29.1 05/03/2017   [APTT  Last 3 Troponin:  No results found for: TROPONINI  ABG:    Lab Results   Component Value Date    [id number] 41.0 11/12/2019    PO2ART 92 11/12/2019    ZMU3EVL 24.3 11/12/2019     TSH:  No results found for: TSH  VITAMIN B12: No components found for: B12  FOLATE:  No results found for: FOLATE  IRON:  No results found for: IRON  Iron Saturation:  No components found for: PERCENTFE  TIBC:  No results found for: TIBC  FERRITIN:  No results found for: FERRITIN    Assessment:     Principal Problem:    Sepsis ([facility]  Active Problems:    Hypotension    Metabolic encephalopathy    Vascular dementia with behavior disturbance (HCC)    Hyperlipidemia    Asthma    Diabetes mellitus ([facility]    Obesity    Chronic kidney disease, stage III (moderate) (HCC)    Hypertensive kidney disease with CKD stage III (HCC)    Depression    Pressure injury of skin of coccygeal region    WD-Pressure injury of sacral region, stage 4 ([facility]  Resolved Problems:    * No resolved hospital problems. *      Plan:   Noted ID consult Appreciated  Been in touch with Dr. Que Casey who ordered CT head and neuro consult.  CT head is negative for acute infarct  Consulted wound care  Continue Vanc and Merepenam per ID recommendation  Continue pressor support and SSI      Melania TORRES M.D.  12/24/2019

## 2019-12-24 NOTE — CONSULTS
Infectious Disease Consult Note  2019   Patient Name: Stan Cohen : 1950   Impression   Severe sepsis  o History of right hip hemiarthroplasty wound with possible PJI, deep tissue and surgical cultures were positive for pseudomonas aeruginosa and corynebacterium simulans/striate on. Plan was to have vancomycin and meropenem and on 2020 and 2019 respectively. Admitted with altered mental status and now requiring vasopressors to maintain blood pressure. o Pyuria is suggestive of catheter associated urinary tract infection. If so then risk of MDRO-VRE and MDR-gram-negative organisms is present, however, patient is clinically improving and escalation of therapy is not warranted. Other portal of entry is sacral decubitus osteomyelitis, however, wound does not appear infected. o Possible noninfectious etiology are dehydration, meropenem neurotoxicity   Stage IV sacral decubitus  o Clean base, palpable bone not infected however, increased risk of fecal contamination as seen on exam.   Morbid obesity   CKD stage III   Multi-morbidity: per PMHx hypertension, diabetes mellitus, hyperlipidemia. Plan:   Continue vancomycin and meropenem   If patient worsens (increased pressor requirements or worsen endorgan damage) then discontinue vancomycin and meropenem; start daptomycin, Avycaz and metronidazole   Consider surgical consult for diverting colostomy   Follow-up urine and blood culture   Repeat CRP, check sed rate and procalcitonin    Thank you for allowing me to consult in the care of this patient.  ------------------------  REASON FOR CONSULT: Infective syndrome   Requested by: Dr. Jonel Alejandra is a 77 y.o.  male resident of [facility] who was admitted 2019 for further evaluation and management of altered mental status. History is obtained from chart review as the patient is a poor historian.   He is known to the infectious disease service from 2 previous admissions for Proteus mirabilis bacteremia secondary to complicated UTI, infected sacral decubitus ulcer with coccyx abscess and osteomyelitis status post debridement. On the second admission he had a right hip hemiarthroplasty wound with concerns for possible PEJ I.  Dictation surgical cultures were positive for pseudomonas aeruginosa and corynebacterium simulans/striatum. He was discharged to complete a 6-week course of vancomycin and meropenem. Apparently he had altered mental status. There is no report of fever or chills. The patient denies cough, chest pain, abdominal pain or hip pain. He had watery stool and there were concerns for Clostridioides difficile. C. difficile PCR is negative. He had an indwelling urethral catheter, urinalysis showed pyuria, no bacteriuria. Vancomycin and meropenem have been continued. He has been evaluated by nephrology service for acute kidney injury. ?  Infectious diseases service was consulted to evaluate the pt, and recommend further investigative and therapeutic measures. ROS: Other systems reviewed Including eyes, ENT, respiratory, cardiovascular, GI, , dermatologic, neurologic, psych, hem/lymphatic, musculoskeletal and endocrine were negative other than what is mentioned above.      Patient Active Problem List    Diagnosis Date Noted    Abnormal cardiovascular stress test      Priority: High    Recurrent major depressive disorder, in partial remission ([facility] 11/13/2019     Priority: Medium    Delusional ideas ([facility] 11/13/2019     Priority: Medium     Class: Acute    Vascular dementia with behavior disturbance ([facility] 11/13/2019     Priority: Medium     Class: Chronic    Troponin level elevated 11/11/2019    WD-Pressure injury of sacral region, stage 4 ([facility] 11/05/2019    WD-Nonhealing surgical wound 11/05/2019    WD-Skin ulcer of right hip with necrosis of muscle ([facility] [address] 11/05/2019    Septicemia ([facility] [address]     PVC (premature ventricular contraction)  Sepsis ([facility] 09/21/2019    Pressure injury of skin of coccygeal region 09/10/2019    Fracture of epiphysis (separation) (upper) of neck of femur, closed ([facility] 08/28/2019    SOBOE (shortness of breath on exertion) 03/31/2017    Chronic kidney disease, stage III (moderate) ([facility] [address] 06/01/2016    Hypertensive kidney disease with CKD stage III ([facility] 06/01/2016    Depression 06/01/2016    Obesity     Hypertension     Hyperlipidemia     Asthma     Diabetes mellitus [facility]     H/O cardiovascular stress test      Past Medical History:   Diagnosis Date    Arthritis     Asthma     Chronic kidney disease     stage 3, seen by Dr. Luci An Diabetes mellitus ([facility]     H/O cardiac catheterization 05/04/2017    H/O cardiovascular stress test 6/07, 12/08    CARDIOLITE: EF->51%    Hyperlipidemia     Hypertension     Obesity     Pressure ulcer of coccygeal region, unstageable ([facility] 09/10/2019    Thyroid disease       Past Surgical History:   Procedure Laterality Date    CARPAL TUNNEL RELEASE  2005    DIAGNOSTIC CARDIAC CATH LAB PROCEDURE  12/05    NO SIGNIFICANT CAD    EYE SURGERY      HEMIARTHROPLASTY HIP Right 8/29/2019    HIP HEMIARTHROPLASTY performed by Tawana Hart MD at [address] [place] [address] Right 9/29/2019    HIP INCISION AND DRAINAGE performed by Tawana Hart MD at [address] [place] [address] Right 11/15/2019    HIP INCISION AND DRAINAGE RIGHT CLOSURE OF INCISION WITH HEMOVAC performed by Tawana Hart MD at [address] [place] [address]  09/23/2019    of stage 4 wound on coccyx, by Dr. Kizzy Cr N/A 9/23/2019    EXCISIONAL DEBRIDEMENT OF SACRAL PRESSURE ULCER performed by Kayley Leonardo MD at [address] [place] [address] N/A 9/29/2019    DECUBITUS ULCER DEBRIDEMENT REPAIR performed by Anya Euceda MD at [address] [place] [address]        Family History   Problem Relation Age of Onset    High Blood Pressure Mother     Cancer Mother     Cancer Father     Stroke Maternal Grandfather     Diabetes Paternal Grandmother     Heart Disease Paternal Grandfather       Infectious disease related family history - not contibutory. SOCIAL HISTORY  Social History     Tobacco Use    Smoking status: Never Smoker    Smokeless tobacco: Never Used   Substance Use Topics    Alcohol use: No       Born:   Lived   Occupation:   No recent travel of significance.  No recent unusual exposures.  NO pets   ? ALLERGIES  Allergies   Allergen Reactions    Bee Venom Shortness Of Breath      MEDICATIONS  Reviewed and are per the chart/EMR. ? Antibiotics:   Vancomycin  Meropenem  ?  -------------------------------------------------------------------------------------------------------------------    Vital Signs:  Vitals:    12/24/19 1205   BP: 106/63   Pulse: 85   Resp: 19   Temp: 98.3 °F (36.8 °C)   SpO2: 99%         Exam:    VS: noted; wt 134.9 kg  Gen: alert and oriented X2, no distress  Skin: no stigmata of endocarditis  Wounds: Stage IV sacral decubitus wound, clean base, palpable bone, no discharge   HEMT: AT/NC Oropharynx pink, moist, and without lesions or exudates; dentition in good state of repair  Eyes: PERRLA, EOMI, conjunctiva pink, sclera anicteric. Neck: Supple. Trachea midline. No LAD. Chest: no distress and CTA. Good air movement. Heart: RRR and no MRG. Abd: soft, non-distended, no tenderness, no hepatomegaly. Normoactive bowel sounds. Ext: Right hip hemiarthroplasty scar, no dehiscence does not appear infected. No clubbing, cyanosis, or edema  Catheter Site: without erythema or tenderness  Neuro: Mental status intact. CN 2-12 intact and no focal sensory or motor deficits    ? Diagnostic Studies: reviewed  ? ? I have examined this patient and available medical records on this date and have made the above observations, conclusions and recommendations.   Electronically signed by:

## 2019-12-25 NOTE — PROGRESS NOTES
[id number]:175]  Out: 2425 [Urine:2425]  No intake/output data recorded. /66   Pulse 91   Temp 97.8 °F (36.6 °C) (Oral)   Resp 17   Ht 6' 3\" (1.905 m)   Wt 298 lb 3.2 oz (135.3 kg)   SpO2 95%   BMI 37.27 kg/m²   General appearance: alert and cooperative. Lungs: clear to auscultation bilaterally  Heart: regular rate and rhythm, S1, S2 normal, no murmur, click, rub or gallop  Abdomen: soft, non-tender; bowel sounds normal; no masses,  no organomegaly  Extremities: extremities normal, atraumatic, no cyanosis or edema  Skin: Sacral and coccygeal decub  CNS:  Moves all extremities. He is able to puff up his cheeks, doubt CVA        Labs and imaging reviewed.   CBC with Differential:    Lab Results   Component Value Date    WBC 14.4 12/25/2019    RBC 4.65 12/25/2019    HGB 12.4 12/25/2019    HCT 39.7 12/25/2019     12/25/2019    MCV 85.4 12/25/2019    MCH 26.7 12/25/2019    MCHC 31.2 12/25/2019    RDW 16.1 12/25/2019    SEGSPCT 76.4 12/25/2019    BANDSPCT 3 09/29/2019    LYMPHOPCT 6.8 12/25/2019    PROMYELOPCT 2 09/26/2019    MONOPCT 8.9 12/25/2019    MYELOPCT 1 09/29/2019    BASOPCT 0.4 12/25/2019    MONOSABS 1.3 12/25/2019    LYMPHSABS 1.0 12/25/2019    EOSABS 1.0 12/25/2019    BASOSABS 0.1 12/25/2019    DIFFTYPE AUTOMATED DIFFERENTIAL 12/25/2019     CMP:    Lab Results   Component Value Date     12/25/2019    K 4.1 12/25/2019     12/25/2019    CO2 26 12/25/2019    BUN 39 12/25/2019    CREATININE 1.7 12/25/2019    GFRAA 49 12/25/2019    LABGLOM 40 12/25/2019    GLUCOSE 118 12/25/2019    PROT 4.9 12/25/2019    LABALBU 2.7 12/25/2019    CALCIUM 8.9 12/25/2019    BILITOT 0.5 12/25/2019    ALKPHOS 122 12/25/2019    AST 16 12/25/2019    ALT 8 12/25/2019     BMP:    Lab Results   Component Value Date     12/25/2019    K 4.1 12/25/2019     12/25/2019    CO2 26 12/25/2019    BUN 39 12/25/2019    LABALBU 2.7 12/25/2019    CREATININE 1.7 12/25/2019    CALCIUM 8.9 12/25/2019    GFRAA 49 12/25/2019    LABGLOM 40 12/25/2019    GLUCOSE 118 12/25/2019     Sodium:    Lab Results   Component Value Date     12/25/2019     Potassium:    Lab Results   Component Value Date    K 4.1 12/25/2019     Calcium:    Lab Results   Component Value Date    CALCIUM 8.9 12/25/2019     Warfarin PT/INR:  No components found for: Randeen Pel  PTT:    Lab Results   Component Value Date    APTT 29.1 05/03/2017   [APTT  Last 3 Troponin:  No results found for: TROPONINI  ABG:    Lab Results   Component Value Date    QOZ4POL 41.0 11/12/2019    PO2ART 92 11/12/2019    IZJ9GWO 24.3 11/12/2019     TSH:  No results found for: TSH  VITAMIN B12: No components found for: B12  FOLATE:    Lab Results   Component Value Date    FOLATE 5.9 12/25/2019     IRON:  No results found for: IRON  Iron Saturation:  No components found for: PERCENTFE  TIBC:  No results found for: TIBC  FERRITIN:  No results found for: FERRITIN    Assessment:     Principal Problem:    Sepsis ([address]  Active Problems:    Hypotension    Metabolic encephalopathy    Vascular dementia with behavior disturbance (HCC)    Hyperlipidemia    Asthma    Diabetes mellitus (HCC)    Obesity    Chronic kidney disease, stage III (moderate) (HCC)    Hypertensive kidney disease with CKD stage III (HCC)    Depression    Pressure injury of skin of coccygeal region    WD-Pressure injury of sacral region, stage 4 ([address]  Resolved Problems:    * No resolved hospital problems. *      Plan:   MRI pending. Continue wound care  Continue Vanc and Merepenam per ID recommendation  Off pressors- BP is maintained. Can transfer to step down.       Toño TORRES M.D.  12/25/2019

## 2019-12-25 NOTE — PROGRESS NOTES
times per day  sodium chloride flush 0.9 % injection 10 mL, 10 mL, Intravenous, PRN  enoxaparin (LOVENOX) injection 40 mg, 40 mg, Subcutaneous, Daily  0.9 % sodium chloride infusion, , Intravenous, Continuous  insulin lispro (HUMALOG) injection vial 0-12 Units, 0-12 Units, Subcutaneous, TID WC  insulin lispro (HUMALOG) injection vial 0-6 Units, 0-6 Units, Subcutaneous, Nightly  meropenem (MERREM) 2 g in sodium chloride 0.9 % 100 mL IVPB, 2 g, Intravenous, Q8H  Allergies:  Bee venom    Social History:  TOBACCO:   reports that he has never smoked. He has never used smokeless tobacco.  ETOH:   reports no history of alcohol use. DRUGS:   reports no history of drug use. Family History:       Problem Relation Age of Onset    High Blood Pressure Mother     Cancer Mother     Cancer Father     Stroke Maternal Grandfather     Diabetes Paternal Grandmother     Heart Disease Paternal Grandfather        REVIEW OF SYSTEMS:  CONSTITUTIONAL:  negative  HEENT:  negative  RESPIRATORY:  negative  CARDIOVASCULAR:  negative  GASTROINTESTINAL:  negative  GENITOURINARY:  negative  MUSCULOSKELETAL:  negative  BEHAVIOR/PSYCH:  Negative    ROS unavailable    Family hx neg    PHYSICAL EXAM  ------------------------  Vitals:  BP (!) 95/54   Pulse 96   Temp 97.9 °F (36.6 °C) (Oral)   Resp 19   Ht 6' 3\" (1.905 m)   Wt 297 lb 6.4 oz (134.9 kg)   SpO2 95%   BMI 37.17 kg/m²      General:  Drowsy   Well developed, well nourished, well groomed. No apparent distress. HEENT:  Normocephalic, atraumatic. Pupils equal, round, reactive to light. No scleral icterus. No conjunctival injection. Normal lips, teeth, and gums. No nasal discharge. Neck:  Supple  Heart:  RRR, no murmurs, gallops, rubs  Lungs:  CTA bilaterally, bilat symmetrical expansion, no wheeze, rales, or rhonchi  Abdomen:   Bowel sounds present, soft, nontender, no masses, no organomegaly, no peritoneal signs  Extremities:  No clubbing, cyanosis, or edema  Skin:  Warm GFRAA 38 12/24/2019    LABGLOM 32 12/24/2019    GLUCOSE 77 12/24/2019    PROT 4.8 12/24/2019    LABALBU 2.5 12/24/2019    CALCIUM 8.7 12/24/2019    BILITOT 0.5 12/24/2019    ALKPHOS 123 12/24/2019    AST 16 12/24/2019    ALT 8 12/24/2019     BMP:    Lab Results   Component Value Date     12/24/2019    K 4.7 12/24/2019     12/24/2019    CO2 26 12/24/2019    BUN 41 12/24/2019    LABALBU 2.5 12/24/2019    CREATININE 2.1 12/24/2019    CALCIUM 8.7 12/24/2019    GFRAA 38 12/24/2019    LABGLOM 32 12/24/2019    GLUCOSE 77 12/24/2019     PT/INR:    Lab Results   Component Value Date    PROTIME 11.5 05/03/2017    PROTIME 10.6 01/26/2012    INR 1.01 05/03/2017     PTT:    Lab Results   Component Value Date    APTT 29.1 05/03/2017   [APTT  U/A:    Lab Results   Component Value Date    COLORU MICHAEL 12/23/2019    PHUR 5.5 10/24/2018    LABCAST Present 12/12/2016    LABCAST Hyaline casts 12/12/2016    WBCUA 213 12/23/2019    RBCUA 34 12/23/2019    MUCUS RARE 12/23/2019    TRICHOMONAS NONE SEEN 12/23/2019    YEAST FEW 12/23/2019    BACTERIA NEGATIVE 12/23/2019    CLARITYU SLIGHTLY CLOUDY 12/23/2019    SPECGRAV 1.021 12/23/2019    LEUKOCYTESUR MODERATE 12/23/2019    UROBILINOGEN 1 12/23/2019    BILIRUBINUR NEGATIVE 12/23/2019    BLOODU SMALL 12/23/2019    GLUCOSEU 3+ 10/24/2018     TSH:  No results found for: TSH  VITAMIN B12: No components found for: B12  FOLATE:  No results found for: FOLATE  RPR:  No results found for: RPR  CAMILLA:  No results found for: ANATITER, CAMILLA  Urine Toxicology:  No components found for: IAMMENTA, IBARBIT, IBENZO, ICOCAINE, IMARTHC, IOPIATES, IPHENCYC     IMPRESSION:    Metabolic encephalopathy/sepsis    R/o CVA    Hx dementia    ARF/CKD/HTN    PLAN:    CT brain neg    Mri brain pend    B 12 folate TSh    Continue asa and namenda    Discussed plan of care with pts nurse. Yajaira Shane MD  BOARD CERTIFIED-NEUROLOGY.

## 2019-12-25 NOTE — PLAN OF CARE
Problem: Falls - Risk of:  Goal: Will remain free from falls  Description  Will remain free from falls  12/25/2019 0914 by Maria Eugenia Galindo RN  Outcome: Ongoing  12/25/2019 0037 by Agnieszka Queen RN  Outcome: Ongoing  Goal: Absence of physical injury  Description  Absence of physical injury  12/25/2019 0914 by Maria Eugenia Galindo RN  Outcome: Ongoing  12/25/2019 0037 by Agnieszka Queen RN  Outcome: Ongoing     Problem: Risk for Impaired Skin Integrity  Goal: Tissue integrity - skin and mucous membranes  Description  Structural intactness and normal physiological function of skin and  mucous membranes. 12/25/2019 0914 by Maria Eugenia Galindo RN  Outcome: Ongoing  12/25/2019 0037 by Agnieszka Queen RN  Outcome: Ongoing     Problem: Discharge Planning:  Goal: Participates in care planning  Description  Participates in care planning  12/25/2019 0914 by Maria Eugenia Galindo RN  Outcome: Ongoing  12/25/2019 0037 by Agnieszka Queen RN  Outcome: Ongoing  Goal: Discharged to appropriate level of care  Description  Discharged to appropriate level of care  12/25/2019 0914 by Maria Eugenia Galindo RN  Outcome: Ongoing  12/25/2019 0037 by Agnieszka Queen RN  Outcome: Ongoing     Problem: Airway Clearance - Ineffective:  Goal: Ability to maintain a clear airway will improve  Description  Ability to maintain a clear airway will improve  12/25/2019 0914 by Maria Eugenia Galindo RN  Outcome: Ongoing  12/25/2019 0037 by Agnieszka Queen RN  Outcome: Ongoing     Problem: Anxiety/Stress:  Goal: Level of anxiety will decrease  Description  Level of anxiety will decrease  12/25/2019 0914 by Maria Eugenia Galindo RN  Outcome: Ongoing  12/25/2019 0037 by Agnieszka Queen RN  Outcome: Ongoing     Problem: Aspiration:  Goal: Absence of aspiration  Description  Absence of aspiration  12/25/2019 0914 by Maria Eugenia Galindo RN  Outcome: Ongoing  12/25/2019 0037 by Sara Marroquin Pari Younger RN  Outcome: Ongoing     Problem: Bowel Function - Altered:  Goal: Bowel elimination is within specified parameters  Description  Bowel elimination is within specified parameters  12/25/2019 0914 by Regina Coronado RN  Outcome: Ongoing  12/25/2019 0037 by Alyson Queen RN  Outcome: Ongoing     Problem: Cardiac Output - Decreased:  Goal: Hemodynamic stability will improve  Description  Hemodynamic stability will improve  12/25/2019 0914 by Regina Coronado RN  Outcome: Ongoing  12/25/2019 0037 by Alyson Queen RN  Outcome: Ongoing     Problem: Fluid Volume - Imbalance:  Goal: Absence of imbalanced fluid volume signs and symptoms  Description  Absence of imbalanced fluid volume signs and symptoms  12/25/2019 0914 by Regina Coronado RN  Outcome: Ongoing  12/25/2019 0037 by Alyson Queen RN  Outcome: Ongoing     Problem: Gas Exchange - Impaired:  Goal: Levels of oxygenation will improve  Description  Levels of oxygenation will improve  12/25/2019 0914 by Regina Coronado RN  Outcome: Ongoing  12/25/2019 0037 by Alyson Queen RN  Outcome: Ongoing     Problem: Mental Status - Impaired:  Goal: Mental status will be restored to baseline  Description  Mental status will be restored to baseline  12/25/2019 0914 by Regina Coronado RN  Outcome: Ongoing  12/25/2019 0037 by Alyson Queen RN  Outcome: Ongoing     Problem: Nutrition Deficit:  Goal: Ability to achieve adequate nutritional intake will improve  Description  Ability to achieve adequate nutritional intake will improve  12/25/2019 0914 by Regina Coronado RN  Outcome: Ongoing  12/25/2019 0037 by Alyson Queen RN  Outcome: Ongoing     Problem: Pain:  Goal: Pain level will decrease  Description  Pain level will decrease  12/25/2019 0914 by Regina Coronado RN  Outcome: Ongoing  12/25/2019 0037 by Alyson Queen RN  Outcome: Ongoing  Goal: Control of acute pain  Description  Control of acute pain  12/25/2019 0914 by Angelo Martin RN  Outcome: Ongoing  12/25/2019 0037 by Jean Lee. MARLENE Queen  Outcome: Ongoing  Goal: Control of chronic pain  Description  Control of chronic pain  12/25/2019 0914 by Angelo Martin RN  Outcome: Ongoing  12/25/2019 0037 by Jean Lee. MARLENE Queen  Outcome: Ongoing     Problem: Nutrition  Goal: Optimal nutrition therapy  12/25/2019 0914 by Angelo Martin RN  Outcome: Ongoing  12/25/2019 0037 by Jean Lee.  MARLENE Queen  Outcome: Ongoing

## 2019-12-25 NOTE — PROGRESS NOTES
 Now admitted for VIRGILIO and supra-therapeutic levels, proceed with intermittent dosing     today-this am,  Vanco random [trough] = 24.4 = high.  HOLD FURTHER VANCOMYCIN IV.  Random levels daily, [next= 12/26, 6am]           re-dose when < 15 (will likely be several days from now).    Pharmacy will continue to monitor renal function, clinical status & recommend de-escalation if appropriate    Thank you for the consult,   -Tian Bermudez MS, [facility]

## 2019-12-25 NOTE — PROGRESS NOTES
Nephrology Progress Note  12/25/2019 8:37 AM  Subjective:   Admit Date: 12/23/2019  PCP: Heraclio Reeves MD  Interval History: Pt weak and more awake off levophed and monitor, still some confusion    Diet: DIET CARB CONTROL; Dietary Nutrition Supplements: Low Calorie High Protein Supplement  Dietary Nutrition Supplements: Wound Healing Oral Supplement  Pain is: Moderate      Data:   Scheduled Meds:   vancomycin (VANCOCIN) intermittent dosing (placeholder)   Other RX Placeholder    aspirin  81 mg Oral Daily    buPROPion  300 mg Oral Daily    mometasone-formoterol  2 puff Inhalation BID    insulin glargine  15 Units Subcutaneous Nightly    ipratropium-albuterol  1 vial Inhalation Q6H    memantine  5 mg Oral Nightly    montelukast  10 mg Oral Nightly    pantoprazole  40 mg Oral QAM AC    rOPINIRole  1 mg Oral Nightly    sertraline  100 mg Oral BID    spironolactone  25 mg Oral QAM    vitamin C  1,000 mg Oral Daily    vitamin E  400 Units Oral Daily    sodium chloride flush  10 mL Intravenous 2 times per day    enoxaparin  40 mg Subcutaneous Daily    insulin lispro  0-12 Units Subcutaneous TID WC    insulin lispro  0-6 Units Subcutaneous Nightly    meropenem  2 g Intravenous Q8H     Continuous Infusions:   norepinephrine Stopped (12/25/19 0580)    sodium chloride 125 mL/hr at 12/25/19 0215     PRN Meds:acetaminophen, fluticasone, glucagon (rDNA), nitroGLYCERIN, traMADol, trolamine salicylate, sodium chloride flush  I/O last 3 completed shifts: In: 5228.6 [P.O.:600; I.V.:4328.6; IV Piggyback:300]  Out: 8049 [Urine:2875]  No intake/output data recorded.     Intake/Output Summary (Last 24 hours) at 12/25/2019 0837  Last data filed at 12/25/2019 8021  Gross per 24 hour   Intake 5228.62 ml   Output 2875 ml   Net 2353.62 ml     CBC:   Recent Labs     12/23/19  1420 12/24/19  0605 12/25/19  0617   WBC 17.0* 14.8* 14.4*   HGB 13.1* 11.9* 12.4*    308 334     BMP:    Recent Labs     12/23/19  1420

## 2019-12-25 NOTE — PROGRESS NOTES
Patient has had many loose stools, received orders to insert a rectal tube but it still oozes around it, patient gets upset that there is nothing on T V or why he has to be here, wants a different room to watch something else.

## 2019-12-25 NOTE — PROGRESS NOTES
tablet 300 mg, 300 mg, Oral, Daily  fluticasone (FLONASE) 50 MCG/ACT nasal spray 1 spray, 1 spray, Nasal, Daily PRN  mometasone-formoterol (DULERA) 200-5 MCG/ACT inhaler 2 puff, 2 puff, Inhalation, BID  glucagon (rDNA) injection 1 mg, 1 mg, Intramuscular, PRN  insulin glargine (LANTUS) injection vial 15 Units, 15 Units, Subcutaneous, Nightly  ipratropium-albuterol (DUONEB) nebulizer solution 3 mL, 1 vial, Inhalation, Q6H  memantine (NAMENDA) tablet 5 mg, 5 mg, Oral, Nightly  montelukast (SINGULAIR) tablet 10 mg, 10 mg, Oral, Nightly  nitroGLYCERIN (NITROSTAT) SL tablet 0.4 mg, 0.4 mg, Sublingual, Q5 Min PRN  pantoprazole (PROTONIX) tablet 40 mg, 40 mg, Oral, QAM AC  rOPINIRole (REQUIP) tablet 1 mg, 1 mg, Oral, Nightly  sertraline (ZOLOFT) tablet 100 mg, 100 mg, Oral, BID  spironolactone (ALDACTONE) tablet 25 mg, 25 mg, Oral, QAM  traMADol (ULTRAM) tablet 50 mg, 50 mg, Oral, Q6H PRN  trolamine salicylate (ASPERCREME) 10 % cream, , Topical, Q4H PRN  vitamin C (ASCORBIC ACID) tablet 1,000 mg, 1,000 mg, Oral, Daily  vitamin E capsule 400 Units, 400 Units, Oral, Daily  sodium chloride flush 0.9 % injection 10 mL, 10 mL, Intravenous, 2 times per day  sodium chloride flush 0.9 % injection 10 mL, 10 mL, Intravenous, PRN  enoxaparin (LOVENOX) injection 40 mg, 40 mg, Subcutaneous, Daily  0.9 % sodium chloride infusion, , Intravenous, Continuous  insulin lispro (HUMALOG) injection vial 0-12 Units, 0-12 Units, Subcutaneous, TID WC  insulin lispro (HUMALOG) injection vial 0-6 Units, 0-6 Units, Subcutaneous, Nightly  meropenem (MERREM) 2 g in sodium chloride 0.9 % 100 mL IVPB, 2 g, Intravenous, Q8H  Allergies:  Bee venom    Social History:  TOBACCO:   reports that he has never smoked. He has never used smokeless tobacco.  ETOH:   reports no history of alcohol use. DRUGS:   reports no history of drug use.   Family History:       Problem Relation Age of Onset    High Blood Pressure Mother     Cancer Mother     Cancer Father    Roney Mackamaury XII          Tongue movement:  normal    Motor:    Drift:  absent  Motor exam is symmetrical 5 out of 5 all extremities bilaterally  Tone:  normal  Abnormal Movements:  Absent    DTRs-2+ biceps,triceps,brachioradialis,knee jerks and ankle jerks bilaterally symmetrical.  Toes-downgoing bilaterally            Sensory:sensation cannot be tested              CBC with Differential:    Lab Results   Component Value Date    WBC 14.4 12/25/2019    RBC 4.65 12/25/2019    HGB 12.4 12/25/2019    HCT 39.7 12/25/2019     12/25/2019    MCV 85.4 12/25/2019    MCH 26.7 12/25/2019    MCHC 31.2 12/25/2019    RDW 16.1 12/25/2019    SEGSPCT 76.4 12/25/2019    BANDSPCT 3 09/29/2019    LYMPHOPCT 6.8 12/25/2019    PROMYELOPCT 2 09/26/2019    MONOPCT 8.9 12/25/2019    MYELOPCT 1 09/29/2019    BASOPCT 0.4 12/25/2019    MONOSABS 1.3 12/25/2019    LYMPHSABS 1.0 12/25/2019    EOSABS 1.0 12/25/2019    BASOSABS 0.1 12/25/2019    DIFFTYPE AUTOMATED DIFFERENTIAL 12/25/2019     CMP:    Lab Results   Component Value Date     12/25/2019    K 4.1 12/25/2019     12/25/2019    CO2 26 12/25/2019    BUN 39 12/25/2019    CREATININE 1.7 12/25/2019    GFRAA 49 12/25/2019    LABGLOM 40 12/25/2019    GLUCOSE 118 12/25/2019    PROT 4.9 12/25/2019    LABALBU 2.7 12/25/2019    CALCIUM 8.9 12/25/2019    BILITOT 0.5 12/25/2019    ALKPHOS 122 12/25/2019    AST 16 12/25/2019    ALT 8 12/25/2019     BMP:    Lab Results   Component Value Date     12/25/2019    K 4.1 12/25/2019     12/25/2019    CO2 26 12/25/2019    BUN 39 12/25/2019    LABALBU 2.7 12/25/2019    CREATININE 1.7 12/25/2019    CALCIUM 8.9 12/25/2019    GFRAA 49 12/25/2019    LABGLOM 40 12/25/2019    GLUCOSE 118 12/25/2019     PT/INR:    Lab Results   Component Value Date    PROTIME 11.5 05/03/2017    PROTIME 10.6 01/26/2012    INR 1.01 05/03/2017     PTT:    Lab Results   Component Value Date    APTT 29.1 05/03/2017   [APTT  U/A:    Lab Results   Component Value Date COLORU MICHAEL 12/23/2019    PHUR 5.5 10/24/2018    LABCAST Present 12/12/2016    LABCAST Hyaline casts 12/12/2016    WBCUA 213 12/23/2019    RBCUA 34 12/23/2019    MUCUS RARE 12/23/2019    TRICHOMONAS NONE SEEN 12/23/2019    YEAST FEW 12/23/2019    BACTERIA NEGATIVE 12/23/2019    CLARITYU SLIGHTLY CLOUDY 12/23/2019    SPECGRAV 1.021 12/23/2019    LEUKOCYTESUR MODERATE 12/23/2019    UROBILINOGEN 1 12/23/2019    BILIRUBINUR NEGATIVE 12/23/2019    BLOODU SMALL 12/23/2019    GLUCOSEU 3+ 10/24/2018     TSH:  No results found for: TSH  VITAMIN B12: No components found for: B12  FOLATE:    Lab Results   Component Value Date    FOLATE 5.9 12/25/2019     RPR:  No results found for: RPR  CAMILLA:  No results found for: ANATITER, CAMILLA  Urine Toxicology:  No components found for: IAMMENTA, IBARBIT, IBENZO, ICOCAINE, IMARTHC, IOPIATES, IPHENCYC     IMPRESSION:    Metabolic encephalopathy/sepsis    R/o CVA    Hx dementia    ARF/CKD/HTN    PLAN:    CT brain neg    Mri brain pend    B 12 folate TSh nl    Continue asa and namenda    Discussed plan of care with pts nurse. Aurora Capellan MD  BOARD CERTIFIED-NEUROLOGY.

## 2019-12-26 NOTE — PROGRESS NOTES
Nephrology Progress Note  12/26/2019 10:50 AM  Subjective:   Admit Date: 12/23/2019  PCP: Jazzy Stroud MD  Interval History: pt more lucid and interactive today    Diet: DIET CARB CONTROL; Dietary Nutrition Supplements: Low Calorie High Protein Supplement  Dietary Nutrition Supplements: Wound Healing Oral Supplement  Pain is: Moderate      Data:   Scheduled Meds:   vancomycin  1,500 mg Intravenous Once    vancomycin (VANCOCIN) intermittent dosing (placeholder)   Other RX Placeholder    aspirin  81 mg Oral Daily    buPROPion  300 mg Oral Daily    mometasone-formoterol  2 puff Inhalation BID    insulin glargine  15 Units Subcutaneous Nightly    ipratropium-albuterol  1 vial Inhalation Q6H    memantine  5 mg Oral Nightly    montelukast  10 mg Oral Nightly    pantoprazole  40 mg Oral QAM AC    rOPINIRole  1 mg Oral Nightly    sertraline  100 mg Oral BID    spironolactone  25 mg Oral QAM    vitamin C  1,000 mg Oral Daily    vitamin E  400 Units Oral Daily    sodium chloride flush  10 mL Intravenous 2 times per day    enoxaparin  40 mg Subcutaneous Daily    insulin lispro  0-12 Units Subcutaneous TID WC    insulin lispro  0-6 Units Subcutaneous Nightly    meropenem  2 g Intravenous Q8H     Continuous Infusions:   dextrose      sodium chloride 125 mL/hr at 12/26/19 0053     PRN Meds:glucose, dextrose, glucagon (rDNA), dextrose, acetaminophen, fluticasone, nitroGLYCERIN, traMADol, trolamine salicylate, sodium chloride flush  I/O last 3 completed shifts: In: 8667 [P.O.:910; I.V.:2583; IV Piggyback:100]  Out: 1925 [Urine:1925]  No intake/output data recorded.     Intake/Output Summary (Last 24 hours) at 12/26/2019 1050  Last data filed at 12/26/2019 0346  Gross per 24 hour   Intake 3133 ml   Output 1925 ml   Net 1208 ml     CBC:   Recent Labs     12/24/19  0605 12/25/19  0617 12/26/19  0400   WBC 14.8* 14.4* 8.4   HGB 11.9* 12.4* 10.9*    334 220     BMP:    Recent Labs     12/24/19  0605 12/25/19  0617 12/26/19  0400    137 146*   K 4.7 4.1 3.7    100 108   CO2 26 26 26   BUN 41* 39* 38*   CREATININE 2.1* 1.7* 1.4*   GLUCOSE 77 118* 90     Hepatic:   Recent Labs     12/24/19  0605 12/25/19  0617 12/26/19  0400   AST 16 16 13*   ALT 8* 8* 8*   BILITOT 0.5 0.5 0.4   ALKPHOS 123 122 99     Troponin: No results for input(s): TROPONINI in the last 72 hours. BNP: No results for input(s): BNP in the last 72 hours. Lipids: No results for input(s): CHOL, HDL in the last 72 hours. Invalid input(s): LDLCALCU  ABGs:   Lab Results   Component Value Date    PO2ART 92 11/12/2019    TVJ7UIO 41.0 11/12/2019     INR: No results for input(s): INR in the last 72 hours.   Renal Labs  Albumin:    Lab Results   Component Value Date    LABALBU 2.0 12/26/2019     Calcium:    Lab Results   Component Value Date    CALCIUM 7.5 12/26/2019     Phosphorus:    Lab Results   Component Value Date    PHOS 2.8 11/17/2019     U/A:    Lab Results   Component Value Date    NITRU NEGATIVE 12/23/2019    NITRU Negative 10/24/2018    COLORU MICHAEL 12/23/2019    PHUR 5.5 10/24/2018    LABCAST Present 12/12/2016    LABCAST Hyaline casts 12/12/2016    WBCUA 213 12/23/2019    RBCUA 34 12/23/2019    MUCUS RARE 12/23/2019    TRICHOMONAS NONE SEEN 12/23/2019    YEAST FEW 12/23/2019    BACTERIA NEGATIVE 12/23/2019    CLARITYU SLIGHTLY CLOUDY 12/23/2019    SPECGRAV 1.021 12/23/2019    UROBILINOGEN 1 12/23/2019    BILIRUBINUR NEGATIVE 12/23/2019    BLOODU SMALL 12/23/2019    GLUCOSEU 3+ 10/24/2018    KETUA SMALL 12/23/2019     ABG:    Lab Results   Component Value Date    HWS6VMC 41.0 11/12/2019    PO2ART 92 11/12/2019    ZCM6ORB 24.3 11/12/2019     HgBA1c:    Lab Results   Component Value Date    LABA1C 6.1 09/04/2019     Microalbumen/Creatinine ratio:  No components found for: RUCREAT          Objective:   Vitals: BP (!) 104/56   Pulse 91   Temp 98 °F (36.7 °C) (Oral)   Resp 19   Ht 6' 3\" (1.905 m)   Wt (!) 310 lb 3 oz (140.7 kg) SpO2 93%   BMI 38.77 kg/m²   General appearance: awake weak  HEENT: Head: Normal, normocephalic, atraumatic.   Neck: supple, symmetrical, trachea midline  Lungs: diminished breath sounds bilaterally  Heart: S1, S2 normal  Abdomen: abnormal findings:  soft nt obese  Extremities: edema trace healing wound  Neurologic: Mental status: alertness: awake      Patient Active Problem List:     Hypertension     Hyperlipidemia     Asthma     Diabetes mellitus ([address]     H/O cardiovascular stress test     Obesity     Chronic kidney disease, stage III (moderate) (HCC)     Hypertensive kidney disease with CKD stage III (HCC)     Depression     SOBOE (shortness of breath on exertion)     Abnormal cardiovascular stress test     Fracture of epiphysis (separation) (upper) of neck of femur, closed ([facility])     Pressure injury of skin of coccygeal region     Sepsis ([address]     PVC (premature ventricular contraction)     Septicemia ([facility])     WD-Pressure injury of sacral region, stage 4 (HCC)     WD-Nonhealing surgical wound     WD-Skin ulcer of right hip with necrosis of muscle ([facility])     Troponin level elevated     Recurrent major depressive disorder, in partial remission ([facility])     Delusional ideas ([address]     Vascular dementia with behavior disturbance (HCC)     Hypotension     Metabolic encephalopathy    Assessment and Plan:      IMP:  arf from atn on ckd 3   Metabolic encephalopathy  Hypotension/septic shock with pyuria  Dm2  Facial droop    Plan     Renal improved stable with + uop monitor  Affect improved today monitor  bp stable no pressors cutlure negative  ssi  Neuro stable  Overall improved ok to medical bed from renal           Trev Acevedo MD

## 2019-12-26 NOTE — PROGRESS NOTES
1135 -- 95 22 --   12/26/19 1130 (!) 98/58 98 23 --   12/26/19 1125 -- 96 23 --   12/26/19 1120 -- 95 22 --   12/26/19 1115 -- 97 23 --   12/26/19 1110 -- 101 22 --   12/26/19 1105 (!) 66/47 98 22 --   12/26/19 1100 -- 99 17 --   12/26/19 1055 -- 99 23 --   12/26/19 1050 -- 100 21 --   12/26/19 1045 -- 105 16 --   12/26/19 1040 -- 104 18 --   12/26/19 1035 -- 99 20 --   12/26/19 1030 (!) 98/55 101 22 --   12/26/19 1025 -- 102 21 --   12/26/19 1020 -- 99 22 --   12/26/19 1015 -- 96 22 --   12/26/19 1010 -- 100 20 --   12/26/19 1005 101/76 97 24 --   12/26/19 1000 -- 102 27 --   12/26/19 0955 -- 97 19 --   12/26/19 0950 -- 96 (!) 39 --   12/26/19 0945 -- 95 22 --   12/26/19 0940 -- 104 16 --   12/26/19 0935 -- 99 23 --   12/26/19 0930 (!) 113/58 99 23 --   12/26/19 0925 -- 98 21 --   12/26/19 0920 -- 96 21 --   12/26/19 0915 -- 101 24 --   12/26/19 0910 -- 100 20 --   12/26/19 0905 -- 100 24 --   12/26/19 0900 (!) 102/53 100 19 --   12/26/19 0855 -- 97 19 --   12/26/19 0850 -- 96 23 --   12/26/19 0845 -- 99 20 --   12/26/19 0840 -- 91 20 --   12/26/19 0835 (!) 92/58 90 18 --   12/26/19 0830 -- 99 18 --   12/26/19 0825 -- 98 23 --   12/26/19 0820 -- 92 21 --   12/26/19 0815 -- 93 18 --   12/26/19 0810 -- 93 17 --   12/26/19 0805 -- 92 21 --   12/26/19 0800 (!) 115/52 92 20 --   12/26/19 0755 -- 89 16 --   12/26/19 0750 -- 90 22 --   12/26/19 0745 -- 93 26 --   12/26/19 0740 -- 98 24 --   12/26/19 0735 -- 91 21 --   12/26/19 0730 (!) 114/54 93 18 --     I/O last 3 completed shifts: In: 2613 [P.O.:1030; I.V.:1583]  Out: 2175 [Urine:2175]  No intake/output data recorded. /61   Pulse 105   Temp 98 °F (36.7 °C) (Oral)   Resp 25   Ht 6' 3\" (1.905 m)   Wt (!) 310 lb 3 oz (140.7 kg)   SpO2 99%   BMI 38.77 kg/m²   General appearance: alert and cooperative.   Lungs: clear to auscultation bilaterally  Heart: regular rate and rhythm, S1, S2 normal, no murmur, click, rub or gallop  Abdomen: soft, non-tender; bowel sounds normal; no masses,  no organomegaly  Extremities: extremities normal, atraumatic, no cyanosis or edema  Skin: Sacral and coccygeal decub  CNS:  Moves all extremities. He is able to puff up his cheeks, doubt CVA        Labs and imaging reviewed.   CBC with Differential:    Lab Results   Component Value Date    WBC 8.4 12/26/2019    RBC 4.09 12/26/2019    HGB 10.9 12/26/2019    HCT 35.2 12/26/2019     12/26/2019    MCV 86.1 12/26/2019    MCH 26.7 12/26/2019    MCHC 31.0 12/26/2019    RDW 16.1 12/26/2019    SEGSPCT 75.1 12/26/2019    BANDSPCT 3 09/29/2019    LYMPHOPCT 6.7 12/26/2019    PROMYELOPCT 2 09/26/2019    MONOPCT 7.4 12/26/2019    MYELOPCT 1 09/29/2019    BASOPCT 0.4 12/26/2019    MONOSABS 0.6 12/26/2019    LYMPHSABS 0.6 12/26/2019    EOSABS 0.8 12/26/2019    BASOSABS 0.0 12/26/2019    DIFFTYPE AUTOMATED DIFFERENTIAL 12/26/2019     CMP:    Lab Results   Component Value Date     12/26/2019    K 3.7 12/26/2019     12/26/2019    CO2 26 12/26/2019    BUN 38 12/26/2019    CREATININE 1.4 12/26/2019    GFRAA >60 12/26/2019    LABGLOM 50 12/26/2019    GLUCOSE 90 12/26/2019    PROT 3.9 12/26/2019    LABALBU 2.0 12/26/2019    CALCIUM 7.5 12/26/2019    BILITOT 0.4 12/26/2019    ALKPHOS 99 12/26/2019    AST 13 12/26/2019    ALT 8 12/26/2019     BMP:    Lab Results   Component Value Date     12/26/2019    K 3.7 12/26/2019     12/26/2019    CO2 26 12/26/2019    BUN 38 12/26/2019    LABALBU 2.0 12/26/2019    CREATININE 1.4 12/26/2019    CALCIUM 7.5 12/26/2019    GFRAA >60 12/26/2019    LABGLOM 50 12/26/2019    GLUCOSE 90 12/26/2019     Sodium:    Lab Results   Component Value Date     12/26/2019     Potassium:    Lab Results   Component Value Date    K 3.7 12/26/2019     Calcium:    Lab Results   Component Value Date    CALCIUM 7.5 12/26/2019     Warfarin PT/INR:  No components found for: PTPATWAR, PTINRWAR  PTT:    Lab Results   Component Value Date    APTT 29.1 05/03/2017 [APTT  Last 3 Troponin:  No results found for: TROPONINI  ABG:    Lab Results   Component Value Date    YDE8WUC 41.0 11/12/2019    PO2ART 92 11/12/2019    GXD5UAW 24.3 11/12/2019     TSH:  No results found for: TSH  VITAMIN B12: No components found for: B12  FOLATE:    Lab Results   Component Value Date    FOLATE 5.9 12/25/2019     MRI Brain:  1. No acute intracranial abnormality.  No acute infarct. 2. Mild global parenchymal volume loss with chronic microvascular ischemic  changes. 3. Left mastoid effusion. Assessment:     Principal Problem:    Sepsis ([address]  Active Problems:    Hypotension    Metabolic encephalopathy    Vascular dementia with behavior disturbance (HCC)    Hyperlipidemia    Asthma    Diabetes mellitus ([address]    Obesity    Chronic kidney disease, stage III (moderate) (HCC)    Hypertensive kidney disease with CKD stage III (HCC)    Depression    Pressure injury of skin of coccygeal region    WD-Pressure injury of sacral region, stage 4 ([address]  Resolved Problems:    * No resolved hospital problems. *      Plan:   MRI -no acute infarct  Continue wound care  Continue Vanc and Merepenam per ID recommendation  Will consult Dr. Adam Hannah for diverting colostomy. Off pressors- BP is maintained. Appreciate renal consult  Poke to patient about diverting colostomy because of his diarrhea and contamination of decub wound. He is agreeable to a temporary colostomy. Consulted Dr. Adam Hannah.       Marleen TORRES M.D.  12/26/2019

## 2019-12-26 NOTE — PROGRESS NOTES
Infectious Disease Progress Note  2019   Patient Name: Eduin Warren : 1950   Impression  · Hypotension  ? History of right hip hemiarthroplasty wound with possible PJI, deep tissue and surgical cultures were positive for pseudomonas aeruginosa and corynebacterium simulans/striate on. Plan was to have vancomycin and meropenem and on 2020 and 2019 respectively. ? Initially thought to be secondary to severe sepsis. However, more likely due to dehydration resulting in VIRGILIO. ? Urine culture positive for yeast, not surprising as the patient has been on antibiotics for greater than 4 weeks. · Stage IV sacral decubitus  ? Clean base, palpable bone not infected however, increased risk of fecal contamination as seen on exam.  · Morbid obesity  · CKD stage III  · Multi-morbidity: per PMHx hypertension, diabetes mellitus, hyperlipidemia. Plan:  · Continue vancomycin and meropenem (end date 2020 and 2019 respectively)  · Consider surgical consult for diverting colostomy      Ongoing Antimicrobial Therapy  Vancomycin  Meropenem ? Completed Antimicrobial Therapy  ? History:? Interval history noted. Hypotension and altered mental status. Dehydration with acute kidney injury. Denies n/v/d/f or untoward effects of antibiotics  Physical Exam:  Vital Signs: BP (!) 104/56   Pulse 91   Temp 98 °F (36.7 °C) (Oral)   Resp 19   Ht 6' 3\" (1.905 m)   Wt (!) 310 lb 3 oz (140.7 kg)   SpO2 93%   BMI 38.77 kg/m²     Gen: alert and oriented X2, no distress  Skin: no stigmata of endocarditis  Wounds: Stage IV sacral decubitus wound, clean base, palpable bone, no discharge   HEMT: AT/NC Oropharynx pink, moist, and without lesions or exudates; dentition in good state of repair  Eyes: PERRLA, EOMI, conjunctiva pink, sclera anicteric. Neck: Supple. Trachea midline. No LAD. Chest: no distress and CTA. Good air movement. Heart: RRR and no MRG.    Abd: soft, non-distended, no tenderness, no hepatomegaly. Normoactive bowel sounds. Ext: Right hip hemiarthroplasty scar, no dehiscence does not appear infected. No clubbing, cyanosis, or edema  Catheter Site: without erythema or tenderness  Neuro: Mental status intact. CN 2-12 intact and no focal sensory or motor deficits     Radiologic / Imaging / TESTING  No results found.      Labs:    Recent Results (from the past 24 hour(s))   POCT Glucose    Collection Time: 12/25/19 12:55 PM   Result Value Ref Range    POC Glucose 97 70 - 99 MG/DL   POCT Glucose    Collection Time: 12/25/19  5:44 PM   Result Value Ref Range    POC Glucose 109 (H) 70 - 99 MG/DL   POCT Glucose    Collection Time: 12/25/19  9:24 PM   Result Value Ref Range    POC Glucose 117 (H) 70 - 99 MG/DL   CBC auto differential    Collection Time: 12/26/19  4:00 AM   Result Value Ref Range    WBC 8.4 4.0 - 10.5 K/CU MM    RBC 4.09 (L) 4.6 - 6.2 M/CU MM    Hemoglobin 10.9 (L) 13.5 - 18.0 GM/DL    Hematocrit 35.2 (L) 42 - 52 %    MCV 86.1 78 - 100 FL    MCH 26.7 (L) 27 - 31 PG    MCHC 31.0 (L) 32.0 - 36.0 %    RDW 16.1 (H) 11.7 - 14.9 %    Platelets 913 856 - 384 K/CU MM    MPV 9.4 7.5 - 11.1 FL    Differential Type AUTOMATED DIFFERENTIAL     Segs Relative 75.1 (H) 36 - 66 %    Lymphocytes % 6.7 (L) 24 - 44 %    Monocytes % 7.4 (H) 0 - 4 %    Eosinophils % 9.9 (H) 0 - 3 %    Basophils % 0.4 0 - 1 %    Segs Absolute 6.3 K/CU MM    Lymphocytes Absolute 0.6 K/CU MM    Monocytes Absolute 0.6 K/CU MM    Eosinophils Absolute 0.8 K/CU MM    Basophils Absolute 0.0 K/CU MM    Nucleated RBC % 0.0 %    Total Nucleated RBC 0.0 K/CU MM    Total Immature Neutrophil 0.04 K/CU MM    Immature Neutrophil % 0.5 (H) 0 - 0.43 %   Comprehensive Metabolic Panel w/ Reflex to MG    Collection Time: 12/26/19  4:00 AM   Result Value Ref Range    Sodium 146 (H) 135 - 145 MMOL/L    Potassium 3.7 3.5 - 5.1 MMOL/L    Chloride 108 99 - 110 mMol/L    CO2 26 21 - 32 MMOL/L    BUN 38 (H) 6 - 23 MG/DL    CREATININE 1.4 (H) 0.9 - 1.3

## 2019-12-26 NOTE — PROGRESS NOTES
Shahzad Vigil MD.  Section of General Neurology - Adult  Consult Note        Reason for Consult:    Requesting Physician:  No referring provider defined for this encounter.   Thank you for your kind referral.    CHIEF COMPLAINT:     Pt is stable and doing some better     No confusion         Past Medical History:        Diagnosis Date    Arthritis     Asthma     Chronic kidney disease     stage 3, seen by Dr. Lauren Wesley Diabetes mellitus ([facility]     H/O cardiac catheterization 05/04/2017    H/O cardiovascular stress test 6/07, 12/08    CARDIOLITE: EF->51%    Hyperlipidemia     Hypertension     Obesity     Pressure ulcer of coccygeal region, unstageable ([facility] 09/10/2019    Thyroid disease      Past Surgical History:        Procedure Laterality Date    CARPAL TUNNEL RELEASE  2005    DIAGNOSTIC CARDIAC CATH LAB PROCEDURE  12/05    NO SIGNIFICANT CAD    EYE SURGERY      HEMIARTHROPLASTY HIP Right 8/29/2019    HIP HEMIARTHROPLASTY performed by Britta Barajas MD at [address] Right 9/29/2019    HIP INCISION AND DRAINAGE performed by Britta Barajas MD at [address] Right 11/15/2019    HIP INCISION AND DRAINAGE RIGHT CLOSURE OF INCISION WITH HEMOVAC performed by Britta Barajas MD at [address] [place] [address]  09/23/2019    of stage 4 wound on coccyx, by Dr. Dariel Ding N/A 9/23/2019    EXCISIONAL DEBRIDEMENT OF SACRAL PRESSURE ULCER performed by Elina Parra MD at [address] [place] [address] N/A 9/29/2019    DECUBITUS ULCER [address] performed by Chandana Joaquin MD at [address]       Current Medications:   Current Facility-Administered Medications: glucose (GLUTOSE) 40 % oral gel 15 g, 15 g, Oral, PRN  dextrose 50 % IV solution, 12.5 g, Intravenous, PRN  glucagon (rDNA) injection 1 mg, 1 mg, Intramuscular, PRN  dextrose 5 % solution, 100 mL/hr, Intravenous, PRN  vancomycin (VANCOCIN) intermittent dosing (placeholder), , Other, RX Placeholder  acetaminophen (TYLENOL) tablet 500 mg, 500 mg, Oral, Q6H PRN  aspirin EC tablet 81 mg, 81 mg, Oral, Daily  buPROPion (WELLBUTRIN XL) extended release tablet 300 mg, 300 mg, Oral, Daily  fluticasone (FLONASE) 50 MCG/ACT nasal spray 1 spray, 1 spray, Nasal, Daily PRN  mometasone-formoterol (DULERA) 200-5 MCG/ACT inhaler 2 puff, 2 puff, Inhalation, BID  insulin glargine (LANTUS) injection vial 15 Units, 15 Units, Subcutaneous, Nightly  ipratropium-albuterol (DUONEB) nebulizer solution 3 mL, 1 vial, Inhalation, Q6H  memantine (NAMENDA) tablet 5 mg, 5 mg, Oral, Nightly  montelukast (SINGULAIR) tablet 10 mg, 10 mg, Oral, Nightly  nitroGLYCERIN (NITROSTAT) SL tablet 0.4 mg, 0.4 mg, Sublingual, Q5 Min PRN  pantoprazole (PROTONIX) tablet 40 mg, 40 mg, Oral, QAM AC  rOPINIRole (REQUIP) tablet 1 mg, 1 mg, Oral, Nightly  sertraline (ZOLOFT) tablet 100 mg, 100 mg, Oral, BID  spironolactone (ALDACTONE) tablet 25 mg, 25 mg, Oral, QAM  traMADol (ULTRAM) tablet 50 mg, 50 mg, Oral, Q6H PRN  trolamine salicylate (ASPERCREME) 10 % cream, , Topical, Q4H PRN  vitamin C (ASCORBIC ACID) tablet 1,000 mg, 1,000 mg, Oral, Daily  vitamin E capsule 400 Units, 400 Units, Oral, Daily  sodium chloride flush 0.9 % injection 10 mL, 10 mL, Intravenous, 2 times per day  sodium chloride flush 0.9 % injection 10 mL, 10 mL, Intravenous, PRN  enoxaparin (LOVENOX) injection 40 mg, 40 mg, Subcutaneous, Daily  0.9 % sodium chloride infusion, , Intravenous, Continuous  insulin lispro (HUMALOG) injection vial 0-12 Units, 0-12 Units, Subcutaneous, TID WC  insulin lispro (HUMALOG) injection vial 0-6 Units, 0-6 Units, Subcutaneous, Nightly  meropenem (MERREM) 2 g in sodium chloride 0.9 % 100 mL IVPB, 2 g, Intravenous, Q8H  Allergies:  Bee venom    Social History:  TOBACCO:   reports that he has never smoked.  He has never used smokeless tobacco.  ETOH: reports no history of alcohol use. DRUGS:   reports no history of drug use. Family History:       Problem Relation Age of Onset    High Blood Pressure Mother     Cancer Mother     Cancer Father     Stroke Maternal Grandfather     Diabetes Paternal Grandmother     Heart Disease Paternal Grandfather        REVIEW OF SYSTEMS:  CONSTITUTIONAL:  negative  HEENT:  negative  RESPIRATORY:  negative  CARDIOVASCULAR:  negative  GASTROINTESTINAL:  negative  GENITOURINARY:  negative  MUSCULOSKELETAL:  negative  BEHAVIOR/PSYCH:  Negative    ROS unavailable    Family hx neg    PHYSICAL EXAM  ------------------------  Vitals:  BP (!) 114/55   Pulse 98   Temp 97.7 °F (36.5 °C) (Oral)   Resp 23   Ht 6' 3\" (1.905 m)   Wt (!) 310 lb 3 oz (140.7 kg)   SpO2 93%   BMI 38.77 kg/m²      General:  Drowsy   Well developed, well nourished, well groomed. No apparent distress. HEENT:  Normocephalic, atraumatic. Pupils equal, round, reactive to light. No scleral icterus. No conjunctival injection. Normal lips, teeth, and gums. No nasal discharge. Neck:  Supple  Heart:  RRR, no murmurs, gallops, rubs  Lungs:  CTA bilaterally, bilat symmetrical expansion, no wheeze, rales, or rhonchi  Abdomen: Bowel sounds present, soft, nontender, no masses, no organomegaly, no peritoneal signs  Extremities:  No clubbing, cyanosis, or edema  Skin:  Warm and dry, no open lesions or rash  Breast: deferred  Rectal: deferred  Genitalia:  deferred    NEUROLOGICAL EXAM  ---------------------------------    Mental Status Exam:             Drowsy follows simple commands  Oriented to person place year month-moderate dementia    Cranial [id number] Intact.         Cranial nerve II           Visual acuity:  normal                 Cranial nerve III           Pupils:  equal, round, reactive to light      Cranial nerves III, IV, VI           Extraocular Movements: intact      Cranial nerve V           Facial sensation:  intact      Cranial nerve VII PROTIME 11.5 05/03/2017    PROTIME 10.6 01/26/2012    INR 1.01 05/03/2017     PTT:    Lab Results   Component Value Date    APTT 29.1 05/03/2017   [APTT  U/A:    Lab Results   Component Value Date    COLORU MICHAEL 12/23/2019    PHUR 5.5 10/24/2018    LABCAST Present 12/12/2016    LABCAST Hyaline casts 12/12/2016    WBCUA 213 12/23/2019    RBCUA 34 12/23/2019    MUCUS RARE 12/23/2019    TRICHOMONAS NONE SEEN 12/23/2019    YEAST FEW 12/23/2019    BACTERIA NEGATIVE 12/23/2019    CLARITYU SLIGHTLY CLOUDY 12/23/2019    SPECGRAV 1.021 12/23/2019    LEUKOCYTESUR MODERATE 12/23/2019    UROBILINOGEN 1 12/23/2019    BILIRUBINUR NEGATIVE 12/23/2019    BLOODU SMALL 12/23/2019    GLUCOSEU 3+ 10/24/2018     TSH:  No results found for: TSH  VITAMIN B12: No components found for: B12  FOLATE:    Lab Results   Component Value Date    FOLATE 5.9 12/25/2019     RPR:  No results found for: RPR  CAMILLA:  No results found for: ANATITER, CAMILLA  Urine Toxicology:  No components found for: IAMMENTA, IBARBIT, IBENZO, ICOCAINE, IMARTHC, IOPIATES, IPHENCYC     IMPRESSION:    Metabolic encephalopathy/sepsis    neg for CVA    Hx dementia    ARF/CKD/HTN    PLAN:    CT brain neg    Mri brain neg    B 12 folate TSh nl    Continue asa and namenda    Discussed plan of care with pts nurse. Ana Rosa Arteaga MD  BOARD CERTIFIED-NEUROLOGY.

## 2019-12-27 NOTE — PROGRESS NOTES
Dextrose 5% started at 50ml/hr per Dr. Edwige Harper since blood glucose is 77 and pt is NPO for surgery this afternoon. Normal saline is to be restarted after surgery when pt is eating again.

## 2019-12-27 NOTE — PROGRESS NOTES
1634: Arrived to PACU from OR. Monitors applied, alarms on. Report obtained from [facility] DAMIEN and S. Akins CRNA. B/P 74/60. Cuff readjusted and retaken 73/55. Medicated per ERIBERTO Akins CRNA. Dextrose 5% continues to infuse at 50cc/hr. 1637: Patient responds and reaching for mask. When asked if getting breath ok states is working at it. Sao2 99%  1643: 250cc albumin given per ERIBERTO Akins CRNA. Seems to be breathing easier now, takes a deep breath when encouraged. 1650: Mask removed per patient request and placed on nasal cannula at 3L. C/o stomach feeling \"upset\" When asked if having pain or nausea states he's not sure which then dozed. 1726: Dr. Sharifa Clayton at bedside and update given regarding low B/P. 250cc albumin started and will run in remaining 500cc of present IV. 1750: Turned and repositioned in bed. Heels off bed. Tolerated well and went back to sleep quickly. 1840: B/P 112/51. Albumin and 500cc bolus in. Patient dozes, arouses easily to name. No c.o pain at present. No family in waiting room to update at this time. 1850: Transported to room 2016.  Care assumed per [facility] 5

## 2019-12-27 NOTE — PROGRESS NOTES
Perfect serve sent to Dr. Guzman Mask asking if pt needs to be NPO after midnight. Waiting on response.

## 2019-12-27 NOTE — PLAN OF CARE
Problem: Falls - Risk of:  Goal: Will remain free from falls  Description  Will remain free from falls  12/27/2019 0800 by Dewayne Daily RN  Outcome: Ongoing  12/26/2019 2036 by Rickey Merino RN  Outcome: Ongoing  Goal: Absence of physical injury  Description  Absence of physical injury  12/27/2019 0800 by Dewayne Daily RN  Outcome: Ongoing  12/26/2019 2036 by Rickey Merino RN  Outcome: Ongoing     Problem: Risk for Impaired Skin Integrity  Goal: Tissue integrity - skin and mucous membranes  Description  Structural intactness and normal physiological function of skin and  mucous membranes. 12/27/2019 0800 by Dewayne Daily RN  Outcome: Ongoing  12/26/2019 2036 by Rickey Merino RN  Outcome: Ongoing     Problem: Discharge Planning:  Goal: Participates in care planning  Description  Participates in care planning  Outcome: Ongoing  Goal: Discharged to appropriate level of care  Description  Discharged to appropriate level of care  Outcome: Ongoing     Problem: Airway Clearance - Ineffective:  Goal: Ability to maintain a clear airway will improve  Description  Ability to maintain a clear airway will improve  12/27/2019 0800 by Dewayne Daily RN  Outcome: Ongoing  12/26/2019 2036 by Rickey Merino RN  Outcome: Ongoing     Problem: Anxiety/Stress:  Goal: Level of anxiety will decrease  Description  Level of anxiety will decrease  12/27/2019 0800 by Dewayne Daily RN  Outcome: Ongoing  12/26/2019 2036 by Rickey Merino RN  Outcome: Ongoing     Problem: Aspiration:  Goal: Absence of aspiration  Description  Absence of aspiration  Outcome: Ongoing     Problem:  Bowel Function - Altered:  Goal: Bowel elimination is within specified parameters  Description  Bowel elimination is within specified parameters  12/27/2019 0800 by Dewayne Daily RN  Outcome: Ongoing  12/26/2019 2036 by Rickey Merino RN  Outcome: Ongoing     Problem: Cardiac Output - Decreased:  Goal: Hemodynamic stability will improve  Description  Hemodynamic stability will improve  12/27/2019 0800 by Lizzie Chong RN  Outcome: Ongoing  12/26/2019 2036 by Russel Mata RN  Outcome: Ongoing     Problem: Fluid Volume - Imbalance:  Goal: Absence of imbalanced fluid volume signs and symptoms  Description  Absence of imbalanced fluid volume signs and symptoms  12/27/2019 0800 by Lizzie Chong RN  Outcome: Ongoing  12/26/2019 2036 by Russel Mata RN  Outcome: Ongoing     Problem: Gas Exchange - Impaired:  Goal: Levels of oxygenation will improve  Description  Levels of oxygenation will improve  Outcome: Ongoing     Problem: Mental Status - Impaired:  Goal: Mental status will be restored to baseline  Description  Mental status will be restored to baseline  12/27/2019 0800 by Lizzie Chong RN  Outcome: Ongoing  12/26/2019 2036 by Russel Mata RN  Outcome: Ongoing     Problem: Nutrition Deficit:  Goal: Ability to achieve adequate nutritional intake will improve  Description  Ability to achieve adequate nutritional intake will improve  Outcome: Ongoing     Problem: Pain:  Goal: Pain level will decrease  Description  Pain level will decrease  12/27/2019 0800 by Lizzie Chong RN  Outcome: Ongoing  12/26/2019 2036 by Russel Mata RN  Outcome: Ongoing  Goal: Control of acute pain  Description  Control of acute pain  12/27/2019 0800 by Lizzie Chong RN  Outcome: Ongoing  12/26/2019 2036 by Russel Mata RN  Outcome: Ongoing  Goal: Control of chronic pain  Description  Control of chronic pain  12/27/2019 0800 by Lizzie Chong RN  Outcome: Ongoing  12/26/2019 2036 by Russel Mata RN  Outcome: Ongoing     Problem: Nutrition  Goal: Optimal nutrition therapy  Outcome: Ongoing

## 2019-12-27 NOTE — PROGRESS NOTES
Oleg Gomez MD.  Section of General Neurology - Adult  Consult Note        Reason for Consult:    Requesting Physician:  No referring provider defined for this encounter.   Thank you for your kind referral.    CHIEF COMPLAINT:     Pt is stable and doing a lot better     No confusion         Past Medical History:        Diagnosis Date    Arthritis     Asthma     Chronic kidney disease     stage 3, seen by Dr. Savi Rivas Diabetes mellitus ([facility]     H/O cardiac catheterization 05/04/2017    H/O cardiovascular stress test 6/07, 12/08    CARDIOLITE: EF->51%    Hyperlipidemia     Hypertension     Obesity     Pressure ulcer of coccygeal region, unstageable ([facility] [facility] 09/10/2019    Thyroid disease      Past Surgical History:        Procedure Laterality Date    CARPAL TUNNEL RELEASE  2005    DIAGNOSTIC CARDIAC CATH LAB PROCEDURE  12/05    NO SIGNIFICANT CAD    EYE SURGERY      HEMIARTHROPLASTY HIP Right 8/29/2019    HIP HEMIARTHROPLASTY performed by Nat Brantley MD at [address] [place] [address] And [address] [place] [address] 9/29/2019    HIP INCISION AND DRAINAGE performed by Nat Brantley MD at [address] [place] [address] And [address] [place] [address] 11/15/2019    HIP INCISION AND DRAINAGE RIGHT CLOSURE OF INCISION WITH HEMOVAC performed by Nat Brantley MD at [address] [place] [address]  09/23/2019    of stage 4 wound on coccyx, by Dr. Gilda Haney N/A 9/23/2019    EXCISIONAL DEBRIDEMENT OF SACRAL PRESSURE ULCER performed by Josué Castro MD at [address] [place] [address] N/A 9/29/2019    DECUBITUS ULCER DEBRIDEMENT REPAIR performed by Muriel Holliday MD at [address]        Current Medications:   Current Facility-Administered Medications: dextrose 5 % solution, , Intravenous, Continuous  fentaNYL (SUBLIMAZE) injection 25 mcg, 25 mcg, Intravenous, Q5 Min PRN  HYDROmorphone (DILAUDID) injection 0.5 mg, 0.5 mg, Intravenous, Q5 Min PRN  HYDROmorphone (DILAUDID) injection 0.25 mg, 0.25 mg, Intravenous, Q5 Min PRN  morphine (PF) injection 2 mg, 2 mg, Intravenous, Q5 Min PRN  promethazine (PHENERGAN) injection 6.25 mg, 6.25 mg, Intramuscular, Once PRN  labetalol (NORMODYNE;TRANDATE) injection syringe 5 mg, 5 mg, Intravenous, Q10 Min PRN  hydrALAZINE (APRESOLINE) injection 5 mg, 5 mg, Intravenous, Q10 Min PRN  lactated ringers infusion, , Intravenous, Continuous  0.9 % sodium chloride infusion, 1,000 mL, Intravenous, Continuous  sodium chloride 0.9 % infusion, , ,   collagenase ointment, , Topical, Daily  glucose (GLUTOSE) 40 % oral gel 15 g, 15 g, Oral, PRN  dextrose 50 % IV solution, 12.5 g, Intravenous, PRN  glucagon (rDNA) injection 1 mg, 1 mg, Intramuscular, PRN  dextrose 5 % solution, 100 mL/hr, Intravenous, PRN  vancomycin (VANCOCIN) intermittent dosing (placeholder), , Other, RX Placeholder  acetaminophen (TYLENOL) tablet 500 mg, 500 mg, Oral, Q6H PRN  aspirin EC tablet 81 mg, 81 mg, Oral, Daily  buPROPion (WELLBUTRIN XL) extended release tablet 300 mg, 300 mg, Oral, Daily  fluticasone (FLONASE) 50 MCG/ACT nasal spray 1 spray, 1 spray, Nasal, Daily PRN  mometasone-formoterol (DULERA) 200-5 MCG/ACT inhaler 2 puff, 2 puff, Inhalation, BID  insulin glargine (LANTUS) injection vial 15 Units, 15 Units, Subcutaneous, Nightly  ipratropium-albuterol (DUONEB) nebulizer solution 3 mL, 1 vial, Inhalation, Q6H  memantine (NAMENDA) tablet 5 mg, 5 mg, Oral, Nightly  montelukast (SINGULAIR) tablet 10 mg, 10 mg, Oral, Nightly  nitroGLYCERIN (NITROSTAT) SL tablet 0.4 mg, 0.4 mg, Sublingual, Q5 Min PRN  pantoprazole (PROTONIX) tablet 40 mg, 40 mg, Oral, QAM AC  rOPINIRole (REQUIP) tablet 1 mg, 1 mg, Oral, Nightly  sertraline (ZOLOFT) tablet 100 mg, 100 mg, Oral, BID  spironolactone (ALDACTONE) tablet 25 mg, 25 mg, Oral, QAM  traMADol (ULTRAM) tablet 50 mg, 50 mg, Oral, Q6H PRN  trolamine salicylate (ASPERCREME) 10 % cream, , Topical, Q4H PRN  vitamin C wheeze, rales, or rhonchi  Abdomen: Bowel sounds present, soft, nontender, no masses, no organomegaly, no peritoneal signs  Extremities:  No clubbing, cyanosis, or edema  Skin:  Warm and dry, no open lesions or rash  Breast: deferred  Rectal: deferred  Genitalia:  deferred    NEUROLOGICAL EXAM  ---------------------------------    Mental Status Exam:             Alert  follows simple commands  Oriented to person place year month-moderate dementia    Cranial [id number] Intact.         Cranial nerve II           Visual acuity:  normal                 Cranial nerve III           Pupils:  equal, round, reactive to light      Cranial nerves III, IV, VI           Extraocular Movements: intact      Cranial nerve V           Facial sensation:  intact      Cranial nerve VII           Facial strength: intact      Cranial nerve VIII           Hearing:  intact      Cranial nerve IX           Palate:  intact      Cranial nerve XI         Shoulder shrug:  intact      Cranial nerve XII          Tongue movement:  normal    Motor:    Drift:  absent  Motor exam is symmetrical 5 out of 5 all extremities bilaterally  Tone:  normal  Abnormal Movements:  Absent    DTRs-2+ biceps,triceps,brachioradialis,knee jerks and ankle jerks bilaterally symmetrical.  Toes-downgoing bilaterally            Sensory:sensation cannot be tested              CBC with Differential:    Lab Results   Component Value Date    WBC 9.9 12/27/2019    RBC 4.33 12/27/2019    HGB 11.7 12/27/2019    HCT 37.4 12/27/2019     12/27/2019    MCV 86.4 12/27/2019    MCH 27.0 12/27/2019    MCHC 31.3 12/27/2019    RDW 16.1 12/27/2019    SEGSPCT 72.7 12/27/2019    BANDSPCT 3 09/29/2019    LYMPHOPCT 7.8 12/27/2019    PROMYELOPCT 2 09/26/2019    MONOPCT 8.6 12/27/2019    MYELOPCT 1 09/29/2019    BASOPCT 0.4 12/27/2019    MONOSABS 0.9 12/27/2019    LYMPHSABS 0.8 12/27/2019    EOSABS 1.0 12/27/2019    BASOSABS 0.0 12/27/2019    DIFFTYPE AUTOMATED DIFFERENTIAL 12/27/2019 brain neg    B 12 folate TSh nl    Continue asa and namenda    Discussed plan of care with pts nurse. Discussed with pts family. Ana Rosa Arteaga MD  BOARD CERTIFIED-NEUROLOGY.

## 2019-12-27 NOTE — CONSULTS
Component Value Date     12/27/2019    K 3.8 12/27/2019     12/27/2019    CO2 25 12/27/2019    BUN 36 12/27/2019    LABALBU 2.1 12/27/2019    CREATININE 1.4 12/27/2019    CALCIUM 8.1 12/27/2019    GFRAA >60 12/27/2019    LABGLOM 50 12/27/2019    GLUCOSE 87 12/27/2019     Hepatic Function Panel:    Lab Results   Component Value Date    ALKPHOS 104 12/27/2019    ALT 8 12/27/2019    AST 18 12/27/2019    PROT 4.2 12/27/2019    BILITOT 0.4 12/27/2019    LABALBU 2.1 12/27/2019     PT/INR:    Lab Results   Component Value Date    PROTIME 11.5 05/03/2017    PROTIME 10.6 01/26/2012    INR 1.01 05/03/2017     CT Head:  1. No acute intracranial abnormality. 2. Diffuse cerebral atrophy with chronic small vessel ischemic disease.            IMPRESSION:        Patient Active Problem List:     Hypertension     Hyperlipidemia     Asthma     Diabetes mellitus ([address]     H/O cardiovascular stress test     Obesity     Chronic kidney disease, stage III (moderate) (HCC)     Hypertensive kidney disease with CKD stage III (HCC)     Depression     SOBOE (shortness of breath on exertion)     Abnormal cardiovascular stress test     Fracture of epiphysis (separation) (upper) of neck of femur, closed (HCC)     Pressure injury of skin of coccygeal region     Sepsis ([address]     PVC (premature ventricular contraction)     Septicemia ([address]     WD-Pressure injury of sacral region, stage 4 ([address]     WD-Nonhealing surgical wound     WD-Skin ulcer of right hip with necrosis of muscle (HCC)     Troponin level elevated     Recurrent major depressive disorder, in partial remission (HCC)     Delusional ideas ([address]     Vascular dementia with behavior disturbance (HCC)     Hypotension     Metabolic encephalopathy      78 y/o M with sacral decub with fecal contamination     PLAN:    -To OR for lap diverting loop ileostomy  -Consent obtained  -Reviewed in detail with the patient and/or family the expected pre-operative, operative, and post-operative courses including risks, benefits, and alternatives to the procedure. The patient's questions were answered in detail and agreed to proceed with the procedure.            Magalis Lowery MD

## 2019-12-27 NOTE — PROGRESS NOTES
[facility]  consulted by Dr. Lis Lockhart for monitoring and adjustment. Indication for treatment: Sepsis, currently being treated for R hip PJI  Goal trough: 15 mcg/mL  Other antimicrobials: meropenem     Ht Readings from Last 1 Encounters:   12/23/19 6' 3\" (1.905 m)     Wt Readings from Last 3 Encounters:   12/27/19 (!) 320 lb 12.3 oz (145.5 kg)   11/10/19 (!) 309 lb 9 oz (140.4 kg)   10/28/19 (!) 335 lb (152 kg)        Pertinent Laboratory Values:   Temp Readings from Last 3 Encounters:   12/27/19 98 °F (36.7 °C) (Oral)   11/27/19 98.1 °F (36.7 °C) (Oral)   11/15/19 98.2 °F (36.8 °C)     Recent Labs     12/25/19  0617 12/26/19  0400 12/27/19  0813   WBC 14.4* 8.4 9.9     Recent Labs     12/25/19  0617 12/26/19  0400 12/27/19  0813   BUN 39* 38* 36*   CREATININE 1.7* 1.4* 1.4*     Estimated Creatinine Clearance: 78 mL/min (A) (based on SCr of 1.4 mg/dL (H)). Intake/Output Summary (Last 24 hours) at 12/27/2019 1032  Last data filed at 12/27/2019 0346  Gross per 24 hour   Intake 3574 ml   Output 2000 ml   Net 1574 ml       Pertinent Cultures:  Date    Source    Results  12/23/19  Blood    NGTD  12/23/19  C diff     Negative  12/23/19   Urine     Yeast  12/27/19   Decubitus   Pending    Previous vancomycin levels:  TROUGH:    No results for input(s): VANCOTROUGH in the last 72 hours. RANDOM:    Recent Labs     12/25/19  0617 12/26/19  0400 12/27/19  0813   VANCORANDOM 24.4  (NOTE)  Therapeutic Range Interpretation: Please refer to current dosing   guidelines. 17.1  (NOTE)  Therapeutic Range Interpretation: Please refer to current dosing   guidelines. 20.6  (NOTE)  Therapeutic Range Interpretation: Please refer to current dosing   guidelines. Assessment:  · WBC and temperature: Improving, WNL; Afebrile. · SCr, BUN, and urine output: VIRGILIO (baseline ~1.0); Improving trend; No RRT.   · Day(s) of therapy: #4 (in-patient)  · Vancomycin level: 20.6 mcg/mL    Plan:   Intermittent vancomycin dosing 2/2 VIRGILIO.  Renal trends are improved and stable.  Based on level result, will look to re-dose vancomycin 1500 mg IVPB around noon.  Repeat next random level: 12/28 @ 1200 (24h post-dose)   Pharmacy will continue to monitor renal function, clinical status & recommend de-escalation if appropriate.     Thank you for the consult,   Sandy Toro, PharmD, [facility]

## 2019-12-28 PROBLEM — E43 SEVERE MALNUTRITION (HCC): Chronic | Status: ACTIVE | Noted: 2019-01-01

## 2019-12-28 NOTE — CONSULTS
Consult for IV team for no blood return. Dressing changed to remove gauze and place antibacterial patch and fresh PICC stabilizer. Both lumens flushed and positive pressure cap changed. No blood return. Patient will require cathflo for declotting.  Deshawn Hamm

## 2019-12-28 NOTE — PROGRESS NOTES
Therapies:  · Oral Diet Orders: Carb Control 4 Carbs/Meal   · Oral Diet intake: %  · Oral Nutrition Supplement (ONS) Orders: None  · Anthropometric Measures:  · Ht: 6' 3\" (190.5 cm)   · Current Body Wt: 320 lb (145.2 kg)  · Admission Body Wt: 297 lb (134.7 kg)  · Usual Body Wt: 375 lb (170.1 kg)(6/10/19)  · % Weight Change: -26% x 3 months  · Ideal Body Wt: 196 lb (88.9 kg), % Ideal Body 163%  · BMI Classification: BMI > or equal to 40.0 Obese Class III(37.3)    Nutrition Interventions:   Start ONS, Continue current diet  Continued Inpatient Monitoring, Education Initiated, Coordination of Care    Nutrition Evaluation:   · Evaluation: Progressing toward goals   · Goals: Pt will consume >75% of all meals and supplements provided     · Monitoring: Meal Intake, Supplement Intake, Wound Healing, Mental Status/Confusion, Weight, Pertinent Labs      Electronically signed by Lauren Martínez RD, MOON on 55/27/92 at 11:18 AM    Contact Number: [phone]

## 2019-12-28 NOTE — PROGRESS NOTES
[facility]  consulted by Dr. Swapnil Noonan for monitoring and adjustment. Indication for treatment: Sepsis, currently being treated for R hip PJI  Goal trough: 15 mcg/mL  Other antimicrobials: meropenem     Ht Readings from Last 1 Encounters:   12/23/19 6' 3\" (1.905 m)     Wt Readings from Last 3 Encounters:   12/28/19 (!) 330 lb 4 oz (149.8 kg)   11/10/19 (!) 309 lb 9 oz (140.4 kg)   10/28/19 (!) 335 lb (152 kg)        Pertinent Laboratory Values:   Temp Readings from Last 3 Encounters:   12/28/19 98.5 °F (36.9 °C) (Oral)   12/27/19 98.6 °F (37 °C)   11/27/19 98.1 °F (36.7 °C) (Oral)     Recent Labs     12/26/19  0400 12/27/19  0813 12/28/19  0446   WBC 8.4 9.9 11.6*     Recent Labs     12/26/19  0400 12/27/19  0813 12/28/19  0446   BUN 38* 36* 28*   CREATININE 1.4* 1.4* 1.4*     Estimated Creatinine Clearance: 79 mL/min (A) (based on SCr of 1.4 mg/dL (H)). Intake/Output Summary (Last 24 hours) at 12/28/2019 1357  Last data filed at 12/28/2019 0544  Gross per 24 hour   Intake 3780 ml   Output 1720 ml   Net 2060 ml       Pertinent Cultures:  Date    Source    Results  12/23/19  Blood    NGTD  12/23/19  C diff     Negative  12/23/19   Urine     Yeast  12/27/19   Decubitus   Gram negative    Previous vancomycin levels:  TROUGH:    Recent Labs     12/28/19  1140   VANCOTROUGH 18.9     RANDOM:    Recent Labs     12/26/19  0400 12/27/19  0813   VANCORANDOM 17.1  (NOTE)  Therapeutic Range Interpretation: Please refer to current dosing   guidelines. 20.6  (NOTE)  Therapeutic Range Interpretation: Please refer to current dosing   guidelines. Assessment:  · WBC and temperature: WBC trending up/afebrile  · SCr, BUN, and urine output: stable (elevated)   · Day(s) of therapy: #5 (in-patient)  · Vancomycin level: 18.9 @ 1140 (only listed under chart review labs)     Plan:   Intermittent vancomycin dosing 2/2 VIRGILIO.     Hold vancomycin today, random tomorrow AM    Pharmacy will continue to monitor renal function, clinical status & recommend de-escalation if appropriate.     Thank you for the consult,   Armani Grace PharmD, [facility]  12/28/2019 2:02 PM

## 2019-12-28 NOTE — PROGRESS NOTES
Nephrology Progress Note  12/28/2019 3:50 PM        Subjective:   Admit Date: 12/23/2019  PCP: Jim Harris MD     This is a late entry. Pt seen in early am     Interval History: no ac  Event overnight    Diet: ? some per pt     ROS:  Had diverting loop ileotomy for healing of sacral decub ulcer , no sob ,     Data:     Current med's:    collagenase   Topical Daily    vancomycin (VANCOCIN) intermittent dosing (placeholder)   Other RX Placeholder    aspirin  81 mg Oral Daily    buPROPion  300 mg Oral Daily    mometasone-formoterol  2 puff Inhalation BID    insulin glargine  15 Units Subcutaneous Nightly    memantine  5 mg Oral Nightly    montelukast  10 mg Oral Nightly    pantoprazole  40 mg Oral QAM AC    rOPINIRole  1 mg Oral Nightly    sertraline  100 mg Oral BID    spironolactone  25 mg Oral QAM    vitamin C  1,000 mg Oral Daily    vitamin E  400 Units Oral Daily    sodium chloride flush  10 mL Intravenous 2 times per day    enoxaparin  40 mg Subcutaneous Daily    insulin lispro  0-12 Units Subcutaneous TID WC    insulin lispro  0-6 Units Subcutaneous Nightly    meropenem  2 g Intravenous Q8H      dextrose Stopped (12/27/19 1904)    lactated ringers 100 mL/hr at 12/27/19 1425    dextrose      sodium chloride 125 mL/hr at 12/28/19 1303         I/O last 3 completed shifts:   In: 3780 [I.V.:3780]  Out: 6692 [Urine:1510; Stool:200; Blood:10]    CBC:   Recent Labs     12/26/19  0400 12/27/19  0813 12/28/19  0446   WBC 8.4 9.9 11.6*   HGB 10.9* 11.7* 11.8*    237 233          Recent Labs     12/26/19  0400 12/27/19  0813 12/28/19  0446   * 135 140   K 3.7 3.8 4.2    101 104   CO2 26 25 27   BUN 38* 36* 28*   CREATININE 1.4* 1.4* 1.4*   GLUCOSE 90 87 101*       Lab Results   Component Value Date    CALCIUM 8.5 12/28/2019    PHOS 2.8 11/17/2019       Objective:     Vitals: /71   Pulse 102   Temp 98.5 °F (36.9 °C) (Oral)   Resp 21   Ht 6' 3\" (1.905 m)   Wt (!) 330 lb 4

## 2019-12-28 NOTE — PROGRESS NOTES
General Surgery- [facility] Day: 6    ChiefComplaint on Admission: sacral wound      Subjective:     Becka Dillon is a 77 y.o. male with pod #1 s/p lap diverting loop ileostomy placement . Patient reports abdominal pain. Tolerating DIET CARB CONTROL;. + BM via stoma. ROS:  Review of Systems   Constitutional: Negative for chills and fever. HENT: Negative for ear pain, mouth sores, sore throat and tinnitus. Eyes: Negative for photophobia, redness and itching. Respiratory: Negative for apnea, choking and stridor. Cardiovascular: Negative for chest pain and palpitations. Gastrointestinal: Positive for abdominal pain. Negative for anal bleeding, constipation and rectal pain. Endocrine: Negative for polydipsia. Genitourinary: Negative for enuresis, flank pain and hematuria. Musculoskeletal: Negative for back pain, joint swelling and myalgias. Skin: Positive for wound. Negative for color change and pallor. Allergic/Immunologic: Negative for environmental allergies. Neurological: Negative for syncope and speech difficulty. Psychiatric/Behavioral: Negative for confusion and hallucinations. Allergies  Bee venom          Diagnosis Date    Arthritis     Asthma     Chronic kidney disease     stage 3, seen by Dr. Ingrid Dominguez Diabetes mellitus ([facility]     H/O cardiac catheterization 2017    H/O cardiovascular stress test ,     CARDIOLITE: EF->51%    Hyperlipidemia     Hypertension     Obesity     Pressure ulcer of coccygeal region, unstageable ([facility] 09/10/2019    Thyroid disease        Objective:     Vitals:    19 0934   BP:    Pulse:    Resp:    Temp:    SpO2: (!) 88%       TEMPERATURE:  Current - Temp: 98 °F (36.7 °C); Max - Temp  Av.7 °F (36.5 °C)  Min: 96.9 °F (36.1 °C)  Max: 98.6 °F (37 °C)    No intake/output data recorded. I/O last 3 completed shifts:   In: 1350 [I.V.:3780]  Out: 3463 [Urine:1600; Stool:200; Blood:10]      Physical (rDNA), dextrose, acetaminophen, fluticasone, nitroGLYCERIN, traMADol, trolamine salicylate, sodium chloride flush      Labs/Imaging Results:   Lab Results   Component Value Date    WBC 11.6 (H) 12/28/2019    HGB 11.8 (L) 12/28/2019    HCT 38.3 (L) 12/28/2019    MCV 87.4 12/28/2019     12/28/2019     Lab Results   Component Value Date     12/28/2019    K 4.2 12/28/2019     12/28/2019    CO2 27 12/28/2019    BUN 28 (H) 12/28/2019    CREATININE 1.4 (H) 12/28/2019    GLUCOSE 101 (H) 12/28/2019    CALCIUM 8.5 12/28/2019    PROT 4.6 (L) 12/28/2019    LABALBU 2.6 (L) 12/28/2019    BILITOT 0.4 12/28/2019    ALKPHOS 106 12/28/2019    AST 17 12/28/2019    ALT 9 (L) 12/28/2019    LABGLOM 50 (L) 12/28/2019    GFRAA >60 12/28/2019       Assessment:     Patient Active Problem List:     Hypertension     Hyperlipidemia     Asthma     Diabetes mellitus ([address]     H/O cardiovascular stress test     Obesity     Chronic kidney disease, stage III (moderate) (HCC)     Hypertensive kidney disease with CKD stage III (HCC)     Depression     SOBOE (shortness of breath on exertion)     Abnormal cardiovascular stress test     Fracture of epiphysis (separation) (upper) of neck of femur, closed (HCC)     Pressure injury of skin of coccygeal region     Sepsis ([address]     PVC (premature ventricular contraction)     Septicemia ([address]     WD-Pressure injury of sacral region, stage 4 ([address]     WD-Nonhealing surgical wound     WD-Skin ulcer of right hip with necrosis of muscle (HCC)     Troponin level elevated     Recurrent major depressive disorder, in partial remission (HCC)     Delusional ideas ([address]     Vascular dementia with behavior disturbance (HCC)     Hypotension     Metabolic encephalopathy      Plan:       Stoma mucosa ecchymotic but appears viable  Cont to monitor stoma output  Cont local wound care   Ok to remove rectal wilkerson      Misti Waller MD

## 2019-12-28 NOTE — PLAN OF CARE
0800 by Ashleigh Hackett RN  Outcome: Ongoing     Problem: Fluid Volume - Imbalance:  Goal: Absence of imbalanced fluid volume signs and symptoms  Description  Absence of imbalanced fluid volume signs and symptoms  12/27/2019 2016 by Flavio Carranza RN  Outcome: Ongoing  12/27/2019 0800 by Ashleigh Hackett RN  Outcome: Ongoing

## 2019-12-28 NOTE — PROGRESS NOTES
(!) 330 lb 4 oz (149.8 kg)   SpO2 92%   BMI 41.28 kg/m²   General appearance: alert and cooperative. Lungs: clear to auscultation bilaterally  Heart: regular rate and rhythm, S1, S2 normal, no murmur, click, rub or gallop  Abdomen: soft right upper quadrent abdominal tenderness; bowel sounds normal; no masses,  no organomegaly  Extremities: extremities normal, atraumatic, no cyanosis or edema  Skin: Sacral and coccygeal decub  CNS:  Moves all extremities. He is able to puff up his cheeks, doubt CVA        Labs and imaging reviewed.   CBC with Differential:    Lab Results   Component Value Date    WBC 11.6 12/28/2019    RBC 4.38 12/28/2019    HGB 11.8 12/28/2019    HCT 38.3 12/28/2019     12/28/2019    MCV 87.4 12/28/2019    MCH 26.9 12/28/2019    MCHC 30.8 12/28/2019    RDW 16.3 12/28/2019    SEGSPCT 81.3 12/28/2019    BANDSPCT 3 09/29/2019    LYMPHOPCT 5.7 12/28/2019    PROMYELOPCT 2 09/26/2019    MONOPCT 7.1 12/28/2019    MYELOPCT 1 09/29/2019    BASOPCT 0.2 12/28/2019    MONOSABS 0.8 12/28/2019    LYMPHSABS 0.7 12/28/2019    EOSABS 0.6 12/28/2019    BASOSABS 0.0 12/28/2019    DIFFTYPE AUTOMATED DIFFERENTIAL 12/28/2019     CMP:    Lab Results   Component Value Date     12/28/2019    K 4.2 12/28/2019     12/28/2019    CO2 27 12/28/2019    BUN 28 12/28/2019    CREATININE 1.4 12/28/2019    GFRAA >60 12/28/2019    LABGLOM 50 12/28/2019    GLUCOSE 101 12/28/2019    PROT 4.6 12/28/2019    LABALBU 2.6 12/28/2019    CALCIUM 8.5 12/28/2019    BILITOT 0.4 12/28/2019    ALKPHOS 106 12/28/2019    AST 17 12/28/2019    ALT 9 12/28/2019     BMP:    Lab Results   Component Value Date     12/28/2019    K 4.2 12/28/2019     12/28/2019    CO2 27 12/28/2019    BUN 28 12/28/2019    LABALBU 2.6 12/28/2019    CREATININE 1.4 12/28/2019    CALCIUM 8.5 12/28/2019    GFRAA >60 12/28/2019    LABGLOM 50 12/28/2019    GLUCOSE 101 12/28/2019     Sodium:    Lab Results   Component Value Date     12/28/2019 Potassium:    Lab Results   Component Value Date    K 4.2 12/28/2019     Calcium:    Lab Results   Component Value Date    CALCIUM 8.5 12/28/2019     Warfarin PT/INR:  No components found for: Tono Mendoza  PTT:    Lab Results   Component Value Date    APTT 29.1 05/03/2017   [APTT  Last 3 Troponin:  No results found for: TROPONINI  ABG:    Lab Results   Component Value Date    NWL9FMR 41.0 11/12/2019    PO2ART 92 11/12/2019    QLR4SGF 24.3 11/12/2019     TSH:  No results found for: TSH  VITAMIN B12: No components found for: B12  FOLATE:    Lab Results   Component Value Date    FOLATE 5.9 12/25/2019     MRI Brain:  1. No acute intracranial abnormality.  No acute infarct. 2. Mild global parenchymal volume loss with chronic microvascular ischemic  changes. 3. Left mastoid effusion. Assessment:     Principal Problem:    Sepsis ([facility] [address]  Active Problems:    Hypotension    Metabolic encephalopathy    Vascular dementia with behavior disturbance (HCC)    Hyperlipidemia    Asthma    Diabetes mellitus ([facility] [address]    Obesity    Chronic kidney disease, stage III (moderate) (HCC)    Hypertensive kidney disease with CKD stage III (HCC)    Depression    Pressure injury of skin of coccygeal region    WD-Pressure injury of sacral region, stage 4 (HCC)    Severe malnutrition (HCC)  Resolved Problems:    * No resolved hospital problems. *      Plan:   MRI -no acute infarct  Continue wound care  Continue Vanc and Merepenam per ID recommendation  Patietn had diverting colostomy yesterday. Off pressors- BP is maintained. Appreciate renal consult  Zofran for Nausea  Bentyl if ok with surgery fro abdominal pain-spoke to nurse.     Kong TORRES M.D.  12/28/2019

## 2019-12-28 NOTE — PROGRESS NOTES
Brianna Kirby MD.  Section of General Neurology - Adult  Consult Note        Reason for Consult:    Requesting Physician:  No referring provider defined for this encounter.   Thank you for your kind referral.    CHIEF COMPLAINT:     Pt is stable and doing a lot better     No confusion         Past Medical History:        Diagnosis Date    Arthritis     Asthma     Chronic kidney disease     stage 3, seen by Dr. Bran Jerome Diabetes mellitus ([facility] [address]     H/O cardiac catheterization 05/04/2017    H/O cardiovascular stress test 6/07, 12/08    CARDIOLITE: EF->51%    Hyperlipidemia     Hypertension     Obesity     Pressure ulcer of coccygeal region, unstageable ([facility] [address] 09/10/2019    Thyroid disease      Past Surgical History:        Procedure Laterality Date    CARPAL TUNNEL RELEASE  2005    DIAGNOSTIC CARDIAC CATH LAB PROCEDURE  12/05    NO SIGNIFICANT CAD    EYE SURGERY      HEMIARTHROPLASTY HIP Right 8/29/2019    HIP HEMIARTHROPLASTY performed by Destini Gallegos MD at [place] [address] Right 9/29/2019    HIP INCISION AND DRAINAGE performed by Destini Gallegos MD at [place] [address] Right 11/15/2019    HIP INCISION AND DRAINAGE RIGHT CLOSURE OF INCISION WITH HEMOVAC performed by Destini Gallegos MD at [address] [place] [address]  09/23/2019    of stage 4 wound on coccyx, by Dr. Emi Horton N/A 9/23/2019    EXCISIONAL DEBRIDEMENT OF SACRAL PRESSURE ULCER performed by David Monroe MD at [address] [place] [address] N/A 9/29/2019    DECUBITUS ULCER [address] performed by Jade Jordan MD at [address] [facility]       Current Medications:   Current Facility-Administered Medications: ipratropium-albuterol (DUONEB) nebulizer solution 3 mL, 1 vial, Inhalation, TID PRN  calcium carbonate (TUMS) chewable tablet 500 mg, 500 mg, Oral, TID PRN  dextrose 5 % solution, , Intravenous,

## 2019-12-28 NOTE — OP NOTE
patient's abdomen was prepped and draped in a standard sterile  fashion. The patient previously had antibiotics in accordance with national  protocol. A [facility]-approved time-out was held with all members of the operating  team present and in agreement. Using a 5 mm 0-degree scope housed in a 5-mm optical trocar, entry into  the patient's abdomen was accomplished to Zurita's point. Pneumoperitoneum was established to 15 mmHg. Additional three 5-mm ports were placed under direct vision without  damage to nearby structures in the following locations:  1. Infraumbilical.  2.  Left lower quadrant. 3.  Right lower quadrant at the planned location of the loop ileostomy. The patient was placed in Trendelenburg and the small bowel was traced  from the ligament of Treitz to the cecum. A point approximately 30 cm  proximal to the ileocecal valve was selected as the ileostomy site. The  proximal and distal portions of the bowel were marked with clips. The bowel was then grasped with Tanya Blander and delivered through the  patient's abdominal wall. Prior to delivering the small bowel through the patient's abdominal  wall, the ostomy site was selected and a circular piece of skin was  removed. The fat was removed and the subcutaneous tissue was carried  down to the fascia. The fascia was incised and the muscle was split in  a standard fashion which easily accommodated two finger breadths. The posterior sheath was split and the loop of  small bowel was delivered. Using the previously marked proximal and  distal portions, the bowel was delivered in the correct orientation and  this was verified using the laparoscopic camera. The loop ileostomy was matured in a standard fashion. A bridge was placed. After the loop ileostomy was matured, proper orientation of the small  bowel was once again verified using the laparoscopic camera.    Pneumoperitoneum was completely desufflated and colostomy device was  placed. At the end of the case, the instrument, sponge count, and needle count  were correct x2. At the end of the case, the patient was extubated and transported to the  PACU in stable condition.         Aftab Manning MD    D: 12/27/2019 16:46:10       T: 12/27/2019 19:16:46     [email or web address]  Job#: [id number]     Doc#: [id number]    CC:

## 2019-12-29 NOTE — PROGRESS NOTES
Nephrology Progress Note  12/29/2019 2:42 PM        Subjective:   Admit Date: 12/23/2019  PCP: Angel St MD    Interval History: N/V     Diet: poor/     ROS:  Tachy/ sob - UOP 1400 ml/d     Data:     Current meds:    furosemide  20 mg Intravenous BID    collagenase   Topical Daily    vancomycin (VANCOCIN) intermittent dosing (placeholder)   Other RX Placeholder    aspirin  81 mg Oral Daily    buPROPion  300 mg Oral Daily    mometasone-formoterol  2 puff Inhalation BID    insulin glargine  15 Units Subcutaneous Nightly    memantine  5 mg Oral Nightly    montelukast  10 mg Oral Nightly    pantoprazole  40 mg Oral QAM AC    rOPINIRole  1 mg Oral Nightly    sertraline  100 mg Oral BID    spironolactone  25 mg Oral QAM    vitamin C  1,000 mg Oral Daily    vitamin E  400 Units Oral Daily    sodium chloride flush  10 mL Intravenous 2 times per day    enoxaparin  40 mg Subcutaneous Daily    insulin lispro  0-12 Units Subcutaneous TID WC    insulin lispro  0-6 Units Subcutaneous Nightly    meropenem  2 g Intravenous Q8H      dextrose Stopped (12/27/19 1904)    dextrose           I/O last 3 completed shifts:   In: 1875 [I.V.:1875]  Out: 1700 [Urine:1450; Stool:250]    CBC:   Recent Labs     12/27/19  0813 12/28/19  0446 12/29/19  0500   WBC 9.9 11.6* 19.7*   HGB 11.7* 11.8* 13.4*    233 299          Recent Labs     12/27/19  0813 12/28/19  0446 12/29/19  0500    140 141   K 3.8 4.2 3.7    104 105   CO2 25 27 25   BUN 36* 28* 24*   CREATININE 1.4* 1.4* 1.2   GLUCOSE 87 101* 121*       Lab Results   Component Value Date    CALCIUM 8.7 12/29/2019    PHOS 2.8 11/17/2019       Objective:     Vitals: /81   Pulse 120   Temp 98.3 °F (36.8 °C) (Oral)   Resp 29   Ht 6' 3\" (1.905 m)   Wt (!) 317 lb 7.4 oz (144 kg)   SpO2 98%   BMI 39.68 kg/m²     General appearance:  Some sob  HEENT:    + conj pallor  Neck:  supple  Lungs:  + basil pavel coarse crackles  Heart:  tachy  Abdomen: soft, minimally tender- ostomy  Extremities:  Mild pedal edema , PICC at RUE      Problem List :         Impression :     1. VIRGILIO/ CKD stage 2-3  Stable  2. ? pulmo edema with crackles, high HR-   3. Infected pressure ulcer  S/O diverting   Colostomy   4. Had DM/ obesity etc    Recommendation/Plan  :     1. CXR  2. If pulmo edema   3. stop IVF  4. Start IV loop   5. follow clinically  6.  Warren Jackson MD

## 2019-12-29 NOTE — PROGRESS NOTES
Michael Driscoll MD.  Section of General Neurology - Adult  Consult Note        Reason for Consult:    Requesting Physician:  No referring provider defined for this encounter.   Thank you for your kind referral.    CHIEF COMPLAINT:     Pt is stable and doing a lot better     No confusion         Past Medical History:        Diagnosis Date    Arthritis     Asthma     Chronic kidney disease     stage 3, seen by Dr. Dalia Obrien Diabetes mellitus ([facility] [address]     H/O cardiac catheterization 05/04/2017    H/O cardiovascular stress test 6/07, 12/08    CARDIOLITE: EF->51%    Hyperlipidemia     Hypertension     Obesity     Pressure ulcer of coccygeal region, unstageable ([facility] [address] 09/10/2019    Thyroid disease      Past Surgical History:        Procedure Laterality Date    CARPAL TUNNEL RELEASE  2005    DIAGNOSTIC CARDIAC CATH LAB PROCEDURE  12/05    NO SIGNIFICANT CAD    EYE SURGERY      HEMIARTHROPLASTY HIP Right 8/29/2019    HIP HEMIARTHROPLASTY performed by Warren Rosario MD at [address] [place] [address] Right 9/29/2019    HIP INCISION AND DRAINAGE performed by Warren Rosario MD at [address] [place] [address] Right 11/15/2019    HIP INCISION AND DRAINAGE RIGHT CLOSURE OF INCISION WITH HEMOVAC performed by Warren Rosario MD at [address] [place] [address]  09/23/2019    of stage 4 wound on coccyx, by Dr. Leslie Hansen N/A 9/23/2019    EXCISIONAL DEBRIDEMENT OF SACRAL PRESSURE ULCER performed by Christophe Medina MD at [address] [place] [address] N/A 9/29/2019    DECUBITUS ULCER [facility] performed by Ivan Powell MD at [address]       Current Medications:   Current Facility-Administered Medications: furosemide (LASIX) injection 20 mg, 20 mg, Intravenous, BID  pantoprazole (PROTONIX) injection 40 mg, 40 mg, Intravenous, BID  promethazine (PHENERGAN) injection 12.5 mg, 12.5 mg, Intravenous, Q6H PRN  ipratropium-albuterol (DUONEB) nebulizer solution 3 mL, 1 vial, Inhalation, TID PRN  calcium carbonate (TUMS) chewable tablet 500 mg, 500 mg, Oral, TID PRN  ondansetron (ZOFRAN) injection 4 mg, 4 mg, Intravenous, Q6H PRN  dextrose 5 % solution, , Intravenous, Continuous  morphine (PF) injection 2 mg, 2 mg, Intravenous, Q4H PRN  HYDROcodone-acetaminophen (NORCO) 5-325 MG per tablet 1 tablet, 1 tablet, Oral, Q6H PRN  collagenase ointment, , Topical, Daily  glucose (GLUTOSE) 40 % oral gel 15 g, 15 g, Oral, PRN  dextrose 50 % IV solution, 12.5 g, Intravenous, PRN  glucagon (rDNA) injection 1 mg, 1 mg, Intramuscular, PRN  dextrose 5 % solution, 100 mL/hr, Intravenous, PRN  vancomycin (VANCOCIN) intermittent dosing (placeholder), , Other, RX Placeholder  acetaminophen (TYLENOL) tablet 500 mg, 500 mg, Oral, Q6H PRN  aspirin EC tablet 81 mg, 81 mg, Oral, Daily  buPROPion (WELLBUTRIN XL) extended release tablet 300 mg, 300 mg, Oral, Daily  fluticasone (FLONASE) 50 MCG/ACT nasal spray 1 spray, 1 spray, Nasal, Daily PRN  mometasone-formoterol (DULERA) 200-5 MCG/ACT inhaler 2 puff, 2 puff, Inhalation, BID  insulin glargine (LANTUS) injection vial 15 Units, 15 Units, Subcutaneous, Nightly  memantine (NAMENDA) tablet 5 mg, 5 mg, Oral, Nightly  montelukast (SINGULAIR) tablet 10 mg, 10 mg, Oral, Nightly  nitroGLYCERIN (NITROSTAT) SL tablet 0.4 mg, 0.4 mg, Sublingual, Q5 Min PRN  rOPINIRole (REQUIP) tablet 1 mg, 1 mg, Oral, Nightly  sertraline (ZOLOFT) tablet 100 mg, 100 mg, Oral, BID  spironolactone (ALDACTONE) tablet 25 mg, 25 mg, Oral, QAM  traMADol (ULTRAM) tablet 50 mg, 50 mg, Oral, Q6H PRN  trolamine salicylate (ASPERCREME) 10 % cream, , Topical, Q4H PRN  vitamin C (ASCORBIC ACID) tablet 1,000 mg, 1,000 mg, Oral, Daily  vitamin E capsule 400 Units, 400 Units, Oral, Daily  sodium chloride flush 0.9 % injection 10 mL, 10 mL, Intravenous, 2 times per day  sodium chloride flush 0.9 % injection 10 mL, 10 mL, Intravenous,

## 2019-12-29 NOTE — PROGRESS NOTES
[facility]  consulted by Dr. Nancie Song for monitoring and adjustment. Indication for treatment: Sepsis, currently being treated for R hip PJI  Goal trough: 15 mcg/mL  Other antimicrobials: meropenem     Ht Readings from Last 1 Encounters:   12/23/19 6' 3\" (1.905 m)     Wt Readings from Last 3 Encounters:   12/29/19 (!) 317 lb 7.4 oz (144 kg)   11/10/19 (!) 309 lb 9 oz (140.4 kg)   10/28/19 (!) 335 lb (152 kg)        Pertinent Laboratory Values:   Temp Readings from Last 3 Encounters:   12/29/19 98.3 °F (36.8 °C) (Oral)   12/27/19 98.6 °F (37 °C)   11/27/19 98.1 °F (36.7 °C) (Oral)     Recent Labs     12/27/19  0813 12/28/19  0446 12/29/19  0500   WBC 9.9 11.6* 19.7*     Recent Labs     12/27/19  0813 12/28/19  0446 12/29/19  0500   BUN 36* 28* 24*   CREATININE 1.4* 1.4* 1.2     Estimated Creatinine Clearance: 90 mL/min (based on SCr of 1.2 mg/dL). Intake/Output Summary (Last 24 hours) at 12/29/2019 1225  Last data filed at 12/29/2019 0254  Gross per 24 hour   Intake 1875 ml   Output 1700 ml   Net 175 ml       Pertinent Cultures:  Date    Source    Results  12/23/19  Blood    NGTD  12/23/19  C diff     Negative  12/23/19   Urine     Yeast  12/27/19   Decubitus   Gram negative    Previous vancomycin levels:  TROUGH:    Recent Labs     12/28/19  1140   VANCOTROUGH 18.9     RANDOM:    Recent Labs     12/27/19  0813 12/29/19  0500   VANCORANDOM 20.6  (NOTE)  Therapeutic Range Interpretation: Please refer to current dosing   guidelines. 17.1  (NOTE)  Therapeutic Range Interpretation: Please refer to current dosing   guidelines. Assessment:  · WBC and temperature: WBC trending up/afebrile  · SCr, BUN, and urine output: trending down    · Day(s) of therapy: #6 (in-patient)  · Vancomycin level: 17.1    Plan:   Intermittent vancomycin dosing 2/2 VIRGILIO.     Hold vancomycin today, random tomorrow AM    Slow trend down on vancomycin level   Pharmacy will continue to monitor renal function, clinical status & recommend de-escalation if appropriate.     Thank you for the consult,   Angela BeaverD, [facility]  12/29/2019 12:25 PM

## 2019-12-30 NOTE — PLAN OF CARE
Impaired:  Goal: Levels of oxygenation will improve  Description  Levels of oxygenation will improve  Outcome: Ongoing     Problem: Mental Status - Impaired:  Goal: Mental status will be restored to baseline  Description  Mental status will be restored to baseline  Outcome: Ongoing     Problem: Nutrition Deficit:  Goal: Ability to achieve adequate nutritional intake will improve  Description  Ability to achieve adequate nutritional intake will improve  Outcome: Ongoing     Problem: Pain:  Goal: Pain level will decrease  Description  Pain level will decrease  Outcome: Ongoing  Goal: Control of acute pain  Description  Control of acute pain  Outcome: Ongoing  Goal: Control of chronic pain  Description  Control of chronic pain  Outcome: Ongoing     Problem: Nutrition  Goal: Optimal nutrition therapy  Outcome: Ongoing

## 2019-12-30 NOTE — PROGRESS NOTES
Progress note    Andrew Holiday    12/30/2019    Subjective:     Patients verbal and had abdominal pain on and off. Surgery ordered a CT abdomen today. Medication side effects: none. Scheduled Meds:   vancomycin  1,500 mg Intravenous Once    furosemide  20 mg Intravenous BID    pantoprazole  40 mg Intravenous BID    collagenase   Topical Daily    vancomycin (VANCOCIN) intermittent dosing (placeholder)   Other RX Placeholder    aspirin  81 mg Oral Daily    buPROPion  300 mg Oral Daily    mometasone-formoterol  2 puff Inhalation BID    insulin glargine  15 Units Subcutaneous Nightly    memantine  5 mg Oral Nightly    montelukast  10 mg Oral Nightly    rOPINIRole  1 mg Oral Nightly    sertraline  100 mg Oral BID    spironolactone  25 mg Oral QAM    vitamin C  1,000 mg Oral Daily    vitamin E  400 Units Oral Daily    sodium chloride flush  10 mL Intravenous 2 times per day    enoxaparin  40 mg Subcutaneous Daily    insulin lispro  0-12 Units Subcutaneous TID WC    insulin lispro  0-6 Units Subcutaneous Nightly    meropenem  2 g Intravenous Q8H     Continuous Infusions:   dextrose Stopped (12/27/19 1904)    dextrose       PRN Meds:potassium chloride, magnesium sulfate, promethazine, ipratropium-albuterol, calcium carbonate, ondansetron, morphine, HYDROcodone 5 mg - acetaminophen, glucose, dextrose, glucagon (rDNA), dextrose, acetaminophen, fluticasone, nitroGLYCERIN, traMADol, trolamine salicylate, sodium chloride flush    Review of Systems  Pertinent items are noted in HPI. Objective:     Patient Vitals for the past 8 hrs:   BP Temp Temp src Pulse Resp SpO2   12/30/19 1127 (!) 143/86 97.8 °F (36.6 °C) Axillary 114 22 94 %   12/30/19 0741 124/81 97.7 °F (36.5 °C) Oral 112 10 94 %     I/O last 3 completed shifts: In: 653 [P.O.:120; I.V.:333; IV Piggyback:200]  Out: 1450 [Urine:1300; Stool:150]  No intake/output data recorded.     BP (!) 143/86   Pulse 114   Temp 97.8 °F (36.6 °C) (Axillary) Resp 22   Ht 6' 3\" (1.905 m)   Wt (!) 321 lb 6.9 oz (145.8 kg)   SpO2 94%   BMI 40.18 kg/m²   General appearance: alert and cooperative. Lungs: clear to auscultation bilaterally  Heart: regular rate and rhythm, S1, S2 normal, no murmur, click, rub or gallop  Abdomen: soft right upper quadrent abdominal tenderness; bowel sounds normal; no masses,  no organomegaly  Extremities: extremities normal, atraumatic, no cyanosis or edema  Skin: Sacral and coccygeal decub  CNS:  Moves all extremities. He is able to puff up his cheeks, doubt CVA        Labs and imaging reviewed.   CBC with Differential:    Lab Results   Component Value Date    WBC 29.9 12/30/2019    RBC 5.10 12/30/2019    HGB 13.7 12/30/2019    HCT 43.4 12/30/2019     12/30/2019    MCV 85.1 12/30/2019    MCH 26.9 12/30/2019    MCHC 31.6 12/30/2019    RDW 16.5 12/30/2019    SEGSPCT 92.0 12/30/2019    BANDSPCT 3 09/29/2019    LYMPHOPCT 2.1 12/30/2019    PROMYELOPCT 2 09/26/2019    MONOPCT 4.7 12/30/2019    MYELOPCT 1 09/29/2019    BASOPCT 0.3 12/30/2019    MONOSABS 1.4 12/30/2019    LYMPHSABS 0.6 12/30/2019    EOSABS 0.0 12/30/2019    BASOSABS 0.1 12/30/2019    DIFFTYPE AUTOMATED DIFFERENTIAL 12/30/2019     CMP:    Lab Results   Component Value Date     12/30/2019    K 3.4 12/30/2019     12/30/2019    CO2 25 12/30/2019    BUN 31 12/30/2019    CREATININE 1.3 12/30/2019    GFRAA >60 12/30/2019    LABGLOM 55 12/30/2019    GLUCOSE 170 12/30/2019    PROT 4.9 12/30/2019    LABALBU 2.6 12/30/2019    CALCIUM 9.3 12/30/2019    BILITOT 0.4 12/30/2019    ALKPHOS 116 12/30/2019    AST 13 12/30/2019    ALT 8 12/30/2019     BMP:    Lab Results   Component Value Date     12/30/2019    K 3.4 12/30/2019     12/30/2019    CO2 25 12/30/2019    BUN 31 12/30/2019    LABALBU 2.6 12/30/2019    CREATININE 1.3 12/30/2019    CALCIUM 9.3 12/30/2019    GFRAA >60 12/30/2019    LABGLOM 55 12/30/2019    GLUCOSE 170 12/30/2019     Sodium:    Lab Results

## 2019-12-30 NOTE — PROGRESS NOTES
General Surgery- [facility] Day: 8    ChiefComplaint on Admission: sacral wound      Subjective:     Magdalena Velasquez is a 77 y.o. male with pod #2 s/p lap diverting loop ileostomy placement . Patient reports abdominal pain. Rectal wilkerson is out. Tolerating DIET CARB CONTROL;. + BM via stoma. ROS:  Review of Systems   Constitutional: Negative for chills and fever. HENT: Negative for ear pain, mouth sores, sore throat and tinnitus. Eyes: Negative for photophobia, redness and itching. Respiratory: Negative for apnea, choking and stridor. Cardiovascular: Negative for chest pain and palpitations. Gastrointestinal: Positive for abdominal pain. Negative for anal bleeding, constipation and rectal pain. Endocrine: Negative for polydipsia. Genitourinary: Negative for enuresis, flank pain and hematuria. Musculoskeletal: Negative for back pain, joint swelling and myalgias. Skin: Positive for wound. Negative for color change and pallor. Allergic/Immunologic: Negative for environmental allergies. Neurological: Negative for syncope and speech difficulty. Psychiatric/Behavioral: Negative for confusion and hallucinations. Allergies  Bee venom          Diagnosis Date    Arthritis     Asthma     Chronic kidney disease     stage 3, seen by Dr. Almas Cruz Diabetes mellitus ([facility]     H/O cardiac catheterization 2017    H/O cardiovascular stress test ,     CARDIOLITE: EF->51%    Hyperlipidemia     Hypertension     Obesity     Pressure ulcer of coccygeal region, unstageable ([facility] 09/10/2019    Thyroid disease        Objective:     Vitals:    19 0314   BP: 112/88   Pulse: 114   Resp: 19   Temp: 97.8 °F (36.6 °C)   SpO2: 92%       TEMPERATURE:  Current - Temp: 97.8 °F (36.6 °C); Max - Temp  Av.9 °F (36.6 °C)  Min: 97.5 °F (36.4 °C)  Max: 98.3 °F (36.8 °C)    I/O this shift:  In: 200 [IV Piggyback:200]  Out: 550 [Urine:550]I/O last 3 completed shifts:   In: 6818 glucagon (rDNA), dextrose, acetaminophen, fluticasone, nitroGLYCERIN, traMADol, trolamine salicylate, sodium chloride flush      Labs/Imaging Results:   Lab Results   Component Value Date    WBC 29.9 (H) 12/30/2019    HGB 13.7 12/30/2019    HCT 43.4 12/30/2019    MCV 85.1 12/30/2019     12/30/2019     Lab Results   Component Value Date     12/30/2019    K 3.4 (L) 12/30/2019     12/30/2019    CO2 25 12/30/2019    BUN 31 (H) 12/30/2019    CREATININE 1.3 12/30/2019    GLUCOSE 170 (H) 12/30/2019    CALCIUM 9.3 12/30/2019    PROT 4.9 (L) 12/30/2019    LABALBU 2.6 (L) 12/30/2019    BILITOT 0.4 12/30/2019    ALKPHOS 116 12/30/2019    AST 13 (L) 12/30/2019    ALT 8 (L) 12/30/2019    LABGLOM 55 (L) 12/30/2019    GFRAA >60 12/30/2019       Assessment:     Patient Active Problem List:     Hypertension     Hyperlipidemia     Asthma     Diabetes mellitus ([address]     H/O cardiovascular stress test     Obesity     Chronic kidney disease, stage III (moderate) (HCC)     Hypertensive kidney disease with CKD stage III (HCC)     Depression     SOBOE (shortness of breath on exertion)     Abnormal cardiovascular stress test     Fracture of epiphysis (separation) (upper) of neck of femur, closed (HCC)     Pressure injury of skin of coccygeal region     Sepsis ([address]     PVC (premature ventricular contraction)     Septicemia ([address]     WD-Pressure injury of sacral region, stage 4 ([address]     WD-Nonhealing surgical wound     WD-Skin ulcer of right hip with necrosis of muscle (HCC)     Troponin level elevated     Recurrent major depressive disorder, in partial remission (HCC)     Delusional ideas ([address]     Vascular dementia with behavior disturbance (HCC)     Hypotension     Metabolic encephalopathy      Plan:       Stoma mucosa ecchymotic but appears viable  Cont to monitor stoma output  Cont local wound care   Ostomy care consult  Rehab with wound care soon      Oscar Pascal MD

## 2019-12-30 NOTE — PROGRESS NOTES
intermittent vancomycin dosing based on levels 2/2 VIRGILIO   Received vancomycin 1500 mg x 1 on 12/27, random level 72h post-dose: 14.8    Re-dose with vancomycin 1500 mg x 1    Random level daily   [address] will continue to monitor renal function, clinical status & recommend de-escalation if appropriate.     Thank you for the consult,   Ike BeaverD, [facility]  12/30/2019 1:15 PM

## 2019-12-30 NOTE — PROGRESS NOTES
All morning medications have given at this time and started his Iv potassium and mag. Wound care is at bedside to see patients ostomy.

## 2019-12-30 NOTE — PROGRESS NOTES
Dr. Tasia Boyd at bedside and looked at all patients wounds and ostomy and stated he is getting a stat CT of ABD and pelvis because they want to make sure ostomy is working and doesn't need to be fixed. Patient is aware of situation.

## 2019-12-30 NOTE — CONSULTS
[facility] Continence Nurse  Consult Note       Stan Cohen  AGE: 77 y.o.    GENDER: male  : 1950  TODAY'S DATE:  2019    Subjective:     Reason for  Evaluation and Assessment: ostomy assessment      Stan Cohen is a 77 y.o. male referred by:   [x] Physician  [] Nursing  [] Other:     Wound Identification:  Wound Type: ileostomy  Contributing Factors: chronic pressure, decreased mobility and obesity        PAST MEDICAL HISTORY        Diagnosis Date    Arthritis     Asthma     Chronic kidney disease     stage 3, seen by Dr. Stephanie Smith Diabetes mellitus ([facility]     H/O cardiac catheterization 2017    H/O cardiovascular stress test ,     CARDIOLITE: EF->51%    Hyperlipidemia     Hypertension     Obesity     [facility] ulcer [facility]geal region[facility] un[facility]able [facility] 09/10/2019    Thyroid disease        PAST SURGICAL HISTORY    Past Surgical History:   Procedure Laterality Date    CARPAL TUNNEL RELEASE      COLOSTOMY N/A 2019    LAPAROSCOPIC DIVERTING ILEOSTOMY performed by Norma Cisneros MD at [place] [address] CATH LAB PROCEDURE      NO SIGNIFICANT CAD    EYE SURGERY      HEMIARTHROPLASTY HIP Right 2019    HIP HEMIARTHROPLASTY performed by Andrea Pulido MD at [address] [place] [address] Right 2019    HIP INCISION AND DRAINAGE performed by Andrea Pulido MD at [address] [place] [address] Right 11/15/2019    HIP INCISION AND DRAINAGE RIGHT CLOSURE OF INCISION WITH HEMOVAC performed by Andrea Pulido MD at [address] [place] [address]  2019    of stage 4 wound on coccyx, by Dr. Shaka Rivera N/A 2019    EXCISIONAL DEBRIDEMENT OF SACRAL PRESSURE ULCER performed by Norma Cisneros MD at [address] [place] [address] N/A 2019    DECUBITUS ULCER DEBRIDEMENT REPAIR performed by Frances Jolly MD at [address] [place] [address] SURGERY         FAMILY HISTORY    Family History   Problem Relation Age of Onset    High Blood Pressure Mother     Cancer Mother     Cancer Father     Stroke Maternal Grandfather     Diabetes Paternal Grandmother     Heart Disease Paternal Grandfather        SOCIAL HISTORY    Social History     Tobacco Use    Smoking status: Never Smoker    Smokeless tobacco: Never Used   Substance Use Topics    Alcohol use: No    Drug use: No       ALLERGIES    Allergies   Allergen Reactions    Bee Venom Shortness Of Breath       MEDICATIONS    No current facility-administered medications on file prior to encounter. Current Outpatient Medications on File Prior to Encounter   Medication Sig Dispense Refill    ipratropium-albuterol (DUONEB) 0.5-2.5 (3) MG/3ML SOLN nebulizer solution Inhale 1 vial into the lungs every 6 hours      montelukast (SINGULAIR) 10 MG tablet Take 1 tablet by mouth nightly 30 tablet 3    memantine (NAMENDA) 5 MG tablet Take 1 tablet by mouth nightly 60 tablet 3    furosemide (LASIX) 20 MG tablet Take 20 mg by mouth daily afternoon      baclofen (LIORESAL) 10 MG tablet Take 10 mg by mouth every 8 hours       fluticasone-vilanterol (BREO ELLIPTA) 100-25 MCG/INH AEPB inhaler Inhale 1 puff into the lungs daily      furosemide (LASIX) 40 MG tablet Take 40 mg by mouth every morning       potassium chloride (KLOR-CON M) 20 MEQ extended release tablet Take 20 mEq by mouth 3 times daily      spironolactone (ALDACTONE) 25 MG tablet Take 25 mg by mouth every morning       traMADol (ULTRAM) 50 MG tablet Take 50 mg by mouth every 6 hours as needed for Pain.       rOPINIRole (REQUIP) 1 MG tablet Take 1 tablet by mouth nightly 30 tablet 0    acetaminophen (TYLENOL) 500 MG tablet Take 1 tablet by mouth every 6 hours as needed for Pain or Fever 120 tablet 3    insulin glargine (LANTUS) 100 UNIT/ML injection vial Inject 15 Units into the skin nightly 1 vial 3    TRULICITY 1.5 VG/3.7XV SOPN Inject 1.5 mg into the skin once a week   2    ezetimibe (ZETIA) 10 MG tablet Take 10 mg by mouth nightly       SUPER B COMPLEX/C PO Take 1 capsule by mouth daily      vitamin E 400 UNIT capsule Take 400 Units by mouth daily      vitamin C (ASCORBIC ACID) 500 MG tablet Take 1,000 mg by mouth daily       insulin lispro (HUMALOG) 100 UNIT/ML injection vial Inject into the skin 3 times daily (before meals) 10/28/19 Sliding scale per nursing facility      benazepril (LOTENSIN) 20 MG tablet Take 1 tablet by mouth daily 90 tablet 3    Multiple Vitamins-Minerals (VISION VITAMINS PO) Take 1 capsule by mouth daily       omeprazole (PRILOSEC) 20 MG capsule Take 20 mg by mouth daily.  Canagliflozin (INVOKANA) 300 MG TABS Take 300 mg by mouth daily       buPROPion (WELLBUTRIN XL) 300 MG XL tablet Take 300 mg by mouth daily.  sertraline (ZOLOFT) 100 MG tablet Take 100 mg by mouth 2 times daily.  aspirin 81 MG EC tablet Take 81 mg by mouth daily.  Trolamine Salicylate (ASPERCREME) 10 % LOTN Apply topically every 4 hours as needed for Pain      glucagon, rDNA, 1 MG injection Inject 1 mg into the muscle as needed for Low blood sugar (Blood glucose less than 70 mg/dL and patient NOT ALERT or NPO and does not have IV access. ) 1 each 0    nitroGLYCERIN (NITROSTAT) 0.4 MG SL tablet Place 1 tablet under the tongue every 5 minutes as needed for Chest pain 25 tablet 2    fluticasone (FLONASE) 50 MCG/ACT nasal spray 1 spray by Nasal route daily as needed       Albuterol Sulfate (PROAIR HFA IN) Inhale 1 puff into the lungs every 6 hours as needed            Objective:      BP (!) 143/86   Pulse 114   Temp 97.8 °F (36.6 °C) (Axillary)   Resp 22   Ht 6' 3\" (1.905 m)   Wt (!) 321 lb 6.9 oz (145.8 kg)   SpO2 94%   BMI 40.18 kg/m²   Alfonso Risk Score: Alfonso Scale Score: 17    LABS    CBC:   Lab Results   Component Value Date    WBC 29.9 12/30/2019    RBC 5.10 12/30/2019    HGB 13.7 12/30/2019    HCT 43.4 12/30/2019    MCV 85.1 12/30/2019    MCH 26.9 12/30/2019    MCHC 31.6 12/30/2019    RDW 16.5 12/30/2019     12/30/2019    MPV 9.3 12/30/2019     CMP:    Lab Results   Component Value Date     12/30/2019    K 3.4 12/30/2019     12/30/2019    CO2 25 12/30/2019    BUN 31 12/30/2019    CREATININE 1.3 12/30/2019    GFRAA >60 12/30/2019    LABGLOM 55 12/30/2019    GLUCOSE 170 12/30/2019    PROT 4.9 12/30/2019    LABALBU 2.6 12/30/2019    CALCIUM 9.3 12/30/2019    BILITOT 0.4 12/30/2019    ALKPHOS 116 12/30/2019    AST 13 12/30/2019    ALT 8 12/30/2019     Albumin:    Lab Results   Component Value Date    LABALBU 2.6 12/30/2019     PT/INR:    Lab Results   Component Value Date    PROTIME 11.5 05/03/2017    PROTIME 10.6 01/26/2012    INR 1.01 05/03/2017     HgBA1c:    Lab Results   Component Value Date    LABA1C 6.1 09/04/2019         Assessment:     Patient Active Problem List   Diagnosis    Hypertension    Hyperlipidemia    Asthma    Diabetes mellitus ([address]    H/O cardiovascular stress test    Obesity    Chronic kidney disease, stage III (moderate) (HCC)    Hypertensive kidney disease with CKD stage III (HCC)    Depression    SOBOE (shortness of breath on exertion)    Abnormal cardiovascular stress test    Fracture of epiphysis (separation) (upper) of neck of femur, closed (HCC)    Pressure injury of skin of coccygeal region    Sepsis ([address]    PVC (premature ventricular contraction)    Septicemia ([address]    WD-Pressure injury of sacral region, stage 4 ([address]    WD-Nonhealing surgical wound    WD-Skin ulcer of right hip with necrosis of muscle (HCC)    Troponin level elevated    Recurrent major depressive disorder, in partial remission ([address]    Delusional ideas ([address]    Vascular dementia with behavior disturbance (HCC)    Hypotension    Metabolic encephalopathy    Severe malnutrition (HCC)       Measurements:  Negative Pressure Wound Therapy Hip Right (Active)   Wound Type Surgical 11/21/2019  8:00 PM   Unit Type prevena 11/18/2019  8:27 PM   Dressing Type Other (Comment) 11/18/2019 10:30 AM   Number of pieces used 1 11/12/2019  9:00 AM   Cycle Continuous 11/21/2019  8:00 PM   Target Pressure (mmHg) 125 11/21/2019  8:00 PM   Canister changed? No 11/21/2019  8:00 PM   Dressing Status Changed 11/18/2019 10:30 AM   Dressing Changed Changed/New 11/18/2019 10:30 AM   Drainage Amount None 11/18/2019 10:30 AM   Drainage Description Serosanguinous 11/15/2019  4:08 AM   Output (ml) 550 ml 11/21/2019  5:56 AM   Wound Assessment Other (Comment) 11/21/2019  8:00 PM   Annika-wound Assessment Other (Comment) 11/21/2019  8:00 PM   Odor Strong 11/15/2019  4:08 AM   Number of days: 92       Negative Pressure Wound Therapy Coccyx (Active)   Wound Type Pressure ulcer: Stage IV 11/27/2019  9:54 PM   Unit Type KCI 11/27/2019  9:45 AM   Dressing Type Black foam 11/27/2019  9:54 PM   Number of pieces used 3 11/27/2019  9:45 AM   Cycle Continuous 11/27/2019  9:54 PM   Target Pressure (mmHg) 125 11/27/2019  9:54 PM   Canister changed? No 11/27/2019  9:45 AM   Dressing Status Clean;Dry; Intact 11/27/2019  9:54 PM   Dressing Changed Changed/New 11/27/2019  9:45 AM   Drainage Amount Moderate 11/27/2019  9:45 AM   Drainage Description Serosanguinous 11/27/2019  9:45 AM   Dressing Change Due 11/27/19 11/25/2019 11:00 AM   Output (ml) 50 ml 11/26/2019  6:55 PM   Wound Assessment Red;Yellow 11/27/2019  9:45 AM   Annika-wound Assessment Pink 11/27/2019  9:45 AM   Odor None 11/27/2019  9:45 AM   Number of days: 90       Wound 09/21/19 Coccyx Stage 4 Pressure Injury (Active)   Wound Pressure Stage  4 12/30/2019  7:50 AM   Dressing Status Clean;Dry; Intact 12/30/2019  7:50 AM   Dressing Changed Changed/New 12/29/2019 10:54 PM   Dressing/Treatment Moist to dry 12/30/2019  7:50 AM   Wound Cleansed Rinsed/Irrigated with saline 12/29/2019 10:54 PM   Dressing Change Due 12/27/19 12/26/2019  4:29 PM   Wound Length (cm) 7.3 cm 12/26/2019 4:29 PM   Wound Width (cm) 5.5 cm 12/26/2019  4:29 PM   Wound Depth (cm) 3.3 cm 12/26/2019  4:29 PM   Wound Surface Area (cm^2) 40.15 cm^2 12/26/2019  4:29 PM   Change in Wound Size % (l*w) 56.36 12/26/2019  4:29 PM   Wound Volume (cm^3) 132.5 cm^3 12/26/2019  4:29 PM   Wound Healing % 4 12/26/2019  4:29 PM   Distance Tunneling (cm) 0 cm 12/26/2019  4:29 PM   Tunneling Position ___ O'Clock 0 12/26/2019  4:29 PM   Undermining Starts ___ O'Clock 0100 12/26/2019  4:29 PM   Undermining Ends___ O'Clock 0300 12/26/2019  4:29 PM   Undermining Maxium Distance (cm) 1.9 12/26/2019  4:29 PM   Wound Assessment Pink;Red;Yellow 12/30/2019  7:50 AM   Drainage Amount Moderate 12/30/2019  7:50 AM   Drainage Description Serosanguinous 12/30/2019  7:50 AM   Odor Mild 12/30/2019  7:50 AM   Margins Defined edges 12/29/2019 10:54 PM   Exposed structure Bone 12/26/2019  4:29 PM   Annika-wound Assessment Intact 12/27/2019  6:34 AM   Non-staged Wound Description Full thickness 12/29/2019 10:54 PM   [place]%Wound Bed 40 12/26/2019  4:29 PM   Red%Wound Bed 40 12/26/2019  4:29 PM   Yellow%Wound Bed 20 12/26/2019  4:29 PM   Black%Wound Bed 0 12/26/2019  4:29 PM   Purple%Wound Bed 0 12/26/2019  4:29 PM   Other%Wound Bed 0 12/26/2019  4:29 PM   Number of days: 100       Wound 12/23/19 Groin Left (Active)   Wound Venous 12/24/2019  8:40 PM   Dressing Status Other (Comment) 12/30/2019  1:42 AM   Dressing Changed Changed/New 12/29/2019 11:00 AM   Dressing/Treatment Open to air 12/30/2019  7:50 AM   Wound Cleansed Rinsed/Irrigated with saline 12/29/2019 11:00 AM   Wound Assessment Pink 12/30/2019  7:50 AM   Drainage Amount None 12/30/2019  7:50 AM   Drainage Description Serosanguinous 12/29/2019  8:02 AM   Odor None 12/30/2019  1:42 AM   Number of days: 6       Wound 12/26/19 Left hip Unstageable PI (Active)   Wound Pressure Unstageable 12/30/2019  7:50 AM   Dressing Status Clean;Dry; Intact 12/30/2019  1:42 AM   Dressing Changed Changed/New 12/26/2019 12/26/2019  4:29 PM   Wound Assessment Red;Yellow 12/30/2019  7:50 AM   Drainage Amount Scant 12/30/2019  7:50 AM   Drainage Description Serous 12/30/2019  7:50 AM   Odor None 12/30/2019  7:50 AM   Margins Defined edges 12/26/2019  4:29 PM   Annika-wound Assessment Intact 12/26/2019  4:29 PM   [place]%Wound Bed 0 12/26/2019  4:29 PM   Red%Wound Bed 100 12/26/2019  4:29 PM   Yellow%Wound Bed 0 12/26/2019  4:29 PM   Black%Wound Bed 00 12/26/2019  4:29 PM   Purple%Wound Bed 0 12/26/2019  4:29 PM   Other%Wound Bed 0 12/26/2019  4:29 PM   Number of days: 3       Response to treatment:  Well tolerated by patient. Pain Assessment:  Severity:  none  Quality of pain: na  Wound Pain Timing/Severity: na  Premedicated: no    Plan:     Plan of Care:    Pt seen today for ileostomy. Surgery on 12/27/19. Nurse in room and stated ostomy appliance came off turning. Stated applied another appliance temporary for transport to radiology. Old appliance removed. Picture taken of stoma. No stool noted in bag. Stoma red and moist. Red rubber tube sutured to peristomal skin. Washed with water and pat dry. Stoma ring applied. Barrier and bag applied. Pt difficult to visualize stoma. Agreeable to future education. Pt from SNF and will likely return to SNF upon discharge. Will continue to follow. Small incision noted to left abdomen. DSD change. Ostomy Care:  Remove pouch and barrier. Cleanse stoma and peristomal skin with warm water. Dry thoroughly. Apply stoma powder prn to any reddened peristomal skin. Measure stoma and cut barrier opening to fit close around base of stoma. Apply stoma ring. Apply Pierpont barrier #05672 , size 2 3/4\"  and pouch #[id number] or Coloplast #[id number], size 2 11/16\" and pouch #[id number]. Archana Lozano Empty pouch when 1/3 to 1/2 full. Change every 3-7 days and prn. Review teaching folder with patient and caregivers when performing ostomy care.   Patient to practice with assistance, frequent checking and emptying

## 2019-12-30 NOTE — PROGRESS NOTES
carbonate, ondansetron, morphine, HYDROcodone 5 mg - acetaminophen, glucose, dextrose, glucagon (rDNA), dextrose, acetaminophen, fluticasone, nitroGLYCERIN, traMADol, trolamine salicylate, sodium chloride flush      Labs/Imaging Results:   Lab Results   Component Value Date    WBC 29.9 (H) 12/30/2019    HGB 13.7 12/30/2019    HCT 43.4 12/30/2019    MCV 85.1 12/30/2019     12/30/2019     Lab Results   Component Value Date     12/30/2019    K 3.4 (L) 12/30/2019     12/30/2019    CO2 25 12/30/2019    BUN 31 (H) 12/30/2019    CREATININE 1.3 12/30/2019    GLUCOSE 170 (H) 12/30/2019    CALCIUM 9.3 12/30/2019    PROT 4.9 (L) 12/30/2019    LABALBU 2.6 (L) 12/30/2019    BILITOT 0.4 12/30/2019    ALKPHOS 116 12/30/2019    AST 13 (L) 12/30/2019    ALT 8 (L) 12/30/2019    LABGLOM 55 (L) 12/30/2019    GFRAA >60 12/30/2019       Assessment:     Patient Active Problem List:     Hypertension     Hyperlipidemia     Asthma     Diabetes mellitus ([address]     H/O cardiovascular stress test     Obesity     Chronic kidney disease, stage III (moderate) (HCC)     Hypertensive kidney disease with CKD stage III (HCC)     Depression     SOBOE (shortness of breath on exertion)     Abnormal cardiovascular stress test     Fracture of epiphysis (separation) (upper) of neck of femur, closed (HCC)     Pressure injury of skin of coccygeal region     Sepsis ([address]     PVC (premature ventricular contraction)     Septicemia ([address]     WD-Pressure injury of sacral region, stage 4 ([address]     WD-Nonhealing surgical wound     WD-Skin ulcer of right hip with necrosis of muscle (HCC)     Troponin level elevated     Recurrent major depressive disorder, in partial remission ([facility])     Delusional ideas ([address]     Vascular dementia with behavior disturbance ([facility])     Hypotension     Metabolic encephalopathy      Plan:     76 y/o M POD 3 s/p lap loop ileostomy, sacral wound    -Sacral wound is clean and viable, clean base, no necrotic tissue  -Stoma

## 2019-12-31 NOTE — PROGRESS NOTES
completed shifts: In: 3130 [P.O.:1120; I.V.:1810; IV Piggyback:200]  Out: 500 [Urine:500]      Physical Exam:    Physical Exam  Constitutional:       Appearance: He is well-developed. HENT:      Head: Normocephalic. Eyes:      Pupils: Pupils are equal, round, and reactive to light. Neck:      Musculoskeletal: Normal range of motion and neck supple. Cardiovascular:      Rate and Rhythm: Normal rate. Pulmonary:      Effort: Pulmonary effort is normal.   Abdominal:      General: There is no distension. Palpations: Abdomen is soft. There is no mass. Tenderness: There is tenderness (loop ileostomy with some ecchymosis but apears viable). There is no guarding or rebound. Musculoskeletal: Normal range of motion. Skin:     General: Skin is warm. Neurological:      Mental Status: He is alert and oriented to person, place, and time. Mental status is at baseline.              Scheduled Meds:   methylPREDNISolone  40 mg Intravenous Q8H    ipratropium-albuterol  1 ampule Inhalation Q4H    furosemide  40 mg Intravenous Once    albumin human  25 g Intravenous Once    ceftazidime-acibactam (AVYCAZ) infusion  2.5 g Intravenous Q8H    pantoprazole  40 mg Intravenous BID    collagenase   Topical Daily    vancomycin (VANCOCIN) intermittent dosing (placeholder)   Other RX Placeholder    aspirin  81 mg Oral Daily    buPROPion  300 mg Oral Daily    mometasone-formoterol  2 puff Inhalation BID    insulin glargine  15 Units Subcutaneous Nightly    memantine  5 mg Oral Nightly    montelukast  10 mg Oral Nightly    rOPINIRole  1 mg Oral Nightly    sertraline  100 mg Oral BID    vitamin C  1,000 mg Oral Daily    vitamin E  400 Units Oral Daily    sodium chloride flush  10 mL Intravenous 2 times per day    enoxaparin  40 mg Subcutaneous Daily    insulin lispro  0-12 Units Subcutaneous TID WC    insulin lispro  0-6 Units Subcutaneous Nightly     Continuous Infusions:   norepinephrine 30 mcg/min (12/31/19 1338)    sodium chloride      dextrose Stopped (12/27/19 1904)    dextrose       PRN Meds:diphenhydrAMINE, potassium chloride, magnesium sulfate, promethazine, ipratropium-albuterol, calcium carbonate, ondansetron, morphine, HYDROcodone 5 mg - acetaminophen, glucose, dextrose, glucagon (rDNA), dextrose, acetaminophen, fluticasone, nitroGLYCERIN, traMADol, trolamine salicylate, sodium chloride flush      Labs/Imaging Results:   Lab Results   Component Value Date    WBC 18.1 (H) 12/31/2019    HGB 13.1 (L) 12/31/2019    HCT 41.4 (L) 12/31/2019    MCV 85.0 12/31/2019     (H) 12/31/2019     Lab Results   Component Value Date     12/31/2019    K 3.8 12/31/2019     12/31/2019    CO2 25 12/31/2019    BUN 45 (H) 12/31/2019    CREATININE 1.8 (H) 12/31/2019    GLUCOSE 163 (H) 12/31/2019    CALCIUM 10.2 12/31/2019    PROT 4.9 (L) 12/31/2019    LABALBU 2.6 (L) 12/31/2019    BILITOT 0.4 12/31/2019    ALKPHOS 106 12/31/2019    AST 15 12/31/2019    ALT 8 (L) 12/31/2019    LABGLOM 38 (L) 12/31/2019    GFRAA 46 (L) 12/31/2019       Assessment:     Patient Active Problem List:     Hypertension     Hyperlipidemia     Asthma     Diabetes mellitus ([address]     H/O cardiovascular stress test     Obesity     Chronic kidney disease, stage III (moderate) (HCC)     Hypertensive kidney disease with CKD stage III (HCC)     Depression     SOBOE (shortness of breath on exertion)     Abnormal cardiovascular stress test     Fracture of epiphysis (separation) (upper) of neck of femur, closed ([facility])     Pressure injury of skin of coccygeal region     Sepsis ([address]     PVC (premature ventricular contraction)     Septicemia ([address]     WD-Pressure injury of sacral region, stage 4 ([address]     WD-Nonhealing surgical wound     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

## 2019-12-31 NOTE — PROGRESS NOTES
Pts conditions worsened   apparently aspirated --> resp distres-- > hypoxia --> tachy--?  Hypotension -> ICU trasfer  CXR suggestive of pulm edema  Unless he ahs pneumpnia     He did not responds to colloidal  and crystalloid   So pressor initiated - also on emp abx after Bc x 2     D/D- VTE( he is on perfect setting for it ) /  / cardiac event / sepsis - as he has infected hip prosthetic -     So start with pro BNP/ troponin/ LE US / d-dimer ( low value may help )   If US neg may have to do CTA even though he has VIRGILIO   Will try with crystalloid   Also we are considering emp heparin and d/w Dr Sita More from surgical standpoint - ok to do so   I have called his nep[hw and d/w him- Angela Doe

## 2019-12-31 NOTE — PROGRESS NOTES
[address] [facility]  consulted by Dr. Zain Hernandez for monitoring and adjustment. Indication for treatment: R hip PJI  Goal trough: 15 mcg/mL  Other antimicrobials: meropenem     Ht Readings from Last 1 Encounters:   12/23/19 6' 3\" (1.905 m)     Wt Readings from Last 3 Encounters:   12/31/19 (!) 324 lb 15.3 oz (147.4 kg)   11/10/19 (!) 309 lb 9 oz (140.4 kg)   10/28/19 (!) 335 lb (152 kg)      Pertinent Laboratory Values:   Temp Readings from Last 3 Encounters:   12/31/19 97.5 °F (36.4 °C) (Oral)   12/27/19 98.6 °F (37 °C)   11/27/19 98.1 °F (36.7 °C) (Oral)     Recent Labs     12/29/19  0500 12/30/19  0405 12/31/19  0510   WBC 19.7* 29.9* 18.1*     Recent Labs     12/29/19  0500 12/30/19  0405 12/31/19  0510   BUN 24* 31* 45*   CREATININE 1.2 1.3 1.8*     Estimated Creatinine Clearance: 61 mL/min (A) (based on SCr of 1.8 mg/dL (H)). Intake/Output Summary (Last 24 hours) at 12/31/2019 1038  Last data filed at 12/31/2019 1017  Gross per 24 hour   Intake 2770 ml   Output 5400 ml   Net -2630 ml       Pertinent Cultures:  Date    Source    Results  12/23/19  Blood    NGTD  12/23/19  C diff     Negative  12/23/19   Urine     Yeast  12/27/19   Decubitus   Gram negative    Previous vancomycin levels:  TROUGH:    Recent Labs     12/28/19  1140   VANCOTROUGH 18.9     RANDOM:    Recent Labs     12/29/19  0500 12/30/19  0405 12/31/19  0510   VANCORANDOM 17.1  (NOTE)  Therapeutic Range Interpretation: Please refer to current dosing   guidelines. 14.8  (NOTE)  Therapeutic Range Interpretation: Please refer to current dosing   guidelines. 21.9  (NOTE)  Therapeutic Range Interpretation: Please refer to current dosing   guidelines.          Assessment:  · WBC and temperature: WBC trending up/afebrile  · SCr, BUN, and urine output: VIRGILIO, trending back up   · Day(s) of therapy: #8 (in-patient)  · Vancomycin level: 21.9    Plan:   Patient was receiving vancomycin 1250 mg q24h IVPB as an outpatient,

## 2019-12-31 NOTE — PROGRESS NOTES
PRN  magnesium sulfate 1 g in dextrose 5% 100 mL IVPB, 1 g, Intravenous, PRN  pantoprazole (PROTONIX) injection 40 mg, 40 mg, Intravenous, BID  promethazine (PHENERGAN) injection 12.5 mg, 12.5 mg, Intravenous, Q6H PRN  ipratropium-albuterol (DUONEB) nebulizer solution 3 mL, 1 vial, Inhalation, TID PRN  calcium carbonate (TUMS) chewable tablet 500 mg, 500 mg, Oral, TID PRN  ondansetron (ZOFRAN) injection 4 mg, 4 mg, Intravenous, Q6H PRN  dextrose 5 % solution, , Intravenous, Continuous  morphine (PF) injection 2 mg, 2 mg, Intravenous, Q4H PRN  HYDROcodone-acetaminophen (NORCO) 5-325 MG per tablet 1 tablet, 1 tablet, Oral, Q6H PRN  collagenase ointment, , Topical, Daily  glucose (GLUTOSE) 40 % oral gel 15 g, 15 g, Oral, PRN  dextrose 50 % IV solution, 12.5 g, Intravenous, PRN  glucagon (rDNA) injection 1 mg, 1 mg, Intramuscular, PRN  dextrose 5 % solution, 100 mL/hr, Intravenous, PRN  vancomycin (VANCOCIN) intermittent dosing (placeholder), , Other, RX Placeholder  acetaminophen (TYLENOL) tablet 500 mg, 500 mg, Oral, Q6H PRN  aspirin EC tablet 81 mg, 81 mg, Oral, Daily  buPROPion (WELLBUTRIN XL) extended release tablet 300 mg, 300 mg, Oral, Daily  fluticasone (FLONASE) 50 MCG/ACT nasal spray 1 spray, 1 spray, Nasal, Daily PRN  mometasone-formoterol (DULERA) 200-5 MCG/ACT inhaler 2 puff, 2 puff, Inhalation, BID  insulin glargine (LANTUS) injection vial 15 Units, 15 Units, Subcutaneous, Nightly  memantine (NAMENDA) tablet 5 mg, 5 mg, Oral, Nightly  montelukast (SINGULAIR) tablet 10 mg, 10 mg, Oral, Nightly  nitroGLYCERIN (NITROSTAT) SL tablet 0.4 mg, 0.4 mg, Sublingual, Q5 Min PRN  rOPINIRole (REQUIP) tablet 1 mg, 1 mg, Oral, Nightly  sertraline (ZOLOFT) tablet 100 mg, 100 mg, Oral, BID  spironolactone (ALDACTONE) tablet 25 mg, 25 mg, Oral, QAM  traMADol (ULTRAM) tablet 50 mg, 50 mg, Oral, Q6H PRN  trolamine salicylate (ASPERCREME) 10 % cream, , Topical, Q4H PRN  vitamin C (ASCORBIC ACID) tablet 1,000 mg, 1,000 mg, organomegaly, no peritoneal signs  Extremities:  No clubbing, cyanosis, or edema  Skin:  Warm and dry, no open lesions or rash  Breast: deferred  Rectal: deferred  Genitalia:  deferred    NEUROLOGICAL EXAM  ---------------------------------    Mental Status Exam:             Alert  follows simple commands  Oriented to person place year month-moderate dementia    Cranial [id number] Intact.         Cranial nerve II           Visual acuity:  normal                 Cranial nerve III           Pupils:  equal, round, reactive to light      Cranial nerves III, IV, VI           Extraocular Movements: intact      Cranial nerve V           Facial sensation:  intact      Cranial nerve VII           Facial strength: intact      Cranial nerve VIII           Hearing:  intact      Cranial nerve IX           Palate:  intact      Cranial nerve XI         Shoulder shrug:  intact      Cranial nerve XII          Tongue movement:  normal    Motor:    Drift:  absent  Motor exam is symmetrical 5 out of 5 all extremities bilaterally  Tone:  normal  Abnormal Movements:  Absent    DTRs-2+ biceps,triceps,brachioradialis,knee jerks and ankle jerks bilaterally symmetrical.  Toes-downgoing bilaterally            Sensory:sensation cannot be tested              CBC with Differential:    Lab Results   Component Value Date    WBC 29.9 12/30/2019    RBC 5.10 12/30/2019    HGB 13.7 12/30/2019    HCT 43.4 12/30/2019     12/30/2019    MCV 85.1 12/30/2019    MCH 26.9 12/30/2019    MCHC 31.6 12/30/2019    RDW 16.5 12/30/2019    SEGSPCT 92.0 12/30/2019    BANDSPCT 3 09/29/2019    LYMPHOPCT 2.1 12/30/2019    PROMYELOPCT 2 09/26/2019    MONOPCT 4.7 12/30/2019    MYELOPCT 1 09/29/2019    BASOPCT 0.3 12/30/2019    MONOSABS 1.4 12/30/2019    LYMPHSABS 0.6 12/30/2019    EOSABS 0.0 12/30/2019    BASOSABS 0.1 12/30/2019    DIFFTYPE AUTOMATED DIFFERENTIAL 12/30/2019     CMP:    Lab Results   Component Value Date     12/30/2019    K 4.0 12/30/2019  12/30/2019    CO2 25 12/30/2019    BUN 31 12/30/2019    CREATININE 1.3 12/30/2019    GFRAA >60 12/30/2019    LABGLOM 55 12/30/2019    GLUCOSE 170 12/30/2019    PROT 4.9 12/30/2019    LABALBU 2.6 12/30/2019    CALCIUM 9.3 12/30/2019    BILITOT 0.4 12/30/2019    ALKPHOS 116 12/30/2019    AST 13 12/30/2019    ALT 8 12/30/2019     BMP:    Lab Results   Component Value Date     12/30/2019    K 4.0 12/30/2019     12/30/2019    CO2 25 12/30/2019    BUN 31 12/30/2019    LABALBU 2.6 12/30/2019    CREATININE 1.3 12/30/2019    CALCIUM 9.3 12/30/2019    GFRAA >60 12/30/2019    LABGLOM 55 12/30/2019    GLUCOSE 170 12/30/2019     PT/INR:    Lab Results   Component Value Date    PROTIME 11.5 05/03/2017    PROTIME 10.6 01/26/2012    INR 1.01 05/03/2017     PTT:    Lab Results   Component Value Date    APTT 29.1 05/03/2017   [APTT  U/A:    Lab Results   Component Value Date    COLORU MICHAEL 12/23/2019    PHUR 5.5 10/24/2018    LABCAST Present 12/12/2016    LABCAST Hyaline casts 12/12/2016    WBCUA 213 12/23/2019    RBCUA 34 12/23/2019    MUCUS RARE 12/23/2019    TRICHOMONAS NONE SEEN 12/23/2019    YEAST FEW 12/23/2019    BACTERIA NEGATIVE 12/23/2019    CLARITYU SLIGHTLY CLOUDY 12/23/2019    SPECGRAV 1.021 12/23/2019    LEUKOCYTESUR MODERATE 12/23/2019    UROBILINOGEN 1 12/23/2019    BILIRUBINUR NEGATIVE 12/23/2019    BLOODU SMALL 12/23/2019    GLUCOSEU 3+ 10/24/2018     TSH:  No results found for: TSH  VITAMIN B12: No components found for: B12  FOLATE:    Lab Results   Component Value Date    FOLATE 5.9 12/25/2019     RPR:  No results found for: RPR  CAMILLA:  No results found for: ANATITER, CAMILLA  Urine Toxicology:  No components found for: IAMMENTA, IBARBIT, IBENZO, ICOCAINE, IMARTHC, IOPIATES, IPHENCYC     IMPRESSION:    Metabolic encephalopathy/sepsis    neg for CVA    Hx dementia    ARF/CKD/HTN    PLAN:    CT brain neg    Mri brain neg    B 12 folate TSh nl    Continue asa and namenda    Pt stable neurologically. Mary Beth Nagel MD  BOARD CERTIFIED-NEUROLOGY.

## 2019-12-31 NOTE — PROGRESS NOTES
Spoke with Heidi Mello RN house supervisor and requested to have a bed in ICU and explained reason why.

## 2019-12-31 NOTE — PLAN OF CARE
Problem: Falls - Risk of:  Goal: Will remain free from falls  Outcome: Ongoing  Goal: Absence of physical injury  Outcome: Ongoing     Problem: Risk for Impaired Skin Integrity  Goal: Tissue integrity - skin and mucous membranes  Outcome: Ongoing     Problem: Discharge Planning:  Goal: Participates in care planning  Outcome: Ongoing  Goal: Discharged to appropriate level of care  Outcome: Ongoing     Problem: Discharge Planning:  Goal: Participates in care planning  Outcome: Ongoing  Goal: Discharged to appropriate level of care  Outcome: Ongoing     Problem: Airway Clearance - Ineffective:  Goal: Ability to maintain a clear airway will improve  Outcome: Ongoing     Problem: Anxiety/Stress:  Goal: Level of anxiety will decrease  Outcome: Ongoing

## 2019-12-31 NOTE — CONSULTS
History:   reports that he has never smoked. He has never used smokeless tobacco. He reports that he does not drink alcohol or use drugs.   Family history:   no family history of CAD, STROKE of DM at early age    Allergies   Allergen Reactions    Bee Venom Shortness Of Breath       diphenhydrAMINE (BENADRYL) injection 12.5 mg, Q6H PRN  methylPREDNISolone sodium (SOLU-MEDROL) injection 40 mg, Q8H  ipratropium-albuterol (DUONEB) nebulizer solution 1 ampule, Q4H  furosemide (LASIX) injection 40 mg, Once  albumin human 25 % IV solution 25 g, Once  norepinephrine (LEVOPHED) 16 mg in dextrose 5% 250 mL infusion, Continuous  0.9 % sodium chloride infusion, Continuous  ceftazidime-avibactam (AVYCAZ) 2.5 g in dextrose 5 % 100 mL IVPB, Q8H  vasopressin 20 Units in dextrose 5 % 100 mL infusion, Continuous  potassium chloride 20 mEq/50 mL IVPB (Central Line), PRN  magnesium sulfate 1 g in dextrose 5% 100 mL IVPB, PRN  pantoprazole (PROTONIX) injection 40 mg, BID  promethazine (PHENERGAN) injection 12.5 mg, Q6H PRN  ipratropium-albuterol (DUONEB) nebulizer solution 3 mL, TID PRN  calcium carbonate (TUMS) chewable tablet 500 mg, TID PRN  ondansetron (ZOFRAN) injection 4 mg, Q6H PRN  dextrose 5 % solution, Continuous  morphine (PF) injection 2 mg, Q4H PRN  HYDROcodone-acetaminophen (NORCO) 5-325 MG per tablet 1 tablet, Q6H PRN  collagenase ointment, Daily  glucose (GLUTOSE) 40 % oral gel 15 g, PRN  dextrose 50 % IV solution, PRN  glucagon (rDNA) injection 1 mg, PRN  dextrose 5 % solution, PRN  vancomycin (VANCOCIN) intermittent dosing (placeholder), RX Placeholder  acetaminophen (TYLENOL) tablet 500 mg, Q6H PRN  aspirin EC tablet 81 mg, Daily  buPROPion (WELLBUTRIN XL) extended release tablet 300 mg, Daily  fluticasone (FLONASE) 50 MCG/ACT nasal spray 1 spray, Daily PRN  mometasone-formoterol (DULERA) 200-5 MCG/ACT inhaler 2 puff, BID  insulin glargine (LANTUS) injection vial 15 Units, Nightly  memantine (NAMENDA) tablet 5 mg, Nightly  montelukast (SINGULAIR) tablet 10 mg, Nightly  nitroGLYCERIN (NITROSTAT) SL tablet 0.4 mg, Q5 Min PRN  rOPINIRole (REQUIP) tablet 1 mg, Nightly  sertraline (ZOLOFT) tablet 100 mg, BID  traMADol (ULTRAM) tablet 50 mg, Q6H PRN  trolamine salicylate (ASPERCREME) 10 % cream, Q4H PRN  vitamin C (ASCORBIC ACID) tablet 1,000 mg, Daily  vitamin E capsule 400 Units, Daily  sodium chloride flush 0.9 % injection 10 mL, 2 times per day  sodium chloride flush 0.9 % injection 10 mL, PRN  enoxaparin (LOVENOX) injection 40 mg, Daily  insulin lispro (HUMALOG) injection vial 0-12 Units, TID WC  insulin lispro (HUMALOG) injection vial 0-6 Units, Nightly      Current Facility-Administered Medications   Medication Dose Route Frequency Provider Last Rate Last Dose    diphenhydrAMINE (BENADRYL) injection 12.5 mg  12.5 mg Intravenous Q6H PRN Della Bronson MD        methylPREDNISolone sodium (SOLU-MEDROL) injection 40 mg  40 mg Intravenous Q8H Al Romero MD   40 mg at 12/31/19 1245    ipratropium-albuterol (DUONEB) nebulizer solution 1 ampule  1 ampule Inhalation Q4H Al Romero MD   1 ampule at 12/31/19 1133    furosemide (LASIX) injection 40 mg  40 mg Intravenous Once Chana Fernandez MD        albumin human 25 % IV solution 25 g  25 g Intravenous Once Chana Fernandez MD        norepinephrine (LEVOPHED) 16 mg in dextrose 5% 250 mL infusion  2 mcg/min Intravenous Continuous Chana Fernandez MD 28.1 mL/hr at 12/31/19 1338 30 mcg/min at 12/31/19 1338    0.9 % sodium chloride infusion   Intravenous Continuous Leticia Rodriguez  mL/hr at 12/31/19 1440      ceftazidime-avibactam (AVYCAZ) 2.5 g in dextrose 5 % 100 mL IVPB  2.5 g Intravenous Q8H Della Bronson MD        vasopressin 20 Units in dextrose 5 % 100 mL infusion  0.02 Units/min Intravenous Continuous Leticia Rodriguez MD        potassium chloride 20 mEq/50 mL IVPB (Central Line)  20 mEq Intravenous PRN Danae Macario Franco Luong MD 50 mL/hr at 12/30/19 1127 20 mEq at 12/30/19 1127    magnesium sulfate 1 g in dextrose 5% 100 mL IVPB  1 g Intravenous PRN Marj Nuñez MD        pantoprazole (PROTONIX) injection 40 mg  40 mg Intravenous BID Marj Nuñez MD   40 mg at 12/31/19 0920    promethazine (PHENERGAN) injection 12.5 mg  12.5 mg Intravenous Q6H PRN Marj Nuñez MD   12.5 mg at 12/31/19 0250    ipratropium-albuterol (DUONEB) nebulizer solution 3 mL  1 vial Inhalation TID PRN Lorena Hansen II, MD        calcium carbonate (TUMS) chewable tablet 500 mg  500 mg Oral TID PRN Marj Nuñez MD   500 mg at 12/31/19 0504    ondansetron (ZOFRAN) injection 4 mg  4 mg Intravenous Q6H PRN Marj Nuñez MD   4 mg at 12/31/19 9037    dextrose 5 % solution   Intravenous Continuous Lorena Hansen II, MD   Stopped at 12/27/19 1904    morphine (PF) injection 2 mg  2 mg Intravenous Q4H PRN Lorena Hansen II, MD   2 mg at 12/29/19 0240    HYDROcodone-acetaminophen (NORCO) 5-325 MG per tablet 1 tablet  1 tablet Oral Q6H PRN Lorena Hansen II, MD   1 tablet at 12/29/19 1530    collagenase ointment   Topical Daily Lorena Hansen II, MD        glucose (GLUTOSE) 40 % oral gel 15 g  15 g Oral PRN Lorena Hansen II, MD        dextrose 50 % IV solution  12.5 g Intravenous PRN Lorena Hansen II, MD        glucagon (rDNA) injection 1 mg  1 mg Intramuscular PRN Lorena Hansen II, MD        dextrose 5 % solution  100 mL/hr Intravenous PRN Elina Parra MD        vancomycin Milton Castaneda) intermittent dosing (placeholder)   Other RX Enrikeholder Lorena Hansen II, MD        acetaminophen (TYLENOL) tablet 500 mg  500 mg Oral Q6H PRN Lorena Hansen II, MD   500 mg at 12/31/19 0250    aspirin EC tablet 81 mg  81 mg Oral Daily Lorena Hansen II, MD   Stopped at 12/31/19 0820    buPROPion (WELLBUTRIN XL) extended release tablet 300 mg  300 mg Oral Daily Lorena Hansen II, MD   300 mg at 12/30/19 1034    fluticasone (FLONASE) 50 MCG/ACT nasal spray 1 spray  1 spray Nasal Daily PRN Tc Trejo II, MD        mometasone-formoterol [facility]) 200-5 MCG/ACT inhaler 2 puff  2 puff Inhalation BID Tc Trejo II, MD   2 puff at 12/30/19 1959    insulin glargine (LANTUS) injection vial 15 Units  15 Units Subcutaneous Nightly Tc Trejo II, MD   15 Units at 12/30/19 2124    memantine (NAMENDA) tablet 5 mg  5 mg Oral Nightly Tc Trejo II, MD   5 mg at 12/30/19 2124    montelukast (SINGULAIR) tablet 10 mg  10 mg Oral Nightly Tc Trejo II, MD   10 mg at 12/30/19 2123    nitroGLYCERIN (NITROSTAT) SL tablet 0.4 mg  0.4 mg Sublingual Q5 Min PRN Tc Trejo II, MD        rOPINIRole (REQUIP) tablet 1 mg  1 mg Oral Nightly Tc Trejo II, MD   1 mg at 12/30/19 2123    sertraline (ZOLOFT) tablet 100 mg  100 mg Oral BID Tc Trejo II, MD   Stopped at 12/31/19 0467    traMADol (ULTRAM) tablet 50 mg  50 mg Oral Q6H PRN Tc Trejo II, MD   50 mg at 12/31/19 0250    trolamine salicylate (ASPERCREME) 10 % cream   Topical Q4H PRN Tc Trejo II, MD        vitamin C (ASCORBIC ACID) tablet 1,000 mg  1,000 mg Oral Daily Tc Trejo II, MD   Stopped at 12/31/19 9424    vitamin E capsule 400 Units  400 Units Oral Daily Tc Trejo II, MD   Stopped at 12/31/19 8297    sodium chloride flush 0.9 % injection 10 mL  10 mL Intravenous 2 times per day Tc Trejo II, MD   10 mL at 12/31/19 0920    sodium chloride flush 0.9 % injection 10 mL  10 mL Intravenous PRN Tc Trejo II, MD        enoxaparin (LOVENOX) injection 40 mg  40 mg Subcutaneous Daily Tc Trejo II, MD   40 mg at 12/31/19 0920    insulin lispro (HUMALOG) injection vial 0-12 Units  0-12 Units Subcutaneous TID WC Tc Trejo II, MD   2 Units at 12/31/19 0719    insulin lispro (HUMALOG) injection vial 0-6 Units  0-6 Units Subcutaneous Nightly Tc Trejo II, MD   1 Units at 12/30/19 2123     Review of Systems:   · Constitutional: No Fever or Weight Loss   · Eyes: lesion. Several calcified gallstones present. No finding of acute cholecystitis. Bilateral renal cysts again noted. No renal calculus or hydronephrosis. Other abdominal organs appear normal. GI/Bowel: Very large amount of liquid distending the stomach. Moderate distension of multiple upper abdominal small bowel loops with air-fluid levels. Terminal ileum collapsed. Normal appendix identified. No diverticular disease. Pelvis: Lujan catheter in place draining the bladder. Normal size of the prostate. No adenopathy. Small amount of pelvic ascites. Significant scan artifact from right total hip replacement. Peritoneum/Retroperitoneum: Small to moderate amount of free air from recent surgery. Air also present in the left anterior abdominal wall suspected to be from left lower quadrant laparoscopy port. Left lower quadrant loop diverting ileostomy present. Mild distension of the ileal loop prior to entering the ostomy. Exiting loop normal in size. Very small amount of perihepatic and perisplenic ascites. Very small amount of ascites in the hepato renal fossa. Bones/Soft Tissues: Some air seen filling what is thought to be a left lower quadrant laparoscopy site. Small amount of additional subcutaneous air more inferiorly in the left lower quadrant. Bipolar right total hip replacement. No intra-abdominal abscess. Small amount of postoperative ascites. Small amount of residual free intra-air appropriate for recent surgery. Moderate distension of the bowel proximal to the ileostomy either due to postoperative ileus or less likely small bowel obstruction. Distal small bowel normal in size. Very large amount of fluid in the stomach. Cholelithiasis without finding of cholecystitis. RECOMMENDATIONS: NG tube suggested if the patient has nausea or vomiting.      Xr Pelvis (1-2 Views)    Result Date: 12/23/2019  EXAMINATION: ONE XRAY VIEW OF THE PELVIS 12/23/2019 3:42 pm COMPARISON: 11/13/2019 HISTORY: ORDERING SYSTEM PROVIDED HISTORY: altered mental status, h/o decubitus ulcer TECHNOLOGIST PROVIDED HISTORY: Reason for exam:->altered mental status, h/o decubitus ulcer Reason for Exam: altered mental status, concern for infection Acuity: Acute Type of Exam: Initial Mechanism of Injury: fall Relevant Medical/Surgical History: sx FINDINGS: No acute fracture or dislocation is seen involving either hip or the bony pelvis. Non cemented bipolar right total hip replacement present and unchanged. No destructive bony lesion. Moderate atherosclerotic calcifications. Soft tissues otherwise appear normal.     No finding of bony destruction that would be worrisome for osteomyelitis. Osteopenia present. No acute abnormality. No significant change from the prior study. Ct Head Wo Contrast    Result Date: 12/24/2019  EXAMINATION: CT OF THE HEAD WITHOUT CONTRAST  12/24/2019 9:37 am TECHNIQUE: CT of the head was performed without the administration of intravenous contrast. Dose modulation, iterative reconstruction, and/or weight based adjustment of the mA/kV was utilized to reduce the radiation dose to as low as reasonably achievable. COMPARISON: 11/10/2019. HISTORY: ORDERING SYSTEM PROVIDED HISTORY: confusion right facial droop TECHNOLOGIST PROVIDED HISTORY: Reason for exam:->confusion right facial droop Has a \"code stroke\" or \"stroke alert\" been called? ->No Reason for Exam: confusion right facial droop Acuity: Unknown Type of Exam: Unknown Additional signs and symptoms: unknown Relevant Medical/Surgical History: poor historian FINDINGS: BRAIN/VENTRICLES: There is no acute intracranial hemorrhage, mass effect or midline shift. No abnormal extra-axial fluid collection. The gray-white differentiation is maintained without evidence of an acute infarct. There is no evidence of hydrocephalus. The cerebral sulci and ventricles are enlarged.  There is low-attenuation within the periventricular white matter and deep white matter of the brain. ORBITS: The visualized portion of the orbits demonstrate no acute abnormality. SINUSES: The visualized paranasal sinuses and mastoid air cells demonstrate no acute abnormality. Redemonstrated is some density in the left mastoid air cells. SOFT TISSUES/SKULL:  No acute abnormality of the visualized skull or soft tissues. 1. No acute intracranial abnormality. 2. Diffuse cerebral atrophy with chronic small vessel ischemic disease. Xr Chest Portable    Result Date: 12/31/2019  EXAMINATION: ONE X-RAY VIEW OF THE CHEST 12/31/2019 9:08 am COMPARISON: 12/29/2019 HISTORY: ORDERING SYSTEM PROVIDED HISTORY: Aspiration TECHNOLOGIST PROVIDED HISTORY: Reason for exam:->Aspiration Reason for Exam: Aspiration Acuity: Acute Type of Exam: Initial FINDINGS: Low lung volumes with elevation of the left hemidiaphragm. Mild blunting of the bilateral costophrenic sulci. No definite pneumothorax is seen. Bilateral interstitial opacities along with perihilar and left basilar opacities. The cardiac silhouette is largely obscured. Similar appearance to the right-sided PICC. Low lung volumes with elevation of the left hemidiaphragm. Interstitial opacities bilaterally with perihilar and left basilar airspace opacities. This could represent pulmonary edema, aspiration, and/or infection. Xr Chest Portable    Result Date: 12/29/2019  EXAMINATION: ONE XRAY VIEW OF THE CHEST 12/29/2019 8:01 am COMPARISON: Chest radiograph 12/27/2019 HISTORY: ORDERING SYSTEM PROVIDED HISTORY: SOB TECHNOLOGIST PROVIDED HISTORY: Reason for exam:->SOB Reason for Exam: SOB Acuity: Acute Type of Exam: Initial FINDINGS: Unchanged right upper extremity peripherally inserted central catheter. Low lung volumes. Persistent elevation of the left hemidiaphragm. Increased linear opacities in each lung base. Diffuse interstitial prominence with indistinct pulmonary vasculature but no definite interlobular septal thickening.   No definite findings of pneumothorax. Questionable trace bilateral pleural effusions. Mediastinal prominence due to aortic tortuosity and/or central vascular crowding. Partially obscured cardiac contour with suggestion of mild prominence. 1. Increased bibasilar atelectasis. 2. Pulmonary vascular congestion and suspected mild cardiomegaly. 3. Questionable trace bilateral effusions. Xr Chest Portable    Result Date: 12/28/2019  EXAMINATION: ONE XRAY VIEW OF THE CHEST 12/27/2019 8:32 am COMPARISON: 12/23/2019 HISTORY: ORDERING SYSTEM PROVIDED HISTORY: check PICC tip placement, suspect PICC in the right atrium, will not return blood TECHNOLOGIST PROVIDED HISTORY: Reason for exam:->check PICC tip placement, suspect PICC in the right atrium, will not return blood Reason for Exam: check PICC tip placement, suspect PICC in the right atrium, will Acuity: Unknown Type of Exam: Unknown FINDINGS: Right PICC is present. The tip is difficult to localize but appears to extend to at least the cavoatrial junction with possible extension to the right atrium. No pneumothorax or pleural effusion. Mild elevation of the left hemidiaphragm has improved. No lung consolidation. Heart size is stable. Right PICC with tip extending to at least the cavoatrial junction. The tip is difficult to visualize and may extend partially into the right atrium. Xr Chest Portable    Result Date: 12/23/2019  EXAMINATION: ONE XRAY VIEW OF THE CHEST 12/23/2019 3:42 pm COMPARISON: 11/10/2019. HISTORY: ORDERING SYSTEM PROVIDED HISTORY: altered mental status, concern for infection TECHNOLOGIST PROVIDED HISTORY: Reason for exam:->altered mental status, concern for infection Reason for Exam: altered mental status, concern for infection Acuity: Unknown Type of Exam: Initial Additional signs and symptoms: none Relevant Medical/Surgical History: asthma FINDINGS: There is chronic elevation of the left hemidiaphragm.   The heart size is enlarged but as is. Impression:  Principal Problem:    Sepsis ([address]  Active Problems:    Hypotension    Metabolic encephalopathy    Vascular dementia with behavior disturbance (HCC)    Hyperlipidemia    Asthma    Diabetes mellitus ([address]    Obesity    Chronic kidney disease, stage III (moderate) (HCC)    Hypertensive kidney disease with CKD stage III (HCC)    Depression    Pressure injury of skin of coccygeal region    WD-Pressure injury of sacral region, stage 4 (HCC)    Severe malnutrition (HCC)  Resolved Problems:    * No resolved hospital problems. *      Assessment: 77 y. o.year old with PMH of  has a past medical history of Arthritis, Asthma, Chronic kidney disease, Diabetes mellitus ([address] H/O cardiac catheterization, H/O cardiovascular stress test, Hyperlipidemia, Hypertension, Obesity, Pressure ulcer of coccygeal region, unstageable ([address] and Thyroid disease. Plan and Recommendations:    Tachycardia in setting of hypotension respiratory distress possible aspiration pneumonia  I would give him IV fluid boluses at this time  Hypotension requiring pressors. Add vasopressin prefer phenylephrine to avoid tachycardia  Sinus tachycardia in response to physiological need  DVT prophylaxis if no contraindication      Is critical and sick discussed with team critical care time spent 37 minutes patient seen multiple times, pressors adjusted ,w ill follow      Thank you  much for consult and giving us the opportunity in contributing in the care of this patient. Please feel free to call me for any questions.        Reanna James MD, 12/31/2019 3:44 PM

## 2019-12-31 NOTE — PROGRESS NOTES
Flores Rn charge nurse arrived to room and replaced patients pulse ox and it was 72 percent. At that time pleth was still bad but called a rapid because patients was gargling and sounded like he has aspirated. Patient was placed on a nonrebreather and 02 came up to 95 percent. Patient is alert and oriented but mental status has changed because he is very sleepy doesn't want to open eyes but will talk. Once patients 02 came up placed patients NG tube to try and contain the vomiting. Patients NG tube dumped out and patient had a total of 3400 during patients rapid of green liquid. Patient was placed on a vapotherm due to 02 being down. Patient is breathing hard now with mouth wide open. Patients NG tube is at 60cm. X ray was taken during rapid. Patients Bp is set to take every 5 mins. Patients vapotherm is at 40lpm 50 percent and 02 is 92 percent. Dr. Zain Hernandez was called and she stated that she would like patient to be transferred to ICU and consult Dr. Johnella Gowers and keep patient NPO. Will cont to monitor patient until room ready in ICU.

## 2019-12-31 NOTE — CONSULTS
present  illness. PHYSICAL EXAMINATION:  GENERAL:  The patient is awake, in no acute respiratory distress. VITAL SIGNS:  His blood pressure was 165/149 mmHg, pulse of 120 per  minute, and respiratory rate of 30 per minute. He is afebrile. His  saturation is 94% on Vapotherm 50%. HEENT:  Reveals no JVD. No lymphadenopathy. NECK:  The neck is supple. LUNGS:  Revealed bibasilar crackles and occasional rhonchi. HEART:  Showed normal S1 and S2. There was no S3 or S4 noted. ABDOMEN:  Reveals that it is soft, but tender to palpation. IMAGING:  His chest x-ray showed low lung volumes, elevation of the left  hemidiaphragm, and prominent interstitial opacities and perihilar and  left basilar infiltrate. LABORATORY DATA:  His electrolytes showed a sodium of 138, potassium  3.8, chloride 101, carbon dioxide 25, BUN 45, creatinine 1.8. His CBC  showed a white count of 18.1, hemoglobin 13.1, hematocrit 41.4. IMPRESSION:  1. Acute respiratory failure secondary to aspiration pneumonia. 2.  Aspiration pneumonia. 3.  Possible underlying obstructive sleep apnea. 4.  History of bronchial asthma. PLAN:  1. Continue present antibiotic therapy of vancomycin and Unasyn. 2.  DuoNeb q.4 h. around-the-clock. 3.  We will add Solu-Medrol 40 mg q.8 h.  4.  Sputum for culture and sensitivity. 5.  Check magnesium and phosphorus. 6.  We will check ABGs. 7.  Wean FiO2 to keep saturation greater than 90%. 8.  We will place the patient on BiPAP once his nausea and vomiting get  better. 9.  The patient will need sleep study as outpatient. 10.  As per orders.         Ezekiel Spivey MD    D: 12/31/2019 10:14:45       T: 12/31/2019 15:09:20     [email or web address]  Job#: [id number]     Doc#: [id number]    CC:

## 2019-12-31 NOTE — PROGRESS NOTES
Patient had vomit all over bed at this time so changed the patient. Patient began to vomit again during movement so placed patient on his side and patient vomited for about 5 mins green. At that time realized that [employer] took patients pulse ox off while changing patient. Place it back on patient and called Flores ROSARIO to come in and help. Patients 02 was 67 with bad pleth. Placed patient on 15 liters until help came.

## 2019-12-31 NOTE — PROGRESS NOTES
Progress note    Teagan Bertrand    12/31/2019    Subjective:     Patients aspirated this morning with drop in BP and tachycardia. Rapid response was inititated. Patient transferred to ICU and Levophed started for pressure support. Saw patient in ICU he is confused but awake. NG tube has billious liquid. Medication side effects: none. Scheduled Meds:   ampicillin-sulbactam  1.5 g Intravenous Q6H    methylPREDNISolone  40 mg Intravenous Q8H    ipratropium-albuterol  1 ampule Inhalation Q4H    furosemide  40 mg Intravenous Once    albumin human  25 g Intravenous Once    pantoprazole  40 mg Intravenous BID    collagenase   Topical Daily    vancomycin (VANCOCIN) intermittent dosing (placeholder)   Other RX Placeholder    aspirin  81 mg Oral Daily    buPROPion  300 mg Oral Daily    mometasone-formoterol  2 puff Inhalation BID    insulin glargine  15 Units Subcutaneous Nightly    memantine  5 mg Oral Nightly    montelukast  10 mg Oral Nightly    rOPINIRole  1 mg Oral Nightly    sertraline  100 mg Oral BID    vitamin C  1,000 mg Oral Daily    vitamin E  400 Units Oral Daily    sodium chloride flush  10 mL Intravenous 2 times per day    enoxaparin  40 mg Subcutaneous Daily    insulin lispro  0-12 Units Subcutaneous TID WC    insulin lispro  0-6 Units Subcutaneous Nightly     Continuous Infusions:   norepinephrine 25 mcg/min (12/31/19 1210)    dextrose Stopped (12/27/19 1904)    dextrose       PRN Meds:diphenhydrAMINE, potassium chloride, magnesium sulfate, promethazine, ipratropium-albuterol, calcium carbonate, ondansetron, morphine, HYDROcodone 5 mg - acetaminophen, glucose, dextrose, glucagon (rDNA), dextrose, acetaminophen, fluticasone, nitroGLYCERIN, traMADol, trolamine salicylate, sodium chloride flush    Review of Systems  Pertinent items are noted in HPI.     Objective:     Patient Vitals for the past 8 hrs:   BP Temp Temp src Pulse Resp SpO2   12/31/19 1133 -- -- -- -- 25 94 % 12/31/19 1010 (!) 75/42 -- -- 126 (!) 33 92 %   12/31/19 0940 (!) 72/46 -- -- 123 30 93 %   12/31/19 0935 (!) 165/149 -- -- 120 30 93 %   12/31/19 0930 (!) 121/105 -- -- 121 30 94 %   12/31/19 0925 114/84 -- -- 120 26 93 %   12/31/19 0901 93/74 -- -- 120 30 96 %   12/31/19 0900 93/74 -- -- 121 28 96 %   12/31/19 0854 (!) 122/101 -- -- 121 -- 92 %   12/31/19 0715 106/63 97.5 °F (36.4 °C) Oral 115 20 94 %     I/O last 3 completed shifts: In: 3130 [P.O.:1120; I.V.:1810; IV Piggyback:200]  Out: 500 [Urine:500]  I/O this shift:  In: -   Out: 4900 [Emesis/NG output:4900]    BP (!) 75/42   Pulse 126   Temp 97.5 °F (36.4 °C) (Oral)   Resp 25   Ht 6' 3\" (1.905 m)   Wt (!) 324 lb 15.3 oz (147.4 kg)   SpO2 94%   BMI 40.62 kg/m²   General appearance: alert and cooperative. Lungs: Bilateral rales present in bases  Heart: regular rate and rhythm, S1, S2 normal, no murmur, click, rub or gallop  Abdomen: soft right upper quadrent abdominal tenderness; bowel sounds normal; no masses,  no organomegaly  Extremities: extremities normal, atraumatic, no cyanosis or edema  Skin: Sacral and coccygeal decub  CNS:  Moves all extremities. He is able to puff up his cheeks, doubt CVA        Labs and imaging reviewed.   CBC with Differential:    Lab Results   Component Value Date    WBC 18.1 12/31/2019    RBC 4.87 12/31/2019    HGB 13.1 12/31/2019    HCT 41.4 12/31/2019     12/31/2019    MCV 85.0 12/31/2019    MCH 26.9 12/31/2019    MCHC 31.6 12/31/2019    RDW 16.9 12/31/2019    SEGSPCT 86.7 12/31/2019    BANDSPCT 3 09/29/2019    LYMPHOPCT 3.7 12/31/2019    PROMYELOPCT 2 09/26/2019    MONOPCT 9.0 12/31/2019    MYELOPCT 1 09/29/2019    BASOPCT 0.2 12/31/2019    MONOSABS 1.6 12/31/2019    LYMPHSABS 0.7 12/31/2019    EOSABS 0.0 12/31/2019    BASOSABS 0.0 12/31/2019    DIFFTYPE AUTOMATED DIFFERENTIAL 12/31/2019     CMP:    Lab Results   Component Value Date     12/31/2019    K 3.8 12/31/2019     12/31/2019    CO2 25 elevation of the left hemidiaphragm. Interstitial opacities bilaterally with perihilar and left basilar airspace  opacities.  This could represent pulmonary edema, aspiration, and/or  infection. Assessment:     Principal Problem:    Sepsis ([facility]  Active Problems:    Hypotension    Metabolic encephalopathy    Vascular dementia with behavior disturbance (HCC)    Hyperlipidemia    Asthma    Diabetes mellitus ([facility]    Obesity    Chronic kidney disease, stage III (moderate) (HCC)    Hypertensive kidney disease with CKD stage III (HCC)    Depression    Pressure injury of skin of coccygeal region    WD-Pressure injury of sacral region, stage 4 (HCC)    Severe malnutrition (HCC)  Resolved Problems:    * No resolved hospital problems. *  Hypokalemia and hypomagnesemia  Aspiration Pneumonia  Plan:   MRI -no acute infarct  Continue wound care, hip wound culture results noted. Continue Vanc and Merepenam per ID recommendation  Patient had diverting colostomy. Appreciate renal recommendations  Zofran for Nausea  CT abdomen results noted. Management per surgery  Chest X ray report as above  Keep patient in ICU. Stop Unasyn started during rapid response. Start Yvonnie Bozena with more broad spectrum coverage. Dr. Janae Velazquez consulted  Darern Ashvin was consulted. Nurse called him and waiting for response. Continue pressor support with Levophed.   Spent approximately an hour and 30 min critical care time in patient care    Ana TORRES M.D.  12/31/2019

## 2019-12-31 NOTE — PROGRESS NOTES
Came into patients room and patient stated that he needed help because he has vomited. Vomited is green and had a medium size. Patient refused to take morning PO medications. Notified Dr. Kayla Schroeder and he stated if patient continues to vomit to place NGT. Will cont to monitor patient.

## 2019-12-31 NOTE — PROGRESS NOTES
Nephrology Progress Note  12/31/2019 7:52 AM        Subjective:   Admit Date: 12/23/2019  PCP: Angel St MD    Interval History: C/O N/v - abd discomfort    Diet: poor    ROS:  Tachy , low uop     Data:     Current meds:    pantoprazole  40 mg Intravenous BID    collagenase   Topical Daily    vancomycin (VANCOCIN) intermittent dosing (placeholder)   Other RX Placeholder    aspirin  81 mg Oral Daily    buPROPion  300 mg Oral Daily    mometasone-formoterol  2 puff Inhalation BID    insulin glargine  15 Units Subcutaneous Nightly    memantine  5 mg Oral Nightly    montelukast  10 mg Oral Nightly    rOPINIRole  1 mg Oral Nightly    sertraline  100 mg Oral BID    spironolactone  25 mg Oral QAM    vitamin C  1,000 mg Oral Daily    vitamin E  400 Units Oral Daily    sodium chloride flush  10 mL Intravenous 2 times per day    enoxaparin  40 mg Subcutaneous Daily    insulin lispro  0-12 Units Subcutaneous TID WC    insulin lispro  0-6 Units Subcutaneous Nightly    meropenem  2 g Intravenous Q8H      dextrose Stopped (12/27/19 1904)    dextrose           I/O last 3 completed shifts: In: 3130 [P.O.:1120;  I.V.:1810; IV Piggyback:200]  Out: 500 [Urine:500]    CBC:   Recent Labs     12/29/19  0500 12/30/19  0405 12/31/19  0510   WBC 19.7* 29.9* 18.1*   HGB 13.4* 13.7 13.1*    418 462*          Recent Labs     12/29/19  0500 12/30/19  0405 12/30/19  1400 12/31/19  0510    143  --  138   K 3.7 3.4* 4.0 3.8    103  --  101   CO2 25 25  --  25   BUN 24* 31*  --  45*   CREATININE 1.2 1.3  --  1.8*   GLUCOSE 121* 170*  --  163*       Lab Results   Component Value Date    CALCIUM 10.2 12/31/2019    PHOS 2.8 11/17/2019       Objective:     Vitals: /63   Pulse 115   Temp 97.5 °F (36.4 °C) (Oral)   Resp 20   Ht 6' 3\" (1.905 m)   Wt (!) 324 lb 15.3 oz (147.4 kg)   SpO2 94%   BMI 40.62 kg/m²     General appearance:  No ac distress  HEENT:  + conj pallor  Neck:  supple  Lungs:  Some adv BS  Heart:  tachy  Abdomen: soft, tender on palpation   Extremities:  Mild ankle edema       Problem List :         Impression :     1. VIRGILIO/ CKD perhaps stage 2 - worsened - oliguric- difficult to assess his volumes atus  Tony with risk for GI fluid sequestration  But also on abx and has risk for AIN  And please not his vanco level was high and potentially could contributes   2. recent pulmo edema   3. DM / recent surgery    Recommendation/Plan  :     1. Redo UA and urinary indices  2. revisit abx - I will d/w Dr Edwige Harper and surgeon for abx   3. Watch UOP  4. I am reluctant to add IVf given recent pulm edema   5. Po food/ fluid   6. Also add pro BNP level in am ( although obesity  can cause low BNP level despite fluid overload   7.  BMP in am       Anika Diamond MD    I have d/w Dr Miguel Reilly- abx was started by ID for hip prosthetic infection with corynebacterium and pseudomonas    Per IR recomn- will stop meropenem  And stop vanco on 1/5/2020

## 2019-12-31 NOTE — PROGRESS NOTES
DIEGO Montenegro here  I had good d/w him   If LE US neg  Add CTA despite high Cr as his pre test probability is high  Nephew underrated the though process   Also nephew OK with  Emp heparin  will d/C once CTA neg   Also will have bonus view of lung parenchyma

## 2019-12-31 NOTE — PROGRESS NOTES
His overall condition worsened  Hypoxia / hypotension  Transferred  to ICU  CXR showed worsening pulm edema   Unless he  Has  b/l pneumonia     Lasix 40 IB  colloidal -   If BP still low - add NE - goal MAP 65   see if he responds to loop  If so intensify  Add pro BNP  Will re assess later today

## 2019-12-31 NOTE — PROGRESS NOTES
PHARMACY CONSULT FOR RENAL DOSING PER DR Yanely Giles    RENAL LAB EVALUATION  Estimated Creatinine Clearance: 61 mL/min (A) (based on SCr of 1.8 mg/dL (H)). Recent Labs     12/29/19  0500 12/30/19  0405 12/31/19  0510   BUN 24* 31* 45*   CREATININE 1.2 1.3 1.8*     Using [employer] online program for renal dosing Avycaz, is as follows: Dosing: Renal Impairment: Adult   Note: Estimation of renal function for the purpose of drug dosing should be done using the Cockcroft-Gault formula. Dosage recommendations are expressed as total grams of the ceftazidime/avibactam combination:  CrCl >50 mL/minute: No dosage adjustment necessary. CrCl 31 to 50 mL/minute: 1.25 g every 8 hours  CrCl 16 to 30 mL/minute: 0.94 g every 12 hours  CrCl 6 to 15 mL/minute: 0.94 g every 24 hours  CrCl ?5 mL/minute: 0.94 g every 48 hours  Pharmacy has evaluated the use of Avycaz IV Antibiotic renally per consult and the patient's renal function is 61 ml/min and is > 50 ml/min and the dose of Avycaz is appropriate at this time.  Continue to monitor.'    Vic Rose [facility]  12/31/2019  2:38 PM

## 2020-01-01 ENCOUNTER — APPOINTMENT (OUTPATIENT)
Dept: GENERAL RADIOLOGY | Age: 70
DRG: 853 | End: 2020-01-01
Payer: MEDICARE

## 2020-01-01 ENCOUNTER — APPOINTMENT (OUTPATIENT)
Dept: CT IMAGING | Age: 70
DRG: 853 | End: 2020-01-01
Payer: MEDICARE

## 2020-01-01 ENCOUNTER — HOSPITAL ENCOUNTER (INPATIENT)
Age: 70
LOS: 1 days | DRG: 951 | End: 2020-01-11
Attending: FAMILY MEDICINE | Admitting: FAMILY MEDICINE
Payer: COMMERCIAL

## 2020-01-01 ENCOUNTER — APPOINTMENT (OUTPATIENT)
Dept: INTERVENTIONAL RADIOLOGY/VASCULAR | Age: 70
DRG: 853 | End: 2020-01-01
Payer: MEDICARE

## 2020-01-01 VITALS
WEIGHT: 281.97 LBS | RESPIRATION RATE: 29 BRPM | HEART RATE: 104 BPM | SYSTOLIC BLOOD PRESSURE: 109 MMHG | OXYGEN SATURATION: 93 % | HEIGHT: 75 IN | BODY MASS INDEX: 35.06 KG/M2 | DIASTOLIC BLOOD PRESSURE: 78 MMHG | TEMPERATURE: 97.8 F

## 2020-01-01 VITALS
TEMPERATURE: 97.4 F | HEART RATE: 98 BPM | SYSTOLIC BLOOD PRESSURE: 70 MMHG | DIASTOLIC BLOOD PRESSURE: 42 MMHG | RESPIRATION RATE: 12 BRPM | OXYGEN SATURATION: 79 %

## 2020-01-01 LAB
ALBUMIN SERPL-MCNC: 2.6 GM/DL (ref 3.4–5)
ALBUMIN SERPL-MCNC: 2.7 GM/DL (ref 3.4–5)
ALBUMIN SERPL-MCNC: 2.7 GM/DL (ref 3.4–5)
ALBUMIN SERPL-MCNC: 2.8 GM/DL (ref 3.4–5)
ALBUMIN SERPL-MCNC: 2.8 GM/DL (ref 3.4–5)
ALBUMIN SERPL-MCNC: 3 GM/DL (ref 3.4–5)
ALBUMIN SERPL-MCNC: 3.1 GM/DL (ref 3.4–5)
ALBUMIN SERPL-MCNC: 3.3 GM/DL (ref 3.4–5)
ALP BLD-CCNC: 108 IU/L (ref 40–128)
ALP BLD-CCNC: 110 IU/L (ref 40–128)
ALP BLD-CCNC: 110 IU/L (ref 40–128)
ALP BLD-CCNC: 125 IU/L (ref 40–128)
ALP BLD-CCNC: 141 IU/L (ref 40–128)
ALP BLD-CCNC: 176 IU/L (ref 40–128)
ALP BLD-CCNC: 199 IU/L (ref 40–128)
ALP BLD-CCNC: 202 IU/L (ref 40–128)
ALP BLD-CCNC: 88 IU/L (ref 40–128)
ALP BLD-CCNC: 97 IU/L (ref 40–128)
ALT SERPL-CCNC: 11 U/L (ref 10–40)
ALT SERPL-CCNC: 13 U/L (ref 10–40)
ALT SERPL-CCNC: 18 U/L (ref 10–40)
ALT SERPL-CCNC: 24 U/L (ref 10–40)
ALT SERPL-CCNC: 6 U/L (ref 10–40)
ALT SERPL-CCNC: 7 U/L (ref 10–40)
ALT SERPL-CCNC: 8 U/L (ref 10–40)
ALT SERPL-CCNC: 9 U/L (ref 10–40)
ANION GAP SERPL CALCULATED.3IONS-SCNC: 12 MMOL/L (ref 4–16)
ANION GAP SERPL CALCULATED.3IONS-SCNC: 15 MMOL/L (ref 4–16)
ANION GAP SERPL CALCULATED.3IONS-SCNC: 15 MMOL/L (ref 4–16)
ANION GAP SERPL CALCULATED.3IONS-SCNC: 16 MMOL/L (ref 4–16)
ANION GAP SERPL CALCULATED.3IONS-SCNC: 19 MMOL/L (ref 4–16)
ANION GAP SERPL CALCULATED.3IONS-SCNC: 21 MMOL/L (ref 4–16)
ANION GAP SERPL CALCULATED.3IONS-SCNC: 22 MMOL/L (ref 4–16)
ANISOCYTOSIS: ABNORMAL
APTT: 200.9 SECONDS (ref 25.1–37.1)
APTT: 72.9 SECONDS (ref 25.1–37.1)
APTT: 85.9 SECONDS (ref 25.1–37.1)
AST SERPL-CCNC: 12 IU/L (ref 15–37)
AST SERPL-CCNC: 12 IU/L (ref 15–37)
AST SERPL-CCNC: 15 IU/L (ref 15–37)
AST SERPL-CCNC: 15 IU/L (ref 15–37)
AST SERPL-CCNC: 20 IU/L (ref 15–37)
AST SERPL-CCNC: 22 IU/L (ref 15–37)
AST SERPL-CCNC: 36 IU/L (ref 15–37)
AST SERPL-CCNC: 70 IU/L (ref 15–37)
AST SERPL-CCNC: 9 IU/L (ref 15–37)
AST SERPL-CCNC: 9 IU/L (ref 15–37)
BACTERIA: NEGATIVE /HPF
BANDED NEUTROPHILS ABSOLUTE COUNT: 0.63 K/CU MM
BANDED NEUTROPHILS ABSOLUTE COUNT: 0.86 K/CU MM
BANDED NEUTROPHILS ABSOLUTE COUNT: 0.95 K/CU MM
BANDED NEUTROPHILS ABSOLUTE COUNT: 3.36 K/CU MM
BANDED NEUTROPHILS ABSOLUTE COUNT: 4.58 K/CU MM
BANDED NEUTROPHILS RELATIVE PERCENT: 12 % (ref 5–11)
BANDED NEUTROPHILS RELATIVE PERCENT: 16 % (ref 5–11)
BANDED NEUTROPHILS RELATIVE PERCENT: 3 % (ref 5–11)
BANDED NEUTROPHILS RELATIVE PERCENT: 5 % (ref 5–11)
BANDED NEUTROPHILS RELATIVE PERCENT: 7 % (ref 5–11)
BASE EXCESS MIXED: 6.1 (ref 0–1.2)
BASE EXCESS MIXED: ABNORMAL (ref 0–1.2)
BASE EXCESS MIXED: ABNORMAL (ref 0–1.2)
BASE EXCESS: ABNORMAL (ref 0–3.3)
BASOPHILS ABSOLUTE: 0 K/CU MM
BASOPHILS ABSOLUTE: 0 K/CU MM
BASOPHILS ABSOLUTE: 0.1 K/CU MM
BASOPHILS RELATIVE PERCENT: 0.1 % (ref 0–1)
BASOPHILS RELATIVE PERCENT: 0.2 % (ref 0–1)
BASOPHILS RELATIVE PERCENT: 0.3 % (ref 0–1)
BILIRUB SERPL-MCNC: 0.5 MG/DL (ref 0–1)
BILIRUB SERPL-MCNC: 0.6 MG/DL (ref 0–1)
BILIRUB SERPL-MCNC: 0.8 MG/DL (ref 0–1)
BILIRUB SERPL-MCNC: 0.8 MG/DL (ref 0–1)
BILIRUB SERPL-MCNC: 0.9 MG/DL (ref 0–1)
BILIRUBIN URINE: NEGATIVE MG/DL
BLOOD, URINE: ABNORMAL
BUN BLDV-MCNC: 105 MG/DL (ref 6–23)
BUN BLDV-MCNC: 54 MG/DL (ref 6–23)
BUN BLDV-MCNC: 56 MG/DL (ref 6–23)
BUN BLDV-MCNC: 59 MG/DL (ref 6–23)
BUN BLDV-MCNC: 60 MG/DL (ref 6–23)
BUN BLDV-MCNC: 61 MG/DL (ref 6–23)
BUN BLDV-MCNC: 66 MG/DL (ref 6–23)
BUN BLDV-MCNC: 79 MG/DL (ref 6–23)
BUN BLDV-MCNC: 93 MG/DL (ref 6–23)
CALCIUM SERPL-MCNC: 10 MG/DL (ref 8.3–10.6)
CALCIUM SERPL-MCNC: 8.7 MG/DL (ref 8.3–10.6)
CALCIUM SERPL-MCNC: 8.8 MG/DL (ref 8.3–10.6)
CALCIUM SERPL-MCNC: 8.8 MG/DL (ref 8.3–10.6)
CALCIUM SERPL-MCNC: 8.9 MG/DL (ref 8.3–10.6)
CALCIUM SERPL-MCNC: 9 MG/DL (ref 8.3–10.6)
CALCIUM SERPL-MCNC: 9.1 MG/DL (ref 8.3–10.6)
CALCIUM SERPL-MCNC: 9.2 MG/DL (ref 8.3–10.6)
CALCIUM SERPL-MCNC: 9.3 MG/DL (ref 8.3–10.6)
CALCIUM SERPL-MCNC: 9.4 MG/DL (ref 8.3–10.6)
CALCIUM SERPL-MCNC: 9.6 MG/DL (ref 8.3–10.6)
CARBON MONOXIDE, BLOOD: 2.1 % (ref 0–5)
CARBON MONOXIDE, BLOOD: 2.6 % (ref 0–5)
CHLORIDE BLD-SCNC: 80 MMOL/L (ref 99–110)
CHLORIDE BLD-SCNC: 83 MMOL/L (ref 99–110)
CHLORIDE BLD-SCNC: 84 MMOL/L (ref 99–110)
CHLORIDE BLD-SCNC: 86 MMOL/L (ref 99–110)
CHLORIDE BLD-SCNC: 86 MMOL/L (ref 99–110)
CHLORIDE BLD-SCNC: 89 MMOL/L (ref 99–110)
CHLORIDE BLD-SCNC: 90 MMOL/L (ref 99–110)
CHLORIDE BLD-SCNC: 93 MMOL/L (ref 99–110)
CHLORIDE BLD-SCNC: 94 MMOL/L (ref 99–110)
CHLORIDE BLD-SCNC: 98 MMOL/L (ref 99–110)
CHLORIDE BLD-SCNC: 99 MMOL/L (ref 99–110)
CLARITY: ABNORMAL
CO2 CONTENT: 30.2 MMOL/L (ref 19–24)
CO2 CONTENT: 39 MMOL/L (ref 19–24)
CO2: 19 MMOL/L (ref 21–32)
CO2: 24 MMOL/L (ref 21–32)
CO2: 26 MMOL/L (ref 21–32)
CO2: 27 MMOL/L (ref 21–32)
CO2: 29 MMOL/L (ref 21–32)
CO2: 29 MMOL/L (ref 21–32)
CO2: 30 MMOL/L (ref 21–32)
CO2: 31 MMOL/L (ref 21–32)
CO2: 31 MMOL/L (ref 21–32)
CO2: 32 MMOL/L (ref 21–32)
CO2: 33 MMOL/L (ref 21–32)
CO2: 34 MMOL/L (ref 21–32)
COLOR: YELLOW
COMMENT: ABNORMAL
CREAT SERPL-MCNC: 1.8 MG/DL (ref 0.9–1.3)
CREAT SERPL-MCNC: 1.8 MG/DL (ref 0.9–1.3)
CREAT SERPL-MCNC: 1.9 MG/DL (ref 0.9–1.3)
CREAT SERPL-MCNC: 2 MG/DL (ref 0.9–1.3)
CREAT SERPL-MCNC: 2.1 MG/DL (ref 0.9–1.3)
CREAT SERPL-MCNC: 2.1 MG/DL (ref 0.9–1.3)
CREAT SERPL-MCNC: 2.2 MG/DL (ref 0.9–1.3)
CREAT SERPL-MCNC: 2.4 MG/DL (ref 0.9–1.3)
CREAT SERPL-MCNC: 2.4 MG/DL (ref 0.9–1.3)
CREAT SERPL-MCNC: 2.5 MG/DL (ref 0.9–1.3)
CREAT SERPL-MCNC: 2.6 MG/DL (ref 0.9–1.3)
CULTURE: ABNORMAL
CULTURE: NORMAL
DIFFERENTIAL TYPE: ABNORMAL
DOHLE BODIES: PRESENT
DOHLE BODIES: PRESENT
DOSE AMOUNT: NORMAL
DOSE AMOUNT: NORMAL
DOSE TIME: NORMAL
DOSE TIME: NORMAL
EKG ATRIAL RATE: 137 BPM
EKG ATRIAL RATE: 138 BPM
EKG DIAGNOSIS: NORMAL
EKG DIAGNOSIS: NORMAL
EKG P-R INTERVAL: 120 MS
EKG Q-T INTERVAL: 300 MS
EKG Q-T INTERVAL: 340 MS
EKG QRS DURATION: 152 MS
EKG QRS DURATION: 162 MS
EKG QTC CALCULATION (BAZETT): 472 MS
EKG QTC CALCULATION (BAZETT): 513 MS
EKG R AXIS: -68 DEGREES
EKG R AXIS: -86 DEGREES
EKG T AXIS: 36 DEGREES
EKG T AXIS: 98 DEGREES
EKG VENTRICULAR RATE: 137 BPM
EKG VENTRICULAR RATE: 149 BPM
EOSINOPHILS ABSOLUTE: 0 K/CU MM
EOSINOPHILS RELATIVE PERCENT: 0 % (ref 0–3)
EOSINOPHILS RELATIVE PERCENT: 0 % (ref 0–3)
EOSINOPHILS RELATIVE PERCENT: 0.1 % (ref 0–3)
GFR AFRICAN AMERICAN: 25 ML/MIN/1.73M2
GFR AFRICAN AMERICAN: 30 ML/MIN/1.73M2
GFR AFRICAN AMERICAN: 31 ML/MIN/1.73M2
GFR AFRICAN AMERICAN: 33 ML/MIN/1.73M2
GFR AFRICAN AMERICAN: 33 ML/MIN/1.73M2
GFR AFRICAN AMERICAN: 36 ML/MIN/1.73M2
GFR AFRICAN AMERICAN: 38 ML/MIN/1.73M2
GFR AFRICAN AMERICAN: 38 ML/MIN/1.73M2
GFR AFRICAN AMERICAN: 40 ML/MIN/1.73M2
GFR AFRICAN AMERICAN: 43 ML/MIN/1.73M2
GFR AFRICAN AMERICAN: 46 ML/MIN/1.73M2
GFR AFRICAN AMERICAN: 46 ML/MIN/1.73M2
GFR NON-AFRICAN AMERICAN: 20 ML/MIN/1.73M2
GFR NON-AFRICAN AMERICAN: 25 ML/MIN/1.73M2
GFR NON-AFRICAN AMERICAN: 26 ML/MIN/1.73M2
GFR NON-AFRICAN AMERICAN: 27 ML/MIN/1.73M2
GFR NON-AFRICAN AMERICAN: 27 ML/MIN/1.73M2
GFR NON-AFRICAN AMERICAN: 30 ML/MIN/1.73M2
GFR NON-AFRICAN AMERICAN: 32 ML/MIN/1.73M2
GFR NON-AFRICAN AMERICAN: 32 ML/MIN/1.73M2
GFR NON-AFRICAN AMERICAN: 33 ML/MIN/1.73M2
GFR NON-AFRICAN AMERICAN: 35 ML/MIN/1.73M2
GFR NON-AFRICAN AMERICAN: 38 ML/MIN/1.73M2
GFR NON-AFRICAN AMERICAN: 38 ML/MIN/1.73M2
GIANT PLATELETS: PRESENT
GLUCOSE BLD-MCNC: 188 MG/DL (ref 70–99)
GLUCOSE BLD-MCNC: 199 MG/DL (ref 70–99)
GLUCOSE BLD-MCNC: 204 MG/DL (ref 70–99)
GLUCOSE BLD-MCNC: 206 MG/DL (ref 70–99)
GLUCOSE BLD-MCNC: 209 MG/DL (ref 70–99)
GLUCOSE BLD-MCNC: 215 MG/DL (ref 70–99)
GLUCOSE BLD-MCNC: 220 MG/DL (ref 70–99)
GLUCOSE BLD-MCNC: 241 MG/DL (ref 70–99)
GLUCOSE BLD-MCNC: 255 MG/DL (ref 70–99)
GLUCOSE BLD-MCNC: 262 MG/DL (ref 70–99)
GLUCOSE BLD-MCNC: 273 MG/DL (ref 70–99)
GLUCOSE BLD-MCNC: 283 MG/DL (ref 70–99)
GLUCOSE BLD-MCNC: 285 MG/DL (ref 70–99)
GLUCOSE BLD-MCNC: 285 MG/DL (ref 70–99)
GLUCOSE BLD-MCNC: 295 MG/DL (ref 70–99)
GLUCOSE BLD-MCNC: 299 MG/DL (ref 70–99)
GLUCOSE BLD-MCNC: 301 MG/DL (ref 70–99)
GLUCOSE BLD-MCNC: 306 MG/DL (ref 70–99)
GLUCOSE BLD-MCNC: 311 MG/DL (ref 70–99)
GLUCOSE BLD-MCNC: 313 MG/DL (ref 70–99)
GLUCOSE BLD-MCNC: 324 MG/DL (ref 70–99)
GLUCOSE BLD-MCNC: 324 MG/DL (ref 70–99)
GLUCOSE BLD-MCNC: 333 MG/DL (ref 70–99)
GLUCOSE BLD-MCNC: 335 MG/DL (ref 70–99)
GLUCOSE BLD-MCNC: 335 MG/DL (ref 70–99)
GLUCOSE BLD-MCNC: 338 MG/DL (ref 70–99)
GLUCOSE BLD-MCNC: 343 MG/DL (ref 70–99)
GLUCOSE BLD-MCNC: 360 MG/DL (ref 70–99)
GLUCOSE BLD-MCNC: 361 MG/DL (ref 70–99)
GLUCOSE BLD-MCNC: 363 MG/DL (ref 70–99)
GLUCOSE BLD-MCNC: 369 MG/DL (ref 70–99)
GLUCOSE BLD-MCNC: 373 MG/DL (ref 70–99)
GLUCOSE BLD-MCNC: 374 MG/DL (ref 70–99)
GLUCOSE BLD-MCNC: 375 MG/DL (ref 70–99)
GLUCOSE BLD-MCNC: 377 MG/DL (ref 70–99)
GLUCOSE BLD-MCNC: 378 MG/DL (ref 70–99)
GLUCOSE BLD-MCNC: 378 MG/DL (ref 70–99)
GLUCOSE BLD-MCNC: 385 MG/DL (ref 70–99)
GLUCOSE BLD-MCNC: 390 MG/DL (ref 70–99)
GLUCOSE BLD-MCNC: 392 MG/DL (ref 70–99)
GLUCOSE BLD-MCNC: 396 MG/DL (ref 70–99)
GLUCOSE BLD-MCNC: 402 MG/DL (ref 70–99)
GLUCOSE BLD-MCNC: 402 MG/DL (ref 70–99)
GLUCOSE BLD-MCNC: 404 MG/DL (ref 70–99)
GLUCOSE BLD-MCNC: 404 MG/DL (ref 70–99)
GLUCOSE BLD-MCNC: 409 MG/DL (ref 70–99)
GLUCOSE BLD-MCNC: 410 MG/DL (ref 70–99)
GLUCOSE BLD-MCNC: 410 MG/DL (ref 70–99)
GLUCOSE BLD-MCNC: 413 MG/DL (ref 70–99)
GLUCOSE BLD-MCNC: 415 MG/DL (ref 70–99)
GLUCOSE BLD-MCNC: 420 MG/DL (ref 70–99)
GLUCOSE BLD-MCNC: 422 MG/DL (ref 70–99)
GLUCOSE BLD-MCNC: 422 MG/DL (ref 70–99)
GLUCOSE BLD-MCNC: 423 MG/DL (ref 70–99)
GLUCOSE BLD-MCNC: 431 MG/DL (ref 70–99)
GLUCOSE BLD-MCNC: 432 MG/DL (ref 70–99)
GLUCOSE BLD-MCNC: 437 MG/DL (ref 70–99)
GLUCOSE BLD-MCNC: 441 MG/DL (ref 70–99)
GLUCOSE BLD-MCNC: 447 MG/DL (ref 70–99)
GLUCOSE BLD-MCNC: 452 MG/DL (ref 70–99)
GLUCOSE BLD-MCNC: >700 MG/DL (ref 70–99)
GLUCOSE, URINE: 150 MG/DL
GRAM SMEAR: ABNORMAL
HCO3 ARTERIAL: 29 MMOL/L (ref 18–23)
HCO3 ARTERIAL: 32.4 MMOL/L (ref 18–23)
HCO3 ARTERIAL: 37.9 MMOL/L (ref 18–23)
HCT VFR BLD CALC: 31.5 % (ref 42–52)
HCT VFR BLD CALC: 31.5 % (ref 42–52)
HCT VFR BLD CALC: 31.6 % (ref 42–52)
HCT VFR BLD CALC: 31.6 % (ref 42–52)
HCT VFR BLD CALC: 32 % (ref 42–52)
HCT VFR BLD CALC: 34.2 % (ref 42–52)
HCT VFR BLD CALC: 36 % (ref 42–52)
HCT VFR BLD CALC: 37.3 % (ref 42–52)
HCT VFR BLD CALC: 37.7 % (ref 42–52)
HCT VFR BLD CALC: 38.5 % (ref 42–52)
HCT VFR BLD CALC: 39.1 % (ref 42–52)
HCT VFR BLD CALC: 41.1 % (ref 42–52)
HCT VFR BLD CALC: 41.4 % (ref 42–52)
HEMOGLOBIN: 10.1 GM/DL (ref 13.5–18)
HEMOGLOBIN: 10.8 GM/DL (ref 13.5–18)
HEMOGLOBIN: 11 GM/DL (ref 13.5–18)
HEMOGLOBIN: 11.5 GM/DL (ref 13.5–18)
HEMOGLOBIN: 11.5 GM/DL (ref 13.5–18)
HEMOGLOBIN: 11.6 GM/DL (ref 13.5–18)
HEMOGLOBIN: 11.7 GM/DL (ref 13.5–18)
HEMOGLOBIN: 12.6 GM/DL (ref 13.5–18)
HEMOGLOBIN: 12.8 GM/DL (ref 13.5–18)
HEMOGLOBIN: 13.3 GM/DL (ref 13.5–18)
HEMOGLOBIN: 9.8 GM/DL (ref 13.5–18)
HEMOGLOBIN: 9.9 GM/DL (ref 13.5–18)
HEMOGLOBIN: 9.9 GM/DL (ref 13.5–18)
HIGH SENSITIVE C-REACTIVE PROTEIN: 63.4 MG/L
HIGH SENSITIVE C-REACTIVE PROTEIN: 80.5 MG/L
HYALINE CASTS: 2 /LPF
IMMATURE NEUTROPHIL %: 0.7 % (ref 0–0.43)
IMMATURE NEUTROPHIL %: 1.4 % (ref 0–0.43)
IMMATURE NEUTROPHIL %: 4.1 % (ref 0–0.43)
KETONES, URINE: NEGATIVE MG/DL
LACTATE: 1.8 MMOL/L (ref 0.4–2)
LEUKOCYTE ESTERASE, URINE: ABNORMAL
LYMPHOCYTES ABSOLUTE: 0.3 K/CU MM
LYMPHOCYTES ABSOLUTE: 0.3 K/CU MM
LYMPHOCYTES ABSOLUTE: 0.4 K/CU MM
LYMPHOCYTES ABSOLUTE: 0.4 K/CU MM
LYMPHOCYTES ABSOLUTE: 0.6 K/CU MM
LYMPHOCYTES ABSOLUTE: 0.8 K/CU MM
LYMPHOCYTES ABSOLUTE: 0.8 K/CU MM
LYMPHOCYTES ABSOLUTE: 1.1 K/CU MM
LYMPHOCYTES ABSOLUTE: 1.1 K/CU MM
LYMPHOCYTES RELATIVE PERCENT: 1.8 % (ref 24–44)
LYMPHOCYTES RELATIVE PERCENT: 2 % (ref 24–44)
LYMPHOCYTES RELATIVE PERCENT: 2 % (ref 24–44)
LYMPHOCYTES RELATIVE PERCENT: 2.1 % (ref 24–44)
LYMPHOCYTES RELATIVE PERCENT: 2.6 % (ref 24–44)
LYMPHOCYTES RELATIVE PERCENT: 3 % (ref 24–44)
LYMPHOCYTES RELATIVE PERCENT: 3 % (ref 24–44)
LYMPHOCYTES RELATIVE PERCENT: 4 % (ref 24–44)
LYMPHOCYTES RELATIVE PERCENT: 5 % (ref 24–44)
Lab: ABNORMAL
Lab: ABNORMAL
Lab: NORMAL
MAGNESIUM: 1.5 MG/DL (ref 1.8–2.4)
MAGNESIUM: 1.7 MG/DL (ref 1.8–2.4)
MAGNESIUM: 1.8 MG/DL (ref 1.8–2.4)
MAGNESIUM: 1.9 MG/DL (ref 1.8–2.4)
MAGNESIUM: 1.9 MG/DL (ref 1.8–2.4)
MAGNESIUM: 2 MG/DL (ref 1.8–2.4)
MAGNESIUM: 2 MG/DL (ref 1.8–2.4)
MCH RBC QN AUTO: 26.6 PG (ref 27–31)
MCH RBC QN AUTO: 26.7 PG (ref 27–31)
MCH RBC QN AUTO: 26.7 PG (ref 27–31)
MCH RBC QN AUTO: 26.8 PG (ref 27–31)
MCH RBC QN AUTO: 26.9 PG (ref 27–31)
MCH RBC QN AUTO: 27 PG (ref 27–31)
MCH RBC QN AUTO: 27.3 PG (ref 27–31)
MCHC RBC AUTO-ENTMCNC: 28.3 % (ref 32–36)
MCHC RBC AUTO-ENTMCNC: 30.5 % (ref 32–36)
MCHC RBC AUTO-ENTMCNC: 30.8 % (ref 32–36)
MCHC RBC AUTO-ENTMCNC: 31 % (ref 32–36)
MCHC RBC AUTO-ENTMCNC: 31.4 % (ref 32–36)
MCHC RBC AUTO-ENTMCNC: 31.4 % (ref 32–36)
MCHC RBC AUTO-ENTMCNC: 32 % (ref 32–36)
MCHC RBC AUTO-ENTMCNC: 32.2 % (ref 32–36)
MCHC RBC AUTO-ENTMCNC: 32.2 % (ref 32–36)
MCHC RBC AUTO-ENTMCNC: 32.7 % (ref 32–36)
MCV RBC AUTO: 82.9 FL (ref 78–100)
MCV RBC AUTO: 83.4 FL (ref 78–100)
MCV RBC AUTO: 83.6 FL (ref 78–100)
MCV RBC AUTO: 84 FL (ref 78–100)
MCV RBC AUTO: 84.9 FL (ref 78–100)
MCV RBC AUTO: 85.4 FL (ref 78–100)
MCV RBC AUTO: 85.9 FL (ref 78–100)
MCV RBC AUTO: 87.4 FL (ref 78–100)
MCV RBC AUTO: 88.1 FL (ref 78–100)
MCV RBC AUTO: 95.4 FL (ref 78–100)
METAMYELOCYTES ABSOLUTE COUNT: 0.21 K/CU MM
METAMYELOCYTES ABSOLUTE COUNT: 0.38 K/CU MM
METAMYELOCYTES PERCENT: 1 %
METAMYELOCYTES PERCENT: 1 %
METHEMOGLOBIN ARTERIAL: 1.3 %
METHEMOGLOBIN ARTERIAL: 1.5 %
MONOCYTES ABSOLUTE: 0.1 K/CU MM
MONOCYTES ABSOLUTE: 0.6 K/CU MM
MONOCYTES ABSOLUTE: 0.9 K/CU MM
MONOCYTES ABSOLUTE: 0.9 K/CU MM
MONOCYTES ABSOLUTE: 1.1 K/CU MM
MONOCYTES ABSOLUTE: 1.3 K/CU MM
MONOCYTES ABSOLUTE: 1.5 K/CU MM
MONOCYTES ABSOLUTE: 1.9 K/CU MM
MONOCYTES ABSOLUTE: 1.9 K/CU MM
MONOCYTES RELATIVE PERCENT: 1 % (ref 0–4)
MONOCYTES RELATIVE PERCENT: 3 % (ref 0–4)
MONOCYTES RELATIVE PERCENT: 4 % (ref 0–4)
MONOCYTES RELATIVE PERCENT: 5 % (ref 0–4)
MONOCYTES RELATIVE PERCENT: 5 % (ref 0–4)
MONOCYTES RELATIVE PERCENT: 5.5 % (ref 0–4)
MONOCYTES RELATIVE PERCENT: 5.9 % (ref 0–4)
MONOCYTES RELATIVE PERCENT: 6.2 % (ref 0–4)
MONOCYTES RELATIVE PERCENT: 9 % (ref 0–4)
MUCUS: ABNORMAL HPF
MYELOCYTE PERCENT: 2 %
MYELOCYTE PERCENT: 2 %
MYELOCYTES ABSOLUTE COUNT: 0.42 K/CU MM
MYELOCYTES ABSOLUTE COUNT: 0.73 K/CU MM
NITRITE URINE, QUANTITATIVE: NEGATIVE
NUCLEATED RBC %: 0 %
NUCLEATED RBC %: 0 %
NUCLEATED RBC %: 0.1 %
NUCLEATED RED BLOOD CELLS: 1
O2 SATURATION: 63.9 % (ref 96–97)
O2 SATURATION: 85.7 % (ref 96–97)
O2 SATURATION: 92.2 % (ref 96–97)
PCO2 ARTERIAL: 36 MMHG (ref 32–45)
PCO2 ARTERIAL: 38 MMHG (ref 32–45)
PCO2 ARTERIAL: 54.8 MMHG (ref 32–45)
PDW BLD-RTO: 16.6 % (ref 11.7–14.9)
PDW BLD-RTO: 16.7 % (ref 11.7–14.9)
PDW BLD-RTO: 16.8 % (ref 11.7–14.9)
PDW BLD-RTO: 17.2 % (ref 11.7–14.9)
PDW BLD-RTO: 17.9 % (ref 11.7–14.9)
PDW BLD-RTO: 18.9 % (ref 11.7–14.9)
PH BLOOD: 7.38 (ref 7.34–7.45)
PH BLOOD: 7.49 (ref 7.34–7.45)
PH BLOOD: 7.63 (ref 7.34–7.45)
PH, URINE: 5 (ref 5–8)
PHOSPHORUS: 0.6 MG/DL (ref 2.5–4.9)
PHOSPHORUS: 2.4 MG/DL (ref 2.5–4.9)
PHOSPHORUS: 3.5 MG/DL (ref 2.5–4.9)
PHOSPHORUS: 4.1 MG/DL (ref 2.5–4.9)
PHOSPHORUS: 5.4 MG/DL (ref 2.5–4.9)
PLATELET # BLD: 229 K/CU MM (ref 140–440)
PLATELET # BLD: 254 K/CU MM (ref 140–440)
PLATELET # BLD: 270 K/CU MM (ref 140–440)
PLATELET # BLD: 302 K/CU MM (ref 140–440)
PLATELET # BLD: 306 K/CU MM (ref 140–440)
PLATELET # BLD: 389 K/CU MM (ref 140–440)
PLATELET # BLD: 441 K/CU MM (ref 140–440)
PLATELET # BLD: 468 K/CU MM (ref 140–440)
PLATELET # BLD: 470 K/CU MM (ref 140–440)
PLATELET # BLD: 512 K/CU MM (ref 140–440)
PMV BLD AUTO: 10.1 FL (ref 7.5–11.1)
PMV BLD AUTO: 10.7 FL (ref 7.5–11.1)
PMV BLD AUTO: 11.1 FL (ref 7.5–11.1)
PMV BLD AUTO: 11.2 FL (ref 7.5–11.1)
PMV BLD AUTO: 11.3 FL (ref 7.5–11.1)
PMV BLD AUTO: 11.3 FL (ref 7.5–11.1)
PMV BLD AUTO: 9.7 FL (ref 7.5–11.1)
PMV BLD AUTO: 9.9 FL (ref 7.5–11.1)
PO2 ARTERIAL: 34.7 MMHG (ref 75–100)
PO2 ARTERIAL: 44 MMHG (ref 75–100)
PO2 ARTERIAL: 67 MMHG (ref 75–100)
POC CALCIUM: 1.28 MMOL/L (ref 1.12–1.32)
POC CHLORIDE: 97 MMOL/L (ref 98–109)
POC CREATININE: 3.1 MG/DL (ref 0.9–1.3)
POTASSIUM SERPL-SCNC: 2.9 MMOL/L (ref 3.5–4.5)
POTASSIUM SERPL-SCNC: 3 MMOL/L (ref 3.5–4.5)
POTASSIUM SERPL-SCNC: 3.1 MMOL/L (ref 3.5–5.1)
POTASSIUM SERPL-SCNC: 3.1 MMOL/L (ref 3.5–5.1)
POTASSIUM SERPL-SCNC: 3.3 MMOL/L (ref 3.5–5.1)
POTASSIUM SERPL-SCNC: 3.3 MMOL/L (ref 3.5–5.1)
POTASSIUM SERPL-SCNC: 3.6 MMOL/L (ref 3.5–5.1)
POTASSIUM SERPL-SCNC: 4.1 MMOL/L (ref 3.5–5.1)
POTASSIUM SERPL-SCNC: 4.3 MMOL/L (ref 3.5–5.1)
POTASSIUM SERPL-SCNC: 4.8 MMOL/L (ref 3.5–5.1)
POTASSIUM SERPL-SCNC: 5 MMOL/L (ref 3.5–5.1)
POTASSIUM SERPL-SCNC: 5 MMOL/L (ref 3.5–5.1)
POTASSIUM SERPL-SCNC: 5.2 MMOL/L (ref 3.5–5.1)
POTASSIUM SERPL-SCNC: ABNORMAL MMOL/L (ref 3.5–5.1)
PRO-BNP: 8761 PG/ML
PRO-BNP: ABNORMAL PG/ML
PROCALCITONIN: 1.22
PROCALCITONIN: 10.17
PROCALCITONIN: 2.49
PROCALCITONIN: 5.14
PROTEIN UA: NEGATIVE MG/DL
RBC # BLD: 3.68 M/CU MM (ref 4.6–6.2)
RBC # BLD: 3.69 M/CU MM (ref 4.6–6.2)
RBC # BLD: 3.71 M/CU MM (ref 4.6–6.2)
RBC # BLD: 3.76 M/CU MM (ref 4.6–6.2)
RBC # BLD: 4.09 M/CU MM (ref 4.6–6.2)
RBC # BLD: 4.27 M/CU MM (ref 4.6–6.2)
RBC # BLD: 4.28 M/CU MM (ref 4.6–6.2)
RBC # BLD: 4.34 M/CU MM (ref 4.6–6.2)
RBC # BLD: 4.34 M/CU MM (ref 4.6–6.2)
RBC # BLD: 4.69 M/CU MM (ref 4.6–6.2)
RBC # BLD: ABNORMAL 10*6/UL
RBC # BLD: ABNORMAL 10*6/UL
RBC URINE: 4 /HPF (ref 0–3)
REASON FOR REJECTION: NORMAL
REASON FOR REJECTION: NORMAL
REJECTED TEST: NORMAL
SEGMENTED NEUTROPHILS ABSOLUTE COUNT: 12.2 K/CU MM
SEGMENTED NEUTROPHILS ABSOLUTE COUNT: 13.9 K/CU MM
SEGMENTED NEUTROPHILS ABSOLUTE COUNT: 14.9 K/CU MM
SEGMENTED NEUTROPHILS ABSOLUTE COUNT: 15.1 K/CU MM
SEGMENTED NEUTROPHILS ABSOLUTE COUNT: 16.3 K/CU MM
SEGMENTED NEUTROPHILS ABSOLUTE COUNT: 18.1 K/CU MM
SEGMENTED NEUTROPHILS ABSOLUTE COUNT: 20.7 K/CU MM
SEGMENTED NEUTROPHILS ABSOLUTE COUNT: 30.5 K/CU MM
SEGMENTED NEUTROPHILS ABSOLUTE COUNT: 33.3 K/CU MM
SEGMENTED NEUTROPHILS RELATIVE PERCENT: 71 % (ref 36–66)
SEGMENTED NEUTROPHILS RELATIVE PERCENT: 80 % (ref 36–66)
SEGMENTED NEUTROPHILS RELATIVE PERCENT: 86 % (ref 36–66)
SEGMENTED NEUTROPHILS RELATIVE PERCENT: 87.4 % (ref 36–66)
SEGMENTED NEUTROPHILS RELATIVE PERCENT: 88 % (ref 36–66)
SEGMENTED NEUTROPHILS RELATIVE PERCENT: 89 % (ref 36–66)
SEGMENTED NEUTROPHILS RELATIVE PERCENT: 90.8 % (ref 36–66)
SEGMENTED NEUTROPHILS RELATIVE PERCENT: 90.8 % (ref 36–66)
SEGMENTED NEUTROPHILS RELATIVE PERCENT: 91 % (ref 36–66)
SODIUM BLD-SCNC: 126 MMOL/L (ref 135–145)
SODIUM BLD-SCNC: 131 MMOL/L (ref 135–145)
SODIUM BLD-SCNC: 131 MMOL/L (ref 135–145)
SODIUM BLD-SCNC: 132 MMOL/L (ref 135–145)
SODIUM BLD-SCNC: 133 MMOL/L (ref 135–145)
SODIUM BLD-SCNC: 133 MMOL/L (ref 135–145)
SODIUM BLD-SCNC: 134 MMOL/L (ref 135–145)
SODIUM BLD-SCNC: 135 MMOL/L (ref 135–145)
SODIUM BLD-SCNC: 137 MMOL/L (ref 135–145)
SODIUM BLD-SCNC: 138 MMOL/L (ref 135–145)
SODIUM BLD-SCNC: 139 MMOL/L (ref 135–145)
SODIUM BLD-SCNC: 140 MMOL/L (ref 138–146)
SOURCE, BLOOD GAS: ABNORMAL
SOURCE, BLOOD GAS: ABNORMAL
SPECIFIC GRAVITY UA: 1.01 (ref 1–1.03)
SPECIMEN: ABNORMAL
SPECIMEN: ABNORMAL
SPECIMEN: NORMAL
TOTAL IMMATURE NEUTOROPHIL: 0.13 K/CU MM
TOTAL IMMATURE NEUTOROPHIL: 0.22 K/CU MM
TOTAL IMMATURE NEUTOROPHIL: 0.98 K/CU MM
TOTAL NUCLEATED RBC: 0 K/CU MM
TOTAL PROTEIN: 4.5 GM/DL (ref 6.4–8.2)
TOTAL PROTEIN: 4.6 GM/DL (ref 6.4–8.2)
TOTAL PROTEIN: 4.7 GM/DL (ref 6.4–8.2)
TOTAL PROTEIN: 4.8 GM/DL (ref 6.4–8.2)
TOTAL PROTEIN: 4.9 GM/DL (ref 6.4–8.2)
TOTAL PROTEIN: 5.2 GM/DL (ref 6.4–8.2)
TOTAL PROTEIN: 5.4 GM/DL (ref 6.4–8.2)
TOTAL PROTEIN: 5.8 GM/DL (ref 6.4–8.2)
TOTAL PROTEIN: 5.9 GM/DL (ref 6.4–8.2)
TOTAL PROTEIN: 6.1 GM/DL (ref 6.4–8.2)
TOXIC GRANULATION: PRESENT
TRICHOMONAS: ABNORMAL /HPF
TRIGL SERPL-MCNC: 177 MG/DL
TRIGL SERPL-MCNC: 280 MG/DL
UROBILINOGEN, URINE: NORMAL MG/DL (ref 0.2–1)
VANCOMYCIN RANDOM: 17.2 UG/ML
VANCOMYCIN RANDOM: 19.4 UG/ML
VANCOMYCIN RANDOM: NORMAL UG/ML
VANCOMYCIN RANDOM: NORMAL UG/ML
WBC # BLD: 13.6 K/CU MM (ref 4–10.5)
WBC # BLD: 15.3 K/CU MM (ref 4–10.5)
WBC # BLD: 17.2 K/CU MM (ref 4–10.5)
WBC # BLD: 17.9 K/CU MM (ref 4–10.5)
WBC # BLD: 18.8 K/CU MM (ref 4–10.5)
WBC # BLD: 21 K/CU MM (ref 4–10.5)
WBC # BLD: 21.1 K/CU MM (ref 4–10.5)
WBC # BLD: 23.7 K/CU MM (ref 4–10.5)
WBC # BLD: 36.6 K/CU MM (ref 4–10.5)
WBC # BLD: 38.2 K/CU MM (ref 4–10.5)
WBC # BLD: ABNORMAL 10*3/UL
WBC UA: 32 /HPF (ref 0–2)
YEAST: ABNORMAL /HPF

## 2020-01-01 PROCEDURE — 6360000002 HC RX W HCPCS: Performed by: INTERNAL MEDICINE

## 2020-01-01 PROCEDURE — 6370000000 HC RX 637 (ALT 250 FOR IP): Performed by: INTERNAL MEDICINE

## 2020-01-01 PROCEDURE — 2500000003 HC RX 250 WO HCPCS: Performed by: INTERNAL MEDICINE

## 2020-01-01 PROCEDURE — 2580000003 HC RX 258: Performed by: INTERNAL MEDICINE

## 2020-01-01 PROCEDURE — 85014 HEMATOCRIT: CPT

## 2020-01-01 PROCEDURE — 2700000000 HC OXYGEN THERAPY PER DAY

## 2020-01-01 PROCEDURE — 94640 AIRWAY INHALATION TREATMENT: CPT

## 2020-01-01 PROCEDURE — 2580000003 HC RX 258: Performed by: SURGERY

## 2020-01-01 PROCEDURE — 94761 N-INVAS EAR/PLS OXIMETRY MLT: CPT

## 2020-01-01 PROCEDURE — 99233 SBSQ HOSP IP/OBS HIGH 50: CPT | Performed by: INTERNAL MEDICINE

## 2020-01-01 PROCEDURE — 82962 GLUCOSE BLOOD TEST: CPT

## 2020-01-01 PROCEDURE — 6360000002 HC RX W HCPCS: Performed by: SURGERY

## 2020-01-01 PROCEDURE — 85025 COMPLETE CBC W/AUTO DIFF WBC: CPT

## 2020-01-01 PROCEDURE — 85007 BL SMEAR W/DIFF WBC COUNT: CPT

## 2020-01-01 PROCEDURE — 2000000000 HC ICU R&B

## 2020-01-01 PROCEDURE — 85027 COMPLETE CBC AUTOMATED: CPT

## 2020-01-01 PROCEDURE — C9113 INJ PANTOPRAZOLE SODIUM, VIA: HCPCS | Performed by: INTERNAL MEDICINE

## 2020-01-01 PROCEDURE — 6370000000 HC RX 637 (ALT 250 FOR IP): Performed by: SURGERY

## 2020-01-01 PROCEDURE — 80053 COMPREHEN METABOLIC PANEL: CPT

## 2020-01-01 PROCEDURE — 87071 CULTURE AEROBIC QUANT OTHER: CPT

## 2020-01-01 PROCEDURE — 84100 ASSAY OF PHOSPHORUS: CPT

## 2020-01-01 PROCEDURE — 83735 ASSAY OF MAGNESIUM: CPT

## 2020-01-01 PROCEDURE — 2580000003 HC RX 258

## 2020-01-01 PROCEDURE — 06HN33Z INSERTION OF INFUSION DEVICE INTO LEFT FEMORAL VEIN, PERCUTANEOUS APPROACH: ICD-10-PCS | Performed by: INTERNAL MEDICINE

## 2020-01-01 PROCEDURE — 89220 SPUTUM SPECIMEN COLLECTION: CPT

## 2020-01-01 PROCEDURE — 99024 POSTOP FOLLOW-UP VISIT: CPT | Performed by: SURGERY

## 2020-01-01 PROCEDURE — 71045 X-RAY EXAM CHEST 1 VIEW: CPT

## 2020-01-01 PROCEDURE — 6370000000 HC RX 637 (ALT 250 FOR IP): Performed by: NURSE PRACTITIONER

## 2020-01-01 PROCEDURE — 94664 DEMO&/EVAL PT USE INHALER: CPT

## 2020-01-01 PROCEDURE — C1769 GUIDE WIRE: HCPCS

## 2020-01-01 PROCEDURE — 87040 BLOOD CULTURE FOR BACTERIA: CPT

## 2020-01-01 PROCEDURE — 36592 COLLECT BLOOD FROM PICC: CPT

## 2020-01-01 PROCEDURE — C1894 INTRO/SHEATH, NON-LASER: HCPCS

## 2020-01-01 PROCEDURE — 36569 INSJ PICC 5 YR+ W/O IMAGING: CPT

## 2020-01-01 PROCEDURE — 85018 HEMOGLOBIN: CPT

## 2020-01-01 PROCEDURE — 2500000003 HC RX 250 WO HCPCS: Performed by: FAMILY MEDICINE

## 2020-01-01 PROCEDURE — 93010 ELECTROCARDIOGRAM REPORT: CPT | Performed by: INTERNAL MEDICINE

## 2020-01-01 PROCEDURE — 2580000003 HC RX 258: Performed by: FAMILY MEDICINE

## 2020-01-01 PROCEDURE — 84145 PROCALCITONIN (PCT): CPT

## 2020-01-01 PROCEDURE — 80202 ASSAY OF VANCOMYCIN: CPT

## 2020-01-01 PROCEDURE — 76937 US GUIDE VASCULAR ACCESS: CPT

## 2020-01-01 PROCEDURE — 83880 ASSAY OF NATRIURETIC PEPTIDE: CPT

## 2020-01-01 PROCEDURE — 74018 RADEX ABDOMEN 1 VIEW: CPT

## 2020-01-01 PROCEDURE — 31720 CLEARANCE OF AIRWAYS: CPT

## 2020-01-01 PROCEDURE — 87070 CULTURE OTHR SPECIMN AEROBIC: CPT

## 2020-01-01 PROCEDURE — 2709999900 HC NON-CHARGEABLE SUPPLY

## 2020-01-01 PROCEDURE — 83605 ASSAY OF LACTIC ACID: CPT

## 2020-01-01 PROCEDURE — 87081 CULTURE SCREEN ONLY: CPT

## 2020-01-01 PROCEDURE — 82803 BLOOD GASES ANY COMBINATION: CPT

## 2020-01-01 PROCEDURE — 86141 C-REACTIVE PROTEIN HS: CPT

## 2020-01-01 PROCEDURE — 99213 OFFICE O/P EST LOW 20 MIN: CPT

## 2020-01-01 PROCEDURE — 0KBP0ZZ EXCISION OF LEFT HIP MUSCLE, OPEN APPROACH: ICD-10-PCS | Performed by: SURGERY

## 2020-01-01 PROCEDURE — 99291 CRITICAL CARE FIRST HOUR: CPT | Performed by: INTERNAL MEDICINE

## 2020-01-01 PROCEDURE — 51702 INSERT TEMP BLADDER CATH: CPT

## 2020-01-01 PROCEDURE — 87205 SMEAR GRAM STAIN: CPT

## 2020-01-01 PROCEDURE — 85730 THROMBOPLASTIN TIME PARTIAL: CPT

## 2020-01-01 PROCEDURE — C1751 CATH, INF, PER/CENT/MIDLINE: HCPCS

## 2020-01-01 PROCEDURE — 11044 DBRDMT BONE 1ST 20 SQ CM/<: CPT | Performed by: SURGERY

## 2020-01-01 PROCEDURE — 84132 ASSAY OF SERUM POTASSIUM: CPT

## 2020-01-01 PROCEDURE — 6360000004 HC RX CONTRAST MEDICATION: Performed by: INTERNAL MEDICINE

## 2020-01-01 PROCEDURE — 36556 INSERT NON-TUNNEL CV CATH: CPT

## 2020-01-01 PROCEDURE — 6370000000 HC RX 637 (ALT 250 FOR IP): Performed by: PSYCHIATRY & NEUROLOGY

## 2020-01-01 PROCEDURE — 6360000002 HC RX W HCPCS: Performed by: FAMILY MEDICINE

## 2020-01-01 PROCEDURE — 94660 CPAP INITIATION&MGMT: CPT

## 2020-01-01 PROCEDURE — 99232 SBSQ HOSP IP/OBS MODERATE 35: CPT | Performed by: INTERNAL MEDICINE

## 2020-01-01 PROCEDURE — 80048 BASIC METABOLIC PNL TOTAL CA: CPT

## 2020-01-01 PROCEDURE — 1250000000 HC SEMI PRIVATE HOSPICE R&B

## 2020-01-01 PROCEDURE — 6370000000 HC RX 637 (ALT 250 FOR IP): Performed by: FAMILY MEDICINE

## 2020-01-01 PROCEDURE — 93005 ELECTROCARDIOGRAM TRACING: CPT | Performed by: SURGERY

## 2020-01-01 PROCEDURE — 84295 ASSAY OF SERUM SODIUM: CPT

## 2020-01-01 PROCEDURE — 84478 ASSAY OF TRIGLYCERIDES: CPT

## 2020-01-01 PROCEDURE — 81001 URINALYSIS AUTO W/SCOPE: CPT

## 2020-01-01 PROCEDURE — 71275 CT ANGIOGRAPHY CHEST: CPT

## 2020-01-01 PROCEDURE — 0KBN0ZZ EXCISION OF RIGHT HIP MUSCLE, OPEN APPROACH: ICD-10-PCS | Performed by: SURGERY

## 2020-01-01 PROCEDURE — 87186 SC STD MICRODIL/AGAR DIL: CPT

## 2020-01-01 PROCEDURE — 05HY33Z INSERTION OF INFUSION DEVICE INTO UPPER VEIN, PERCUTANEOUS APPROACH: ICD-10-PCS | Performed by: INTERNAL MEDICINE

## 2020-01-01 PROCEDURE — 87086 URINE CULTURE/COLONY COUNT: CPT

## 2020-01-01 PROCEDURE — B54CZZA ULTRASONOGRAPHY OF LEFT LOWER EXTREMITY VEINS, GUIDANCE: ICD-10-PCS | Performed by: INTERNAL MEDICINE

## 2020-01-01 PROCEDURE — 85049 AUTOMATED PLATELET COUNT: CPT

## 2020-01-01 PROCEDURE — 36600 WITHDRAWAL OF ARTERIAL BLOOD: CPT

## 2020-01-01 PROCEDURE — 74177 CT ABD & PELVIS W/CONTRAST: CPT

## 2020-01-01 PROCEDURE — 87077 CULTURE AEROBIC IDENTIFY: CPT

## 2020-01-01 PROCEDURE — 87107 FUNGI IDENTIFICATION MOLD: CPT

## 2020-01-01 PROCEDURE — 82330 ASSAY OF CALCIUM: CPT

## 2020-01-01 PROCEDURE — 92610 EVALUATE SWALLOWING FUNCTION: CPT

## 2020-01-01 RX ORDER — GLYCOPYRROLATE 1 MG/5 ML
0.2 SYRINGE (ML) INTRAVENOUS EVERY 4 HOURS PRN
Status: DISCONTINUED | OUTPATIENT
Start: 2020-01-01 | End: 2020-01-01 | Stop reason: HOSPADM

## 2020-01-01 RX ORDER — INSULIN GLARGINE 100 [IU]/ML
30 INJECTION, SOLUTION SUBCUTANEOUS 2 TIMES DAILY
Status: DISCONTINUED | OUTPATIENT
Start: 2020-01-01 | End: 2020-01-01 | Stop reason: HOSPADM

## 2020-01-01 RX ORDER — LIDOCAINE HYDROCHLORIDE 10 MG/ML
5 INJECTION, SOLUTION EPIDURAL; INFILTRATION; INTRACAUDAL; PERINEURAL ONCE
Status: COMPLETED | OUTPATIENT
Start: 2020-01-01 | End: 2020-01-01

## 2020-01-01 RX ORDER — NITROGLYCERIN 0.4 MG/1
0.4 TABLET SUBLINGUAL EVERY 5 MIN PRN
Status: DISCONTINUED | OUTPATIENT
Start: 2020-01-01 | End: 2020-01-12 | Stop reason: HOSPADM

## 2020-01-01 RX ORDER — GLYCOPYRROLATE 1 MG/5 ML
0.2 SYRINGE (ML) INTRAVENOUS EVERY 4 HOURS PRN
Status: DISCONTINUED | OUTPATIENT
Start: 2020-01-01 | End: 2020-01-12 | Stop reason: HOSPADM

## 2020-01-01 RX ORDER — SODIUM CHLORIDE 9 MG/ML
INJECTION, SOLUTION INTRAVENOUS
Status: COMPLETED
Start: 2020-01-01 | End: 2020-01-01

## 2020-01-01 RX ORDER — SODIUM CHLORIDE 0.9 % (FLUSH) 0.9 %
10 SYRINGE (ML) INJECTION PRN
Status: CANCELLED | OUTPATIENT
Start: 2020-01-01

## 2020-01-01 RX ORDER — HEPARIN SODIUM 5000 [USP'U]/ML
5000 INJECTION, SOLUTION INTRAVENOUS; SUBCUTANEOUS EVERY 8 HOURS SCHEDULED
Status: DISCONTINUED | OUTPATIENT
Start: 2020-01-01 | End: 2020-01-01 | Stop reason: HOSPADM

## 2020-01-01 RX ORDER — POTASSIUM CHLORIDE 29.8 MG/ML
40 INJECTION INTRAVENOUS ONCE
Status: DISCONTINUED | OUTPATIENT
Start: 2020-01-01 | End: 2020-01-01 | Stop reason: CLARIF

## 2020-01-01 RX ORDER — FUROSEMIDE 10 MG/ML
80 INJECTION INTRAMUSCULAR; INTRAVENOUS 3 TIMES DAILY
Status: DISCONTINUED | OUTPATIENT
Start: 2020-01-01 | End: 2020-01-01

## 2020-01-01 RX ORDER — NITROGLYCERIN 0.4 MG/1
0.4 TABLET SUBLINGUAL EVERY 5 MIN PRN
Status: CANCELLED | OUTPATIENT
Start: 2020-01-01

## 2020-01-01 RX ORDER — MAGNESIUM SULFATE IN WATER 40 MG/ML
2 INJECTION, SOLUTION INTRAVENOUS ONCE
Status: COMPLETED | OUTPATIENT
Start: 2020-01-01 | End: 2020-01-01

## 2020-01-01 RX ORDER — SODIUM CHLORIDE 0.9 % (FLUSH) 0.9 %
10 SYRINGE (ML) INJECTION EVERY 12 HOURS SCHEDULED
Status: CANCELLED | OUTPATIENT
Start: 2020-01-01

## 2020-01-01 RX ORDER — METHYLPREDNISOLONE SODIUM SUCCINATE 40 MG/ML
40 INJECTION, POWDER, LYOPHILIZED, FOR SOLUTION INTRAMUSCULAR; INTRAVENOUS EVERY 12 HOURS
Status: CANCELLED | OUTPATIENT
Start: 2020-01-01 | End: 2020-01-12

## 2020-01-01 RX ORDER — SODIUM CHLORIDE 0.9 % (FLUSH) 0.9 %
10 SYRINGE (ML) INJECTION PRN
Status: DISCONTINUED | OUTPATIENT
Start: 2020-01-01 | End: 2020-01-01 | Stop reason: HOSPADM

## 2020-01-01 RX ORDER — BUPROPION HYDROCHLORIDE 150 MG/1
150 TABLET ORAL DAILY
Status: DISCONTINUED | OUTPATIENT
Start: 2020-01-01 | End: 2020-01-01

## 2020-01-01 RX ORDER — METHYLPREDNISOLONE SODIUM SUCCINATE 40 MG/ML
40 INJECTION, POWDER, LYOPHILIZED, FOR SOLUTION INTRAMUSCULAR; INTRAVENOUS EVERY 12 HOURS
Status: DISCONTINUED | OUTPATIENT
Start: 2020-01-01 | End: 2020-01-12 | Stop reason: HOSPADM

## 2020-01-01 RX ORDER — POTASSIUM CHLORIDE 29.8 MG/ML
20 INJECTION INTRAVENOUS ONCE
Status: COMPLETED | OUTPATIENT
Start: 2020-01-01 | End: 2020-01-01

## 2020-01-01 RX ORDER — MIDODRINE HYDROCHLORIDE 5 MG/1
10 TABLET ORAL
Status: DISCONTINUED | OUTPATIENT
Start: 2020-01-01 | End: 2020-01-01 | Stop reason: HOSPADM

## 2020-01-01 RX ORDER — ROPINIROLE 0.25 MG/1
0.5 TABLET, FILM COATED ORAL NIGHTLY
Status: CANCELLED | OUTPATIENT
Start: 2020-01-01

## 2020-01-01 RX ORDER — MINOCYCLINE HYDROCHLORIDE 100 MG/1
100 CAPSULE ORAL 2 TIMES DAILY
Status: DISCONTINUED | OUTPATIENT
Start: 2020-01-01 | End: 2020-01-01 | Stop reason: HOSPADM

## 2020-01-01 RX ORDER — DIPHENHYDRAMINE HYDROCHLORIDE 50 MG/ML
12.5 INJECTION INTRAMUSCULAR; INTRAVENOUS EVERY 6 HOURS PRN
Status: CANCELLED | OUTPATIENT
Start: 2020-01-01

## 2020-01-01 RX ORDER — LINEZOLID 2 MG/ML
600 INJECTION, SOLUTION INTRAVENOUS EVERY 12 HOURS
Status: DISCONTINUED | OUTPATIENT
Start: 2020-01-01 | End: 2020-01-01

## 2020-01-01 RX ORDER — LORAZEPAM 2 MG/ML
1 INJECTION INTRAMUSCULAR EVERY 4 HOURS PRN
Status: DISCONTINUED | OUTPATIENT
Start: 2020-01-01 | End: 2020-01-01 | Stop reason: HOSPADM

## 2020-01-01 RX ORDER — DIPHENHYDRAMINE HYDROCHLORIDE 50 MG/ML
12.5 INJECTION INTRAMUSCULAR; INTRAVENOUS EVERY 6 HOURS PRN
Status: DISCONTINUED | OUTPATIENT
Start: 2020-01-01 | End: 2020-01-12 | Stop reason: HOSPADM

## 2020-01-01 RX ORDER — SPIRONOLACTONE 25 MG/1
25 TABLET ORAL DAILY
Status: DISCONTINUED | OUTPATIENT
Start: 2020-01-01 | End: 2020-01-01

## 2020-01-01 RX ORDER — GLYCOPYRROLATE 0.2 MG/ML
0.2 INJECTION INTRAMUSCULAR; INTRAVENOUS EVERY 4 HOURS PRN
Status: CANCELLED | OUTPATIENT
Start: 2020-01-01

## 2020-01-01 RX ORDER — DEXTROSE MONOHYDRATE 50 MG/ML
INJECTION, SOLUTION INTRAVENOUS CONTINUOUS
Status: DISCONTINUED | OUTPATIENT
Start: 2020-01-01 | End: 2020-01-01

## 2020-01-01 RX ORDER — METOLAZONE 5 MG/1
2.5 TABLET ORAL DAILY
Status: DISCONTINUED | OUTPATIENT
Start: 2020-01-01 | End: 2020-01-01

## 2020-01-01 RX ORDER — SODIUM CHLORIDE 0.9 % (FLUSH) 0.9 %
10 SYRINGE (ML) INJECTION EVERY 12 HOURS SCHEDULED
Status: DISCONTINUED | OUTPATIENT
Start: 2020-01-01 | End: 2020-01-12 | Stop reason: HOSPADM

## 2020-01-01 RX ORDER — IPRATROPIUM BROMIDE AND ALBUTEROL SULFATE 2.5; .5 MG/3ML; MG/3ML
1 SOLUTION RESPIRATORY (INHALATION) EVERY 4 HOURS PRN
Status: CANCELLED | OUTPATIENT
Start: 2020-01-01

## 2020-01-01 RX ORDER — LINEZOLID 2 MG/ML
600 INJECTION, SOLUTION INTRAVENOUS EVERY 12 HOURS
Status: DISCONTINUED | OUTPATIENT
Start: 2020-01-01 | End: 2020-01-01 | Stop reason: HOSPADM

## 2020-01-01 RX ORDER — LORAZEPAM 2 MG/ML
0.5 INJECTION INTRAMUSCULAR EVERY 8 HOURS SCHEDULED
Status: DISCONTINUED | OUTPATIENT
Start: 2020-01-01 | End: 2020-01-12 | Stop reason: HOSPADM

## 2020-01-01 RX ORDER — INSULIN GLARGINE 100 [IU]/ML
40 INJECTION, SOLUTION SUBCUTANEOUS NIGHTLY
Status: DISCONTINUED | OUTPATIENT
Start: 2020-01-01 | End: 2020-01-01

## 2020-01-01 RX ORDER — SODIUM CHLORIDE 0.9 % (FLUSH) 0.9 %
10 SYRINGE (ML) INJECTION PRN
Status: DISCONTINUED | OUTPATIENT
Start: 2020-01-01 | End: 2020-01-12 | Stop reason: HOSPADM

## 2020-01-01 RX ORDER — SODIUM CHLORIDE 9 MG/ML
INJECTION, SOLUTION INTRAVENOUS CONTINUOUS
Status: DISCONTINUED | OUTPATIENT
Start: 2020-01-01 | End: 2020-01-01

## 2020-01-01 RX ORDER — TRAMADOL HYDROCHLORIDE 50 MG/1
50 TABLET ORAL EVERY 6 HOURS PRN
Status: CANCELLED | OUTPATIENT
Start: 2020-01-01

## 2020-01-01 RX ORDER — IPRATROPIUM BROMIDE AND ALBUTEROL SULFATE 2.5; .5 MG/3ML; MG/3ML
1 SOLUTION RESPIRATORY (INHALATION) EVERY 4 HOURS PRN
Status: DISCONTINUED | OUTPATIENT
Start: 2020-01-01 | End: 2020-01-12 | Stop reason: HOSPADM

## 2020-01-01 RX ORDER — METHYLPREDNISOLONE SODIUM SUCCINATE 40 MG/ML
40 INJECTION, POWDER, LYOPHILIZED, FOR SOLUTION INTRAMUSCULAR; INTRAVENOUS EVERY 12 HOURS
Status: DISCONTINUED | OUTPATIENT
Start: 2020-01-01 | End: 2020-01-01 | Stop reason: HOSPADM

## 2020-01-01 RX ORDER — ROPINIROLE 0.25 MG/1
0.5 TABLET, FILM COATED ORAL NIGHTLY
Status: DISCONTINUED | OUTPATIENT
Start: 2020-01-01 | End: 2020-01-12 | Stop reason: HOSPADM

## 2020-01-01 RX ORDER — POTASSIUM CHLORIDE 29.8 MG/ML
60 INJECTION INTRAVENOUS ONCE
Status: COMPLETED | OUTPATIENT
Start: 2020-01-01 | End: 2020-01-01

## 2020-01-01 RX ORDER — ONDANSETRON 2 MG/ML
4 INJECTION INTRAMUSCULAR; INTRAVENOUS EVERY 6 HOURS PRN
Status: DISCONTINUED | OUTPATIENT
Start: 2020-01-01 | End: 2020-01-12 | Stop reason: HOSPADM

## 2020-01-01 RX ORDER — IPRATROPIUM BROMIDE AND ALBUTEROL SULFATE 2.5; .5 MG/3ML; MG/3ML
1 SOLUTION RESPIRATORY (INHALATION) EVERY 4 HOURS
Status: DISCONTINUED | OUTPATIENT
Start: 2020-01-01 | End: 2020-01-01 | Stop reason: HOSPADM

## 2020-01-01 RX ORDER — MIDODRINE HYDROCHLORIDE 5 MG/1
5 TABLET ORAL
Status: DISCONTINUED | OUTPATIENT
Start: 2020-01-01 | End: 2020-01-01

## 2020-01-01 RX ORDER — AMIODARONE HYDROCHLORIDE 200 MG/1
200 TABLET ORAL DAILY
Status: DISCONTINUED | OUTPATIENT
Start: 2020-01-01 | End: 2020-01-01

## 2020-01-01 RX ORDER — FLUDROCORTISONE ACETATE 0.1 MG/1
0.1 TABLET ORAL DAILY
Status: DISCONTINUED | OUTPATIENT
Start: 2020-01-01 | End: 2020-01-01 | Stop reason: HOSPADM

## 2020-01-01 RX ORDER — ONDANSETRON 2 MG/ML
4 INJECTION INTRAMUSCULAR; INTRAVENOUS EVERY 6 HOURS PRN
Status: CANCELLED | OUTPATIENT
Start: 2020-01-01

## 2020-01-01 RX ORDER — SODIUM CHLORIDE 0.9 % (FLUSH) 0.9 %
10 SYRINGE (ML) INJECTION EVERY 12 HOURS SCHEDULED
Status: DISCONTINUED | OUTPATIENT
Start: 2020-01-01 | End: 2020-01-01 | Stop reason: HOSPADM

## 2020-01-01 RX ORDER — ROPINIROLE 0.25 MG/1
0.5 TABLET, FILM COATED ORAL NIGHTLY
Status: DISCONTINUED | OUTPATIENT
Start: 2020-01-01 | End: 2020-01-01 | Stop reason: HOSPADM

## 2020-01-01 RX ORDER — TRAMADOL HYDROCHLORIDE 50 MG/1
50 TABLET ORAL EVERY 6 HOURS PRN
Status: DISCONTINUED | OUTPATIENT
Start: 2020-01-01 | End: 2020-01-12 | Stop reason: HOSPADM

## 2020-01-01 RX ORDER — LORAZEPAM 2 MG/ML
1 INJECTION INTRAMUSCULAR EVERY 4 HOURS PRN
Status: CANCELLED | OUTPATIENT
Start: 2020-01-01

## 2020-01-01 RX ORDER — AMILORIDE HYDROCHLORIDE 5 MG/1
5 TABLET ORAL DAILY
Status: DISCONTINUED | OUTPATIENT
Start: 2020-01-01 | End: 2020-01-01

## 2020-01-01 RX ORDER — INSULIN GLARGINE 100 [IU]/ML
20 INJECTION, SOLUTION SUBCUTANEOUS NIGHTLY
Status: DISCONTINUED | OUTPATIENT
Start: 2020-01-01 | End: 2020-01-01

## 2020-01-01 RX ORDER — LORAZEPAM 2 MG/ML
1 INJECTION INTRAMUSCULAR EVERY 4 HOURS PRN
Status: DISCONTINUED | OUTPATIENT
Start: 2020-01-01 | End: 2020-01-12 | Stop reason: HOSPADM

## 2020-01-01 RX ADMIN — LIDOCAINE HYDROCHLORIDE 5 ML: 10 INJECTION, SOLUTION EPIDURAL; INFILTRATION; INTRACAUDAL; PERINEURAL at 13:52

## 2020-01-01 RX ADMIN — METRONIDAZOLE 500 MG: 500 INJECTION, SOLUTION INTRAVENOUS at 15:37

## 2020-01-01 RX ADMIN — CEFTAZIDIME, AVIBACTAM 2.5 G: 2; .5 POWDER, FOR SOLUTION INTRAVENOUS at 08:46

## 2020-01-01 RX ADMIN — ASPIRIN 81 MG: 81 TABLET, COATED ORAL at 09:00

## 2020-01-01 RX ADMIN — FUROSEMIDE 80 MG: 10 INJECTION, SOLUTION INTRAMUSCULAR; INTRAVENOUS at 14:45

## 2020-01-01 RX ADMIN — METRONIDAZOLE 500 MG: 500 INJECTION, SOLUTION INTRAVENOUS at 00:06

## 2020-01-01 RX ADMIN — CALCIUM GLUCONATE: 94 INJECTION, SOLUTION INTRAVENOUS at 21:24

## 2020-01-01 RX ADMIN — VASOPRESSIN 0.04 UNITS/MIN: 20 INJECTION INTRAVENOUS at 04:01

## 2020-01-01 RX ADMIN — VASOPRESSIN 0.04 UNITS/MIN: 20 INJECTION INTRAVENOUS at 14:15

## 2020-01-01 RX ADMIN — SPIRONOLACTONE 25 MG: 25 TABLET ORAL at 08:50

## 2020-01-01 RX ADMIN — INSULIN HUMAN 18 UNITS: 100 INJECTION, SOLUTION PARENTERAL at 05:31

## 2020-01-01 RX ADMIN — VASOPRESSIN 0.04 UNITS/MIN: 20 INJECTION INTRAVENOUS at 07:45

## 2020-01-01 RX ADMIN — SODIUM CHLORIDE, PRESERVATIVE FREE 10 ML: 5 INJECTION INTRAVENOUS at 02:09

## 2020-01-01 RX ADMIN — METRONIDAZOLE 500 MG: 500 INJECTION, SOLUTION INTRAVENOUS at 08:32

## 2020-01-01 RX ADMIN — INSULIN GLARGINE 15 UNITS: 100 INJECTION, SOLUTION SUBCUTANEOUS at 21:24

## 2020-01-01 RX ADMIN — IPRATROPIUM BROMIDE AND ALBUTEROL SULFATE 1 AMPULE: .5; 3 SOLUTION RESPIRATORY (INHALATION) at 00:29

## 2020-01-01 RX ADMIN — OXYCODONE HYDROCHLORIDE AND ACETAMINOPHEN 1000 MG: 500 TABLET ORAL at 08:59

## 2020-01-01 RX ADMIN — CEFTAZIDIME, AVIBACTAM 1.25 G: 2; .5 POWDER, FOR SOLUTION INTRAVENOUS at 15:15

## 2020-01-01 RX ADMIN — SODIUM CHLORIDE, PRESERVATIVE FREE 10 ML: 5 INJECTION INTRAVENOUS at 09:39

## 2020-01-01 RX ADMIN — FUROSEMIDE 80 MG: 10 INJECTION, SOLUTION INTRAMUSCULAR; INTRAVENOUS at 20:46

## 2020-01-01 RX ADMIN — MAGNESIUM SULFATE HEPTAHYDRATE 1 G: 1 INJECTION, SOLUTION INTRAVENOUS at 11:18

## 2020-01-01 RX ADMIN — HEPARIN SODIUM 5000 UNITS: 5000 INJECTION INTRAVENOUS; SUBCUTANEOUS at 13:40

## 2020-01-01 RX ADMIN — HEPARIN SODIUM 5000 UNITS: 5000 INJECTION INTRAVENOUS; SUBCUTANEOUS at 14:56

## 2020-01-01 RX ADMIN — METRONIDAZOLE 500 MG: 500 INJECTION, SOLUTION INTRAVENOUS at 11:10

## 2020-01-01 RX ADMIN — FUROSEMIDE 80 MG: 10 INJECTION, SOLUTION INTRAMUSCULAR; INTRAVENOUS at 14:15

## 2020-01-01 RX ADMIN — SODIUM CHLORIDE, PRESERVATIVE FREE 10 ML: 5 INJECTION INTRAVENOUS at 10:00

## 2020-01-01 RX ADMIN — ROPINIROLE HYDROCHLORIDE 1 MG: 1 TABLET, FILM COATED ORAL at 20:27

## 2020-01-01 RX ADMIN — CEFTAZIDIME, AVIBACTAM 2.5 G: 2; .5 POWDER, FOR SOLUTION INTRAVENOUS at 00:13

## 2020-01-01 RX ADMIN — CEFTAZIDIME, AVIBACTAM 2.5 G: 2; .5 POWDER, FOR SOLUTION INTRAVENOUS at 16:30

## 2020-01-01 RX ADMIN — ROPINIROLE HYDROCHLORIDE 1 MG: 1 TABLET, FILM COATED ORAL at 20:06

## 2020-01-01 RX ADMIN — INSULIN LISPRO 8 UNITS: 100 INJECTION, SOLUTION INTRAVENOUS; SUBCUTANEOUS at 16:52

## 2020-01-01 RX ADMIN — IPRATROPIUM BROMIDE AND ALBUTEROL SULFATE 1 AMPULE: .5; 3 SOLUTION RESPIRATORY (INHALATION) at 00:14

## 2020-01-01 RX ADMIN — FUROSEMIDE 80 MG: 10 INJECTION, SOLUTION INTRAMUSCULAR; INTRAVENOUS at 08:46

## 2020-01-01 RX ADMIN — CEFTAZIDIME, AVIBACTAM 2.5 G: 2; .5 POWDER, FOR SOLUTION INTRAVENOUS at 00:08

## 2020-01-01 RX ADMIN — COLLAGENASE SANTYL: 250 OINTMENT TOPICAL at 09:05

## 2020-01-01 RX ADMIN — OXYCODONE HYDROCHLORIDE AND ACETAMINOPHEN 1000 MG: 500 TABLET ORAL at 09:03

## 2020-01-01 RX ADMIN — INSULIN GLARGINE 15 UNITS: 100 INJECTION, SOLUTION SUBCUTANEOUS at 20:47

## 2020-01-01 RX ADMIN — Medication 400 UNITS: at 10:32

## 2020-01-01 RX ADMIN — MINOCYCLINE HYDROCHLORIDE 100 MG: 100 CAPSULE ORAL at 08:51

## 2020-01-01 RX ADMIN — VASOPRESSIN 0.01 UNITS/MIN: 20 INJECTION INTRAVENOUS at 06:12

## 2020-01-01 RX ADMIN — MINOCYCLINE HYDROCHLORIDE 100 MG: 100 CAPSULE ORAL at 09:03

## 2020-01-01 RX ADMIN — METHYLPREDNISOLONE SODIUM SUCCINATE 40 MG: 40 INJECTION, POWDER, LYOPHILIZED, FOR SOLUTION INTRAMUSCULAR; INTRAVENOUS at 02:42

## 2020-01-01 RX ADMIN — IPRATROPIUM BROMIDE AND ALBUTEROL SULFATE 1 AMPULE: .5; 3 SOLUTION RESPIRATORY (INHALATION) at 04:32

## 2020-01-01 RX ADMIN — METHYLPREDNISOLONE SODIUM SUCCINATE 40 MG: 40 INJECTION, POWDER, LYOPHILIZED, FOR SOLUTION INTRAMUSCULAR; INTRAVENOUS at 17:22

## 2020-01-01 RX ADMIN — IPRATROPIUM BROMIDE AND ALBUTEROL SULFATE 1 AMPULE: .5; 3 SOLUTION RESPIRATORY (INHALATION) at 17:07

## 2020-01-01 RX ADMIN — METRONIDAZOLE 500 MG: 500 INJECTION, SOLUTION INTRAVENOUS at 18:05

## 2020-01-01 RX ADMIN — SPIRONOLACTONE 25 MG: 25 TABLET ORAL at 08:14

## 2020-01-01 RX ADMIN — INSULIN HUMAN 25 UNITS: 100 INJECTION, SUSPENSION SUBCUTANEOUS at 09:16

## 2020-01-01 RX ADMIN — POTASSIUM CHLORIDE 20 MEQ: 29.8 INJECTION, SOLUTION INTRAVENOUS at 13:19

## 2020-01-01 RX ADMIN — PANTOPRAZOLE SODIUM 40 MG: 40 INJECTION, POWDER, FOR SOLUTION INTRAVENOUS at 08:49

## 2020-01-01 RX ADMIN — METRONIDAZOLE 500 MG: 500 INJECTION, SOLUTION INTRAVENOUS at 09:03

## 2020-01-01 RX ADMIN — VASOPRESSIN 0.04 UNITS/MIN: 20 INJECTION INTRAVENOUS at 20:41

## 2020-01-01 RX ADMIN — AMIODARONE HYDROCHLORIDE 0.5 MG/MIN: 50 INJECTION, SOLUTION INTRAVENOUS at 22:46

## 2020-01-01 RX ADMIN — MONTELUKAST 10 MG: 10 TABLET, FILM COATED ORAL at 20:03

## 2020-01-01 RX ADMIN — INSULIN LISPRO 2 UNITS: 100 INJECTION, SOLUTION INTRAVENOUS; SUBCUTANEOUS at 21:30

## 2020-01-01 RX ADMIN — COLLAGENASE SANTYL: 250 OINTMENT TOPICAL at 17:23

## 2020-01-01 RX ADMIN — VASOPRESSIN 0.04 UNITS/MIN: 20 INJECTION INTRAVENOUS at 02:53

## 2020-01-01 RX ADMIN — SODIUM CHLORIDE, PRESERVATIVE FREE 10 ML: 5 INJECTION INTRAVENOUS at 09:17

## 2020-01-01 RX ADMIN — SERTRALINE HYDROCHLORIDE 100 MG: 100 TABLET ORAL at 08:49

## 2020-01-01 RX ADMIN — HYDROMORPHONE HYDROCHLORIDE 0.5 MG: 1 INJECTION, SOLUTION INTRAMUSCULAR; INTRAVENOUS; SUBCUTANEOUS at 22:27

## 2020-01-01 RX ADMIN — Medication 400 UNITS: at 09:00

## 2020-01-01 RX ADMIN — IPRATROPIUM BROMIDE AND ALBUTEROL SULFATE 1 AMPULE: .5; 3 SOLUTION RESPIRATORY (INHALATION) at 14:52

## 2020-01-01 RX ADMIN — INSULIN GLARGINE 15 UNITS: 100 INJECTION, SOLUTION SUBCUTANEOUS at 21:16

## 2020-01-01 RX ADMIN — SPIRONOLACTONE 25 MG: 25 TABLET ORAL at 10:29

## 2020-01-01 RX ADMIN — Medication 400 UNITS: at 09:03

## 2020-01-01 RX ADMIN — FUROSEMIDE 80 MG: 10 INJECTION, SOLUTION INTRAMUSCULAR; INTRAVENOUS at 23:40

## 2020-01-01 RX ADMIN — Medication 400 UNITS: at 10:29

## 2020-01-01 RX ADMIN — METHYLPREDNISOLONE SODIUM SUCCINATE 40 MG: 40 INJECTION, POWDER, LYOPHILIZED, FOR SOLUTION INTRAMUSCULAR; INTRAVENOUS at 20:00

## 2020-01-01 RX ADMIN — MEMANTINE 5 MG: 10 TABLET ORAL at 20:06

## 2020-01-01 RX ADMIN — INSULIN LISPRO 4 UNITS: 100 INJECTION, SOLUTION INTRAVENOUS; SUBCUTANEOUS at 12:14

## 2020-01-01 RX ADMIN — HEPARIN SODIUM 5000 UNITS: 5000 INJECTION INTRAVENOUS; SUBCUTANEOUS at 21:55

## 2020-01-01 RX ADMIN — METHYLPREDNISOLONE SODIUM SUCCINATE 40 MG: 40 INJECTION, POWDER, LYOPHILIZED, FOR SOLUTION INTRAMUSCULAR; INTRAVENOUS at 18:09

## 2020-01-01 RX ADMIN — IPRATROPIUM BROMIDE AND ALBUTEROL SULFATE 1 AMPULE: .5; 3 SOLUTION RESPIRATORY (INHALATION) at 00:50

## 2020-01-01 RX ADMIN — MORPHINE SULFATE 2 MG: 2 INJECTION, SOLUTION INTRAMUSCULAR; INTRAVENOUS at 21:37

## 2020-01-01 RX ADMIN — PANTOPRAZOLE SODIUM 40 MG: 40 INJECTION, POWDER, FOR SOLUTION INTRAVENOUS at 22:10

## 2020-01-01 RX ADMIN — SERTRALINE HYDROCHLORIDE 100 MG: 100 TABLET ORAL at 09:33

## 2020-01-01 RX ADMIN — HEPARIN SODIUM 5000 UNITS: 5000 INJECTION INTRAVENOUS; SUBCUTANEOUS at 14:15

## 2020-01-01 RX ADMIN — HEPARIN SODIUM 5000 UNITS: 5000 INJECTION INTRAVENOUS; SUBCUTANEOUS at 21:34

## 2020-01-01 RX ADMIN — INSULIN HUMAN 12 UNITS: 100 INJECTION, SOLUTION PARENTERAL at 23:08

## 2020-01-01 RX ADMIN — METHYLPREDNISOLONE SODIUM SUCCINATE 40 MG: 40 INJECTION, POWDER, LYOPHILIZED, FOR SOLUTION INTRAMUSCULAR; INTRAVENOUS at 17:12

## 2020-01-01 RX ADMIN — MEMANTINE 5 MG: 10 TABLET ORAL at 22:08

## 2020-01-01 RX ADMIN — IPRATROPIUM BROMIDE AND ALBUTEROL SULFATE 1 AMPULE: .5; 3 SOLUTION RESPIRATORY (INHALATION) at 11:40

## 2020-01-01 RX ADMIN — FUROSEMIDE 80 MG: 10 INJECTION, SOLUTION INTRAMUSCULAR; INTRAVENOUS at 20:28

## 2020-01-01 RX ADMIN — CEFTAZIDIME, AVIBACTAM 1.25 G: 2; .5 POWDER, FOR SOLUTION INTRAVENOUS at 00:00

## 2020-01-01 RX ADMIN — MINOCYCLINE HYDROCHLORIDE 100 MG: 100 CAPSULE ORAL at 22:47

## 2020-01-01 RX ADMIN — HEPARIN SODIUM 5000 UNITS: 5000 INJECTION INTRAVENOUS; SUBCUTANEOUS at 21:36

## 2020-01-01 RX ADMIN — MORPHINE SULFATE 2 MG: 2 INJECTION, SOLUTION INTRAMUSCULAR; INTRAVENOUS at 11:10

## 2020-01-01 RX ADMIN — IPRATROPIUM BROMIDE AND ALBUTEROL SULFATE 1 AMPULE: .5; 3 SOLUTION RESPIRATORY (INHALATION) at 11:19

## 2020-01-01 RX ADMIN — INSULIN LISPRO 6 UNITS: 100 INJECTION, SOLUTION INTRAVENOUS; SUBCUTANEOUS at 10:51

## 2020-01-01 RX ADMIN — VASOPRESSIN 0.04 UNITS/MIN: 20 INJECTION INTRAVENOUS at 11:01

## 2020-01-01 RX ADMIN — ROPINIROLE HYDROCHLORIDE 1 MG: 1 TABLET, FILM COATED ORAL at 21:15

## 2020-01-01 RX ADMIN — POTASSIUM CHLORIDE 20 MEQ: 400 INJECTION, SOLUTION INTRAVENOUS at 15:37

## 2020-01-01 RX ADMIN — METRONIDAZOLE 500 MG: 500 INJECTION, SOLUTION INTRAVENOUS at 01:41

## 2020-01-01 RX ADMIN — INSULIN HUMAN 30 UNITS: 100 INJECTION, SOLUTION PARENTERAL at 12:55

## 2020-01-01 RX ADMIN — POTASSIUM CHLORIDE 20 MEQ: 29.8 INJECTION, SOLUTION INTRAVENOUS at 04:24

## 2020-01-01 RX ADMIN — FUROSEMIDE 80 MG: 10 INJECTION, SOLUTION INTRAMUSCULAR; INTRAVENOUS at 09:03

## 2020-01-01 RX ADMIN — PANTOPRAZOLE SODIUM 40 MG: 40 INJECTION, POWDER, FOR SOLUTION INTRAVENOUS at 09:15

## 2020-01-01 RX ADMIN — METHYLPREDNISOLONE SODIUM SUCCINATE 40 MG: 40 INJECTION, POWDER, FOR SOLUTION INTRAMUSCULAR; INTRAVENOUS at 23:58

## 2020-01-01 RX ADMIN — HYDROCODONE BITARTRATE AND ACETAMINOPHEN 1 TABLET: 5; 325 TABLET ORAL at 12:44

## 2020-01-01 RX ADMIN — PROMETHAZINE HYDROCHLORIDE 12.5 MG: 25 INJECTION INTRAMUSCULAR; INTRAVENOUS at 21:37

## 2020-01-01 RX ADMIN — DEXTROSE MONOHYDRATE 100 MG: 50 INJECTION, SOLUTION INTRAVENOUS at 14:14

## 2020-01-01 RX ADMIN — SERTRALINE HYDROCHLORIDE 100 MG: 100 TABLET ORAL at 08:50

## 2020-01-01 RX ADMIN — PANTOPRAZOLE SODIUM 40 MG: 40 INJECTION, POWDER, FOR SOLUTION INTRAVENOUS at 20:42

## 2020-01-01 RX ADMIN — SERTRALINE HYDROCHLORIDE 100 MG: 100 TABLET ORAL at 09:00

## 2020-01-01 RX ADMIN — INSULIN LISPRO 2 UNITS: 100 INJECTION, SOLUTION INTRAVENOUS; SUBCUTANEOUS at 17:11

## 2020-01-01 RX ADMIN — PHENYLEPHRINE HYDROCHLORIDE 100 MCG/MIN: 10 INJECTION INTRAVENOUS at 00:08

## 2020-01-01 RX ADMIN — MINOCYCLINE HYDROCHLORIDE 100 MG: 100 CAPSULE ORAL at 23:53

## 2020-01-01 RX ADMIN — PANTOPRAZOLE SODIUM 40 MG: 40 INJECTION, POWDER, FOR SOLUTION INTRAVENOUS at 20:03

## 2020-01-01 RX ADMIN — SODIUM CHLORIDE, PRESERVATIVE FREE 10 ML: 5 INJECTION INTRAVENOUS at 22:58

## 2020-01-01 RX ADMIN — MONTELUKAST 10 MG: 10 TABLET, FILM COATED ORAL at 22:07

## 2020-01-01 RX ADMIN — METHYLPREDNISOLONE SODIUM SUCCINATE 40 MG: 40 INJECTION, POWDER, LYOPHILIZED, FOR SOLUTION INTRAMUSCULAR; INTRAVENOUS at 10:28

## 2020-01-01 RX ADMIN — PANTOPRAZOLE SODIUM 40 MG: 40 INJECTION, POWDER, FOR SOLUTION INTRAVENOUS at 22:19

## 2020-01-01 RX ADMIN — IPRATROPIUM BROMIDE AND ALBUTEROL SULFATE 1 AMPULE: .5; 3 SOLUTION RESPIRATORY (INHALATION) at 07:55

## 2020-01-01 RX ADMIN — IPRATROPIUM BROMIDE AND ALBUTEROL SULFATE 1 AMPULE: .5; 3 SOLUTION RESPIRATORY (INHALATION) at 15:37

## 2020-01-01 RX ADMIN — MONTELUKAST 10 MG: 10 TABLET, FILM COATED ORAL at 20:46

## 2020-01-01 RX ADMIN — I.V. FAT EMULSION 250 ML: 20 EMULSION INTRAVENOUS at 18:08

## 2020-01-01 RX ADMIN — SODIUM CHLORIDE, PRESERVATIVE FREE 10 ML: 5 INJECTION INTRAVENOUS at 02:59

## 2020-01-01 RX ADMIN — METHYLPREDNISOLONE SODIUM SUCCINATE 40 MG: 40 INJECTION, POWDER, LYOPHILIZED, FOR SOLUTION INTRAMUSCULAR; INTRAVENOUS at 18:16

## 2020-01-01 RX ADMIN — SODIUM CHLORIDE, PRESERVATIVE FREE 10 ML: 5 INJECTION INTRAVENOUS at 08:59

## 2020-01-01 RX ADMIN — IPRATROPIUM BROMIDE AND ALBUTEROL SULFATE 1 AMPULE: .5; 3 SOLUTION RESPIRATORY (INHALATION) at 04:31

## 2020-01-01 RX ADMIN — SODIUM CHLORIDE, PRESERVATIVE FREE 10 ML: 5 INJECTION INTRAVENOUS at 08:51

## 2020-01-01 RX ADMIN — SODIUM CHLORIDE, PRESERVATIVE FREE 10 ML: 5 INJECTION INTRAVENOUS at 10:56

## 2020-01-01 RX ADMIN — METRONIDAZOLE 500 MG: 500 INJECTION, SOLUTION INTRAVENOUS at 09:14

## 2020-01-01 RX ADMIN — IPRATROPIUM BROMIDE AND ALBUTEROL SULFATE 1 AMPULE: .5; 3 SOLUTION RESPIRATORY (INHALATION) at 04:35

## 2020-01-01 RX ADMIN — IPRATROPIUM BROMIDE AND ALBUTEROL SULFATE 1 AMPULE: .5; 3 SOLUTION RESPIRATORY (INHALATION) at 11:55

## 2020-01-01 RX ADMIN — MEMANTINE 5 MG: 10 TABLET ORAL at 21:16

## 2020-01-01 RX ADMIN — POTASSIUM CHLORIDE 20 MEQ: 29.8 INJECTION, SOLUTION INTRAVENOUS at 12:25

## 2020-01-01 RX ADMIN — MORPHINE SULFATE 2 MG: 2 INJECTION, SOLUTION INTRAMUSCULAR; INTRAVENOUS at 04:30

## 2020-01-01 RX ADMIN — CEFTAZIDIME, AVIBACTAM 1.25 G: 2; .5 POWDER, FOR SOLUTION INTRAVENOUS at 00:08

## 2020-01-01 RX ADMIN — I.V. FAT EMULSION 250 ML: 20 EMULSION INTRAVENOUS at 18:13

## 2020-01-01 RX ADMIN — CEFTAZIDIME, AVIBACTAM 2.5 G: 2; .5 POWDER, FOR SOLUTION INTRAVENOUS at 16:39

## 2020-01-01 RX ADMIN — INSULIN GLARGINE 15 UNITS: 100 INJECTION, SOLUTION SUBCUTANEOUS at 21:34

## 2020-01-01 RX ADMIN — INSULIN LISPRO 8 UNITS: 100 INJECTION, SOLUTION INTRAVENOUS; SUBCUTANEOUS at 11:57

## 2020-01-01 RX ADMIN — ASPIRIN 81 MG: 81 TABLET, COATED ORAL at 08:47

## 2020-01-01 RX ADMIN — MONTELUKAST 10 MG: 10 TABLET, FILM COATED ORAL at 20:28

## 2020-01-01 RX ADMIN — METHYLPREDNISOLONE SODIUM SUCCINATE 40 MG: 40 INJECTION, POWDER, LYOPHILIZED, FOR SOLUTION INTRAMUSCULAR; INTRAVENOUS at 02:55

## 2020-01-01 RX ADMIN — AMIODARONE HYDROCHLORIDE 200 MG: 200 TABLET ORAL at 09:00

## 2020-01-01 RX ADMIN — SODIUM CHLORIDE, PRESERVATIVE FREE 10 ML: 5 INJECTION INTRAVENOUS at 11:23

## 2020-01-01 RX ADMIN — METRONIDAZOLE 500 MG: 500 INJECTION, SOLUTION INTRAVENOUS at 08:46

## 2020-01-01 RX ADMIN — METHYLPREDNISOLONE SODIUM SUCCINATE 40 MG: 40 INJECTION, POWDER, LYOPHILIZED, FOR SOLUTION INTRAMUSCULAR; INTRAVENOUS at 02:34

## 2020-01-01 RX ADMIN — METHYLPREDNISOLONE SODIUM SUCCINATE 40 MG: 40 INJECTION, POWDER, LYOPHILIZED, FOR SOLUTION INTRAMUSCULAR; INTRAVENOUS at 10:56

## 2020-01-01 RX ADMIN — SODIUM CHLORIDE: 9 INJECTION, SOLUTION INTRAVENOUS at 11:55

## 2020-01-01 RX ADMIN — ASPIRIN 81 MG: 81 TABLET, COATED ORAL at 09:03

## 2020-01-01 RX ADMIN — METRONIDAZOLE 500 MG: 500 INJECTION, SOLUTION INTRAVENOUS at 17:12

## 2020-01-01 RX ADMIN — AMILORIDE HYDROCLORIDE 5 MG: 5 TABLET ORAL at 09:04

## 2020-01-01 RX ADMIN — CEFTAZIDIME, AVIBACTAM 1.25 G: 2; .5 POWDER, FOR SOLUTION INTRAVENOUS at 09:03

## 2020-01-01 RX ADMIN — MONTELUKAST 10 MG: 10 TABLET, FILM COATED ORAL at 22:19

## 2020-01-01 RX ADMIN — FUROSEMIDE 80 MG: 10 INJECTION, SOLUTION INTRAMUSCULAR; INTRAVENOUS at 16:34

## 2020-01-01 RX ADMIN — IPRATROPIUM BROMIDE AND ALBUTEROL SULFATE 1 AMPULE: .5; 3 SOLUTION RESPIRATORY (INHALATION) at 21:45

## 2020-01-01 RX ADMIN — MEMANTINE 5 MG: 10 TABLET ORAL at 23:26

## 2020-01-01 RX ADMIN — MEMANTINE 5 MG: 10 TABLET ORAL at 20:46

## 2020-01-01 RX ADMIN — IPRATROPIUM BROMIDE AND ALBUTEROL SULFATE 1 AMPULE: .5; 3 SOLUTION RESPIRATORY (INHALATION) at 04:59

## 2020-01-01 RX ADMIN — PHENYLEPHRINE HYDROCHLORIDE 100 MCG/MIN: 10 INJECTION INTRAVENOUS at 14:56

## 2020-01-01 RX ADMIN — VASOPRESSIN 0.04 UNITS/MIN: 20 INJECTION INTRAVENOUS at 14:21

## 2020-01-01 RX ADMIN — IPRATROPIUM BROMIDE AND ALBUTEROL SULFATE 1 AMPULE: .5; 3 SOLUTION RESPIRATORY (INHALATION) at 11:07

## 2020-01-01 RX ADMIN — CEFTAZIDIME, AVIBACTAM 2.5 G: 2; .5 POWDER, FOR SOLUTION INTRAVENOUS at 09:18

## 2020-01-01 RX ADMIN — PANTOPRAZOLE SODIUM 40 MG: 40 INJECTION, POWDER, FOR SOLUTION INTRAVENOUS at 10:28

## 2020-01-01 RX ADMIN — FUROSEMIDE 80 MG: 10 INJECTION, SOLUTION INTRAMUSCULAR; INTRAVENOUS at 14:12

## 2020-01-01 RX ADMIN — INSULIN GLARGINE 15 UNITS: 100 INJECTION, SOLUTION SUBCUTANEOUS at 22:11

## 2020-01-01 RX ADMIN — METRONIDAZOLE 500 MG: 500 INJECTION, SOLUTION INTRAVENOUS at 23:27

## 2020-01-01 RX ADMIN — METRONIDAZOLE 500 MG: 500 INJECTION, SOLUTION INTRAVENOUS at 00:37

## 2020-01-01 RX ADMIN — POTASSIUM CHLORIDE 20 MEQ: 29.8 INJECTION, SOLUTION INTRAVENOUS at 12:02

## 2020-01-01 RX ADMIN — SERTRALINE HYDROCHLORIDE 100 MG: 100 TABLET ORAL at 20:06

## 2020-01-01 RX ADMIN — IPRATROPIUM BROMIDE AND ALBUTEROL SULFATE 1 AMPULE: .5; 3 SOLUTION RESPIRATORY (INHALATION) at 13:03

## 2020-01-01 RX ADMIN — POTASSIUM CHLORIDE 20 MEQ: 29.8 INJECTION, SOLUTION INTRAVENOUS at 17:06

## 2020-01-01 RX ADMIN — OXYCODONE HYDROCHLORIDE AND ACETAMINOPHEN 1000 MG: 500 TABLET ORAL at 08:49

## 2020-01-01 RX ADMIN — IPRATROPIUM BROMIDE AND ALBUTEROL SULFATE 1 AMPULE: .5; 3 SOLUTION RESPIRATORY (INHALATION) at 08:05

## 2020-01-01 RX ADMIN — SODIUM CHLORIDE, PRESERVATIVE FREE 10 ML: 5 INJECTION INTRAVENOUS at 22:11

## 2020-01-01 RX ADMIN — IPRATROPIUM BROMIDE AND ALBUTEROL SULFATE 1 AMPULE: .5; 3 SOLUTION RESPIRATORY (INHALATION) at 07:42

## 2020-01-01 RX ADMIN — CEFTAZIDIME, AVIBACTAM 2.5 G: 2; .5 POWDER, FOR SOLUTION INTRAVENOUS at 15:05

## 2020-01-01 RX ADMIN — VASOPRESSIN 0.04 UNITS/MIN: 20 INJECTION INTRAVENOUS at 00:12

## 2020-01-01 RX ADMIN — IPRATROPIUM BROMIDE AND ALBUTEROL SULFATE 1 AMPULE: .5; 3 SOLUTION RESPIRATORY (INHALATION) at 09:15

## 2020-01-01 RX ADMIN — FUROSEMIDE 80 MG: 10 INJECTION, SOLUTION INTRAMUSCULAR; INTRAVENOUS at 10:31

## 2020-01-01 RX ADMIN — HEPARIN SODIUM 5000 UNITS: 5000 INJECTION INTRAVENOUS; SUBCUTANEOUS at 22:20

## 2020-01-01 RX ADMIN — MINOCYCLINE HYDROCHLORIDE 100 MG: 100 CAPSULE ORAL at 21:30

## 2020-01-01 RX ADMIN — PANTOPRAZOLE SODIUM 40 MG: 40 INJECTION, POWDER, FOR SOLUTION INTRAVENOUS at 09:33

## 2020-01-01 RX ADMIN — Medication 400 UNITS: at 09:15

## 2020-01-01 RX ADMIN — METRONIDAZOLE 500 MG: 500 INJECTION, SOLUTION INTRAVENOUS at 07:42

## 2020-01-01 RX ADMIN — ROPINIROLE HYDROCHLORIDE 0.5 MG: 0.25 TABLET, FILM COATED ORAL at 21:26

## 2020-01-01 RX ADMIN — AMILORIDE HYDROCLORIDE 5 MG: 5 TABLET ORAL at 11:59

## 2020-01-01 RX ADMIN — CEFTAZIDIME, AVIBACTAM 1.25 G: 2; .5 POWDER, FOR SOLUTION INTRAVENOUS at 10:31

## 2020-01-01 RX ADMIN — IPRATROPIUM BROMIDE AND ALBUTEROL SULFATE 1 AMPULE: .5; 3 SOLUTION RESPIRATORY (INHALATION) at 16:22

## 2020-01-01 RX ADMIN — DEXTROSE MONOHYDRATE: 50 INJECTION, SOLUTION INTRAVENOUS at 20:03

## 2020-01-01 RX ADMIN — IPRATROPIUM BROMIDE AND ALBUTEROL SULFATE 1 AMPULE: .5; 3 SOLUTION RESPIRATORY (INHALATION) at 01:01

## 2020-01-01 RX ADMIN — SODIUM CHLORIDE, PRESERVATIVE FREE 10 ML: 5 INJECTION INTRAVENOUS at 17:48

## 2020-01-01 RX ADMIN — INSULIN LISPRO 6 UNITS: 100 INJECTION, SOLUTION INTRAVENOUS; SUBCUTANEOUS at 17:10

## 2020-01-01 RX ADMIN — VASOPRESSIN 0.04 UNITS/MIN: 20 INJECTION INTRAVENOUS at 18:08

## 2020-01-01 RX ADMIN — AMIODARONE HYDROCHLORIDE 200 MG: 200 TABLET ORAL at 10:29

## 2020-01-01 RX ADMIN — PANTOPRAZOLE SODIUM 40 MG: 40 INJECTION, POWDER, FOR SOLUTION INTRAVENOUS at 21:15

## 2020-01-01 RX ADMIN — PANTOPRAZOLE SODIUM 40 MG: 40 INJECTION, POWDER, FOR SOLUTION INTRAVENOUS at 08:59

## 2020-01-01 RX ADMIN — SODIUM CHLORIDE, PRESERVATIVE FREE 10 ML: 5 INJECTION INTRAVENOUS at 20:07

## 2020-01-01 RX ADMIN — DEXTROSE MONOHYDRATE 100 MG: 50 INJECTION, SOLUTION INTRAVENOUS at 14:09

## 2020-01-01 RX ADMIN — DEXTROSE MONOHYDRATE: 50 INJECTION, SOLUTION INTRAVENOUS at 02:53

## 2020-01-01 RX ADMIN — MONTELUKAST 10 MG: 10 TABLET, FILM COATED ORAL at 21:26

## 2020-01-01 RX ADMIN — TRAMADOL HYDROCHLORIDE 50 MG: 50 TABLET, FILM COATED ORAL at 17:23

## 2020-01-01 RX ADMIN — PANTOPRAZOLE SODIUM 40 MG: 40 INJECTION, POWDER, FOR SOLUTION INTRAVENOUS at 20:07

## 2020-01-01 RX ADMIN — MINOCYCLINE HYDROCHLORIDE 100 MG: 100 CAPSULE ORAL at 09:14

## 2020-01-01 RX ADMIN — SERTRALINE HYDROCHLORIDE 100 MG: 100 TABLET ORAL at 08:14

## 2020-01-01 RX ADMIN — SODIUM CHLORIDE, PRESERVATIVE FREE 10 ML: 5 INJECTION INTRAVENOUS at 15:05

## 2020-01-01 RX ADMIN — METHYLPREDNISOLONE SODIUM SUCCINATE 40 MG: 40 INJECTION, POWDER, FOR SOLUTION INTRAMUSCULAR; INTRAVENOUS at 09:39

## 2020-01-01 RX ADMIN — HYDROCODONE BITARTRATE AND ACETAMINOPHEN 1 TABLET: 5; 325 TABLET ORAL at 14:57

## 2020-01-01 RX ADMIN — CEFTAZIDIME, AVIBACTAM 2.5 G: 2; .5 POWDER, FOR SOLUTION INTRAVENOUS at 08:48

## 2020-01-01 RX ADMIN — HEPARIN SODIUM 5000 UNITS: 5000 INJECTION INTRAVENOUS; SUBCUTANEOUS at 22:12

## 2020-01-01 RX ADMIN — TRAMADOL HYDROCHLORIDE 50 MG: 50 TABLET, FILM COATED ORAL at 11:56

## 2020-01-01 RX ADMIN — METHYLPREDNISOLONE SODIUM SUCCINATE 40 MG: 40 INJECTION, POWDER, LYOPHILIZED, FOR SOLUTION INTRAMUSCULAR; INTRAVENOUS at 11:56

## 2020-01-01 RX ADMIN — POTASSIUM CHLORIDE 20 MEQ: 29.8 INJECTION, SOLUTION INTRAVENOUS at 22:08

## 2020-01-01 RX ADMIN — PANTOPRAZOLE SODIUM 40 MG: 40 INJECTION, POWDER, FOR SOLUTION INTRAVENOUS at 08:13

## 2020-01-01 RX ADMIN — FUROSEMIDE 80 MG: 10 INJECTION, SOLUTION INTRAMUSCULAR; INTRAVENOUS at 14:42

## 2020-01-01 RX ADMIN — DEXTROSE MONOHYDRATE 100 MG: 50 INJECTION, SOLUTION INTRAVENOUS at 16:50

## 2020-01-01 RX ADMIN — LORAZEPAM 1 MG: 2 INJECTION INTRAMUSCULAR; INTRAVENOUS at 06:49

## 2020-01-01 RX ADMIN — SERTRALINE HYDROCHLORIDE 100 MG: 100 TABLET ORAL at 20:03

## 2020-01-01 RX ADMIN — METRONIDAZOLE 500 MG: 500 INJECTION, SOLUTION INTRAVENOUS at 14:43

## 2020-01-01 RX ADMIN — CALCIUM GLUCONATE: 98 INJECTION, SOLUTION INTRAVENOUS at 18:08

## 2020-01-01 RX ADMIN — IOPAMIDOL 75 ML: 755 INJECTION, SOLUTION INTRAVENOUS at 16:14

## 2020-01-01 RX ADMIN — CEFTAZIDIME, AVIBACTAM 2.5 G: 2; .5 POWDER, FOR SOLUTION INTRAVENOUS at 00:26

## 2020-01-01 RX ADMIN — METRONIDAZOLE 500 MG: 500 INJECTION, SOLUTION INTRAVENOUS at 01:04

## 2020-01-01 RX ADMIN — LINEZOLID 600 MG: 600 INJECTION, SOLUTION INTRAVENOUS at 23:44

## 2020-01-01 RX ADMIN — HEPARIN SODIUM 5000 UNITS: 5000 INJECTION INTRAVENOUS; SUBCUTANEOUS at 06:11

## 2020-01-01 RX ADMIN — HEPARIN SODIUM 5000 UNITS: 5000 INJECTION INTRAVENOUS; SUBCUTANEOUS at 10:51

## 2020-01-01 RX ADMIN — ASPIRIN 81 MG: 81 TABLET, COATED ORAL at 08:14

## 2020-01-01 RX ADMIN — INSULIN LISPRO 4 UNITS: 100 INJECTION, SOLUTION INTRAVENOUS; SUBCUTANEOUS at 16:22

## 2020-01-01 RX ADMIN — MINOCYCLINE HYDROCHLORIDE 100 MG: 100 CAPSULE ORAL at 21:33

## 2020-01-01 RX ADMIN — MORPHINE SULFATE 2 MG: 2 INJECTION, SOLUTION INTRAMUSCULAR; INTRAVENOUS at 02:59

## 2020-01-01 RX ADMIN — METOLAZONE 2.5 MG: 5 TABLET ORAL at 09:15

## 2020-01-01 RX ADMIN — INSULIN LISPRO 4 UNITS: 100 INJECTION, SOLUTION INTRAVENOUS; SUBCUTANEOUS at 20:46

## 2020-01-01 RX ADMIN — CEFTAZIDIME, AVIBACTAM 1.25 G: 2; .5 POWDER, FOR SOLUTION INTRAVENOUS at 08:59

## 2020-01-01 RX ADMIN — FUROSEMIDE 80 MG: 10 INJECTION, SOLUTION INTRAMUSCULAR; INTRAVENOUS at 10:28

## 2020-01-01 RX ADMIN — INSULIN GLARGINE 15 UNITS: 100 INJECTION, SOLUTION SUBCUTANEOUS at 23:55

## 2020-01-01 RX ADMIN — COLLAGENASE SANTYL: 250 OINTMENT TOPICAL at 09:02

## 2020-01-01 RX ADMIN — Medication 400 UNITS: at 08:49

## 2020-01-01 RX ADMIN — SERTRALINE HYDROCHLORIDE 100 MG: 100 TABLET ORAL at 23:26

## 2020-01-01 RX ADMIN — METHYLPREDNISOLONE SODIUM SUCCINATE 40 MG: 40 INJECTION, POWDER, LYOPHILIZED, FOR SOLUTION INTRAMUSCULAR; INTRAVENOUS at 08:59

## 2020-01-01 RX ADMIN — VASOPRESSIN 0.04 UNITS/MIN: 20 INJECTION INTRAVENOUS at 01:12

## 2020-01-01 RX ADMIN — HEPARIN SODIUM 5000 UNITS: 5000 INJECTION INTRAVENOUS; SUBCUTANEOUS at 14:09

## 2020-01-01 RX ADMIN — ROPINIROLE HYDROCHLORIDE 0.5 MG: 0.25 TABLET, FILM COATED ORAL at 22:19

## 2020-01-01 RX ADMIN — MAGNESIUM SULFATE HEPTAHYDRATE 2 G: 40 INJECTION, SOLUTION INTRAVENOUS at 18:16

## 2020-01-01 RX ADMIN — IPRATROPIUM BROMIDE AND ALBUTEROL SULFATE 1 AMPULE: .5; 3 SOLUTION RESPIRATORY (INHALATION) at 04:30

## 2020-01-01 RX ADMIN — HYDROCODONE BITARTRATE AND ACETAMINOPHEN 1 TABLET: 5; 325 TABLET ORAL at 22:07

## 2020-01-01 RX ADMIN — BUPROPION HYDROCHLORIDE 300 MG: 150 TABLET, FILM COATED, EXTENDED RELEASE ORAL at 09:00

## 2020-01-01 RX ADMIN — IPRATROPIUM BROMIDE AND ALBUTEROL SULFATE 1 AMPULE: .5; 3 SOLUTION RESPIRATORY (INHALATION) at 00:38

## 2020-01-01 RX ADMIN — SODIUM CHLORIDE, PRESERVATIVE FREE 10 ML: 5 INJECTION INTRAVENOUS at 01:49

## 2020-01-01 RX ADMIN — INSULIN GLARGINE 30 UNITS: 100 INJECTION, SOLUTION SUBCUTANEOUS at 11:02

## 2020-01-01 RX ADMIN — FUROSEMIDE 80 MG: 10 INJECTION, SOLUTION INTRAMUSCULAR; INTRAVENOUS at 08:59

## 2020-01-01 RX ADMIN — METRONIDAZOLE 500 MG: 500 INJECTION, SOLUTION INTRAVENOUS at 08:42

## 2020-01-01 RX ADMIN — IPRATROPIUM BROMIDE AND ALBUTEROL SULFATE 1 AMPULE: .5; 3 SOLUTION RESPIRATORY (INHALATION) at 19:58

## 2020-01-01 RX ADMIN — LINEZOLID 600 MG: 600 INJECTION, SOLUTION INTRAVENOUS at 01:27

## 2020-01-01 RX ADMIN — CEFTAZIDIME, AVIBACTAM 1.25 G: 2; .5 POWDER, FOR SOLUTION INTRAVENOUS at 14:42

## 2020-01-01 RX ADMIN — SODIUM CHLORIDE, PRESERVATIVE FREE 10 ML: 5 INJECTION INTRAVENOUS at 22:59

## 2020-01-01 RX ADMIN — SODIUM CHLORIDE, PRESERVATIVE FREE 10 ML: 5 INJECTION INTRAVENOUS at 10:30

## 2020-01-01 RX ADMIN — SODIUM CHLORIDE, PRESERVATIVE FREE 10 ML: 5 INJECTION INTRAVENOUS at 22:27

## 2020-01-01 RX ADMIN — HYDROMORPHONE HYDROCHLORIDE 0.5 MG: 1 INJECTION, SOLUTION INTRAMUSCULAR; INTRAVENOUS; SUBCUTANEOUS at 23:58

## 2020-01-01 RX ADMIN — IPRATROPIUM BROMIDE AND ALBUTEROL SULFATE 1 AMPULE: .5; 3 SOLUTION RESPIRATORY (INHALATION) at 21:09

## 2020-01-01 RX ADMIN — METHYLPREDNISOLONE SODIUM SUCCINATE 40 MG: 40 INJECTION, POWDER, LYOPHILIZED, FOR SOLUTION INTRAMUSCULAR; INTRAVENOUS at 11:00

## 2020-01-01 RX ADMIN — METHYLPREDNISOLONE SODIUM SUCCINATE 40 MG: 40 INJECTION, POWDER, LYOPHILIZED, FOR SOLUTION INTRAMUSCULAR; INTRAVENOUS at 09:15

## 2020-01-01 RX ADMIN — HYDROMORPHONE HYDROCHLORIDE 0.5 MG: 1 INJECTION, SOLUTION INTRAMUSCULAR; INTRAVENOUS; SUBCUTANEOUS at 06:49

## 2020-01-01 RX ADMIN — SODIUM CHLORIDE, PRESERVATIVE FREE 10 ML: 5 INJECTION INTRAVENOUS at 20:20

## 2020-01-01 RX ADMIN — INSULIN LISPRO 12 UNITS: 100 INJECTION, SOLUTION INTRAVENOUS; SUBCUTANEOUS at 08:01

## 2020-01-01 RX ADMIN — METRONIDAZOLE 500 MG: 500 INJECTION, SOLUTION INTRAVENOUS at 16:39

## 2020-01-01 RX ADMIN — HEPARIN SODIUM 5000 UNITS: 5000 INJECTION INTRAVENOUS; SUBCUTANEOUS at 13:55

## 2020-01-01 RX ADMIN — METHYLPREDNISOLONE SODIUM SUCCINATE 40 MG: 40 INJECTION, POWDER, LYOPHILIZED, FOR SOLUTION INTRAMUSCULAR; INTRAVENOUS at 10:31

## 2020-01-01 RX ADMIN — IPRATROPIUM BROMIDE AND ALBUTEROL SULFATE 1 AMPULE: .5; 3 SOLUTION RESPIRATORY (INHALATION) at 07:47

## 2020-01-01 RX ADMIN — CEFTAZIDIME, AVIBACTAM 2.5 G: 2; .5 POWDER, FOR SOLUTION INTRAVENOUS at 01:41

## 2020-01-01 RX ADMIN — INSULIN HUMAN 50 UNITS: 100 INJECTION, SUSPENSION SUBCUTANEOUS at 11:01

## 2020-01-01 RX ADMIN — FUROSEMIDE 80 MG: 10 INJECTION, SOLUTION INTRAMUSCULAR; INTRAVENOUS at 21:15

## 2020-01-01 RX ADMIN — CEFTAZIDIME, AVIBACTAM 1.25 G: 2; .5 POWDER, FOR SOLUTION INTRAVENOUS at 08:42

## 2020-01-01 RX ADMIN — IPRATROPIUM BROMIDE AND ALBUTEROL SULFATE 1 AMPULE: .5; 3 SOLUTION RESPIRATORY (INHALATION) at 12:37

## 2020-01-01 RX ADMIN — FUROSEMIDE 80 MG: 10 INJECTION, SOLUTION INTRAMUSCULAR; INTRAVENOUS at 08:32

## 2020-01-01 RX ADMIN — SERTRALINE HYDROCHLORIDE 100 MG: 100 TABLET ORAL at 09:03

## 2020-01-01 RX ADMIN — MINOCYCLINE HYDROCHLORIDE 100 MG: 100 CAPSULE ORAL at 09:34

## 2020-01-01 RX ADMIN — INSULIN LISPRO 4 UNITS: 100 INJECTION, SOLUTION INTRAVENOUS; SUBCUTANEOUS at 08:56

## 2020-01-01 RX ADMIN — TRAMADOL HYDROCHLORIDE 50 MG: 50 TABLET, FILM COATED ORAL at 22:19

## 2020-01-01 RX ADMIN — MAGNESIUM SULFATE HEPTAHYDRATE 1 G: 1 INJECTION, SOLUTION INTRAVENOUS at 12:26

## 2020-01-01 RX ADMIN — AMIODARONE HYDROCHLORIDE 200 MG: 200 TABLET ORAL at 23:26

## 2020-01-01 RX ADMIN — POTASSIUM PHOSPHATE, MONOBASIC AND POTASSIUM PHOSPHATE, DIBASIC 15 MMOL: 224; 236 INJECTION, SOLUTION INTRAVENOUS at 14:57

## 2020-01-01 RX ADMIN — SODIUM CHLORIDE, PRESERVATIVE FREE 10 ML: 5 INJECTION INTRAVENOUS at 09:00

## 2020-01-01 RX ADMIN — SERTRALINE HYDROCHLORIDE 100 MG: 100 TABLET ORAL at 20:46

## 2020-01-01 RX ADMIN — SPIRONOLACTONE 25 MG: 25 TABLET ORAL at 09:04

## 2020-01-01 RX ADMIN — MONTELUKAST 10 MG: 10 TABLET, FILM COATED ORAL at 20:16

## 2020-01-01 RX ADMIN — CEFTAZIDIME, AVIBACTAM 2.5 G: 2; .5 POWDER, FOR SOLUTION INTRAVENOUS at 16:43

## 2020-01-01 RX ADMIN — SODIUM CHLORIDE, PRESERVATIVE FREE 10 ML: 5 INJECTION INTRAVENOUS at 23:59

## 2020-01-01 RX ADMIN — POTASSIUM CHLORIDE 20 MEQ: 29.8 INJECTION, SOLUTION INTRAVENOUS at 11:19

## 2020-01-01 RX ADMIN — COLLAGENASE SANTYL: 250 OINTMENT TOPICAL at 08:23

## 2020-01-01 RX ADMIN — DEXTROSE MONOHYDRATE 100 MG: 50 INJECTION, SOLUTION INTRAVENOUS at 13:55

## 2020-01-01 RX ADMIN — METHYLPREDNISOLONE SODIUM SUCCINATE 40 MG: 40 INJECTION, POWDER, LYOPHILIZED, FOR SOLUTION INTRAMUSCULAR; INTRAVENOUS at 17:48

## 2020-01-01 RX ADMIN — VASOPRESSIN 0.03 UNITS/MIN: 20 INJECTION INTRAVENOUS at 23:33

## 2020-01-01 RX ADMIN — IPRATROPIUM BROMIDE AND ALBUTEROL SULFATE 1 AMPULE: .5; 3 SOLUTION RESPIRATORY (INHALATION) at 09:02

## 2020-01-01 RX ADMIN — IPRATROPIUM BROMIDE AND ALBUTEROL SULFATE 1 AMPULE: .5; 3 SOLUTION RESPIRATORY (INHALATION) at 11:28

## 2020-01-01 RX ADMIN — COLLAGENASE SANTYL: 250 OINTMENT TOPICAL at 08:59

## 2020-01-01 RX ADMIN — POTASSIUM CHLORIDE: 2 INJECTION, SOLUTION, CONCENTRATE INTRAVENOUS at 17:48

## 2020-01-01 RX ADMIN — ASPIRIN 81 MG: 81 TABLET, COATED ORAL at 10:29

## 2020-01-01 RX ADMIN — MONTELUKAST 10 MG: 10 TABLET, FILM COATED ORAL at 21:15

## 2020-01-01 RX ADMIN — LORAZEPAM 1 MG: 2 INJECTION INTRAMUSCULAR; INTRAVENOUS at 15:05

## 2020-01-01 RX ADMIN — IPRATROPIUM BROMIDE AND ALBUTEROL SULFATE 1 AMPULE: .5; 3 SOLUTION RESPIRATORY (INHALATION) at 19:48

## 2020-01-01 RX ADMIN — SODIUM CHLORIDE, PRESERVATIVE FREE 10 ML: 5 INJECTION INTRAVENOUS at 10:42

## 2020-01-01 RX ADMIN — COLLAGENASE SANTYL: 250 OINTMENT TOPICAL at 09:18

## 2020-01-01 RX ADMIN — ASPIRIN 81 MG: 81 TABLET, COATED ORAL at 09:15

## 2020-01-01 RX ADMIN — CEFTAZIDIME, AVIBACTAM 1.25 G: 2; .5 POWDER, FOR SOLUTION INTRAVENOUS at 11:10

## 2020-01-01 RX ADMIN — DEXTROSE MONOHYDRATE 200 MG: 50 INJECTION, SOLUTION INTRAVENOUS at 14:42

## 2020-01-01 RX ADMIN — OXYCODONE HYDROCHLORIDE AND ACETAMINOPHEN 1000 MG: 500 TABLET ORAL at 08:14

## 2020-01-01 RX ADMIN — IPRATROPIUM BROMIDE AND ALBUTEROL SULFATE 1 AMPULE: .5; 3 SOLUTION RESPIRATORY (INHALATION) at 05:00

## 2020-01-01 RX ADMIN — INSULIN LISPRO 3 UNITS: 100 INJECTION, SOLUTION INTRAVENOUS; SUBCUTANEOUS at 22:11

## 2020-01-01 RX ADMIN — SODIUM CHLORIDE, PRESERVATIVE FREE 10 ML: 5 INJECTION INTRAVENOUS at 08:13

## 2020-01-01 RX ADMIN — VASOPRESSIN 0.04 UNITS/MIN: 20 INJECTION INTRAVENOUS at 21:54

## 2020-01-01 RX ADMIN — VASOPRESSIN 0.03 UNITS/MIN: 20 INJECTION INTRAVENOUS at 21:06

## 2020-01-01 RX ADMIN — METRONIDAZOLE 500 MG: 500 INJECTION, SOLUTION INTRAVENOUS at 07:19

## 2020-01-01 RX ADMIN — OXYCODONE HYDROCHLORIDE AND ACETAMINOPHEN 1000 MG: 500 TABLET ORAL at 09:16

## 2020-01-01 RX ADMIN — CEFTAZIDIME, AVIBACTAM 1.25 G: 2; .5 POWDER, FOR SOLUTION INTRAVENOUS at 16:34

## 2020-01-01 RX ADMIN — CEFTAZIDIME, AVIBACTAM 2.5 G: 2; .5 POWDER, FOR SOLUTION INTRAVENOUS at 00:43

## 2020-01-01 RX ADMIN — IPRATROPIUM BROMIDE AND ALBUTEROL SULFATE 1 AMPULE: .5; 3 SOLUTION RESPIRATORY (INHALATION) at 12:45

## 2020-01-01 RX ADMIN — SODIUM CHLORIDE, PRESERVATIVE FREE 10 ML: 5 INJECTION INTRAVENOUS at 08:56

## 2020-01-01 RX ADMIN — PHENYLEPHRINE HYDROCHLORIDE 50 MCG/MIN: 10 INJECTION INTRAVENOUS at 18:09

## 2020-01-01 RX ADMIN — ROPINIROLE HYDROCHLORIDE 1 MG: 1 TABLET, FILM COATED ORAL at 22:09

## 2020-01-01 RX ADMIN — ROPINIROLE HYDROCHLORIDE 1 MG: 1 TABLET, FILM COATED ORAL at 20:46

## 2020-01-01 RX ADMIN — LINEZOLID 600 MG: 600 INJECTION, SOLUTION INTRAVENOUS at 23:54

## 2020-01-01 RX ADMIN — METRONIDAZOLE 500 MG: 500 INJECTION, SOLUTION INTRAVENOUS at 16:34

## 2020-01-01 RX ADMIN — MIDODRINE HYDROCHLORIDE 10 MG: 5 TABLET ORAL at 16:35

## 2020-01-01 RX ADMIN — COLLAGENASE SANTYL: 250 OINTMENT TOPICAL at 09:00

## 2020-01-01 RX ADMIN — SERTRALINE HYDROCHLORIDE 100 MG: 100 TABLET ORAL at 22:08

## 2020-01-01 RX ADMIN — FLUDROCORTISONE ACETATE 0.1 MG: 0.1 TABLET ORAL at 10:22

## 2020-01-01 RX ADMIN — FUROSEMIDE 80 MG: 10 INJECTION, SOLUTION INTRAMUSCULAR; INTRAVENOUS at 13:55

## 2020-01-01 RX ADMIN — HEPARIN SODIUM 5000 UNITS: 5000 INJECTION INTRAVENOUS; SUBCUTANEOUS at 06:04

## 2020-01-01 RX ADMIN — POTASSIUM CHLORIDE: 2 INJECTION, SOLUTION, CONCENTRATE INTRAVENOUS at 17:43

## 2020-01-01 RX ADMIN — VASOPRESSIN 0.04 UNITS/MIN: 20 INJECTION INTRAVENOUS at 08:59

## 2020-01-01 RX ADMIN — BUPROPION HYDROCHLORIDE 150 MG: 150 TABLET, FILM COATED, EXTENDED RELEASE ORAL at 09:15

## 2020-01-01 RX ADMIN — BUPROPION HYDROCHLORIDE 300 MG: 150 TABLET, FILM COATED, EXTENDED RELEASE ORAL at 10:32

## 2020-01-01 RX ADMIN — FUROSEMIDE 80 MG: 10 INJECTION, SOLUTION INTRAMUSCULAR; INTRAVENOUS at 14:22

## 2020-01-01 RX ADMIN — BUPROPION HYDROCHLORIDE 300 MG: 150 TABLET, FILM COATED, EXTENDED RELEASE ORAL at 09:03

## 2020-01-01 RX ADMIN — METRONIDAZOLE 500 MG: 500 INJECTION, SOLUTION INTRAVENOUS at 23:26

## 2020-01-01 RX ADMIN — I.V. FAT EMULSION 250 ML: 20 EMULSION INTRAVENOUS at 17:43

## 2020-01-01 RX ADMIN — CALCIUM GLUCONATE: 98 INJECTION, SOLUTION INTRAVENOUS at 18:13

## 2020-01-01 RX ADMIN — INSULIN LISPRO 8 UNITS: 100 INJECTION, SOLUTION INTRAVENOUS; SUBCUTANEOUS at 10:06

## 2020-01-01 RX ADMIN — CALCIUM GLUCONATE: 94 INJECTION, SOLUTION INTRAVENOUS at 18:16

## 2020-01-01 RX ADMIN — IPRATROPIUM BROMIDE AND ALBUTEROL SULFATE 1 AMPULE: .5; 3 SOLUTION RESPIRATORY (INHALATION) at 00:30

## 2020-01-01 RX ADMIN — MINOCYCLINE HYDROCHLORIDE 100 MG: 100 CAPSULE ORAL at 08:59

## 2020-01-01 RX ADMIN — METRONIDAZOLE 500 MG: 500 INJECTION, SOLUTION INTRAVENOUS at 16:35

## 2020-01-01 RX ADMIN — OXYCODONE HYDROCHLORIDE AND ACETAMINOPHEN 1000 MG: 500 TABLET ORAL at 08:47

## 2020-01-01 RX ADMIN — PANTOPRAZOLE SODIUM 40 MG: 40 INJECTION, POWDER, FOR SOLUTION INTRAVENOUS at 08:46

## 2020-01-01 RX ADMIN — CEFTAZIDIME, AVIBACTAM 1.25 G: 2; .5 POWDER, FOR SOLUTION INTRAVENOUS at 16:10

## 2020-01-01 RX ADMIN — FUROSEMIDE 80 MG: 10 INJECTION, SOLUTION INTRAMUSCULAR; INTRAVENOUS at 14:09

## 2020-01-01 RX ADMIN — IPRATROPIUM BROMIDE AND ALBUTEROL SULFATE 1 AMPULE: .5; 3 SOLUTION RESPIRATORY (INHALATION) at 20:55

## 2020-01-01 RX ADMIN — AMILORIDE HYDROCLORIDE 5 MG: 5 TABLET ORAL at 08:14

## 2020-01-01 RX ADMIN — TRAMADOL HYDROCHLORIDE 50 MG: 50 TABLET, FILM COATED ORAL at 01:48

## 2020-01-01 RX ADMIN — SPIRONOLACTONE 25 MG: 25 TABLET ORAL at 09:00

## 2020-01-01 RX ADMIN — METHYLPREDNISOLONE SODIUM SUCCINATE 40 MG: 40 INJECTION, POWDER, LYOPHILIZED, FOR SOLUTION INTRAMUSCULAR; INTRAVENOUS at 17:14

## 2020-01-01 RX ADMIN — CEFTAZIDIME, AVIBACTAM 1.25 G: 2; .5 POWDER, FOR SOLUTION INTRAVENOUS at 23:54

## 2020-01-01 RX ADMIN — SODIUM CHLORIDE, PRESERVATIVE FREE 10 ML: 5 INJECTION INTRAVENOUS at 21:50

## 2020-01-01 RX ADMIN — INSULIN GLARGINE 15 UNITS: 100 INJECTION, SOLUTION SUBCUTANEOUS at 21:20

## 2020-01-01 RX ADMIN — IPRATROPIUM BROMIDE AND ALBUTEROL SULFATE 1 AMPULE: .5; 3 SOLUTION RESPIRATORY (INHALATION) at 20:16

## 2020-01-01 RX ADMIN — CEFTAZIDIME, AVIBACTAM 1.25 G: 2; .5 POWDER, FOR SOLUTION INTRAVENOUS at 23:38

## 2020-01-01 RX ADMIN — CEFTAZIDIME, AVIBACTAM 1.25 G: 2; .5 POWDER, FOR SOLUTION INTRAVENOUS at 01:27

## 2020-01-01 RX ADMIN — I.V. FAT EMULSION 250 ML: 20 EMULSION INTRAVENOUS at 18:10

## 2020-01-01 RX ADMIN — IPRATROPIUM BROMIDE AND ALBUTEROL SULFATE 1 AMPULE: .5; 3 SOLUTION RESPIRATORY (INHALATION) at 15:32

## 2020-01-01 RX ADMIN — METRONIDAZOLE 500 MG: 500 INJECTION, SOLUTION INTRAVENOUS at 02:00

## 2020-01-01 RX ADMIN — METHYLPREDNISOLONE SODIUM SUCCINATE 40 MG: 40 INJECTION, POWDER, LYOPHILIZED, FOR SOLUTION INTRAMUSCULAR; INTRAVENOUS at 02:32

## 2020-01-01 RX ADMIN — DAPTOMYCIN 845 MG: 500 INJECTION, POWDER, LYOPHILIZED, FOR SOLUTION INTRAVENOUS at 18:39

## 2020-01-01 RX ADMIN — METHYLPREDNISOLONE SODIUM SUCCINATE 40 MG: 40 INJECTION, POWDER, LYOPHILIZED, FOR SOLUTION INTRAMUSCULAR; INTRAVENOUS at 18:17

## 2020-01-01 RX ADMIN — SERTRALINE HYDROCHLORIDE 50 MG: 50 TABLET ORAL at 09:15

## 2020-01-01 RX ADMIN — SODIUM CHLORIDE, PRESERVATIVE FREE 10 ML: 5 INJECTION INTRAVENOUS at 21:35

## 2020-01-01 RX ADMIN — MINOCYCLINE HYDROCHLORIDE 100 MG: 100 CAPSULE ORAL at 22:00

## 2020-01-01 RX ADMIN — FUROSEMIDE 80 MG: 10 INJECTION, SOLUTION INTRAMUSCULAR; INTRAVENOUS at 09:15

## 2020-01-01 RX ADMIN — COLLAGENASE SANTYL: 250 OINTMENT TOPICAL at 09:40

## 2020-01-01 RX ADMIN — VASOPRESSIN 0.04 UNITS/MIN: 20 INJECTION INTRAVENOUS at 07:42

## 2020-01-01 RX ADMIN — PANTOPRAZOLE SODIUM 40 MG: 40 INJECTION, POWDER, FOR SOLUTION INTRAVENOUS at 11:10

## 2020-01-01 RX ADMIN — ROPINIROLE HYDROCHLORIDE 1 MG: 1 TABLET, FILM COATED ORAL at 23:26

## 2020-01-01 RX ADMIN — SERTRALINE HYDROCHLORIDE 50 MG: 50 TABLET ORAL at 21:26

## 2020-01-01 RX ADMIN — POTASSIUM CHLORIDE: 2 INJECTION, SOLUTION, CONCENTRATE INTRAVENOUS at 18:09

## 2020-01-01 RX ADMIN — SODIUM CHLORIDE, PRESERVATIVE FREE 10 ML: 5 INJECTION INTRAVENOUS at 13:56

## 2020-01-01 RX ADMIN — IPRATROPIUM BROMIDE AND ALBUTEROL SULFATE 1 AMPULE: .5; 3 SOLUTION RESPIRATORY (INHALATION) at 15:46

## 2020-01-01 RX ADMIN — PHENYLEPHRINE HYDROCHLORIDE 50 MCG/MIN: 10 INJECTION INTRAVENOUS at 14:12

## 2020-01-01 RX ADMIN — METHYLPREDNISOLONE SODIUM SUCCINATE 40 MG: 40 INJECTION, POWDER, LYOPHILIZED, FOR SOLUTION INTRAMUSCULAR; INTRAVENOUS at 17:42

## 2020-01-01 RX ADMIN — CEFTAZIDIME, AVIBACTAM 1.25 G: 2; .5 POWDER, FOR SOLUTION INTRAVENOUS at 16:19

## 2020-01-01 RX ADMIN — POTASSIUM CHLORIDE 20 MEQ: 29.8 INJECTION, SOLUTION INTRAVENOUS at 04:25

## 2020-01-01 RX ADMIN — INSULIN LISPRO 12 UNITS: 100 INJECTION, SOLUTION INTRAVENOUS; SUBCUTANEOUS at 11:59

## 2020-01-01 RX ADMIN — AMIODARONE HYDROCHLORIDE 1 MG/MIN: 50 INJECTION, SOLUTION INTRAVENOUS at 12:34

## 2020-01-01 RX ADMIN — OXYCODONE HYDROCHLORIDE AND ACETAMINOPHEN 1000 MG: 500 TABLET ORAL at 09:33

## 2020-01-01 RX ADMIN — POTASSIUM CHLORIDE 20 MEQ: 29.8 INJECTION, SOLUTION INTRAVENOUS at 18:11

## 2020-01-01 RX ADMIN — ROPINIROLE HYDROCHLORIDE 1 MG: 1 TABLET, FILM COATED ORAL at 20:03

## 2020-01-01 RX ADMIN — IPRATROPIUM BROMIDE AND ALBUTEROL SULFATE 1 AMPULE: .5; 3 SOLUTION RESPIRATORY (INHALATION) at 08:06

## 2020-01-01 RX ADMIN — IPRATROPIUM BROMIDE AND ALBUTEROL SULFATE 1 AMPULE: .5; 3 SOLUTION RESPIRATORY (INHALATION) at 11:43

## 2020-01-01 RX ADMIN — MINOCYCLINE HYDROCHLORIDE 100 MG: 100 CAPSULE ORAL at 22:08

## 2020-01-01 RX ADMIN — METHYLPREDNISOLONE SODIUM SUCCINATE 40 MG: 40 INJECTION, POWDER, LYOPHILIZED, FOR SOLUTION INTRAMUSCULAR; INTRAVENOUS at 10:42

## 2020-01-01 RX ADMIN — METHYLPREDNISOLONE SODIUM SUCCINATE 40 MG: 40 INJECTION, POWDER, FOR SOLUTION INTRAMUSCULAR; INTRAVENOUS at 22:27

## 2020-01-01 RX ADMIN — HEPARIN SODIUM 5000 UNITS: 5000 INJECTION INTRAVENOUS; SUBCUTANEOUS at 15:21

## 2020-01-01 RX ADMIN — FUROSEMIDE 80 MG: 10 INJECTION, SOLUTION INTRAMUSCULAR; INTRAVENOUS at 21:26

## 2020-01-01 RX ADMIN — HEPARIN SODIUM AND DEXTROSE 10.2 UNITS/KG/HR: 10000; 5 INJECTION INTRAVENOUS at 10:53

## 2020-01-01 RX ADMIN — IPRATROPIUM BROMIDE AND ALBUTEROL SULFATE 1 AMPULE: .5; 3 SOLUTION RESPIRATORY (INHALATION) at 08:44

## 2020-01-01 RX ADMIN — SODIUM CHLORIDE, PRESERVATIVE FREE 10 ML: 5 INJECTION INTRAVENOUS at 09:04

## 2020-01-01 RX ADMIN — VASOPRESSIN 0.04 UNITS/MIN: 20 INJECTION INTRAVENOUS at 16:34

## 2020-01-01 RX ADMIN — DEXTROSE MONOHYDRATE 100 MG: 50 INJECTION, SOLUTION INTRAVENOUS at 15:06

## 2020-01-01 RX ADMIN — SERTRALINE HYDROCHLORIDE 100 MG: 100 TABLET ORAL at 20:28

## 2020-01-01 RX ADMIN — I.V. FAT EMULSION 250 ML: 20 EMULSION INTRAVENOUS at 17:48

## 2020-01-01 RX ADMIN — INSULIN LISPRO 3 UNITS: 100 INJECTION, SOLUTION INTRAVENOUS; SUBCUTANEOUS at 23:56

## 2020-01-01 RX ADMIN — FUROSEMIDE 80 MG: 10 INJECTION, SOLUTION INTRAMUSCULAR; INTRAVENOUS at 09:33

## 2020-01-01 RX ADMIN — METHYLPREDNISOLONE SODIUM SUCCINATE 40 MG: 40 INJECTION, POWDER, LYOPHILIZED, FOR SOLUTION INTRAMUSCULAR; INTRAVENOUS at 05:09

## 2020-01-01 RX ADMIN — SERTRALINE HYDROCHLORIDE 100 MG: 100 TABLET ORAL at 10:32

## 2020-01-01 RX ADMIN — OXYCODONE HYDROCHLORIDE AND ACETAMINOPHEN 1000 MG: 500 TABLET ORAL at 10:32

## 2020-01-01 RX ADMIN — INSULIN GLARGINE 40 UNITS: 100 INJECTION, SOLUTION SUBCUTANEOUS at 21:25

## 2020-01-01 RX ADMIN — POTASSIUM CHLORIDE: 2 INJECTION, SOLUTION, CONCENTRATE INTRAVENOUS at 18:11

## 2020-01-01 RX ADMIN — IPRATROPIUM BROMIDE AND ALBUTEROL SULFATE 1 AMPULE: .5; 3 SOLUTION RESPIRATORY (INHALATION) at 15:29

## 2020-01-01 RX ADMIN — SODIUM CHLORIDE, PRESERVATIVE FREE 10 ML: 5 INJECTION INTRAVENOUS at 10:32

## 2020-01-01 RX ADMIN — LORAZEPAM 1 MG: 2 INJECTION INTRAMUSCULAR; INTRAVENOUS at 23:58

## 2020-01-01 RX ADMIN — FUROSEMIDE 80 MG: 10 INJECTION, SOLUTION INTRAMUSCULAR; INTRAVENOUS at 20:03

## 2020-01-01 RX ADMIN — METHYLPREDNISOLONE SODIUM SUCCINATE 40 MG: 40 INJECTION, POWDER, LYOPHILIZED, FOR SOLUTION INTRAMUSCULAR; INTRAVENOUS at 02:22

## 2020-01-01 RX ADMIN — VASOPRESSIN 0.04 UNITS/MIN: 20 INJECTION INTRAVENOUS at 14:01

## 2020-01-01 RX ADMIN — METHYLPREDNISOLONE SODIUM SUCCINATE 40 MG: 40 INJECTION, POWDER, LYOPHILIZED, FOR SOLUTION INTRAMUSCULAR; INTRAVENOUS at 03:08

## 2020-01-01 RX ADMIN — MIDODRINE HYDROCHLORIDE 10 MG: 5 TABLET ORAL at 10:21

## 2020-01-01 RX ADMIN — IPRATROPIUM BROMIDE AND ALBUTEROL SULFATE 1 AMPULE: .5; 3 SOLUTION RESPIRATORY (INHALATION) at 00:32

## 2020-01-01 RX ADMIN — OXYCODONE HYDROCHLORIDE AND ACETAMINOPHEN 1000 MG: 500 TABLET ORAL at 10:29

## 2020-01-01 RX ADMIN — PANTOPRAZOLE SODIUM 40 MG: 40 INJECTION, POWDER, FOR SOLUTION INTRAVENOUS at 10:31

## 2020-01-01 RX ADMIN — SODIUM CHLORIDE, PRESERVATIVE FREE 10 ML: 5 INJECTION INTRAVENOUS at 23:39

## 2020-01-01 RX ADMIN — METRONIDAZOLE 500 MG: 500 INJECTION, SOLUTION INTRAVENOUS at 16:09

## 2020-01-01 RX ADMIN — HEPARIN SODIUM 5000 UNITS: 5000 INJECTION INTRAVENOUS; SUBCUTANEOUS at 06:14

## 2020-01-01 RX ADMIN — TRAMADOL HYDROCHLORIDE 50 MG: 50 TABLET, FILM COATED ORAL at 10:53

## 2020-01-01 RX ADMIN — HEPARIN SODIUM 5000 UNITS: 5000 INJECTION INTRAVENOUS; SUBCUTANEOUS at 22:00

## 2020-01-01 RX ADMIN — PANTOPRAZOLE SODIUM 40 MG: 40 INJECTION, POWDER, FOR SOLUTION INTRAVENOUS at 21:26

## 2020-01-01 RX ADMIN — COLLAGENASE SANTYL: 250 OINTMENT TOPICAL at 10:03

## 2020-01-01 RX ADMIN — PANTOPRAZOLE SODIUM 40 MG: 40 INJECTION, POWDER, FOR SOLUTION INTRAVENOUS at 09:03

## 2020-01-01 RX ADMIN — HEPARIN SODIUM 5000 UNITS: 5000 INJECTION INTRAVENOUS; SUBCUTANEOUS at 07:42

## 2020-01-01 RX ADMIN — SODIUM CHLORIDE, PRESERVATIVE FREE 10 ML: 5 INJECTION INTRAVENOUS at 21:34

## 2020-01-01 RX ADMIN — INSULIN LISPRO 2 UNITS: 100 INJECTION, SOLUTION INTRAVENOUS; SUBCUTANEOUS at 14:18

## 2020-01-01 RX ADMIN — Medication 22 MCG/MIN: at 07:07

## 2020-01-01 RX ADMIN — IPRATROPIUM BROMIDE AND ALBUTEROL SULFATE 1 AMPULE: .5; 3 SOLUTION RESPIRATORY (INHALATION) at 08:37

## 2020-01-01 RX ADMIN — PANTOPRAZOLE SODIUM 40 MG: 40 INJECTION, POWDER, FOR SOLUTION INTRAVENOUS at 23:40

## 2020-01-01 RX ADMIN — FUROSEMIDE 80 MG: 10 INJECTION, SOLUTION INTRAMUSCULAR; INTRAVENOUS at 08:48

## 2020-01-01 RX ADMIN — INSULIN LISPRO 6 UNITS: 100 INJECTION, SOLUTION INTRAVENOUS; SUBCUTANEOUS at 12:54

## 2020-01-01 RX ADMIN — HEPARIN SODIUM 5000 UNITS: 5000 INJECTION INTRAVENOUS; SUBCUTANEOUS at 14:22

## 2020-01-01 RX ADMIN — MEMANTINE 5 MG: 10 TABLET ORAL at 20:03

## 2020-01-01 RX ADMIN — VASOPRESSIN 0.04 UNITS/MIN: 20 INJECTION INTRAVENOUS at 23:32

## 2020-01-01 RX ADMIN — Medication 0.2 MG: at 22:28

## 2020-01-01 RX ADMIN — AMILORIDE HYDROCLORIDE 5 MG: 5 TABLET ORAL at 08:50

## 2020-01-01 RX ADMIN — METHYLPREDNISOLONE SODIUM SUCCINATE 40 MG: 40 INJECTION, POWDER, LYOPHILIZED, FOR SOLUTION INTRAMUSCULAR; INTRAVENOUS at 02:30

## 2020-01-01 RX ADMIN — VASOPRESSIN 0.04 UNITS/MIN: 20 INJECTION INTRAVENOUS at 13:40

## 2020-01-01 RX ADMIN — HEPARIN SODIUM 5000 UNITS: 5000 INJECTION INTRAVENOUS; SUBCUTANEOUS at 23:55

## 2020-01-01 RX ADMIN — LINEZOLID 600 MG: 600 INJECTION, SOLUTION INTRAVENOUS at 11:48

## 2020-01-01 RX ADMIN — LINEZOLID 600 MG: 600 INJECTION, SOLUTION INTRAVENOUS at 11:59

## 2020-01-01 RX ADMIN — DIPHENHYDRAMINE HYDROCHLORIDE 12.5 MG: 50 INJECTION INTRAMUSCULAR; INTRAVENOUS at 01:48

## 2020-01-01 RX ADMIN — BUPROPION HYDROCHLORIDE 300 MG: 150 TABLET, FILM COATED, EXTENDED RELEASE ORAL at 10:28

## 2020-01-01 RX ADMIN — PHENYLEPHRINE HYDROCHLORIDE 125 MCG/MIN: 10 INJECTION INTRAVENOUS at 22:31

## 2020-01-01 RX ADMIN — HEPARIN SODIUM 5000 UNITS: 5000 INJECTION INTRAVENOUS; SUBCUTANEOUS at 06:20

## 2020-01-01 RX ADMIN — HEPARIN SODIUM 5000 UNITS: 5000 INJECTION INTRAVENOUS; SUBCUTANEOUS at 14:41

## 2020-01-01 RX ADMIN — FUROSEMIDE 80 MG: 10 INJECTION, SOLUTION INTRAMUSCULAR; INTRAVENOUS at 22:10

## 2020-01-01 RX ADMIN — MONTELUKAST 10 MG: 10 TABLET, FILM COATED ORAL at 23:26

## 2020-01-01 RX ADMIN — HEPARIN SODIUM 5000 UNITS: 5000 INJECTION INTRAVENOUS; SUBCUTANEOUS at 06:12

## 2020-01-01 RX ADMIN — MINOCYCLINE HYDROCHLORIDE 100 MG: 100 CAPSULE ORAL at 17:12

## 2020-01-01 RX ADMIN — SERTRALINE HYDROCHLORIDE 100 MG: 100 TABLET ORAL at 10:29

## 2020-01-01 RX ADMIN — POTASSIUM CHLORIDE 20 MEQ: 29.8 INJECTION, SOLUTION INTRAVENOUS at 22:09

## 2020-01-01 RX ADMIN — MINOCYCLINE HYDROCHLORIDE 100 MG: 100 CAPSULE ORAL at 08:14

## 2020-01-01 RX ADMIN — METOLAZONE 2.5 MG: 5 TABLET ORAL at 11:40

## 2020-01-01 RX ADMIN — AMILORIDE HYDROCLORIDE 5 MG: 5 TABLET ORAL at 09:14

## 2020-01-01 RX ADMIN — VASOPRESSIN 0.04 UNITS/MIN: 20 INJECTION INTRAVENOUS at 02:17

## 2020-01-01 RX ADMIN — MEMANTINE 5 MG: 10 TABLET ORAL at 20:27

## 2020-01-01 RX ADMIN — COLLAGENASE SANTYL: 250 OINTMENT TOPICAL at 09:58

## 2020-01-01 RX ADMIN — MINOCYCLINE HYDROCHLORIDE 100 MG: 100 CAPSULE ORAL at 21:55

## 2020-01-01 RX ADMIN — METHYLPREDNISOLONE SODIUM SUCCINATE 40 MG: 40 INJECTION, POWDER, LYOPHILIZED, FOR SOLUTION INTRAMUSCULAR; INTRAVENOUS at 02:09

## 2020-01-01 RX ADMIN — SODIUM CHLORIDE, PRESERVATIVE FREE 10 ML: 5 INJECTION INTRAVENOUS at 06:15

## 2020-01-01 RX ADMIN — CEFTAZIDIME, AVIBACTAM 1.25 G: 2; .5 POWDER, FOR SOLUTION INTRAVENOUS at 07:42

## 2020-01-01 RX ADMIN — CALCIUM GLUCONATE: 94 INJECTION, SOLUTION INTRAVENOUS at 17:46

## 2020-01-01 RX ADMIN — IPRATROPIUM BROMIDE AND ALBUTEROL SULFATE 1 AMPULE: .5; 3 SOLUTION RESPIRATORY (INHALATION) at 17:00

## 2020-01-01 RX ADMIN — CEFTAZIDIME, AVIBACTAM 2.5 G: 2; .5 POWDER, FOR SOLUTION INTRAVENOUS at 08:13

## 2020-01-01 RX ADMIN — IPRATROPIUM BROMIDE AND ALBUTEROL SULFATE 1 AMPULE: .5; 3 SOLUTION RESPIRATORY (INHALATION) at 20:26

## 2020-01-01 RX ADMIN — HYDROMORPHONE HYDROCHLORIDE 0.5 MG: 1 INJECTION, SOLUTION INTRAMUSCULAR; INTRAVENOUS; SUBCUTANEOUS at 17:42

## 2020-01-01 RX ADMIN — METRONIDAZOLE 500 MG: 500 INJECTION, SOLUTION INTRAVENOUS at 23:21

## 2020-01-01 RX ADMIN — LINEZOLID 600 MG: 600 INJECTION, SOLUTION INTRAVENOUS at 11:34

## 2020-01-01 RX ADMIN — PANTOPRAZOLE SODIUM 40 MG: 40 INJECTION, POWDER, FOR SOLUTION INTRAVENOUS at 20:46

## 2020-01-01 RX ADMIN — FUROSEMIDE 80 MG: 10 INJECTION, SOLUTION INTRAMUSCULAR; INTRAVENOUS at 20:06

## 2020-01-01 RX ADMIN — ASPIRIN 81 MG: 81 TABLET, COATED ORAL at 10:32

## 2020-01-01 RX ADMIN — POTASSIUM CHLORIDE 60 MEQ: 29.8 INJECTION, SOLUTION INTRAVENOUS at 23:25

## 2020-01-01 RX ADMIN — MINOCYCLINE HYDROCHLORIDE 100 MG: 100 CAPSULE ORAL at 10:29

## 2020-01-01 RX ADMIN — SERTRALINE HYDROCHLORIDE 100 MG: 100 TABLET ORAL at 21:15

## 2020-01-01 RX ADMIN — LINEZOLID 600 MG: 600 INJECTION, SOLUTION INTRAVENOUS at 18:28

## 2020-01-01 RX ADMIN — VASOPRESSIN 0.04 UNITS/MIN: 20 INJECTION INTRAVENOUS at 10:57

## 2020-01-01 RX ADMIN — IPRATROPIUM BROMIDE AND ALBUTEROL SULFATE 1 AMPULE: .5; 3 SOLUTION RESPIRATORY (INHALATION) at 04:50

## 2020-01-01 RX ADMIN — HEPARIN SODIUM 5000 UNITS: 5000 INJECTION INTRAVENOUS; SUBCUTANEOUS at 06:38

## 2020-01-01 RX ADMIN — DIPHENHYDRAMINE HYDROCHLORIDE 12.5 MG: 50 INJECTION INTRAMUSCULAR; INTRAVENOUS at 22:10

## 2020-01-01 RX ADMIN — IPRATROPIUM BROMIDE AND ALBUTEROL SULFATE 1 AMPULE: .5; 3 SOLUTION RESPIRATORY (INHALATION) at 00:39

## 2020-01-01 RX ADMIN — AMILORIDE HYDROCLORIDE 5 MG: 5 TABLET ORAL at 08:59

## 2020-01-01 RX ADMIN — IPRATROPIUM BROMIDE AND ALBUTEROL SULFATE 1 AMPULE: .5; 3 SOLUTION RESPIRATORY (INHALATION) at 20:52

## 2020-01-01 RX ADMIN — SODIUM CHLORIDE, PRESERVATIVE FREE 10 ML: 5 INJECTION INTRAVENOUS at 20:54

## 2020-01-01 RX ADMIN — VASOPRESSIN 0.04 UNITS/MIN: 20 INJECTION INTRAVENOUS at 17:44

## 2020-01-01 ASSESSMENT — PAIN SCALES - GENERAL
PAINLEVEL_OUTOF10: 0
PAINLEVEL_OUTOF10: 3
PAINLEVEL_OUTOF10: 0
PAINLEVEL_OUTOF10: 5
PAINLEVEL_OUTOF10: 0
PAINLEVEL_OUTOF10: 4
PAINLEVEL_OUTOF10: 0
PAINLEVEL_OUTOF10: 7
PAINLEVEL_OUTOF10: 6
PAINLEVEL_OUTOF10: 0
PAINLEVEL_OUTOF10: 7
PAINLEVEL_OUTOF10: 7
PAINLEVEL_OUTOF10: 0
PAINLEVEL_OUTOF10: 7
PAINLEVEL_OUTOF10: 0
PAINLEVEL_OUTOF10: 8
PAINLEVEL_OUTOF10: 0
PAINLEVEL_OUTOF10: 5
PAINLEVEL_OUTOF10: 0
PAINLEVEL_OUTOF10: 7
PAINLEVEL_OUTOF10: 0
PAINLEVEL_OUTOF10: 8
PAINLEVEL_OUTOF10: 0
PAINLEVEL_OUTOF10: 4
PAINLEVEL_OUTOF10: 7
PAINLEVEL_OUTOF10: 0
PAINLEVEL_OUTOF10: 4
PAINLEVEL_OUTOF10: 8
PAINLEVEL_OUTOF10: 0
PAINLEVEL_OUTOF10: 8
PAINLEVEL_OUTOF10: 0
PAINLEVEL_OUTOF10: 0
PAINLEVEL_OUTOF10: 4
PAINLEVEL_OUTOF10: 4
PAINLEVEL_OUTOF10: 0
PAINLEVEL_OUTOF10: 2
PAINLEVEL_OUTOF10: 0
PAINLEVEL_OUTOF10: 3
PAINLEVEL_OUTOF10: 8
PAINLEVEL_OUTOF10: 0

## 2020-01-01 ASSESSMENT — ENCOUNTER SYMPTOMS
ANAL BLEEDING: 0
ABDOMINAL PAIN: 0
EYE REDNESS: 0
STRIDOR: 0
APNEA: 0
COLOR CHANGE: 0
CONSTIPATION: 0
RECTAL PAIN: 0
CHOKING: 0
ABDOMINAL PAIN: 0
CONSTIPATION: 0
BACK PAIN: 0
APNEA: 0
COLOR CHANGE: 0
EYE ITCHING: 0
APNEA: 0
EYE REDNESS: 0
PHOTOPHOBIA: 0
EYE ITCHING: 0
STRIDOR: 0
CHOKING: 0
PHOTOPHOBIA: 0
EYE REDNESS: 0
ABDOMINAL PAIN: 0
ANAL BLEEDING: 0
CHOKING: 0
ABDOMINAL PAIN: 0
BACK PAIN: 0
COLOR CHANGE: 0
EYE ITCHING: 0
COLOR CHANGE: 0
SORE THROAT: 0
ANAL BLEEDING: 0
CONSTIPATION: 0
CONSTIPATION: 0
ANAL BLEEDING: 0
CHOKING: 0
STRIDOR: 0
ANAL BLEEDING: 0
EYE ITCHING: 0
SORE THROAT: 0
BACK PAIN: 0
APNEA: 0
RECTAL PAIN: 0
SORE THROAT: 0
COLOR CHANGE: 0
SORE THROAT: 0
SORE THROAT: 0
CONSTIPATION: 0
STRIDOR: 0
STRIDOR: 0
BACK PAIN: 0
EYE REDNESS: 0
ABDOMINAL PAIN: 0
PHOTOPHOBIA: 0
APNEA: 0
CHOKING: 0
PHOTOPHOBIA: 0
PHOTOPHOBIA: 0
BACK PAIN: 0
RECTAL PAIN: 0
RECTAL PAIN: 0
EYE REDNESS: 0
RECTAL PAIN: 0
EYE ITCHING: 0

## 2020-01-01 ASSESSMENT — PAIN - FUNCTIONAL ASSESSMENT
PAIN_FUNCTIONAL_ASSESSMENT: PREVENTS OR INTERFERES SOME ACTIVE ACTIVITIES AND ADLS
PAIN_FUNCTIONAL_ASSESSMENT: ACTIVITIES ARE NOT PREVENTED
PAIN_FUNCTIONAL_ASSESSMENT: ACTIVITIES ARE NOT PREVENTED

## 2020-01-01 ASSESSMENT — PAIN DESCRIPTION - PAIN TYPE
TYPE: SURGICAL PAIN
TYPE: ACUTE PAIN;SURGICAL PAIN
TYPE: ACUTE PAIN;CHRONIC PAIN
TYPE: CHRONIC PAIN
TYPE: SURGICAL PAIN
TYPE: ACUTE PAIN;CHRONIC PAIN
TYPE: CHRONIC PAIN

## 2020-01-01 ASSESSMENT — PAIN DESCRIPTION - PROGRESSION
CLINICAL_PROGRESSION: NOT CHANGED
CLINICAL_PROGRESSION: GRADUALLY WORSENING
CLINICAL_PROGRESSION: NOT CHANGED

## 2020-01-01 ASSESSMENT — PAIN DESCRIPTION - DESCRIPTORS
DESCRIPTORS: DISCOMFORT;SORE
DESCRIPTORS: DISCOMFORT
DESCRIPTORS: ACHING
DESCRIPTORS: ACHING;DISCOMFORT

## 2020-01-01 ASSESSMENT — PAIN DESCRIPTION - LOCATION
LOCATION: COCCYX
LOCATION: ABDOMEN
LOCATION: BACK
LOCATION: ABDOMEN;BUTTOCKS
LOCATION: COCCYX
LOCATION: COCCYX
LOCATION: BACK

## 2020-01-01 ASSESSMENT — PAIN DESCRIPTION - ORIENTATION
ORIENTATION: RIGHT
ORIENTATION: LOWER;POSTERIOR
ORIENTATION: RIGHT

## 2020-01-01 ASSESSMENT — PAIN DESCRIPTION - FREQUENCY
FREQUENCY: CONTINUOUS
FREQUENCY: INTERMITTENT
FREQUENCY: CONTINUOUS
FREQUENCY: CONTINUOUS

## 2020-01-01 ASSESSMENT — PAIN DESCRIPTION - ONSET
ONSET: ON-GOING
ONSET: GRADUAL
ONSET: AWAKENED FROM SLEEP

## 2020-01-01 ASSESSMENT — PAIN DESCRIPTION - DIRECTION: RADIATING_TOWARDS: MID

## 2020-01-01 NOTE — PROGRESS NOTES
CREATININE 1.3  --  1.8*   GLUCOSE 170*  --  163*       Lab Results   Component Value Date    CALCIUM 10.2 12/31/2019    PHOS 2.8 11/17/2019       Objective:     Vitals: /61   Pulse 122   Temp 98 °F (36.7 °C) (Oral)   Resp 17   Ht 6' 3\" (1.905 m)   Wt (!) 324 lb 15.3 oz (147.4 kg)   SpO2 99%   BMI 40.62 kg/m²     General appearance:  No ac distress  HEENT:  ++ conj pallor  Neck:  supple  Lungs:  Crackles B/l   Heart:  tachy  Abdomen: soft  Extremities:  + edema       Problem List :         Impression :     1. VIRGILIO- non oliguric- multiple insult - mainly with lung - likely has ATI but also may have renal vein congestion form pulmo edema   2. Hypoxia/ hypotension and tachy- d/d PE/ infection/pulm edema   3. Recent abd sx/ hip [prosthetic infection/ DM / etc     Recommendation/Plan  :     1. As he responded to loop  2. Try again 80 TID- he is with 2 vasopressors but tolerated ok  3. He is on emp hepatin as his risk is too high of we miss PE  4. Will get CTA even though he has high cr as in my opinion the benefit outweigh the risk  5. He is with emp abx nabil with Rt lung infiltrate  - risk for asp PNA high   6. Stop IVF  7. See how he does   8. Labs in am   9.  I had long d/w with his Lucía Valenzuela MD

## 2020-01-01 NOTE — PROGRESS NOTES
vancomycin 1250 mg q24h IVPB as an outpatient along with meropenem for a PJI, developed VIRGILIO and was admitted with supra-therapeutic levels   Now intermittent vancomycin dosing based on levels 2/2 VIRGILIO  o Received vancomycin 1500 mg x 1 on 12/30  o Random concentration 48h post-dose elevated, expected given worsening renal function and clinical status changes   o Hold vancomycin today   o Random level daily, re-dose when level <15   [address] [place] [address] will continue to monitor renal function, clinical status & recommend de-escalation if appropriate.     Thank you for the consult,   Melly Abbasi PharmD, [facility]  1/1/2020 11:42 AM

## 2020-01-01 NOTE — PROGRESS NOTES
General Surgery-Dr. Maikel Mata Day: 10    ChiefComplaint on Admission: sacral wound      Subjective:     Eduin Warren is a 77 y.o. male with pod #5 s/p lap diverting loop ileostomy placement . Was started on heparin for concern for PE. Levo being weaned. BP and HR improving. Denies abdominal pain. ROS:  Review of Systems   Constitutional: Negative for chills and fever. HENT: Negative for ear pain, mouth sores, sore throat and tinnitus. Eyes: Negative for photophobia, redness and itching. Respiratory: Negative for apnea, choking and stridor. Cardiovascular: Negative for chest pain and palpitations. Gastrointestinal: Negative for abdominal pain, anal bleeding, constipation and rectal pain. Endocrine: Negative for polydipsia. Genitourinary: Negative for enuresis, flank pain and hematuria. Musculoskeletal: Negative for back pain, joint swelling and myalgias. Skin: Positive for wound. Negative for color change and pallor. Allergic/Immunologic: Negative for environmental allergies. Neurological: Negative for syncope and speech difficulty. Psychiatric/Behavioral: Negative for confusion and hallucinations. Allergies  Bee venom          Diagnosis Date    Arthritis     Asthma     Chronic kidney disease     stage 3, seen by Dr. Savi Rivas Diabetes mellitus ([facility]     H/O cardiac catheterization 2017    H/O cardiovascular stress test ,     CARDIOLITE: EF->51%    Hyperlipidemia     Hypertension     Obesity     Pressure ulcer of coccygeal region, unstageable ([facility] [address] 09/10/2019    Thyroid disease        Objective:     Vitals:    20 1701   BP:    Pulse:    Resp: 23   Temp:    SpO2: 92%       TEMPERATURE:  Current - Temp: 98.2 °F (36.8 °C); Max - Temp  Av.2 °F (36.8 °C)  Min: 98 °F (36.7 °C)  Max: 98.3 °F (36.8 °C)    No intake/output data recorded. I/O last 3 completed shifts:   In: 2813 [I.V.:2354]  Out: 5205 [Urine:2725; Emesis/NG [id number]:4038]      Physical Exam:    Physical Exam  Constitutional:       Appearance: He is well-developed. HENT:      Head: Normocephalic. Eyes:      Pupils: Pupils are equal, round, and reactive to light. Neck:      Musculoskeletal: Normal range of motion and neck supple. Cardiovascular:      Rate and Rhythm: Normal rate. Pulmonary:      Effort: Pulmonary effort is normal.   Abdominal:      General: There is no distension. Palpations: Abdomen is soft. There is no mass. Tenderness: There is no tenderness (loop ileostomy with bowel sweat in colostomy bag, still ecchymotic but pink visable). There is no guarding or rebound. Musculoskeletal: Normal range of motion. Skin:     General: Skin is warm. Neurological:      Mental Status: He is alert and oriented to person, place, and time. Mental status is at baseline.              Scheduled Meds:   furosemide  80 mg Intravenous TID    ceftazidime-acibactam (AVYCAZ) infusion  1.25 g Intravenous Q8H    ipratropium-albuterol  1 ampule Inhalation Q4H    methylPREDNISolone  40 mg Intravenous Q8H    albumin human  25 g Intravenous Once    pantoprazole  40 mg Intravenous BID    collagenase   Topical Daily    vancomycin (VANCOCIN) intermittent dosing (placeholder)   Other RX Placeholder    aspirin  81 mg Oral Daily    buPROPion  300 mg Oral Daily    mometasone-formoterol  2 puff Inhalation BID    insulin glargine  15 Units Subcutaneous Nightly    memantine  5 mg Oral Nightly    montelukast  10 mg Oral Nightly    rOPINIRole  1 mg Oral Nightly    sertraline  100 mg Oral BID    vitamin C  1,000 mg Oral Daily    vitamin E  400 Units Oral Daily    sodium chloride flush  10 mL Intravenous 2 times per day    insulin lispro  0-12 Units Subcutaneous TID     insulin lispro  0-6 Units Subcutaneous Nightly     Continuous Infusions:   norepinephrine 5 mcg/min (01/01/20 1600)    vasopressin (Septic Shock) infusion 0.04 Units/min (01/01/20 1101)  heparin (porcine) 10.2 Units/kg/hr (01/01/20 1101)    dextrose Stopped (12/27/19 1904)    dextrose       PRN Meds:sodium chloride flush, diphenhydrAMINE, heparin (porcine), heparin (porcine), potassium chloride, magnesium sulfate, promethazine, ipratropium-albuterol, calcium carbonate, ondansetron, morphine, HYDROcodone 5 mg - acetaminophen, glucose, dextrose, glucagon (rDNA), dextrose, acetaminophen, fluticasone, nitroGLYCERIN, traMADol, trolamine salicylate, sodium chloride flush      Labs/Imaging Results:   Lab Results   Component Value Date    WBC 21.0 (H) 01/01/2020    HGB 11.5 (L) 01/01/2020    HCT 37.3 (L) 01/01/2020    MCV 87.4 01/01/2020     (H) 01/01/2020     Lab Results   Component Value Date     01/01/2020    K 4.3 01/01/2020    CL 99 01/01/2020    CO2 19 (L) 01/01/2020    BUN 56 (H) 01/01/2020    CREATININE 2.5 (H) 01/01/2020    GLUCOSE 220 (H) 01/01/2020    CALCIUM 9.6 01/01/2020    PROT 4.6 (L) 01/01/2020    LABALBU 2.7 (L) 01/01/2020    BILITOT 0.8 01/01/2020    ALKPHOS 97 01/01/2020    AST 70 (H) 01/01/2020    ALT 24 01/01/2020    LABGLOM 26 (L) 01/01/2020    GFRAA 31 (L) 01/01/2020       Assessment:     Patient Active Problem List:     Hypertension     Hyperlipidemia     Asthma     Diabetes mellitus ([address]     H/O cardiovascular stress test     Obesity     Chronic kidney disease, stage III (moderate) ([facility])     Hypertensive kidney disease with CKD stage III (HCC)     Depression     SOBOE (shortness of breath on exertion)     Abnormal cardiovascular stress test     Fracture of epiphysis (separation) (upper) of neck of femur, closed ([facility])     Pressure injury of skin of coccygeal region     Sepsis ([address]     PVC (premature ventricular contraction)     Septicemia ([address]     WD-Pressure injury of sacral region, stage 4 ([address]     WD-Nonhealing surgical wound     WD-Skin ulcer of right hip with necrosis of muscle (HCC)     Troponin level elevated     Recurrent major depressive disorder, in partial remission ([address]     Delusional ideas ([address]     Vascular dementia with behavior disturbance ([address]     Hypotension     Metabolic encephalopathy      Plan:     78 y/o M POD 5 s/p lap loop ileostomy, sacral wound    -Sacral wound is clean and viable, clean base, no necrotic tissue  -Stoma mucosa ecchymotic but appears viable - some areas sloughing off with pink submucosa tissue underneath. Will monitor. May ultimately need revised--however, ideally would wait until off pressors and respiratory status has improved to revise.   -Cont to monitor stoma output  -Local wound care  -Ostomy care / consult  -WBC stable from yesterday (18 from 18 from 29)   - Abdominal exam remains benign with very minimal tenderness. -CT PE / A/P ordered by medical team. Pending.      Dorna Dancer, MD

## 2020-01-01 NOTE — PROGRESS NOTES
Updated Dr. Joey Graff regarding pt's runs of SVT, orders for 2 g magnesium, if persists, start amiodarone gtt. Will continue to monitor closely.

## 2020-01-01 NOTE — PLAN OF CARE
Problem: Falls - Risk of:  Goal: Will remain free from falls  Description  Will remain free from falls  Outcome: Ongoing  Goal: Absence of physical injury  Description  Absence of physical injury  Outcome: Ongoing     Problem: Risk for Impaired Skin Integrity  Goal: Tissue integrity - skin and mucous membranes  Description  Structural intactness and normal physiological function of skin and  mucous membranes. Outcome: Ongoing     Problem: Discharge Planning:  Goal: Participates in care planning  Description  Participates in care planning  Outcome: Ongoing  Goal: Discharged to appropriate level of care  Description  Discharged to appropriate level of care  Outcome: Ongoing     Problem: Airway Clearance - Ineffective:  Goal: Ability to maintain a clear airway will improve  Description  Ability to maintain a clear airway will improve  Outcome: Ongoing     Problem: Anxiety/Stress:  Goal: Level of anxiety will decrease  Description  Level of anxiety will decrease  Outcome: Ongoing     Problem: Aspiration:  Goal: Absence of aspiration  Description  Absence of aspiration  Outcome: Ongoing     Problem:  Bowel Function - Altered:  Goal: Bowel elimination is within specified parameters  Description  Bowel elimination is within specified parameters  Outcome: Ongoing     Problem: Cardiac Output - Decreased:  Goal: Hemodynamic stability will improve  Description  Hemodynamic stability will improve  Outcome: Ongoing     Problem: Fluid Volume - Imbalance:  Goal: Absence of imbalanced fluid volume signs and symptoms  Description  Absence of imbalanced fluid volume signs and symptoms  Outcome: Ongoing     Problem: Gas Exchange - Impaired:  Goal: Levels of oxygenation will improve  Description  Levels of oxygenation will improve  Outcome: Ongoing     Problem: Mental Status - Impaired:  Goal: Mental status will be restored to baseline  Description  Mental status will be restored to baseline  Outcome: Ongoing     Problem: Nutrition Deficit:  Goal: Ability to achieve adequate nutritional intake will improve  Description  Ability to achieve adequate nutritional intake will improve  Outcome: Ongoing     Problem: Pain:  Goal: Pain level will decrease  Description  Pain level will decrease  Outcome: Ongoing  Goal: Control of acute pain  Description  Control of acute pain  Outcome: Ongoing  Goal: Control of chronic pain  Description  Control of chronic pain  Outcome: Ongoing     Problem: Nutrition  Goal: Optimal nutrition therapy  Outcome: Ongoing

## 2020-01-01 NOTE — PROGRESS NOTES
necrosis of muscle (HCC)    Troponin level elevated    Recurrent major depressive disorder, in partial remission ([address]    Delusional ideas ([address]    Vascular dementia with behavior disturbance (HCC)    Hypotension    Metabolic encephalopathy    Severe malnutrition ([address]       Plan:   1. Overall the patient has slightly improved. 2. Wean FiO2.       Violetta Lopez MD  1/1/2020  12:02 PM

## 2020-01-01 NOTE — PROGRESS NOTES
Néstor Hanson MD.  Section of General Neurology - Adult  Consult Note        Reason for Consult:    Requesting Physician:  No referring provider defined for this encounter.   Thank you for your kind referral.    CHIEF COMPLAINT:     Pt was transferred to ICU due to hemodynamic changes and SOB    Pt is stable and doing a lot better     No confusion         Past Medical History:        Diagnosis Date    Arthritis     Asthma     Chronic kidney disease     stage 3, seen by Dr. Marimar Maradiaga Diabetes mellitus ([facility]     H/O cardiac catheterization 05/04/2017    H/O cardiovascular stress test 6/07, 12/08    CARDIOLITE: EF->51%    Hyperlipidemia     Hypertension     Obesity     Pressure ulcer of coccygeal region, unstageable ([facility] [address] 09/10/2019    Thyroid disease      Past Surgical History:        Procedure Laterality Date    CARPAL TUNNEL RELEASE  2005    COLOSTOMY N/A 12/27/2019    LAPAROSCOPIC DIVERTING ILEOSTOMY performed by Melanie Parks MD at [place] [address] CATH LAB PROCEDURE  12/05    NO SIGNIFICANT CAD    EYE SURGERY      HEMIARTHROPLASTY HIP Right 8/29/2019    HIP HEMIARTHROPLASTY performed by Symone Becerril MD at [address] [place] [address] Right 9/29/2019    HIP INCISION AND DRAINAGE performed by Symone Becerril MD at [address] [place] [address] Right 11/15/2019    HIP INCISION AND DRAINAGE RIGHT CLOSURE OF INCISION WITH HEMOVAC performed by Symone Becerril MD at [address] [place] [address]  09/23/2019    of stage 4 wound on coccyx, by Dr. Donnell Branch N/A 9/23/2019    EXCISIONAL DEBRIDEMENT OF SACRAL PRESSURE ULCER performed by Melanie Parks MD at [address] [place] [address] N/A 9/29/2019    DECUBITUS ULCER DEBRIDEMENT REPAIR performed by Raymon Reis MD at [address]        Current Medications:   Current Facility-Administered Medications: furosemide (LASIX) (TYLENOL) tablet 500 mg, 500 mg, Oral, Q6H PRN  aspirin EC tablet 81 mg, 81 mg, Oral, Daily  buPROPion (WELLBUTRIN XL) extended release tablet 300 mg, 300 mg, Oral, Daily  fluticasone (FLONASE) 50 MCG/ACT nasal spray 1 spray, 1 spray, Nasal, Daily PRN  mometasone-formoterol (DULERA) 200-5 MCG/ACT inhaler 2 puff, 2 puff, Inhalation, BID  insulin glargine (LANTUS) injection vial 15 Units, 15 Units, Subcutaneous, Nightly  memantine (NAMENDA) tablet 5 mg, 5 mg, Oral, Nightly  montelukast (SINGULAIR) tablet 10 mg, 10 mg, Oral, Nightly  nitroGLYCERIN (NITROSTAT) SL tablet 0.4 mg, 0.4 mg, Sublingual, Q5 Min PRN  rOPINIRole (REQUIP) tablet 1 mg, 1 mg, Oral, Nightly  sertraline (ZOLOFT) tablet 100 mg, 100 mg, Oral, BID  traMADol (ULTRAM) tablet 50 mg, 50 mg, Oral, Q6H PRN  trolamine salicylate (ASPERCREME) 10 % cream, , Topical, Q4H PRN  vitamin C (ASCORBIC ACID) tablet 1,000 mg, 1,000 mg, Oral, Daily  vitamin E capsule 400 Units, 400 Units, Oral, Daily  sodium chloride flush 0.9 % injection 10 mL, 10 mL, Intravenous, 2 times per day  sodium chloride flush 0.9 % injection 10 mL, 10 mL, Intravenous, PRN  insulin lispro (HUMALOG) injection vial 0-12 Units, 0-12 Units, Subcutaneous, TID WC  insulin lispro (HUMALOG) injection vial 0-6 Units, 0-6 Units, Subcutaneous, Nightly  Allergies:  Bee venom    Social History:  TOBACCO:   reports that he has never smoked. He has never used smokeless tobacco.  ETOH:   reports no history of alcohol use. DRUGS:   reports no history of drug use.   Family History:       Problem Relation Age of Onset    High Blood Pressure Mother     Cancer Mother     Cancer Father     Stroke Maternal Grandfather     Diabetes Paternal Grandmother     Heart Disease Paternal Grandfather        REVIEW OF SYSTEMS:  CONSTITUTIONAL:  negative  HEENT:  negative  RESPIRATORY:  negative  CARDIOVASCULAR:  negative  GASTROINTESTINAL:  negative  GENITOURINARY:  negative  MUSCULOSKELETAL:  negative  BEHAVIOR/PSYCH: Negative    ROS unavailable    Family hx neg    PHYSICAL EXAM  ------------------------  Vitals:  /67   Pulse 116   Temp 98.2 °F (36.8 °C) (Oral)   Resp 25   Ht 6' 3\" (1.905 m)   Wt (!) 324 lb 15.3 oz (147.4 kg)   SpO2 94%   BMI 40.62 kg/m²      General:  Drowsy   Well developed, well nourished, well groomed. No apparent distress. HEENT:  Normocephalic, atraumatic. Pupils equal, round, reactive to light. No scleral icterus. No conjunctival injection. Normal lips, teeth, and gums. No nasal discharge. Neck:  Supple  Heart:  RRR, no murmurs, gallops, rubs  Lungs:  CTA bilaterally, bilat symmetrical expansion, no wheeze, rales, or rhonchi  Abdomen: Bowel sounds present, soft, nontender, no masses, no organomegaly, no peritoneal signs  Extremities:  No clubbing, cyanosis, or edema  Skin:  Warm and dry, no open lesions or rash  Breast: deferred  Rectal: deferred  Genitalia:  deferred    NEUROLOGICAL EXAM  ---------------------------------    Mental Status Exam:             drowsy  follows simple commands  Oriented to person place year month-moderate dementia    Cranial [id number]-DC-NQU Intact.         Cranial nerve II           Visual acuity:  normal                 Cranial nerve III           Pupils:  equal, round, reactive to light      Cranial nerves III, IV, VI           Extraocular Movements: intact      Cranial nerve V           Facial sensation:  intact      Cranial nerve VII           Facial strength: intact      Cranial nerve VIII           Hearing:  intact      Cranial nerve IX           Palate:  intact      Cranial nerve XI         Shoulder shrug:  intact      Cranial nerve XII          Tongue movement:  normal    Motor:    Drift:  absent  Motor exam is symmetrical 5 out of 5 all extremities bilaterally  Tone:  normal  Abnormal Movements:  Absent    DTRs-2+ biceps,triceps,brachioradialis,knee jerks and ankle jerks bilaterally symmetrical.  Toes-downgoing bilaterally

## 2020-01-01 NOTE — PROGRESS NOTES
negative  HEENT:  negative  RESPIRATORY:  negative  CARDIOVASCULAR:  negative  GASTROINTESTINAL:  negative  GENITOURINARY:  negative  MUSCULOSKELETAL:  negative  BEHAVIOR/PSYCH:  Negative    ROS unavailable    Family hx neg    PHYSICAL EXAM  ------------------------  Vitals:  /63   Pulse 114   Temp 98 °F (36.7 °C) (Oral)   Resp 24   Ht 6' 3\" (1.905 m)   Wt (!) 324 lb 15.3 oz (147.4 kg)   SpO2 99%   BMI 40.62 kg/m²      General:  Drowsy   Well developed, well nourished, well groomed. No apparent distress. HEENT:  Normocephalic, atraumatic. Pupils equal, round, reactive to light. No scleral icterus. No conjunctival injection. Normal lips, teeth, and gums. No nasal discharge. Neck:  Supple  Heart:  RRR, no murmurs, gallops, rubs  Lungs:  CTA bilaterally, bilat symmetrical expansion, no wheeze, rales, or rhonchi  Abdomen: Bowel sounds present, soft, nontender, no masses, no organomegaly, no peritoneal signs  Extremities:  No clubbing, cyanosis, or edema  Skin:  Warm and dry, no open lesions or rash  Breast: deferred  Rectal: deferred  Genitalia:  deferred    NEUROLOGICAL EXAM  ---------------------------------    Mental Status Exam:             drowsy  follows simple commands  Oriented to person place year month-moderate dementia    Cranial Kajbvt-XR-UWY Intact.         Cranial nerve II           Visual acuity:  normal                 Cranial nerve III           Pupils:  equal, round, reactive to light      Cranial nerves III, IV, VI           Extraocular Movements: intact      Cranial nerve V           Facial sensation:  intact      Cranial nerve VII           Facial strength: intact      Cranial nerve VIII           Hearing:  intact      Cranial nerve IX           Palate:  intact      Cranial nerve XI         Shoulder shrug:  intact      Cranial nerve XII          Tongue movement:  normal    Motor:    Drift:  absent  Motor exam is symmetrical 5 out of 5 all extremities bilaterally  Tone: normal  Abnormal Movements:  Absent    DTRs-2+ biceps,triceps,brachioradialis,knee jerks and ankle jerks bilaterally symmetrical.  Toes-downgoing bilaterally            Sensory:sensation cannot be tested              CBC with Differential:    Lab Results   Component Value Date    WBC 18.1 12/31/2019    RBC 4.87 12/31/2019    HGB 13.1 12/31/2019    HCT 41.4 12/31/2019     12/31/2019    MCV 85.0 12/31/2019    MCH 26.9 12/31/2019    MCHC 31.6 12/31/2019    RDW 16.9 12/31/2019    SEGSPCT 86.7 12/31/2019    BANDSPCT 3 09/29/2019    LYMPHOPCT 3.7 12/31/2019    PROMYELOPCT 2 09/26/2019    MONOPCT 9.0 12/31/2019    MYELOPCT 1 09/29/2019    BASOPCT 0.2 12/31/2019    MONOSABS 1.6 12/31/2019    LYMPHSABS 0.7 12/31/2019    EOSABS 0.0 12/31/2019    BASOSABS 0.0 12/31/2019    DIFFTYPE AUTOMATED DIFFERENTIAL 12/31/2019     CMP:    Lab Results   Component Value Date     12/31/2019    K 3.8 12/31/2019     12/31/2019    CO2 25 12/31/2019    BUN 45 12/31/2019    CREATININE 1.8 12/31/2019    GFRAA 46 12/31/2019    LABGLOM 38 12/31/2019    GLUCOSE 163 12/31/2019    PROT 4.9 12/31/2019    LABALBU 2.6 12/31/2019    CALCIUM 10.2 12/31/2019    BILITOT 0.4 12/31/2019    ALKPHOS 106 12/31/2019    AST 15 12/31/2019    ALT 8 12/31/2019     BMP:    Lab Results   Component Value Date     12/31/2019    K 3.8 12/31/2019     12/31/2019    CO2 25 12/31/2019    BUN 45 12/31/2019    LABALBU 2.6 12/31/2019    CREATININE 1.8 12/31/2019    CALCIUM 10.2 12/31/2019    GFRAA 46 12/31/2019    LABGLOM 38 12/31/2019    GLUCOSE 163 12/31/2019     PT/INR:    Lab Results   Component Value Date    PROTIME 11.5 05/03/2017    PROTIME 10.6 01/26/2012    INR 1.01 05/03/2017     PTT:    Lab Results   Component Value Date    APTT 29.1 05/03/2017   [APTT  U/A:    Lab Results   Component Value Date    COLORU MICHAEL 12/31/2019    PHUR 5.5 10/24/2018    LABCAST Present 12/12/2016    LABCAST Hyaline casts 12/12/2016    WBCUA 514 12/31/2019 RBCUA 480 12/31/2019    MUCUS RARE 12/23/2019    TRICHOMONAS NONE SEEN 12/31/2019    YEAST MANY 12/31/2019    BACTERIA NEGATIVE 12/31/2019    CLARITYU CLOUDY 12/31/2019    SPECGRAV 1.030 12/31/2019    LEUKOCYTESUR MODERATE 12/31/2019    UROBILINOGEN 1 12/31/2019    BILIRUBINUR NEGATIVE 12/31/2019    BLOODU MODERATE 12/31/2019    GLUCOSEU 3+ 10/24/2018     TSH:  No results found for: TSH  VITAMIN B12: No components found for: B12  FOLATE:    Lab Results   Component Value Date    FOLATE 5.9 12/25/2019     RPR:  No results found for: RPR  CAMILLA:  No results found for: ANATITER, CAMILLA  Urine Toxicology:  No components found for: IAMMENTA, IBARBIT, IBENZO, ICOCAINE, IMARTHC, IOPIATES, IPHENCYC     IMPRESSION:    Metabolic encephalopathy/sepsis    neg for CVA    Hx dementia    ARF/CKD/HTN    PLAN:    CT brain neg    Mri brain neg    B 12 folate TSh nl    Continue asa and namenda    Pt stable neurologically. Sara Neely MD  BOARD CERTIFIED-NEUROLOGY.

## 2020-01-01 NOTE — PROGRESS NOTES
12/27/2019). Social History:   reports that he has never smoked. He has never used smokeless tobacco. He reports that he does not drink alcohol or use drugs. Family history:  family history includes Cancer in his father and mother; Diabetes in his paternal grandmother; Heart Disease in his paternal grandfather; High Blood Pressure in his mother; Stroke in his maternal grandfather. Allergies   Allergen Reactions    Bee Venom Shortness Of Breath       Review of Systems:    All 14 systems were reviewed and are negative  Except for the positive findings  which as documented     /64   Pulse 106   Temp 98.2 °F (36.8 °C) (Oral)   Resp 22   Ht 6' 3\" (1.905 m)   Wt (!) 324 lb 15.3 oz (147.4 kg)   SpO2 95%   BMI 40.62 kg/m²       Intake/Output Summary (Last 24 hours) at 1/1/2020 1213  Last data filed at 1/1/2020 1200  Gross per 24 hour   Intake 2354 ml   Output 3235 ml   Net -881 ml     Physical Exam: Somewhat in distress NG tube in place  Constitutional:  Well developed, Well nourished, No acute distress, Non-toxic appearance. HENT:  Normocephalic, Atraumatic, Bilateral external ears normal, Oropharynx moist, No oral exudates, Nose normal. Neck- Normal range of motion, No tenderness, Supple, No stridor. Eyes:  PERRL, EOMI, Conjunctiva normal, No discharge. Respiratory:  Normal breath sounds, No respiratory distress, No wheezing, No chest tenderness. Cardiovascular:  Normal heart rate, Normal rhythm, No murmurs, No rubs, No gallops, JVP not elevated  Abdomen/GI:  Bowel sounds normal, Soft, No tenderness, No masses, No pulsatile masses. Musculoskeletal:  Intact distal pulses, No edema, No tenderness, No cyanosis, No clubbing. Good range of motion in all major joints. No tenderness to palpation or major deformities noted. Back- No tenderness. Integument:  Warm, Dry, No erythema, No rash. Lymphatic:  No lymphadenopathy noted.    Neurologic:  Alert & oriented x 3, Normal motor function, Normal sensory function, No focal deficits noted.    Psychiatric:  Affect  and  Mood :no change    Medications:    furosemide  80 mg Intravenous TID    ceftazidime-acibactam (AVYCAZ) infusion  1.25 g Intravenous Q8H    methylPREDNISolone  40 mg Intravenous Q8H    ipratropium-albuterol  1 ampule Inhalation Q4H    albumin human  25 g Intravenous Once    pantoprazole  40 mg Intravenous BID    collagenase   Topical Daily    vancomycin (VANCOCIN) intermittent dosing (placeholder)   Other RX Placeholder    aspirin  81 mg Oral Daily    buPROPion  300 mg Oral Daily    mometasone-formoterol  2 puff Inhalation BID    insulin glargine  15 Units Subcutaneous Nightly    memantine  5 mg Oral Nightly    montelukast  10 mg Oral Nightly    rOPINIRole  1 mg Oral Nightly    sertraline  100 mg Oral BID    vitamin C  1,000 mg Oral Daily    vitamin E  400 Units Oral Daily    sodium chloride flush  10 mL Intravenous 2 times per day    insulin lispro  0-12 Units Subcutaneous TID WC    insulin lispro  0-6 Units Subcutaneous Nightly      norepinephrine 15 mcg/min (01/01/20 0910)    vasopressin (Septic Shock) infusion 0.04 Units/min (01/01/20 1101)    heparin (porcine) 10.2 Units/kg/hr (01/01/20 1101)    dextrose Stopped (12/27/19 1904)    dextrose       diphenhydrAMINE, heparin (porcine), heparin (porcine), potassium chloride, magnesium sulfate, promethazine, ipratropium-albuterol, calcium carbonate, ondansetron, morphine, HYDROcodone 5 mg - acetaminophen, glucose, dextrose, glucagon (rDNA), dextrose, acetaminophen, fluticasone, nitroGLYCERIN, traMADol, trolamine salicylate, sodium chloride flush    Lab Data:  CBC:   Recent Labs     12/31/19  0510 01/01/20  0025 01/01/20  0600   WBC 18.1* 18.8* 21.0*   HGB 13.1* 11.5* 11.5*   HCT 41.4* 37.7* 37.3*   MCV 85.0 88.1 87.4   * 468* 470*     BMP:   Recent Labs     12/30/19  0405 12/30/19  1400 12/31/19  0510 01/01/20  0600     --  138 137   K 3.4* 4.0 3.8 4.3   CL

## 2020-01-01 NOTE — PROGRESS NOTES
Dr. Xiang Gonzales at bedside, updated on pt's progress, colostomy dressing taken down and assessed, stoma more pink at this time. No new orders at this time. Will continue to monitor closely.

## 2020-01-01 NOTE — PROGRESS NOTES
Progress note    Michael Brand    1/1/2020    Subjective:     Patients awake. He is on 2 pressors. Levophed being weaned down. He has not been eating anything and he has no IV nutrition either. Medication side effects: none. Scheduled Meds:   furosemide  80 mg Intravenous TID    ceftazidime-acibactam (AVYCAZ) infusion  1.25 g Intravenous Q8H    ipratropium-albuterol  1 ampule Inhalation Q4H    methylPREDNISolone  40 mg Intravenous Q8H    albumin human  25 g Intravenous Once    pantoprazole  40 mg Intravenous BID    collagenase   Topical Daily    vancomycin (VANCOCIN) intermittent dosing (placeholder)   Other RX Placeholder    aspirin  81 mg Oral Daily    buPROPion  300 mg Oral Daily    mometasone-formoterol  2 puff Inhalation BID    insulin glargine  15 Units Subcutaneous Nightly    memantine  5 mg Oral Nightly    montelukast  10 mg Oral Nightly    rOPINIRole  1 mg Oral Nightly    sertraline  100 mg Oral BID    vitamin C  1,000 mg Oral Daily    vitamin E  400 Units Oral Daily    sodium chloride flush  10 mL Intravenous 2 times per day    insulin lispro  0-12 Units Subcutaneous TID WC    insulin lispro  0-6 Units Subcutaneous Nightly     Continuous Infusions:   norepinephrine 6 mcg/min (01/01/20 1518)    vasopressin (Septic Shock) infusion 0.04 Units/min (01/01/20 1101)    heparin (porcine) 10.2 Units/kg/hr (01/01/20 1101)    dextrose Stopped (12/27/19 1904)    dextrose       PRN Meds:sodium chloride flush, diphenhydrAMINE, heparin (porcine), heparin (porcine), potassium chloride, magnesium sulfate, promethazine, ipratropium-albuterol, calcium carbonate, ondansetron, morphine, HYDROcodone 5 mg - acetaminophen, glucose, dextrose, glucagon (rDNA), dextrose, acetaminophen, fluticasone, nitroGLYCERIN, traMADol, trolamine salicylate, sodium chloride flush    Review of Systems  Pertinent items are noted in HPI.     Objective:     Patient Vitals for the past 8 hrs:   BP Temp Temp src Pulse Resp SpO2   01/01/20 1701 -- -- -- -- 23 92 %   01/01/20 1600 138/69 -- Oral 109 23 99 %   01/01/20 1545 (!) 162/65 -- -- 109 19 --   01/01/20 1500 117/67 -- -- 116 25 94 %   01/01/20 1445 125/69 -- -- 105 22 --   01/01/20 1430 124/66 -- -- 106 21 --   01/01/20 1415 120/68 -- -- 109 22 --   01/01/20 1400 127/66 -- -- 108 23 95 %   01/01/20 1330 (!) 102/59 -- -- 112 (!) 31 --   01/01/20 1315 106/79 -- -- 106 21 --   01/01/20 1300 112/66 -- -- 117 19 --   01/01/20 1245 121/66 -- -- 103 22 96 %   01/01/20 1230 110/60 -- -- 102 20 --   01/01/20 1215 124/85 -- -- 109 18 --   01/01/20 1200 115/64 98.2 °F (36.8 °C) Oral 106 22 95 %   01/01/20 1145 113/65 -- -- 105 22 --   01/01/20 1130 100/64 -- -- 112 17 --   01/01/20 1100 -- -- -- 115 20 93 %   01/01/20 1045 130/73 -- -- 119 23 --   01/01/20 1030 -- -- -- 110 22 --   01/01/20 1015 132/75 -- -- 118 29 --   01/01/20 1000 138/76 -- -- 115 25 94 %   01/01/20 0945 131/70 -- -- 113 25 --   01/01/20 0930 118/66 -- -- 110 23 --     I/O last 3 completed shifts: In: 1626 [I.V.:2354]  Out: 5205 [Urine:2725; Emesis/NG output:2480]  No intake/output data recorded. /69   Pulse 109   Temp 98.2 °F (36.8 °C) (Oral)   Resp 23   Ht 6' 3\" (1.905 m)   Wt (!) 324 lb 15.3 oz (147.4 kg)   SpO2 92%   BMI 40.62 kg/m²   General appearance: alert and cooperative. Lungs: Bilateral rales present in bases  Heart: regular rate and rhythm, S1, S2 normal, no murmur, click, rub or gallop  Abdomen: soft right upper quadrent abdominal tenderness; bowel sounds normal; no masses,  no organomegaly  Extremities: extremities normal, atraumatic, no cyanosis or edema  Skin: Sacral and coccygeal decub  CNS:  Moves all extremities. He is able to puff up his cheeks, doubt CVA        Labs and imaging reviewed.   CBC with Differential:    Lab Results   Component Value Date    WBC 21.0 01/01/2020    RBC 4.27 01/01/2020    HGB 11.5 01/01/2020    HCT 37.3 01/01/2020     01/01/2020    MCV 87.4 chronic microvascular ischemic  changes. 3. Left mastoid effusion. CT abdomen:  No intra-abdominal abscess.  Small amount of postoperative ascites.  Small  amount of residual free intra-air appropriate for recent surgery.  Moderate  distension of the bowel proximal to the ileostomy either due to postoperative  ileus or less likely small bowel obstruction.  Distal small bowel normal in  size.  Very large amount of fluid in the stomach.  Cholelithiasis without  finding of cholecystitis. Chest Xray:  Low lung volumes with elevation of the left hemidiaphragm. Interstitial opacities bilaterally with perihilar and left basilar airspace  opacities.  This could represent pulmonary edema, aspiration, and/or  infection. Assessment:     Principal Problem:    Sepsis ([address]  Active Problems:    Hypotension    Metabolic encephalopathy    Vascular dementia with behavior disturbance (HCC)    Hyperlipidemia    Asthma    Diabetes mellitus ([address]    Obesity    Chronic kidney disease, stage III (moderate) (HCC)    Hypertensive kidney disease with CKD stage III (HCC)    Depression    Pressure injury of skin of coccygeal region    WD-Pressure injury of sacral region, stage 4 (HCC)    Severe malnutrition (HCC)  Resolved Problems:    * No resolved hospital problems. *  Hypokalemia and hypomagnesemia  Aspiration Pneumonia  Plan:   MRI -no acute infarct  Continue wound care, hip wound culture results noted. Continue Vanc per ID recommendation  Patient had diverting colostomy. Appreciate renal recommendations  Zofran for Nausea  CT abdomen results noted. Management per surgery  Chest X ray report as above. CTA results pending. Keep patient in ICU. On Avycaz. Noted wound culture results with multidrug resistance. Patient clinically improving. Keep on current antibiotics until ID recommendations when they return from [facility].   Dr. Halie Lange input appreciated  Tachycardia-Dr. Lemons Ply recommendations appreciated  Continue

## 2020-01-02 NOTE — PROGRESS NOTES
vitamin E  400 Units Oral Daily    sodium chloride flush  10 mL Intravenous 2 times per day    insulin lispro  0-12 Units Subcutaneous TID     insulin lispro  0-6 Units Subcutaneous Nightly     Continuous Infusions:   PN-Adult Premix 5/15 - Central      phenylephrine (CHINA-SYNEPHRINE) 50mg/250mL infusion 25 mcg/min (01/02/20 0424)    dextrose 50 mL/hr at 01/02/20 0253    vasopressin (Septic Shock) infusion 0.04 Units/min (01/02/20 0253)    dextrose       PRN Meds:sodium chloride flush, diphenhydrAMINE, potassium chloride, magnesium sulfate, promethazine, ipratropium-albuterol, calcium carbonate, ondansetron, morphine, HYDROcodone 5 mg - acetaminophen, glucose, dextrose, glucagon (rDNA), dextrose, acetaminophen, fluticasone, nitroGLYCERIN, traMADol, trolamine salicylate, sodium chloride flush      Labs/Imaging Results:   Lab Results   Component Value Date    WBC 13.6 (H) 01/02/2020    HGB 9.8 (L) 01/02/2020    HCT 31.6 (L) 01/02/2020    MCV 85.9 01/02/2020     01/02/2020     Lab Results   Component Value Date     01/02/2020    K 3.3 (L) 01/02/2020    CL 98 (L) 01/02/2020    CO2 26 01/02/2020    BUN 61 (H) 01/02/2020    CREATININE 2.6 (H) 01/02/2020    GLUCOSE 255 (H) 01/02/2020    CALCIUM 9.2 01/02/2020    PROT 4.5 (L) 01/02/2020    LABALBU 2.6 (L) 01/02/2020    BILITOT 0.5 01/02/2020    ALKPHOS 88 01/02/2020    AST 36 01/02/2020    ALT 18 01/02/2020    LABGLOM 25 (L) 01/02/2020    GFRAA 30 (L) 01/02/2020     CTA chest and CT A/P:  CHEST:       1. No pulmonary embolism. 2. Small bilateral pleural effusions. 3. Bibasilar dependent consolidations compatible with atelectasis versus   pneumonia versus aspiration. 4. Patchy ground-glass and early consolidative opacities in the bilateral   upper and right middle lobes compatible with edema versus multifocal   pneumonia. 5. Mild likely reactive mediastinal lymphadenopathy.    6. Enlarged main pulmonary artery as can be seen with pulmonary hypertension. ABDOMEN/PELVIS:       1. Status post diverting ileostomy.  Decreased caliber of the upstream small   bowel compared with the previous exam.  Persistently decompressed distal   small bowel.  Findings may represent ileus with a low-grade obstruction not   excluded. 2. Lujan catheter balloon inflated within the prostatic urethra.  Recommend   repositioning. 3. Resolution of the previously identified postoperative pneumoperitoneum. Trace likely reactive free fluid in the pelvis. 4. Cholelithiasis. 5. Bilateral indeterminate renal hypodensities measuring up to 4.5 cm. Consider further evaluation with nonemergent renal protocol CT or MRI. Assessment:     Patient Active Problem List:     Hypertension     Hyperlipidemia     Asthma     Diabetes mellitus ([address]     H/O cardiovascular stress test     Obesity     Chronic kidney disease, stage III (moderate) (HCC)     Hypertensive kidney disease with CKD stage III (HCC)     Depression     SOBOE (shortness of breath on exertion)     Abnormal cardiovascular stress test     Fracture of epiphysis (separation) (upper) of neck of femur, closed (HCC)     Pressure injury of skin of coccygeal region     Sepsis ([address]     PVC (premature ventricular contraction)     Septicemia ([address]     WD-Pressure injury of sacral region, stage 4 ([address]     WD-Nonhealing surgical wound     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   May ultimately need revised--however, ideally would wait until off pressors and respiratory status has improved to revise.   -Cont to monitor stoma output  -Local wound care  -Ostomy care / consult  -WBC improving from yesterday ( 13 from 18 from 18 from 29)   - Abdominal exam remains benign with very minimal tenderness. -CT PE / A/P reviewed. Appears to have resolving ileus.    -TPN until ileus resolves and tolerates PO     [address] [place], [place], [place]

## 2020-01-02 NOTE — PROGRESS NOTES
traMADol, trolamine salicylate, sodium chloride flush    Review of Systems  Pertinent items are noted in HPI. Objective:     Patient Vitals for the past 8 hrs:   BP Temp Temp src Pulse Resp SpO2 Weight   01/02/20 1202 102/69 98.2 °F (36.8 °C) Oral 140 26 90 % --   01/02/20 1140 -- -- -- -- 20 96 % --   01/02/20 1115 (!) 119/50 -- -- 93 21 93 % --   01/02/20 1003 (!) 130/53 -- -- 92 20 100 % --   01/02/20 0903 (!) 120/59 -- -- 99 20 100 % --   01/02/20 0805 -- -- -- -- 24 100 % --   01/02/20 0803 99/78 -- -- 96 20 100 % --   01/02/20 0700 (!) 104/50 -- -- 89 20 96 % --   01/02/20 0600 (!) 102/52 -- -- 92 21 96 % (!) 302 lb 14.6 oz (137.4 kg)     I/O last 3 completed shifts: In: 708 [I.V.:478; NG/GT:60; IV Piggyback:170]  Out: 5755 [Urine:4775; Emesis/NG output:980]  I/O this shift:  In: -   Out: 210 [Emesis/NG output:210]    /69   Pulse 140   Temp 98.2 °F (36.8 °C) (Oral)   Resp 26   Ht 6' 3\" (1.905 m)   Wt (!) 302 lb 14.6 oz (137.4 kg)   SpO2 90%   BMI 37.86 kg/m²   General appearance: alert and cooperative. Lungs: Bilateral rales present in bases but improved  Heart: regular rate and rhythm, S1, S2 normal, no murmur, click, rub or gallop  Abdomen: soft right upper quadrent abdominal tenderness; bowel sounds normal; no masses,  no organomegaly  Extremities: extremities normal, atraumatic, no cyanosis or edema  Skin: Sacral and coccygeal decub  CNS:  Moves all extremities. He is able to puff up his cheeks, doubt CVA        Labs and imaging reviewed.   CBC with Differential:    Lab Results   Component Value Date    WBC 13.6 01/02/2020    RBC 3.68 01/02/2020    HGB 9.8 01/02/2020    HCT 31.6 01/02/2020     01/02/2020    MCV 85.9 01/02/2020    MCH 26.6 01/02/2020    MCHC 31.0 01/02/2020    RDW 17.2 01/02/2020    SEGSPCT 89.0 01/02/2020    BANDSPCT 7 01/02/2020    LYMPHOPCT 3.0 01/02/2020    PROMYELOPCT 2 09/26/2019    MONOPCT 1.0 01/02/2020    MYELOPCT 1 09/29/2019    BASOPCT 0.2 12/31/2019 MONOSABS 0.1 01/02/2020    LYMPHSABS 0.4 01/02/2020    EOSABS 0.0 12/31/2019    BASOSABS 0.0 12/31/2019    DIFFTYPE MANUAL DIFFERENTIAL 01/02/2020     CMP:    Lab Results   Component Value Date     01/02/2020    K 3.3 01/02/2020    CL 98 01/02/2020    CO2 26 01/02/2020    BUN 61 01/02/2020    CREATININE 2.6 01/02/2020    GFRAA 30 01/02/2020    LABGLOM 25 01/02/2020    GLUCOSE 255 01/02/2020    PROT 4.5 01/02/2020    LABALBU 2.6 01/02/2020    CALCIUM 9.2 01/02/2020    BILITOT 0.5 01/02/2020    ALKPHOS 88 01/02/2020    AST 36 01/02/2020    ALT 18 01/02/2020     BMP:    Lab Results   Component Value Date     01/02/2020    K 3.3 01/02/2020    CL 98 01/02/2020    CO2 26 01/02/2020    BUN 61 01/02/2020    LABALBU 2.6 01/02/2020    CREATININE 2.6 01/02/2020    CALCIUM 9.2 01/02/2020    GFRAA 30 01/02/2020    LABGLOM 25 01/02/2020    GLUCOSE 255 01/02/2020     Sodium:    Lab Results   Component Value Date     01/02/2020     Potassium:    Lab Results   Component Value Date    K 3.3 01/02/2020     Calcium:    Lab Results   Component Value Date    CALCIUM 9.2 01/02/2020     Warfarin PT/INR:  No components found for: PTPATWAR, PTINRWAR  PTT:    Lab Results   Component Value Date    APTT 72.9 01/01/2020   [APTT  Last 3 Troponin:  No results found for: TROPONINI  ABG:    Lab Results   Component Value Date    CBA2TSS 39.0 12/31/2019    PO2ART 56 12/31/2019    USZ8LLC 22.5 12/31/2019     TSH:  No results found for: TSH  VITAMIN B12: No components found for: B12  FOLATE:    Lab Results   Component Value Date    FOLATE 5.9 12/25/2019     MRI Brain:  1. No acute intracranial abnormality.  No acute infarct. 2. Mild global parenchymal volume loss with chronic microvascular ischemic  changes. 3. Left mastoid effusion.     CT abdomen:  No intra-abdominal abscess.  Small amount of postoperative ascites.  Small  amount of residual free intra-air appropriate for recent surgery.  Moderate  distension of the bowel proximal

## 2020-01-02 NOTE — PROGRESS NOTES
Nephrology Progress Note  1/2/2020 11:24 AM        Subjective:   Admit Date: 12/23/2019  PCP: Arely Hamm MD    Interval History: lethargic, does wake up     Diet: non will start TPN    ROS:  Good UOP / HR better still with 2 pressors , vaso and phenylephrine     Data:     Current meds:    heparin (porcine)  5,000 Units Subcutaneous 3 times per day    fat emulsion  250 mL Intravenous Once per day on Mon Tue Thu Fri    potassium chloride  40 mEq Intravenous Once    furosemide  80 mg Intravenous TID    ceftazidime-acibactam (AVYCAZ) infusion  1.25 g Intravenous Q8H    ipratropium-albuterol  1 ampule Inhalation Q4H    methylPREDNISolone  40 mg Intravenous Q8H    albumin human  25 g Intravenous Once    pantoprazole  40 mg Intravenous BID    collagenase   Topical Daily    vancomycin (VANCOCIN) intermittent dosing (placeholder)   Other RX Placeholder    aspirin  81 mg Oral Daily    buPROPion  300 mg Oral Daily    mometasone-formoterol  2 puff Inhalation BID    insulin glargine  15 Units Subcutaneous Nightly    memantine  5 mg Oral Nightly    montelukast  10 mg Oral Nightly    rOPINIRole  1 mg Oral Nightly    sertraline  100 mg Oral BID    vitamin C  1,000 mg Oral Daily    vitamin E  400 Units Oral Daily    sodium chloride flush  10 mL Intravenous 2 times per day    insulin lispro  0-12 Units Subcutaneous TID WC    insulin lispro  0-6 Units Subcutaneous Nightly      PN-Adult Premix 5/15 - Central      phenylephrine (CHINA-SYNEPHRINE) 50mg/250mL infusion 25 mcg/min (01/02/20 0424)    dextrose 50 mL/hr at 01/02/20 0253    norepinephrine Stopped (01/01/20 2114)    vasopressin (Septic Shock) infusion 0.04 Units/min (01/02/20 0253)    dextrose           I/O last 3 completed shifts:   In: 708 [I.V.:478; NG/GT:60; IV Piggyback:170]  Out: 5755 [Urine:4775; Emesis/NG output:980]    CBC:   Recent Labs     01/01/20  0025 01/01/20  0600 01/02/20  0550   WBC 18.8* 21.0* 13.6*   HGB 11.5* 11.5* 9.8*   PLT 468* 470* 270          Recent Labs     12/31/19  0510 01/01/20  0600 01/02/20  0550    137 139   K 3.8 4.3 3.3*    99 98*   CO2 25 19* 26   BUN 45* 56* 61*   CREATININE 1.8* 2.5* 2.6*   GLUCOSE 163* 220* 255*       Lab Results   Component Value Date    CALCIUM 9.2 01/02/2020    PHOS 3.5 01/01/2020       Objective:     Vitals: BP (!) 130/53   Pulse 92   Temp 98.6 °F (37 °C) (Oral)   Resp 20   Ht 6' 3\" (1.905 m)   Wt (!) 302 lb 14.6 oz (137.4 kg)   SpO2 100%   BMI 37.86 kg/m²     General appearance:  As above  HEENT:  ++ conj pallor  Neck:  supple  Lungs:  + adv BS  Heart:  Seems irregular but HR < 100 now   Abdomen: soft, + ostomy   Extremities:  ++ edema       Problem List :         Impression :     1. VIRGILIO- no central PE- so d/d now CRS type 1/ infection off course  I had to give him 100 cc contrast but making good urine  finally  But still  with 2 pressor   2. ADHF- better with loop / thiazide   3. Septic shock / ? pneumonia - interestingly he had been on imipenem / Sigrid Blamer co for > 6-7 days prior  to his ICU transfer - so he may have MDR infection   4. recent abd dx- hip infection etc     Recommendation/Plan  :     1. Keep diuretics for now   2. Pro BNP level in am   3. TPN   4. BMP in am   5.  watch  Closely for adequate \"capillary palama refill:\"   6.  ID has seen him before - so will call Dr Capri Isaacs MD

## 2020-01-02 NOTE — PROGRESS NOTES
major depressive disorder, in partial remission ([address]    Delusional ideas ([address]    Vascular dementia with behavior disturbance (HCC)    Hypotension    Metabolic encephalopathy    Severe malnutrition ([address]       Plan:   1. Overall the patient is sl better. 2. Wean Vapotherm.       Gerald Iglesias MD  1/2/2020  10:48 AM

## 2020-01-02 NOTE — PROGRESS NOTES
[facility]  consulted by Dr. Ruthann Whitaker for monitoring and adjustment. Indication for treatment: R hip PJI  Goal trough: 15 mcg/mL  Other antimicrobials: meropenem     Ht Readings from Last 1 Encounters:   12/23/19 6' 3\" (1.905 m)     Wt Readings from Last 3 Encounters:   01/02/20 (!) 302 lb 14.6 oz (137.4 kg)   11/10/19 (!) 309 lb 9 oz (140.4 kg)   10/28/19 (!) 335 lb (152 kg)      Pertinent Laboratory Values:   Temp Readings from Last 3 Encounters:   01/02/20 98.6 °F (37 °C) (Oral)   12/27/19 98.6 °F (37 °C)   11/27/19 98.1 °F (36.7 °C) (Oral)     Recent Labs     01/01/20  0025 01/01/20  0600 01/02/20  0550   WBC 18.8* 21.0* 13.6*     Recent Labs     12/31/19  0510 01/01/20  0600 01/02/20  0550   BUN 45* 56* 61*   CREATININE 1.8* 2.5* 2.6*     Estimated Creatinine Clearance: 41 mL/min (A) (based on SCr of 2.6 mg/dL (H)). Intake/Output Summary (Last 24 hours) at 1/2/2020 1201  Last data filed at 1/2/2020 0600  Gross per 24 hour   Intake 708 ml   Output 5655 ml   Net -4947 ml       Pertinent Cultures:  Date    Source    Results  12/23/19  Blood    NGTD  12/23/19  C diff     Negative  12/23/19   Urine     Yeast  12/27/19   Decubitus   CRAB   01/01/20  Blood    Sent  01/02/20  Sputum   Pending    Previous vancomycin levels:  TROUGH:    No results for input(s): VANCOTROUGH in the last 72 hours. RANDOM:    Recent Labs     12/31/19  0510 01/01/20  0600 01/02/20  0550   VANCORANDOM 21.9  (NOTE)  Therapeutic Range Interpretation: Please refer to current dosing   guidelines. 19.4  (NOTE)  Therapeutic Range Interpretation: Please refer to current dosing   guidelines. 17.2  (NOTE)  Therapeutic Range Interpretation: Please refer to current dosing   guidelines. Assessment:  · WBC and temperature: WBC @ 13.6 (receiving methylprednisolone);  Afebrile  · SCr, BUN, and urine output: VIRGILIO; Scr/BUN trending back up   · Day(s) of therapy: #10 (in-patient)  · Vancomycin level: 17.2    Plan:   Patient was receiving vancomycin 1250 mg q24h IVPB as an outpatient along with meropenem for a PJI, developed VIRGILIO and was admitted with supra-therapeutic levels   Now intermittent vancomycin dosing based on levels 2/2 VIRGILIO  o Received vancomycin 1500 mg x 1 on 12/30  o Vancomycin clearing slowly with therapeutic level today, 72h post-dose. o Continue to hold vancomycin. o Random level daily, re-dose when level <15   [address] [place] [address] will continue to monitor renal function, clinical status & recommend de-escalation if appropriate.     Thank you for the consult,   Aurora Thomas PharmD, [facility]  1/2/2020 12:01 PM

## 2020-01-02 NOTE — PROGRESS NOTES
Dr Delfino Gaspar rounding at bedside. Wants CXR, DVT prophylaxis, and TPN started today. He will place orders.

## 2020-01-02 NOTE — CONSULTS
IV Consult complete. Introcan 20g  1 3/4\" angiocath placed in right FA  With USG after unsuccessful attempt at Powerglide ML as extended dwell.

## 2020-01-02 NOTE — PROGRESS NOTES
Nutrition Assessment (Parenteral Nutrition)    Type and Reason for Visit: Reassess    Nutrition Recommendations:   · Please run TPN of 5/15 at 84 mL/hr to provide the pt with 1705 kcal and 100 g of protein per day. We do not have a PN formula that will meet the pt estimated needs    Nutrition Assessment: Pt had profuse vomiting and is now NPO with an NG and TPN. Please run 5/15 at 84 mL/hr to provide the pt with 1705 kcal and 100 g of protein per day. Malnutrition Assessment:  · Malnutrition Status: Meets the criteria for severe malnutrition  · Context: Chronic illness  · Findings of the 6 clinical characteristics of malnutrition (Minimum of 2 out of 6 clinical characteristics is required to make the diagnosis of moderate or severe Protein Calorie Malnutrition based on AND/ASPEN Guidelines):  1. Energy Intake-Less than or equal to 50% of estimated energy requirement, Greater than or equal to 5 days    2. Weight Loss-20% loss or greater, in 3 months  3. Fat Loss-Moderate subcutaneous fat loss, Orbital  4. Muscle Loss-Severe muscle mass loss, Interosseous, Clavicles (pectoralis and deltoids)  5. Fluid Accumulation-Mild fluid accumulation, Generalized  6.   Strength-Not measured    Nutrition Risk Level: High    Nutrient Needs:  · Estimated Daily Total Kcal: 4447-5850 [facility])  · Estimated Daily Protein (g): 107-178 (1.2-2 g/kg IBW)  · Estimated Daily Total Fluid (ml/day): 6873-5840 (1 mL/kcal)    Nutrition Diagnosis:   · Problem: Severe malnutrition, In context of social or environmental circumstances  · Etiology: related to Insufficient energy/nutrient consumption     Signs and symptoms:  as evidenced by Presence of wounds, Moderate loss of subcutaneous fat, Severe muscle loss, Weight loss greater than or equal to 7.5% in 3 months    Objective Information:  · Wound Type: Pressure Ulcer, Stage II, Stage IV, Surgical Wound  · Current Nutrition Therapies:  · Oral Diet Orders: NPO   · Parenteral Nutrition Orders:  · Type and Formula: 2-in-1 Standard(5/15)   · Lipids: 250ml(x4)  · Rate/Volume: 42  · Duration: Continuous  · Current PN Order Provides: 798 kcal and 43 g of protein per day  · Anthropometric Measures:  · Ht: 6' 3\" (190.5 cm)   · Current Body Wt: 302 lb (137 kg)  · Admission Body Wt: 297 lb (134.7 kg)  · Usual Body Wt: 375 lb (170.1 kg)(6/10/19)  · % Weight Change: -26% x 3 months  · Ideal Body Wt: 196 lb (88.9 kg), % Ideal Body 154%  · BMI Classification: BMI 35.0 - 39.9 Obese Class II    Nutrition Interventions:   Continue NPO, Continue Parenteral Nutrition  Continued Inpatient Monitoring, Education Initiated, Coordination of Care    Nutrition Evaluation:   · Evaluation: Goals set   · Goals: pt will receive greater than 75% of his estimated needs through PN   · Monitoring: PN Intake, PN Tolerance, Weight, Pertinent Labs, Nausea or Vomiting      Electronically signed by Fred Schneider RD, LD on 8/3/77 at 1:40 PM    Contact Number: [phone]

## 2020-01-02 NOTE — PLAN OF CARE
Problem: Falls - Risk of:  Goal: Will remain free from falls  Description  Will remain free from falls  1/2/2020 0634 by Sheron Crystal RN  Outcome: Ongoing  1/1/2020 1850 by Marie Tucker RN  Outcome: Ongoing  Goal: Absence of physical injury  Description  Absence of physical injury  1/2/2020 0634 by Sheron Crystal RN  Outcome: Ongoing  1/1/2020 1850 by Marie Tucker RN  Outcome: Ongoing     Problem: Risk for Impaired Skin Integrity  Goal: Tissue integrity - skin and mucous membranes  Description  Structural intactness and normal physiological function of skin and  mucous membranes. 1/2/2020 0634 by Sheron Crystal RN  Outcome: Ongoing  1/1/2020 1850 by Marie Tucker RN  Outcome: Ongoing     Problem: Discharge Planning:  Goal: Participates in care planning  Description  Participates in care planning  1/2/2020 0634 by Sheron Crystal RN  Outcome: Ongoing  1/1/2020 1850 by Marie Tucker RN  Outcome: Ongoing  Goal: Discharged to appropriate level of care  Description  Discharged to appropriate level of care  1/2/2020 0634 by Sheron Crystal RN  Outcome: Ongoing  1/1/2020 1850 by Marie Tucker RN  Outcome: Ongoing     Problem: Airway Clearance - Ineffective:  Goal: Ability to maintain a clear airway will improve  Description  Ability to maintain a clear airway will improve  1/2/2020 0634 by Sheron Crystal RN  Outcome: Ongoing  1/1/2020 1850 by Marie Tucker RN  Outcome: Ongoing     Problem: Anxiety/Stress:  Goal: Level of anxiety will decrease  Description  Level of anxiety will decrease  1/2/2020 0634 by Sheron Crystal RN  Outcome: Ongoing  1/1/2020 1850 by Marie Tucker RN  Outcome: Ongoing     Problem: Aspiration:  Goal: Absence of aspiration  Description  Absence of aspiration  1/2/2020 0634 by Sheron Crystal RN  Outcome: Ongoing  1/1/2020 1850 by Marie Tucker RN  Outcome: Ongoing     Problem:  Bowel Function - Altered:  Goal: Bowel elimination is within specified parameters  Description  Bowel elimination is within specified parameters  1/2/2020 0634 by Sheron Crystal RN  Outcome: Ongoing  1/1/2020 1850 by Marie Tucker RN  Outcome: Ongoing     Problem: Cardiac Output - Decreased:  Goal: Hemodynamic stability will improve  Description  Hemodynamic stability will improve  1/2/2020 0634 by Sheron Crystal RN  Outcome: Ongoing  1/1/2020 1850 by Marie Tucker RN  Outcome: Ongoing     Problem: Fluid Volume - Imbalance:  Goal: Absence of imbalanced fluid volume signs and symptoms  Description  Absence of imbalanced fluid volume signs and symptoms  1/2/2020 0634 by Sheron Crystal RN  Outcome: Ongoing  1/1/2020 1850 by Marie Tucker RN  Outcome: Ongoing     Problem: Gas Exchange - Impaired:  Goal: Levels of oxygenation will improve  Description  Levels of oxygenation will improve  1/2/2020 0634 by Sheron Crystal RN  Outcome: Ongoing  1/1/2020 1850 by Marie Tucker RN  Outcome: Ongoing     Problem: Mental Status - Impaired:  Goal: Mental status will be restored to baseline  Description  Mental status will be restored to baseline  1/2/2020 0634 by Sheron Crystal RN  Outcome: Ongoing  1/1/2020 1850 by Marie Tucker RN  Outcome: Ongoing     Problem: Nutrition Deficit:  Goal: Ability to achieve adequate nutritional intake will improve  Description  Ability to achieve adequate nutritional intake will improve  1/2/2020 0634 by Sheron Crystal RN  Outcome: Ongoing  1/1/2020 1850 by Marie Tucker RN  Outcome: Ongoing     Problem: Pain:  Goal: Pain level will decrease  Description  Pain level will decrease  1/2/2020 0634 by Sheron Crystal RN  Outcome: Ongoing  1/1/2020 1850 by Marie Tucker RN  Outcome: Ongoing  Goal: Control of acute pain  Description  Control of acute pain  1/2/2020 0634 by Sheron Crystal RN  Outcome: Ongoing  1/1/2020 1850 by Marie Tucker RN  Outcome: Ongoing  Goal: Control of chronic pain  Description  Control of chronic pain  1/2/2020 0634 by Sheron Crystal RN  Outcome: Ongoing  1/1/2020 1850 by Marie Tucker RN  Outcome: Ongoing     Problem: Nutrition  Goal:

## 2020-01-03 NOTE — PROGRESS NOTES
Dr. Leeanna Saavedra at bedside. States to start PO Amio - 200 mg daily after Amio gtt is complete.  Will carry out orders    Michael Schuster RN

## 2020-01-03 NOTE — PROGRESS NOTES
pulmonary      SUBJECTIVE: on vap[otherm at 65%     OBJECTIVE    VITALS:  BP 93/72   Pulse 89   Temp 98 °F (36.7 °C) (Oral)   Resp 23   Ht 6' 3\" (1.905 m)   Wt 299 lb 13.2 oz (136 kg)   SpO2 97%   BMI 37.48 kg/m²   HEAD AND FACE EXAM:  No throat injection, no active exudate,no thrush  NECK EXAM;No JVD, no masses, symmetrical  CHEST EXAM; Expansion equal and symmetrical, no masses  LUNG EXAM; Good breath sounds bilaterally. There are expiratory wheezes both lungs, there are crackles at both lung bases  CARDIOVASCULAR EXAM: Positive S1 and S2, no S3 or S4, no clicks ,no murmurs  RIGHT AND LEFT LOWER EXTRIMITY EXAM: No edema, no swelling, no inflamation  CNS EXAM: Alert and oriented X3          LABS   Lab Results   Component Value Date    WBC 17.2 (H) 01/03/2020    HGB 9.9 (L) 01/03/2020    HCT 31.5 (L) 01/03/2020    MCV 84.9 01/03/2020     01/03/2020     Lab Results   Component Value Date    CREATININE 2.4 (H) 01/03/2020    BUN 60 (H) 01/03/2020     01/03/2020    K 5.0 01/03/2020    CL 94 (L) 01/03/2020    CO2 29 01/03/2020     Lab Results   Component Value Date    INR 1.01 05/03/2017    PROTIME 11.5 05/03/2017          Lab Results   Component Value Date    PHOS 3.5 01/01/2020    PHOS 2.8 11/17/2019    PHOS 3.5 11/16/2019        Recent Labs     12/31/19  1015 01/02/20  2159   PH 7.37 7.38   PO2ART 56* 34.7*   BXT2EZE 39.0 54.8*   O2SAT 89.2* 63.9*         Wt Readings from Last 3 Encounters:   01/03/20 299 lb 13.2 oz (136 kg)   11/10/19 (!) 309 lb 9 oz (140.4 kg)   10/28/19 (!) 335 lb (152 kg)               ASSESMENT  Ac resp failure  Asp pneumonia  Ac asthma        PLAN  1. Bd rx  2.  Steroids  3. antibx  4. cxr in am    1/3/2020  Gilson Levine M.D.

## 2020-01-03 NOTE — PROGRESS NOTES
good state of repair  Eyes: PERRLA, EOMI, conjunctiva pink, sclera anicteric. Neck: Supple. Trachea midline. No LAD. Chest: no distress and CTA. Good air movement. Heart: RRR and no MRG. Abd: right lower quadrant ileostomy, soft, non-distended, suprapubic tenderness, no hepatomegaly. Normoactive bowel sounds. Ext: Right hip hemiarthroplasty scar, no dehiscence does not appear infected. No clubbing, cyanosis, or edema  Catheter Site: right arm PICC line without erythema or tenderness  Neuro: Mental status intact. CN 2-12 intact and no focal sensory or motor deficits     Radiologic / Imaging / TESTING  CXR 1/2/20  Large amount of dense opacity in the left lung base and large amount of patchy opacity in the right lung base. Bibasilar pneumonias and atelectasis present. No significant change in the appearance of the lung bases. Mild pulmonary vascular congestion now present.         Labs:      CULTURE results: Invalid input(s): BLOOD CULTURE,  URINE CULTURE, SURGICAL CULTURE    Diagnosis:  Patient Active Problem List   Diagnosis    Hypertension    Hyperlipidemia    Asthma    Diabetes mellitus ([address]    H/O cardiovascular stress test    Obesity    Chronic kidney disease, stage III (moderate) (HCC)    Hypertensive kidney disease with CKD stage III (HCC)    Depression    SOBOE (shortness of breath on exertion)    Abnormal cardiovascular stress test    Fracture of epiphysis (separation) (upper) of neck of femur, closed (HCC)    Pressure injury of skin of coccygeal region    Sepsis ([address]    PVC (premature ventricular contraction)    Septicemia ([address]    WD-Pressure injury of sacral region, stage 4 ([address]    WD-Nonhealing surgical wound    WD-Skin ulcer of right hip with necrosis of muscle (HCC)    Troponin level elevated    Recurrent major depressive disorder, in partial remission ([address]    Delusional ideas ([address]    Vascular dementia with behavior disturbance (HCC)    Hypotension    Metabolic

## 2020-01-03 NOTE — PROGRESS NOTES
Nephrology Progress Note  1/3/2020 4:37 PM        Subjective:   Admit Date: 12/23/2019  PCP: Woody Cruz MD      This is a late  entry. Pt seen in early am       Interval History: more awake , alert and oriented, converses     Diet: none / po TPN    ROS:  No overt sob, feels thirsty  and c/o dry mouth , still 2 pressors     Data:     Current meds:    linezolid  600 mg Intravenous Q12H    amiodarone  200 mg Oral Daily    heparin (porcine)  5,000 Units Subcutaneous 3 times per day    fat emulsion  250 mL Intravenous Once per day on Mon Tue Thu Fri    metroNIDAZOLE  500 mg Intravenous Q8H    minocycline  100 mg Oral BID    furosemide  80 mg Intravenous TID    ceftazidime-acibactam (AVYCAZ) infusion  1.25 g Intravenous Q8H    ipratropium-albuterol  1 ampule Inhalation Q4H    methylPREDNISolone  40 mg Intravenous Q8H    albumin human  25 g Intravenous Once    pantoprazole  40 mg Intravenous BID    collagenase   Topical Daily    aspirin  81 mg Oral Daily    buPROPion  300 mg Oral Daily    mometasone-formoterol  2 puff Inhalation BID    insulin glargine  15 Units Subcutaneous Nightly    memantine  5 mg Oral Nightly    montelukast  10 mg Oral Nightly    rOPINIRole  1 mg Oral Nightly    sertraline  100 mg Oral BID    vitamin C  1,000 mg Oral Daily    vitamin E  400 Units Oral Daily    sodium chloride flush  10 mL Intravenous 2 times per day    insulin lispro  0-12 Units Subcutaneous TID WC    insulin lispro  0-6 Units Subcutaneous Nightly      PN-Adult Premix 5/15 - Central      phenylephrine (CHINA-SYNEPHRINE) 50mg/250mL infusion 20 mcg/min (01/03/20 0214)    dextrose 50 mL/hr at 01/02/20 0253    vasopressin (Septic Shock) infusion 0.04 Units/min (01/03/20 1421)    dextrose           I/O last 3 completed shifts:   In: 4588 [I.V.:2488; NG/GT:170; IV Piggyback:424]  Out: 0784 [Urine:6250; Emesis/NG output:480; Stool:45]    CBC:   Recent Labs     01/01/20  0600 01/02/20  0550 01/02/20  [id number]

## 2020-01-03 NOTE — CONSULTS
[address] [place] [address] Continence Nurse  Consult Note       Kellen Guzman  AGE: 77 y.o.    GENDER: male  : 1950  TODAY'S DATE:  1/3/2020    Subjective:     Reason for  Evaluation and Assessment: wound reassessment      Kellen Guzman is a 77 y.o. male referred by:   [x] Physician  [] Nursing  [] Other:     Wound Identification:  Wound Type: pressure  Contributing Factors: diabetes, chronic pressure, decreased mobility, obesity, decreased tissue oxygenation and malnutrition        PAST MEDICAL HISTORY        Diagnosis Date    Arthritis     Asthma     Chronic kidney disease     stage 3, seen by Dr. Susan Lopez [facility]     H/O cardiac catheterization 2017    H/O cardiovascular stress test ,     CARDIOLITE: EF->51%    Hyperlipidemia     Hypertension     Obesity     Pressure ulcer of coccygeal region, unstageable ([facility] 09/10/2019    Thyroid disease        PAST SURGICAL HISTORY    Past Surgical History:   Procedure Laterality Date    CARPAL TUNNEL RELEASE  2005    COLOSTOMY N/A 2019    LAPAROSCOPIC DIVERTING ILEOSTOMY performed by Davina Armas MD at [place] [address] CATH LAB PROCEDURE      NO SIGNIFICANT CAD    EYE SURGERY      HEMIARTHROPLASTY HIP Right 2019    HIP HEMIARTHROPLASTY performed by Johanna Maddox MD at [address] [place] [address] Right 2019    HIP INCISION AND DRAINAGE performed by Johanna Maddox MD at [address] [place] [address] Right 11/15/2019    HIP INCISION AND DRAINAGE RIGHT CLOSURE OF INCISION WITH HEMOVAC performed by Johanna Maddox MD at [address]  2019    of stage 4 wound on coccyx, by Dr. Nirmala Koehler N/A 2019    EXCISIONAL DEBRIDEMENT OF SACRAL PRESSURE ULCER performed by Davina Armas MD at [address] N/A 2019    DECUBITUS ULCER DEBRIDEMENT REPAIR performed by Alexandra Purvis vial 3    TRULICITY 1.5 PK/0.0NZ SOPN Inject 1.5 mg into the skin once a week   2    ezetimibe (ZETIA) 10 MG tablet Take 10 mg by mouth nightly       SUPER B COMPLEX/C PO Take 1 capsule by mouth daily      vitamin E 400 UNIT capsule Take 400 Units by mouth daily      vitamin C (ASCORBIC ACID) 500 MG tablet Take 1,000 mg by mouth daily       insulin lispro (HUMALOG) 100 UNIT/ML injection vial Inject into the skin 3 times daily (before meals) 10/28/19 Sliding scale per nursing facility      benazepril (LOTENSIN) 20 MG tablet Take 1 tablet by mouth daily 90 tablet 3    Multiple Vitamins-Minerals (VISION VITAMINS PO) Take 1 capsule by mouth daily       omeprazole (PRILOSEC) 20 MG capsule Take 20 mg by mouth daily.  Canagliflozin (INVOKANA) 300 MG TABS Take 300 mg by mouth daily       buPROPion (WELLBUTRIN XL) 300 MG XL tablet Take 300 mg by mouth daily.  sertraline (ZOLOFT) 100 MG tablet Take 100 mg by mouth 2 times daily.  aspirin 81 MG EC tablet Take 81 mg by mouth daily.  Trolamine Salicylate (ASPERCREME) 10 % LOTN Apply topically every 4 hours as needed for Pain      glucagon, rDNA, 1 MG injection Inject 1 mg into the muscle as needed for Low blood sugar (Blood glucose less than 70 mg/dL and patient NOT ALERT or NPO and does not have IV access. ) 1 each 0    nitroGLYCERIN (NITROSTAT) 0.4 MG SL tablet Place 1 tablet under the tongue every 5 minutes as needed for Chest pain 25 tablet 2    fluticasone (FLONASE) 50 MCG/ACT nasal spray 1 spray by Nasal route daily as needed       Albuterol Sulfate (PROAIR HFA IN) Inhale 1 puff into the lungs every 6 hours as needed            Objective:      BP (!) 92/50   Pulse 82   Temp 98 °F (36.7 °C) (Oral)   Resp 12   Ht 6' 3\" (1.905 m)   Wt 299 lb 13.2 oz (136 kg)   SpO2 95%   BMI 37.48 kg/m²   Alfonso Risk Score: Alfonso Scale Score: 10    LABS    CBC:   Lab Results   Component Value Date    WBC 17.2 01/03/2020    RBC 3.71 01/03/2020    HGB 1/3/2020 11:02 AM   Wound Surface Area (cm^2) 1.32 cm^2 1/3/2020 11:02 AM   Change in Wound Size % (l*w) 42.11 1/3/2020 11:02 AM   Wound Volume (cm^3) 0.13 cm^3 1/3/2020 11:02 AM   Wound Healing % 43 1/3/2020 11:02 AM   Distance Tunneling (cm) 0 cm 1/3/2020 11:02 AM   Tunneling Position ___ O'Clock 0 1/3/2020 11:02 AM   Undermining Starts ___ O'Clock 0 1/3/2020 11:02 AM   Undermining Ends___ O'Clock 0 1/3/2020 11:02 AM   Undermining Maxium Distance (cm) 0 1/3/2020 11:02 AM   Wound Assessment Red;Yellow 1/3/2020 11:02 AM   Drainage Amount Small 1/3/2020 11:02 AM   Drainage Description Serosanguinous 1/3/2020 11:02 AM   Odor None 1/3/2020 11:02 AM   Margins Defined edges 1/3/2020 11:02 AM   Annika-wound Assessment Intact 1/3/2020 11:02 AM   Non-staged Wound Description Full thickness 1/3/2020 11:02 AM   [place]%Wound Bed 0 1/3/2020 11:02 AM   Red%Wound Bed 30 1/3/2020 11:02 AM   Yellow%Wound Bed 70 1/3/2020 11:02 AM   Black%Wound Bed 0 1/3/2020 11:02 AM   Purple%Wound Bed 0 1/3/2020 11:02 AM   Other%Wound Bed 0 1/3/2020 11:02 AM   Number of days: 7       Response to treatment:  Well tolerated by patient. Pain Assessment:  Severity:  none  Quality of pain: na  Wound Pain Timing/Severity: na  Premedicated: no    Plan:     Plan of Care:     Wound 12/26/19 Left hip Unstageable PI-Dressing/Treatment: (santyl, fibracol, border)    Wound 09/21/19 Coccyx Stage 4 Pressure Injury-Dressing/Treatment: (moist kerlix, abd, tape)    Pt in bed. Nurse in room. Bilateral heels pink, blanching, and intact. Skin prep applied. Back wound now healed. Left RAMONE. Wounds to left hip and coccyx pictures and measurements taken. Cleansed with NS. Treatment as above. Repositioned to right side with pillow support. Spoke with Dr. Jamie Vieira and updated on coccyx wound with increased black tissue. Will add Santyl to coccyx wound until stable to debride. Ileostomy appliance intact.  Pt has not been able to have any education regarding ileostomy due to medical issues. Educational materials left in room and will continue to follow. Pt is at high risk for skin breakdown AEB Alfonso. Follow Alfonso orders. Specialty Bed Required : yes  [x] Low Air Loss   [x] Pressure Redistribution  [] Fluid Immersion  [] Bariatric  [] Total Pressure Relief  [] Other:     Discharge Plan:  Placement for patient upon discharge: tbd  Hospice Care: no  Patient appropriate for Outpatient [address]: [facility]    Patient/Caregiver Teaching:  Level of patient/caregiver understanding able to:   Needs reinforcement.         Electronically signed by Zurdo Hong RN,  on 1/3/2020 at 11:45 AM

## 2020-01-03 NOTE — PROGRESS NOTES
Wound care at bedside. Pt repositioned and dressings changed. Sacral pictures sent to Dr. Chauhan per wound care as stage IV pressure ulcers appears worse compared to 12/27 picture. EPOC BG checked with reading of 700 - does not seem accurate - stat lab draw sent to check glucose.      Marci Schmitz RN

## 2020-01-03 NOTE — PROGRESS NOTES
Cardiology Progress Note     Admit Date:  12/23/2019    Consult reason/ Seen today for :   Tachycardia  Hypotention  Respiratory failure    Subjective and  Overnight Events :  Atrial fibrillation with rapid response with heart rate up to 180s started on amiodarone drip he converted back to sinus. blood pressure significantly improved on pressors. Mostly vasopressin now stopped Levophed   Very much awake but in distress he is on high flow oxygen 40 L a minute of Vapotherm. But clinically seems to be looking better today than yesterday    Chief complain on admission : 77 y. o.year old who is admitted for  Chief Complaint   Patient presents with    Altered Mental Status     since yesterday    Wound Infection     coccyx      Assessment / Plan:  Atrial fibrillation: Thing of respiratory distress converted on amiodarone. He has no history of A. fib in the past at this point we will not initiate anticoagulation. Stress-induced A. Fib? Normal EF with no significant valve abnormality  Respiratory failure due to aspiration pneumonia as per primary team antibiotics recently changed  Renal failure still producing urine on high-dose loop diuretic nephrology following, 6 3 times daily  Echo in September to 2019 reviewed he has preserved EF no significant coronary artery disease  Continue supportive care, still critical in ICU  DVT Prophylaxis if no contraindication    Past medical history:    has a past medical history of Arthritis, Asthma, Chronic kidney disease, Diabetes mellitus ([address] H/O cardiac catheterization, H/O cardiovascular stress test, Hyperlipidemia, Hypertension, Obesity, Pressure ulcer of coccygeal region, unstageable ([address] and Thyroid disease. Past surgical history:   has a past surgical history that includes Retinal detachment surgery; eye surgery; Carpal tunnel release (2005);  Diagnostic Cardiac Cath Lab Procedure (12/05); HEMIARTHROPLASTY HIP (Right, 8/29/2019); Pressure ulcer debridement (09/23/2019); Pressure ulcer debridement (N/A, 9/23/2019); incision and drainage (Right, 9/29/2019); Pressure ulcer debridement (N/A, 9/29/2019); incision and drainage (Right, 11/15/2019); and colostomy (N/A, 12/27/2019). Social History:   reports that he has never smoked. He has never used smokeless tobacco. He reports that he does not drink alcohol or use drugs. Family history:  family history includes Cancer in his father and mother; Diabetes in his paternal grandmother; Heart Disease in his paternal grandfather; High Blood Pressure in his mother; Stroke in his maternal grandfather. Allergies   Allergen Reactions    Bee Venom Shortness Of Breath       Review of Systems:    All 14 systems were reviewed and are negative  Except for the positive findings  which as documented     /72   Pulse 81   Temp 98.7 °F (37.1 °C) (Oral)   Resp 19   Ht 6' 3\" (1.905 m)   Wt (!) 302 lb 14.6 oz (137.4 kg)   SpO2 93%   BMI 37.86 kg/m²       Intake/Output Summary (Last 24 hours) at 1/2/2020 1928  Last data filed at 1/2/2020 1705  Gross per 24 hour   Intake 2006.01 ml   Output 5680 ml   Net -3673.99 ml     Physical Exam: Somewhat in distress NG tube in place  Constitutional:  Well developed, Well nourished, No acute distress, Non-toxic appearance. HENT:  Normocephalic, Atraumatic, Bilateral external ears normal, Oropharynx moist, No oral exudates, Nose normal. Neck- Normal range of motion, No tenderness, Supple, No stridor. Eyes:  PERRL, EOMI, Conjunctiva normal, No discharge. Respiratory:  Normal breath sounds, No respiratory distress, No wheezing, No chest tenderness. Cardiovascular:  Normal heart rate, Normal rhythm, No murmurs, No rubs, No gallops, JVP not elevated  Abdomen/GI:  Bowel sounds normal, Soft, No tenderness, No masses, No pulsatile masses.      Musculoskeletal:  Intact distal pulses, No edema, No tenderness, No cyanosis, No clubbing. Good range of motion in all major joints. No tenderness to palpation or major deformities noted. Back- No tenderness. Integument:  Warm, Dry, No erythema, No rash. Lymphatic:  No lymphadenopathy noted. Neurologic:  Alert & oriented x 3, Normal motor function, Normal sensory function, No focal deficits noted.    Psychiatric:  Affect  and  Mood :no change    Medications:    heparin (porcine)  5,000 Units Subcutaneous 3 times per day    fat emulsion  250 mL Intravenous Once per day on Mon Tue Thu Fri    daptomycin (CUBICIN) IVPB  8 mg/kg (Adjusted) Intravenous Q24H    metroNIDAZOLE  500 mg Intravenous Q8H    minocycline  100 mg Oral BID    furosemide  80 mg Intravenous TID    ceftazidime-acibactam (AVYCAZ) infusion  1.25 g Intravenous Q8H    ipratropium-albuterol  1 ampule Inhalation Q4H    methylPREDNISolone  40 mg Intravenous Q8H    albumin human  25 g Intravenous Once    pantoprazole  40 mg Intravenous BID    collagenase   Topical Daily    aspirin  81 mg Oral Daily    buPROPion  300 mg Oral Daily    mometasone-formoterol  2 puff Inhalation BID    insulin glargine  15 Units Subcutaneous Nightly    memantine  5 mg Oral Nightly    montelukast  10 mg Oral Nightly    rOPINIRole  1 mg Oral Nightly    sertraline  100 mg Oral BID    vitamin C  1,000 mg Oral Daily    vitamin E  400 Units Oral Daily    sodium chloride flush  10 mL Intravenous 2 times per day    insulin lispro  0-12 Units Subcutaneous TID WC    insulin lispro  0-6 Units Subcutaneous Nightly      PN-Adult Premix 5/15 - Central 42 mL/hr at 01/02/20 1808    amiodarone 0.5 mg/min (01/02/20 1840)    phenylephrine (CHINA-SYNEPHRINE) 50mg/250mL infusion 50 mcg/min (01/02/20 1809)    dextrose 50 mL/hr at 01/02/20 0253    vasopressin (Septic Shock) infusion 0.04 Units/min (01/02/20 1808)    dextrose       sodium chloride flush, diphenhydrAMINE, potassium chloride, magnesium sulfate, promethazine, ipratropium-albuterol, questions.     Ramya Garnica MD 1/2/2020 7:28 PM

## 2020-01-03 NOTE — PROGRESS NOTES
Pt's repeat 3.1 - Dr. Maddi Weston notified - ordered to give 60 mEq over 6 hours in addition to the new TPN order    Nayla Wang RN

## 2020-01-03 NOTE — PROGRESS NOTES
Lalo Paiz MD.  Section of General Neurology - Adult  Consult Note        Reason for Consult:    Requesting Physician:  No referring provider defined for this encounter.   Thank you for your kind referral.    CHIEF COMPLAINT:     Pt was transferred to ICU due to hemodynamic changes and SOB    Pt is stable and doing a lot better     No confusion         Past Medical History:        Diagnosis Date    Arthritis     Asthma     Chronic kidney disease     stage 3, seen by Dr. Kaela Baltazar Diabetes mellitus ([facility]     H/O cardiac catheterization 05/04/2017    H/O cardiovascular stress test 6/07, 12/08    CARDIOLITE: EF->51%    Hyperlipidemia     Hypertension     Obesity     Pressure ulcer of coccygeal region, unstageable ([facility] 09/10/2019    Thyroid disease      Past Surgical History:        Procedure Laterality Date    CARPAL TUNNEL RELEASE  2005    COLOSTOMY N/A 12/27/2019    LAPAROSCOPIC DIVERTING ILEOSTOMY performed by Wilner Magallanes MD at [place] [address] CATH LAB PROCEDURE  12/05    NO SIGNIFICANT CAD    EYE SURGERY      HEMIARTHROPLASTY HIP Right 8/29/2019    HIP HEMIARTHROPLASTY performed by Tank Carmona MD at [facility] Right 9/29/2019    HIP INCISION AND DRAINAGE performed by Tank Carmona MD at [facility] Right 11/15/2019    HIP INCISION AND DRAINAGE RIGHT CLOSURE OF INCISION WITH HEMOVAC performed by Tank Carmona MD at [address] [place] [address]  09/23/2019    of stage 4 wound on coccyx, by Dr. Reza Steward N/A 9/23/2019    EXCISIONAL DEBRIDEMENT OF SACRAL PRESSURE ULCER performed by Wilner Magallanes MD at [address] [place] [address] N/A 9/29/2019    DECUBITUS ULCER DEBRIDEMENT REPAIR performed by Suni Gonzalez MD at  [facility]        Current Medications:   Current Facility-Administered Medications: heparin (porcine) (ZOFRAN) injection 4 mg, 4 mg, Intravenous, Q6H PRN  morphine (PF) injection 2 mg, 2 mg, Intravenous, Q4H PRN  HYDROcodone-acetaminophen (NORCO) 5-325 MG per tablet 1 tablet, 1 tablet, Oral, Q6H PRN  collagenase ointment, , Topical, Daily  glucose (GLUTOSE) 40 % oral gel 15 g, 15 g, Oral, PRN  dextrose 50 % IV solution, 12.5 g, Intravenous, PRN  glucagon (rDNA) injection 1 mg, 1 mg, Intramuscular, PRN  dextrose 5 % solution, 100 mL/hr, Intravenous, PRN  acetaminophen (TYLENOL) tablet 500 mg, 500 mg, Oral, Q6H PRN  aspirin EC tablet 81 mg, 81 mg, Oral, Daily  buPROPion (WELLBUTRIN XL) extended release tablet 300 mg, 300 mg, Oral, Daily  fluticasone (FLONASE) 50 MCG/ACT nasal spray 1 spray, 1 spray, Nasal, Daily PRN  mometasone-formoterol (DULERA) 200-5 MCG/ACT inhaler 2 puff, 2 puff, Inhalation, BID  insulin glargine (LANTUS) injection vial 15 Units, 15 Units, Subcutaneous, Nightly  memantine (NAMENDA) tablet 5 mg, 5 mg, Oral, Nightly  montelukast (SINGULAIR) tablet 10 mg, 10 mg, Oral, Nightly  nitroGLYCERIN (NITROSTAT) SL tablet 0.4 mg, 0.4 mg, Sublingual, Q5 Min PRN  rOPINIRole (REQUIP) tablet 1 mg, 1 mg, Oral, Nightly  sertraline (ZOLOFT) tablet 100 mg, 100 mg, Oral, BID  traMADol (ULTRAM) tablet 50 mg, 50 mg, Oral, Q6H PRN  trolamine salicylate (ASPERCREME) 10 % cream, , Topical, Q4H PRN  vitamin C (ASCORBIC ACID) tablet 1,000 mg, 1,000 mg, Oral, Daily  vitamin E capsule 400 Units, 400 Units, Oral, Daily  sodium chloride flush 0.9 % injection 10 mL, 10 mL, Intravenous, 2 times per day  sodium chloride flush 0.9 % injection 10 mL, 10 mL, Intravenous, PRN  insulin lispro (HUMALOG) injection vial 0-12 Units, 0-12 Units, Subcutaneous, TID WC  insulin lispro (HUMALOG) injection vial 0-6 Units, 0-6 Units, Subcutaneous, Nightly  Allergies:  Bee venom    Social History:  TOBACCO:   reports that he has never smoked. He has never used smokeless tobacco.  ETOH:   reports no history of alcohol use.   DRUGS:   reports no history of drug use. Family History:       Problem Relation Age of Onset    High Blood Pressure Mother     Cancer Mother     Cancer Father     Stroke Maternal Grandfather     Diabetes Paternal Grandmother     Heart Disease Paternal Grandfather        REVIEW OF SYSTEMS:  CONSTITUTIONAL:  negative  HEENT:  negative  RESPIRATORY:  negative  CARDIOVASCULAR:  negative  GASTROINTESTINAL:  negative  GENITOURINARY:  negative  MUSCULOSKELETAL:  negative  BEHAVIOR/PSYCH:  Negative    ROS unavailable    Family hx neg    PHYSICAL EXAM  ------------------------  Vitals:  BP (!) 116/58   Pulse 80   Temp 98.7 °F (37.1 °C) (Oral)   Resp 21   Ht 6' 3\" (1.905 m)   Wt (!) 302 lb 14.6 oz (137.4 kg)   SpO2 96%   BMI 37.86 kg/m²      General:  Drowsy   Well developed, well nourished, well groomed. No apparent distress. HEENT:  Normocephalic, atraumatic. Pupils equal, round, reactive to light. No scleral icterus. No conjunctival injection. Normal lips, teeth, and gums. No nasal discharge. Neck:  Supple  Heart:  RRR, no murmurs, gallops, rubs  Lungs:  CTA bilaterally, bilat symmetrical expansion, no wheeze, rales, or rhonchi  Abdomen: Bowel sounds present, soft, nontender, no masses, no organomegaly, no peritoneal signs  Extremities:  No clubbing, cyanosis, or edema  Skin:  Warm and dry, no open lesions or rash  Breast: deferred  Rectal: deferred  Genitalia:  deferred    NEUROLOGICAL EXAM  ---------------------------------    Mental Status Exam:             drowsy  follows simple commands  Oriented to person place year month-moderate dementia    Cranial Ijortn-GR-BZB Intact.         Cranial nerve II           Visual acuity:  normal                 Cranial nerve III           Pupils:  equal, round, reactive to light      Cranial nerves III, IV, VI           Extraocular Movements: intact      Cranial nerve V           Facial sensation:  intact      Cranial nerve VII           Facial strength: intact      Cranial nerve PROTIME 11.5 05/03/2017    PROTIME 10.6 01/26/2012    INR 1.01 05/03/2017     PTT:    Lab Results   Component Value Date    APTT 72.9 01/01/2020   [APTT  U/A:    Lab Results   Component Value Date    COLORU YELLOW 01/02/2020    PHUR 5.5 10/24/2018    LABCAST Present 12/12/2016    LABCAST Hyaline casts 12/12/2016    WBCUA 32 01/02/2020    RBCUA 4 01/02/2020    MUCUS RARE 01/02/2020    TRICHOMONAS NONE SEEN 01/02/2020    YEAST FEW 01/02/2020    BACTERIA NEGATIVE 01/02/2020    CLARITYU HAZY 01/02/2020    SPECGRAV 1.010 01/02/2020    LEUKOCYTESUR SMALL 01/02/2020    UROBILINOGEN NORMAL 01/02/2020    BILIRUBINUR NEGATIVE 01/02/2020    BLOODU MODERATE 01/02/2020    GLUCOSEU 3+ 10/24/2018     TSH:  No results found for: TSH  VITAMIN B12: No components found for: B12  FOLATE:    Lab Results   Component Value Date    FOLATE 5.9 12/25/2019     RPR:  No results found for: RPR  CAMILLA:  No results found for: ANATITER, CAMILLA  Urine Toxicology:  No components found for: IAMMENTA, IBARBIT, IBENZO, ICOCAINE, IMARTHC, IOPIATES, IPHENCYC     IMPRESSION:    Metabolic encephalopathy/sepsis    neg for CVA    Hx dementia    ARF/CKD/HTN    PLAN:    CT brain neg    Mri brain neg    B 12 folate TSh nl    Continue asa and namenda    Pt stable neurologically. Roselia Orona MD  BOARD CERTIFIED-NEUROLOGY.

## 2020-01-03 NOTE — PROGRESS NOTES
Cardiology Progress Note     Admit Date:  12/23/2019    Consult reason/ Seen today for :   Tachycardia  Hypotention  Respiratory failure    Subjective and  Overnight Events : Converted to sinus rhythm on amiodarone drip drip completed change to p.o. amiodarone today  Really hungry would like to eat still an ileus, pretty status is better he has no chest pain but still short of breath and winded    Chief complain on admission : 77 y. o.year old who is admitted for  Chief Complaint   Patient presents with    Altered Mental Status     since yesterday    Wound Infection     coccyx      Assessment / Plan:  Atrial fibrillation: He is to p.o. amiodarone converted on amiodarone. He has no history of A. fib in the past at this point we will not initiate anticoagulation. Stress-induced A. Fib? Will stop amiodarone once respiratory status improves  Normal EF with no significant valve abnormality  Respiratory failure due to aspiration pneumonia as per primary team antibiotics recently changed  Renal failure still producing urine on high-dose loop diuretic nephrology following, 6 3 times daily  Echo in September to 2019 reviewed he has preserved EF no significant coronary artery disease  Continue supportive care, still critical in ICU  DVT Prophylaxis if no contraindication    Past medical history:    has a past medical history of Arthritis, Asthma, Chronic kidney disease, Diabetes mellitus ([address] H/O cardiac catheterization, H/O cardiovascular stress test, Hyperlipidemia, Hypertension, Obesity, Pressure ulcer of coccygeal region, unstageable ([address] and Thyroid disease. Past surgical history:   has a past surgical history that includes Retinal detachment surgery; eye surgery; Carpal tunnel release (2005); Diagnostic Cardiac Cath Lab Procedure (12/05); HEMIARTHROPLASTY HIP (Right, 8/29/2019); Pressure ulcer debridement (09/23/2019);  Pressure ulcer noted. Back- No tenderness. Integument:  Warm, Dry, No erythema, No rash. Lymphatic:  No lymphadenopathy noted. Neurologic:  Alert & oriented x 3, Normal motor function, Normal sensory function, No focal deficits noted.    Psychiatric:  Affect  and  Mood :no change    Medications:    linezolid  600 mg Intravenous Q12H    amiodarone  200 mg Oral Daily    heparin (porcine)  5,000 Units Subcutaneous 3 times per day    fat emulsion  250 mL Intravenous Once per day on Mon Tue Thu Fri    metroNIDAZOLE  500 mg Intravenous Q8H    minocycline  100 mg Oral BID    furosemide  80 mg Intravenous TID    ceftazidime-acibactam (AVYCAZ) infusion  1.25 g Intravenous Q8H    ipratropium-albuterol  1 ampule Inhalation Q4H    methylPREDNISolone  40 mg Intravenous Q8H    albumin human  25 g Intravenous Once    pantoprazole  40 mg Intravenous BID    collagenase   Topical Daily    aspirin  81 mg Oral Daily    buPROPion  300 mg Oral Daily    mometasone-formoterol  2 puff Inhalation BID    insulin glargine  15 Units Subcutaneous Nightly    memantine  5 mg Oral Nightly    montelukast  10 mg Oral Nightly    rOPINIRole  1 mg Oral Nightly    sertraline  100 mg Oral BID    vitamin C  1,000 mg Oral Daily    vitamin E  400 Units Oral Daily    sodium chloride flush  10 mL Intravenous 2 times per day    insulin lispro  0-12 Units Subcutaneous TID WC    insulin lispro  0-6 Units Subcutaneous Nightly      PN-Adult Premix 5/15 - Central      phenylephrine (CHINA-SYNEPHRINE) 50mg/250mL infusion 20 mcg/min (01/03/20 0214)    dextrose 50 mL/hr at 01/02/20 0253    vasopressin (Septic Shock) infusion 0.04 Units/min (01/03/20 1421)    dextrose       sodium chloride flush, diphenhydrAMINE, potassium chloride, magnesium sulfate, promethazine, ipratropium-albuterol, calcium carbonate, ondansetron, morphine, HYDROcodone 5 mg - acetaminophen, glucose, dextrose, glucagon (rDNA), dextrose, acetaminophen, fluticasone, nitroGLYCERIN,

## 2020-01-04 NOTE — PROGRESS NOTES
Nephrology Progress Note  1/4/2020 3:04 PM        Subjective:   Admit Date: 12/23/2019  PCP: Lourdes Payton MD    This is a late entry. Pt seen in early am   Interval History: awake, little lethargic     Diet: TPn    ROS:  Good UOP , HR better - 1 pressor only now ,     Data:     Current meds:    spironolactone  25 mg Oral Daily    aMILoride  5 mg Oral Daily    linezolid  600 mg Intravenous Q12H    amiodarone  200 mg Oral Daily    heparin (porcine)  5,000 Units Subcutaneous 3 times per day    fat emulsion  250 mL Intravenous Once per day on Mon Tue Thu Fri    metroNIDAZOLE  500 mg Intravenous Q8H    minocycline  100 mg Oral BID    furosemide  80 mg Intravenous TID    ceftazidime-acibactam (AVYCAZ) infusion  1.25 g Intravenous Q8H    ipratropium-albuterol  1 ampule Inhalation Q4H    methylPREDNISolone  40 mg Intravenous Q8H    albumin human  25 g Intravenous Once    pantoprazole  40 mg Intravenous BID    collagenase   Topical Daily    aspirin  81 mg Oral Daily    buPROPion  300 mg Oral Daily    mometasone-formoterol  2 puff Inhalation BID    insulin glargine  15 Units Subcutaneous Nightly    memantine  5 mg Oral Nightly    montelukast  10 mg Oral Nightly    rOPINIRole  1 mg Oral Nightly    sertraline  100 mg Oral BID    vitamin C  1,000 mg Oral Daily    vitamin E  400 Units Oral Daily    sodium chloride flush  10 mL Intravenous 2 times per day    insulin lispro  0-12 Units Subcutaneous TID WC    insulin lispro  0-6 Units Subcutaneous Nightly      PN-Adult Premix 5/15 - Central      phenylephrine (CHINA-SYNEPHRINE) 50mg/250mL infusion Stopped (01/04/20 0959)    vasopressin (Septic Shock) infusion 0.04 Units/min (01/04/20 0742)    dextrose           I/O last 3 completed shifts:   In: 3188 [I.V.:1024; IV Piggyback:866]  Out: 1191 [Urine:5100; Emesis/NG output:390; Stool:30]    CBC:   Recent Labs     01/02/20  0550 01/02/20  2159 01/03/20  0614 01/04/20  0851   WBC 13.6*  --  17.2* 17.9*   HGB

## 2020-01-04 NOTE — PROGRESS NOTES
Cardiology Progress Note     Admit Date:  12/23/2019    Consult reason/ Seen today for :   Tachycardia  Hypotention  Respiratory failure    Subjective and  Overnight Events : In sinus for last 24 hrs , Bp is better Converted to sinus rhythm on amiodarone drip   Really hungry would like to eat still an ileus, pretty status is better he has no chest pain but still short of breath and winded    Chief complain on admission : 77 y. o.year old who is admitted for  Chief Complaint   Patient presents with    Altered Mental Status     since yesterday    Wound Infection     coccyx      Assessment / Plan:  Atrial fibrillation: New afib He is to p.o. amiodarone converted on amiodarone. He has no history of A. fib in the past at this point we will not initiate anticoagulation. Stress-induced A. Fib? Will stop amiodarone once respiratory status improves, afib could be stress related   Normal EF with no significant valve abnormality  Respiratory failure due to aspiration pneumonia as per primary team antibiotics recently changed  Renal failure still producing urine on high-dose loop diuretic nephrology following, 6 3 times daily  Echo in September to 2019 reviewed he has preserved EF no significant coronary artery disease  Continue supportive care, still critical in ICU, wean off pressors today   DVT Prophylaxis if no contraindication    Past medical history:    has a past medical history of Arthritis, Asthma, Chronic kidney disease, Diabetes mellitus ([address] H/O cardiac catheterization, H/O cardiovascular stress test, Hyperlipidemia, Hypertension, Obesity, Pressure ulcer of coccygeal region, unstageable ([address] and Thyroid disease. Past surgical history:   has a past surgical history that includes Retinal detachment surgery; eye surgery; Carpal tunnel release (2005); Diagnostic Cardiac Cath Lab Procedure (12/05);  HEMIARTHROPLASTY HIP (Right, 8/29/2019); Pressure ulcer debridement (09/23/2019); Pressure ulcer debridement (N/A, 9/23/2019); incision and drainage (Right, 9/29/2019); Pressure ulcer debridement (N/A, 9/29/2019); incision and drainage (Right, 11/15/2019); and colostomy (N/A, 12/27/2019). Social History:   reports that he has never smoked. He has never used smokeless tobacco. He reports that he does not drink alcohol or use drugs. Family history:  family history includes Cancer in his father and mother; Diabetes in his paternal grandmother; Heart Disease in his paternal grandfather; High Blood Pressure in his mother; Stroke in his maternal grandfather. Allergies   Allergen Reactions    Bee Venom Shortness Of Breath       Review of Systems:    All 14 systems were reviewed and are negative  Except for the positive findings  which as documented     BP (!) 97/52   Pulse 90   Temp 98.4 °F (36.9 °C) (Oral)   Resp 21   Ht 6' 3\" (1.905 m)   Wt 290 lb 2 oz (131.6 kg)   SpO2 (!) 89%   BMI 36.26 kg/m²       Intake/Output Summary (Last 24 hours) at 1/4/2020 1459  Last data filed at 1/4/2020 1330  Gross per 24 hour   Intake 3188 ml   Output 5520 ml   Net -2332 ml     Physical Exam: Somewhat in distress NG tube in place  Constitutional:  Well developed, Well nourished, No acute distress, Non-toxic appearance. HENT:  Normocephalic, Atraumatic, Bilateral external ears normal, Oropharynx moist, No oral exudates, Nose normal. Neck- Normal range of motion, No tenderness, Supple, No stridor. Eyes:  PERRL, EOMI, Conjunctiva normal, No discharge. Respiratory:  Normal breath sounds, No respiratory distress, No wheezing, No chest tenderness. Cardiovascular:  Normal heart rate, Normal rhythm, No murmurs, No rubs, No gallops, JVP not elevated  Abdomen/GI:  Bowel sounds normal, Soft, No tenderness, No masses, No pulsatile masses. Musculoskeletal:  Intact distal pulses, No edema, No tenderness, No cyanosis, No clubbing.  Good range of motion in all major joints. No tenderness to palpation or major deformities noted. Back- No tenderness. Integument:  Warm, Dry, No erythema, No rash. Lymphatic:  No lymphadenopathy noted. Neurologic:  Alert & oriented x 3, Normal motor function, Normal sensory function, No focal deficits noted.    Psychiatric:  Affect  and  Mood :no change    Medications:    spironolactone  25 mg Oral Daily    aMILoride  5 mg Oral Daily    linezolid  600 mg Intravenous Q12H    amiodarone  200 mg Oral Daily    heparin (porcine)  5,000 Units Subcutaneous 3 times per day    fat emulsion  250 mL Intravenous Once per day on Mon Tue Thu Fri    metroNIDAZOLE  500 mg Intravenous Q8H    minocycline  100 mg Oral BID    furosemide  80 mg Intravenous TID    ceftazidime-acibactam (AVYCAZ) infusion  1.25 g Intravenous Q8H    ipratropium-albuterol  1 ampule Inhalation Q4H    methylPREDNISolone  40 mg Intravenous Q8H    albumin human  25 g Intravenous Once    pantoprazole  40 mg Intravenous BID    collagenase   Topical Daily    aspirin  81 mg Oral Daily    buPROPion  300 mg Oral Daily    mometasone-formoterol  2 puff Inhalation BID    insulin glargine  15 Units Subcutaneous Nightly    memantine  5 mg Oral Nightly    montelukast  10 mg Oral Nightly    rOPINIRole  1 mg Oral Nightly    sertraline  100 mg Oral BID    vitamin C  1,000 mg Oral Daily    vitamin E  400 Units Oral Daily    sodium chloride flush  10 mL Intravenous 2 times per day    insulin lispro  0-12 Units Subcutaneous TID WC    insulin lispro  0-6 Units Subcutaneous Nightly      PN-Adult Premix 5/15 - Central      phenylephrine (CHINA-SYNEPHRINE) 50mg/250mL infusion Stopped (01/04/20 5982)    vasopressin (Septic Shock) infusion 0.04 Units/min (01/04/20 8742)    dextrose       sodium chloride flush, diphenhydrAMINE, potassium chloride, magnesium sulfate, promethazine, ipratropium-albuterol, calcium carbonate, ondansetron, morphine, HYDROcodone 5 mg - acetaminophen, glucose, dextrose, glucagon (rDNA), dextrose, acetaminophen, fluticasone, nitroGLYCERIN, traMADol, trolamine salicylate, sodium chloride flush    Lab Data:  CBC:   Recent Labs     01/02/20  0550 01/02/20  2159 01/03/20  0614 01/04/20  0851   WBC 13.6*  --  17.2* 17.9*   HGB 9.8* 10.8* 9.9* 9.9*   HCT 31.6* 32.0* 31.5* 31.5*   MCV 85.9  --  84.9 85.4     --  306 254     BMP:   Recent Labs     01/03/20  0614 01/03/20  1645 01/04/20  0851    138 133*   K 5.0 3.1* 3.0  K CALLED TO ICU, [facility] AT 0938 1.4.2020 BY JEANMARIE MLT  RESULTS READ BACK  *   CL 94* 93* 90*   CO2 29 29 31   BUN 60* 59* 56*   CREATININE 2.4* 2.2* 2.1*     PT/INR: No results for input(s): PROTIME, INR in the last 72 hours. BNP:    No results for input(s): PROBNP in the last 72 hours. TROPONIN:   No results for input(s): TROPONINT in the last 72 hours. ECHO :   echocardiogram    Assessment:  77 y. o.year old who is admitted for  Chief Complaint   Patient presents with    Altered Mental Status     since yesterday    Wound Infection     coccyx    , active issues as noted below:  Impression:  Principal Problem:    Sepsis ([address]  Active Problems:    Hypotension    Metabolic encephalopathy    Vascular dementia with behavior disturbance ([address]    Hyperlipidemia    Asthma    Diabetes mellitus ([address]    Obesity    Chronic kidney disease, stage III (moderate) ([address]    Hypertensive kidney disease with CKD stage III ([address]    Depression    Pressure injury of skin of coccygeal region    WD-Pressure injury of sacral region, stage 4 (HCC)    Severe malnutrition (HCC)  Resolved Problems:    * No resolved hospital problems. *          All labs, medications and tests reviewed by myself , continue all other medications of all above medical condition listed as is except for changes mentioned above. Thank you very much for consult , please call with questions.     Federico Mahoney MD 1/4/2020 2:59 PM

## 2020-01-04 NOTE — PROGRESS NOTES
Progress note    [facility]    1/3/2020    Subjective:     Patients awake says he feels better. Patient is NSR on PO Cordarone. Medication side effects: none.     Scheduled Meds:   linezolid  600 mg Intravenous Q12H    amiodarone  200 mg Oral Daily    potassium chloride  60 mEq Intravenous Once    heparin (porcine)  5,000 Units Subcutaneous 3 times per day    fat emulsion  250 mL Intravenous Once per day on Mon Tue Thu Fri    metroNIDAZOLE  500 mg Intravenous Q8H    minocycline  100 mg Oral BID    furosemide  80 mg Intravenous TID    ceftazidime-acibactam (AVYCAZ) infusion  1.25 g Intravenous Q8H    ipratropium-albuterol  1 ampule Inhalation Q4H    methylPREDNISolone  40 mg Intravenous Q8H    albumin human  25 g Intravenous Once    pantoprazole  40 mg Intravenous BID    collagenase   Topical Daily    aspirin  81 mg Oral Daily    buPROPion  300 mg Oral Daily    mometasone-formoterol  2 puff Inhalation BID    insulin glargine  15 Units Subcutaneous Nightly    memantine  5 mg Oral Nightly    montelukast  10 mg Oral Nightly    rOPINIRole  1 mg Oral Nightly    sertraline  100 mg Oral BID    vitamin C  1,000 mg Oral Daily    vitamin E  400 Units Oral Daily    sodium chloride flush  10 mL Intravenous 2 times per day    insulin lispro  0-12 Units Subcutaneous TID WC    insulin lispro  0-6 Units Subcutaneous Nightly     Continuous Infusions:   PN-Adult Premix 5/15 - Central 50 mL/hr at 01/03/20 1743    phenylephrine (CHINA-SYNEPHRINE) 50mg/250mL infusion 20 mcg/min (01/03/20 0214)    vasopressin (Septic Shock) infusion 0.04 Units/min (01/03/20 1421)    dextrose       PRN Meds:sodium chloride flush, diphenhydrAMINE, potassium chloride, magnesium sulfate, promethazine, ipratropium-albuterol, calcium carbonate, ondansetron, morphine, HYDROcodone 5 mg - acetaminophen, glucose, dextrose, glucagon (rDNA), dextrose, acetaminophen, fluticasone, nitroGLYCERIN, traMADol, trolamine salicylate, sodium 12/31/2019    BASOSABS 0.0 12/31/2019    DIFFTYPE MANUAL DIFFERENTIAL 01/03/2020     CMP:    Lab Results   Component Value Date     01/03/2020    K 3.1 01/03/2020    CL 93 01/03/2020    CO2 29 01/03/2020    BUN 59 01/03/2020    CREATININE 2.2 01/03/2020    GFRAA 36 01/03/2020    LABGLOM 30 01/03/2020    GLUCOSE 285 01/03/2020    PROT 4.7 01/03/2020    LABALBU 2.6 01/03/2020    CALCIUM 8.9 01/03/2020    BILITOT 0.6 01/03/2020    ALKPHOS 108 01/03/2020    AST 22 01/03/2020    ALT 13 01/03/2020     BMP:    Lab Results   Component Value Date     01/03/2020    K 3.1 01/03/2020    CL 93 01/03/2020    CO2 29 01/03/2020    BUN 59 01/03/2020    LABALBU 2.6 01/03/2020    CREATININE 2.2 01/03/2020    CALCIUM 8.9 01/03/2020    GFRAA 36 01/03/2020    LABGLOM 30 01/03/2020    GLUCOSE 285 01/03/2020     Sodium:    Lab Results   Component Value Date     01/03/2020     Potassium:    Lab Results   Component Value Date    K 3.1 01/03/2020     Calcium:    Lab Results   Component Value Date    CALCIUM 8.9 01/03/2020     Warfarin PT/INR:  No components found for: PTPATWAR, PTINRWAR  PTT:    Lab Results   Component Value Date    APTT 72.9 01/01/2020   [APTT  Last 3 Troponin:  No results found for: TROPONINI  ABG:    Lab Results   Component Value Date    MLF7ZBF 54.8 01/02/2020    PO2ART 34.7 01/02/2020    XFY4XHC 32.4 01/02/2020     TSH:  No results found for: TSH  VITAMIN B12: No components found for: B12  FOLATE:    Lab Results   Component Value Date    FOLATE 5.9 12/25/2019     MRI Brain:  1. No acute intracranial abnormality.  No acute infarct. 2. Mild global parenchymal volume loss with chronic microvascular ischemic  changes. 3. Left mastoid effusion.     CT abdomen:  No intra-abdominal abscess.  Small amount of postoperative ascites.  Small  amount of residual free intra-air appropriate for recent surgery.  Moderate  distension of the bowel proximal to the ileostomy either due to postoperative  ileus or less likely small bowel obstruction.  Distal small bowel normal in  size.  Very large amount of fluid in the stomach.  Cholelithiasis without  finding of cholecystitis. Chest Xray:  Low lung volumes with elevation of the left hemidiaphragm. Interstitial opacities bilaterally with perihilar and left basilar airspace  opacities.  This could represent pulmonary edema, aspiration, and/or  infection. Assessment:     Principal Problem:    Sepsis ([facility] [address]  Active Problems:    Hypotension    Metabolic encephalopathy    Vascular dementia with behavior disturbance (HCC)    Hyperlipidemia    Asthma    Diabetes mellitus ([address]    Obesity    Chronic kidney disease, stage III (moderate) (HCC)    Hypertensive kidney disease with CKD stage III (HCC)    Depression    Pressure injury of skin of coccygeal region    WD-Pressure injury of sacral region, stage 4 (HCC)    Severe malnutrition (HCC)  Resolved Problems:    * No resolved hospital problems. *  Hypokalemia and hypomagnesemia  Aspiration Pneumonia  Plan:   MRI -no acute infarct  Continue wound care, hip wound culture results noted. Continue Vanc per ID recommendation  Patient had diverting colostomy. Appreciate renal recommendations  Zofran for Nausea  CT abdomen results noted. Management per surgery  Chest X ray report as above. CTA results pending. Keep patient in ICU. Noted wound culture results with multidrug resistance. Patient clinically improving. ID seen patient. Noted ID recommendation Continue Avycaz, minocycline and metronidazole  D/c daptomycin and start linezolid  A. Fib resolved on Amidarone  Continue pressor support.   Lasix per nephrology- noted recommendations  Patient on TPN  Spent approximately  30 min critical care time in patient care  Sacral decub per surgery- debride if needed      Kong TORRES M.D.  1/3/2020

## 2020-01-04 NOTE — PROGRESS NOTES
Néstor Hanson MD.  Section of General Neurology - Adult  Consult Note        Reason for Consult:    Requesting Physician:  No referring provider defined for this encounter.   Thank you for your kind referral.    CHIEF COMPLAINT:     Pt was transferred to ICU due to hemodynamic changes and SOB    Pt is stable and doing a lot better     No confusion         Past Medical History:        Diagnosis Date    Arthritis     Asthma     Chronic kidney disease     stage 3, seen by Dr. Marimar Maradiaga Diabetes mellitus ([facility]     H/O cardiac catheterization 05/04/2017    H/O cardiovascular stress test 6/07, 12/08    CARDIOLITE: EF->51%    Hyperlipidemia     Hypertension     Obesity     Pressure ulcer of coccygeal region, unstageable ([facility] 09/10/2019    Thyroid disease      Past Surgical History:        Procedure Laterality Date    CARPAL TUNNEL RELEASE  2005    COLOSTOMY N/A 12/27/2019    LAPAROSCOPIC DIVERTING ILEOSTOMY performed by Melanie Parks MD at [facility] CATH LAB PROCEDURE  12/05    NO SIGNIFICANT CAD    EYE SURGERY      HEMIARTHROPLASTY HIP Right 8/29/2019    HIP HEMIARTHROPLASTY performed by Symone Becerril MD at [address] [place] [address] Right 9/29/2019    HIP INCISION AND DRAINAGE performed by Symone Becerril MD at [address] [place] [address] Right 11/15/2019    HIP INCISION AND DRAINAGE RIGHT CLOSURE OF INCISION WITH HEMOVAC performed by Symone Becerril MD at [address] [place] [address]  09/23/2019    of stage 4 wound on coccyx, by Dr. Donnell Branch N/A 9/23/2019    EXCISIONAL DEBRIDEMENT OF SACRAL PRESSURE ULCER performed by Melanie Parks MD at [address] [place] [address] N/A 9/29/2019    DECUBITUS ULCER DEBRIDEMENT REPAIR performed by Raymon Reis MD at [address]        Current Medications:   Current Facility-Administered Medications: PN-Adult Premix 5/15 - Central, , Intravenous, Continuous TPN  linezolid (ZYVOX) IVPB 600 mg, 600 mg, Intravenous, Q12H  amiodarone (CORDARONE) tablet 200 mg, 200 mg, Oral, Daily  potassium chloride 20 mEq/50 mL IVPB (Central Line), 60 mEq, Intravenous, Once  heparin (porcine) injection 5,000 Units, 5,000 Units, Subcutaneous, 3 times per day  fat emulsion 20 % infusion 250 mL, 250 mL, Intravenous, Once per day on Mon Tue Thu Fri  metronidazole (FLAGYL) 500 mg in NaCl 100 mL IVPB premix, 500 mg, Intravenous, Q8H  minocycline (MINOCIN;DYNACIN) capsule 100 mg, 100 mg, Oral, BID  furosemide (LASIX) injection 80 mg, 80 mg, Intravenous, TID  ceftazidime-avibactam (AVYCAZ) 1.25 g in dextrose 5 % 100 mL IVPB, 1.25 g, Intravenous, Q8H  sodium chloride flush 0.9 % injection 10 mL, 10 mL, Intravenous, PRN  ipratropium-albuterol (DUONEB) nebulizer solution 1 ampule, 1 ampule, Inhalation, Q4H  phenylephrine (CHINA-SYNEPHRINE) 50 mg in dextrose 5 % 250 mL infusion, 100 mcg/min, Intravenous, Continuous  diphenhydrAMINE (BENADRYL) injection 12.5 mg, 12.5 mg, Intravenous, Q6H PRN  methylPREDNISolone sodium (SOLU-MEDROL) injection 40 mg, 40 mg, Intravenous, Q8H  albumin human 25 % IV solution 25 g, 25 g, Intravenous, Once  vasopressin 20 Units in dextrose 5 % 100 mL infusion, 0.04 Units/min, Intravenous, Continuous  potassium chloride 20 mEq/50 mL IVPB (Central Line), 20 mEq, Intravenous, PRN  magnesium sulfate 1 g in dextrose 5% 100 mL IVPB, 1 g, Intravenous, PRN  pantoprazole (PROTONIX) injection 40 mg, 40 mg, Intravenous, BID  promethazine (PHENERGAN) injection 12.5 mg, 12.5 mg, Intravenous, Q6H PRN  ipratropium-albuterol (DUONEB) nebulizer solution 3 mL, 1 vial, Inhalation, TID PRN  calcium carbonate (TUMS) chewable tablet 500 mg, 500 mg, Oral, TID PRN  ondansetron (ZOFRAN) injection 4 mg, 4 mg, Intravenous, Q6H PRN  morphine (PF) injection 2 mg, 2 mg, Intravenous, Q4H PRN  HYDROcodone-acetaminophen (NORCO) 5-325 MG per tablet 1 tablet, 1 tablet, Oral, normal    Motor:    Drift:  absent  Motor exam is symmetrical 5 out of 5 all extremities bilaterally  Tone:  normal  Abnormal Movements:  Absent    DTRs-2+ biceps,triceps,brachioradialis,knee jerks and ankle jerks bilaterally symmetrical.  Toes-downgoing bilaterally            Sensory:sensation cannot be tested              CBC with Differential:    Lab Results   Component Value Date    WBC 17.2 01/03/2020    RBC 3.71 01/03/2020    HGB 9.9 01/03/2020    HCT 31.5 01/03/2020     01/03/2020    MCV 84.9 01/03/2020    MCH 26.7 01/03/2020    MCHC 31.4 01/03/2020    RDW 17.2 01/03/2020    NRBC 1 01/03/2020    SEGSPCT 88.0 01/03/2020    BANDSPCT 5 01/03/2020    LYMPHOPCT 2.0 01/03/2020    PROMYELOPCT 2 09/26/2019    MONOPCT 5.0 01/03/2020    MYELOPCT 1 09/29/2019    BASOPCT 0.2 12/31/2019    MONOSABS 0.9 01/03/2020    LYMPHSABS 0.3 01/03/2020    EOSABS 0.0 12/31/2019    BASOSABS 0.0 12/31/2019    DIFFTYPE MANUAL DIFFERENTIAL 01/03/2020     CMP:    Lab Results   Component Value Date     01/03/2020    K 3.1 01/03/2020    CL 93 01/03/2020    CO2 29 01/03/2020    BUN 59 01/03/2020    CREATININE 2.2 01/03/2020    GFRAA 36 01/03/2020    LABGLOM 30 01/03/2020    GLUCOSE 285 01/03/2020    PROT 4.7 01/03/2020    LABALBU 2.6 01/03/2020    CALCIUM 8.9 01/03/2020    BILITOT 0.6 01/03/2020    ALKPHOS 108 01/03/2020    AST 22 01/03/2020    ALT 13 01/03/2020     BMP:    Lab Results   Component Value Date     01/03/2020    K 3.1 01/03/2020    CL 93 01/03/2020    CO2 29 01/03/2020    BUN 59 01/03/2020    LABALBU 2.6 01/03/2020    CREATININE 2.2 01/03/2020    CALCIUM 8.9 01/03/2020    GFRAA 36 01/03/2020    LABGLOM 30 01/03/2020    GLUCOSE 285 01/03/2020     PT/INR:    Lab Results   Component Value Date    PROTIME 11.5 05/03/2017    PROTIME 10.6 01/26/2012    INR 1.01 05/03/2017     PTT:    Lab Results   Component Value Date    APTT 72.9 01/01/2020   [APTT  U/A:    Lab Results   Component Value Date    COLORU YELLOW 01/02/2020    PHUR 5.5 10/24/2018    LABCAST Present 12/12/2016    LABCAST Hyaline casts 12/12/2016    WBCUA 32 01/02/2020    RBCUA 4 01/02/2020    MUCUS RARE 01/02/2020    TRICHOMONAS NONE SEEN 01/02/2020    YEAST FEW 01/02/2020    BACTERIA NEGATIVE 01/02/2020    CLARITYU HAZY 01/02/2020    SPECGRAV 1.010 01/02/2020    LEUKOCYTESUR SMALL 01/02/2020    UROBILINOGEN NORMAL 01/02/2020    BILIRUBINUR NEGATIVE 01/02/2020    BLOODU MODERATE 01/02/2020    GLUCOSEU 3+ 10/24/2018     TSH:  No results found for: TSH  VITAMIN B12: No components found for: B12  FOLATE:    Lab Results   Component Value Date    FOLATE 5.9 12/25/2019     RPR:  No results found for: RPR  CAMILLA:  No results found for: ANATITER, CAMILLA  Urine Toxicology:  No components found for: IAMMENTA, IBARBIT, IBENZO, ICOCAINE, IMARTHC, IOPIATES, IPHENCYC     IMPRESSION:    Metabolic encephalopathy/sepsis    neg for CVA    Hx dementia    ARF/CKD/HTN    PLAN:    CT brain neg    Mri brain neg    B 12 folate TSh nl    Continue asa and namenda    Pt stable neurologically. Discussed with pts nurse. Loren De La Fuente MD  BOARD CERTIFIED-NEUROLOGY.

## 2020-01-04 NOTE — PROGRESS NOTES
fluticasone, nitroGLYCERIN, traMADol, trolamine salicylate, sodium chloride flush    Review of Systems  Pertinent items are noted in HPI. Objective:     Patient Vitals for the past 8 hrs:   BP Temp Temp src Pulse Resp SpO2 Weight   01/04/20 0830 107/62 -- -- 89 26 91 % --   01/04/20 0800 103/63 -- -- 85 22 92 % --   01/04/20 0730 (!) 106/59 98.1 °F (36.7 °C) Oral 84 20 (!) 89 % --   01/04/20 0558 -- -- -- -- -- -- 290 lb 2 oz (131.6 kg)   01/04/20 0532 106/65 -- -- 80 22 92 % --   01/04/20 0502 101/63 -- -- 80 20 90 % --   01/04/20 0432 (!) 73/54 -- -- 90 22 91 % --   01/04/20 0431 -- -- -- -- 24 92 % --   01/04/20 0402 (!) 102/55 99 °F (37.2 °C) Oral 76 21 91 % --   01/04/20 0332 (!) 103/57 -- -- 77 22 93 % --   01/04/20 0302 119/63 -- -- 76 22 95 % --   01/04/20 0232 110/60 -- -- 78 16 93 % --   01/04/20 0202 (!) 112/58 -- -- 77 18 (!) 70 % --   01/04/20 0132 (!) 96/58 -- -- 80 20 91 % --   01/04/20 0102 107/61 -- -- 81 23 91 % --     I/O last 3 completed shifts: In: 6180 [I.V.:1024; NG/GT:80; IV Piggyback:866]  Out: 8040 [Urine:5450; Emesis/NG output:330]  No intake/output data recorded. /62   Pulse 89   Temp 98.1 °F (36.7 °C) (Oral)   Resp 26   Ht 6' 3\" (1.905 m)   Wt 290 lb 2 oz (131.6 kg)   SpO2 91%   BMI 36.26 kg/m²   General appearance: alert and cooperative. Lungs: Clear to auscultation  Heart: regular rate and rhythm, S1, S2 normal, no murmur, click, rub or gallop  Abdomen: soft right upper quadrent abdominal tenderness; bowel sounds normal; no masses,  no organomegaly  Extremities: extremities normal, atraumatic, no cyanosis or edema  Skin: Sacral and coccygeal decub  CNS:  Moves all extremities. He is able to puff up his cheeks, doubt CVA        Labs and imaging reviewed.   CBC with Differential:    Lab Results   Component Value Date    WBC 17.2 01/03/2020    RBC 3.71 01/03/2020    HGB 9.9 01/03/2020    HCT 31.5 01/03/2020     01/03/2020    MCV 84.9 01/03/2020    MCH 26.7 debride if needed  Patient still on [facility] William TORRES M.D.  1/4/2020

## 2020-01-04 NOTE — PROGRESS NOTES
[Urine:5450; Emesis/NG output:330]      Physical Exam:    Physical Exam  Constitutional:       Appearance: He is well-developed. HENT:      Head: Normocephalic. Eyes:      Pupils: Pupils are equal, round, and reactive to light. Neck:      Musculoskeletal: Normal range of motion and neck supple. Cardiovascular:      Rate and Rhythm: Normal rate. Pulmonary:      Effort: Pulmonary effort is normal.   Abdominal:      General: There is no distension. Palpations: Abdomen is soft. There is no mass. Tenderness: There is no tenderness (+loop ileostomy is pink and viable, some mesenteric fat around appears necrotic. There is output from ileostomy. ). There is no guarding or rebound. Musculoskeletal: Normal range of motion. Skin:     General: Skin is warm. Neurological:      Mental Status: He is alert and oriented to person, place, and time. Mental status is at baseline. Sacral wound - the inferior edges (and specifically between 6-9 o'clock positions when pt is prone) have some necrotic tissue.        Scheduled Meds:   spironolactone  25 mg Oral Daily    aMILoride  5 mg Oral Daily    linezolid  600 mg Intravenous Q12H    amiodarone  200 mg Oral Daily    heparin (porcine)  5,000 Units Subcutaneous 3 times per day    fat emulsion  250 mL Intravenous Once per day on Mon Tue Thu Fri    metroNIDAZOLE  500 mg Intravenous Q8H    minocycline  100 mg Oral BID    furosemide  80 mg Intravenous TID    ceftazidime-acibactam (AVYCAZ) infusion  1.25 g Intravenous Q8H    ipratropium-albuterol  1 ampule Inhalation Q4H    methylPREDNISolone  40 mg Intravenous Q8H    albumin human  25 g Intravenous Once    pantoprazole  40 mg Intravenous BID    collagenase   Topical Daily    aspirin  81 mg Oral Daily    buPROPion  300 mg Oral Daily    mometasone-formoterol  2 puff Inhalation BID    insulin glargine  15 Units Subcutaneous Nightly    memantine  5 mg Oral Nightly    montelukast  10 mg Oral Nightly    rOPINIRole  1 mg Oral Nightly    sertraline  100 mg Oral BID    vitamin C  1,000 mg Oral Daily    vitamin E  400 Units Oral Daily    sodium chloride flush  10 mL Intravenous 2 times per day    insulin lispro  0-12 Units Subcutaneous TID     insulin lispro  0-6 Units Subcutaneous Nightly     Continuous Infusions:   PN-Adult Premix 5/15 - Central 50 mL/hr at 01/03/20 1743    phenylephrine (CHINA-SYNEPHRINE) 50mg/250mL infusion 10 mcg/min (01/03/20 2331)    vasopressin (Septic Shock) infusion 0.04 Units/min (01/04/20 0742)    dextrose       PRN Meds:sodium chloride flush, diphenhydrAMINE, potassium chloride, magnesium sulfate, promethazine, ipratropium-albuterol, calcium carbonate, ondansetron, morphine, HYDROcodone 5 mg - acetaminophen, glucose, dextrose, glucagon (rDNA), dextrose, acetaminophen, fluticasone, nitroGLYCERIN, traMADol, trolamine salicylate, sodium chloride flush      Labs/Imaging Results:   Lab Results   Component Value Date    WBC 17.9 (H) 01/04/2020    HGB 9.9 (L) 01/04/2020    HCT 31.5 (L) 01/04/2020    MCV 85.4 01/04/2020     01/04/2020     Lab Results   Component Value Date     (L) 01/04/2020    K (LL) 01/04/2020     3.0  K CALLED TO ICU, [facility] AT 0938 1.4.2020 BY JEANMARIE MLT  RESULTS READ BACK      CL 90 (L) 01/04/2020    CO2 31 01/04/2020    BUN 56 (H) 01/04/2020    CREATININE 2.1 (H) 01/04/2020    GLUCOSE 333 (H) 01/04/2020    CALCIUM 8.7 01/04/2020    PROT 4.8 (L) 01/04/2020    LABALBU 2.6 (L) 01/04/2020    BILITOT 0.6 01/04/2020    ALKPHOS 110 01/04/2020    AST 15 01/04/2020    ALT 9 (L) 01/04/2020    LABGLOM 32 (L) 01/04/2020    GFRAA 38 (L) 01/04/2020     CTA chest and CT A/P:  CHEST:       1. No pulmonary embolism. 2. Small bilateral pleural effusions. 3. Bibasilar dependent consolidations compatible with atelectasis versus   pneumonia versus aspiration.    4. Patchy ground-glass and early consolidative opacities in the bilateral   upper and right middle lobes compatible with edema versus multifocal   pneumonia. 5. Mild likely reactive mediastinal lymphadenopathy. 6. Enlarged main pulmonary artery as can be seen with pulmonary hypertension. ABDOMEN/PELVIS:       1. Status post diverting ileostomy.  Decreased caliber of the upstream small   bowel compared with the previous exam.  Persistently decompressed distal   small bowel.  Findings may represent ileus with a low-grade obstruction not   excluded. 2. Lujan catheter balloon inflated within the prostatic urethra.  Recommend   repositioning. 3. Resolution of the previously identified postoperative pneumoperitoneum. Trace likely reactive free fluid in the pelvis. 4. Cholelithiasis. 5. Bilateral indeterminate renal hypodensities measuring up to 4.5 cm. Consider further evaluation with nonemergent renal protocol CT or MRI.            Assessment:     Patient Active Problem List:     Hypertension     Hyperlipidemia     Asthma     Diabetes mellitus ([address]     H/O cardiovascular stress test     Obesity     Chronic kidney disease, stage III (moderate) (HCC)     Hypertensive kidney disease with CKD stage III (HCC)     Depression     SOBOE (shortness of breath on exertion)     Abnormal cardiovascular stress test     Fracture of epiphysis (separation) (upper) of neck of femur, closed (HCC)     Pressure injury of skin of coccygeal region     Sepsis ([address]     PVC (premature ventricular contraction)     Septicemia ([address]     WD-Pressure injury of sacral region, stage 4 ([address]     WD-Nonhealing surgical wound     WD-Skin ulcer of right hip with necrosis of muscle (HCC)     Troponin level elevated     Recurrent major depressive disorder, in partial remission (HCC)     Delusional ideas ([address]     Vascular dementia with behavior disturbance (HCC)     Hypotension     Metabolic encephalopathy      Plan:     78 y/o M POD 8 s/p lap loop ileostomy, sacral wound      -Loop ileostomy is pink and viable, there is output. If pt continues to have output will trial NGT d/c and start clears tomorrow.   -Ostomy care / consult  -TPN until ileus resolves and tolerates PO   -Sacral wound with some necrotic edges as described above. Currently trying to debride with daily santyl - if does not improve will plan either bedside or surgical debridement in OR. Will monitor. -D/w pt and pt's nurse at bedside.      Sam Galeas MD

## 2020-01-05 NOTE — PROGRESS NOTES
surgery; Carpal tunnel release (2005); Diagnostic Cardiac Cath Lab Procedure (12/05); HEMIARTHROPLASTY HIP (Right, 8/29/2019); Pressure ulcer debridement (09/23/2019); Pressure ulcer debridement (N/A, 9/23/2019); incision and drainage (Right, 9/29/2019); Pressure ulcer debridement (N/A, 9/29/2019); incision and drainage (Right, 11/15/2019); and colostomy (N/A, 12/27/2019). Social History:   reports that he has never smoked. He has never used smokeless tobacco. He reports that he does not drink alcohol or use drugs. Family history:  family history includes Cancer in his father and mother; Diabetes in his paternal grandmother; Heart Disease in his paternal grandfather; High Blood Pressure in his mother; Stroke in his maternal grandfather. Allergies   Allergen Reactions    Bee Venom Shortness Of Breath       Review of Systems:    All 14 systems were reviewed and are negative  Except for the positive findings  which as documented     BP (!) 97/56   Pulse 96   Temp 98.7 °F (37.1 °C) (Oral)   Resp 26   Ht 6' 3\" (1.905 m)   Wt 288 lb 12.8 oz (131 kg)   SpO2 93%   BMI 36.10 kg/m²       Intake/Output Summary (Last 24 hours) at 1/5/2020 1350  Last data filed at 1/5/2020 1230  Gross per 24 hour   Intake 3447.4 ml   Output 5895 ml   Net -2447.6 ml     Physical Exam: Somewhat in distress NG tube in place  Constitutional:  Well developed, Well nourished, No acute distress, Non-toxic appearance. HENT:  Normocephalic, Atraumatic, Bilateral external ears normal, Oropharynx moist, No oral exudates, Nose normal. Neck- Normal range of motion, No tenderness, Supple, No stridor. Eyes:  PERRL, EOMI, Conjunctiva normal, No discharge. Respiratory:  Normal breath sounds, No respiratory distress, No wheezing, No chest tenderness. Cardiovascular:  Normal heart rate, Normal rhythm, No murmurs, No rubs, No gallops, JVP not elevated  Abdomen/GI:  Bowel sounds normal, Soft, No tenderness, No masses, No pulsatile masses. malnutrition ([facility]  Resolved Problems:    * No resolved hospital problems. *          All labs, medications and tests reviewed by myself , continue all other medications of all above medical condition listed as is except for changes mentioned above. Thank you very much for consult , please call with questions.     Jean Claude Moody MD 1/5/2020 1:50 PM

## 2020-01-05 NOTE — PROGRESS NOTES
pulmonary      SUBJECTIVE:  On o2 and doing fair     OBJECTIVE    VITALS:  BP (!) 112/57   Pulse 95   Temp 98.7 °F (37.1 °C) (Oral)   Resp 14   Ht 6' 3\" (1.905 m)   Wt 288 lb 12.8 oz (131 kg)   SpO2 94%   BMI 36.10 kg/m²   HEAD AND FACE EXAM:  No throat injection, no active exudate,no thrush  NECK EXAM;No JVD, no masses, symmetrical  CHEST EXAM; Expansion equal and symmetrical, no masses  LUNG EXAM; Good breath sounds bilaterally. There are expiratory wheezes both lungs, there are crackles at both lung bases  CARDIOVASCULAR EXAM: Positive S1 and S2, no S3 or S4, no clicks ,no murmurs  RIGHT AND LEFT LOWER EXTRIMITY EXAM: No edema, no swelling, no inflamation            LABS   Lab Results   Component Value Date    WBC 15.3 (H) 01/05/2020    HGB 10.1 (L) 01/05/2020    HCT 31.6 (L) 01/05/2020    MCV 84.0 01/05/2020     01/05/2020     Lab Results   Component Value Date    CREATININE 2.0 (H) 01/05/2020    BUN 54 (H) 01/05/2020     (L) 01/05/2020    K 3.6 01/05/2020    CL 89 (L) 01/05/2020    CO2 33 (H) 01/05/2020     Lab Results   Component Value Date    INR 1.01 05/03/2017    PROTIME 11.5 05/03/2017          Lab Results   Component Value Date    PHOS 0.6 01/05/2020    PHOS 3.5 01/01/2020    PHOS 2.8 11/17/2019        Recent Labs     01/02/20  2159   PH 7.38   PO2ART 34.7*   [id number] 54.8*   O2SAT 63.9*         Wt Readings from Last 3 Encounters:   01/05/20 288 lb 12.8 oz (131 kg)   11/10/19 (!) 309 lb 9 oz (140.4 kg)   10/28/19 (!) 335 lb (152 kg)     VIEW OF THE CHEST       1/4/2020 6:47 am       COMPARISON:   January 2, 2019.       HISTORY:   ORDERING SYSTEM PROVIDED HISTORY: fu pneumonia   TECHNOLOGIST PROVIDED HISTORY:   Reason for exam:->fu pneumonia   Reason for Exam: fu pneumonia   Acuity: Unknown   Type of Exam: Unknown       FINDINGS:   Nasogastric tube courses towards the diaphragm, with tip not well imaged.    Right upper extremity PICC terminates within the right atrium.       Cardiac and mediastinal contours are enlarged but unchanged.       Interval worsening in multifocal bilateral pulmonary opacities right greater   than left.  Probable small bilateral pleural effusions.  No evidence of   pneumothorax.       No evidence of new osseous abnormalities.           Impression   Interval worsening in multifocal bilateral pulmonary opacities, right greater   than left.       RECOMMENDATION:   Continued short interval follow-up.                   ASSESMENT  Ac resp failure  Asp pneumonia  Ac asthma        PLAN  1. cpm  2.  Cont vapotherm  3. cxr in am    1/5/2020  Mikie Steen M.D.

## 2020-01-05 NOTE — PROGRESS NOTES
Pavithra Estrada MD.  Section of General Neurology - Adult  Consult Note        Reason for Consult:    Requesting Physician:  No referring provider defined for this encounter.   Thank you for your kind referral.    CHIEF COMPLAINT:     Pt was transferred to ICU due to hemodynamic changes and SOB    Pt is stable and doing a lot better     No confusion         Past Medical History:        Diagnosis Date    Arthritis     Asthma     Chronic kidney disease     stage 3, seen by Dr. Mendy Pimentel Diabetes mellitus ([facility]     H/O cardiac catheterization 05/04/2017    H/O cardiovascular stress test 6/07, 12/08    CARDIOLITE: EF->51%    Hyperlipidemia     Hypertension     Obesity     Pressure ulcer of coccygeal region, unstageable ([facility] [address] 09/10/2019    Thyroid disease      Past Surgical History:        Procedure Laterality Date    CARPAL TUNNEL RELEASE  2005    COLOSTOMY N/A 12/27/2019    LAPAROSCOPIC DIVERTING ILEOSTOMY performed by Isidra Burden MD at [place] [address] CATH LAB PROCEDURE  12/05    NO SIGNIFICANT CAD    EYE SURGERY      HEMIARTHROPLASTY HIP Right 8/29/2019    HIP HEMIARTHROPLASTY performed by Donato Dance, MD at [address] [place] [address] Right 9/29/2019    HIP INCISION AND DRAINAGE performed by Donato Dance, MD at [address] [place] [address] Right 11/15/2019    HIP INCISION AND DRAINAGE RIGHT CLOSURE OF INCISION WITH HEMOVAC performed by Donato Dance, MD at [address] [place] [address]  09/23/2019    of stage 4 wound on coccyx, by Dr. Abeba Gamble N/A 9/23/2019    EXCISIONAL DEBRIDEMENT OF SACRAL PRESSURE ULCER performed by Isidra Burden MD at [address] [place] [address] N/A 9/29/2019    DECUBITUS ULCER DEBRIDEMENT REPAIR performed by Johny Swan MD at [address] [place] [address]       Current Medications:   Current Facility-Administered Medications: spironolactone (ALDACTONE) tablet 25 mg, 25 mg, Oral, Daily  aMILoride (MIDAMOR) tablet 5 mg, 5 mg, Oral, Daily  PN-Adult Premix 5/15 - Central, , Intravenous, Continuous TPN  linezolid (ZYVOX) IVPB 600 mg, 600 mg, Intravenous, Q12H  amiodarone (CORDARONE) tablet 200 mg, 200 mg, Oral, Daily  heparin (porcine) injection 5,000 Units, 5,000 Units, Subcutaneous, 3 times per day  fat emulsion 20 % infusion 250 mL, 250 mL, Intravenous, Once per day on Mon Tue Thu Fri  metronidazole (FLAGYL) 500 mg in NaCl 100 mL IVPB premix, 500 mg, Intravenous, Q8H  minocycline (MINOCIN;DYNACIN) capsule 100 mg, 100 mg, Oral, BID  furosemide (LASIX) injection 80 mg, 80 mg, Intravenous, TID  ceftazidime-avibactam (AVYCAZ) 1.25 g in dextrose 5 % 100 mL IVPB, 1.25 g, Intravenous, Q8H  sodium chloride flush 0.9 % injection 10 mL, 10 mL, Intravenous, PRN  ipratropium-albuterol (DUONEB) nebulizer solution 1 ampule, 1 ampule, Inhalation, Q4H  phenylephrine (CHINA-SYNEPHRINE) 50 mg in dextrose 5 % 250 mL infusion, 100 mcg/min, Intravenous, Continuous  diphenhydrAMINE (BENADRYL) injection 12.5 mg, 12.5 mg, Intravenous, Q6H PRN  methylPREDNISolone sodium (SOLU-MEDROL) injection 40 mg, 40 mg, Intravenous, Q8H  albumin human 25 % IV solution 25 g, 25 g, Intravenous, Once  vasopressin 20 Units in dextrose 5 % 100 mL infusion, 0.04 Units/min, Intravenous, Continuous  potassium chloride 20 mEq/50 mL IVPB (Central Line), 20 mEq, Intravenous, PRN  magnesium sulfate 1 g in dextrose 5% 100 mL IVPB, 1 g, Intravenous, PRN  pantoprazole (PROTONIX) injection 40 mg, 40 mg, Intravenous, BID  promethazine (PHENERGAN) injection 12.5 mg, 12.5 mg, Intravenous, Q6H PRN  ipratropium-albuterol (DUONEB) nebulizer solution 3 mL, 1 vial, Inhalation, TID PRN  calcium carbonate (TUMS) chewable tablet 500 mg, 500 mg, Oral, TID PRN  ondansetron (ZOFRAN) injection 4 mg, 4 mg, Intravenous, Q6H PRN  morphine (PF) injection 2 mg, 2 mg, Intravenous, Q4H PRN  HYDROcodone-acetaminophen ([facility]) 5-325 MG per tablet 1 tablet, 1 tablet, Oral, Q6H PRN  collagenase ointment, , Topical, Daily  glucose (GLUTOSE) 40 % oral gel 15 g, 15 g, Oral, PRN  dextrose 50 % IV solution, 12.5 g, Intravenous, PRN  glucagon (rDNA) injection 1 mg, 1 mg, Intramuscular, PRN  dextrose 5 % solution, 100 mL/hr, Intravenous, PRN  acetaminophen (TYLENOL) tablet 500 mg, 500 mg, Oral, Q6H PRN  aspirin EC tablet 81 mg, 81 mg, Oral, Daily  buPROPion (WELLBUTRIN XL) extended release tablet 300 mg, 300 mg, Oral, Daily  fluticasone (FLONASE) 50 MCG/ACT nasal spray 1 spray, 1 spray, Nasal, Daily PRN  mometasone-formoterol (DULERA) 200-5 MCG/ACT inhaler 2 puff, 2 puff, Inhalation, BID  insulin glargine (LANTUS) injection vial 15 Units, 15 Units, Subcutaneous, Nightly  memantine (NAMENDA) tablet 5 mg, 5 mg, Oral, Nightly  montelukast (SINGULAIR) tablet 10 mg, 10 mg, Oral, Nightly  nitroGLYCERIN (NITROSTAT) SL tablet 0.4 mg, 0.4 mg, Sublingual, Q5 Min PRN  rOPINIRole (REQUIP) tablet 1 mg, 1 mg, Oral, Nightly  sertraline (ZOLOFT) tablet 100 mg, 100 mg, Oral, BID  traMADol (ULTRAM) tablet 50 mg, 50 mg, Oral, Q6H PRN  trolamine salicylate (ASPERCREME) 10 % cream, , Topical, Q4H PRN  vitamin C (ASCORBIC ACID) tablet 1,000 mg, 1,000 mg, Oral, Daily  vitamin E capsule 400 Units, 400 Units, Oral, Daily  sodium chloride flush 0.9 % injection 10 mL, 10 mL, Intravenous, 2 times per day  sodium chloride flush 0.9 % injection 10 mL, 10 mL, Intravenous, PRN  insulin lispro (HUMALOG) injection vial 0-12 Units, 0-12 Units, Subcutaneous, TID WC  insulin lispro (HUMALOG) injection vial 0-6 Units, 0-6 Units, Subcutaneous, Nightly  Allergies:  Bee venom    Social History:  TOBACCO:   reports that he has never smoked. He has never used smokeless tobacco.  ETOH:   reports no history of alcohol use. DRUGS:   reports no history of drug use.   Family History:       Problem Relation Age of Onset    High Blood Pressure Mother     Cancer Mother     Cancer Father     Stroke Maternal Grandfather     Diabetes Paternal Grandmother     Heart Disease Paternal Grandfather        REVIEW OF SYSTEMS:  CONSTITUTIONAL:  negative  HEENT:  negative  RESPIRATORY:  negative  CARDIOVASCULAR:  negative  GASTROINTESTINAL:  negative  GENITOURINARY:  negative  MUSCULOSKELETAL:  negative  BEHAVIOR/PSYCH:  Negative    ROS unavailable    Family hx neg    PHYSICAL EXAM  ------------------------  Vitals:  /63   Pulse 86   Temp 98.6 °F (37 °C) (Oral)   Resp (!) 33   Ht 6' 3\" (1.905 m)   Wt 290 lb 2 oz (131.6 kg)   SpO2 90%   BMI 36.26 kg/m²      General:  Drowsy   Well developed, well nourished, well groomed. No apparent distress. HEENT:  Normocephalic, atraumatic. Pupils equal, round, reactive to light. No scleral icterus. No conjunctival injection. Normal lips, teeth, and gums. No nasal discharge. Neck:  Supple  Heart:  RRR, no murmurs, gallops, rubs  Lungs:  CTA bilaterally, bilat symmetrical expansion, no wheeze, rales, or rhonchi  Abdomen: Bowel sounds present, soft, nontender, no masses, no organomegaly, no peritoneal signs  Extremities:  No clubbing, cyanosis, or edema  Skin:  Warm and dry, no open lesions or rash  Breast: deferred  Rectal: deferred  Genitalia:  deferred    NEUROLOGICAL EXAM  ---------------------------------    Mental Status Exam:             drowsy  follows simple commands  Oriented to person place year month-moderate dementia    Cranial Rsxtuh-PR-ITW Intact.         Cranial nerve II           Visual acuity:  normal                 Cranial nerve III           Pupils:  equal, round, reactive to light      Cranial nerves III, IV, VI           Extraocular Movements: intact      Cranial nerve V           Facial sensation:  intact      Cranial nerve VII           Facial strength: intact      Cranial nerve VIII           Hearing:  intact      Cranial nerve IX           Palate:  intact      Cranial nerve XI         Shoulder shrug:  intact      Cranial nerve XII

## 2020-01-05 NOTE — PLAN OF CARE
Problem: Falls - Risk of:  Goal: Will remain free from falls  Description  Will remain free from falls  1/5/2020 0022 by Moira Carnes RN  Outcome: Ongoing  1/4/2020 1733 by Mirlande Aaron RN  Outcome: Ongoing  Goal: Absence of physical injury  Description  Absence of physical injury  1/5/2020 0022 by Moira Carnes RN  Outcome: Ongoing  1/4/2020 1733 by Mirlande Aaron RN  Outcome: Ongoing     Problem: Risk for Impaired Skin Integrity  Goal: Tissue integrity - skin and mucous membranes  Description  Structural intactness and normal physiological function of skin and  mucous membranes. 1/5/2020 0022 by Moira Carnes RN  Outcome: Ongoing  1/4/2020 1733 by Mirlande Aaron RN  Outcome: Ongoing     Problem: Discharge Planning:  Goal: Participates in care planning  Description  Participates in care planning  1/5/2020 0022 by Moira Carnes RN  Outcome: Ongoing  1/4/2020 1733 by Mirlande Aaron RN  Outcome: Ongoing  Goal: Discharged to appropriate level of care  Description  Discharged to appropriate level of care  1/5/2020 0022 by Moira Carnes RN  Outcome: Ongoing  1/4/2020 1733 by Mirlande Aaron RN  Outcome: Ongoing     Problem: Airway Clearance - Ineffective:  Goal: Ability to maintain a clear airway will improve  Description  Ability to maintain a clear airway will improve  1/5/2020 0022 by Moira Carnes RN  Outcome: Ongoing  1/4/2020 1733 by Mirlande Aaron RN  Outcome: Ongoing     Problem: Anxiety/Stress:  Goal: Level of anxiety will decrease  Description  Level of anxiety will decrease  1/5/2020 0022 by Moira Carnes RN  Outcome: Ongoing  1/4/2020 1733 by Mirlande Aaron RN  Outcome: Ongoing     Problem: Aspiration:  Goal: Absence of aspiration  Description  Absence of aspiration  1/5/2020 0022 by Moira Carnes RN  Outcome: Ongoing  1/4/2020 1733 by Mirlande Aaron RN  Outcome: Ongoing     Problem:  Bowel Function - Altered:  Goal: Bowel elimination is within specified parameters  Description  Bowel elimination is within specified parameters  1/5/2020 0022 by Ebony Mays RN  Outcome: Ongoing  1/4/2020 1733 by Hyun Moraes RN  Outcome: Ongoing     Problem: Cardiac Output - Decreased:  Goal: Hemodynamic stability will improve  Description  Hemodynamic stability will improve  1/5/2020 0022 by Ebony Mays RN  Outcome: Ongoing  1/4/2020 1733 by Hyun Moraes RN  Outcome: Ongoing     Problem: Fluid Volume - Imbalance:  Goal: Absence of imbalanced fluid volume signs and symptoms  Description  Absence of imbalanced fluid volume signs and symptoms  1/5/2020 0022 by Ebony Mays RN  Outcome: Ongoing  1/4/2020 1733 by Hyun Moraes RN  Outcome: Ongoing     Problem: Gas Exchange - Impaired:  Goal: Levels of oxygenation will improve  Description  Levels of oxygenation will improve  1/5/2020 0022 by Ebony Mays RN  Outcome: Ongoing  1/4/2020 1733 by Hyun Moraes RN  Outcome: Ongoing     Problem: Mental Status - Impaired:  Goal: Mental status will be restored to baseline  Description  Mental status will be restored to baseline  1/5/2020 0022 by Ebony Mays RN  Outcome: Ongoing  1/4/2020 1733 by Hyun Moraes RN  Outcome: Ongoing     Problem: Nutrition Deficit:  Goal: Ability to achieve adequate nutritional intake will improve  Description  Ability to achieve adequate nutritional intake will improve  1/5/2020 0022 by Ebony Mays RN  Outcome: Ongoing  1/4/2020 1733 by Hyun Moraes RN  Outcome: Ongoing     Problem: Pain:  Description  Pain management should include both nonpharmacologic and pharmacologic interventions.   Goal: Pain level will decrease  Description  Pain level will decrease  1/5/2020 0022 by Ebony Mays RN  Outcome: Ongoing  1/4/2020 1733 by Hyun Moraes RN  Outcome: Ongoing  Goal: Control of acute pain  Description  Control of acute pain  1/5/2020 0022 by Linda Hay RN  Outcome: Ongoing  1/4/2020 1733 by Marina Palacios RN  Outcome: Ongoing  Goal: Control of chronic pain  Description  Control of chronic pain  1/5/2020 0022 by Linda Hay RN  Outcome: Ongoing  1/4/2020 1733 by Marina Palacios RN  Outcome: Ongoing     Problem: Nutrition  Goal: Optimal nutrition therapy  1/5/2020 0022 by Linda Hay RN  Outcome: Ongoing  1/4/2020 1733 by Marina Palacios RN  Outcome: Ongoing

## 2020-01-05 NOTE — PROGRESS NOTES
General Surgery-Dr. Elyse Lin Day: 14    ChiefComplaint on Admission: sacral wound      Subjective:     Anastasia Duarte is a 77 y.o. male with pod #9 s/p lap diverting loop ileostomy placement . Denies abdominal pain. States he is hungry. Denies F/C. Is having santyl dressings to his sacral wound. +output from ileostomy. Pt did pull out his NGT overnight. ROS:  Review of Systems   Constitutional: Negative for chills and fever. HENT: Negative for ear pain, mouth sores, sore throat and tinnitus. Eyes: Negative for photophobia, redness and itching. Respiratory: Negative for apnea, choking and stridor. Cardiovascular: Negative for chest pain and palpitations. Gastrointestinal: Negative for abdominal pain, anal bleeding, constipation and rectal pain. Endocrine: Negative for polydipsia. Genitourinary: Negative for enuresis, flank pain and hematuria. Musculoskeletal: Negative for back pain, joint swelling and myalgias. Skin: Positive for wound. Negative for color change and pallor. Allergic/Immunologic: Negative for environmental allergies. Neurological: Negative for syncope and speech difficulty. Psychiatric/Behavioral: Negative for confusion and hallucinations. Allergies  Bee venom          Diagnosis Date    Arthritis     Asthma     Chronic kidney disease     stage 3, seen by Dr. Anita Jackson Diabetes mellitus ([facility] [address]     H/O cardiac catheterization 2017    H/O cardiovascular stress test ,     CARDIOLITE: EF->51%    Hyperlipidemia     Hypertension     Obesity     Pressure ulcer of coccygeal region, unstageable ([facility] [address] 09/10/2019    Thyroid disease        Objective:     Vitals:    20 0735   BP: 114/84   Pulse:    Resp:    Temp: 98.2 °F (36.8 °C)   SpO2: 90%       TEMPERATURE:  Current - Temp: 98.2 °F (36.8 °C); Max - Temp  Av.4 °F (36.9 °C)  Min: 97.9 °F (36.6 °C)  Max: 98.6 °F (37 °C)    No intake/output data recorded. I/O last 3 completed Subcutaneous Nightly    memantine  5 mg Oral Nightly    montelukast  10 mg Oral Nightly    rOPINIRole  1 mg Oral Nightly    sertraline  100 mg Oral BID    vitamin C  1,000 mg Oral Daily    vitamin E  400 Units Oral Daily    sodium chloride flush  10 mL Intravenous 2 times per day    insulin lispro  0-12 Units Subcutaneous TID     insulin lispro  0-6 Units Subcutaneous Nightly     Continuous Infusions:   PN-Adult Premix 5/15 - Central 75 mL/hr at 01/04/20 1811    phenylephrine (CHINA-SYNEPHRINE) 50mg/250mL infusion Stopped (01/04/20 0959)    vasopressin (Septic Shock) infusion 0.04 Units/min (01/05/20 0112)    dextrose       PRN Meds:sodium chloride flush, diphenhydrAMINE, potassium chloride, magnesium sulfate, promethazine, ipratropium-albuterol, calcium carbonate, ondansetron, morphine, HYDROcodone 5 mg - acetaminophen, glucose, dextrose, glucagon (rDNA), dextrose, acetaminophen, fluticasone, nitroGLYCERIN, traMADol, trolamine salicylate, sodium chloride flush      Labs/Imaging Results:   Lab Results   Component Value Date    WBC 15.3 (H) 01/05/2020    HGB 10.1 (L) 01/05/2020    HCT 31.6 (L) 01/05/2020    MCV 84.0 01/05/2020     01/05/2020     Lab Results   Component Value Date     (L) 01/05/2020    K 3.6 01/05/2020    CL 89 (L) 01/05/2020    CO2 33 (H) 01/05/2020    BUN 54 (H) 01/05/2020    CREATININE 2.0 (H) 01/05/2020    GLUCOSE 422 (HH) 01/05/2020    CALCIUM 8.8 01/05/2020    PROT 4.9 (L) 01/05/2020    LABALBU 2.7 (L) 01/05/2020    BILITOT 0.8 01/05/2020    ALKPHOS 110 01/05/2020    AST 9 (L) 01/05/2020    ALT 6 (L) 01/05/2020    LABGLOM 33 (L) 01/05/2020    GFRAA 40 (L) 01/05/2020     CTA chest and CT A/P:  CHEST:       1. No pulmonary embolism. 2. Small bilateral pleural effusions. 3. Bibasilar dependent consolidations compatible with atelectasis versus   pneumonia versus aspiration.    4. Patchy ground-glass and early consolidative opacities in the bilateral   upper and right

## 2020-01-06 NOTE — PROGRESS NOTES
sodium chloride flush    Review of Systems  Pertinent items are noted in HPI. Objective:     Patient Vitals for the past 8 hrs:   BP Temp Temp src Pulse Resp SpO2   01/06/20 1302 105/74 -- -- 90 (!) 31 92 %   01/06/20 1202 110/65 98.5 °F (36.9 °C) Oral 93 27 95 %   01/06/20 1138 -- -- -- -- -- 100 %   01/06/20 1132 (!) 98/58 -- -- 95 22 --   01/06/20 1032 (!) 96/56 -- -- 91 19 92 %   01/06/20 1002 (!) 99/58 -- -- 91 17 92 %   01/06/20 0902 109/63 -- -- 89 26 92 %   01/06/20 0802 90/66 -- -- 105 (!) 45 92 %   01/06/20 0742 -- -- -- -- (!) 31 99 %   01/06/20 0625 -- -- -- 100 -- --     I/O last 3 completed shifts: In: 2200 [I.V.:2200]  Out: 0176 [Urine:5100; Emesis/NG output:625; Stool:420]  No intake/output data recorded. /74   Pulse 90   Temp 98.5 °F (36.9 °C) (Oral)   Resp (!) 31   Ht 6' 3\" (1.905 m)   Wt 288 lb 12.8 oz (131 kg)   SpO2 92%   BMI 36.10 kg/m²   General appearance: alert and cooperative. Lungs: Clear to auscultation  Heart: regular rate and rhythm, S1, S2 normal, no murmur, click, rub or gallop  Abdomen: soft right upper quadrent abdominal tenderness; bowel sounds normal; no masses,  no organomegaly  Extremities: extremities normal, atraumatic, no cyanosis or edema  Skin: Sacral and coccygeal decub  CNS:  Moves all extremities. He is able to puff up his cheeks, doubt CVA        Labs and imaging reviewed.   CBC with Differential:    Lab Results   Component Value Date    WBC 21.1 01/06/2020    RBC 4.09 01/06/2020    HGB 11.0 01/06/2020    HCT 34.2 01/06/2020     01/06/2020    MCV 83.6 01/06/2020    MCH 26.9 01/06/2020    MCHC 32.2 01/06/2020    RDW 16.7 01/06/2020    NRBC 1 01/03/2020    SEGSPCT 86.0 01/06/2020    BANDSPCT 3 01/06/2020    LYMPHOPCT 5.0 01/06/2020    PROMYELOPCT 2 09/26/2019    MONOPCT 3.0 01/06/2020    MYELOPCT 2 01/06/2020    BASOPCT 0.1 01/05/2020    MONOSABS 0.6 01/06/2020    LYMPHSABS 1.1 01/06/2020    EOSABS 0.0 01/05/2020    BASOSABS 0.0 01/05/2020 bowel normal in  size.  Very large amount of fluid in the stomach.  Cholelithiasis without  finding of cholecystitis. Chest Xray:  01/04/2020  Interval worsening in multifocal bilateral pulmonary opacities, right greater   than left. Assessment:     Principal Problem:    Sepsis ([address]  Active Problems:    Hypotension    Metabolic encephalopathy    Vascular dementia with behavior disturbance (HCC)    Hyperlipidemia    Asthma    Diabetes mellitus ([address]    Obesity    Chronic kidney disease, stage III (moderate) (HCC)    Hypertensive kidney disease with CKD stage III (HCC)    Depression    Pressure injury of skin of coccygeal region    WD-Pressure injury of sacral region, stage 4 (HCC)    Severe malnutrition (HCC)  Resolved Problems:    * No resolved hospital problems. *  Hypokalemia and hypomagnesemia  Aspiration Pneumonia  Plan:   MRI -no acute infarct  Continue wound care, hip wound culture results noted. Patient had diverting colostomy. Appreciate renal recommendations  Zofran for Nausea  CT abdomen results noted. Management per surgery  Chest X ray report as above. CTA results pending. Keep patient in ICU. Noted wound culture results with multidrug resistance. Patient clinically improving. ID seen patient. Antibiotics per ID. Debridement plans per surgery, currently on santyl. A. Fib resolved on Amidarone  Continue pressor support, wean down as appropriate. Lasix per nephrology- noted recommendations. Maybe discontinue or go down on lasix today. Will let nephrology decide.   Patient on TPN  Spent approximately  30 min critical care time in patient care  Sacral decub per surgery- debride if needed  Patient still on Vee TORRES M.D.  1/6/2020

## 2020-01-06 NOTE — PROGRESS NOTES
[Urine:5100; Emesis/NG output:625; Stool:420]      Physical Exam:    Physical Exam  Constitutional:       Appearance: He is well-developed. HENT:      Head: Normocephalic. Eyes:      Pupils: Pupils are equal, round, and reactive to light. Neck:      Musculoskeletal: Normal range of motion and neck supple. Cardiovascular:      Rate and Rhythm: Normal rate. Pulmonary:      Effort: Pulmonary effort is normal.   Abdominal:      General: There is no distension. Palpations: Abdomen is soft. There is no mass. Tenderness: There is no tenderness (+loop ileostomy is pink and viable, some mesenteric fat around appears necrotic. There is output from ileostomy. ). There is no guarding or rebound. Musculoskeletal: Normal range of motion. Skin:     General: Skin is warm. Neurological:      Mental Status: He is alert and oriented to person, place, and time. Mental status is at baseline. Sacral wound - the inferior edges (and specifically between 6-9 o'clock positions when pt is prone) have some necrotic tissue.        Scheduled Meds:   anidulafungin  100 mg Intravenous Q24H    insulin lispro  0-18 Units Subcutaneous Q4H    spironolactone  25 mg Oral Daily    aMILoride  5 mg Oral Daily    linezolid  600 mg Intravenous Q12H    heparin (porcine)  5,000 Units Subcutaneous 3 times per day    fat emulsion  250 mL Intravenous Once per day on Mon Tue Thu Fri    metroNIDAZOLE  500 mg Intravenous Q8H    minocycline  100 mg Oral BID    furosemide  80 mg Intravenous TID    ceftazidime-acibactam (AVYCAZ) infusion  1.25 g Intravenous Q8H    ipratropium-albuterol  1 ampule Inhalation Q4H    methylPREDNISolone  40 mg Intravenous Q8H    albumin human  25 g Intravenous Once    pantoprazole  40 mg Intravenous BID    collagenase   Topical Daily    aspirin  81 mg Oral Daily    buPROPion  300 mg Oral Daily    mometasone-formoterol  2 puff Inhalation BID    insulin glargine  15 Units Subcutaneous Nightly Enlarged main pulmonary artery as can be seen with pulmonary hypertension. ABDOMEN/PELVIS:       1. Status post diverting ileostomy.  Decreased caliber of the upstream small   bowel compared with the previous exam.  Persistently decompressed distal   small bowel.  Findings may represent ileus with a low-grade obstruction not   excluded. 2. Lujan catheter balloon inflated within the prostatic urethra.  Recommend   repositioning. 3. Resolution of the previously identified postoperative pneumoperitoneum. Trace likely reactive free fluid in the pelvis. 4. Cholelithiasis. 5. Bilateral indeterminate renal hypodensities measuring up to 4.5 cm. Consider further evaluation with nonemergent renal protocol CT or MRI.            Assessment:     Patient Active Problem List:     Hypertension     Hyperlipidemia     Asthma     Diabetes mellitus ([address]     H/O cardiovascular stress test     Obesity     Chronic kidney disease, stage III (moderate) (HCC)     Hypertensive kidney disease with CKD stage III (HCC)     Depression     SOBOE (shortness of breath on exertion)     Abnormal cardiovascular stress test     Fracture of epiphysis (separation) (upper) of neck of femur, closed (HCC)     Pressure injury of skin of coccygeal region     Sepsis ([address]     PVC (premature ventricular contraction)     Septicemia ([address]     WD-Pressure injury of sacral region, stage 4 ([address]     WD-Nonhealing surgical wound     WD-Skin ulcer of right hip with necrosis of muscle (HCC)     Troponin level elevated     Recurrent major depressive disorder, in partial remission (HCC)     Delusional ideas ([address]     Vascular dementia with behavior disturbance (HCC)     Hypotension     Metabolic encephalopathy      Plan:     78 y/o M POD 10 s/p lap loop ileostomy, sacral wound      -Loop ileostomy is pink and viable, there is output.   -Ostomy care / consult  -TPN until ileus resolves and tolerates PO   -Sacral wound with some necrotic edges as described above. Currently trying to debride with daily santyl - if does not improve will plan either bedside or surgical debridement in OR. Will monitor. -D/w pt and pt's nurse at bedside.        Kaylin Ortega MD

## 2020-01-06 NOTE — PROGRESS NOTES
6' 3\" (1.905 m)   Wt 288 lb 12.8 oz (131 kg)   SpO2 100%   BMI 36.10 kg/m²     Gen: alert and oriented X2, no distress  Skin: no stigmata of endocarditis  Wounds: Stage IV sacral decubitus wound, purplish base, no discharge   HEMT: AT/NC Oropharynx pink, moist, and without lesions or exudates; dentition in good state of repair  Eyes: PERRLA, EOMI, conjunctiva pink, sclera anicteric. Neck: Supple. Trachea midline. No LAD. Chest: no distress and CTA. Good air movement. Heart: RRR and no MRG. Abd: right lower quadrant ileostomy, soft, non-distended, suprapubic tenderness, no hepatomegaly. Normoactive bowel sounds. Ext: Right hip hemiarthroplasty scar, no dehiscence does not appear infected. No clubbing, cyanosis, or edema  Catheter Site: right arm PICC line without erythema or tenderness  Neuro: Mental status intact. CN 2-12 intact and no focal sensory or motor deficits     Radiologic / Imaging / TESTING  CXR 1/2/20  Large amount of dense opacity in the left lung base and large amount of patchy opacity in the right lung base. Bibasilar pneumonias and atelectasis present. No significant change in the appearance of the lung bases. Mild pulmonary vascular congestion now present.         Labs:      CULTURE results: Invalid input(s): BLOOD CULTURE,  URINE CULTURE, SURGICAL CULTURE    Diagnosis:  Patient Active Problem List   Diagnosis    Hypertension    Hyperlipidemia    Asthma    Diabetes mellitus ([address]    H/O cardiovascular stress test    Obesity    Chronic kidney disease, stage III (moderate) (HCC)    Hypertensive kidney disease with CKD stage III (HCC)    Depression    SOBOE (shortness of breath on exertion)    Abnormal cardiovascular stress test    Fracture of epiphysis (separation) (upper) of neck of femur, closed (HCC)    Pressure injury of skin of coccygeal region    Sepsis ([address]    PVC (premature ventricular contraction)    Septicemia ([address]    WD-Pressure injury of sacral region, stage 4 (HCC)    WD-Nonhealing surgical wound    WD-Skin ulcer of right hip with necrosis of muscle (HCC)    Troponin level elevated    Recurrent major depressive disorder, in partial remission ([address]    Delusional ideas ([address]    Vascular dementia with behavior disturbance (HCC)    Hypotension    Metabolic encephalopathy    Severe malnutrition (HCC)       Active Problems  Principal Problem:    Sepsis ([address]  Active Problems:    Hypotension    Metabolic encephalopathy    Vascular dementia with behavior disturbance (HCC)    Hyperlipidemia    Asthma    Diabetes mellitus ([address]    Obesity    Chronic kidney disease, stage III (moderate) (HCC)    Hypertensive kidney disease with CKD stage III (HCC)    Depression    Pressure injury of skin of coccygeal region    WD-Pressure injury of sacral region, stage 4 (HCC)    Severe malnutrition (HCC)  Resolved Problems:    * No resolved hospital problems.  *    Electronically signed by: Electronically signed by Kassy Cabrera MD on 1/6/2020 at 12:14 PM

## 2020-01-06 NOTE — PROGRESS NOTES
Unable to send perfect serve message, attempted to leave message did not allow on basic machine. Left \"urgent\" message for new consult for Dr. Duane Simpson.

## 2020-01-06 NOTE — PROGRESS NOTES
Dr. Alexandria Waller at bedside. Plan to bedside debridement tomorrow am. Called and spoke with Charge RN, Latrell Quispe about getting debridement kit at bedside. Patient's ileostomy changed. Continue TPN; possibly tomorrow after debridement speech can see patient to do swallow eval and advance diet.

## 2020-01-06 NOTE — PROGRESS NOTES
Spoke with [facility], [address] [place] [address] about adding insulin in TPN. [facility] spoke with Dr. Ricardo Pisano yesterday, Dr. Ludwin Jaquez is on today. Insulin sliding scale was increased yesterady, but Blood glucose is still high. Quyen Saavedra is going to talk with Dr. Ludwin Jaquez.

## 2020-01-06 NOTE — PROGRESS NOTES
01/06/20  0456   *  --   --  134* 131*   K 3.0  K CALLED TO ICU, [facility] AT 0938 1.4.2020 BY JEANMARIE MLT  RESULTS READ BACK  *   < > 3.6 3.6 3.6   CL 90*  --   --  89* 83*   CO2 31  --   --  33* 32   BUN 56*  --   --  54* 56*   CREATININE 2.1*  --   --  2.0* 1.8*   GLUCOSE 333*  --   --  422* 422*    < > = values in this interval not displayed. Lab Results   Component Value Date    CALCIUM 9.0 01/06/2020    PHOS 0.6 (L) 01/05/2020       Objective:     Vitals: BP (!) 109/94   Pulse 100   Temp 98.6 °F (37 °C) (Oral)   Resp (!) 31   Ht 6' 3\" (1.905 m)   Wt 288 lb 12.8 oz (131 kg)   SpO2 99%   BMI 36.10 kg/m²     General appearance:  awake and verbally interactive   HEENT: Head: normocephalic, atraumatic. Neck: supple, symmetrical, trachea midline  Cardiovascular: normal S1 and S2  Pulmonary: diminished lung sounds bilaterally   Abdomen:  soft / non-tender / ostomy site intact   -NG tube intact   Extremities: + edema to the bilateral lower legs     Impression :     1. VIRGILIO/ CKD stsg 32- non oliguric -  recovering from ATI  2. Sepsis from Candida Glabrata / Line infection    3. ADHF   4. Hypotension   5. Caloric Malnutrition   6. DM  7. Met alkalosis    8.   Anemia     Recommendation/Plan  :     1.   -improving serum creatinine with normal K   -uop: 5.1 liters in the last 24 hours via [facility]   2.   -on Eraxis / Xenia Drain / minocycline / Metronidazole  -followed by ID   3.   -maintain negative fluid balance   -high flow O2: 35 LPM / 90% with O2 sat: 95%  -monitor: O2 saturations / urine output and volume status   -on IV Lasix 80 mg TID / amiloride / spironolactone; monitor plasma refill  4.   -recent systolics: 90 -522  -currently on Vasopressin   5.   -on TPN   6.   -on Lantus and SSI for diabetes management   7.   -likely from diuretics with stable CO2  8.   -latest Hb: 11.1; follow hemoglobin trend (improving)    Electronically signed by LYNDA Page - DAQUAN         Nephrology Attending

## 2020-01-06 NOTE — PROGRESS NOTES
Patient is refusing Bipap \"I wont wear it! \" Patient put back on vapotherm sats are 96% will continue to monitor.

## 2020-01-06 NOTE — PROGRESS NOTES
pulmonary      SUBJECTIVE: [id number] on high fio2 via vapotherm     OBJECTIVE    VITALS:  BP 90/66   Pulse 105   Temp 98.6 °F (37 °C) (Oral)   Resp (!) 45   Ht 6' 3\" (1.905 m)   Wt 288 lb 12.8 oz (131 kg)   SpO2 92%   BMI 36.10 kg/m²   HEAD AND FACE EXAM:  No throat injection, no active exudate,no thrush  NECK EXAM;No JVD, no masses, symmetrical  CHEST EXAM; Expansion equal and symmetrical, no masses  LUNG EXAM; Good breath sounds bilaterally.  There are expiratory wheezes both lungs, there are crackles at both lung bases  CARDIOVASCULAR EXAM: Positive S1 and S2, no S3 or S4, no clicks ,no murmurs  RIGHT AND LEFT LOWER EXTRIMITY EXAM: No edema, no swelling, no inflamation  CNS EXAM: Alert and oriented X3          LABS   Lab Results   Component Value Date    WBC 21.1 (H) 01/06/2020    HGB 11.0 (L) 01/06/2020    HCT 34.2 (L) 01/06/2020    MCV 83.6 01/06/2020     01/06/2020     Lab Results   Component Value Date    CREATININE 1.8 (H) 01/06/2020    BUN 56 (H) 01/06/2020     (L) 01/06/2020    K 3.6 01/06/2020    CL 83 (L) 01/06/2020    CO2 32 01/06/2020     Lab Results   Component Value Date    INR 1.01 05/03/2017    PROTIME 11.5 05/03/2017          Lab Results   Component Value Date    PHOS 0.6 01/05/2020    PHOS 3.5 01/01/2020    PHOS 2.8 11/17/2019        Recent Labs     01/06/20  0130   PH 7.63*   PO2ART 44*   FTB2AAZ 36.0   O2SAT 85.7*         Wt Readings from Last 3 Encounters:   01/05/20 288 lb 12.8 oz (131 kg)   11/10/19 (!) 309 lb 9 oz (140.4 kg)   10/28/19 (!) 335 lb (152 kg)     COMPARISON:   01/04/2020       HISTORY:   ORDERING SYSTEM PROVIDED HISTORY: fu pneumonia   TECHNOLOGIST PROVIDED HISTORY:   Reason for exam:->fu pneumonia   Reason for Exam: fu pneumonia   Acuity: Unknown   Type of Exam: Subsequent/Follow-up   Additional signs and symptoms: fu pneumonia   Relevant Medical/Surgical History: fu pneumonia       FINDINGS:   Right PICC with tip at the cavoatrial junction.  Enteric tube

## 2020-01-06 NOTE — PROGRESS NOTES
anidulafungin (ERAXIS) 200 mg in dextrose 5 % 260 mL IVPB, 200 mg, Intravenous, Once **AND** [START ON 1/6/2020] anidulafungin (ERAXIS) 100 mg in dextrose 5 % 130 mL IVPB, 100 mg, Intravenous, Q24H  insulin lispro (HUMALOG) injection vial 0-18 Units, 0-18 Units, Subcutaneous, Q4H  PN-Adult Premix 5/15 - Central, , Intravenous, Continuous TPN  spironolactone (ALDACTONE) tablet 25 mg, 25 mg, Oral, Daily  aMILoride (MIDAMOR) tablet 5 mg, 5 mg, Oral, Daily  linezolid (ZYVOX) IVPB 600 mg, 600 mg, Intravenous, Q12H  heparin (porcine) injection 5,000 Units, 5,000 Units, Subcutaneous, 3 times per day  fat emulsion 20 % infusion 250 mL, 250 mL, Intravenous, Once per day on Mon Tue Thu Fri  metronidazole (FLAGYL) 500 mg in NaCl 100 mL IVPB premix, 500 mg, Intravenous, Q8H  minocycline (MINOCIN;DYNACIN) capsule 100 mg, 100 mg, Oral, BID  furosemide (LASIX) injection 80 mg, 80 mg, Intravenous, TID  ceftazidime-avibactam (AVYCAZ) 1.25 g in dextrose 5 % 100 mL IVPB, 1.25 g, Intravenous, Q8H  sodium chloride flush 0.9 % injection 10 mL, 10 mL, Intravenous, PRN  ipratropium-albuterol (DUONEB) nebulizer solution 1 ampule, 1 ampule, Inhalation, Q4H  phenylephrine (CHINA-SYNEPHRINE) 50 mg in dextrose 5 % 250 mL infusion, 100 mcg/min, Intravenous, Continuous  diphenhydrAMINE (BENADRYL) injection 12.5 mg, 12.5 mg, Intravenous, Q6H PRN  methylPREDNISolone sodium (SOLU-MEDROL) injection 40 mg, 40 mg, Intravenous, Q8H  albumin human 25 % IV solution 25 g, 25 g, Intravenous, Once  vasopressin 20 Units in dextrose 5 % 100 mL infusion, 0.04 Units/min, Intravenous, Continuous  potassium chloride 20 mEq/50 mL IVPB (Central Line), 20 mEq, Intravenous, PRN  magnesium sulfate 1 g in dextrose 5% 100 mL IVPB, 1 g, Intravenous, PRN  pantoprazole (PROTONIX) injection 40 mg, 40 mg, Intravenous, BID  promethazine (PHENERGAN) injection 12.5 mg, 12.5 mg, Intravenous, Q6H PRN  ipratropium-albuterol (DUONEB) nebulizer solution 3 mL, 1 vial, Inhalation, TID PRN  calcium carbonate (TUMS) chewable tablet 500 mg, 500 mg, Oral, TID PRN  ondansetron (ZOFRAN) injection 4 mg, 4 mg, Intravenous, Q6H PRN  morphine (PF) injection 2 mg, 2 mg, Intravenous, Q4H PRN  HYDROcodone-acetaminophen (NORCO) 5-325 MG per tablet 1 tablet, 1 tablet, Oral, Q6H PRN  collagenase ointment, , Topical, Daily  glucose (GLUTOSE) 40 % oral gel 15 g, 15 g, Oral, PRN  dextrose 50 % IV solution, 12.5 g, Intravenous, PRN  glucagon (rDNA) injection 1 mg, 1 mg, Intramuscular, PRN  dextrose 5 % solution, 100 mL/hr, Intravenous, PRN  acetaminophen (TYLENOL) tablet 500 mg, 500 mg, Oral, Q6H PRN  aspirin EC tablet 81 mg, 81 mg, Oral, Daily  buPROPion (WELLBUTRIN XL) extended release tablet 300 mg, 300 mg, Oral, Daily  fluticasone (FLONASE) 50 MCG/ACT nasal spray 1 spray, 1 spray, Nasal, Daily PRN  mometasone-formoterol (DULERA) 200-5 MCG/ACT inhaler 2 puff, 2 puff, Inhalation, BID  insulin glargine (LANTUS) injection vial 15 Units, 15 Units, Subcutaneous, Nightly  memantine (NAMENDA) tablet 5 mg, 5 mg, Oral, Nightly  montelukast (SINGULAIR) tablet 10 mg, 10 mg, Oral, Nightly  nitroGLYCERIN (NITROSTAT) SL tablet 0.4 mg, 0.4 mg, Sublingual, Q5 Min PRN  rOPINIRole (REQUIP) tablet 1 mg, 1 mg, Oral, Nightly  sertraline (ZOLOFT) tablet 100 mg, 100 mg, Oral, BID  traMADol (ULTRAM) tablet 50 mg, 50 mg, Oral, Q6H PRN  trolamine salicylate (ASPERCREME) 10 % cream, , Topical, Q4H PRN  vitamin C (ASCORBIC ACID) tablet 1,000 mg, 1,000 mg, Oral, Daily  vitamin E capsule 400 Units, 400 Units, Oral, Daily  sodium chloride flush 0.9 % injection 10 mL, 10 mL, Intravenous, 2 times per day  sodium chloride flush 0.9 % injection 10 mL, 10 mL, Intravenous, PRN  Allergies:  Bee venom    Social History:  TOBACCO:   reports that he has never smoked. He has never used smokeless tobacco.  ETOH:   reports no history of alcohol use. DRUGS:   reports no history of drug use.   Family History:       Problem Relation Age of Onset    High Blood Pressure Mother     Cancer Mother     Cancer Father     Stroke Maternal Grandfather     Diabetes Paternal Grandmother     Heart Disease Paternal Grandfather        REVIEW OF SYSTEMS:  CONSTITUTIONAL:  negative  HEENT:  negative  RESPIRATORY:  negative  CARDIOVASCULAR:  negative  GASTROINTESTINAL:  negative  GENITOURINARY:  negative  MUSCULOSKELETAL:  negative  BEHAVIOR/PSYCH:  Negative    ROS unavailable    Family hx neg    PHYSICAL EXAM  ------------------------  Vitals:  /69   Pulse 99   Temp 98.7 °F (37.1 °C) (Oral)   Resp 28   Ht 6' 3\" (1.905 m)   Wt 288 lb 12.8 oz (131 kg)   SpO2 96%   BMI 36.10 kg/m²      General:  Drowsy   Well developed, well nourished, well groomed. No apparent distress. HEENT:  Normocephalic, atraumatic. Pupils equal, round, reactive to light. No scleral icterus. No conjunctival injection. Normal lips, teeth, and gums. No nasal discharge. Neck:  Supple  Heart:  RRR, no murmurs, gallops, rubs  Lungs:  CTA bilaterally, bilat symmetrical expansion, no wheeze, rales, or rhonchi  Abdomen: Bowel sounds present, soft, nontender, no masses, no organomegaly, no peritoneal signs  Extremities:  No clubbing, cyanosis, or edema  Skin:  Warm and dry, no open lesions or rash  Breast: deferred  Rectal: deferred  Genitalia:  deferred    NEUROLOGICAL EXAM  ---------------------------------    Mental Status Exam:             drowsy  follows simple commands  Oriented to person place year month-moderate dementia    Cranial [id number] Intact.         Cranial nerve II           Visual acuity:  normal                 Cranial nerve III           Pupils:  equal, round, reactive to light      Cranial nerves III, IV, VI           Extraocular Movements: intact      Cranial nerve V           Facial sensation:  intact      Cranial nerve VII           Facial strength: intact      Cranial nerve VIII           Hearing:  intact      Cranial nerve IX           Palate:  intact      Cranial APTT 72.9 01/01/2020   [APTT  U/A:    Lab Results   Component Value Date    COLORU YELLOW 01/02/2020    PHUR 5.5 10/24/2018    LABCAST Present 12/12/2016    LABCAST Hyaline casts 12/12/2016    WBCUA 32 01/02/2020    RBCUA 4 01/02/2020    MUCUS RARE 01/02/2020    TRICHOMONAS NONE SEEN 01/02/2020    YEAST FEW 01/02/2020    BACTERIA NEGATIVE 01/02/2020    CLARITYU HAZY 01/02/2020    SPECGRAV 1.010 01/02/2020    LEUKOCYTESUR SMALL 01/02/2020    UROBILINOGEN NORMAL 01/02/2020    BILIRUBINUR NEGATIVE 01/02/2020    BLOODU MODERATE 01/02/2020    GLUCOSEU 3+ 10/24/2018     TSH:  No results found for: TSH  VITAMIN B12: No components found for: B12  FOLATE:    Lab Results   Component Value Date    FOLATE 5.9 12/25/2019     RPR:  No results found for: RPR  CAMILLA:  No results found for: ANATITER, CAMILLA  Urine Toxicology:  No components found for: IAMMENTA, IBARBIT, IBENZO, ICOCAINE, IMARTHC, IOPIATES, IPHENCYC     IMPRESSION:    Metabolic encephalopathy/sepsis    neg for CVA    Hx dementia    ARF/CKD/HTN    PLAN:    CT brain neg    Mri brain neg    B 12 folate TSh nl    Continue asa and namenda    Pt stable neurologically. Scot Langston MD  BOARD CERTIFIED-NEUROLOGY.

## 2020-01-06 NOTE — PROGRESS NOTES
Dr. Zhen Estrada is aware of the ABG gas for this AM. Per Dr. Zhen Estrada put patient on Bipap. Will continue to monitor.

## 2020-01-07 NOTE — PROGRESS NOTES
pulmonary      SUBJECTIVE: remains on vapotherm     OBJECTIVE    VITALS:  BP (!) 81/57   Pulse 87   Temp 97.9 °F (36.6 °C) (Oral)   Resp 18   Ht 6' 3\" (1.905 m)   Wt 281 lb 15.5 oz (127.9 kg)   SpO2 91%   BMI 35.24 kg/m²   HEAD AND FACE EXAM:  No throat injection, no active exudate,no thrush  NECK EXAM;No JVD, no masses, symmetrical  CHEST EXAM; Expansion equal and symmetrical, no masses  LUNG EXAM; Good breath sounds bilaterally. There are expiratory wheezes both lungs, there are crackles at both lung bases  CARDIOVASCULAR EXAM: Positive S1 and S2, no S3 or S4, no clicks ,no murmurs  RIGHT AND LEFT LOWER EXTRIMITY EXAM: No edema, no swelling, no inflamation            LABS   Lab Results   Component Value Date    WBC 23.7 (H) 01/07/2020    HGB 11.6 (L) 01/07/2020    HCT 36.0 (L) 01/07/2020    MCV 82.9 01/07/2020     01/07/2020     Lab Results   Component Value Date    CREATININE 1.8 (H) 01/07/2020    BUN 66 (H) 01/07/2020     (L) 01/07/2020    K 4.1 01/07/2020    CL 86 (L) 01/07/2020    CO2 31 01/07/2020     Lab Results   Component Value Date    INR 1.01 05/03/2017    PROTIME 11.5 05/03/2017          Lab Results   Component Value Date    PHOS 0.6 01/05/2020    PHOS 3.5 01/01/2020    PHOS 2.8 11/17/2019        Recent Labs     01/06/20  0130   PH 7.63*   PO2ART 44*   [id number] 36.0   O2SAT 85.7*         Wt Readings from Last 3 Encounters:   01/06/20 281 lb 15.5 oz (127.9 kg)   11/10/19 (!) 309 lb 9 oz (140.4 kg)   10/28/19 (!) 335 lb (152 kg)               ASSESMENT  Ac resp failure  Asp pneumonia  Ac asthma        PLAN  1.  Cont vapotherm  2. cpm  3. cxr in am  4. abg in am    1/7/2020  Chasidy Frazier M.D.

## 2020-01-07 NOTE — PROGRESS NOTES
cyanosis, No clubbing. Good range of motion in all major joints. No tenderness to palpation or major deformities noted. Back- No tenderness. Integument:  Warm, Dry, No erythema, No rash. Lymphatic:  No lymphadenopathy noted. Neurologic:  Alert & oriented x 3, Normal motor function, Normal sensory function, No focal deficits noted.    Psychiatric:  Affect  and  Mood :no change    Medications:    ceftazidime-acibactam (AVYCAZ) infusion  2.5 g Intravenous Q8H    anidulafungin  100 mg Intravenous Q24H    insulin lispro  0-18 Units Subcutaneous Q4H    spironolactone  25 mg Oral Daily    aMILoride  5 mg Oral Daily    heparin (porcine)  5,000 Units Subcutaneous 3 times per day    fat emulsion  250 mL Intravenous Once per day on Mon Tue Thu Fri    metroNIDAZOLE  500 mg Intravenous Q8H    minocycline  100 mg Oral BID    furosemide  80 mg Intravenous TID    ipratropium-albuterol  1 ampule Inhalation Q4H    methylPREDNISolone  40 mg Intravenous Q8H    albumin human  25 g Intravenous Once    pantoprazole  40 mg Intravenous BID    collagenase   Topical Daily    aspirin  81 mg Oral Daily    buPROPion  300 mg Oral Daily    mometasone-formoterol  2 puff Inhalation BID    insulin glargine  15 Units Subcutaneous Nightly    memantine  5 mg Oral Nightly    montelukast  10 mg Oral Nightly    rOPINIRole  1 mg Oral Nightly    sertraline  100 mg Oral BID    vitamin C  1,000 mg Oral Daily    vitamin E  400 Units Oral Daily    sodium chloride flush  10 mL Intravenous 2 times per day      PN-Adult Premix 5/15 - Central 75 mL/hr at 01/06/20 1748    vasopressin (Septic Shock) infusion 0.03 Units/min (01/06/20 1600)    dextrose       sodium chloride flush, diphenhydrAMINE, potassium chloride, magnesium sulfate, promethazine, ipratropium-albuterol, calcium carbonate, ondansetron, morphine, HYDROcodone 5 mg - acetaminophen, glucose, dextrose, glucagon (rDNA), dextrose, acetaminophen, fluticasone, nitroGLYCERIN,

## 2020-01-07 NOTE — PROGRESS NOTES
Infectious Disease Progress Note  2020   Patient Name: Andrew Parks : 1950   Impression  · Septic shock  · Hospital acquired pneumonia: bilateral lower lobes and hypoxic respiratory failure  ? Lessening oxygen needs, hopefull improving  · Candida glabrata fungemia  ? History of right hip hemiarthroplasty wound with possible PJI, deep tissue and surgical cultures were positive for pseudomonas aeruginosa and corynebacterium simulans/striate on. Plan was to have vancomycin and meropenem and on 2020 and 2019 respectively. However, hospital was complicated, and these abx were discontinued  ? S/p laparoscopic diverting ileostomy on   ? Sputum cx: polymicrobial- S aureus, Yeast  ? Blood cx :  1 Candida glabrata; risk factors broad-spectrum antibiotics, longstanding PICC line, abdominal surgery. Previous urine culture 1 had scanty growth of yeast as well as that done earlier. Unclear if this is from the gut or if it is from the urinary tract. ?    · Stage IV sacral decubitus  ? Deep tissue injury  · Morbid obesity  · CKD stage III  · Multi-morbidity: per PMHx hypertension, diabetes mellitus, hyperlipidemia. Plan:  · Does he need steroids? · Continue Avycaz, minocycline and metronidazole  · Work-up yeast in urine culture if possible  · Plans for surgical debridement of sacral decubitus noted  · Daily blood cultures until negative  · Remove PICC line and culture tip  · Ophthalmology consult for for endoscopy to rule out fungal endophthalmitis      Ongoing Antimicrobial Therapy  Avycaz   Metronidazole /  Minocycline 2  Anidulafungin   Completed Antimicrobial Therapy  Vancomycin  Meropenem ? Daptomycin 2-3  Linezolid 1/3-  History:? Interval history noted. Septic shock, HAP,   Denies abdominal pain.   Physical Exam:  Vital Signs: BP (!) 101/51   Pulse 93   Temp 97.9 °F (36.6 °C) (Oral)   Resp 15   Ht 6' 3\" (1.905 m)   Wt 281 lb 15.5 oz (127.9 kg)   SpO2 93%   BMI 35.24 kg/m²     Gen: alert and oriented X2, no distress  Skin: no stigmata of endocarditis  Wounds: Stage IV sacral decubitus wound, purplish base, no discharge   HEMT: AT/NC Oropharynx pink, moist, and without lesions or exudates; dentition in good state of repair  Eyes: PERRLA, EOMI, conjunctiva pink, sclera anicteric. Neck: Supple. Trachea midline. No LAD. Chest: no distress and CTA. Good air movement. Heart: RRR and no MRG. Abd: right lower quadrant ileostomy, soft, non-distended, suprapubic tenderness, no hepatomegaly. Normoactive bowel sounds. Ext: Right hip hemiarthroplasty scar, no dehiscence does not appear infected. No clubbing, cyanosis, or edema  Catheter Site: right arm PICC line without erythema or tenderness  Neuro: Mental status intact. CN 2-12 intact and no focal sensory or motor deficits     Radiologic / Imaging / TESTING  CXR 1/2/20  Large amount of dense opacity in the left lung base and large amount of patchy opacity in the right lung base. Bibasilar pneumonias and atelectasis present. No significant change in the appearance of the lung bases. Mild pulmonary vascular congestion now present.         Labs:      CULTURE results: Invalid input(s): BLOOD CULTURE,  URINE CULTURE, SURGICAL CULTURE    Diagnosis:  Patient Active Problem List   Diagnosis    Hypertension    Hyperlipidemia    Asthma    Diabetes mellitus ([address]    H/O cardiovascular stress test    Obesity    Chronic kidney disease, stage III (moderate) (HCC)    Hypertensive kidney disease with CKD stage III (HCC)    Depression    SOBOE (shortness of breath on exertion)    Abnormal cardiovascular stress test    Fracture of epiphysis (separation) (upper) of neck of femur, closed (HCC)    Pressure injury of skin of coccygeal region    Sepsis ([address]    PVC (premature ventricular contraction)    Septicemia ([address]    WD-Pressure injury of sacral region, stage 4 ([address]    WD-Nonhealing surgical wound    WD-Skin ulcer of right hip with necrosis of muscle (HCC)    Troponin level elevated    Recurrent major depressive disorder, in partial remission ([address]    Delusional ideas ([address]    Vascular dementia with behavior disturbance (HCC)    Hypotension    Metabolic encephalopathy    Severe malnutrition (HCC)    VIRGILIO (acute kidney injury) ([address]       Active Problems  Principal Problem:    Sepsis ([address]  Active Problems:    Hypotension    Metabolic encephalopathy    Vascular dementia with behavior disturbance (HCC)    Hyperlipidemia    Asthma    Diabetes mellitus ([address]    Obesity    Chronic kidney disease, stage III (moderate) (HCC)    Hypertensive kidney disease with CKD stage III (HCC)    Depression    Pressure injury of skin of coccygeal region    WD-Pressure injury of sacral region, stage 4 (HCC)    Severe malnutrition (HCC)    VIRGILIO (acute kidney injury) ([address]  Resolved Problems:    * No resolved hospital problems.  *    Electronically signed by: Electronically signed by Jose Juan Smith MD on 1/7/2020 at 9:41 AM

## 2020-01-07 NOTE — PROGRESS NOTES
Michael Driscoll MD.  Section of General Neurology - Adult  Consult Note        Reason for Consult:    Requesting Physician:  No referring provider defined for this encounter.   Thank you for your kind referral.    CHIEF COMPLAINT:     Pt states he feels weak and feels tired    Denies any new symptoms    No confusion         Past Medical History:        Diagnosis Date    Arthritis     Asthma     Chronic kidney disease     stage 3, seen by Dr. Dalia Obrien Diabetes mellitus ([facility]     H/O cardiac catheterization 05/04/2017    H/O cardiovascular stress test 6/07, 12/08    CARDIOLITE: EF->51%    Hyperlipidemia     Hypertension     Obesity     Pressure ulcer of coccygeal region, unstageable ([facility] [address] 09/10/2019    Thyroid disease      Past Surgical History:        Procedure Laterality Date    CARPAL TUNNEL RELEASE  2005    COLOSTOMY N/A 12/27/2019    LAPAROSCOPIC DIVERTING ILEOSTOMY performed by Christophe Medina MD at [place] [address] CATH LAB PROCEDURE  12/05    NO SIGNIFICANT CAD    EYE SURGERY      HEMIARTHROPLASTY HIP Right 8/29/2019    HIP HEMIARTHROPLASTY performed by Warren Rosario MD at [address] [place] [address] Right 9/29/2019    HIP INCISION AND DRAINAGE performed by Warren Rosario MD at [address] [place] [address] Right 11/15/2019    HIP INCISION AND DRAINAGE RIGHT CLOSURE OF INCISION WITH HEMOVAC performed by Warren Rosario MD at [address] [place] [address]  09/23/2019    of stage 4 wound on coccyx, by Dr. Leslie Hansen N/A 9/23/2019    EXCISIONAL DEBRIDEMENT OF SACRAL PRESSURE ULCER performed by Christophe Medina MD at [address] [place] [address] N/A 9/29/2019    DECUBITUS ULCER DEBRIDEMENT REPAIR performed by Ivan Powell MD at [address] [place] [address]       Current Medications:   Current Facility-Administered Medications: ceftazidime-avibactam (AVYCAZ) 2.5 g in dextrose 5 % 100 mL IVPB, 2.5 g, Intravenous, Q8H  PN-Adult Premix 5/15 - Central, , Intravenous, Continuous TPN  [COMPLETED] anidulafungin (ERAXIS) 200 mg in dextrose 5 % 260 mL IVPB, 200 mg, Intravenous, Once **AND** anidulafungin (ERAXIS) 100 mg in dextrose 5 % 130 mL IVPB, 100 mg, Intravenous, Q24H  insulin lispro (HUMALOG) injection vial 0-18 Units, 0-18 Units, Subcutaneous, Q4H  spironolactone (ALDACTONE) tablet 25 mg, 25 mg, Oral, Daily  aMILoride (MIDAMOR) tablet 5 mg, 5 mg, Oral, Daily  heparin (porcine) injection 5,000 Units, 5,000 Units, Subcutaneous, 3 times per day  fat emulsion 20 % infusion 250 mL, 250 mL, Intravenous, Once per day on Mon Tue Thu Fri  metronidazole (FLAGYL) 500 mg in NaCl 100 mL IVPB premix, 500 mg, Intravenous, Q8H  minocycline (MINOCIN;DYNACIN) capsule 100 mg, 100 mg, Oral, BID  furosemide (LASIX) injection 80 mg, 80 mg, Intravenous, TID  sodium chloride flush 0.9 % injection 10 mL, 10 mL, Intravenous, PRN  ipratropium-albuterol (DUONEB) nebulizer solution 1 ampule, 1 ampule, Inhalation, Q4H  diphenhydrAMINE (BENADRYL) injection 12.5 mg, 12.5 mg, Intravenous, Q6H PRN  methylPREDNISolone sodium (SOLU-MEDROL) injection 40 mg, 40 mg, Intravenous, Q8H  albumin human 25 % IV solution 25 g, 25 g, Intravenous, Once  vasopressin 20 Units in dextrose 5 % 100 mL infusion, 0.04 Units/min, Intravenous, Continuous  potassium chloride 20 mEq/50 mL IVPB (Central Line), 20 mEq, Intravenous, PRN  magnesium sulfate 1 g in dextrose 5% 100 mL IVPB, 1 g, Intravenous, PRN  pantoprazole (PROTONIX) injection 40 mg, 40 mg, Intravenous, BID  promethazine (PHENERGAN) injection 12.5 mg, 12.5 mg, Intravenous, Q6H PRN  ipratropium-albuterol (DUONEB) nebulizer solution 3 mL, 1 vial, Inhalation, TID PRN  calcium carbonate (TUMS) chewable tablet 500 mg, 500 mg, Oral, TID PRN  ondansetron (ZOFRAN) injection 4 mg, 4 mg, Intravenous, Q6H PRN  morphine (PF) injection 2 mg, 2 mg, Intravenous, Q4H PRN  HYDROcodone-acetaminophen Movements:  Absent    DTRs-2+ biceps,triceps,brachioradialis,knee jerks and ankle jerks bilaterally symmetrical.  Toes-downgoing bilaterally            Sensory:sensation cannot be tested              CBC with Differential:    Lab Results   Component Value Date    WBC 21.1 01/06/2020    RBC 4.09 01/06/2020    HGB 11.0 01/06/2020    HCT 34.2 01/06/2020     01/06/2020    MCV 83.6 01/06/2020    MCH 26.9 01/06/2020    MCHC 32.2 01/06/2020    RDW 16.7 01/06/2020    NRBC 1 01/03/2020    SEGSPCT 86.0 01/06/2020    BANDSPCT 3 01/06/2020    LYMPHOPCT 5.0 01/06/2020    PROMYELOPCT 2 09/26/2019    MONOPCT 3.0 01/06/2020    MYELOPCT 2 01/06/2020    BASOPCT 0.1 01/05/2020    MONOSABS 0.6 01/06/2020    LYMPHSABS 1.1 01/06/2020    EOSABS 0.0 01/05/2020    BASOSABS 0.0 01/05/2020    DIFFTYPE MANUAL DIFFERENTIAL 01/06/2020     CMP:    Lab Results   Component Value Date     01/06/2020    K 3.6 01/06/2020    CL 83 01/06/2020    CO2 32 01/06/2020    BUN 56 01/06/2020    CREATININE 1.8 01/06/2020    GFRAA 46 01/06/2020    LABGLOM 38 01/06/2020    GLUCOSE 422 01/06/2020    PROT 5.2 01/06/2020    LABALBU 2.8 01/06/2020    CALCIUM 9.0 01/06/2020    BILITOT 0.9 01/06/2020    ALKPHOS 125 01/06/2020    AST 9 01/06/2020    ALT 6 01/06/2020     BMP:    Lab Results   Component Value Date     01/06/2020    K 3.6 01/06/2020    CL 83 01/06/2020    CO2 32 01/06/2020    BUN 56 01/06/2020    LABALBU 2.8 01/06/2020    CREATININE 1.8 01/06/2020    CALCIUM 9.0 01/06/2020    GFRAA 46 01/06/2020    LABGLOM 38 01/06/2020    GLUCOSE 422 01/06/2020     PT/INR:    Lab Results   Component Value Date    PROTIME 11.5 05/03/2017    PROTIME 10.6 01/26/2012    INR 1.01 05/03/2017     PTT:    Lab Results   Component Value Date    APTT 72.9 01/01/2020   [APTT  U/A:    Lab Results   Component Value Date    COLORU YELLOW 01/02/2020    PHUR 5.5 10/24/2018    LABCAST Present 12/12/2016    LABCAST Hyaline casts 12/12/2016    WBCUA 32 01/02/2020    RBCUA 4 01/02/2020    MUCUS RARE 01/02/2020    TRICHOMONAS NONE SEEN 01/02/2020    YEAST FEW 01/02/2020    BACTERIA NEGATIVE 01/02/2020    CLARITYU HAZY 01/02/2020    SPECGRAV 1.010 01/02/2020    LEUKOCYTESUR SMALL 01/02/2020    UROBILINOGEN NORMAL 01/02/2020    BILIRUBINUR NEGATIVE 01/02/2020    BLOODU MODERATE 01/02/2020    GLUCOSEU 3+ 10/24/2018     TSH:  No results found for: TSH  VITAMIN B12: No components found for: B12  FOLATE:    Lab Results   Component Value Date    FOLATE 5.9 12/25/2019     RPR:  No results found for: RPR  CAMILLA:  No results found for: ANATITER, CAMILLA  Urine Toxicology:  No components found for: IAMMENTA, IBARBIT, IBENZO, ICOCAINE, IMARTHC, IOPIATES, IPHENCYC     IMPRESSION:    Metabolic encephalopathy/sepsis    neg for CVA    Hx dementia    ARF/CKD/HTN    PLAN:    CT brain neg    Mri brain neg    B 12 folate TSh nl    Continue asa and namenda    Pt stable neurologically although weak due to metabolic encephalopathy. Yajaira Shane MD  BOARD CERTIFIED-NEUROLOGY.

## 2020-01-07 NOTE — PROGRESS NOTES
past 8 hrs:   BP Temp Temp src Pulse Resp SpO2   01/07/20 1802 95/61 -- -- 92 23 93 %   01/07/20 1712 -- -- -- -- -- 96 %   01/07/20 1706 -- -- -- -- -- 91 %   01/07/20 1702 95/67 -- -- 90 25 --   01/07/20 1659 -- -- -- -- -- 90 %   01/07/20 1604 -- -- -- -- -- 92 %   01/07/20 1602 (!) 102/54 98.1 °F (36.7 °C) Oral 88 28 --   01/07/20 1502 119/73 -- -- 94 (!) 31 94 %   01/07/20 1452 -- -- -- -- 20 92 %   01/07/20 1441 -- -- -- -- -- 93 %   01/07/20 1432 -- -- -- -- -- 94 %   01/07/20 1402 96/70 -- -- 97 27 (!) 87 %   01/07/20 1343 -- -- -- -- -- 93 %   01/07/20 1302 106/72 -- -- 89 22 96 %   01/07/20 1202 (!) 81/57 -- -- 87 18 91 %   01/07/20 1132 (!) 92/58 -- -- 90 20 92 %   01/07/20 1107 -- -- -- -- 20 92 %   01/07/20 1102 -- -- -- -- -- 96 %     I/O last 3 completed shifts: In: 0136 [I.V.:470; IV Piggyback:526]  Out: 6583 [Urine:3550; Emesis/NG output:345; Stool:130]  I/O this shift:  In: 0823 [I.V.:166; IV Piggyback:456]  Out: 2530 [Urine:2000; Emesis/NG output:230; Stool:300]    BP 95/61   Pulse 92   Temp 98.1 °F (36.7 °C) (Oral)   Resp 23   Ht 6' 3\" (1.905 m)   Wt 281 lb 15.5 oz (127.9 kg)   SpO2 93%   BMI 35.24 kg/m²   General appearance: alert and cooperative. Lungs: Clear to auscultation  Heart: regular rate and rhythm, S1, S2 normal, no murmur, click, rub or gallop  Abdomen: soft right upper quadrent abdominal tenderness; bowel sounds normal; no masses,  no organomegaly  Extremities: extremities normal, atraumatic, no cyanosis or edema  Skin: Sacral and coccygeal decub  CNS:  Moves all extremities. He is able to puff up his cheeks, doubt CVA        Labs and imaging reviewed.   CBC with Differential:    Lab Results   Component Value Date    WBC 23.7 01/07/2020    RBC 4.34 01/07/2020    HGB 11.6 01/07/2020    HCT 36.0 01/07/2020     01/07/2020    MCV 82.9 01/07/2020    MCH 26.7 01/07/2020    MCHC 32.2 01/07/2020    RDW 17.2 01/07/2020    NRBC 1 01/03/2020    SEGSPCT 87.4 01/07/2020 BANDSPCT 3 01/06/2020    LYMPHOPCT 2.6 01/07/2020    PROMYELOPCT 2 09/26/2019    MONOPCT 5.5 01/07/2020    MYELOPCT 2 01/06/2020    BASOPCT 0.3 01/07/2020    MONOSABS 1.3 01/07/2020    LYMPHSABS 0.6 01/07/2020    EOSABS 0.0 01/07/2020    BASOSABS 0.1 01/07/2020    DIFFTYPE AUTOMATED DIFFERENTIAL 01/07/2020     CMP:    Lab Results   Component Value Date     01/07/2020    K 4.1 01/07/2020    CL 86 01/07/2020    CO2 31 01/07/2020    BUN 66 01/07/2020    CREATININE 1.8 01/07/2020    GFRAA 46 01/07/2020    LABGLOM 38 01/07/2020    GLUCOSE 415 01/07/2020    PROT 5.4 01/07/2020    LABALBU 2.8 01/07/2020    CALCIUM 9.1 01/07/2020    BILITOT 0.6 01/07/2020    ALKPHOS 141 01/07/2020    AST 12 01/07/2020    ALT 6 01/07/2020     BMP:    Lab Results   Component Value Date     01/07/2020    K 4.1 01/07/2020    CL 86 01/07/2020    CO2 31 01/07/2020    BUN 66 01/07/2020    LABALBU 2.8 01/07/2020    CREATININE 1.8 01/07/2020    CALCIUM 9.1 01/07/2020    GFRAA 46 01/07/2020    LABGLOM 38 01/07/2020    GLUCOSE 415 01/07/2020     Sodium:    Lab Results   Component Value Date     01/07/2020     Potassium:    Lab Results   Component Value Date    K 4.1 01/07/2020     Calcium:    Lab Results   Component Value Date    CALCIUM 9.1 01/07/2020     Warfarin PT/INR:  No components found for: PTPATWAR, PTINRWAR  PTT:    Lab Results   Component Value Date    APTT 72.9 01/01/2020   [APTT  Last 3 Troponin:  No results found for: TROPONINI  ABG:    Lab Results   Component Value Date    ERM8SSA 36.0 01/06/2020    PO2ART 44 01/06/2020    ZEW7FSQ 37.9 01/06/2020     TSH:  No results found for: TSH  VITAMIN B12: No components found for: B12  FOLATE:    Lab Results   Component Value Date    FOLATE 5.9 12/25/2019     MRI Brain:  1. No acute intracranial abnormality.  No acute infarct. 2. Mild global parenchymal volume loss with chronic microvascular ischemic  changes. 3. Left mastoid effusion.     CT abdomen:  No intra-abdominal Vapotherm  Nurse tells me she tried weaning off Vasopressin but her BP dropped, so she started him back on Vasopressin  Spent approximately  30 min critical care time in patient care    Tete TORRES M.D.  1/7/2020

## 2020-01-07 NOTE — PROGRESS NOTES
Nephrology Progress Note  1/7/2020 6:19 AM    Subjective:   Admit Date: 12/23/2019  PCP: Lourdes Payton MD    Interval History: CVC insertion on 1/6. Diminished NG output per nurse   -unable to wean off pressors; BP not sustaining   -stable serum creatinine with good urine output    Data:     Current med's:    ceftazidime-acibactam (AVYCAZ) infusion  2.5 g Intravenous Q8H    anidulafungin  100 mg Intravenous Q24H    insulin lispro  0-18 Units Subcutaneous Q4H    spironolactone  25 mg Oral Daily    aMILoride  5 mg Oral Daily    heparin (porcine)  5,000 Units Subcutaneous 3 times per day    fat emulsion  250 mL Intravenous Once per day on Mon Tue Thu Fri    metroNIDAZOLE  500 mg Intravenous Q8H    minocycline  100 mg Oral BID    furosemide  80 mg Intravenous TID    ipratropium-albuterol  1 ampule Inhalation Q4H    methylPREDNISolone  40 mg Intravenous Q8H    albumin human  25 g Intravenous Once    pantoprazole  40 mg Intravenous BID    collagenase   Topical Daily    aspirin  81 mg Oral Daily    buPROPion  300 mg Oral Daily    mometasone-formoterol  2 puff Inhalation BID    insulin glargine  15 Units Subcutaneous Nightly    memantine  5 mg Oral Nightly    montelukast  10 mg Oral Nightly    rOPINIRole  1 mg Oral Nightly    sertraline  100 mg Oral BID    vitamin C  1,000 mg Oral Daily    vitamin E  400 Units Oral Daily    sodium chloride flush  10 mL Intravenous 2 times per day      PN-Adult Premix 5/15 - Central 75 mL/hr at 01/06/20 1748    vasopressin (Septic Shock) infusion 0.03 Units/min (01/07/20 0203)    dextrose       I/O last 3 completed shifts:   In: 2459 [I.V.:271; IV Piggyback:426]  Out: 4500 [Urine:4300; Emesis/NG output:100; Stool:100]    CBC:   Recent Labs     01/05/20  0545 01/06/20  0456 01/07/20  0338   WBC 15.3* 21.1* 23.7*   HGB 10.1* 11.0* 11.6*    302 389          Recent Labs     01/05/20  0545 01/06/20  0456 01/07/20  0338   * 131* 133*   K 3.6 3.6 4.1   CL My additional findings are as follows:  Pt appear weak and tired today    Objective:   Vitals: BP (!) 85/54   Pulse 92   Temp 97.9 °F (36.6 °C) (Oral)   Resp 20   Ht 6' 3\" (1.905 m)   Wt 281 lb 15.5 oz (127.9 kg)   SpO2 92%   BMI 35.24 kg/m²   arousable weak  Soft nt  Trace edema    Assessment and Plan:  IMP:  As stated above    Plan     1 bp low monitor as try wean pressor  2 renal stable and keep negative no acute hd need  3 abx per ID  4 maintain TPN  5 ssi may need increase insulin tpn           Electronically signed by Jennifer Yarbrough MD on 1/7/2020 at 11:26 AM

## 2020-01-07 NOTE — PROGRESS NOTES
Cardiology Progress Note     Admit Date:  12/23/2019    Consult reason/ Seen today for :   afib   Hypotention/ septic shock   Respiratory failure    Subjective and  Overnight Events : In sinus for last 72 hrs , Bp is better Converted to sinus rhythm on amiodarone drip , off amio gtt now , getting po in sinus, unable to wean off vasopressin but very small dose. Ng in place . Started tpn  still an ileus, on high flow O2  still short of breath and winded    Chief complain on admission : 77 y. o.year old who is admitted for  Chief Complaint   Patient presents with    Altered Mental Status     since yesterday    Wound Infection     coccyx      Assessment / Plan:  Atrial fibrillation: New afib, converted on amiodarone. He has no history of A. fib in the past at this point we will not initiate anticoagulation. Stress-induced A. Fib? Off amiodarone, afib could be stress related . Normal EF with no significant valve abnormality  Aspiration pneumonaRespiratory failure due to aspiration pneumonia as per primary team antibiotics recently changed  Ileus as pe primary team   Renal failure still producing urine on high-dose loop diuretic nephrology following, 6 3 times daily  Echo in September to 2019 reviewed he has preserved EF no significant coronary artery disease  Continue supportive care, still critical in ICU, wean off pressors if possible   DVT Prophylaxis if no contraindication    Cardiac wise no new suggestions, I will sign off for now. Please call as needed. Past medical history:    has a past medical history of Arthritis, Asthma, Chronic kidney disease, Diabetes mellitus ([address] H/O cardiac catheterization, H/O cardiovascular stress test, Hyperlipidemia, Hypertension, Obesity, Pressure ulcer of coccygeal region, unstageable ([address] and Thyroid disease.   Past surgical history:   has a past surgical history that includes Retinal detachment surgery; eye surgery; Carpal tunnel release (2005); Diagnostic Cardiac Cath Lab Procedure (12/05); HEMIARTHROPLASTY HIP (Right, 8/29/2019); Pressure ulcer debridement (09/23/2019); Pressure ulcer debridement (N/A, 9/23/2019); incision and drainage (Right, 9/29/2019); Pressure ulcer debridement (N/A, 9/29/2019); incision and drainage (Right, 11/15/2019); and colostomy (N/A, 12/27/2019). Social History:   reports that he has never smoked. He has never used smokeless tobacco. He reports that he does not drink alcohol or use drugs. Family history:  family history includes Cancer in his father and mother; Diabetes in his paternal grandmother; Heart Disease in his paternal grandfather; High Blood Pressure in his mother; Stroke in his maternal grandfather. Allergies   Allergen Reactions    Bee Venom Shortness Of Breath       Review of Systems:    All 14 systems were reviewed and are negative  Except for the positive findings  which as documented     BP (!) 102/54   Pulse 88   Temp 97.9 °F (36.6 °C) (Oral)   Resp 28   Ht 6' 3\" (1.905 m)   Wt 281 lb 15.5 oz (127.9 kg)   SpO2 94%   BMI 35.24 kg/m²       Intake/Output Summary (Last 24 hours) at 1/7/2020 1623  Last data filed at 1/7/2020 4417  Gross per 24 hour   Intake 3672 ml   Output 4025 ml   Net -353 ml     Physical Exam: Somewhat in distress NG tube in place  Constitutional:  Well developed, Well nourished, No acute distress, Non-toxic appearance. HENT:  Normocephalic, Atraumatic, Bilateral external ears normal, Oropharynx moist, No oral exudates, Nose normal. Neck- Normal range of motion, No tenderness, Supple, No stridor. Eyes:  PERRL, EOMI, Conjunctiva normal, No discharge. Respiratory:  Normal breath sounds, No respiratory distress, No wheezing, No chest tenderness.    Cardiovascular:  Normal heart rate, Normal rhythm, No murmurs, No rubs, No gallops, JVP not elevated  Abdomen/GI:  Bowel sounds normal, Soft, No tenderness, No masses, No pulsatile masses. Musculoskeletal:  Intact distal pulses, No edema, No tenderness, No cyanosis, No clubbing. Good range of motion in all major joints. No tenderness to palpation or major deformities noted. Back- No tenderness. Integument:  Warm, Dry, No erythema, No rash. Lymphatic:  No lymphadenopathy noted. Neurologic:  Alert & oriented x 3, Normal motor function, Normal sensory function, No focal deficits noted.    Psychiatric:  Affect  and  Mood :no change    Medications:    ceftazidime-acibactam (AVYCAZ) infusion  2.5 g Intravenous Q8H    anidulafungin  100 mg Intravenous Q24H    insulin lispro  0-18 Units Subcutaneous Q4H    spironolactone  25 mg Oral Daily    aMILoride  5 mg Oral Daily    heparin (porcine)  5,000 Units Subcutaneous 3 times per day    fat emulsion  250 mL Intravenous Once per day on Mon Tue Thu Fri    metroNIDAZOLE  500 mg Intravenous Q8H    minocycline  100 mg Oral BID    furosemide  80 mg Intravenous TID    ipratropium-albuterol  1 ampule Inhalation Q4H    methylPREDNISolone  40 mg Intravenous Q8H    albumin human  25 g Intravenous Once    pantoprazole  40 mg Intravenous BID    collagenase   Topical Daily    aspirin  81 mg Oral Daily    buPROPion  300 mg Oral Daily    mometasone-formoterol  2 puff Inhalation BID    insulin glargine  15 Units Subcutaneous Nightly    memantine  5 mg Oral Nightly    montelukast  10 mg Oral Nightly    rOPINIRole  1 mg Oral Nightly    sertraline  100 mg Oral BID    vitamin C  1,000 mg Oral Daily    vitamin E  400 Units Oral Daily    sodium chloride flush  10 mL Intravenous 2 times per day      PN-Adult Premix 5/15 - Central      PN-Adult Premix 5/15 - Central 75 mL/hr at 01/06/20 1748    vasopressin (Septic Shock) infusion 0.04 Units/min (01/07/20 1057)    dextrose       sodium chloride flush, diphenhydrAMINE, potassium chloride, magnesium sulfate, promethazine, ipratropium-albuterol, calcium carbonate, ondansetron, morphine, HYDROcodone 5 mg - acetaminophen, glucose, dextrose, glucagon (rDNA), dextrose, acetaminophen, fluticasone, nitroGLYCERIN, traMADol, trolamine salicylate, sodium chloride flush    Lab Data:  CBC:   Recent Labs     01/05/20  0545 01/06/20 0456 01/07/20  0338   WBC 15.3* 21.1* 23.7*   HGB 10.1* 11.0* 11.6*   HCT 31.6* 34.2* 36.0*   MCV 84.0 83.6 82.9    302 389     BMP:   Recent Labs     01/05/20  0545 01/06/20 0456 01/07/20  0338   * 131* 133*   K 3.6 3.6 4.1   CL 89* 83* 86*   CO2 33* 32 31   PHOS 0.6*  --   --    BUN 54* 56* 66*   CREATININE 2.0* 1.8* 1.8*     PT/INR: No results for input(s): PROTIME, INR in the last 72 hours. BNP:    No results for input(s): PROBNP in the last 72 hours. TROPONIN:   No results for input(s): TROPONINT in the last 72 hours. ECHO :   echocardiogram    Assessment:  77 y. o.year old who is admitted for  Chief Complaint   Patient presents with    Altered Mental Status     since yesterday    Wound Infection     coccyx    , active issues as noted below:  Impression:  Principal Problem:    Sepsis ([address]  Active Problems:    Hypotension    Metabolic encephalopathy    Vascular dementia with behavior disturbance ([address]    Hyperlipidemia    Asthma    Diabetes mellitus ([address]    Obesity    Chronic kidney disease, stage III (moderate) ([address]    Hypertensive kidney disease with CKD stage III ([address]    Depression    Pressure injury of skin of coccygeal region    WD-Pressure injury of sacral region, stage 4 (HCC)    Severe malnutrition (HCC)    VIRGILIO (acute kidney injury) ([address]  Resolved Problems:    * No resolved hospital problems. *        All labs, medications and tests reviewed by myself , continue all other medications of all above medical condition listed as is except for changes mentioned above. Thank you very much for consult , please call with questions.     Susy Renee MD 1/7/2020 4:23 PM

## 2020-01-08 NOTE — PROGRESS NOTES
pulmonary      SUBJECTIVE: on vapotherm     OBJECTIVE    VITALS:  /66   Pulse 100   Temp 98.8 °F (37.1 °C) (Oral)   Resp 21   Ht 6' 3\" (1.905 m)   Wt 281 lb 15.5 oz (127.9 kg)   SpO2 90%   BMI 35.24 kg/m²   HEAD AND FACE EXAM:  No throat injection, no active exudate,no thrush  NECK EXAM;No JVD, no masses, symmetrical  CHEST EXAM; Expansion equal and symmetrical, no masses  LUNG EXAM; Good breath sounds bilaterally. There are expiratory wheezes both lungs, there are crackles at both lung bases  CARDIOVASCULAR EXAM: Positive S1 and S2, no S3 or S4, no clicks ,no murmurs  RIGHT AND LEFT LOWER EXTRIMITY EXAM: No edema, no swelling, no inflamation            LABS   Lab Results   Component Value Date    WBC 38.2 (HH) 01/08/2020    HGB 12.8 (L) 01/08/2020    HCT 39.1 (L) 01/08/2020    MCV 83.4 01/08/2020     (H) 01/08/2020     Lab Results   Component Value Date    CREATININE 1.9 (H) 01/08/2020    BUN 79 (H) 01/08/2020     (L) 01/08/2020    K 5.2 (H) 01/08/2020    CL 86 (L) 01/08/2020    CO2 30 01/08/2020     Lab Results   Component Value Date    INR 1.01 05/03/2017    PROTIME 11.5 05/03/2017          Lab Results   Component Value Date    PHOS 2.4 01/08/2020    PHOS 0.6 01/05/2020    PHOS 3.5 01/01/2020        Recent Labs     01/06/20  0130 01/08/20  0600   PH 7.63* 7.49*   PO2ART 44* 67*   [id number] 36.0 38.0   O2SAT 85.7* 92.2*         Wt Readings from Last 3 Encounters:   01/06/20 281 lb 15.5 oz (127.9 kg)   11/10/19 (!) 309 lb 9 oz (140.4 kg)   10/28/19 (!) 335 lb (152 kg)     4:52 am       COMPARISON:   January 6, 2020       HISTORY:   ORDERING SYSTEM PROVIDED HISTORY: fu pneumonia   TECHNOLOGIST PROVIDED HISTORY:   Reason for exam:->fu pneumonia   Reason for Exam: fu pneumonia   Acuity: Acute   Type of Exam: Subsequent/Follow-up       FINDINGS:   Right base consolidation previously seen is still present but has improved.    Left base consolidation with mild elevation of the left hemidiaphragm

## 2020-01-08 NOTE — PROGRESS NOTES
Stopped to see pt to attempt bedside debridement. Pt currently having PICC placed. Will plan on debridement of sacrum later. Continue local wound care until debridement.      [address] [facility]

## 2020-01-08 NOTE — PLAN OF CARE
Problem: Falls - Risk of:  Goal: Will remain free from falls  Description  Will remain free from falls  Outcome: Ongoing  Goal: Absence of physical injury  Description  Absence of physical injury  Outcome: Ongoing     Problem: Risk for Impaired Skin Integrity  Goal: Tissue integrity - skin and mucous membranes  Description  Structural intactness and normal physiological function of skin and  mucous membranes. Outcome: Ongoing     Problem: Discharge Planning:  Goal: Participates in care planning  Description  Participates in care planning  Outcome: Ongoing  Goal: Discharged to appropriate level of care  Description  Discharged to appropriate level of care  Outcome: Ongoing     Problem: Airway Clearance - Ineffective:  Goal: Ability to maintain a clear airway will improve  Description  Ability to maintain a clear airway will improve  Outcome: Ongoing     Problem: Anxiety/Stress:  Goal: Level of anxiety will decrease  Description  Level of anxiety will decrease  Outcome: Ongoing     Problem: Aspiration:  Goal: Absence of aspiration  Description  Absence of aspiration  Outcome: Ongoing     Problem:  Bowel Function - Altered:  Goal: Bowel elimination is within specified parameters  Description  Bowel elimination is within specified parameters  Outcome: Ongoing     Problem: Cardiac Output - Decreased:  Goal: Hemodynamic stability will improve  Description  Hemodynamic stability will improve  Outcome: Ongoing     Problem: Fluid Volume - Imbalance:  Goal: Absence of imbalanced fluid volume signs and symptoms  Description  Absence of imbalanced fluid volume signs and symptoms  Outcome: Ongoing     Problem: Gas Exchange - Impaired:  Goal: Levels of oxygenation will improve  Description  Levels of oxygenation will improve  Outcome: Ongoing     Problem: Mental Status - Impaired:  Goal: Mental status will be restored to baseline  Description  Mental status will be restored to baseline  Outcome: Ongoing     Problem: Nutrition Deficit:  Goal: Ability to achieve adequate nutritional intake will improve  Description  Ability to achieve adequate nutritional intake will improve  Outcome: Ongoing     Problem: Pain:  Description  Pain management should include both nonpharmacologic and pharmacologic interventions.   Goal: Pain level will decrease  Description  Pain level will decrease  Outcome: Ongoing  Goal: Control of acute pain  Description  Control of acute pain  Outcome: Ongoing  Goal: Control of chronic pain  Description  Control of chronic pain  Outcome: Ongoing     Problem: Nutrition  Goal: Optimal nutrition therapy  Outcome: Ongoing

## 2020-01-08 NOTE — PROGRESS NOTES
chloride flush    Review of Systems  Pertinent items are noted in HPI. Objective:     Patient Vitals for the past 8 hrs:   BP Temp Temp src Pulse Resp SpO2   01/08/20 1200 107/68 -- Oral 102 25 92 %   01/08/20 1105 125/64 -- -- 100 25 94 %   01/08/20 1020 107/65 -- -- 101 27 93 %   01/08/20 1005 119/78 -- -- 98 28 91 %   01/08/20 0902 115/69 -- -- 99 27 97 %   01/08/20 0838 -- -- -- -- -- 98 %   01/08/20 0815 98/62 97.8 °F (36.6 °C) Oral 100 30 90 %   01/08/20 0750 112/68 -- -- 93 24 (!) 89 %   01/08/20 0700 (!) 103/53 -- -- 96 22 93 %   01/08/20 0638 -- -- -- 100 21 90 %     I/O last 3 completed shifts: In: 3297 [I.V.:1759; IV Piggyback:456]  Out: 0280 [Urine:3500; Emesis/NG output:455; Stool:490]  No intake/output data recorded. /68   Pulse 102   Temp 97.8 °F (36.6 °C) (Oral)   Resp 25   Ht 6' 3\" (1.905 m)   Wt 281 lb 15.5 oz (127.9 kg)   SpO2 92%   BMI 35.24 kg/m²   General appearance: alert and cooperative. Lungs: Clear to auscultation  Heart: regular rate and rhythm, S1, S2 normal, no murmur, click, rub or gallop  Abdomen: soft right upper quadrent abdominal tenderness; bowel sounds normal; no masses,  no organomegaly  Extremities: extremities normal, atraumatic, no cyanosis or edema  Skin: Sacral and coccygeal decub  CNS:  Moves all extremities. He is able to puff up his cheeks, doubt CVA        Labs and imaging reviewed.   CBC with Differential:    Lab Results   Component Value Date    WBC 38.2 01/08/2020    RBC 4.69 01/08/2020    HGB 12.8 01/08/2020    HCT 39.1 01/08/2020     01/08/2020    MCV 83.4 01/08/2020    MCH 27.3 01/08/2020    MCHC 32.7 01/08/2020    RDW 17.9 01/08/2020    NRBC 1 01/03/2020    SEGSPCT 80.0 01/08/2020    BANDSPCT 12 01/08/2020    LYMPHOPCT 2.0 01/08/2020    PROMYELOPCT 2 09/26/2019    MONOPCT 5.0 01/08/2020    MYELOPCT 2 01/06/2020    BASOPCT 0.3 01/07/2020    MONOSABS 1.9 01/08/2020    LYMPHSABS 0.8 01/08/2020    EOSABS 0.0 01/07/2020    BASOSABS 0.1  Distal small bowel normal in  size.  Very large amount of fluid in the stomach.  Cholelithiasis without  finding of cholecystitis. Chest Xray:  01/04/2020  Interval worsening in multifocal bilateral pulmonary opacities, right greater   than left. Assessment:     Principal Problem:    Sepsis ([address]  Active Problems:    Hypotension    Metabolic encephalopathy    Vascular dementia with behavior disturbance (HCC)    Hyperlipidemia    Asthma    Diabetes mellitus ([address]    Obesity    Chronic kidney disease, stage III (moderate) (HCC)    Hypertensive kidney disease with CKD stage III (HCC)    Depression    Pressure injury of skin of coccygeal region    WD-Pressure injury of sacral region, stage 4 (HCC)    Severe malnutrition (HCC)    VIRGILIO (acute kidney injury) ([address]  Resolved Problems:    * No resolved hospital problems. *  Hypokalemia and hypomagnesemia  Aspiration Pneumonia  Plan:   MRI -no acute infarct  Continue wound care, hip wound culture results noted. Patient had diverting colostomy. Appreciate renal, cardiology and pulmonary recommendations  Zofran for Nausea  CT abdomen results noted. Management per surgery  Keep patient in ICU. Noted wound culture results with multidrug resistance. Patient clinically improving. ID seen patient. Antibiotics per ID. On Avicaz, Minocycline, Metronidazole and anidulafungin. Debridement plans per surgery, currently on santyl. A. Fib resolved on Amidarone  Continue pressor support, wean down as appropriate. Lasix per nephrology- noted recommendations. Maybe discontinue or go down on lasix today. Will let nephrology decide. Patient on TPN  Sacral decub per surgery- debride if needed  Patient still on Vapotherm  Off Vasopressin currently and BP in low 964'D systolic  BS running high. Needs Insulin in TPN increased. On high dose SSI. Will increase Lantus to 20 Units  Swallow evaluation pending. Hopefully he will go back to po intake once he passes swallow evaluation. Consult Dr. Mary Alice Rosario  Will consult ophthalmology per ID notes  Spent approximately  30 min critical care time in patient care    Kong TORRES M.D.  1/8/2020

## 2020-01-08 NOTE — PROGRESS NOTES
alert and oriented X2, no distress  Skin: no stigmata of endocarditis  Wounds: Stage IV sacral decubitus wound, dry, slough at the base. HEMT: AT/NC Oropharynx pink, moist, and without lesions or exudates; dentition in good state of repair  Eyes: PERRLA, EOMI, conjunctiva pink, sclera anicteric. Neck: Supple. Trachea midline. No LAD. Chest: no distress and CTA. Good air movement. Heart: RRR and no MRG. Abd: right lower quadrant ileostomy, soft, non-distended, no tenderness, no hepatomegaly. Normoactive bowel sounds. Ext: Right hip hemiarthroplasty scar, no dehiscence does not appear infected. No clubbing, cyanosis, or edema  Catheter Site: right arm PICC line without erythema or tenderness  Neuro: Mental status intact. CN 2-12 intact and no focal sensory or motor deficits     Radiologic / Imaging / TESTING  CXR 1/8/20  1. Persistent but improved right base consolidation. 2. No significant change in left base consolidation with mild elevation of left hemidiaphragm.           Labs:      CULTURE results: Invalid input(s): BLOOD CULTURE,  URINE CULTURE, SURGICAL CULTURE    Diagnosis:  Patient Active Problem List   Diagnosis    Hypertension    Hyperlipidemia    Asthma    Diabetes mellitus ([address]    H/O cardiovascular stress test    Obesity    Chronic kidney disease, stage III (moderate) (HCC)    Hypertensive kidney disease with CKD stage III (HCC)    Depression    SOBOE (shortness of breath on exertion)    Abnormal cardiovascular stress test    Fracture of epiphysis (separation) (upper) of neck of femur, closed (HCC)    Pressure injury of skin of coccygeal region    Sepsis ([address]    PVC (premature ventricular contraction)    Septicemia ([address]    WD-Pressure injury of sacral region, stage 4 ([address]    WD-Nonhealing surgical wound    WD-Skin ulcer of right hip with necrosis of muscle (HCC)    Troponin level elevated    Recurrent major depressive disorder, in partial remission ([address]    Delusional ideas [facility])    Vascular dementia with behavior disturbance ([address]    Hypotension    Metabolic encephalopathy    Severe malnutrition (HCC)    VIRGILIO (acute kidney injury) [address]       Active Problems  Principal Problem:    Sepsis ([address]  Active Problems:    Hypotension    Metabolic encephalopathy    Vascular dementia with behavior disturbance (HCC)    Hyperlipidemia    Asthma    Diabetes mellitus ([address]    Obesity    Chronic kidney disease, stage III (moderate) (HCC)    Hypertensive kidney disease with CKD stage III (HCC)    Depression    Pressure injury of skin of coccygeal region    WD-Pressure injury of sacral region, stage 4 (HCC)    Severe malnutrition (HCC)    VIRGILIO (acute kidney injury) ([address]  Resolved Problems:    * No resolved hospital problems.  *    Electronically signed by: Electronically signed by Catarino Daniels MD on 1/8/2020 at 7:31 AM

## 2020-01-08 NOTE — PROGRESS NOTES
Nephrology Progress Note  1/8/2020 5:59 AM    Subjective:   Admit Date: 12/23/2019  PCP: Fox Osorio MD    Interval History: Serum K this am: 5.2; switch patient   From Spironolactone to Metolazone. Good urine output.   -starting to wean off Vasopressin. Data:     Current med's:    ceftazidime-acibactam (AVYCAZ) infusion  2.5 g Intravenous Q8H    anidulafungin  100 mg Intravenous Q24H    insulin lispro  0-18 Units Subcutaneous Q4H    spironolactone  25 mg Oral Daily    aMILoride  5 mg Oral Daily    heparin (porcine)  5,000 Units Subcutaneous 3 times per day    fat emulsion  250 mL Intravenous Once per day on Mon Tue Thu Fri    metroNIDAZOLE  500 mg Intravenous Q8H    minocycline  100 mg Oral BID    furosemide  80 mg Intravenous TID    ipratropium-albuterol  1 ampule Inhalation Q4H    methylPREDNISolone  40 mg Intravenous Q8H    albumin human  25 g Intravenous Once    pantoprazole  40 mg Intravenous BID    collagenase   Topical Daily    aspirin  81 mg Oral Daily    buPROPion  300 mg Oral Daily    mometasone-formoterol  2 puff Inhalation BID    insulin glargine  15 Units Subcutaneous Nightly    memantine  5 mg Oral Nightly    montelukast  10 mg Oral Nightly    rOPINIRole  1 mg Oral Nightly    sertraline  100 mg Oral BID    vitamin C  1,000 mg Oral Daily    vitamin E  400 Units Oral Daily    sodium chloride flush  10 mL Intravenous 2 times per day      PN-Adult Premix 5/15 - Central 75 mL/hr at 01/07/20 1809    vasopressin (Septic Shock) infusion 0.02 Units/min (01/08/20 0435)    dextrose       I/O last 3 completed shifts:   In: 2917 [I.V.:365; IV Piggyback:556]  Out: 0721 [Urine:3250; Emesis/NG output:600; Stool:380]    CBC:   Recent Labs     01/06/20 0456 01/07/20  0338   WBC 21.1* 23.7*   HGB 11.0* 11.6*    389          Recent Labs     01/06/20  0456 01/07/20  0338   * 133*   K 3.6 4.1   CL 83* 86*   CO2 32 31   BUN 56* 66*   CREATININE 1.8* 1.8*   GLUCOSE 422* 415* additional findings are as follows:   Pt weak and arousable today but glucose stay high    Objective:   Vitals: /68   Pulse 102   Temp 97.8 °F (36.6 °C) (Oral)   Resp 25   Ht 6' 3\" (1.905 m)   Wt 281 lb 15.5 oz (127.9 kg)   SpO2 92%   BMI 35.24 kg/m²   Awake weak  Soft nt obese   Sp surgery  Trace edema    Assessment and Plan:  IMP:  As stated above    Plan     1 bp low stable  2 adjust tpn for na and K and increase insulin for glucose  3 sp surgery monitor  4 abx per Dr Kristen Veras  5 monitor affect and possible oral diet when better  Will follow           Electronically signed by Rosana Angela MD on 1/8/2020 at 2:34 PM

## 2020-01-08 NOTE — PROGRESS NOTES
(NORCO) 5-325 MG per tablet 1 tablet, 1 tablet, Oral, Q6H PRN  collagenase ointment, , Topical, Daily  glucose (GLUTOSE) 40 % oral gel 15 g, 15 g, Oral, PRN  dextrose 50 % IV solution, 12.5 g, Intravenous, PRN  glucagon (rDNA) injection 1 mg, 1 mg, Intramuscular, PRN  dextrose 5 % solution, 100 mL/hr, Intravenous, PRN  acetaminophen (TYLENOL) tablet 500 mg, 500 mg, Oral, Q6H PRN  aspirin EC tablet 81 mg, 81 mg, Oral, Daily  buPROPion (WELLBUTRIN XL) extended release tablet 300 mg, 300 mg, Oral, Daily  fluticasone (FLONASE) 50 MCG/ACT nasal spray 1 spray, 1 spray, Nasal, Daily PRN  mometasone-formoterol (DULERA) 200-5 MCG/ACT inhaler 2 puff, 2 puff, Inhalation, BID  insulin glargine (LANTUS) injection vial 15 Units, 15 Units, Subcutaneous, Nightly  memantine (NAMENDA) tablet 5 mg, 5 mg, Oral, Nightly  montelukast (SINGULAIR) tablet 10 mg, 10 mg, Oral, Nightly  nitroGLYCERIN (NITROSTAT) SL tablet 0.4 mg, 0.4 mg, Sublingual, Q5 Min PRN  rOPINIRole (REQUIP) tablet 1 mg, 1 mg, Oral, Nightly  sertraline (ZOLOFT) tablet 100 mg, 100 mg, Oral, BID  traMADol (ULTRAM) tablet 50 mg, 50 mg, Oral, Q6H PRN  trolamine salicylate (ASPERCREME) 10 % cream, , Topical, Q4H PRN  vitamin C (ASCORBIC ACID) tablet 1,000 mg, 1,000 mg, Oral, Daily  vitamin E capsule 400 Units, 400 Units, Oral, Daily  sodium chloride flush 0.9 % injection 10 mL, 10 mL, Intravenous, 2 times per day  sodium chloride flush 0.9 % injection 10 mL, 10 mL, Intravenous, PRN  Allergies:  Bee venom    Social History:  TOBACCO:   reports that he has never smoked. He has never used smokeless tobacco.  ETOH:   reports no history of alcohol use. DRUGS:   reports no history of drug use.   Family History:       Problem Relation Age of Onset    High Blood Pressure Mother     Cancer Mother     Cancer Father     Stroke Maternal Grandfather     Diabetes Paternal Grandmother     Heart Disease Paternal Grandfather        REVIEW OF SYSTEMS:  CONSTITUTIONAL:  negative  HEENT: negative  RESPIRATORY:  negative  CARDIOVASCULAR:  negative  GASTROINTESTINAL:  negative  GENITOURINARY:  negative  MUSCULOSKELETAL:  negative  BEHAVIOR/PSYCH:  Negative    ROS unavailable    Family hx neg    PHYSICAL EXAM  ------------------------  Vitals:  /70   Pulse 97   Temp 98.6 °F (37 °C) (Oral)   Resp 20   Ht 6' 3\" (1.905 m)   Wt 281 lb 15.5 oz (127.9 kg)   SpO2 92%   BMI 35.24 kg/m²      General:  More awake  Well developed, well nourished, well groomed. No apparent distress. HEENT:  Normocephalic, atraumatic. Pupils equal, round, reactive to light. No scleral icterus. No conjunctival injection. Normal lips, teeth, and gums. No nasal discharge. Neck:  Supple  Heart:  RRR, no murmurs, gallops, rubs  Lungs:  CTA bilaterally, bilat symmetrical expansion, no wheeze, rales, or rhonchi  Abdomen: Bowel sounds present, soft, nontender, no masses, no organomegaly, no peritoneal signs  Extremities:  No clubbing, cyanosis, or edema  Skin:  Warm and dry, no open lesions or rash  Breast: deferred  Rectal: deferred  Genitalia:  deferred    NEUROLOGICAL EXAM  ---------------------------------    Mental Status Exam:             More awake follows simple commands  Oriented to person place year month-moderate dementia    Cranial Rbirlm-VK-MAB Intact.         Cranial nerve II           Visual acuity:  normal                 Cranial nerve III           Pupils:  equal, round, reactive to light      Cranial nerves III, IV, VI           Extraocular Movements: intact      Cranial nerve V           Facial sensation:  intact      Cranial nerve VII           Facial strength: intact      Cranial nerve VIII           Hearing:  intact      Cranial nerve IX           Palate:  intact      Cranial nerve XI         Shoulder shrug:  intact      Cranial nerve XII          Tongue movement:  normal    Motor:    Drift:  absent  Motor exam is symmetrical 5 out of 5 all extremities bilaterally  Tone:  normal  Abnormal Movements:  Absent    DTRs-2+ biceps,triceps,brachioradialis,knee jerks and ankle jerks bilaterally symmetrical.  Toes-downgoing bilaterally            Sensory:sensation cannot be tested              CBC with Differential:    Lab Results   Component Value Date    WBC 23.7 01/07/2020    RBC 4.34 01/07/2020    HGB 11.6 01/07/2020    HCT 36.0 01/07/2020     01/07/2020    MCV 82.9 01/07/2020    MCH 26.7 01/07/2020    MCHC 32.2 01/07/2020    RDW 17.2 01/07/2020    NRBC 1 01/03/2020    SEGSPCT 87.4 01/07/2020    BANDSPCT 3 01/06/2020    LYMPHOPCT 2.6 01/07/2020    PROMYELOPCT 2 09/26/2019    MONOPCT 5.5 01/07/2020    MYELOPCT 2 01/06/2020    BASOPCT 0.3 01/07/2020    MONOSABS 1.3 01/07/2020    LYMPHSABS 0.6 01/07/2020    EOSABS 0.0 01/07/2020    BASOSABS 0.1 01/07/2020    DIFFTYPE AUTOMATED DIFFERENTIAL 01/07/2020     CMP:    Lab Results   Component Value Date     01/07/2020    K 4.1 01/07/2020    CL 86 01/07/2020    CO2 31 01/07/2020    BUN 66 01/07/2020    CREATININE 1.8 01/07/2020    GFRAA 46 01/07/2020    LABGLOM 38 01/07/2020    GLUCOSE 415 01/07/2020    PROT 5.4 01/07/2020    LABALBU 2.8 01/07/2020    CALCIUM 9.1 01/07/2020    BILITOT 0.6 01/07/2020    ALKPHOS 141 01/07/2020    AST 12 01/07/2020    ALT 6 01/07/2020     BMP:    Lab Results   Component Value Date     01/07/2020    K 4.1 01/07/2020    CL 86 01/07/2020    CO2 31 01/07/2020    BUN 66 01/07/2020    LABALBU 2.8 01/07/2020    CREATININE 1.8 01/07/2020    CALCIUM 9.1 01/07/2020    GFRAA 46 01/07/2020    LABGLOM 38 01/07/2020    GLUCOSE 415 01/07/2020     PT/INR:    Lab Results   Component Value Date    PROTIME 11.5 05/03/2017    PROTIME 10.6 01/26/2012    INR 1.01 05/03/2017     PTT:    Lab Results   Component Value Date    APTT 72.9 01/01/2020   [APTT  U/A:    Lab Results   Component Value Date    COLORU YELLOW 01/02/2020    PHUR 5.5 10/24/2018    LABCAST Present 12/12/2016    LABCAST Hyaline casts 12/12/2016    WBCUA 32 01/02/2020 RBCUA 4 01/02/2020    MUCUS RARE 01/02/2020    TRICHOMONAS NONE SEEN 01/02/2020    YEAST FEW 01/02/2020    BACTERIA NEGATIVE 01/02/2020    CLARITYU HAZY 01/02/2020    SPECGRAV 1.010 01/02/2020    LEUKOCYTESUR SMALL 01/02/2020    UROBILINOGEN NORMAL 01/02/2020    BILIRUBINUR NEGATIVE 01/02/2020    BLOODU MODERATE 01/02/2020    GLUCOSEU 3+ 10/24/2018     TSH:  No results found for: TSH  VITAMIN B12: No components found for: B12  FOLATE:    Lab Results   Component Value Date    FOLATE 5.9 12/25/2019     RPR:  No results found for: RPR  CAMILLA:  No results found for: ANATITER, CAMILLA  Urine Toxicology:  No components found for: IAMMENTA, IBARBIT, IBENZO, ICOCAINE, IMARTHC, IOPIATES, IPHENCYC     IMPRESSION:    Metabolic encephalopathy/sepsis    neg for CVA    Hx dementia    ARF/CKD/HTN    PLAN:    CT brain neg    Mri brain neg    B 12 folate TSh nl    Continue asa and namenda    Pt stable neurologically although weak due to metabolic encephalopathy. Mary Beth Nagel MD  BOARD CERTIFIED-NEUROLOGY.

## 2020-01-08 NOTE — PROGRESS NOTES
Depression; SOBOE (shortness of breath on exertion); Abnormal cardiovascular stress test; Fracture of epiphysis (separation) (upper) of neck of femur, closed ([address] Pressure injury of skin of coccygeal region; Sepsis ([address] PVC (premature ventricular contraction); Septicemia [facility]); WD-Pressure injury of sacral region, stage 4 ([address] WD-Nonhealing surgical wound; WD-Skin ulcer of right hip with necrosis of muscle ([address] Troponin level elevated; Recurrent major depressive disorder, in partial remission ([address] Delusional ideas ([address] Vascular dementia with behavior disturbance ([address]  Hypotension; Metabolic encephalopathy; Severe malnutrition ([address] and VIRGILIO (acute kidney injury) ([address] on their problem list.  ONSET DATE: this admission    Recent Chest Xray/CT of Chest: see chart    Date of Eval: 1/8/2020  Evaluating Therapist: Enrike Serra    Current Diet level:  Current Diet : NPO  Current Liquid Diet : NPO      Primary Complaint  Patient Complaint: requests water    Pain:  Pain Assessment  Pain Assessment: 0-10  Pain Level: 0  Patient's Stated Pain Goal: No pain  Pain Type: Chronic pain  Pain Location: Back  Pain Orientation: Lower, Posterior  Pain Radiating Towards: Mid  Pain Descriptors: Aching  Pain Frequency: Continuous  Pain Onset: Gradual  Clinical Progression: Gradually worsening  Functional Pain Assessment: Activities are not prevented  Non-Pharmaceutical Pain Intervention(s): Repositioned(and medication)  Response to Pain Intervention: Patient Satisfied  Multiple Pain Sites: No  RASS Score: Alert and calm  POSS Score (Patient Ctrl Analgesia): 1  Pain Assessment/FLACC  Pain Rating: FLACC (rest) - Face: no particular expression or smile  Pain Rating: FLACC (rest) - Legs: normal position or relaxed  Pain Rating: FLACC (rest) - Activity: lying quietly, normal position, moves easily  Pain Rating: FLACC (rest) - Cry: no cry (awake or asleep)  Pain Rating: FLACC (rest) - Consolability: content, relaxed  Score: FLACC (rest): 0    Reason for Referral  Elisha Méndez was referred for a bedside swallow evaluation to assess the efficiency of his swallow function, identify signs and symptoms of aspiration and make recommendations regarding safe dietary consistencies, effective compensatory strategies, and safe eating environment. Impression  Dysphagia Diagnosis: Moderate pharyngeal stage dysphagia  Dysphagia Outcome Severity Scale: Level 3: Moderate dysphagia- Total assisstance, supervision or strategies. Two or more diet consistencies restricted     Treatment Plan  Requires SLP Intervention: Yes  Duration/Frequency of Treatment: 3-5x/week for LOS          Recommended Diet and Intervention  Diet Solids Recommendation: (TBD)  Liquid Consistency Recommendation: Moderately Thick (Honey)  Recommended Form of Meds: PO     Therapeutic Interventions: Diet tolerance monitoring;Patient/Family education; Therapeutic PO trials with SLP    Compensatory Swallowing Strategies  Compensatory Swallowing Strategies: Upright as possible for all oral intake;Small bites/sips    Treatment/Goals  Short-term Goals  Timeframe for Short-term Goals: length of admission  Goal 1: Pt will tolerate honey thick liquids without overt s/s aspiration. Goal 2: Pt will participate in instrumental swallow assessment when clinically appropriate. Goal 3: Pt will tolerate trials of advanced textures without s/s aspiration for possible diet upgrade. Goal 4: Pt/caregivers will indicate understanding of all recommendations. General  Chart Reviewed: Yes  Behavior/Cognition: Alert; Cooperative  Respiratory Status: O2 via nasual cannula  O2 Device: (vapotherm)  Communication Observation: (limited verbalizations)  Follows Directions: Simple  Dentition: Poor dental/oral hygeine; Some missing teeth  Patient Positioning: Upright in bed  Baseline Vocal Quality: Weak;Dysphonic  Prior Dysphagia History: none known prior to admission  Consistencies Administered:  Thin -

## 2020-01-09 NOTE — PROCEDURES
Excisional Debridement Procedure:    Patient ID:  Milad Graves  [id number]  56 y.o.  1950        Indications: Sacral wound    Pre-operative Diagnosis: Sacral wound    Post-operative Diagnosis: same    Procedrure:  Excisional debridement down to bone  Total square cm area: 16 cm^2 (4 cm x 4 cm)    Surgeon: Ede Pitts II, MD    Findings:  Minimal lower portion of sacral wound necrosis involving muscle down to bone. EBL: less than 15 mL    Procedure Details: The risk, benefits, expected outcome, and alternative to the recommended procedure have been discussed with the patient. Patient understands and wants to proceed with the procedure. Procedure: The above wound was debrided. An Excisional Debridement down to bone. curette, scissors and # 10 blade scalpel were use and pressure was applied for hemostasis. The wound was packed with saline moistened gauze and covered with ABD and tape. Wound care instructions were given.     Ede Pitts II

## 2020-01-09 NOTE — PROGRESS NOTES
Michael Driscoll MD.  Section of General Neurology - Adult  Consult Note        Reason for Consult:    Requesting Physician:  No referring provider defined for this encounter.   Thank you for your kind referral.    CHIEF COMPLAINT:     Pt states he feels weak and feels tired    Denies any new symptoms    No confusion         Past Medical History:        Diagnosis Date    Arthritis     Asthma     Chronic kidney disease     stage 3, seen by Dr. Dalia Obrien Diabetes mellitus ([facility]     H/O cardiac catheterization 05/04/2017    H/O cardiovascular stress test 6/07, 12/08    CARDIOLITE: EF->51%    Hyperlipidemia     Hypertension     Obesity     Pressure ulcer of coccygeal region, unstageable ([facility] 09/10/2019    Thyroid disease      Past Surgical History:        Procedure Laterality Date    CARPAL TUNNEL RELEASE  2005    COLOSTOMY N/A 12/27/2019    LAPAROSCOPIC DIVERTING ILEOSTOMY performed by Christophe Medina MD at [place] [address] CATH LAB PROCEDURE  12/05    NO SIGNIFICANT CAD    EYE SURGERY      HEMIARTHROPLASTY HIP Right 8/29/2019    HIP HEMIARTHROPLASTY performed by Warren Rosario MD at [address] [place] [address] 69 Right 9/29/2019    HIP INCISION AND DRAINAGE performed by Warren Rosario MD at [address] [place] [address] 69 Right 11/15/2019    HIP INCISION AND DRAINAGE RIGHT CLOSURE OF INCISION WITH HEMOVAC performed by Warren Rosario MD at [address] [place] [address]  09/23/2019    of stage 4 wound on coccyx, by Dr. Leslie Hansen N/A 9/23/2019    EXCISIONAL DEBRIDEMENT OF SACRAL PRESSURE ULCER performed by Christophe Medina MD at [address] [place] [address] N/A 9/29/2019    DECUBITUS ULCER DEBRIDEMENT REPAIR performed by Ivan Powell MD at [address] [place] [address]       Current Medications:   Current Facility-Administered Medications: sodium chloride flush 0.9 % injection 10 mL, 10 mL, TID PRN  calcium carbonate (TUMS) chewable tablet 500 mg, 500 mg, Oral, TID PRN  ondansetron (ZOFRAN) injection 4 mg, 4 mg, Intravenous, Q6H PRN  morphine (PF) injection 2 mg, 2 mg, Intravenous, Q4H PRN  HYDROcodone-acetaminophen (NORCO) 5-325 MG per tablet 1 tablet, 1 tablet, Oral, Q6H PRN  collagenase ointment, , Topical, Daily  glucose (GLUTOSE) 40 % oral gel 15 g, 15 g, Oral, PRN  dextrose 50 % IV solution, 12.5 g, Intravenous, PRN  glucagon (rDNA) injection 1 mg, 1 mg, Intramuscular, PRN  dextrose 5 % solution, 100 mL/hr, Intravenous, PRN  acetaminophen (TYLENOL) tablet 500 mg, 500 mg, Oral, Q6H PRN  aspirin EC tablet 81 mg, 81 mg, Oral, Daily  buPROPion (WELLBUTRIN XL) extended release tablet 300 mg, 300 mg, Oral, Daily  fluticasone (FLONASE) 50 MCG/ACT nasal spray 1 spray, 1 spray, Nasal, Daily PRN  mometasone-formoterol (DULERA) 200-5 MCG/ACT inhaler 2 puff, 2 puff, Inhalation, BID  memantine (NAMENDA) tablet 5 mg, 5 mg, Oral, Nightly  montelukast (SINGULAIR) tablet 10 mg, 10 mg, Oral, Nightly  nitroGLYCERIN (NITROSTAT) SL tablet 0.4 mg, 0.4 mg, Sublingual, Q5 Min PRN  rOPINIRole (REQUIP) tablet 1 mg, 1 mg, Oral, Nightly  sertraline (ZOLOFT) tablet 100 mg, 100 mg, Oral, BID  traMADol (ULTRAM) tablet 50 mg, 50 mg, Oral, Q6H PRN  trolamine salicylate (ASPERCREME) 10 % cream, , Topical, Q4H PRN  vitamin C (ASCORBIC ACID) tablet 1,000 mg, 1,000 mg, Oral, Daily  vitamin E capsule 400 Units, 400 Units, Oral, Daily  sodium chloride flush 0.9 % injection 10 mL, 10 mL, Intravenous, 2 times per day  sodium chloride flush 0.9 % injection 10 mL, 10 mL, Intravenous, PRN  Allergies:  Bee venom    Social History:  TOBACCO:   reports that he has never smoked. He has never used smokeless tobacco.  ETOH:   reports no history of alcohol use. DRUGS:   reports no history of drug use.   Family History:       Problem Relation Age of Onset    High Blood Pressure Mother     Cancer Mother     Cancer Father     Stroke Maternal Tongue movement:  normal    Motor:    Drift:  absent  Motor exam is symmetrical 5 out of 5 all extremities bilaterally  Tone:  normal  Abnormal Movements:  Absent    DTRs-2+ biceps,triceps,brachioradialis,knee jerks and ankle jerks bilaterally symmetrical.  Toes-downgoing bilaterally            Sensory:sensation cannot be tested              CBC with Differential:    Lab Results   Component Value Date    WBC 38.2 01/08/2020    RBC 4.69 01/08/2020    HGB 12.8 01/08/2020    HCT 39.1 01/08/2020     01/08/2020    MCV 83.4 01/08/2020    MCH 27.3 01/08/2020    MCHC 32.7 01/08/2020    RDW 17.9 01/08/2020    NRBC 1 01/03/2020    SEGSPCT 80.0 01/08/2020    BANDSPCT 12 01/08/2020    LYMPHOPCT 2.0 01/08/2020    PROMYELOPCT 2 09/26/2019    MONOPCT 5.0 01/08/2020    MYELOPCT 2 01/06/2020    BASOPCT 0.3 01/07/2020    MONOSABS 1.9 01/08/2020    LYMPHSABS 0.8 01/08/2020    EOSABS 0.0 01/07/2020    BASOSABS 0.1 01/07/2020    DIFFTYPE MANUAL DIFFERENTIAL 01/08/2020     CMP:    Lab Results   Component Value Date     01/08/2020    K 5.2 01/08/2020    CL 86 01/08/2020    CO2 30 01/08/2020    BUN 79 01/08/2020    CREATININE 1.9 01/08/2020    GFRAA 43 01/08/2020    LABGLOM 35 01/08/2020    GLUCOSE 452 01/08/2020    PROT 5.9 01/08/2020    LABALBU 3.0 01/08/2020    CALCIUM 9.4 01/08/2020    BILITOT 0.5 01/08/2020    ALKPHOS 176 01/08/2020    AST 12 01/08/2020    ALT 7 01/08/2020     BMP:    Lab Results   Component Value Date     01/08/2020    K 5.2 01/08/2020    CL 86 01/08/2020    CO2 30 01/08/2020    BUN 79 01/08/2020    LABALBU 3.0 01/08/2020    CREATININE 1.9 01/08/2020    CALCIUM 9.4 01/08/2020    GFRAA 43 01/08/2020    LABGLOM 35 01/08/2020    GLUCOSE 452 01/08/2020     PT/INR:    Lab Results   Component Value Date    PROTIME 11.5 05/03/2017    PROTIME 10.6 01/26/2012    INR 1.01 05/03/2017     PTT:    Lab Results   Component Value Date    APTT 72.9 01/01/2020   [APTT  U/A:    Lab Results   Component Value Date COLORU YELLOW 01/02/2020    PHUR 5.5 10/24/2018    LABCAST Present 12/12/2016    LABCAST Hyaline casts 12/12/2016    WBCUA 32 01/02/2020    RBCUA 4 01/02/2020    MUCUS RARE 01/02/2020    TRICHOMONAS NONE SEEN 01/02/2020    YEAST FEW 01/02/2020    BACTERIA NEGATIVE 01/02/2020    CLARITYU HAZY 01/02/2020    SPECGRAV 1.010 01/02/2020    LEUKOCYTESUR SMALL 01/02/2020    UROBILINOGEN NORMAL 01/02/2020    BILIRUBINUR NEGATIVE 01/02/2020    BLOODU MODERATE 01/02/2020    GLUCOSEU 3+ 10/24/2018     TSH:  No results found for: TSH  VITAMIN B12: No components found for: B12  FOLATE:    Lab Results   Component Value Date    FOLATE 5.9 12/25/2019     RPR:  No results found for: RPR  CAMILLA:  No results found for: ANATITER, CAMILLA  Urine Toxicology:  No components found for: IAMMENTA, IBARBIT, IBENZO, ICOCAINE, IMARTHC, IOPIATES, IPHENCYC     IMPRESSION:    Metabolic encephalopathy/sepsis    neg for CVA    Hx dementia    ARF/CKD/HTN    PLAN:    CT brain neg    Mri brain neg    B 12 folate TSh nl    Continue asa     decrease wellbutrin namendazoloft    Pt stable neurologically although weak due to metabolic encephalopathy. Mary Ann Mcleod MD  BOARD CERTIFIED-NEUROLOGY.

## 2020-01-09 NOTE — CONSULTS
RETINAL DETACHMENT SURGERY         FAMILY HISTORY    Family History   Problem Relation Age of Onset    High Blood Pressure Mother     Cancer Mother     Cancer Father     Stroke Maternal Grandfather     Diabetes Paternal Grandmother     Heart Disease Paternal Grandfather        SOCIAL HISTORY    Social History     Tobacco Use    Smoking status: Never Smoker    Smokeless tobacco: Never Used   Substance Use Topics    Alcohol use: No    Drug use: No       ALLERGIES    Allergies   Allergen Reactions    Bee Venom Shortness Of Breath       MEDICATIONS    No current facility-administered medications on file prior to encounter. Current Outpatient Medications on File Prior to Encounter   Medication Sig Dispense Refill    ipratropium-albuterol (DUONEB) 0.5-2.5 (3) MG/3ML SOLN nebulizer solution Inhale 1 vial into the lungs every 6 hours      montelukast (SINGULAIR) 10 MG tablet Take 1 tablet by mouth nightly 30 tablet 3    memantine (NAMENDA) 5 MG tablet Take 1 tablet by mouth nightly 60 tablet 3    furosemide (LASIX) 20 MG tablet Take 20 mg by mouth daily afternoon      baclofen (LIORESAL) 10 MG tablet Take 10 mg by mouth every 8 hours       fluticasone-vilanterol (BREO ELLIPTA) 100-25 MCG/INH AEPB inhaler Inhale 1 puff into the lungs daily      furosemide (LASIX) 40 MG tablet Take 40 mg by mouth every morning       potassium chloride (KLOR-CON M) 20 MEQ extended release tablet Take 20 mEq by mouth 3 times daily      spironolactone (ALDACTONE) 25 MG tablet Take 25 mg by mouth every morning       traMADol (ULTRAM) 50 MG tablet Take 50 mg by mouth every 6 hours as needed for Pain.       rOPINIRole (REQUIP) 1 MG tablet Take 1 tablet by mouth nightly 30 tablet 0    acetaminophen (TYLENOL) 500 MG tablet Take 1 tablet by mouth every 6 hours as needed for Pain or Fever 120 tablet 3    insulin glargine (LANTUS) 100 UNIT/ML injection vial Inject 15 Units into the skin nightly 1 vial 3    TRULICITY 1.5 41.4 01/09/2020    MCV 95.4 01/09/2020    MCH 27.0 01/09/2020    MCHC 28.3 01/09/2020    RDW 18.9 01/09/2020     01/09/2020    MPV 11.3 01/09/2020     CMP:    Lab Results   Component Value Date     01/09/2020    K 5.0 01/09/2020    CL 84 01/09/2020    CO2 27 01/09/2020    BUN 93 01/09/2020    CREATININE 2.1 01/09/2020    GFRAA 38 01/09/2020    LABGLOM 32 01/09/2020    GLUCOSE 262 01/09/2020    PROT 6.1 01/09/2020    LABALBU 3.3 01/09/2020    CALCIUM 10.0 01/09/2020    BILITOT 0.6 01/09/2020    ALKPHOS 202 01/09/2020    AST 15 01/09/2020    ALT 8 01/09/2020     Albumin:    Lab Results   Component Value Date    LABALBU 3.3 01/09/2020     PT/INR:    Lab Results   Component Value Date    PROTIME 11.5 05/03/2017    PROTIME 10.6 01/26/2012    INR 1.01 05/03/2017     HgBA1c:    Lab Results   Component Value Date    LABA1C 6.1 09/04/2019         Assessment:     Patient Active Problem List   Diagnosis    Hypertension    Hyperlipidemia    Asthma    Diabetes mellitus ([address]    H/O cardiovascular stress test    Obesity    [facility], [facility] III (moderate) [facility]    Hypertensive kidney disease with CKD stage III ([facility])    Depression    SOBOE (shortness of breath on exertion)    Abnormal cardiovascular stress test    Fracture of epiphysis (separation) (upper) of neck of femur, closed ([facility])    Pressure injury of skin of coccygeal region    Sepsis ([address]    PVC (premature ventricular contraction)    Septicemia ([address]    WD-Pressure injury of sacral region, stage 4 ([address]    WD-Nonhealing surgical wound    WD-Skin ulcer of right hip with necrosis of muscle ([facility])    Troponin level elevated    Recurrent major depressive disorder, in partial remission ([address]    Delusional ideas ([address]    Vascular dementia with behavior disturbance ([address]    Hypotension    Metabolic encephalopathy    Severe malnutrition ([address]    VIRGILIO (acute kidney injury) ([address]       Measurements:  Negative Pressure Wound Therapy Hip Right (Active)   Wound Type Surgical 11/21/2019  8:00 PM   Unit Type prevena 11/18/2019  8:27 PM   Dressing Type Other (Comment) 11/18/2019 10:30 AM   Number of pieces used 1 11/12/2019  9:00 AM   Cycle Continuous 11/21/2019  8:00 PM   Target Pressure (mmHg) 125 11/21/2019  8:00 PM   Canister changed? No 11/21/2019  8:00 PM   Dressing Status Changed 11/18/2019 10:30 AM   Dressing Changed Changed/New 11/18/2019 10:30 AM   Drainage Amount None 11/18/2019 10:30 AM   Drainage Description Serosanguinous 11/15/2019  4:08 AM   Output (ml) 550 ml 11/21/2019  5:56 AM   Wound Assessment Other (Comment) 11/21/2019  8:00 PM   Annika-wound Assessment Other (Comment) 11/21/2019  8:00 PM   Odor Strong 11/15/2019  4:08 AM   Number of days: 102       Negative Pressure Wound Therapy Coccyx (Active)   Wound Type Pressure ulcer: Stage IV 11/27/2019  9:54 PM   Unit Type KCI 11/27/2019  9:45 AM   Dressing Type Black foam 11/27/2019  9:54 PM   Number of pieces used 3 11/27/2019  9:45 AM   Cycle Continuous 11/27/2019  9:54 PM   Target Pressure (mmHg) 125 11/27/2019  9:54 PM   Canister changed? No 11/27/2019  9:45 AM   Dressing Status Clean;Dry; Intact 11/27/2019  9:54 PM   Dressing Changed Changed/New 11/27/2019  9:45 AM   Drainage Amount Moderate 11/27/2019  9:45 AM   Drainage Description Serosanguinous 11/27/2019  9:45 AM   Dressing Change Due 11/27/19 11/25/2019 11:00 AM   Output (ml) 50 ml 11/26/2019  6:55 PM   Wound Assessment Red;Yellow 11/27/2019  9:45 AM   Annika-wound Assessment Pink 11/27/2019  9:45 AM   Odor None 11/27/2019  9:45 AM   Number of days: 100       Wound 09/21/19 Coccyx Stage 4 Pressure Injury (Active)   Wound Pressure Stage  4 1/9/2020 10:30 AM   Dressing Status Changed 1/9/2020 10:30 AM   Dressing Changed Changed/New 1/9/2020 10:30 AM   Wound Cleansed Rinsed/Irrigated with saline 1/9/2020 10:30 AM   Dressing Change Due 01/08/20 1/9/2020  1:15 AM   Wound Length (cm) 8 cm 1/9/2020 10:30 AM   Wound Width (cm) 10.5 cm 1/9/2020 10:30 AM   Wound Depth (cm) 2.5 cm 1/9/2020 10:30 AM   Wound Surface Area (cm^2) 84 cm^2 1/9/2020 10:30 AM   Change in Wound Size % (l*w) 8.7 1/9/2020 10:30 AM   Wound Volume (cm^3) 210 cm^3 1/9/2020 10:30 AM   Wound Healing % -52 1/9/2020 10:30 AM   Distance Tunneling (cm) 0 cm 1/9/2020 10:30 AM   Tunneling Position ___ O'Clock 0 1/9/2020 10:30 AM   Undermining Ends___ O'Clock 3 1/3/2020 11:02 AM   Undermining Maxium Distance (cm) 3.5 1/3/2020 11:02 AM   Wound Assessment Brown;Black;Red;Yellow 1/9/2020 10:30 AM   Drainage Amount Large 1/9/2020 10:30 AM   Drainage Description Sanguinous 1/9/2020 10:30 AM   Odor None 1/9/2020 10:30 AM   Margins Defined edges 1/9/2020 10:30 AM   Exposed structure Bone 1/9/2020 10:30 AM   Annika-wound Assessment Edema;Fragile 1/4/2020 12:00 PM   Non-staged Wound Description Full thickness 1/9/2020 10:30 AM   Aneta%Wound Bed 30 1/3/2020 11:02 AM   Red%Wound Bed 70 1/9/2020 10:30 AM   Yellow%Wound Bed 20 1/9/2020 10:30 AM   Black%Wound Bed 10 1/9/2020 10:30 AM   Other%Wound Bed 0 12/26/2019  4:29 PM   Number of days: 110       Wound 12/23/19 Groin Left (Active)   Wound Venous 1/1/2020  8:00 PM   Dressing Status Other (Comment) 1/7/2020 12:30 AM   Dressing/Treatment Other (comment) 1/9/2020  1:15 AM   Wound Cleansed Rinsed/Irrigated with saline 12/29/2019 11:00 AM   Wound Assessment Pink;Red 1/9/2020  1:15 AM   Drainage Amount None 1/9/2020  1:15 AM   Drainage Description Serosanguinous 1/1/2020  4:00 PM   Odor None 1/9/2020  1:15 AM   Number of days: 16       Wound 12/26/19 Left hip Unstageable PI (Active)   Wound Pressure Unstageable 1/9/2020 10:30 AM   Dressing Status Changed 1/9/2020 10:30 AM   Dressing Changed Changed/New 1/9/2020 10:30 AM   Wound Cleansed Rinsed/Irrigated with saline 1/9/2020 10:30 AM   Dressing Change Due 01/08/20 1/9/2020  1:15 AM   Wound Length (cm) 1.6 cm 1/9/2020 10:30 AM   Wound Width (cm) 1.2 cm 1/9/2020 10:30 AM   Wound Depth (cm) 0.1 cm 1/9/2020 10:30 Colostomy barrier and pouch intact. Specialty Bed Required : yes  [] Low Air Loss   [x] Pressure Redistribution  [] Fluid Immersion  [] Bariatric  [] Total Pressure Relief  [] Other:     Discharge Plan:  Placement for patient upon discharge: tbd  Hospice Care: no  Patient appropriate for Outpatient [address]: [facility]    Patient/Caregiver Teaching:  Level of patient/caregiver understanding able to:   Needs reinforcement.         Electronically signed by Tanesha Arrieta RN,  on 1/9/2020 at 12:06 PM

## 2020-01-09 NOTE — PROGRESS NOTES
Nephrology Progress Note  1/9/2020 3:19 PM  Subjective:   Admit Date: 12/23/2019  PCP: Xavier Jackson MD  Interval History: pt weak and arousable concern bleedinfg again and infection. dw ID and can try do ct with contrast when stbale. Hold diuretic today    Diet: Diet NPO Effective Now Exceptions are: Other (See Comment)  PN-Adult Premix 5/15 - Central  PN-Adult Premix 5/15 - Central  Pain is: Moderate      Data:   Scheduled Meds:   midodrine  10 mg Oral TID WC    fludrocortisone  0.1 mg Oral Daily    sodium chloride flush  10 mL Intravenous 2 times per day    buPROPion  150 mg Oral Daily    sertraline  50 mg Oral BID    rOPINIRole  0.5 mg Oral Nightly    insulin glargine  40 Units Subcutaneous Nightly    insulin lispro  0-30 Units Subcutaneous Q4H    ceftazidime-acibactam (AVYCAZ) infusion  2.5 g Intravenous Q8H    anidulafungin  100 mg Intravenous Q24H    heparin (porcine)  5,000 Units Subcutaneous 3 times per day    fat emulsion  250 mL Intravenous Once per day on Mon Tue Thu Fri    metroNIDAZOLE  500 mg Intravenous Q8H    minocycline  100 mg Oral BID    ipratropium-albuterol  1 ampule Inhalation Q4H    methylPREDNISolone  40 mg Intravenous Q8H    albumin human  25 g Intravenous Once    pantoprazole  40 mg Intravenous BID    collagenase   Topical Daily    aspirin  81 mg Oral Daily    mometasone-formoterol  2 puff Inhalation BID    montelukast  10 mg Oral Nightly    vitamin C  1,000 mg Oral Daily    vitamin E  400 Units Oral Daily    sodium chloride flush  10 mL Intravenous 2 times per day     Continuous Infusions:   sodium chloride 75 mL/hr at 01/09/20 1155    phenylephrine (CHINA-SYNEPHRINE) 50mg/250mL infusion 100 mcg/min (01/09/20 1456)    PN-Adult Premix 5/15 - Central      PN-Adult Premix 5/15 - Central 75 mL/hr at 01/08/20 2124    vasopressin (Septic Shock) infusion 0.04 Units/min (01/09/20 1415)    dextrose       PRN Meds:silver nitrate applicators, sodium chloride flush,

## 2020-01-09 NOTE — PROGRESS NOTES
Progress note    Magdalena Velasquez    1/9/2020    Subjective:     Patients awake says he feels better. Patient is NSR on PO Cordarone. Off Vasopressin, Off phenylephrine. On Midodrine and fludrocortisone. Medication side effects: none.     Scheduled Meds:   midodrine  10 mg Oral TID WC    fludrocortisone  0.1 mg Oral Daily    sodium chloride flush  10 mL Intravenous 2 times per day    buPROPion  150 mg Oral Daily    sertraline  50 mg Oral BID    rOPINIRole  0.5 mg Oral Nightly    insulin glargine  40 Units Subcutaneous Nightly    insulin lispro  0-30 Units Subcutaneous Q4H    ceftazidime-acibactam (AVYCAZ) infusion  2.5 g Intravenous Q8H    anidulafungin  100 mg Intravenous Q24H    heparin (porcine)  5,000 Units Subcutaneous 3 times per day    fat emulsion  250 mL Intravenous Once per day on Mon Tue Thu Fri    metroNIDAZOLE  500 mg Intravenous Q8H    minocycline  100 mg Oral BID    ipratropium-albuterol  1 ampule Inhalation Q4H    methylPREDNISolone  40 mg Intravenous Q8H    albumin human  25 g Intravenous Once    pantoprazole  40 mg Intravenous BID    collagenase   Topical Daily    aspirin  81 mg Oral Daily    mometasone-formoterol  2 puff Inhalation BID    montelukast  10 mg Oral Nightly    vitamin C  1,000 mg Oral Daily    vitamin E  400 Units Oral Daily    sodium chloride flush  10 mL Intravenous 2 times per day     Continuous Infusions:   sodium chloride 75 mL/hr at 01/09/20 1155    PN-Adult Premix 5/15 - Central      PN-Adult Premix 5/15 - Central 75 mL/hr at 01/08/20 2124    vasopressin (Septic Shock) infusion Stopped (01/08/20 1146)    dextrose       PRN Meds:sodium chloride flush, sodium chloride flush, diphenhydrAMINE, potassium chloride, magnesium sulfate, promethazine, ipratropium-albuterol, calcium carbonate, ondansetron, morphine, HYDROcodone 5 mg - acetaminophen, glucose, dextrose, glucagon (rDNA), dextrose, acetaminophen, fluticasone, nitroGLYCERIN, traMADol, trolamine salicylate, sodium chloride flush    Review of Systems  Pertinent items are noted in HPI. Objective:     Patient Vitals for the past 8 hrs:   BP Temp Temp src Pulse Resp SpO2   01/09/20 1304 -- -- -- -- -- 92 %   01/09/20 1230 100/78 -- -- 114 18 93 %   01/09/20 1217 (!) 76/58 -- -- 114 28 (!) 85 %   01/09/20 1200 (!) 75/55 -- -- 107 26 92 %   01/09/20 1136 -- -- -- 106 -- --   01/09/20 1130 90/70 -- -- 103 19 95 %   01/09/20 1103 -- -- -- 101 -- --   01/09/20 1100 95/67 -- -- 107 24 94 %   01/09/20 1042 96/66 -- -- 108 28 94 %   01/09/20 1033 -- -- -- 102 -- --   01/09/20 1031 84/75 -- -- 103 28 98 %   01/09/20 1022 82/62 -- -- 106 17 95 %   01/09/20 1000 (!) 90/47 -- -- 104 28 92 %   01/09/20 0955 -- -- -- 105 -- --   01/09/20 0950 -- -- -- 112 -- --   01/09/20 0930 86/61 -- -- 106 21 96 %   01/09/20 0929 -- -- -- 103 -- --   01/09/20 0900 85/64 -- -- 98 20 94 %   01/09/20 0830 90/64 -- -- 106 20 99 %   01/09/20 0743 (!) 100/59 98 °F (36.7 °C) Oral 102 19 99 %   01/09/20 0646 84/60 -- -- 101 25 --     I/O last 3 completed shifts: In: 1351 [I.V.:423]  Out: 3365 [Urine:2775; Emesis/NG output:480; Stool:110]  I/O this shift: In: 720 [P.O.:720]  Out: 370 [Urine:100; Emesis/NG output:30; Stool:240]    /78   Pulse 114   Temp 98 °F (36.7 °C) (Oral)   Resp 18   Ht 6' 3\" (1.905 m)   Wt 281 lb 15.5 oz (127.9 kg)   SpO2 92%   BMI 35.24 kg/m²   General appearance: alert and cooperative. Lungs: Clear to auscultation  Heart: regular rate and rhythm, S1, S2 normal, no murmur, click, rub or gallop  Abdomen: soft right upper quadrent abdominal tenderness; bowel sounds normal; no masses,  no organomegaly  Extremities: extremities normal, atraumatic, no cyanosis or edema  Skin: Sacral and coccygeal decub-Wound looks healthy after debridement. CNS:  Moves all extremities. He is able to puff up his cheeks, doubt CVA        Labs and imaging reviewed.   CBC with Differential:    Lab Results   Component Value Date WBC 36.6 01/09/2020    RBC 4.34 01/09/2020    HGB 13.3 01/09/2020    HCT 41.1 01/09/2020     01/09/2020    MCV 95.4 01/09/2020    MCH 27.0 01/09/2020    MCHC 28.3 01/09/2020    RDW 18.9 01/09/2020    NRBC 1 01/03/2020    SEGSPCT 91.0 01/09/2020    BANDSPCT 12 01/08/2020    LYMPHOPCT 3.0 01/09/2020    PROMYELOPCT 2 09/26/2019    MONOPCT 4.0 01/09/2020    MYELOPCT 2 01/09/2020    BASOPCT 0.3 01/07/2020    MONOSABS 1.5 01/09/2020    LYMPHSABS 1.1 01/09/2020    EOSABS 0.0 01/07/2020    BASOSABS 0.1 01/07/2020    DIFFTYPE MANUAL DIFFERENTIAL 01/09/2020     CMP:    Lab Results   Component Value Date     01/09/2020    K 5.0 01/09/2020    CL 84 01/09/2020    CO2 27 01/09/2020    BUN 93 01/09/2020    CREATININE 2.1 01/09/2020    GFRAA 38 01/09/2020    LABGLOM 32 01/09/2020    GLUCOSE 262 01/09/2020    PROT 6.1 01/09/2020    LABALBU 3.3 01/09/2020    CALCIUM 10.0 01/09/2020    BILITOT 0.6 01/09/2020    ALKPHOS 202 01/09/2020    AST 15 01/09/2020    ALT 8 01/09/2020     BMP:    Lab Results   Component Value Date     01/09/2020    K 5.0 01/09/2020    CL 84 01/09/2020    CO2 27 01/09/2020    BUN 93 01/09/2020    LABALBU 3.3 01/09/2020    CREATININE 2.1 01/09/2020    CALCIUM 10.0 01/09/2020    GFRAA 38 01/09/2020    LABGLOM 32 01/09/2020    GLUCOSE 262 01/09/2020     Sodium:    Lab Results   Component Value Date     01/09/2020     Potassium:    Lab Results   Component Value Date    K 5.0 01/09/2020     Calcium:    Lab Results   Component Value Date    CALCIUM 10.0 01/09/2020     Warfarin PT/INR:  No components found for: PTPATWAR, PTINRWAR  PTT:    Lab Results   Component Value Date    APTT 72.9 01/01/2020   [APTT  Last 3 Troponin:  No results found for: TROPONINI  ABG:    Lab Results   Component Value Date    VIO6ONK 38.0 01/08/2020    PO2ART 67 01/08/2020    SYT8TBF 29.0 01/08/2020     TSH:  No results found for: TSH  VITAMIN B12: No components found for: B12  FOLATE:    Lab Results   Component Value Date    FOLATE 5.9 12/25/2019     MRI Brain:  1. No acute intracranial abnormality.  No acute infarct. 2. Mild global parenchymal volume loss with chronic microvascular ischemic  changes. 3. Left mastoid effusion. CT abdomen:  No intra-abdominal abscess.  Small amount of postoperative ascites.  Small  amount of residual free intra-air appropriate for recent surgery.  Moderate  distension of the bowel proximal to the ileostomy either due to postoperative  ileus or less likely small bowel obstruction.  Distal small bowel normal in  size.  Very large amount of fluid in the stomach.  Cholelithiasis without  finding of cholecystitis. Chest Xray:  01/04/2020  Interval worsening in multifocal bilateral pulmonary opacities, right greater   than left. Assessment:     Principal Problem:    Sepsis ([address]  Active Problems:    Hypotension    Metabolic encephalopathy    Vascular dementia with behavior disturbance (HCC)    Hyperlipidemia    Asthma    Diabetes mellitus ([address]    Obesity    Chronic kidney disease, stage III (moderate) (HCC)    Hypertensive kidney disease with CKD stage III (HCC)    Depression    Pressure injury of skin of coccygeal region    WD-Pressure injury of sacral region, stage 4 (HCC)    Severe malnutrition (HCC)    VIRGILIO (acute kidney injury) ([address]  Resolved Problems:    * No resolved hospital problems. *  Hypokalemia and hypomagnesemia  Aspiration Pneumonia  Plan:   MRI -no acute infarct  Continue wound care, hip wound culture results noted. Patient had diverting colostomy. Appreciate renal, cardiology and pulmonary recommendations  Zofran for Nausea  CT abdomen results noted. Management per surgery  Keep patient in ICU. Noted wound culture results with multidrug resistance. Patient clinically improving. ID seen patient. Antibiotics per ID. On Avicaz, Minocycline, Metronidazole and anidulafungin. A. Fib resolved on Amidarone  Continue pressor support, wean down as appropriate.   Lasix per

## 2020-01-09 NOTE — PROGRESS NOTES
muscle ([address]    Troponin level elevated    Recurrent major depressive disorder, in partial remission ([address]    Delusional ideas ([address]    Vascular dementia with behavior disturbance (HCC)    Hypotension    Metabolic encephalopathy    Severe malnutrition (HCC)    VIRGILIO (acute kidney injury) ([address]       Active Problems  Principal Problem:    Sepsis ([address]  Active Problems:    Hypotension    Metabolic encephalopathy    Vascular dementia with behavior disturbance (HCC)    Hyperlipidemia    Asthma    Diabetes mellitus (HCC)    Obesity    Chronic kidney disease, stage III (moderate) (HCC)    Hypertensive kidney disease with CKD stage III (HCC)    Depression    Pressure injury of skin of coccygeal region    WD-Pressure injury of sacral region, stage 4 (HCC)    Severe malnutrition (HCC)    VIRGILIO (acute kidney injury) ([address]  Resolved Problems:    * No resolved hospital problems.  *    Electronically signed by: Electronically signed by Garett Cantu MD on 1/9/2020 at 9:20 AM

## 2020-01-09 NOTE — CONSULTS
Endocrinology   Consult Note  Dear Doctor Tyrese Kirby for the Consult     Pt. Was Admitted for : Brought in from [place] altered mental state and sepsis and hypotension    Reason for Consult: Better control of blood glucose      History Obtained From:  Patient/ EMR       HISTORY OF PRESENT ILLNESS:                The patient is a 77 y.o. male with significant past medical history of history of multiple decubitus ulcers, has right hip surgery and infections with Pseudomonas Corynebacterium, hypertension, hyperlipidemia, hypothyroidism was brought in from [address] [place] [address]. Where he was recuperating with change in mental state possibly sepsis. Patient has been on TPN and running very high blood glucose I was  consulted for better control of blood glucose. ROS:   Pt's ROS done in detail. Abnormal ROS are noted in Medical and Surgical History Section below:      Other Medical History:        Diagnosis Date    Arthritis     Asthma     Chronic kidney disease     stage 3, seen by Dr. Mala White Diabetes mellitus ([address]     H/O cardiac catheterization 05/04/2017    H/O cardiovascular stress test 6/07, 12/08    CARDIOLITE: EF->51%    Hyperlipidemia     Hypertension     Obesity     Pressure ulcer of coccygeal region, unstageable ([address] 09/10/2019    Thyroid disease      Surgical History:        Procedure Laterality Date    CARPAL TUNNEL RELEASE  2005    COLOSTOMY N/A 12/27/2019    LAPAROSCOPIC DIVERTING ILEOSTOMY performed by Jane Durand MD at [place] [address] CATH LAB PROCEDURE  12/05    NO SIGNIFICANT CAD    EYE SURGERY      HEMIARTHROPLASTY HIP Right 8/29/2019    HIP HEMIARTHROPLASTY performed by Micheal Adams MD at [address] [place] [address] Right 9/29/2019    HIP INCISION AND DRAINAGE performed by Micheal Adams MD at [address] [place] [address] Right 11/15/2019    HIP INCISION AND DRAINAGE RIGHT CLOSURE OF INCISION WITH HEMOVAC performed by Keira Hayden MD at [address] [place] [address]  09/23/2019    of stage 4 wound on coccyx, by Dr. Miguel Padron N/A 9/23/2019    EXCISIONAL DEBRIDEMENT OF SACRAL PRESSURE ULCER performed by Lisa Del Angel MD at [address] [place] [address] N/A 9/29/2019    DECUBITUS ULCER DEBRIDEMENT REPAIR performed by Magnolia Gomez MD at [address]         Allergies:  Bee venom    Family History:       Problem Relation Age of Onset    High Blood Pressure Mother     Cancer Mother     Cancer Father     Stroke Maternal Grandfather     Diabetes Paternal Grandmother     Heart Disease Paternal Grandfather      REVIEW OF SYSTEMS:  Review of System Done as noted above     PHYSICAL EXAM:      Vitals:    BP (!) 104/54   Pulse 105   Temp 98.1 °F (36.7 °C) (Oral)   Resp 28   Ht 6' 3\" (1.905 m)   Wt 281 lb 15.5 oz (127.9 kg)   SpO2 97%   BMI 35.24 kg/m²     CONSTITUTIONAL:  awake, alert, cooperative, appears stated age   EYES:  vision intact Fundoscopic Exam not performed   ENT:Normal  NECK:  Supple, No JVD. Thyroid Exam:Normal   LUNGS:  Has Vesicular Breath Sounds, on Vapotherm  CARDIOVASCULAR:  Normal apical impulse, regular rate and rhythm, normal S1 and S2, no S3 or S4, and has no  murmur   ABDOMEN:  No scars, normal bowel sounds, soft, non-distended, non-tender, no masses palpated, no hepatolienomegaly  Musculoskeletal: Normal  Extremities: Normal, peripheral pulses normal, , has no edema   NEUROLOGIC:  Awake, alert, oriented to name, place and time. Cranial nerves II-XII are grossly intact. Motor is  intact. Sensory possible neuropathy.  ,  and gait is abnormal.  Mostly bedridden    DATA:    CBC:   Recent Labs     01/06/20  0456 01/07/20  0338 01/08/20  0600   WBC 21.1* 23.7* 38.2*   HGB 11.0* 11.6* 12.8*    389 512*    CMP:  Recent Labs     01/06/20  0456 01/07/20  0338 01/08/20  0600   * 133* 131*   K 3.6 4.1 the fundus of the stomach. Side hole is distal to the GE junction. Bowel gas pattern is unremarkable. No dilated loops of gas-filled small bowel. Cholelithiasis. 1. Tip of the enteric catheter overlies the fundus of the stomach. Side hole is distal to the GE junction. 2. Cholelithiasis. Xr Chest Portable    Result Date: 1/8/2020  EXAMINATION: ONE XRAY VIEW OF THE CHEST 1/6/2020 5:32 am COMPARISON: 01/04/2020 HISTORY: ORDERING SYSTEM PROVIDED HISTORY: fu pneumonia TECHNOLOGIST PROVIDED HISTORY: Reason for exam:->fu pneumonia Reason for Exam: fu pneumonia Acuity: Unknown Type of Exam: Subsequent/Follow-up Additional signs and symptoms: fu pneumonia Relevant Medical/Surgical History: fu pneumonia FINDINGS: Right PICC with tip at the cavoatrial junction. Enteric tube courses below the diaphragm. Questionable trace left apical pneumothorax measuring 6 mm. Stable bibasilar consolidation especially in the left lower lobe. No pleural effusion. Stable mild cardiomegaly. 1. Satisfactory position of support devices. 2. Questionable 6 mm trace left apical pneumothorax. 3. Otherwise stable multifocal consolidation. Xr Chest Portable    Result Date: 1/8/2020  EXAMINATION: ONE XRAY VIEW OF THE CHEST 1/8/2020 4:52 am COMPARISON: January 6, 2020 HISTORY: ORDERING SYSTEM PROVIDED HISTORY: fu pneumonia TECHNOLOGIST PROVIDED HISTORY: Reason for exam:->fu pneumonia Reason for Exam: fu pneumonia Acuity: Acute Type of Exam: Subsequent/Follow-up FINDINGS: Right base consolidation previously seen is still present but has improved. Left base consolidation with mild elevation of the left hemidiaphragm has not significantly changed. Cardiac silhouette is within normal range. Tip of the enteric catheter is not adequately seen for evaluation. 1. Persistent but improved right base consolidation. 2. No significant change in left base consolidation with mild elevation of left hemidiaphragm.      Xr Chest Portable    Result Date: 1/6/2020  EXAMINATION: ONE XRAY VIEW OF THE CHEST 1/6/2020 8:28 am COMPARISON: 01/06/2020 HISTORY: ORDERING SYSTEM PROVIDED HISTORY: question of left pneumothorax TECHNOLOGIST PROVIDED HISTORY: Reason for exam:->question of left pneumothorax Reason for Exam: question of left pneumothorax Acuity: Unknown Type of Exam: Unknown FINDINGS: An enteric tube courses to the left upper quadrant. The mediastinal and cardiac contours are stable. Multifocal airspace consolidation is unchanged. There is no definite pneumothorax identified. 1. No pneumothorax identified. 2. Persistent multifocal airspace consolidation. Xr Chest Portable    Result Date: 1/4/2020  EXAMINATION: ONE XRAY VIEW OF THE CHEST 1/4/2020 6:47 am COMPARISON: January 2, 2019. HISTORY: ORDERING SYSTEM PROVIDED HISTORY: fu pneumonia TECHNOLOGIST PROVIDED HISTORY: Reason for exam:->fu pneumonia Reason for Exam: fu pneumonia Acuity: Unknown Type of Exam: Unknown FINDINGS: Nasogastric tube courses towards the diaphragm, with tip not well imaged. Right upper extremity PICC terminates within the right atrium. Cardiac and mediastinal contours are enlarged but unchanged. Interval worsening in multifocal bilateral pulmonary opacities right greater than left. Probable small bilateral pleural effusions. No evidence of pneumothorax. No evidence of new osseous abnormalities. Interval worsening in multifocal bilateral pulmonary opacities, right greater than left. RECOMMENDATION: Continued short interval follow-up. Xr Chest Portable    Result Date: 1/2/2020  EXAMINATION: ONE XRAY VIEW OF THE CHEST 1/2/2020 8:51 am COMPARISON: 01/01/2020 HISTORY: ORDERING SYSTEM PROVIDED HISTORY: SOB TECHNOLOGIST PROVIDED HISTORY: Reason for exam:->SOB Reason for Exam: SOB Respiratory distress. FINDINGS: The heart is borderline enlarged. The pulmonary vasculature is mildly prominent. A large amount of opacity is present in the right lung base. Dense opacity is present in the left lung base. No sizable pleural effusion appreciated. Mild elevation of the left hemidiaphragm present. NG tube in place. Left internal jugular venous catheter and right-sided PICC are also present. Large amount of dense opacity in the left lung base and large amount of patchy opacity in the right lung base. Bibasilar pneumonias and atelectasis present. No significant change in the appearance of the lung bases. Mild pulmonary vascular congestion now present. Xr Abdomen For Ng/og/ne Tube Placement    Result Date: 1/1/2020  EXAMINATION: ONE SUPINE XRAY VIEW(S) OF THE ABDOMEN 1/1/2020 10:40 pm COMPARISON: None. HISTORY: ORDERING SYSTEM PROVIDED HISTORY: NG tube placement TECHNOLOGIST PROVIDED HISTORY: Reason for exam:->NG tube placement Portable? ->Yes Reason for Exam: NG tube placement FINDINGS: There is an enteric tube with its tip projecting over the fundus of the stomach. Proximal port is below the GE junction. Limited images of the chest demonstrate multifocal airspace disease suggesting pneumonia. Cardiomegaly is again demonstrated. Enteric tube in the stomach as above. Airspace disease throughout the right lung suggesting multifocal pneumonia. Ir Nontunneled Vascular Catheter > 5 Years    Result Date: 1/6/2020  PROCEDURE: ULTRASOUND GUIDED VASCULAR ACCESS. 1/6/2020. HISTORY: ORDERING SYSTEM PROVIDED HISTORY: CVC Insertion TECHNOLOGIST PROVIDED HISTORY: Reason for exam:->CVC Insertion. Patient's current respiratory status limits transportation and so the procedure was performed bedside. Needs central venous access SEDATION: None FLUOROSCOPY DOSE AND TYPE OR TIME AND EXPOSURES: None TECHNIQUE: Informed consent was obtained after a detailed explanation of the procedure including risks, benefits, and alternatives. Universal protocol was observed. The right neck and chest were prepped and draped in sterile fashion using maximum sterile barrier technique. Local anesthesia was achieved with lidocaine. The right internal jugular vein was diminutive. After multiple attempts with ultrasound-guided micropuncture access and aspiration technique, attention was directed towards the femoral veins. The left common femoral vein was widely open and the left groin was prepped and draped using standard aseptic technique. A micropuncture needle was used to access the left common femoral vein using ultrasound guidance. An ultrasound image demonstrating patency of the vein with an 035 wire located within it. An image was obtained and stored in PACs. A 0.035 guidewire was used to place a triple-lumen central venous non tunneled catheter. The catheter flushed easily and there was a good blood return. The catheter was sutured to the skin. The catheter was locked with heparinized saline. The patient tolerated the procedure well and there were no immediate complications. EBL: Less than 5 cc FINDINGS: Immediate follow-up portable abdominal x-ray was reviewed. The catheter tip is within the common iliac/left external iliac junction. Successful ultrasound guided non-tunneled central venous catheter placement in the left common femoral vein.        Scheduled Medicines   Medications:    sodium chloride flush  10 mL Intravenous 2 times per day    metOLazone  2.5 mg Oral Daily    [START ON 1/9/2020] buPROPion  150 mg Oral Daily    sertraline  50 mg Oral BID    rOPINIRole  0.5 mg Oral Nightly    insulin glargine  40 Units Subcutaneous Nightly    insulin lispro  0-30 Units Subcutaneous Q4H    ceftazidime-acibactam (AVYCAZ) infusion  2.5 g Intravenous Q8H    anidulafungin  100 mg Intravenous Q24H    aMILoride  5 mg Oral Daily    heparin (porcine)  5,000 Units Subcutaneous 3 times per day    fat emulsion  250 mL Intravenous Once per day on Mon Tue Thu Fri    metroNIDAZOLE  500 mg Intravenous Q8H    minocycline  100 mg Oral BID    furosemide  80 mg Intravenous TID    care with me.      Truly yours,       Moe Cortes MD

## 2020-01-09 NOTE — FLOWSHEET NOTE
Per Dr Lucrecia Giron unable to chart at this time and this note is per him:     Marj Ramirez is a 78 yo WM with visual changes OD. Thesvisual changes are at least 15 years in duration. He has a hx of bilateral cataract surgery in [facility]. [facility] OD: HM 1 foot  OS 20/50 near card         EOMS: full OU   Lids: WNL bilaterally     Penlight: Ant Seg WNL Ou. Bilateral P/C IOLs     Fundus:  Post Pole Clear OU    Imp/Plan: 1. Visual Changes OD - probable old optic nerve                           Vascular problem which could include old                          Giant cell arteritis. Should follow up as outpt                           Upon D/C.                  2. Previous cataract surgery OU- Doing well. Needs                           A refraction for optimal visual acuity and                           Protection of his only useful eye.    Carmelina Phelps MD   Ophthalmology

## 2020-01-10 PROBLEM — Z51.5 HOSPICE CARE PATIENT: Status: ACTIVE | Noted: 2020-01-01

## 2020-01-10 NOTE — PLAN OF CARE
Nutrition Problem: Severe malnutrition, In context of social or environmental circumstances  Intervention: Food and/or Nutrient Delivery: Start oral diet, Continue Parenteral Nutrition  Nutritional Goals: pt will receive greater than 75% of his estimated needs through PN

## 2020-01-10 NOTE — PROGRESS NOTES
Discussed possible GI w/u with patient and family. Patient has changed code status to [facility] and is pursuing hospice care at this time. Patient is not interested in GI evaluation at this time. Please call if further assistance needed.      Jey Bryson, [facility]  1/10/2020  2:08 PM

## 2020-01-10 NOTE — PROGRESS NOTES
Nutrition Assessment (Parenteral Nutrition)    Type and Reason for Visit: Reassess    Nutrition Recommendations:   · Please d/c the TPN    Nutrition Assessment: Pt continues to be NPO and continues on TPN. Noted the possibility of the pt being moved to the 4th floor for hospice. Please d/c the TPN. Malnutrition Assessment:  · Malnutrition Status: Meets the criteria for severe malnutrition  · Context: Chronic illness  · Findings of the 6 clinical characteristics of malnutrition (Minimum of 2 out of 6 clinical characteristics is required to make the diagnosis of moderate or severe Protein Calorie Malnutrition based on AND/ASPEN Guidelines):  1. Energy Intake-Less than or equal to 50% of estimated energy requirement, Greater than or equal to 5 days    2. Weight Loss-20% loss or greater, in 3 months  3. Fat Loss-Moderate subcutaneous fat loss, Orbital  4. Muscle Loss-Severe muscle mass loss, Interosseous, Clavicles (pectoralis and deltoids)  5. Fluid Accumulation-Mild fluid accumulation, Generalized  6.   Strength-Not measured    Nutrition Risk Level: High    Nutrient Needs:  · Estimated Daily Total Kcal: 9150-3896 (Alferd Cliche)  · Estimated Daily Protein (g): 107-178 (1.2-2 g/kg IBW)  · Estimated Daily Total Fluid (ml/day): 6921-3448 (1 mL/kcal)    Nutrition Diagnosis:   · Problem: Severe malnutrition, In context of social or environmental circumstances  · Etiology: related to Insufficient energy/nutrient consumption     Signs and symptoms:  as evidenced by Presence of wounds, Moderate loss of subcutaneous fat, Severe muscle loss, Weight loss greater than or equal to 7.5% in 3 months    Objective Information:  · Wound Type: Pressure Ulcer, Stage II, Stage IV, Surgical Wound  · Current Nutrition Therapies:  · Oral Diet Orders: NPO   · Parenteral Nutrition Orders:  · Type and Formula: 2-in-1 Standard(5/15)   · Lipids: 250ml(x4)  · Rate/Volume: 75  · Duration: Continuous  · Current PN Order Provides: 0917 kcal and 90 g of protein per day  · Anthropometric Measures:  · Ht: 6' 3\" (190.5 cm)   · Current Body Wt: 281 lb (127.5 kg)  · Admission Body Wt: 297 lb (134.7 kg)  · Usual Body Wt: 375 lb (170.1 kg)(6/10/19)  · % Weight Change:  ,  -26% x 3 months  · Ideal Body Wt: 196 lb (88.9 kg), % Ideal Body 143%  · BMI Classification: BMI 35.0 - 39.9 Obese Class II    Nutrition Interventions:   Start oral diet, Continue Parenteral Nutrition  Continued Inpatient Monitoring, Education Initiated, Coordination of Care    Nutrition Evaluation:   · Evaluation: Progressing toward goals(meeting 77% of his estimated needs)   · Goals: pt will receive greater than 75% of his estimated needs through PN   · Monitoring: PN Intake, PN Tolerance, Weight, Pertinent Labs, Nausea or Vomiting      Electronically signed by Luis E Reyes RD, LD on 3/65/09 at 2:50 PM    Contact Number: [phone]

## 2020-01-10 NOTE — PROGRESS NOTES
°F (37 °C) (Oral)   Resp 28   Ht 6' 3\" (1.905 m)   Wt 281 lb 15.5 oz (127.9 kg)   SpO2 90%   BMI 35.24 kg/m²     Gen: alert and oriented X2, no distress  Skin: no stigmata of endocarditis  Wounds: Stage IV sacral decubitus wound, dry, slough at the base. HEMT: AT/NC Oropharynx pink, moist, and without lesions or exudates; dentition in good state of repair  Eyes: PERRLA, EOMI, conjunctiva pink, sclera anicteric. Neck: Supple. Trachea midline. No LAD. Chest: no distress and CTA. Good air movement. Heart: RRR and no MRG. Abd: right lower quadrant ileostomy, soft, non-distended, no tenderness, no hepatomegaly. Normoactive bowel sounds. Ext: Right hip hemiarthroplasty scar, no dehiscence does not appear infected. No clubbing, cyanosis, or edema  Catheter Site: right arm PICC line without erythema or tenderness  Neuro: Mental status intact. CN 2-12 intact and no focal sensory or motor deficits     Radiologic / Imaging / TESTING  CXR 1/8/20  1. Persistent but improved right base consolidation. 2. No significant change in left base consolidation with mild elevation of left hemidiaphragm.           Labs:      CULTURE results: Invalid input(s): BLOOD CULTURE,  URINE CULTURE, SURGICAL CULTURE    Diagnosis:  Patient Active Problem List   Diagnosis    Hypertension    Hyperlipidemia    Asthma    Diabetes mellitus ([address]    H/O cardiovascular stress test    Obesity    Chronic kidney disease, stage III (moderate) (HCC)    Hypertensive kidney disease with CKD stage III (HCC)    Depression    SOBOE (shortness of breath on exertion)    Abnormal cardiovascular stress test    Fracture of epiphysis (separation) (upper) of neck of femur, closed (HCC)    Pressure injury of skin of coccygeal region    Sepsis ([address]    PVC (premature ventricular contraction)    Septicemia ([address]    WD-Pressure injury of sacral region, stage 4 ([address]    WD-Nonhealing surgical wound    WD-Skin ulcer of right hip with necrosis of muscle ([address]    Troponin level elevated    Recurrent major depressive disorder, in partial remission ([address]    Delusional ideas ([address]    Vascular dementia with behavior disturbance (HCC)    Hypotension    Metabolic encephalopathy    Severe malnutrition (HCC)    VIRGILIO (acute kidney injury) ([address]       Active Problems  Principal Problem:    Sepsis ([address]  Active Problems:    Hypotension    Metabolic encephalopathy    Vascular dementia with behavior disturbance (HCC)    Hyperlipidemia    Asthma    Diabetes mellitus (HCC)    Obesity    Chronic kidney disease, stage III (moderate) (HCC)    Hypertensive kidney disease with CKD stage III (HCC)    Depression    Pressure injury of skin of coccygeal region    WD-Pressure injury of sacral region, stage 4 (HCC)    Severe malnutrition (HCC)    VIRGILIO (acute kidney injury) ([address]  Resolved Problems:    * No resolved hospital problems.  *    Electronically signed by: Electronically signed by Irina Hurtado MD on 1/10/2020 at 10:44 AM

## 2020-01-10 NOTE — PROGRESS NOTES
Two person skin assessment completed with [facility], RN. Bilateral mushy, reddened heels. Stage 4 coccyx. RLQ illlostomy. Bronzed bilateral shins. Bruising on R FA. Skin tear L elbow. Scattered bruising.    Donte Khoury, BSN, RN

## 2020-01-10 NOTE — PROGRESS NOTES
position of support devices. 2. Questionable 6 mm trace left apical pneumothorax. 3. Otherwise stable multifocal consolidation. Xr Chest Portable    Result Date: 1/8/2020  EXAMINATION: ONE XRAY VIEW OF THE CHEST 1/8/2020 4:52 am COMPARISON: January 6, 2020 HISTORY: ORDERING SYSTEM PROVIDED HISTORY: fu pneumonia   . Cardiac silhouette is within normal range. Tip of the enteric catheter is not adequately seen for evaluation. 1. Persistent but improved right base consolidation. 2. No significant change in left base consolidation with mild elevation of left hemidiaphragm. Xr Chest Portable    Result Date: 1/6/2020  EXAMINATION: ONE XRAY VIEW OF THE CHEST 1/6/2020 8:28 am     1. No pneumothorax identified. 2. Persistent multifocal airspace consolidation. Xr Chest Portable    Result Date: 1/4/2020  EXAMINATION: ONE XRAY VIEW OF THE CHEST 1/4/2020 6:47 am COMPARISON: January 2, 2019. Reardon bilateral pleural effusions. No evidence of pneumothorax. No evidence of new osseous abnormalities. Interval worsening in multifocal bilateral pulmonary opacities, right greater than left. RECOMMENDATION: Continued short interval follow-up. Ir Nontunneled Vascular Catheter > 5 Years    Result Date: 1/6/2020  PROCEDURE: ULTRASOUND GUIDED VASCULAR ACCESS. 1/6/2020.  n.     Successful ultrasound guided non-tunneled central venous catheter placement in the left common femoral vein.        Scheduled Medicines   Medications:    midodrine  10 mg Oral TID WC    fludrocortisone  0.1 mg Oral Daily    linezolid  600 mg Intravenous Q12H    [START ON 1/10/2020] sertraline  50 mg Oral Dinner    sodium chloride flush  10 mL Intravenous 2 times per day    rOPINIRole  0.5 mg Oral Nightly    insulin glargine  40 Units Subcutaneous Nightly    insulin lispro  0-30 Units Subcutaneous Q4H    ceftazidime-acibactam (AVYCAZ) infusion  2.5 g Intravenous Q8H    anidulafungin  100 mg Intravenous Q24H    heparin (porcine)  5,000 Fracture of epiphysis (separation) (upper) of neck of femur, closed (HCC)     Pressure injury of skin of coccygeal region     Sepsis ([address]     PVC (premature ventricular contraction)     Septicemia (HCC)     WD-Pressure injury of sacral region, stage 4 ([address]     WD-Nonhealing surgical wound     WD-Skin ulcer of right hip with necrosis of muscle (HCC)     Troponin level elevated     Recurrent major depressive disorder, in partial remission ([address]     Delusional ideas ([address]     Vascular dementia with behavior disturbance (HCC)     Hypotension     Metabolic encephalopathy     Severe malnutrition (HCC)     VIRGILIO (acute kidney injury) ([address]      Plan:     1. Reviewed POC blood glucose . Labs and X ray results   2. Reviewed Current Medicines   3. On TPN insulin added insulin to the bag  4. Also started on correction bolus regular insulin  5. Monitor Blood glucose frequently   6. Modified  the dose of Insulin/ other medicines as needed   7. Will follow     .      Letitia Rodríguez MD

## 2020-01-10 NOTE — PROGRESS NOTES
YELLOW 01/02/2020    PHUR 5.5 10/24/2018    LABCAST Present 12/12/2016    LABCAST Hyaline casts 12/12/2016    WBCUA 32 01/02/2020    RBCUA 4 01/02/2020    MUCUS RARE 01/02/2020    TRICHOMONAS NONE SEEN 01/02/2020    YEAST FEW 01/02/2020    BACTERIA NEGATIVE 01/02/2020    CLARITYU HAZY 01/02/2020    SPECGRAV 1.010 01/02/2020    UROBILINOGEN NORMAL 01/02/2020    BILIRUBINUR NEGATIVE 01/02/2020    BLOODU MODERATE 01/02/2020    GLUCOSEU 3+ 10/24/2018    KETUA NEGATIVE 01/02/2020     ABG:    Lab Results   Component Value Date    YUQ9FXE 38.0 01/08/2020    PO2ART 67 01/08/2020    CGM7JZG 29.0 01/08/2020     HgBA1c:    Lab Results   Component Value Date    LABA1C 6.1 09/04/2019     Microalbumen/Creatinine ratio:  No components found for: RUCREAT          Objective:   Vitals: BP 86/64   Pulse 110   Temp 98.6 °F (37 °C) (Oral)   Resp 27   Ht 6' 3\" (1.905 m)   Wt 281 lb 15.5 oz (127.9 kg)   SpO2 (!) 84%   BMI 35.24 kg/m²   General appearance: awake weak  HEENT: Head: Normal, normocephalic, atraumatic.   Neck: supple, symmetrical, trachea midline  Lungs: diminished breath sounds bilaterally  Heart: S1, S2 normal  Abdomen: abnormal findings:  soft nt wound  Extremities: edema trace  Neurologic: Mental status: alertness: awake      Patient Active Problem List:     Hypertension     Hyperlipidemia     Asthma     Diabetes mellitus ([address]     H/O cardiovascular stress test     Obesity     Chronic kidney disease, stage III (moderate) ([facility])     Hypertensive kidney disease with CKD stage III (HCC)     Depression     SOBOE (shortness of breath on exertion)     Abnormal cardiovascular stress test     Fracture of epiphysis (separation) (upper) of neck of femur, closed (formerly [facility])     Pressure injury of skin of coccygeal region     Sepsis ([address]     PVC (premature ventricular contraction)     Septicemia ([address]     WD-Pressure injury of sacral region, stage 4 ([address]     WD-Nonhealing surgical wound     WD-Skin ulcer of right hip with necrosis of muscle [address]     Troponin level elevated     Recurrent major depressive disorder, in partial remission ([facility]     Delusional ideas ([facility]     Vascular dementia with behavior disturbance (HCC)     Hypotension     Metabolic encephalopathy     Severe malnutrition (HCC)     VIRGILIO (acute kidney injury) ([facility]    Assessment and Plan:      IMP:  1. VIRGILIO/ CKD 3- non oliguric -   2. Sepsis from Candida Glabrata / Line infection / PNE    3. ADHF   4. Hypotension   5. Caloric Malnutrition   6. DM  7. Met alkalosis    8. Anemia   9.   Hyperkalemia     Plan     1 renal monitor  2 adjust insulin with tpn  3 is lucid is dnr cc-a and will consult hospice  4 maitmohinder with pressor hold on more imaging or studies  Fu hospice rec  Will monitor and dw meghna and dr Ravi Jaimes MD

## 2020-01-10 NOTE — PLAN OF CARE
Problem: Falls - Risk of:  Goal: Will remain free from falls  Description  Will remain free from falls  Outcome: Ongoing  Goal: Absence of physical injury  Description  Absence of physical injury  Outcome: Ongoing     Problem: Risk for Impaired Skin Integrity  Goal: Tissue integrity - skin and mucous membranes  Description  Structural intactness and normal physiological function of skin and  mucous membranes. Outcome: Ongoing     Problem: Discharge Planning:  Goal: Participates in care planning  Description  Participates in care planning  Outcome: Ongoing  Goal: Discharged to appropriate level of care  Description  Discharged to appropriate level of care  Outcome: Ongoing     Problem: Airway Clearance - Ineffective:  Goal: Ability to maintain a clear airway will improve  Description  Ability to maintain a clear airway will improve  Outcome: Ongoing     Problem: Anxiety/Stress:  Goal: Level of anxiety will decrease  Description  Level of anxiety will decrease  Outcome: Ongoing     Problem: Aspiration:  Goal: Absence of aspiration  Description  Absence of aspiration  Outcome: Ongoing     Problem:  Bowel Function - Altered:  Goal: Bowel elimination is within specified parameters  Description  Bowel elimination is within specified parameters  Outcome: Ongoing     Problem: Cardiac Output - Decreased:  Goal: Hemodynamic stability will improve  Description  Hemodynamic stability will improve  Outcome: Ongoing     Problem: Fluid Volume - Imbalance:  Goal: Absence of imbalanced fluid volume signs and symptoms  Description  Absence of imbalanced fluid volume signs and symptoms  Outcome: Ongoing     Problem: Gas Exchange - Impaired:  Goal: Levels of oxygenation will improve  Description  Levels of oxygenation will improve  Outcome: Ongoing     Problem: Mental Status - Impaired:  Goal: Mental status will be restored to baseline  Description  Mental status will be restored to baseline  Outcome: Ongoing     Problem: Nutrition

## 2020-01-10 NOTE — PROGRESS NOTES
mg, Oral, TID WC  fludrocortisone (FLORINEF) tablet 0.1 mg, 0.1 mg, Oral, Daily  silver nitrate applicators applicator 1 each, 1 each, Topical, PRN  phenylephrine (CHINA-SYNEPHRINE) 50 mg in dextrose 5 % 250 mL infusion, 100 mcg/min, Intravenous, Continuous  linezolid (ZYVOX) IVPB 600 mg, 600 mg, Intravenous, Q12H  [START ON 1/10/2020] sertraline (ZOLOFT) tablet 50 mg, 50 mg, Oral, Dinner  sodium chloride flush 0.9 % injection 10 mL, 10 mL, Intravenous, 2 times per day  sodium chloride flush 0.9 % injection 10 mL, 10 mL, Intravenous, PRN  rOPINIRole (REQUIP) tablet 0.5 mg, 0.5 mg, Oral, Nightly  insulin glargine (LANTUS) injection vial 40 Units, 40 Units, Subcutaneous, Nightly  insulin lispro (HUMALOG) injection vial 0-30 Units, 0-30 Units, Subcutaneous, Q4H  PN-Adult Premix 5/15 - Central, , Intravenous, Continuous TPN  ceftazidime-avibactam (AVYCAZ) 2.5 g in dextrose 5 % 100 mL IVPB, 2.5 g, Intravenous, Q8H  [COMPLETED] anidulafungin (ERAXIS) 200 mg in dextrose 5 % 260 mL IVPB, 200 mg, Intravenous, Once **AND** anidulafungin (ERAXIS) 100 mg in dextrose 5 % 130 mL IVPB, 100 mg, Intravenous, Q24H  heparin (porcine) injection 5,000 Units, 5,000 Units, Subcutaneous, 3 times per day  fat emulsion 20 % infusion 250 mL, 250 mL, Intravenous, Once per day on Mon Tue Thu Fri  metronidazole (FLAGYL) 500 mg in NaCl 100 mL IVPB premix, 500 mg, Intravenous, Q8H  minocycline (MINOCIN;DYNACIN) capsule 100 mg, 100 mg, Oral, BID  sodium chloride flush 0.9 % injection 10 mL, 10 mL, Intravenous, PRN  ipratropium-albuterol (DUONEB) nebulizer solution 1 ampule, 1 ampule, Inhalation, Q4H  diphenhydrAMINE (BENADRYL) injection 12.5 mg, 12.5 mg, Intravenous, Q6H PRN  methylPREDNISolone sodium (SOLU-MEDROL) injection 40 mg, 40 mg, Intravenous, Q8H  albumin human 25 % IV solution 25 g, 25 g, Intravenous, Once  vasopressin 20 Units in dextrose 5 % 100 mL infusion, 0.04 Units/min, Intravenous, Continuous  potassium chloride 20 mEq/50 mL IVPB  High Blood Pressure Mother     Cancer Mother     Cancer Father     Stroke Maternal Grandfather     Diabetes Paternal Grandmother     Heart Disease Paternal Grandfather        REVIEW OF SYSTEMS:  CONSTITUTIONAL:  negative  HEENT:  negative  RESPIRATORY:  negative  CARDIOVASCULAR:  negative  GASTROINTESTINAL:  negative  GENITOURINARY:  negative  MUSCULOSKELETAL:  negative  BEHAVIOR/PSYCH:  Negative    ROS unavailable    Family hx neg    PHYSICAL EXAM  ------------------------  Vitals:  /80   Pulse 103   Temp 98 °F (36.7 °C) (Oral)   Resp (!) 34   Ht 6' 3\" (1.905 m)   Wt 281 lb 15.5 oz (127.9 kg)   SpO2 (!) 89%   BMI 35.24 kg/m²      General:  More awake  Well developed, well nourished, well groomed. No apparent distress. HEENT:  Normocephalic, atraumatic. Pupils equal, round, reactive to light. No scleral icterus. No conjunctival injection. Normal lips, teeth, and gums. No nasal discharge. Neck:  Supple  Heart:  RRR, no murmurs, gallops, rubs  Lungs:  CTA bilaterally, bilat symmetrical expansion, no wheeze, rales, or rhonchi  Abdomen: Bowel sounds present, soft, nontender, no masses, no organomegaly, no peritoneal signs  Extremities:  No clubbing, cyanosis, or edema  Skin:  Warm and dry, no open lesions or rash  Breast: deferred  Rectal: deferred  Genitalia:  deferred    NEUROLOGICAL EXAM  ---------------------------------    Mental Status Exam:             More awake follows simple commands  Oriented to person place year month-moderate dementia    Cranial Wstmzo-QK-OLV Intact.         Cranial nerve II           Visual acuity:  normal                 Cranial nerve III           Pupils:  equal, round, reactive to light      Cranial nerves III, IV, VI           Extraocular Movements: intact      Cranial nerve V           Facial sensation:  intact      Cranial nerve VII           Facial strength: intact      Cranial nerve VIII           Hearing:  intact      Cranial nerve IX Palate:  intact      Cranial nerve XI         Shoulder shrug:  intact      Cranial nerve XII          Tongue movement:  normal    Motor:    Drift:  absent  Motor exam is symmetrical 5 out of 5 all extremities bilaterally  Tone:  normal  Abnormal Movements:  Absent    DTRs-2+ biceps,triceps,brachioradialis,knee jerks and ankle jerks bilaterally symmetrical.  Toes-downgoing bilaterally            Sensory:sensation cannot be tested              CBC with Differential:    Lab Results   Component Value Date    WBC 36.6 01/09/2020    RBC 4.34 01/09/2020    HGB 12.6 01/09/2020    HCT 38.5 01/09/2020     01/09/2020    MCV 95.4 01/09/2020    MCH 27.0 01/09/2020    MCHC 28.3 01/09/2020    RDW 18.9 01/09/2020    NRBC 1 01/03/2020    SEGSPCT 91.0 01/09/2020    BANDSPCT 12 01/08/2020    LYMPHOPCT 3.0 01/09/2020    PROMYELOPCT 2 09/26/2019    MONOPCT 4.0 01/09/2020    MYELOPCT 2 01/09/2020    BASOPCT 0.3 01/07/2020    MONOSABS 1.5 01/09/2020    LYMPHSABS 1.1 01/09/2020    EOSABS 0.0 01/07/2020    BASOSABS 0.1 01/07/2020    DIFFTYPE MANUAL DIFFERENTIAL 01/09/2020     CMP:    Lab Results   Component Value Date     01/09/2020    K 5.0 01/09/2020    CL 84 01/09/2020    CO2 27 01/09/2020    BUN 93 01/09/2020    CREATININE 2.1 01/09/2020    GFRAA 38 01/09/2020    LABGLOM 32 01/09/2020    GLUCOSE 262 01/09/2020    PROT 6.1 01/09/2020    LABALBU 3.3 01/09/2020    CALCIUM 10.0 01/09/2020    BILITOT 0.6 01/09/2020    ALKPHOS 202 01/09/2020    AST 15 01/09/2020    ALT 8 01/09/2020     BMP:    Lab Results   Component Value Date     01/09/2020    K 5.0 01/09/2020    CL 84 01/09/2020    CO2 27 01/09/2020    BUN 93 01/09/2020    LABALBU 3.3 01/09/2020    CREATININE 2.1 01/09/2020    CALCIUM 10.0 01/09/2020    GFRAA 38 01/09/2020    LABGLOM 32 01/09/2020    GLUCOSE 262 01/09/2020     PT/INR:    Lab Results   Component Value Date    PROTIME 11.5 05/03/2017    PROTIME 10.6 01/26/2012    INR 1.01 05/03/2017     PTT:    Lab Results Component Value Date    APTT 72.9 01/01/2020   [APTT  U/A:    Lab Results   Component Value Date    COLORU YELLOW 01/02/2020    PHUR 5.5 10/24/2018    LABCAST Present 12/12/2016    LABCAST Hyaline casts 12/12/2016    WBCUA 32 01/02/2020    RBCUA 4 01/02/2020    MUCUS RARE 01/02/2020    TRICHOMONAS NONE SEEN 01/02/2020    YEAST FEW 01/02/2020    BACTERIA NEGATIVE 01/02/2020    CLARITYU HAZY 01/02/2020    SPECGRAV 1.010 01/02/2020    LEUKOCYTESUR SMALL 01/02/2020    UROBILINOGEN NORMAL 01/02/2020    BILIRUBINUR NEGATIVE 01/02/2020    BLOODU MODERATE 01/02/2020    GLUCOSEU 3+ 10/24/2018     TSH:  No results found for: TSH  VITAMIN B12: No components found for: B12  FOLATE:    Lab Results   Component Value Date    FOLATE 5.9 12/25/2019     RPR:  No results found for: RPR  CAMILLA:  No results found for: ANATITER, CAMILLA  Urine Toxicology:  No components found for: IAMMENTA, IBARBIT, IBENZO, ICOCAINE, IMARTHC, IOPIATES, IPHENCYC     IMPRESSION:    Metabolic encephalopathy/sepsis    neg for CVA    Hx dementia    ARF/CKD/HTN    PLAN:    CT brain neg    Mri brain neg    B 12 folate TSh nl    Continue asa     dc wellbutrin namenda    Decrease zoloft    Pt is doing a lot better since stopping some of his meds. Samuel Perez MD  BOARD CERTIFIED-NEUROLOGY.

## 2020-01-10 NOTE — PROGRESS NOTES
per day    rOPINIRole  0.5 mg Oral Nightly    anidulafungin  100 mg Intravenous Q24H    heparin (porcine)  5,000 Units Subcutaneous 3 times per day    fat emulsion  250 mL Intravenous Once per day on Mon Tue Thu Fri    metroNIDAZOLE  500 mg Intravenous Q8H    minocycline  100 mg Oral BID    ipratropium-albuterol  1 ampule Inhalation Q4H    methylPREDNISolone  40 mg Intravenous Q8H    albumin human  25 g Intravenous Once    pantoprazole  40 mg Intravenous BID    collagenase   Topical Daily    aspirin  81 mg Oral Daily    mometasone-formoterol  2 puff Inhalation BID    montelukast  10 mg Oral Nightly    vitamin C  1,000 mg Oral Daily    vitamin E  400 Units Oral Daily    sodium chloride flush  10 mL Intravenous 2 times per day     ContinuousInfusions:   PN-Adult Premix 5/15 - Central      phenylephrine (CHINA-SYNEPHRINE) 50mg/250mL infusion Stopped (01/10/20 0745)    vasopressin (Septic Shock) infusion 0.04 Units/min (01/10/20 0745)    dextrose       PRN Meds:silver nitrate applicators, sodium chloride flush, sodium chloride flush, diphenhydrAMINE, potassium chloride, magnesium sulfate, promethazine, ipratropium-albuterol, calcium carbonate, ondansetron, morphine, HYDROcodone 5 mg - acetaminophen, glucose, dextrose, glucagon (rDNA), dextrose, acetaminophen, fluticasone, nitroGLYCERIN, traMADol, trolamine salicylate, sodium chloride flush      Labs/Imaging Results:   Lab Results   Component Value Date    WBC 36.6 (HH) 01/09/2020    HGB 12.6 (L) 01/09/2020    HCT 38.5 (L) 01/09/2020    MCV 95.4 01/09/2020     (H) 01/09/2020     Lab Results   Component Value Date     (L) 01/10/2020    K 4.8 01/10/2020    CL 80 (L) 01/10/2020    CO2 24 01/10/2020     (H) 01/10/2020    CREATININE 2.4 (H) 01/10/2020    GLUCOSE 441 (HH) 01/10/2020    CALCIUM 9.3 01/10/2020    PROT 5.8 (L) 01/10/2020    LABALBU 3.1 (L) 01/10/2020    BILITOT 0.5 01/10/2020    ALKPHOS 199 (H) 01/10/2020    AST 20

## 2020-01-10 NOTE — CONSULTS
[address] [place] [address] Consultation Note    Date: 1/10/2020  Name: Eduin aWrren  MRN: [id number]  YOB: 1950   Patient's PCP: Jim Harris MD  Referring Physician: Dr Amy Gambino  Consultants during acute care: Pulmonary, Endocrinology, Cardiology, wound care, General Surgery, Neurology, ID, Nephrology,   Acute care admission: 12/23/2019 (4th admission since 8/28/19)    Informant: Chart reviewed, discussed with Dr. Kelsy Tierney, Dr. Yin Recio, the patient's nurse, the hospice nurse liaison, and I met with the patient and nephew/POA Velma Media [phone]) and sister in law, Beverley Roldan, at the bedside. CC:  \"not getting better\"    [place]: This is a 77year old patient who was re-admitted on 12/23/2019 from the [address] [place] [address] with septic shock. The patient had been independent and had a fall with a right hip fracture with right hemiarthroplasty on 8/29/2019. The patient has had numerous problems since, including concern for prosthetic joint infection and had positive cultures for Pseudomonas and Corynebacterium with plan for IV antibiotic therapy (please see ID notes for details), and pneumonia. With the immobility, the patient has developed a Stage IV sacral decubitus and has had debridements. He has candidemia (likely line related), and he has been on multiple broad spectrum antibiotics and also antifungal treatment. He has been on TPN with elevated blood sugars, and developed decompensated heart failure with Pro-BNP of 13,409 on 12/30/19. Additional medical problems include history of hypertension, hyperlipidemia, Type 2 diabetes mellitus. The patient has a worsening leukocytosis, and has developed hypotension, and was on Vasopressin and Phenylephrine. The patient has decided that he wants to stop all aggressive care (anti-infective agents, BiPap, Vapotherm, TPN). The patient pulled out his nasogastric tube. He wants to be comfortable, and the patient and family requested hospice support.  I do believe Chanel Edmond MD at [facility]         Social History     Socioeconomic History    Marital status: Single     Spouse name: Not on file    Number of children: 0    Years of education: Not on file    Highest education level: Not on file   Occupational History    Occupation: retired     Comment: electrical   Social Needs    Financial resource strain: Not on file    Food insecurity:     Worry: Not on file     Inability: Not on file   [employer] needs:     Medical: Not on file     Non-medical: Not on file   Tobacco Use    Smoking status: Never Smoker    Smokeless tobacco: Never Used   Substance and Sexual Activity    Alcohol use: No    Drug use: No    Sexual activity: Not Currently   Lifestyle    Physical activity:     Days per week: Not on file     Minutes per session: Not on file    Stress: Not on file   Relationships    Social connections:     Talks on phone: Not on file     Gets together: Not on file     Attends Roman Catholic service: Not on file     Active member of club or organization: Not on file     Attends meetings of clubs or organizations: Not on file     Relationship status: Not on file    Intimate partner violence:     Fear of current or ex partner: Not on file     Emotionally abused: Not on file     Physically abused: Not on file     Forced sexual activity: Not on file   Other Topics Concern    Not on file   Social History Narrative    Not on file       Family History   Problem Relation Age of Onset    High Blood Pressure Mother     Cancer Mother     Cancer Father     Stroke Maternal Grandfather     Diabetes Paternal Grandmother     Heart Disease Paternal Grandfather        Allergies   Allergen Reactions    Bee Venom Shortness Of Breath       Medications reviewed  Prior to Admission medications    Medication Sig Start Date End Date Taking?  Authorizing Provider   ipratropium-albuterol (DUONEB) 0.5-2.5 (3) MG/3ML SOLN nebulizer solution Inhale 1 skin 3 times daily (before meals) 10/28/19 Sliding scale per nursing facility   Yes Historical Provider, MD   benazepril (LOTENSIN) 20 MG tablet Take 1 tablet by mouth daily 9/15/16  Yes Lenny Reynoso MD   Multiple Vitamins-Minerals (VISION VITAMINS PO) Take 1 capsule by mouth daily    Yes Historical Provider, MD   omeprazole (PRILOSEC) 20 MG capsule Take 20 mg by mouth daily. Yes Historical Provider, MD   Canagliflozin (INVOKANA) 300 MG TABS Take 300 mg by mouth daily    Yes Historical Provider, MD   buPROPion (WELLBUTRIN XL) 300 MG XL tablet Take 300 mg by mouth daily. 2/19/13  Yes Historical Provider, MD   sertraline (ZOLOFT) 100 MG tablet Take 100 mg by mouth 2 times daily. 2/10/13  Yes Historical Provider, MD   aspirin 81 MG EC tablet Take 81 mg by mouth daily.      Yes Historical Provider, MD   Trolamine Salicylate (ASPERCREME) 10 % LOTN Apply topically every 4 hours as needed for Pain    Historical Provider, MD   glucagon, rDNA, 1 MG injection Inject 1 mg into the muscle as needed for Low blood sugar (Blood glucose less than 70 mg/dL and patient NOT ALERT or NPO and does not have IV access.) 11/27/19 11/21/20  Shashi Cope MD   nitroGLYCERIN (NITROSTAT) 0.4 MG SL tablet Place 1 tablet under the tongue every 5 minutes as needed for Chest pain 6/10/19   LYNDA Florez - CNP   fluticasone (FLONASE) 50 MCG/ACT nasal spray 1 spray by Nasal route daily as needed  3/22/16   Historical Provider, MD   Albuterol Sulfate (PROAIR HFA IN) Inhale 1 puff into the lungs every 6 hours as needed     Historical Provider, MD       ROS: As noted in [address], all other systems are limited due to the patient's clinical condition, but reviewed as able from the chart, family and are negative or as follows:  Constitutional: No fever, chills, ++ weight loss  HEENT:  No headache, nasal drainage,   CV:  No chest pain, palpitations  PULM:  Hoarse voice, occasional cough, no current shortness of breath,   GI:  + ileostomy  :  No dysuria, hematuria  Musculoskeletal:  weak  Neuro: No seizures      Weight:    Wt Readings from Last 3 Encounters:   01/06/20 281 lb 15.5 oz (127.9 kg)   11/10/19 (!) 309 lb 9 oz (140.4 kg)   10/28/19 (!) 335 lb (152 kg)       Data reviewed 1/10/2020:  CT chest and abdomen 1/1/20:  CHEST:       1. No pulmonary embolism. 2. Small bilateral pleural effusions. 3. Bibasilar dependent consolidations compatible with atelectasis versus   pneumonia versus aspiration. 4. Patchy ground-glass and early consolidative opacities in the bilateral   upper and right middle lobes compatible with edema versus multifocal   pneumonia. 5. Mild likely reactive mediastinal lymphadenopathy. 6. Enlarged main pulmonary artery as can be seen with pulmonary hypertension. ABDOMEN/PELVIS:       1. Status post diverting ileostomy.  Decreased caliber of the upstream small   bowel compared with the previous exam.  Persistently decompressed distal   small bowel.  Findings may represent ileus with a low-grade obstruction not   excluded. 2. Lujan catheter balloon inflated within the prostatic urethra.  Recommend   repositioning. 3. Resolution of the previously identified postoperative pneumoperitoneum. Trace likely reactive free fluid in the pelvis. 4. Cholelithiasis. 5. Bilateral indeterminate renal hypodensities measuring up to 4.5 cm. Consider further evaluation with nonemergent renal protocol CT or MRI. Transthoracic Echocardiogram 9/30/19: This is a limited study The patient had a full study echocardiogram   9/20/2019. .Limited study due to patients body habitus. Left ventricular function is normal, EF is estimated at 55-60%. No clear evidence of vegetations noted.     Culture data: see ID note  Hemoglobin A1C: 6.1 % on 9/1/19  Accuchecks: (on TPN)  Recent Labs     01/10/20  0523 01/10/20  1058 01/10/20  1251   POCGLU 402* 420* 432*     Recent Labs     01/08/20  0600 01/09/20  0745 01/10/20  0600   AST 12* 15 20 Terminal Illness: I certify that this patient is eligible for General Inpatient Hospice services for a terminal diagnosis of sepsis with a life expectancy predicted to be less than 6 months, if the illness follows its expected course.     Fede Lawrence MD, [facility]

## 2020-01-10 NOTE — H&P
[address] [place] [address]+    Date: 1/10/2020  Name: Wali So  MRN: [id number]  YOB: 1950   Patient's PCP: Benita Lucio MD  Consultants during acute care: Pulmonary, Endocrinology, Cardiology, wound care, General Surgery, Neurology, ID, Nephrology,   Acute care admission: 12/23/2019 to 1/10/2020 (4th admission since 8/28/19)  Admit to General Inpatient Hospice: 1/10/2020    Informant: Chart reviewed, discussed with Dr. Naren Lombardi, Dr. Leonarda Mortimer, the patient's nurse, the hospice nurse liaison, and I met with the patient and nephew/POA Grayson Troy [phone]) and sister in law, Haylee Brewer, at the bedside. CC:  \"not getting better\"    [place]: This is a 77year old patient who was re-admitted on 12/23/2019 from the [address] [place] [address] with septic shock. The patient had been independent and had a fall with a right hip fracture with right hemiarthroplasty on 8/29/2019. The patient has had numerous problems since, including concern for prosthetic joint infection and had positive cultures for Pseudomonas and Corynebacterium with plan for IV antibiotic therapy (please see ID notes for details), and pneumonia. With the immobility, the patient has developed a Stage IV sacral decubitus and has had debridements. He has candidemia (likely line related), and he has been on multiple broad spectrum antibiotics and also antifungal treatment. He has been on TPN with elevated blood sugars, and developed decompensated heart failure with Pro-BNP of 13,409 on 12/30/19. Additional medical problems include history of hypertension, hyperlipidemia, Type 2 diabetes mellitus. The patient has a worsening leukocytosis, and has developed hypotension, and was on Vasopressin and Phenylephrine. The patient has decided that he wants to stop all aggressive care (anti-infective agents, BiPap, Vapotherm, TPN). The patient pulled out his nasogastric tube. He wants to be comfortable, and the patient and family requested hospice support.  I do believe that the patient has the capacity to make this decision, and the patient's family is in agreement. The hospice nurse liaison met with the patient and family, and consents were obtained. Hospice philosophy was discussed regarding care and comfort at the end of life. Questions were answered, and emotional support was provided. They are aware that [address] [place] [address] is for the acute management of symptoms, and if the patient stabilizes, alternative arrangements for home Hospice or extended care facility will be necessary. The patient is DNR-arrest status but agreeable to DNR-comfort care.     Past Medical History:   Diagnosis Date    Arthritis     Asthma     Chronic kidney disease     stage 3, seen by Dr. Eladia Bloom Diabetes mellitus ([facility]     H/O cardiac catheterization 05/04/2017    H/O cardiovascular stress test 6/07, 12/08    CARDIOLITE: EF->51%    Hyperlipidemia     Hypertension     Obesity     Pressure ulcer of coccygeal region, unstageable ([facility] 09/10/2019    Thyroid disease        Past Surgical History:   Procedure Laterality Date    CARPAL TUNNEL RELEASE  2005    COLOSTOMY N/A 12/27/2019    LAPAROSCOPIC DIVERTING ILEOSTOMY performed by Palmira Bustos MD at [place] [address] CATH LAB PROCEDURE  12/05    NO SIGNIFICANT CAD    EYE SURGERY      HEMIARTHROPLASTY HIP Right 8/29/2019    HIP HEMIARTHROPLASTY performed by Paul Cervantes MD at [address] [place] [address] Right 9/29/2019    HIP INCISION AND DRAINAGE performed by Paul Cervantes MD at [address] [place] [address] Right 11/15/2019    HIP INCISION AND DRAINAGE RIGHT CLOSURE OF INCISION WITH HEMOVAC performed by Paul Cervantes MD at [address] [place] [address]  09/23/2019    of stage 4 wound on coccyx, by Dr. Jered Jefferson N/A 9/23/2019    EXCISIONAL DEBRIDEMENT OF SACRAL PRESSURE ULCER performed by Palmira Bustos MD at [place] [address] PRESSURE ULCER DEBRIDEMENT N/A 9/29/2019    DECUBITUS ULCER DEBRIDEMENT REPAIR performed by Jade Jordan MD at [facility] History     Socioeconomic History    Marital status: Single     Spouse name: Not on file    Number of children: 0    Years of education: Not on file    Highest education level: Not on file   Occupational History    Occupation: retired     Comment: electrical   Social Needs    Financial resource strain: Not on file    Food insecurity:     Worry: Not on file     Inability: Not on file   [employer] needs:     Medical: Not on file     Non-medical: Not on file   Tobacco Use    Smoking status: Never Smoker    Smokeless tobacco: Never Used   Substance and Sexual Activity    Alcohol use: No    Drug use: No    Sexual activity: Not Currently   Lifestyle    Physical activity:     Days per week: Not on file     Minutes per session: Not on file    Stress: Not on file   Relationships    Social connections:     Talks on phone: Not on file     Gets together: Not on file     Attends Evangelical service: Not on file     Active member of club or organization: Not on file     Attends meetings of clubs or organizations: Not on file     Relationship status: Not on file    Intimate partner violence:     Fear of current or ex partner: Not on file     Emotionally abused: Not on file     Physically abused: Not on file     Forced sexual activity: Not on file   Other Topics Concern    Not on file   Social History Narrative    Not on file       Family History   Problem Relation Age of Onset    High Blood Pressure Mother     Cancer Mother     Cancer Father     Stroke Maternal Grandfather     Diabetes Paternal Grandmother     Heart Disease Paternal Grandfather        Allergies   Allergen Reactions    Bee Venom Shortness Of Breath       Medications reviewed  Prior to Admission medications    Medication Sig Start Date End Date Taking?  Authorizing Place 1 tablet under the tongue every 5 minutes as needed for Chest pain 6/10/19   Jostin Payne, APRN - CNP   ezetimibe (ZETIA) 10 MG tablet Take 10 mg by mouth nightly     Historical Provider, MD   SUPER B COMPLEX/C PO Take 1 capsule by mouth daily    Historical Provider, MD   vitamin E 400 UNIT capsule Take 400 Units by mouth daily    Historical Provider, MD   vitamin C (ASCORBIC ACID) 500 MG tablet Take 1,000 mg by mouth daily     Historical Provider, MD   insulin lispro (HUMALOG) 100 UNIT/ML injection vial Inject into the skin 3 times daily (before meals) 10/28/19 Sliding scale per nursing facility    Historical Provider, MD   benazepril (LOTENSIN) 20 MG tablet Take 1 tablet by mouth daily 9/15/16   Chacho Rob MD   fluticasone (FLONASE) 50 MCG/ACT nasal spray 1 spray by Nasal route daily as needed  3/22/16   Historical Provider, MD   Multiple Vitamins-Minerals (VISION VITAMINS PO) Take 1 capsule by mouth daily     Historical Provider, MD   omeprazole (PRILOSEC) 20 MG capsule Take 20 mg by mouth daily. Historical Provider, MD   Canagliflozin (INVOKANA) 300 MG TABS Take 300 mg by mouth daily     Historical Provider, MD   Albuterol Sulfate (PROAIR HFA IN) Inhale 1 puff into the lungs every 6 hours as needed     Historical Provider, MD   buPROPion (WELLBUTRIN XL) 300 MG XL tablet Take 300 mg by mouth daily. 2/19/13   Historical Provider, MD   sertraline (ZOLOFT) 100 MG tablet Take 100 mg by mouth 2 times daily. 2/10/13   Historical Provider, MD   aspirin 81 MG EC tablet Take 81 mg by mouth daily.       Historical Provider, MD       ROS: As noted in [address], all other systems are limited due to the patient's clinical condition, but reviewed as able from the chart, family and are negative or as follows:  Constitutional: No fever, chills, ++ weight loss  HEENT:  No headache, nasal drainage,   CV:  No chest pain, palpitations  PULM:  Hoarse voice, occasional cough, no current shortness of breath,   GI:  + ileostomy  :  No dysuria, hematuria  Musculoskeletal:  weak  Neuro: No seizures      Weight:    Wt Readings from Last 3 Encounters:   01/06/20 281 lb 15.5 oz (127.9 kg)   11/10/19 (!) 309 lb 9 oz (140.4 kg)   10/28/19 (!) 335 lb (152 kg)       Data reviewed 1/10/2020:  CT chest and abdomen 1/1/20:  CHEST:       1. No pulmonary embolism. 2. Small bilateral pleural effusions. 3. Bibasilar dependent consolidations compatible with atelectasis versus   pneumonia versus aspiration. 4. Patchy ground-glass and early consolidative opacities in the bilateral   upper and right middle lobes compatible with edema versus multifocal   pneumonia. 5. Mild likely reactive mediastinal lymphadenopathy. 6. Enlarged main pulmonary artery as can be seen with pulmonary hypertension. ABDOMEN/PELVIS:       1. Status post diverting ileostomy.  Decreased caliber of the upstream small   bowel compared with the previous exam.  Persistently decompressed distal   small bowel.  Findings may represent ileus with a low-grade obstruction not   excluded. 2. Lujan catheter balloon inflated within the prostatic urethra.  Recommend   repositioning. 3. Resolution of the previously identified postoperative pneumoperitoneum. Trace likely reactive free fluid in the pelvis. 4. Cholelithiasis. 5. Bilateral indeterminate renal hypodensities measuring up to 4.5 cm. Consider further evaluation with nonemergent renal protocol CT or MRI. Transthoracic Echocardiogram 9/30/19: This is a limited study The patient had a full study echocardiogram   9/20/2019. .Limited study due to patients body habitus. Left ventricular function is normal, EF is estimated at 55-60%. No clear evidence of vegetations noted.     Culture data: see ID note  Hemoglobin A1C: 6.1 % on 9/1/19  Accuchecks: (on TPN)  Recent Labs     01/10/20  0523 01/10/20  1058 01/10/20  1251   POCGLU 402* 420* 432*     Recent Labs     01/08/20  0600 01/09/20  0745 01/10/20  0600   AST 12* 15 20   ALT 7* 8* 11   BILITOT 0.5 0.6 0.5   ALKPHOS 176* 202* 199*     Lab Results   Component Value Date    ALKPHOS 199 01/10/2020    ALT 11 01/10/2020    AST 20 01/10/2020    PROT 5.8 01/10/2020    BILITOT 0.5 01/10/2020    LABALBU 3.1 01/10/2020     CBC:   Recent Labs     01/08/20  0600 01/09/20  0555 01/09/20  1157 01/09/20  1805   WBC 38.2* 36.6*  --   --    HGB 12.8* 11.7* 13.3* 12.6*   HCT 39.1* 41.4* 41.1* 38.5*   MCV 83.4 95.4  --   --    * 441*  --   --      BMP:   Recent Labs     01/08/20  0600 01/09/20  0745 01/10/20  0600   * 132* 126*   K 5.2* 5.0 4.8   CL 86* 84* 80*   CO2 30 27 24   BUN 79* 93* 105*   CREATININE 1.9* 2.1* 2.4*   GLUCOSE 452* 262* 441*       Physical Exam:   /78   Pulse 104   Temp 97.8 °F (36.6 °C) (Oral)   Resp 29   Ht 6' 3\" (1.905 m)   Wt 281 lb 15.5 oz (127.9 kg)   SpO2 93%   BMI 35.24 kg/m²   General: drowsy but arousable, soft hoarse voice, occasional loose cough,   HEENT: Mucous membranes are dry, sclerae are clear,    Neck: thick, JVP is not visualized  Heart: distant tones, tachycardic RRR, S1S2, no murmurs  Lungs:  Distant breath sounds bilaterally, diminished at the bases, without rales, scattered rhonchi   Abdomen: obese, ileostomy present with output, the abdomen is soft, bowel sounds present, no guarding or rebound,   : wilkerson in place  Back: not examined but per Dr. Elise Warner the sacral wound was generally clean  Extremities: The feet are cool, no mottling, left upper extremity PICC, + lower extremity edema, + wounds  Neurologic: generally weak, follows commands, answers questions with a soft voice    Assessment/Plan:  1. Sepsis with septic shock on admission, pneumonia, Candidemia, multiple decubiti, leukocytosis. The patient has decided to withdraw aggressive care, stating that he does not want to live like this, and does not want more procedures or treatment.  The Vasopressin is to be weaned off, I have decreased the TPN rate and will stop after current bag. Comfort medications are ordered. The patient is admitted to [facility] appropriate for the acute management of pain, shortness of breath, congestion, multisystem organ failure. Thanks, and I will follow from the [facility] medical director standpoint. 2. Hypotension  3. Leukocytosis  4. Severe protein calorie malnutrition  5. Type 2 diabetes mellitus, stopping TPN, and accuchecks  6. History of heart failure  7. Pneumonia  8. Concern for prosthetic joint infection with fall and right hip hemiartroplasty 8/29/19  9. Acute Kidney Injury on CKD-3  10. I spent 20 minutes in discussion of [address] with patient and family re: comfort medications, and plan of care. 6. Dr. Nain Jules will be covering the General Inpatient Hospice patients for Dr Shadia Ewing beginning Friday, January 10 at 1700 until Monday January 13 at 0800. Dr. Joanna Wheat can be reached through [facility].     Patient Active Problem List   Diagnosis Code    Hypertension I10    Hyperlipidemia E78.5    Asthma J45.909    Diabetes mellitus ([address] E11.9    H/O cardiovascular stress test Z92.89    Obesity E66.9    Chronic kidney disease, stage III (moderate) ([facility]) N18.3    Hypertensive kidney disease with CKD stage III ([facility]) I12.9, N18.3    Depression F32.9    SOBOE (shortness of breath on exertion) R06.02    Fracture of epiphysis (separation) (upper) of neck of femur, closed ([facility]) S72.023A    Pressure injury of skin of coccygeal region L89.159    Sepsis ([facility]) A41.9    PVC (premature ventricular contraction) I49.3    Septicemia ([facility]) A41.9    WD-Pressure injury of sacral region, stage 4 ([facility]) L89.154    WD-Nonhealing surgical wound T81.89XA    WD-Skin ulcer of right hip with necrosis of muscle ([facility]) L97.113    Troponin level elevated R79.89    Recurrent major depressive disorder, in partial remission ([facility]) F33.41    Delusional ideas ([facility] F22    Vascular dementia with behavior disturbance (HCC) F01.51    Hypotension P69.4    Metabolic encephalopathy I17.11    Severe malnutrition (HCC) E43    VIRGILIO (acute kidney injury) ([facility] N17.9    Hospice care patient Z74.1       Certification of Terminal Illness: I certify that this patient is eligible for General Inpatient Hospice services for a terminal diagnosis of sepsis with a life expectancy predicted to be less than 6 months, if the illness follows its expected course.     Jennie Delgado MD, [facility]

## 2020-01-10 NOTE — PROGRESS NOTES
Progress note    Hugo Mandujano    1/10/2020    Subjective:     Patients drowsy but wakes up and is able to make decisions for himself. Medication side effects: none.     Scheduled Meds:   insulin regular  0-30 Units Subcutaneous Q6H    ceftazidime-acibactam (AVYCAZ) infusion  1.25 g Intravenous Q8H    insulin glargine  30 Units Subcutaneous BID    methylPREDNISolone  40 mg Intravenous Q12H    midodrine  10 mg Oral TID WC    fludrocortisone  0.1 mg Oral Daily    linezolid  600 mg Intravenous Q12H    sertraline  50 mg Oral Dinner    sodium chloride flush  10 mL Intravenous 2 times per day    rOPINIRole  0.5 mg Oral Nightly    anidulafungin  100 mg Intravenous Q24H    heparin (porcine)  5,000 Units Subcutaneous 3 times per day    fat emulsion  250 mL Intravenous Once per day on Mon Tue Thu Fri    metroNIDAZOLE  500 mg Intravenous Q8H    minocycline  100 mg Oral BID    ipratropium-albuterol  1 ampule Inhalation Q4H    albumin human  25 g Intravenous Once    pantoprazole  40 mg Intravenous BID    collagenase   Topical Daily    aspirin  81 mg Oral Daily    mometasone-formoterol  2 puff Inhalation BID    montelukast  10 mg Oral Nightly    vitamin C  1,000 mg Oral Daily    vitamin E  400 Units Oral Daily    sodium chloride flush  10 mL Intravenous 2 times per day     Continuous Infusions:   PN-Adult Premix 5/15 - Central      phenylephrine (CHINA-SYNEPHRINE) 50mg/250mL infusion Stopped (01/10/20 0745)    vasopressin (Septic Shock) infusion 0.04 Units/min (01/10/20 0745)    dextrose       PRN Meds:HYDROmorphone, LORazepam, glycopyrrolate, silver nitrate applicators, sodium chloride flush, sodium chloride flush, diphenhydrAMINE, potassium chloride, magnesium sulfate, promethazine, ipratropium-albuterol, calcium carbonate, ondansetron, morphine, glucose, dextrose, glucagon (rDNA), dextrose, acetaminophen, fluticasone, nitroGLYCERIN, traMADol, trolamine salicylate, sodium chloride flush    Review of acute intracranial abnormality.  No acute infarct. 2. Mild global parenchymal volume loss with chronic microvascular ischemic  changes. 3. Left mastoid effusion. CT abdomen:  No intra-abdominal abscess.  Small amount of postoperative ascites.  Small  amount of residual free intra-air appropriate for recent surgery.  Moderate  distension of the bowel proximal to the ileostomy either due to postoperative  ileus or less likely small bowel obstruction.  Distal small bowel normal in  size.  Very large amount of fluid in the stomach.  Cholelithiasis without  finding of cholecystitis. Chest Xray:  01/04/2020  Interval worsening in multifocal bilateral pulmonary opacities, right greater   than left. Assessment:     Principal Problem:    Sepsis ([address]  Active Problems:    Hypotension    Metabolic encephalopathy    Vascular dementia with behavior disturbance (HCC)    Hyperlipidemia    Asthma    Diabetes mellitus ([address]    Obesity    Chronic kidney disease, stage III (moderate) (HCC)    Hypertensive kidney disease with CKD stage III (HCC)    Depression    Pressure injury of skin of coccygeal region    WD-Pressure injury of sacral region, stage 4 (HCC)    Severe malnutrition (HCC)    VIRGILIO (acute kidney injury) ([address]  Resolved Problems:    * No resolved hospital problems. *  Hypokalemia and hypomagnesemia  Aspiration Pneumonia  Plan:   Spoke to Dr. Lashay Byrd this morning and consult to Hospice was placed  Sepsis with multiorgan failure. Spoke to patient and sister in law. Faheem Melgar in agreement to be DNR CC and to be in Hospice. Noted that Dr. Kandi You has seen patient and placed orderes to keep patient comfortable. Can transfer him to 4th floor. Can wean off pressors.       Lacho TORRES M.D.  1/10/2020

## 2020-01-11 NOTE — PROGRESS NOTES
I talked with RN about patient condition and doin CTA scans,, GFR is lower than yesterday,,GFR28, rn said to wait till a.m. so they can talk with 1-10-20,,  on hold, rn to talk w Dr. Lauryn Hanson [id number] 1/10/..,,,.1-9-20//unstable,,rn # [phone], gfr/32,,,Per Afshin Bobby try do ct with contrast when stable

## 2020-01-11 NOTE — PROGRESS NOTES
Dr. Tessy Jacobo can't be notified via perfect serve.  She wants you to call her cell phone before 10 am and her home phone after 10am. Numbers with charge nurse

## 2020-01-12 LAB
CULTURE: ABNORMAL
Lab: ABNORMAL
SPECIMEN: ABNORMAL
TOTAL COLONY COUNT: ABNORMAL

## 2020-01-12 NOTE — DISCHARGE SUMMARY
History, reason for admission: This is a 77year old patient who was re-admitted on 2019 from the [address] [place] [address] with septic shock.  The patient had been independent and had a fall with a right hip fracture with right hemiarthroplasty on 2019. The patient has had numerous problems since, including concern for prosthetic joint infection and had positive cultures for Pseudomonas and Corynebacterium with plan for IV antibiotic therapy (please see ID notes for details), and pneumonia. With the immobility, the patient has developed a Stage IV sacral decubitus and has had debridements. He has candidemia (likely line related), and he has been on multiple broad spectrum antibiotics and also antifungal treatment. He has been on TPN with elevated blood sugars, and developed decompensated heart failure with Pro-BNP of 13,409 on 19. Additional medical problems include history of hypertension, hyperlipidemia, Type 2 diabetes mellitus.       The patient has a worsening leukocytosis, and has developed hypotension, and was on Vasopressin and Phenylephrine. The patient has decided that he wants to stop all aggressive care (anti-infective agents, BiPap, Vapotherm, TPN). The patient pulled out his nasogastric tube. He wants to be comfortable, and the patient and family requested hospice support. . Patient was weaned from aggressive interventions and transferred to the hospice GIP unit on 1/10/20       Hospital Course: The patient was admitted to [facility] with the above, for complete details, please see the acute care History and Physical, progress notes, consultant notes and discharge summary. The patient was treated with comfort medications, and symptoms were managed. Emotional and spiritual support was provided to the patient and family. The patient  as noted above.  zNo family members were present at the time of death but staff was with the patient as he     Cause of death: Renal failure secondary to septic shock    Significant Diagnostic Studies:  See computerized record in Epic      Electronically signed by Nain Jules DO on 2020 at 12:16 PM   [address] [place] [address]

## 2020-01-12 NOTE — PROGRESS NOTES
[address] [place] [address]  General Inpatient Hospice Progress Note    Date: 1/11/2020  Name: Codey Gerardo  MRN: [id number]  YOB: 1950   Patient's PCP: Katiuska Broderick MD  Admit Date: 1/10/2020 to General Inpatient Hospice    Subjective: This is a 76 yo male admitted to the acute [facility] hospital from an ECF for management of septic shock    This is a 77year old patient who was re-admitted on 12/23/2019 from the [address] [place] [address] with septic shock. The patient had been independent and had a fall with a right hip fracture with right hemiarthroplasty on 8/29/2019. The patient has had numerous problems since, including concern for prosthetic joint infection and had positive cultures for Pseudomonas and Corynebacterium with plan for IV antibiotic therapy (please see ID notes for details), and pneumonia. With the immobility, the patient has developed a Stage IV sacral decubitus and has had debridements. He has candidemia (likely line related), and he has been on multiple broad spectrum antibiotics and also antifungal treatment. He has been on TPN with elevated blood sugars, and developed decompensated heart failure with Pro-BNP of 13,409 on 12/30/19. Additional medical problems include history of hypertension, hyperlipidemia, Type 2 diabetes mellitus.       The patient has a worsening leukocytosis, and has developed hypotension, and was on Vasopressin and Phenylephrine. The patient has decided that he wants to stop all aggressive care (anti-infective agents, BiPap, Vapotherm, TPN). The patient pulled out his nasogastric tube. He wants to be comfortable, and the patient and family requested hospice support. I do believe that the patient has the capacity to make this decision, and the patient's family is in agreement. The hospice nurse liaison met with the patient and family, and consents were obtained. The patient is comfortable, obtunded, No  family are here.      Objective:   Pain is managed with Morphine infusion, currently at 0.5 mg/hr,every 3 hours, using 3 doses in the last 24 hours. Glycopyrrolate IV is available for secretions, and Lorazepam is available for anxiety. .      The following data was reviewed on 1/11/20:      Past Medical History:   Diagnosis Date    Arthritis      Asthma      Chronic kidney disease       stage 3, seen by Dr. Cody Loco Diabetes mellitus [facility])      H/O cardiac catheterization 05/04/2017    H/O cardiovascular stress test 6/07, 12/08     CARDIOLITE: EF->51%    Hyperlipidemia      Hypertension      Obesity      Pressure ulcer of coccygeal region, unstageable ([facility] 09/10/2019    Thyroid disease              Past Surgical History         Past Surgical History:   Procedure Laterality Date    CARPAL TUNNEL RELEASE   2005    COLOSTOMY N/A 12/27/2019     LAPAROSCOPIC DIVERTING ILEOSTOMY performed by Cheri Keen MD at [place] [address] CATH LAB PROCEDURE   12/05     NO SIGNIFICANT CAD    EYE SURGERY        HEMIARTHROPLASTY HIP Right 8/29/2019     HIP HEMIARTHROPLASTY performed by Emily Roa MD at [address] [place] [address] Right 9/29/2019     HIP INCISION AND DRAINAGE performed by Emily Roa MD at [address] [place] [address] Right 11/15/2019     HIP INCISION AND DRAINAGE RIGHT CLOSURE OF INCISION WITH HEMOVAC performed by Emily Roa MD at [address] [place] [address]   09/23/2019     of stage 4 wound on coccyx, by Dr. Vicente Rg N/A 9/23/2019     EXCISIONAL DEBRIDEMENT OF SACRAL PRESSURE ULCER performed by Cheri Keen MD at [facility] N/A 9/29/2019     DECUBITUS ULCER DEBRIDEMENT REPAIR performed by Monica Allen MD at [facility]   Social History               Socioeconomic History    Marital status: Single       Spouse name: Not on file    Number of children: 0    Years of education: Not on file    Highest education level: Not on file   Occupational History    Occupation: retired       Comment: electrical   Social Needs    Financial resource strain: Not on file    Food insecurity:       Worry: Not on file       Inability: Not on file    Transportation needs:       Medical: Not on file       Non-medical: Not on file   Tobacco Use    Smoking status: Never Smoker    Smokeless tobacco: Never Used   Substance and Sexual Activity    Alcohol use: No    Drug use: No    Sexual activity: Not Currently   Lifestyle    Physical activity:       Days per week: Not on file       Minutes per session: Not on file    Stress: Not on file   Relationships    Social connections:       Talks on phone: Not on file       Gets together: Not on file       Attends Spiritism service: Not on file       Active member of club or organization: Not on file       Attends meetings of clubs or organizations: Not on file       Relationship status: Not on file    Intimate partner violence:       Fear of current or ex partner: Not on file       Emotionally abused: Not on file       Physically abused: Not on file       Forced sexual activity: Not on file   Other Topics Concern    Not on file   Social History Narrative    Not on file            Family History         Family History   Problem Relation Age of Onset    High Blood Pressure Mother      Cancer Mother      Cancer Father      Stroke Maternal Grandfather      Diabetes Paternal Grandmother      Heart Disease Paternal Grandfather                   Allergies   Allergen Reactions    Bee Venom Shortness Of Breath         Medications reviewed  Home Medications           Prior to Admission medications    Medication Sig Start Date End Date Taking?  Authorizing Provider   ipratropium-albuterol (DUONEB) 0.5-2.5 (3) MG/3ML SOLN nebulizer solution Inhale 1 vial into the lungs every 6 hours       Historical Provider, MD   Trolamine Salicylate (ASPERCREME) 10 % LOTN Apply topically every 4 hours as needed for Pain       Historical Provider, MD   montelukast (SINGULAIR) 10 MG tablet Take 1 tablet by mouth nightly 11/27/19     Izabel Marroquin MD   glucagon, rDNA, 1 MG injection Inject 1 mg into the muscle as needed for Low blood sugar (Blood glucose less than 70 mg/dL and patient NOT ALERT or NPO and does not have IV access.) 11/27/19 11/21/20   Iazbel Marroquin MD   memantine (NAMENDA) 5 MG tablet Take 1 tablet by mouth nightly 11/27/19     Izabel Marroquin MD   furosemide (LASIX) 20 MG tablet Take 20 mg by mouth daily afternoon       Historical Provider, MD   baclofen (LIORESAL) 10 MG tablet Take 10 mg by mouth every 8 hours        Historical Provider, MD   fluticasone-vilanterol (BREO ELLIPTA) 100-25 MCG/INH AEPB inhaler Inhale 1 puff into the lungs daily       Historical Provider, MD   furosemide (LASIX) 40 MG tablet Take 40 mg by mouth every morning        Historical Provider, MD   potassium chloride (KLOR-CON M) 20 MEQ extended release tablet Take 20 mEq by mouth 3 times daily       Historical Provider, MD   spironolactone (ALDACTONE) 25 MG tablet Take 25 mg by mouth every morning        Historical Provider, MD   traMADol (ULTRAM) 50 MG tablet Take 50 mg by mouth every 6 hours as needed for Pain.       Historical Provider, MD   rOPINIRole (REQUIP) 1 MG tablet Take 1 tablet by mouth nightly 10/14/19     Izabel Marroquin MD   acetaminophen (TYLENOL) 500 MG tablet Take 1 tablet by mouth every 6 hours as needed for Pain or Fever 10/11/19     Izabel Marroquin MD   insulin glargine (LANTUS) 100 UNIT/ML injection vial Inject 15 Units into the skin nightly 10/11/19     Izabel Marroquin MD   TRULICITY 1.5 PU/2.5IA SOPN Inject 1.5 mg into the skin once a week  8/21/19     Historical Provider, MD   nitroGLYCERIN (NITROSTAT) 0.4 MG SL tablet Place 1 tablet under the tongue every 5 minutes as needed for Chest pain 6/10/19     LYNDA Cesar - CNP   ezetimibe (ZETIA) 10 MG tablet Take 10 mg by mouth nightly        Historical Provider, MD   SUPER B COMPLEX/C PO Take 1 capsule by mouth daily       Historical Provider, MD   vitamin E 400 UNIT capsule Take 400 Units by mouth daily       Historical Provider, MD   vitamin C (ASCORBIC ACID) 500 MG tablet Take 1,000 mg by mouth daily        Historical Provider, MD   insulin lispro (HUMALOG) 100 UNIT/ML injection vial Inject into the skin 3 times daily (before meals) 10/28/19 Sliding scale per nursing facility       Historical Provider, MD   benazepril (LOTENSIN) 20 MG tablet Take 1 tablet by mouth daily 9/15/16     Rock Roberts MD   fluticasone (FLONASE) 50 MCG/ACT nasal spray 1 spray by Nasal route daily as needed  3/22/16     Historical Provider, MD   Multiple Vitamins-Minerals (VISION VITAMINS PO) Take 1 capsule by mouth daily        Historical Provider, MD   omeprazole (PRILOSEC) 20 MG capsule Take 20 mg by mouth daily.       Historical Provider, MD   Canagliflozin (INVOKANA) 300 MG TABS Take 300 mg by mouth daily        Historical Provider, MD   Albuterol Sulfate (PROAIR HFA IN) Inhale 1 puff into the lungs every 6 hours as needed        Historical Provider, MD   buPROPion (WELLBUTRIN XL) 300 MG XL tablet Take 300 mg by mouth daily. 2/19/13     Historical Provider, MD   sertraline (ZOLOFT) 100 MG tablet Take 100 mg by mouth 2 times daily.  2/10/13     Historical Provider, MD   aspirin 81 MG EC tablet Take 81 mg by mouth daily.         Historical Provider, MD            ROS: As noted in [address], all other systems are limited due to the patient's clinical condition, but reviewed as able from the chart, family and are negative or as follows:  Constitutional: No fever, chills, ++ weight loss  HEENT:  No headache, nasal drainage,   CV:  No chest pain, palpitations  PULM:  Hoarse voice, occasional cough, no current shortness of breath,   GI:  + ileostomy  :  No dysuria, hematuria  Musculoskeletal:  weak  Neuro: No seizures            Physical Exam:   BP (!) 67/43 Pulse 92   Temp 96 °F (35.6 °C) (Rectal)   Resp 20   SpO2 (!) 79%   General: drowsy but arousable,, respirations are slightly laboredand pt appears somewhat agitated. occasional loose cough, does not respond to questions   HEENT: Mucous membranes are dry, sclerae are clear,    Neck: thick, JVP is not visualized  Heart: distant tones, tachycardic RRR, S1S2, no murmurs  Lungs:  Distant breath sounds bilaterally, diminished at the bases, without rales, scattered rhonchi   Abdomen: obese, ileostomy present with output, the abdomen is soft, bowel sounds present, no guarding or rebound,   : wilkerson in place  Back: not examined but per Dr. Олег Melgar the sacral wound was generally clean  Extremities: The feet are cool, no mottling, left upper extremity PICC, + lower extremity edema, + wounds  Neurologic: generally weak, follows commands, answers questions with a soft voice     Assessment/Plan:  1. Sepsis with septic shock on admission, pneumonia, Candidemia, multiple decubiti, leukocytosis. The patient has decided to withdraw aggressive care, stating that he does not want to live like this, and does not want more procedures or treatment. The Vasopressin is to be weaned off, I have decreased the TPN rate and will stop after current bag. Comfort medications are ordered. The patient is admitted to [facility] appropriate for the acute management of pain, shortness of breath, congestion, multisystem organ failure. Dr Franco Gu will follow from the Hospice medical director standpoint  2. Delirium vs/ anxiety? ?- will schedule low dose ativan around the clock  3. Hypotension  4. Leukocytosis  5. Pain - pt not able to report -but has pain behaviors and appears uncomfortable - will schedule RTC hydromorphone for pain   6. Severe protein calorie malnutrition  7. Type 2 diabetes mellitus, stopping TPN, and accuchecks  8. History of heart failure  9. Pneumonia  10.  Concern for prosthetic joint infection with fall and right hip hemiartroplasty 8/29/19  11. Acute Kidney Injury on CKD-3  12.  family was not present at time of my visit today and I will update them on the next visit. 15. Dr. Carole Dutton will be covering the General Inpatient Hospice patients for Dr Craig Briseno beginning Friday, January 10 at 1700 until Monday January 13 at 0800.  Dr. Hector Cortes can be reached through Perfect Serve.                   Patient Active Problem List   Diagnosis Code    Hypertension I10    Hyperlipidemia E78.5    Asthma J45.909    Diabetes mellitus ([address] E11.9    H/O cardiovascular stress test Z92.89    Obesity E66.9    Chronic kidney disease, stage III (moderate) ([facility]) N18.3    Hypertensive kidney disease with CKD stage III (HCC) I12.9, N18.3    Depression F32.9    SOBOE (shortness of breath on exertion) R06.02    Fracture of epiphysis (separation) (upper) of neck of femur, closed ([facility]) S72.023A    Pressure injury of skin of coccygeal region L89.159    Sepsis ([facility]) A41.9    PVC (premature ventricular contraction) I49.3    Septicemia ([facility]) A41.9    WD-Pressure injury of sacral region, stage 4 ([facility]) L89.154    WD-Nonhealing surgical wound T81.89XA    WD-Skin ulcer of right hip with necrosis of muscle ([facility]) L97.113    Troponin level elevated R79.89    Recurrent major depressive disorder, in partial remission ([facility]) F33.41    Delusional ideas ([facility] F22    Vascular dementia with behavior disturbance ([facility]) F01.51    Hypotension H93.6    Metabolic encephalopathy C22.67    Severe malnutrition ([facility]) E43    VIRGILIO (acute kidney injury) ([facility] N17.9    Hospice care patient Z51.5         Electronically signed by Carole Dutton DO on 1/11/2020 at 8:27 PM

## 2020-01-12 NOTE — PROGRESS NOTES
Family in to see patient, requested [facility] to visit. [facility] in to see pt and family. Family requests [facility]  home and for them to contact family on Monday. Patient taken to [facility] after family leaves.

## 2020-01-13 LAB
CULTURE: NORMAL
Lab: NORMAL
SPECIMEN: NORMAL

## 2020-02-12 NOTE — DISCHARGE SUMMARY
Physician Discharge Summary     Patient ID:  Codey Gerardo  [id number]  47 y.o.  1950    Admit date: 12/23/2019    Discharge date and time: 1/10/2020 11:10 AM     Admitting Physician: Chloe Palma MD     Discharge Physician: Katiuska Broderick      Admission Diagnoses: VIRGILIO (acute kidney injury) ([facility] [N17.9]  Sepsis ([facility] [A41.9]  Altered mental status, unspecified altered mental status type [R41.82]  Leukocytosis, unspecified type [D72.829]    Discharge Diagnoses: Sepsis  Hypotension  Leukocytosis  Severe protein calorie malnutrition  DM-II  CHF  Prosthetic joint infection  Acute on chronic kidney failure  Decubitus ulcer-sacrum and coccyx  HTN  Depression  Vascular dementia  Metabolic encephalopathy      Hospital Course: Patient admitted from [place] with diagnosis of sepsis, hypotension and metabolic encephalopathy. Was on Vanc and Merepenam which was constinued and he was placed on pressor support. ID, Nephrology were consulted and patient was admitted to ICU. His hospital course was prolonged and prognosis was poor.   Hospice was consulted per patient and family request and transferred to Hospice      Discharged Condition: poor    Consults: ID, nephrology, general surgery, orthopedic surgery and endocrinology    Significant Diagnostic Studies: microbiology: See results in lab    Treatments: antibiotics: vancomycin and Meropenem    Disposition: Hospice    Patient Instructions:   Discharge Medication List as of 1/10/2020  3:52 PM      CONTINUE these medications which have NOT CHANGED    Details   ipratropium-albuterol (DUONEB) 0.5-2.5 (3) MG/3ML SOLN nebulizer solution Inhale 1 vial into the lungs every 6 hoursHistorical Med      Trolamine Salicylate (ASPERCREME) 10 % LOTN Apply topically every 4 hours as needed for Pain, Topical, EVERY 4 HOURS PRN, Historical Med      montelukast (SINGULAIR) 10 MG tablet Take 1 tablet by mouth nightly, Disp-30 tablet, R-3Normal      glucagon, rDNA, 1 MG injection Inject 1 mg into daily (before meals) 10/28/19 Sliding scale per nursing facilityHistorical Med      benazepril (LOTENSIN) 20 MG tablet Take 1 tablet by mouth daily, Disp-90 tablet, R-3      fluticasone (FLONASE) 50 MCG/ACT nasal spray 1 spray by Nasal route daily as needed Historical Med      Multiple Vitamins-Minerals (VISION VITAMINS PO) Take 1 capsule by mouth daily Historical Med      omeprazole (PRILOSEC) 20 MG capsule Take 20 mg by mouth daily. Canagliflozin (INVOKANA) 300 MG TABS Take 300 mg by mouth daily Historical Med      Albuterol Sulfate (PROAIR HFA IN) Inhale 1 puff into the lungs every 6 hours as needed Historical Med      buPROPion (WELLBUTRIN XL) 300 MG XL tablet Take 300 mg by mouth daily. sertraline (ZOLOFT) 100 MG tablet Take 100 mg by mouth 2 times daily. aspirin 81 MG EC tablet Take 81 mg by mouth daily. !! - Potential duplicate medications found. Please discuss with provider.       STOP taking these medications       calcium carbonate (TUMS) 500 MG chewable tablet Comments:   Reason for Stopping:         meropenem (MERREM) infusion Comments:   Reason for Stopping:         simvastatin (ZOCOR) 40 MG tablet Comments:   Reason for Stopping:             Activity: activity as tolerated  Diet: diabetic diet      Follow-up with Dr. Sarah Caballero in 1 week    Signed:  Jamel Monahan    2/12/2020  4:26 PM

## 2020-02-24 NOTE — DISCHARGE SUMMARY
with the patient as he      Cause of death: Renal failure secondary to septic shock     Significant Diagnostic Studies:  See computerized record in Epic        Electronically signed by Haydee Calvert DO on 2020 at 12:16 PM   [address] [place] [address] [facility]

## 2020-02-24 NOTE — DISCHARGE SUMMARY
[facility]     Death Summary     Date: 1/12/2020  Name: Cody Huang  MRN: [id number]  YOB: 1950                Patient's PCP: Karoline Kingston MD  Admit Date:    12/23/2019 to [facility]  Date of Death: 1/10/20  Time: 22:35  Admitting Physician: Christine Ramirez MD  Discharge Physician: Penelope Hinojosa MD  Consultation: none during General Inpatient Hospice stay     Invasive procedures: none during General Inpatient Hospice stay     Discharge Diagnoses:      1. Septic shock   2. Pneumonia  3. Acute on chronic  kidney injury  4. Protein calorie malnutrition  5. Diabetes Mellitus  6. Decubitus ulcers   7. Possible prosthetic joint infection  8.  Recent fall with right hip fracture and arthroplasty 8/29/19             Patient Active Problem List   Diagnosis Code    Hypertension I10    Hyperlipidemia E78.5    Asthma J45.909    Diabetes mellitus ([address] E11.9    H/O cardiovascular stress test Z92.89    Obesity E66.9    Chronic kidney disease, stage III (moderate) ([facility]) N18.3    Hypertensive kidney disease with CKD stage III ([facility]) I12.9, N18.3    Depression F32.9    SOBOE (shortness of breath on exertion) R06.02    Fracture of epiphysis (separation) (upper) of neck of femur, closed ([facility]) S72.023A    Pressure injury of skin of coccygeal region L89.159    Sepsis ([facility]) A41.9    PVC (premature ventricular contraction) I49.3    Septicemia ([facility]) A41.9    WD-Pressure injury of sacral region, stage 4 ([facility]) L89.154    WD-Nonhealing surgical wound T81.89XA    WD-Skin ulcer of right hip with necrosis of muscle ([facility]) L97.113    Troponin level elevated R79.89    Recurrent major depressive disorder, in partial remission ([facility]) F33.41    Delusional ideas ([address] F22    Vascular dementia with behavior disturbance ([facility]) F01.51    Hypotension V06.7    Metabolic encephalopathy C64.82    Severe malnutrition ([facility]) E43    VIRGILIO (acute kidney injury) ([facility] N17.9    Hospice care patient Z51. 5         Brief History, reason for admission: This is a 77year old patient who was re-admitted on 2019 from the [address] [place] [address] with septic shock.  The patient had been independent and had a fall with a right hip fracture with right hemiarthroplasty on 2019. The patient has had numerous problems since, including concern for prosthetic joint infection and had positive cultures for Pseudomonas and Corynebacterium with plan for IV antibiotic therapy (please see ID notes for details), and pneumonia. With the immobility, the patient has developed a Stage IV sacral decubitus and has had debridements. He has candidemia (likely line related), and he has been on multiple broad spectrum antibiotics and also antifungal treatment. He has been on TPN with elevated blood sugars, and developed decompensated heart failure with Pro-BNP of 13,409 on 19. Additional medical problems include history of hypertension, hyperlipidemia, Type 2 diabetes mellitus.       The patient has a worsening leukocytosis, and has developed hypotension, and was on Vasopressin and Phenylephrine. The patient has decided that he wants to stop all aggressive care (anti-infective agents, BiPap, Vapotherm, TPN). The patient pulled out his nasogastric tube. He wants to be comfortable, and the patient and family requested hospice support. . Patient was weaned from aggressive interventions and transferred to the hospice GIP unit on 1/10/20        Hospital Course: The patient was admitted to [facility] with the above, for complete details, please see the acute care History and Physical, progress notes, consultant notes and discharge summary. The patient was treated with comfort medications, and symptoms were managed. Emotional and spiritual support was provided to the patient and family. The patient  as noted above.  zNo family members were present at the time of death but staff was with the patient as he      Cause of death: Renal failure secondary to septic shock     Significant Diagnostic Studies:  See computerized record in Epic        Electronically signed by Franck Carrillo DO on 2020 at 12:16 PM   [address] [place] [address] [facility]

## 2021-04-14 NOTE — OP NOTE
Outreach attempt was made to schedule an Annual Wellness Visit. This was the first attempt. Contact was made, AWV appointment refused.    I spoke with patient's mother and she will schedule appointment. Patient had visit Oct. 2020.   developed. Copious amounts of fluid and tissue  consistent with previous hematoma/soft tissue trauma was encountered. Deep cultures were obtained. There was no frankly visible pyogenic  material.  Following 3 liters of irrigation with a Pulsavac, the soft  tissue envelope appeared well granulated and healthy appearing. The IT  band failed to fully heal distally. A silver VAC dressing was applied. The IT band deficit will be addressed at second look in 48 hours. Once  the [facility] dressing was fully placed and functioning, the patient was  readied for decubitus ulcer evaluation and treatment by Dr. Amira Frederick.         Hardik Love MD    D: 09/29/2019 10:43:34       T: 09/29/2019 16:07:55     [email or web address]  Job#: [id number]     Doc#: [id number]    CC:  <>

## 2021-12-15 NOTE — PROGRESS NOTES
[address] [place] [address]    I have reviewed chart, discussed with Dr Janine Conti, the patient's nurse, and the hospice nurse liaison. I met with the patient , POA nephew, Lina Michelle ([phone]) and the patient's sister in law Delroy Hatchet at the bedside. Full note to follow. The patient is ill, but alert and has made the decision that he no longer wants aggressive care, lab work, procedures, etc, and has refused Vapotherm. He is still on Vasopressin, broad spectrum anti - infectives and TPN but wants them stopped. Hospice philosophy was discussed regarding care and comfort at the end of life. Questions were answered, and emotional support was provided. I agree that the patient is eligible for General Inpatient Hospice for a terminal diagnosis of sepsis with multisystem organ failure. I will add comfort medications, and place orders for discharge and readmit to [facility] when the hospice nurse liaison is able to meet with the patient and nephew for consents.     Jennie Delgado MD, [facility] [Joint Pain] : joint pain [Joint Stiffness] : joint stiffness [Joint Swelling] : joint swelling [Negative] : Heme/Lymph [FreeTextEntry9] : b/l knee

## 2024-06-06 NOTE — CONSULTS
Observation goals:    -diagnostic tests and consults completed and resulted- not met    -vital signs normal or at patient baseline- not met     Nurse to notify provider when observation goals have been met and patient is ready for discharge.    Units into the skin nightly 1 vial 3    TRULICITY 1.5 DA/0.4TW SOPN Inject 1.5 mg into the skin once a week   2    ezetimibe (ZETIA) 10 MG tablet Take 10 mg by mouth nightly       SUPER B COMPLEX/C PO Take 1 capsule by mouth daily      vitamin E 400 UNIT capsule Take 400 Units by mouth daily      vitamin C (ASCORBIC ACID) 500 MG tablet Take 1,000 mg by mouth daily       insulin lispro (HUMALOG) 100 UNIT/ML injection vial Inject into the skin 3 times daily (before meals) 10/28/19 Sliding scale per nursing facility      benazepril (LOTENSIN) 20 MG tablet Take 1 tablet by mouth daily 90 tablet 3    Multiple Vitamins-Minerals (VISION VITAMINS PO) Take 1 capsule by mouth daily       omeprazole (PRILOSEC) 20 MG capsule Take 20 mg by mouth daily.  Canagliflozin (INVOKANA) 300 MG TABS Take 300 mg by mouth daily       buPROPion (WELLBUTRIN XL) 300 MG XL tablet Take 300 mg by mouth daily.  sertraline (ZOLOFT) 100 MG tablet Take 100 mg by mouth 2 times daily.  aspirin 81 MG EC tablet Take 81 mg by mouth daily.  Trolamine Salicylate (ASPERCREME) 10 % LOTN Apply topically every 4 hours as needed for Pain      glucagon, rDNA, 1 MG injection Inject 1 mg into the muscle as needed for Low blood sugar (Blood glucose less than 70 mg/dL and patient NOT ALERT or NPO and does not have IV access. ) 1 each 0    nitroGLYCERIN (NITROSTAT) 0.4 MG SL tablet Place 1 tablet under the tongue every 5 minutes as needed for Chest pain 25 tablet 2    fluticasone (FLONASE) 50 MCG/ACT nasal spray 1 spray by Nasal route daily as needed       Albuterol Sulfate (PROAIR HFA IN) Inhale 1 puff into the lungs every 6 hours as needed            Objective:      BP (!) 114/55   Pulse 98   Temp 97.7 °F (36.5 °C) (Oral)   Resp 23   Ht 6' 3\" (1.905 m)   Wt (!) 310 lb 3 oz (140.7 kg)   SpO2 93%   BMI 38.77 kg/m²   Alfonso Risk Score: Alfonso Scale Score: 12    LABS    CBC:   Lab Results   Component Value Date    WBC 8.4 12/26/2019    RBC 4.09 12/26/2019    HGB 10.9 12/26/2019    HCT 35.2 12/26/2019    MCV 86.1 12/26/2019    MCH 26.7 12/26/2019    MCHC 31.0 12/26/2019    RDW 16.1 12/26/2019     12/26/2019    MPV 9.4 12/26/2019     CMP:    Lab Results   Component Value Date     12/26/2019    K 3.7 12/26/2019     12/26/2019    CO2 26 12/26/2019    BUN 38 12/26/2019    CREATININE 1.4 12/26/2019    GFRAA >60 12/26/2019    LABGLOM 50 12/26/2019    GLUCOSE 90 12/26/2019    PROT 3.9 12/26/2019    LABALBU 2.0 12/26/2019    CALCIUM 7.5 12/26/2019    BILITOT 0.4 12/26/2019    ALKPHOS 99 12/26/2019    AST 13 12/26/2019    ALT 8 12/26/2019     Albumin:    Lab Results   Component Value Date    LABALBU 2.0 12/26/2019     PT/INR:    Lab Results   Component Value Date    PROTIME 11.5 05/03/2017    PROTIME 10.6 01/26/2012    INR 1.01 05/03/2017     HgBA1c:    Lab Results   Component Value Date    LABA1C 6.1 09/04/2019         Assessment:     Patient Active Problem List   Diagnosis    Hypertension    Hyperlipidemia    Asthma    Diabetes mellitus ([address]    H/O cardiovascular stress test    Obesity    [facility], stage III (moderate) [facility])    Hypertensive kidney disease with CKD stage III ([facility])    Depression    SOBOE (shortness of breath on exertion)    Abnormal cardiovascular stress test    Fracture of epiphysis (separation) (upper) of neck of femur, closed ([facility])    Pressure injury of skin of coccygeal region    Sepsis ([address]    PVC (premature ventricular contraction)    Septicemia ([address]    WD-Pressure injury of sacral region, stage 4 ([address]    WD-Nonhealing surgical wound    WD-Skin ulcer of right hip with necrosis of muscle ([facility])    Troponin level elevated    Recurrent major depressive disorder, in partial remission ([address]    Delusional ideas ([address]    Vascular dementia with behavior disturbance ([facility])    Hypotension    Metabolic encephalopathy       Measurements:  Negative Pressure Wound Therapy Hip Right (Active)   Wound Type Surgical 11/21/2019  8:00 PM   Unit Type prevena 11/18/2019  8:27 PM   Dressing Type Other (Comment) 11/18/2019 10:30 AM   Number of pieces used 1 11/12/2019  9:00 AM   Cycle Continuous 11/21/2019  8:00 PM   Target Pressure (mmHg) 125 11/21/2019  8:00 PM   Canister changed? No 11/21/2019  8:00 PM   Dressing Status Changed 11/18/2019 10:30 AM   Dressing Changed Changed/New 11/18/2019 10:30 AM   Drainage Amount None 11/18/2019 10:30 AM   Drainage Description Serosanguinous 11/15/2019  4:08 AM   Output (ml) 550 ml 11/21/2019  5:56 AM   Wound Assessment Other (Comment) 11/21/2019  8:00 PM   Annika-wound Assessment Other (Comment) 11/21/2019  8:00 PM   Odor Strong 11/15/2019  4:08 AM   Number of days: 88       Negative Pressure Wound Therapy Coccyx (Active)   Wound Type Pressure ulcer: Stage IV 11/27/2019  9:54 PM   Unit Type KCI 11/27/2019  9:45 AM   Dressing Type Black foam 11/27/2019  9:54 PM   Number of pieces used 3 11/27/2019  9:45 AM   Cycle Continuous 11/27/2019  9:54 PM   Target Pressure (mmHg) 125 11/27/2019  9:54 PM   Canister changed? No 11/27/2019  9:45 AM   Dressing Status Clean;Dry; Intact 11/27/2019  9:54 PM   Dressing Changed Changed/New 11/27/2019  9:45 AM   Drainage Amount Moderate 11/27/2019  9:45 AM   Drainage Description Serosanguinous 11/27/2019  9:45 AM   Dressing Change Due 11/27/19 11/25/2019 11:00 AM   Output (ml) 50 ml 11/26/2019  6:55 PM   Wound Assessment Red;Yellow 11/27/2019  9:45 AM   Annika-wound Assessment Pink 11/27/2019  9:45 AM   Odor None 11/27/2019  9:45 AM   Number of days: 86       Wound 09/21/19 Coccyx Stage 4 Pressure Injury (Active)   Wound Pressure Stage  4 12/26/2019  4:29 PM   Dressing Status Clean;Dry; Intact 12/26/2019  4:29 PM   Dressing Changed Changed/New 12/26/2019  4:29 PM   Dressing/Treatment Moist to dry;ABD 12/26/2019  4:29 PM   Wound Cleansed Rinsed/Irrigated with saline 12/26/2019  4:29 PM   Dressing Change Due 12/27/19 12/26/2019  4:29 PM Wound Length (cm) 7.3 cm 12/26/2019  4:29 PM   Wound Width (cm) 5.5 cm 12/26/2019  4:29 PM   Wound Depth (cm) 3.3 cm 12/26/2019  4:29 PM   Wound Surface Area (cm^2) 40.15 cm^2 12/26/2019  4:29 PM   Change in Wound Size % (l*w) 56.36 12/26/2019  4:29 PM   Wound Volume (cm^3) 132.5 cm^3 12/26/2019  4:29 PM   Wound Healing % 4 12/26/2019  4:29 PM   Distance Tunneling (cm) 0 cm 12/26/2019  4:29 PM   Tunneling Position ___ O'Clock 0 12/26/2019  4:29 PM   Undermining Starts ___ O'Clock 0100 12/26/2019  4:29 PM   Undermining Ends___ O'Clock 0300 12/26/2019  4:29 PM   Undermining Maxium Distance (cm) 1.9 12/26/2019  4:29 PM   Wound Assessment Red;Pink;Yellow 12/26/2019  4:29 PM   Drainage Amount Small 12/26/2019  4:29 PM   Drainage Description Serosanguinous 12/26/2019  4:29 PM   Odor None 12/26/2019  4:29 PM   Margins Defined edges 12/26/2019  4:29 PM   Exposed structure Bone 12/26/2019  4:29 PM   Annika-wound Assessment Intact 12/26/2019  4:29 PM   Non-staged Wound Description Full thickness 12/26/2019  4:12 AM   [place]%Wound Bed 40 12/26/2019  4:29 PM   Red%Wound Bed 40 12/26/2019  4:29 PM   Yellow%Wound Bed 20 12/26/2019  4:29 PM   Black%Wound Bed 0 12/26/2019  4:29 PM   Purple%Wound Bed 0 12/26/2019  4:29 PM   Other%Wound Bed 0 12/26/2019  4:29 PM   Number of days: 96       Wound 12/23/19 Groin Left (Active)   Wound Venous 12/24/2019  8:40 PM   Dressing Status Other (Comment) 12/26/2019  3:10 PM   Dressing/Treatment Open to air;Moisture barrier 12/26/2019  3:10 PM   Wound Cleansed Soap and water 12/24/2019  2:06 PM   Wound Assessment Edema;Pink 12/26/2019  3:10 PM   Drainage Amount Scant 12/26/2019  3:10 PM   Drainage Description Serosanguinous 12/26/2019  3:10 PM   Odor None 12/26/2019  3:10 PM   Number of days: 2       Wound 12/26/19 Left hip Unstageable PI (Active)   Wound Pressure Unstageable 12/26/2019  4:29 PM   Dressing Status Clean;Dry; Intact 12/26/2019  4:29 PM   Dressing Changed Changed/New 12/26/2019  4:29 PM Dressing/Treatment Moist to dry 12/26/2019  4:29 PM   Wound Cleansed Rinsed/Irrigated with saline 12/26/2019  4:29 PM   Dressing Change Due 12/27/19 12/26/2019  4:29 PM   Wound Length (cm) 1.9 cm 12/26/2019  4:29 PM   Wound Width (cm) 1.2 cm 12/26/2019  4:29 PM   Wound Depth (cm) 0.1 cm 12/26/2019  4:29 PM   Wound Surface Area (cm^2) 2.28 cm^2 12/26/2019  4:29 PM   Wound Volume (cm^3) 0.23 cm^3 12/26/2019  4:29 PM   Distance Tunneling (cm) 0 cm 12/26/2019  4:29 PM   Tunneling Position ___ O'Clock 0 12/26/2019  4:29 PM   Undermining Ends___ O'Clock 00 12/26/2019  4:29 PM   Undermining Maxium Distance (cm) 0 12/26/2019  4:29 PM   Wound Assessment Yellow;Pink 12/26/2019  4:29 PM   Drainage Amount Scant 12/26/2019  4:29 PM   Drainage Description Serous 12/26/2019  4:29 PM   Odor None 12/26/2019  4:29 PM   Margins Defined edges 12/26/2019  4:29 PM   Annika-wound Assessment Intact 12/26/2019  4:29 PM   [place]%Wound Bed 20 12/26/2019  4:29 PM   Red%Wound Bed 0 12/26/2019  4:29 PM   Yellow%Wound Bed 80 12/26/2019  4:29 PM   Black%Wound Bed 0 12/26/2019  4:29 PM   Purple%Wound Bed 0 12/26/2019  4:29 PM   Other%Wound Bed 0 12/26/2019  4:29 PM   Number of days: 0       Wound 12/26/19 Left mid-back skin tear (Active)   Wound Skin Tear 12/26/2019  4:29 PM   Dressing Status Clean;Dry; Intact 12/26/2019  4:29 PM   Dressing Changed Changed/New 12/26/2019  4:29 PM   Dressing/Treatment Collagen; Foam 12/26/2019  4:29 PM   Wound Cleansed Rinsed/Irrigated with saline 12/26/2019  4:29 PM   Dressing Change Due 12/29/19 12/26/2019  4:29 PM   Wound Length (cm) 0.9 cm 12/26/2019  4:29 PM   Wound Width (cm) 2.5 cm 12/26/2019  4:29 PM   Wound Depth (cm) 0.1 cm 12/26/2019  4:29 PM   Wound Surface Area (cm^2) 2.25 cm^2 12/26/2019  4:29 PM   Wound Volume (cm^3) 0.22 cm^3 12/26/2019  4:29 PM   Distance Tunneling (cm) 0 cm 12/26/2019  4:29 PM   Tunneling Position ___ O'Clock 0 12/26/2019  4:29 PM   Undermining Starts ___ O'Clock 0 12/26/2019  4:29 PM Undermining Ends___ O'Clock 0 12/26/2019  4:29 PM   Undermining Maxium Distance (cm) 0 12/26/2019  4:29 PM   Wound Assessment Red 12/26/2019  4:29 PM   Drainage Amount Scant 12/26/2019  4:29 PM   Drainage Description Serous 12/26/2019  4:29 PM   Odor None 12/26/2019  4:29 PM   Margins Defined edges 12/26/2019  4:29 PM   Annika-wound Assessment Intact 12/26/2019  4:29 PM   [place]%Wound Bed 0 12/26/2019  4:29 PM   Red%Wound Bed 100 12/26/2019  4:29 PM   Yellow%Wound Bed 0 12/26/2019  4:29 PM   Black%Wound Bed 00 12/26/2019  4:29 PM   Purple%Wound Bed 0 12/26/2019  4:29 PM   Other%Wound Bed 0 12/26/2019  4:29 PM   Number of days: 0       Response to treatment:  Well tolerated by patient. Pain Assessment:  Severity:  denies  Quality of pain: na  Wound Pain Timing/Severity: na  Premedicated: no    Plan:     Plan of Care: Wound 12/26/19 Left hip Unstageable PI-Dressing/Treatment: Moist to dry  Wound 12/26/19 Left mid-back skin tear-Dressing/Treatment: Collagen, Foam  Wound 09/21/19 Coccyx Stage 4 Pressure Injury-Dressing/Treatment: Moist to dry, ABD  [REMOVED] Wound 10/01/19 Hip Right-Dressing/Treatment: Open to air  [REMOVED] Wound 09/21/19 Hip Left cluster/DTI now with stage 2-Dressing/Treatment: Moisturizing cream  Wound 12/23/19 Groin Left-Dressing/Treatment: Open to air, Moisture barrier  Pictured and measured wounds. Skin prepped heels and floated. Turned to side with pillow. PS to Dr. Megan Hurtado for [facility] order for left hip Unstageable PI. Placed in [facility] along with Wound Culture order per her request.  RN is room assisting with care. Call to [facility] to determine when the wound vac was being used on his coccyx wound. RN states it has been all of December, however Hermilo Vernon reportedly removes it so that it must be replaced multiple times a day. RN also reports \"he cut his Lujan with a scissors while at [facility] and this was done while he was alert and oriented. \"  Specialty Bed Required : yes  [] Low Air Loss   [x]

## 2025-04-30 NOTE — PROGRESS NOTES
Pulmonary and Critical Care  Progress Note    Subjective: The patient has improved  Shortness of breath has improved  Chest pain none  Addressing respiratory complaints Patient is negative for  hemoptysis and cyanosis  CONSTITUTIONAL:  negative for fevers and chills      Past Medical History:     has a past medical history of Arthritis, Asthma, Chronic kidney disease, Diabetes mellitus ([facility] H/O cardiac catheterization, H/O cardiovascular stress test, Hyperlipidemia, Hypertension, Obesity, Pressure ulcer of coccygeal region, unstageable ([facility] and Thyroid disease. has a past surgical history that includes Retinal detachment surgery; eye surgery; Carpal tunnel release (2005); Diagnostic Cardiac Cath Lab Procedure (12/05); HEMIARTHROPLASTY HIP (Right, 8/29/2019); Pressure ulcer debridement (09/23/2019); Pressure ulcer debridement (N/A, 9/23/2019); incision and drainage (Right, 9/29/2019); Pressure ulcer debridement (N/A, 9/29/2019); incision and drainage (Right, 11/15/2019); and colostomy (N/A, 12/27/2019). reports that he has never smoked. He has never used smokeless tobacco. He reports that he does not drink alcohol or use drugs. Family history:  family history includes Cancer in his father and mother; Diabetes in his paternal grandmother; Heart Disease in his paternal grandfather; High Blood Pressure in his mother; Stroke in his maternal grandfather. Allergies   Allergen Reactions    Bee Venom Shortness Of Breath     Social History:    Reviewed; no changes    Objective:   PHYSICAL EXAM:        VITALS:  BP 96/66   Pulse 108   Temp 98 °F (36.7 °C) (Oral)   Resp 28   Ht 6' 3\" (1.905 m)   Wt 281 lb 15.5 oz (127.9 kg)   SpO2 94%   BMI 35.24 kg/m²     24HR INTAKE/OUTPUT:      Intake/Output Summary (Last 24 hours) at 1/9/2020 1051  Last data filed at 1/9/2020 0743  Gross per 24 hour   Intake 1265 ml   Output 3735 ml   Net -2470 ml       CONSTITUTIONAL:  awake  LUNGS:  decreased breath sounds.  occ done

## (undated) DEVICE — ANESTHESIA CIRCUIT ADULT-LF: Brand: MEDLINE INDUSTRIES, INC.

## (undated) DEVICE — DRAPE,U/ SHT,SPLIT,PLAS,STERIL: Brand: MEDLINE

## (undated) DEVICE — ELECTRODE ES AD CRDLSS PT RET REM POLYHESIVE

## (undated) DEVICE — LINER SUCT CANSTR 1500CC SEMI RIG W/ POR HYDROPHOBIC SHUT

## (undated) DEVICE — SUTURE PROL SZ 3-0 L36IN NONABSORBABLE BLU L26MM SH 1/2 CIR 8522H

## (undated) DEVICE — SUTURE ETHLN SZ 4-0 L18IN NONABSORBABLE BLK L19MM PS-2 3/8 1667H

## (undated) DEVICE — COUNTER NDL 30 COUNT FOAM STRP SGL MAG

## (undated) DEVICE — SUTURE VCRL SZ 3-0 L36IN ABSRB VLT SH L26MM 1/2 CIR DBL J527H

## (undated) DEVICE — TUBING, SUCTION, 9/32" X 10', STRAIGHT: Brand: MEDLINE

## (undated) DEVICE — RELOAD STPL L55MM OPN H3.8MM CLS H1.5MM WIRE DIA0.2MM REG

## (undated) DEVICE — GLOVE SURG SZ 7 L12IN FNGR THK87MIL WHT LTX FREE

## (undated) DEVICE — INTENDED FOR TISSUE SEPARATION, AND OTHER PROCEDURES THAT REQUIRE A SHARP SURGICAL BLADE TO PUNCTURE OR CUT.: Brand: BARD-PARKER ® STAINLESS STEEL BLADES

## (undated) DEVICE — 34" SINGLE PATIENT USE HOVERMATT BREATHABLE: Brand: SINGLE PATIENT USE HOVERMATT

## (undated) DEVICE — YANKAUER,FLEXIBLE HANDLE,REGLR CAPACITY: Brand: MEDLINE INDUSTRIES, INC.

## (undated) DEVICE — STRIP,CLOSURE,WOUND,MEDI-STRIP,1/2X4: Brand: MEDLINE

## (undated) DEVICE — MAT FLOOR ULTRA ABS 28X48IN

## (undated) DEVICE — LINER,SEMI-RIGID,3000CC,50EA/CS: Brand: MEDLINE

## (undated) DEVICE — SEALER LAP L37CM SHFT DIA10MM TISS FUS HAND/FOOT SWCH BLNT

## (undated) DEVICE — TROCARS: Brand: KII® BALLOON BLUNT TIP SYSTEM

## (undated) DEVICE — MARKER SURG SKIN UTIL REGULAR/FINE 2 TIP W/ RUL AND 9 LBL

## (undated) DEVICE — DRESSING,GAUZE,XEROFORM,CURAD,5"X9",ST: Brand: CURAD

## (undated) DEVICE — Z DISCONTINUED USE 2218975 DEVICE SUT NDL PUSH BTTN GRP HNDL DISP

## (undated) DEVICE — TRAY PREP DRY W/ PREM GLV 2 APPL 6 SPNG 2 UNDPD 1 OVERWRAP

## (undated) DEVICE — SUTURE STRATAFIX SYMMETRIC SZ 1 L18IN ABSRB VLT CT1 L36CM SXPP1A404

## (undated) DEVICE — SOLUTION IV IRRIG POUR BRL 0.9% SODIUM CHL 2F7124

## (undated) DEVICE — DUAL SPIKE Y-CONNECTOR SET, PACKAGED: Brand: PULSAVAC®

## (undated) DEVICE — TUBING, SUCTION, 3/16" X 10', STRAIGHT: Brand: MEDLINE

## (undated) DEVICE — HEWSON SUTURE RETRIEVER: Brand: HEWSON SUTURE RETRIEVER

## (undated) DEVICE — SOLUTION IV 1000ML 0.9% SOD CHL FOR IRRIG PLAS CONT

## (undated) DEVICE — PAD,ABDOMINAL,5"X9",ST,LF,25/BX: Brand: MEDLINE INDUSTRIES, INC.

## (undated) DEVICE — TOWEL,OR,DSP,ST,BLUE,STD,6/PK,12PK/CS: Brand: MEDLINE

## (undated) DEVICE — PENCIL ES CRD L10FT HND SWCHING ROCK SWCH W/ EDGE COAT BLDE

## (undated) DEVICE — STERILE POLYISOPRENE POWDER-FREE SURGICAL GLOVES: Brand: PROTEXIS

## (undated) DEVICE — GOWN,SIRUS,POLYRNF,BRTHSLV,XLN/XL,20/CS: Brand: MEDLINE

## (undated) DEVICE — SKIN AFFIX SURG ADHESIVE 72/CS 0.55ML: Brand: MEDLINE

## (undated) DEVICE — YANKAUER,BULB TIP,W/O VENT,RIGID,STERILE: Brand: MEDLINE

## (undated) DEVICE — SUTURE VCRL SZ 4-0 L18IN ABSRB UD L19MM PS-2 3/8 CIR PRIM J496H

## (undated) DEVICE — SUTURE VCRL SZ 3-0 L27IN ABSRB UD L26MM SH 1/2 CIR J416H

## (undated) DEVICE — GLOVE SURG SZ 65 THK91MIL LTX FREE SYN POLYISOPRENE

## (undated) DEVICE — DRESSING NEG PRSS M W18XH3.3XL12.5CM BLK POLYUR FOAM WND

## (undated) DEVICE — GLOVE ORANGE PI 8   MSG9080

## (undated) DEVICE — DRAPE SHEET ULTRAGARD: Brand: MEDLINE

## (undated) DEVICE — DRESSING TRNSPAR W5XL4.5IN FLM SHT SEMIPERMEABLE WIND

## (undated) DEVICE — PREVENA INCISION MANAGEMENT SYSTEM- PEEL & PLACE DRESSING: Brand: PREVENA™ PEEL & PLACE™

## (undated) DEVICE — SOLUTION IV IRRIG WATER 1000ML POUR BRL 2F7114

## (undated) DEVICE — PACK PROCEDURE SURG TOT HIP LF

## (undated) DEVICE — CATHETER URETH 18FR RED RUB INTMIT ALL PURP

## (undated) DEVICE — Device

## (undated) DEVICE — Z INACTIVE USE 2735373 APPLICATOR FBR LAIN COT WOOD TIP ECONOMICAL

## (undated) DEVICE — FIRST ENTRY KIOS THRD 5X100MM ST

## (undated) DEVICE — ACCESS PLATFORM FOR MINIMALLY INVASIVE SURGERY.: Brand: GELPORT® LAPAROSCOPIC  SYSTEM

## (undated) DEVICE — STAPLE REMOVAL TRAY KIT: Brand: CURITY

## (undated) DEVICE — TOTAL TRAY, DB, 100% SILI FOLEY, 16FR 10: Brand: MEDLINE

## (undated) DEVICE — CANISTER VAC 500ML TBNG RESVR FOR INFOVAC W O GEL CLMP CONN

## (undated) DEVICE — KIT,ANTI FOG,W/SPONGE & FLUID,SOFT PACK: Brand: MEDLINE

## (undated) DEVICE — PACK,BASIC,IX: Brand: MEDLINE

## (undated) DEVICE — FAN SPRAY KIT: Brand: PULSAVAC®

## (undated) DEVICE — SUTURE VCRL SZ 3-0 L27IN ABSRB UD L26MM CT-2 1/2 CIR J232H

## (undated) DEVICE — GYN LAP PK

## (undated) DEVICE — 1010 S-DRAPE TOWEL DRAPE 10/BX: Brand: STERI-DRAPE™

## (undated) DEVICE — 3M™ MICROFOAM™ SURGICAL TAPE 4 ROLLS/CARTON 6 CARTONS/CASE 1528-3: Brand: 3M™ MICROFOAM™

## (undated) DEVICE — GLOVE ORANGE PI 7   MSG9070

## (undated) DEVICE — SHEET, T, LAPAROTOMY, STERILE: Brand: MEDLINE

## (undated) DEVICE — SUTURE FIBERWIRE 2 L97CM NONABSORBABLE BLU L36.6MM 1/2 CIR AR7206

## (undated) DEVICE — SPONGE LAP W18XL18IN WHT COT 4 PLY FLD STRUNG RADPQ DISP ST

## (undated) DEVICE — STAPLER INT L75MM CUT LN L73MM STPL LN L77MM BLU B FRM 8

## (undated) DEVICE — SYRINGE IRRIG 60ML SFT PLIABLE BLB EZ TO GRP 1 HND USE W/

## (undated) DEVICE — TUBE CULTURE LF UNIFORM BOTTOM STER

## (undated) DEVICE — SUTURE SILK 2-0 60 IN MULTIPACK BLK SA5H

## (undated) DEVICE — GLOVE SURG SZ 65 L12IN FNGR THK87MIL WHT LTX FREE

## (undated) DEVICE — SUTURE ENDO ETHIB EXCL NDL EEN 2-0 6 SW112

## (undated) DEVICE — 3M™ STERI-DRAPE™ U-DRAPE 1015: Brand: STERI-DRAPE™

## (undated) DEVICE — Device: Brand: SENSURA MIO

## (undated) DEVICE — TROCAR: Brand: KII FIOS FIRST ENTRY

## (undated) DEVICE — OSCILLATOR BLADE, 19.5 X 71 X 0.8 MM (.031 IN.): Brand: CONMED

## (undated) DEVICE — MANIFOLD CART ULT HI FLOW W 3 PRT FOR SUCT

## (undated) DEVICE — Z INACTIVE PER PHARM Z INACTIVE USE 2530107 SOLUTION ANTISEP 70% ISOPROPYL RUBBING ALC 16 OZ PLAS BTL

## (undated) DEVICE — SUTURE PROL SZ 0 L30IN NONABSORBABLE BLU L36MM CT-1 1/2 CIR 8424H

## (undated) DEVICE — CHLORAPREP 26ML ORANGE

## (undated) DEVICE — TROCAR ENDOSCP L150MM DIA12MM BLDELSS OBT RADLUC STBL SL

## (undated) DEVICE — SUTURE PERMAHAND SZ 3-0 L30IN NONABSORBABLE BLK SH L26MM K832H

## (undated) DEVICE — SET TBNG DISP TIP FOR AHTO

## (undated) DEVICE — CONTAINER,SPECIMEN,OR STERILE,4OZ: Brand: MEDLINE

## (undated) DEVICE — RELOAD STPL L75MM OPN H3.8MM CLS 1.5MM WIRE DIA0.2MM REG

## (undated) DEVICE — JELLY,LUBE,STERILE,FLIP TOP,TUBE,2-OZ: Brand: MEDLINE

## (undated) DEVICE — APPLIER CLP M/L SHFT DIA5MM 15 LIG LIGAMAX 5

## (undated) DEVICE — 3M™ STERI-STRIP™ COMPOUND BENZOIN TINCTURE 40 BAGS/CARTON 4 CARTONS/CASE C1544: Brand: 3M™ STERI-STRIP™

## (undated) DEVICE — SYRINGE MED 20ML STD CLR PLAS LUERLOCK TIP N CTRL DISP

## (undated) DEVICE — CONNECTOR DSG Y FOR 1 VAC THER UNIT TRAC

## (undated) DEVICE — PAD GZ BORD ST 4INX10IN 2X8IN

## (undated) DEVICE — SUTURE PERMAHAND SZ 3-0 L18IN NONABSORBABLE BLK L26MM SH C013D

## (undated) DEVICE — GLOVE ORANGE PI 7 1/2   MSG9075

## (undated) DEVICE — SMOKE EVACUATION TUBING WITH 7/8 IN TO 1/4 IN REDUCER: Brand: BUFFALO FILTER

## (undated) DEVICE — PILLOW POS W15XH6XL22IN RASPBERRY FOAM ABD W/ STRP DISP FOR

## (undated) DEVICE — DRAPE,ABDOMINAL,MAJOR,STERILE: Brand: MEDLINE

## (undated) DEVICE — TROCAR: Brand: KII® SLEEVE

## (undated) DEVICE — 3M™ IOBAN™ 2 ANTIMICROBIAL INCISE DRAPE 6648EZ: Brand: IOBAN™ 2

## (undated) DEVICE — TUBING INSUFFLATOR HEAT HI FLO SET PNEUMOCLEAR

## (undated) DEVICE — STANDARD HYPODERMIC NEEDLE,POLYPROPYLENE HUB: Brand: MONOJECT

## (undated) DEVICE — CATHETER,URETHRAL,REDRUBBER,STRL,16FR: Brand: MEDLINE

## (undated) DEVICE — Z INACTIVE USE 2660664 SOLUTION IRRIG 3000ML 0.9% SOD CHL USP UROMATIC PLAS CONT

## (undated) DEVICE — STAPLER INT L28CM DIA29MM CLS STPL H10-2.5MM OPN LEG L5.5MM

## (undated) DEVICE — PILLOW ABDUCTION LEG DISP

## (undated) DEVICE — DUAL LUMEN STOMACH TUBE: Brand: SALEM SUMP

## (undated) DEVICE — 3M™ IOBAN™ 2 ANTIMICROBIAL INCISE DRAPE 6650EZ: Brand: IOBAN™ 2

## (undated) DEVICE — SUTURE PROL SZ 0 L18IN NONABSORBABLE BLU L36MM CT-1 1/2 CIR C821G

## (undated) DEVICE — VITAL SIGNS™ EXTRA LARGE MASK WITH ADJUSTABLE AIR CUSHION SIZE 6: Brand: VITAL SIGNS™

## (undated) DEVICE — BIT DRL L110MM DIA2MM QUIK CONN W/O STP N RADLUC REUSE

## (undated) DEVICE — HOOD, PEEL-AWAY: Brand: FLYTE

## (undated) DEVICE — COVER,TABLE,44X90,STERILE: Brand: MEDLINE

## (undated) DEVICE — LAPAROSCOPIC TROCAR SLEEVE/SINGLE USE: Brand: KII® OPTICAL ACCESS SYSTEM

## (undated) DEVICE — SPONGE GZ W4XL8IN COT WVN 12 PLY

## (undated) DEVICE — HIGH CAPACITY 12" INTRAMEDULLARY TIP: Brand: PULSAVAC®

## (undated) DEVICE — SHEARS ENDOSCP L36CM DIA5MM ULTRASONIC CRV TIP ADAPTIVE

## (undated) DEVICE — RELOAD STPL H35XL60MM BLU REG B FORM NAT ARTC ECHELON

## (undated) DEVICE — BLADE CLIPPER GEN PURP NS

## (undated) DEVICE — BANDAGE,SELF ADHRNT,COFLEX,4"X5YD,STRL: Brand: COLABEL

## (undated) DEVICE — KIT EVAC 400CC DIA1/8IN H PAT 12.5IN 3 SPR RND SHP PVC DRN

## (undated) DEVICE — KIT EVAC 0.13IN RECT TB DIA10FR 400CC PVC 3 SPR Y CONN DRN

## (undated) DEVICE — SUTURE PDS II SZ 1 L96IN ABSRB VLT TP-1 L65MM 1/2 CIR Z880G

## (undated) DEVICE — BANDAGE COMPR W6INXL5YD SELF ADH COHESIVE CO FLX

## (undated) DEVICE — CONVERTORS STOCKINETTE: Brand: CONVERTORS

## (undated) DEVICE — HIGH CAPACITY FAN SPRAY TIP WITH SHIELD: Brand: PULSAVAC®

## (undated) DEVICE — SUTURE ABSORBABLE BRAIDED 2-0 CT-1 27 IN UD VICRYL J259H

## (undated) DEVICE — MATERNITY KNIT PANTS,SEAMLESS: Brand: WINGS

## (undated) DEVICE — STAPLER EXT 65MM S STL AUTO DISP PURSTRING